# Patient Record
Sex: FEMALE | Race: WHITE | NOT HISPANIC OR LATINO | Employment: OTHER | URBAN - METROPOLITAN AREA
[De-identification: names, ages, dates, MRNs, and addresses within clinical notes are randomized per-mention and may not be internally consistent; named-entity substitution may affect disease eponyms.]

---

## 2017-01-01 ENCOUNTER — HOSPITAL ENCOUNTER (EMERGENCY)
Facility: HOSPITAL | Age: 37
Discharge: HOME | End: 2017-01-01
Attending: FAMILY MEDICINE
Payer: MEDICAID

## 2017-01-01 VITALS
OXYGEN SATURATION: 99 % | HEIGHT: 62 IN | WEIGHT: 293 LBS | SYSTOLIC BLOOD PRESSURE: 136 MMHG | HEART RATE: 90 BPM | BODY MASS INDEX: 53.92 KG/M2 | TEMPERATURE: 98 F | RESPIRATION RATE: 18 BRPM | DIASTOLIC BLOOD PRESSURE: 72 MMHG

## 2017-01-01 DIAGNOSIS — N39.0 URINARY TRACT INFECTION WITHOUT HEMATURIA, SITE UNSPECIFIED: Primary | ICD-10-CM

## 2017-01-01 DIAGNOSIS — R05.9 COUGH: ICD-10-CM

## 2017-01-01 LAB
B-HCG UR QL: NEGATIVE
BACTERIA #/AREA URNS AUTO: ABNORMAL /HPF
BILIRUB UR QL STRIP: NEGATIVE
CLARITY UR REFRACT.AUTO: CLEAR
COLOR UR AUTO: ABNORMAL
GLUCOSE UR QL STRIP: ABNORMAL
HGB UR QL STRIP: ABNORMAL
KETONES UR QL STRIP: NEGATIVE
LEUKOCYTE ESTERASE UR QL STRIP: ABNORMAL
MICROSCOPIC COMMENT: ABNORMAL
NITRITE UR QL STRIP: NEGATIVE
PH UR STRIP: 7 [PH] (ref 5–8)
PROT UR QL STRIP: NEGATIVE
RBC #/AREA URNS AUTO: 2 /HPF (ref 0–4)
SP GR UR STRIP: 1.01 (ref 1–1.03)
URN SPEC COLLECT METH UR: ABNORMAL
UROBILINOGEN UR STRIP-ACNC: NEGATIVE EU/DL
WBC #/AREA URNS AUTO: 10 /HPF (ref 0–5)
WBC CLUMPS UR QL AUTO: ABNORMAL
YEAST UR QL AUTO: ABNORMAL

## 2017-01-01 PROCEDURE — 87086 URINE CULTURE/COLONY COUNT: CPT

## 2017-01-01 PROCEDURE — 81000 URINALYSIS NONAUTO W/SCOPE: CPT

## 2017-01-01 PROCEDURE — 87077 CULTURE AEROBIC IDENTIFY: CPT

## 2017-01-01 PROCEDURE — 25000003 PHARM REV CODE 250: Performed by: FAMILY MEDICINE

## 2017-01-01 PROCEDURE — 87186 SC STD MICRODIL/AGAR DIL: CPT

## 2017-01-01 PROCEDURE — 81025 URINE PREGNANCY TEST: CPT

## 2017-01-01 PROCEDURE — 99284 EMERGENCY DEPT VISIT MOD MDM: CPT

## 2017-01-01 PROCEDURE — 87088 URINE BACTERIA CULTURE: CPT

## 2017-01-01 RX ORDER — TRAMADOL HYDROCHLORIDE 50 MG/1
50 TABLET ORAL EVERY 6 HOURS PRN
Qty: 12 TABLET | Refills: 0 | Status: SHIPPED | OUTPATIENT
Start: 2017-01-01 | End: 2017-01-11

## 2017-01-01 RX ORDER — SULFAMETHOXAZOLE AND TRIMETHOPRIM 800; 160 MG/1; MG/1
1 TABLET ORAL 2 TIMES DAILY
Qty: 14 TABLET | Refills: 0 | Status: SHIPPED | OUTPATIENT
Start: 2017-01-01 | End: 2017-01-08

## 2017-01-01 RX ORDER — SULFAMETHOXAZOLE AND TRIMETHOPRIM 800; 160 MG/1; MG/1
1 TABLET ORAL
Status: COMPLETED | OUTPATIENT
Start: 2017-01-01 | End: 2017-01-01

## 2017-01-01 RX ADMIN — SULFAMETHOXAZOLE AND TRIMETHOPRIM 1 TABLET: 800; 160 TABLET ORAL at 09:01

## 2017-01-01 NOTE — ED AVS SNAPSHOT
[facility]  [address] [address]               Lele Dias   2017  8:12 PM   ED    Description:  Female : 1980   Department:  [facility]           Your Care was Coordinated By:     Provider Role From Binu Cochran MD Attending Provider [phone] --      Reason for Visit     Flank Pain     Cough           Diagnoses this Visit        Comments    Urinary tract infection without hematuria, site unspecified    -  Primary     Cough           ED Disposition     ED Disposition Condition Comment    Discharge             To Do List           Follow-up Information     Schedule an appointment as soon as possible for a visit with Kari Edge MD.    Specialty:  Emergency Medicine    Why:  If symptoms worsen    Contact information:    [address]  [facility] [address]  [phone]         These Medications        Disp Refills Start End    sulfamethoxazole-trimethoprim 800-160mg (BACTRIM DS) 800-160 mg Tab 14 tablet 0 2017    Take 1 tablet by mouth 2 (two) times daily. - Oral    Pharmacy: [facility] #[place]  [address] Ph #: [phone]       tramadol (ULTRAM) 50 mg tablet 12 tablet 0 2017    Take 1 tablet (50 mg total) by mouth every 6 (six) hours as needed for Pain. With a tylenol prn pain - Oral    Pharmacy: [employer] [facility] [address] [address] Ph #: [phone]         [facility]     [facility] -  Assistance  Registered nurses in the [facility] provide clinical advisement, health education, appointment booking, and other advisory services.  Call for this free service at [phone].             Medications           Message regarding Medications     Verify the changes and/or additions to your medication regime listed below are the same as discussed with your clinician today.  If any  of these changes or additions are incorrect, please notify your healthcare provider.        START taking these NEW medications        Refills    sulfamethoxazole-trimethoprim 800-160mg (BACTRIM DS) 800-160 mg Tab 0    Sig: Take 1 tablet by mouth 2 (two) times daily.    Class: Normal    Route: Oral    tramadol (ULTRAM) 50 mg tablet 0    Sig: Take 1 tablet (50 mg total) by mouth every 6 (six) hours as needed for Pain. With a tylenol prn pain    Class: Print    Route: Oral      STOP taking these medications     carvedilol (COREG) 3.125 MG tablet Take 1 tablet (3.125 mg total) by mouth 2 (two) times daily.    diclofenac (VOLTAREN) 75 MG EC tablet Take 1 tablet (75 mg total) by mouth 2 (two) times daily as needed (Pain).    naproxen (NAPROSYN) 500 MG tablet Take 1 tablet (500 mg total) by mouth 2 (two) times daily with meals.           Verify that the below list of medications is an accurate representation of the medications you are currently taking.  If none reported, the list may be blank. If incorrect, please contact your healthcare provider. Carry this list with you in case of emergency.           Current Medications     albuterol (ACCUNEB) 1.25 mg/3 mL Nebu Take 1.25 mg by nebulization every 4 (four) hours as needed.    amitriptyline (ELAVIL) 50 MG tablet Take 50 mg by mouth nightly as needed for Insomnia.    buprenorphine-naloxone 2-0.5 mg (SUBOXONE) 2-0.5 mg Subl Place 8 mg under the tongue once daily.    fluticasone-salmeterol 100-50 mcg/dose (ADVAIR) 100-50 mcg/dose diskus inhaler Inhale 1 puff into the lungs 2 (two) times daily.    hydrOXYzine pamoate (VISTARIL) 50 MG Cap Take 50 mg by mouth every 8 (eight) hours as needed.    insulin glargine (LANTUS) 100 unit/mL injection Inject 120 Units into the skin 2 (two) times daily.     insulin lispro (HUMALOG) 100 unit/mL injection Inject 95 Units into the skin 3 (three) times daily before meals.     lisinopril (PRINIVIL,ZESTRIL) 20 MG tablet Take 20 mg by mouth once  "daily.     omeprazole (PRILOSEC) 40 MG capsule Take 40 mg by mouth every morning.     pregabalin (LYRICA) 150 MG capsule Take 300 mg by mouth 2 (two) times daily.    promethazine (PHENERGAN) 25 MG tablet Take 25 mg by mouth every 4 (four) hours.    sulfamethoxazole-trimethoprim 800-160mg (BACTRIM DS) 800-160 mg Tab Take 1 tablet by mouth 2 (two) times daily.    tramadol (ULTRAM) 50 mg tablet Take 1 tablet (50 mg total) by mouth every 6 (six) hours as needed for Pain. With a tylenol prn pain           Clinical Reference Information           Your Vitals Were     BP Pulse Temp Resp Height Weight    136/72 (BP Location: Right arm, Patient Position: Sitting) 90 97.6 °F (36.4 °C) (Oral) 18 5' 2" (1.575 m) 136.1 kg (300 lb)    SpO2 BMI             99% 54.87 kg/m2         Allergies as of 1/1/2017        Reactions    Celexa [Citalopram] Nausea And Vomiting      Immunizations Administered on Date of Encounter - 1/1/2017     None      ED Micro, Lab, POCT     Start Ordered       Status Ordering Provider    01/01/17 2023 01/01/17 2022  Urinalysis  STAT      Final result     01/01/17 2023 01/01/17 2022  UPT (Pregnancy, urine rapid)  STAT      Final result     01/01/17 2022 01/01/17 2022  Urinalysis Microscopic  Once      Final result       ED Imaging Orders     Start Ordered       Status Ordering Provider    01/01/17 2025 01/01/17 2025  X-Ray Chest PA And Lateral  1 time imaging      Final result       Discharge References/Attachments     URINARY TRACT INFECTIONS IN WOMEN (ENGLISH)      [facility] Sign-Up     Activating your [facility] account is as easy as 1-2-3!     1) Visit [email or web address], select Sign Up Now, enter this activation code and your date of birth, then select Next.  [email or web address]  Expires: 2/15/2017  9:41 PM      2) Create a username and password to use when you visit [facility] in the future and select a security question in case you lose your password and select Next.    3) Enter your e-mail address and click " Sign Up!    Additional Information  If you have questions, please e-mail [email or web address] or call [phone] to talk to our [facility] staff. Remember, [facility] is NOT to be used for urgent needs. For medical emergencies, dial 911.         Smoking Cessation     If you would like to quit smoking:   You may be eligible for free services if you are a [place] resident and started smoking cigarettes before September 1, 1988.  Call the Smoking Cessation Trust ([facility]) toll free at [phone] or [phone].   Call 4-474-QUIT-NOW if you do not meet the above criteria.             [facility] complies with applicable Federal civil rights laws and does not discriminate on the basis of race, color, national origin, age, disability, or sex.        Language Assistance Services     ATTENTION: Language assistance services are available, free of charge. Please call [phone].      ATENCIÓN: Si habla español, tiene a moore disposición servicios gratuitos de asistencia lingüística. Llame al [phone].     CHÚ Ý: N?u b?n nói Ti?ng Vi?t, có các d?ch v? h? tr? ngôn ng? mi?n phí leylah cho b?n. G?i s? [phone].

## 2017-01-02 NOTE — ED NOTES
"When nurse attempt to give pt her tramadol rx pt hand it back to nurse and state "I can't have this. Im on suboxone and I don't want any issues with my doctor." Rx voided and shredded per Dr. Cochran instruction  "

## 2017-01-02 NOTE — ED PROVIDER NOTES
Encounter Date: 2017       History     Chief Complaint   Patient presents with    Flank Pain     Pt state she has been having pain in the right side x1 week    Cough     Review of patient's allergies indicates:   Allergen Reactions    Celexa [citalopram] Nausea And Vomiting     HPI Comments: Patient's a 36-year-old white female comes in complaining of right flank pain of approximately one week duration.  Patient states this is waxed and waned for a week along with some intermittent nausea and vomiting.  Now she states as a dull ache aggravated when she coughs.  She's had no hematuria or dysuria of note.  The patient is on Depo-Provera.    The history is provided by the patient.     Past Medical History   Diagnosis Date    Asthma     Depression     Diabetes mellitus, type II     Diastolic dysfunction     E. 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   Respiratory: Positive for cough. Negative for shortness of breath.    Gastrointestinal: Positive for nausea and  vomiting. Negative for diarrhea.   Genitourinary: Positive for flank pain. Negative for frequency, hematuria, vaginal bleeding and vaginal discharge.   All other systems reviewed and are negative.      Physical Exam   Initial Vitals   BP Pulse Resp Temp SpO2   01/01/17 2014 01/01/17 2014 01/01/17 2014 01/01/17 2014 01/01/17 2014   136/72 90 18 97.6 °F (36.4 °C) 99 %     Physical Exam    Nursing note and vitals reviewed.  Constitutional: She appears well-developed.   Morbidly obese white female in no respiratory distress   HENT:   Head: Normocephalic.   Eyes: Pupils are equal, round, and reactive to light.   Neck: Neck supple.   Cardiovascular: Normal rate and regular rhythm. Exam reveals no friction rub.    Pulmonary/Chest: Breath sounds normal. No respiratory distress. She has no wheezes. She has no rhonchi. She has no rales.   Abdominal: She exhibits no distension. There is no tenderness.   Musculoskeletal: Normal range of motion.   Left arm amputated above elbow   Neurological: She is alert and oriented to person, place, and time. She has normal strength. No sensory deficit.   Skin: Skin is warm and dry.   Psychiatric: She has a normal mood and affect.         ED Course   Procedures  Labs Reviewed   URINALYSIS - Abnormal; Notable for the following:        Result Value    Glucose, UA 4+ (*)     Occult Blood UA 1+ (*)     Leukocytes, UA 1+ (*)     All other components within normal limits   URINALYSIS MICROSCOPIC - Abnormal; Notable for the following:     WBC, UA 10 (*)     WBC Clumps, UA Occasional (*)     Bacteria, UA Moderate (*)     All other components within normal limits   PREGNANCY TEST, URINE RAPID             Medical Decision Making:   Clinical Tests:   Lab Tests: Ordered and Reviewed  The following lab test(s) were unremarkable: UPT       <> Summary of Lab: 10 WBCs per high-powered field of urine.  WBCs in clumps                   ED Course     Clinical Impression:   The primary encounter diagnosis was  Urinary tract infection without hematuria, site unspecified. A diagnosis of Cough was also pertinent to this visit.          RONA Cochran MD  01/01/17 3725

## 2017-01-05 LAB — BACTERIA UR CULT: NORMAL

## 2017-01-08 ENCOUNTER — HOSPITAL ENCOUNTER (EMERGENCY)
Facility: HOSPITAL | Age: 37
Discharge: HOME | End: 2017-01-08
Attending: EMERGENCY MEDICINE
Payer: MEDICAID

## 2017-01-08 VITALS
SYSTOLIC BLOOD PRESSURE: 156 MMHG | HEART RATE: 94 BPM | BODY MASS INDEX: 53.92 KG/M2 | RESPIRATION RATE: 18 BRPM | DIASTOLIC BLOOD PRESSURE: 69 MMHG | WEIGHT: 293 LBS | HEIGHT: 62 IN | TEMPERATURE: 98 F | OXYGEN SATURATION: 100 %

## 2017-01-08 DIAGNOSIS — L02.91 ABSCESS: Primary | ICD-10-CM

## 2017-01-08 PROCEDURE — 99283 EMERGENCY DEPT VISIT LOW MDM: CPT

## 2017-01-08 RX ORDER — SULFAMETHOXAZOLE AND TRIMETHOPRIM 800; 160 MG/1; MG/1
1 TABLET ORAL 2 TIMES DAILY
Qty: 14 TABLET | Refills: 0 | Status: SHIPPED | OUTPATIENT
Start: 2017-01-08 | End: 2017-01-15

## 2017-01-08 RX ORDER — DOXYCYCLINE 100 MG/1
100 CAPSULE ORAL 2 TIMES DAILY
Qty: 20 CAPSULE | Refills: 0 | Status: SHIPPED | OUTPATIENT
Start: 2017-01-08 | End: 2017-01-18

## 2017-01-08 NOTE — DISCHARGE INSTRUCTIONS
Abscess (Antibiotic Treatment Only)  An abscess (sometimes called a boil) occurs when bacteria get trapped under the skin and begin to grow. Pus forms inside the abscess as the body responds to the bacteria. An abscess can occur with an insect bite, ingrown hair, blocked oil gland, pimple, cyst, or puncture wound.  In the early stages, redness and tenderness are the only symptoms. Sometimes, this stage can be treated with antibiotics alone. If the abscess does not respond to antibiotic treatment, it will need to be drained with a small cut, under local anesthesia.  Home care  The following will help you care for your abscess at home:  · Soak the wound in hot water or apply hot packs (small towel soaked in hot water) to the area for 20 minutes at a time. Do this 3 to 4 times a day.  · Do not lakia, squeeze, or pop the boil yourself.  · Apply antibiotic cream or ointment onto the skin 3 to 4 times a day, unless something else was prescribed. Some ointments include an antibiotic plus a local pain reliever.  · If your doctor prescribed antibiotics, do not stop taking this medication until you have finished the medication or the doctor tells you to stop.  · You may use an over-the-counter pain medication to control pain, unless another pain medicine was prescribed. If you have chronic liver or kidney disease or ever had a stomach ulcer or gastrointestinal bleeding, talk with your doctor before using these any of these.  Follow-up care  Follow up with your health care provider as advised by our staff. Look at your wound each day for the signs of worsening infection listed below.  When to seek medical care  Get prompt medical attention if any of the following occur:  · An increase in redness or swelling  · Red streaks in the skin leading away from the abscess  · An increase in local pain or swelling  · Fever of 100.4ºF (38ºC) or higher, or as directed by your health care provider  · Pus or fluid coming from the  abscess  · Boil returns after getting better  © 2257-5865 [employer]. [address] [place] [address], [place] [address]. All rights reserved. This information is not intended as a substitute for professional medical care. Always follow your healthcare professional's instructions.

## 2017-01-08 NOTE — ED PROVIDER NOTES
"Encounter Date: 2017       History     Chief Complaint   Patient presents with    Abscess     Pt states "I have a boil on my vagina, about 3 days"     Review of patient's allergies indicates:   Allergen Reactions    Celexa [citalopram] Nausea And Vomiting     The history is provided by the patient. No  was used.    Patient complains of sinus on the knee region above her vagina for 3 days.  Patient denies any nausea vomiting or diarrhea.  Patient denies any fever.  Patient states that she has had drainage from the site.  Patient states that she has been taking Amoxil for her abscess.  Patient states the pain is 8 out of 10.  States she has been taking Amoxil for 3 days.    Past Medical History   Diagnosis Date    Asthma     Depression     Diabetes mellitus, type II     Diastolic dysfunction     E. 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      Physical Exam   Initial Vitals   BP Pulse Resp Temp SpO2   17 1402 17 1402 " 01/08/17 1402 01/08/17 1402 01/08/17 1402   183/106 97 20 98 °F (36.7 °C) 100 %     Physical Exam    Nursing note and vitals reviewed.  Constitutional: She appears well-developed and well-nourished.   HENT:   Head: Normocephalic and atraumatic.   Neck: Normal range of motion.   Pulmonary/Chest: Breath sounds normal.   Musculoskeletal: Normal range of motion.   Neurological: She is alert and oriented to person, place, and time.   Skin: Skin is warm and dry. Abscess noted. There is erythema.   Psychiatric: She has a normal mood and affect.         ED Course   Procedures  Labs Reviewed - No data to display                            ED Course     Clinical Impression:   The encounter diagnosis was Abscess.          Sumanth Vaz MD  01/08/17 [id number]

## 2017-01-08 NOTE — ED AVS SNAPSHOT
[facility]  [address] [place] [address]               Lele Dias   2017  1:58 PM   ED    Description:  Female : 1980   Department:  [facility]           Your Care was Coordinated By:     Provider Role From To    Sumanth Vaz MD Attending Provider 17 1356 --      Reason for Visit     Abscess           Diagnoses this Visit        Comments    Abscess    -  Primary       ED Disposition     ED Disposition Condition Comment    Discharge             To Do List           Follow-up Information     Follow up with Kari Edge MD In 1 week.    Specialty:  Emergency Medicine    Contact information:    Michael LYMAN  [facility] [address]  [phone]         These Medications        Disp Refills Start End    doxycycline (VIBRAMYCIN) 100 MG Cap 20 capsule 0 2017    Take 1 capsule (100 mg total) by mouth 2 (two) times daily. - Oral    sulfamethoxazole-trimethoprim 800-160mg (BACTRIM DS) 800-160 mg Tab 14 tablet 0 2017 1/15/2017    Take 1 tablet by mouth 2 (two) times daily. - Oral      [facility]     [facility] On Call Nurse Care Line -  Assistance  Registered nurses in the [facility] provide clinical advisement, health education, appointment booking, and other advisory services.  Call for this free service at [phone].             Medications           Message regarding Medications     Verify the changes and/or additions to your medication regime listed below are the same as discussed with your clinician today.  If any of these changes or additions are incorrect, please notify your healthcare provider.        START taking these NEW medications        Refills    doxycycline (VIBRAMYCIN) 100 MG Cap 0    Sig: Take 1 capsule (100 mg total) by mouth 2 (two) times daily.    Class: Print    Route: Oral           Verify that the below list of medications is an accurate representation of  "the medications you are currently taking.  If none reported, the list may be blank. If incorrect, please contact your healthcare provider. Carry this list with you in case of emergency.           Current Medications     albuterol (ACCUNEB) 1.25 mg/3 mL Nebu Take 1.25 mg by nebulization every 4 (four) hours as needed.    amitriptyline (ELAVIL) 50 MG tablet Take 50 mg by mouth nightly as needed for Insomnia.    buprenorphine-naloxone 2-0.5 mg (SUBOXONE) 2-0.5 mg Subl Place 8 mg under the tongue once daily.    doxycycline (VIBRAMYCIN) 100 MG Cap Take 1 capsule (100 mg total) by mouth 2 (two) times daily.    fluticasone-salmeterol 100-50 mcg/dose (ADVAIR) 100-50 mcg/dose diskus inhaler Inhale 1 puff into the lungs 2 (two) times daily.    hydrOXYzine pamoate (VISTARIL) 50 MG Cap Take 50 mg by mouth every 8 (eight) hours as needed.    insulin glargine (LANTUS) 100 unit/mL injection Inject 120 Units into the skin 2 (two) times daily.     insulin lispro (HUMALOG) 100 unit/mL injection Inject 95 Units into the skin 3 (three) times daily before meals.     lisinopril (PRINIVIL,ZESTRIL) 20 MG tablet Take 20 mg by mouth once daily.     omeprazole (PRILOSEC) 40 MG capsule Take 40 mg by mouth every morning.     pregabalin (LYRICA) 150 MG capsule Take 300 mg by mouth 2 (two) times daily.    promethazine (PHENERGAN) 25 MG tablet Take 25 mg by mouth every 4 (four) hours.    sulfamethoxazole-trimethoprim 800-160mg (BACTRIM DS) 800-160 mg Tab Take 1 tablet by mouth 2 (two) times daily.    tramadol (ULTRAM) 50 mg tablet Take 1 tablet (50 mg total) by mouth every 6 (six) hours as needed for Pain. With a tylenol prn pain           Clinical Reference Information           Your Vitals Were     BP Pulse Temp Resp Height Weight    183/106 97 98 °F (36.7 °C) 20 5' 2" (1.575 m) 136.1 kg (300 lb)    SpO2 BMI             100% 54.87 kg/m2         Allergies as of 1/8/2017        Reactions    Celexa [Citalopram] Nausea And Vomiting    "   Immunizations Administered on Date of Encounter - 1/8/2017     None      ED Micro, Lab, POCT     None      ED Imaging Orders     None        Discharge Instructions         Abscess (Antibiotic Treatment Only)  An abscess (sometimes called a boil) occurs when bacteria get trapped under the skin and begin to grow. Pus forms inside the abscess as the body responds to the bacteria. An abscess can occur with an insect bite, ingrown hair, blocked oil gland, pimple, cyst, or puncture wound.  In the early stages, redness and tenderness are the only symptoms. Sometimes, this stage can be treated with antibiotics alone. If the abscess does not respond to antibiotic treatment, it will need to be drained with a small cut, under local anesthesia.  Home care  The following will help you care for your abscess at home:  · Soak the wound in hot water or apply hot packs (small towel soaked in hot water) to the area for 20 minutes at a time. Do this 3 to 4 times a day.  · Do not lakia, squeeze, or pop the boil yourself.  · Apply antibiotic cream or ointment onto the skin 3 to 4 times a day, unless something else was prescribed. Some ointments include an antibiotic plus a local pain reliever.  · If your doctor prescribed antibiotics, do not stop taking this medication until you have finished the medication or the doctor tells you to stop.  · You may use an over-the-counter pain medication to control pain, unless another pain medicine was prescribed. If you have chronic liver or kidney disease or ever had a stomach ulcer or gastrointestinal bleeding, talk with your doctor before using these any of these.  Follow-up care  Follow up with your health care provider as advised by our staff. Look at your wound each day for the signs of worsening infection listed below.  When to seek medical care  Get prompt medical attention if any of the following occur:  · An increase in redness or swelling  · Red streaks in the skin leading away from  the abscess  · An increase in local pain or swelling  · Fever of 100.4ºF (38ºC) or higher, or as directed by your health care provider  · Pus or fluid coming from the abscess  · Boil returns after getting better  © 6101-5212 [employer]. [address] [place] [address]. All rights reserved. This information is not intended as a substitute for professional medical care. Always follow your healthcare professional's instructions.          [facility] Sign-Up     Activating your [facility] account is as easy as 1-2-3!     1) Visit [email or web address], select Sign Up Now, enter this activation code and your date of birth, then select Next.  [email or web address]  Expires: 2/15/2017  9:41 PM      2) Create a username and password to use when you visit [facility] in the future and select a security question in case you lose your password and select Next.    3) Enter your e-mail address and click Sign Up!    Additional Information  If you have questions, please e-mail [email or web address] or call [phone] to talk to our [facility] staff. Remember, [facility] is NOT to be used for urgent needs. For medical emergencies, dial 911.         Smoking Cessation     If you would like to quit smoking:   You may be eligible for free services if you are a [place] resident and started smoking cigarettes before September 1, 1988.  Call the Smoking Cessation Trust ([facility]) toll free at (234) 751-9720 or (127) 704-6905.   Call 0-800-QUIT-NOW if you do not meet the above criteria.             [facility] complies with applicable Federal civil rights laws and does not discriminate on the basis of race, color, national origin, age, disability, or sex.        Language Assistance Services     ATTENTION: Language assistance services are available, free of charge. Please call [phone].      ATENCIÓN: Si habla español, tiene a moore disposición servicios gratuitos de asistencia lingüística. Llame al  [phone].     SUREKHA Ý: N?u b?n nói Ti?ng Vi?t, có các d?ch v? h? tr? ngôn ng? mi?n phí dành cho b?n. G?i s? [phone].

## 2017-04-27 ENCOUNTER — HOSPITAL ENCOUNTER (EMERGENCY)
Facility: HOSPITAL | Age: 37
Discharge: HOME | End: 2017-04-28
Attending: EMERGENCY MEDICINE
Payer: MEDICAID

## 2017-04-27 DIAGNOSIS — N39.0 LOWER URINARY TRACT INFECTIOUS DISEASE: Primary | ICD-10-CM

## 2017-04-27 DIAGNOSIS — F11.10 DRUG ABUSE, OPIOID TYPE: ICD-10-CM

## 2017-04-27 LAB
AMPHET+METHAMPHET UR QL: NEGATIVE
ANION GAP SERPL CALC-SCNC: 13 MMOL/L
B-HCG UR QL: NEGATIVE
BACTERIA #/AREA URNS AUTO: ABNORMAL /HPF
BARBITURATES UR QL SCN>200 NG/ML: NORMAL
BENZODIAZ UR QL SCN>200 NG/ML: NORMAL
BILIRUB UR QL STRIP: NEGATIVE
BUN SERPL-MCNC: 21 MG/DL
BZE UR QL SCN: NEGATIVE
CALCIUM SERPL-MCNC: 9.4 MG/DL
CANNABINOIDS UR QL SCN: NEGATIVE
CHLORIDE SERPL-SCNC: 106 MMOL/L
CLARITY UR REFRACT.AUTO: ABNORMAL
CO2 SERPL-SCNC: 19 MMOL/L
COLOR UR AUTO: ABNORMAL
CREAT SERPL-MCNC: 1.45 MG/DL
CREAT UR-MCNC: 282.6 MG/DL
EST. GFR  (AFRICAN AMERICAN): 52.3 ML/MIN/1.73 M^2
EST. GFR  (NON AFRICAN AMERICAN): 45.4 ML/MIN/1.73 M^2
GLUCOSE SERPL-MCNC: 300 MG/DL
GLUCOSE UR QL STRIP: ABNORMAL
HGB UR QL STRIP: ABNORMAL
HYALINE CASTS UR QL AUTO: 7 /LPF
KETONES UR QL STRIP: NEGATIVE
LEUKOCYTE ESTERASE UR QL STRIP: ABNORMAL
METHADONE UR QL SCN>300 NG/ML: NEGATIVE
MICROSCOPIC COMMENT: ABNORMAL
NITRITE UR QL STRIP: NEGATIVE
OPIATES UR QL SCN: NORMAL
PCP UR QL SCN>25 NG/ML: NEGATIVE
PH UR STRIP: 5 [PH] (ref 5–8)
POCT GLUCOSE: 271 MG/DL (ref 70–110)
POTASSIUM SERPL-SCNC: 3.9 MMOL/L
PROT UR QL STRIP: ABNORMAL
RBC #/AREA URNS AUTO: 4 /HPF (ref 0–4)
SODIUM SERPL-SCNC: 138 MMOL/L
SP GR UR STRIP: 1.02 (ref 1–1.03)
SQUAMOUS #/AREA URNS AUTO: ABNORMAL /HPF
TOXICOLOGY INFORMATION: NORMAL
URN SPEC COLLECT METH UR: ABNORMAL
UROBILINOGEN UR STRIP-ACNC: NEGATIVE EU/DL
WBC #/AREA URNS AUTO: 6 /HPF (ref 0–5)

## 2017-04-27 PROCEDURE — 82962 GLUCOSE BLOOD TEST: CPT

## 2017-04-27 PROCEDURE — 85027 COMPLETE CBC AUTOMATED: CPT

## 2017-04-27 PROCEDURE — 99284 EMERGENCY DEPT VISIT MOD MDM: CPT | Mod: 25

## 2017-04-27 PROCEDURE — 81000 URINALYSIS NONAUTO W/SCOPE: CPT

## 2017-04-27 PROCEDURE — 82570 ASSAY OF URINE CREATININE: CPT

## 2017-04-27 PROCEDURE — 80048 BASIC METABOLIC PNL TOTAL CA: CPT

## 2017-04-27 PROCEDURE — 81025 URINE PREGNANCY TEST: CPT

## 2017-04-27 PROCEDURE — 85007 BL SMEAR W/DIFF WBC COUNT: CPT

## 2017-04-27 NOTE — ED AVS SNAPSHOT
[facility]  [address] [place] [address]               Lele Dias   2017 10:01 PM   ED    Description:  Female : 1980   Department:  [facility]           Your Care was Coordinated By:     Provider Role From To    Lana Ramires MD Attending Provider [phone] --      Reason for Visit     Dizziness           Diagnoses this Visit        Comments    Lower urinary tract infectious disease    -  Primary     Drug abuse, opioid type           ED Disposition     ED Disposition Condition Comment    Discharge             To Do List           Follow-up Information     Follow up with Kari Edge MD.    Specialty:  Emergency Medicine    Contact information:    Michael DON[facility] [address]  [phone]         These Medications        Disp Refills Start End    nitrofurantoin, macrocrystal-monohydrate, (MACROBID) 100 MG capsule 14 capsule 0 2017     Take 1 capsule (100 mg total) by mouth 2 (two) times daily. - Oral    Pharmacy: [address] #: [phone]         [facility] On Call     [facility] Care [facility] -  Assistance  Unless otherwise directed by your provider, please contact [facility], our nurse care line that is available for  assistance.     Registered nurses in the [facility] provide: appointment scheduling, clinical advisement, health education, and other advisory services.  Call: [phone] (toll free)               Medications           Message regarding Medications     Verify the changes and/or additions to your medication regime listed below are the same as discussed with your clinician today.  If any of these changes or additions are incorrect, please notify your healthcare provider.        START taking these NEW medications        Refills    nitrofurantoin, macrocrystal-monohydrate, (MACROBID) 100 MG capsule 0     "Sig: Take 1 capsule (100 mg total) by mouth 2 (two) times daily.    Class: Print    Route: Oral           Verify that the below list of medications is an accurate representation of the medications you are currently taking.  If none reported, the list may be blank. If incorrect, please contact your healthcare provider. Carry this list with you in case of emergency.           Current Medications     albuterol (ACCUNEB) 1.25 mg/3 mL Nebu Take 1.25 mg by nebulization every 4 (four) hours as needed.    amitriptyline (ELAVIL) 50 MG tablet Take 50 mg by mouth nightly as needed for Insomnia.    buprenorphine-naloxone 2-0.5 mg (SUBOXONE) 2-0.5 mg Subl Place 8 mg under the tongue once daily.    fluticasone-salmeterol 100-50 mcg/dose (ADVAIR) 100-50 mcg/dose diskus inhaler Inhale 1 puff into the lungs 2 (two) times daily.    hydrOXYzine pamoate (VISTARIL) 50 MG Cap Take 50 mg by mouth every 8 (eight) hours as needed.    insulin glargine (LANTUS) 100 unit/mL injection Inject 120 Units into the skin 2 (two) times daily.     insulin lispro (HUMALOG) 100 unit/mL injection Inject 95 Units into the skin 3 (three) times daily before meals.     lisinopril (PRINIVIL,ZESTRIL) 20 MG tablet Take 20 mg by mouth once daily.     nitrofurantoin, macrocrystal-monohydrate, (MACROBID) 100 MG capsule Take 1 capsule (100 mg total) by mouth 2 (two) times daily.    omeprazole (PRILOSEC) 40 MG capsule Take 40 mg by mouth every morning.     pregabalin (LYRICA) 150 MG capsule Take 300 mg by mouth 2 (two) times daily.    promethazine (PHENERGAN) 25 MG tablet Take 25 mg by mouth every 4 (four) hours.           Clinical Reference Information           Your Vitals Were     BP Pulse Temp Resp Height Weight    127/91 (BP Location: Right arm, Patient Position: Sitting, BP Method: Automatic) 76 98.4 °F (36.9 °C) (Oral) 18 5' 2" (1.575 m) 148.8 kg (328 lb)    SpO2 BMI             96% 59.99 kg/m2         Allergies as of 4/28/2017        Reactions    Celexa " "[Citalopram] Nausea And Vomiting      Immunizations Administered on Date of Encounter - 4/28/2017     None      ED Micro, Lab, POCT     Start Ordered       Status Ordering Provider    04/27/17 2244 04/27/17 2243  UPT (Pregnancy, urine rapid)  STAT      Final result     04/27/17 2244 04/27/17 2243  Urinalysis  STAT      Final result     04/27/17 2244 04/27/17 2243  Drug screen panel, urine emergency  Once      Final result     04/27/17 2243 04/27/17 2243  Urinalysis Microscopic  Once      Final result     04/27/17 2228 04/27/17 2227  Basic metabolic panel  STAT      Final result     04/27/17 2228 04/27/17 2227  CBC auto differential  STAT      Final result     04/27/17 2216 04/27/17 2216  POCT glucose  Once      Final result     04/27/17 2210 04/27/17 2209  POCT glucose  Once      Acknowledged       ED Imaging Orders     None        Discharge Instructions           Bladder Infection, Female (Adult)    Urine is normally doesn't have any bacteria in it. But bacteria can get into the urinary tract from the skin around the rectum. Or they can travel in the blood from elsewhere in the body. Once they are in your urinary tract, they can cause infection in the urethra (urethritis), the bladder (cystitis), or the kidneys (pyelonephritis).  The most common place for an infection is in the bladder. This is called a bladder infection. This is one of the most common infections in women. Most bladder infections are easily treated. They are not serious unless the infection spreads to the kidney.  The phrases "bladder infection," "UTI," and "cystitis" are often used to describe the same thing. But they are not always the same. Cystitis is an inflammation of the bladder. The most common cause of cystitis is an infection.  Symptoms  The infection causes inflammation in the urethra and bladder. This causes many of the symptoms. The most common symptoms of a bladder infection are:  · Pain or burning when urinating  · Having to urinate " more often than usual  · Urgent need to urinate  · Only a small amount of urine comes out  · Blood in urine  · Abdominal discomfort. This is usually in the lower abdomen above the pubic bone.  · Cloudy urine  · Strong- or bad-smelling urine  · Unable to urinate (urinary retention)  · Unable to hold urine in (urinary incontinence)  · Fever  · Loss of appetite  · Confusion (in older adults)  Causes  Bladder infections are not contagious. You can't get one from someone else, from a toilet seat, or from sharing a bath.  The most common cause of bladder infections is bacteria from the bowels. The bacteria get onto the skin around the opening of the urethra. From there, they can get into the urine and travel up to the bladder, causing inflammation and infection. This usually happens because of:  · Wiping improperly after urinating. Always wipe from front to back.  · Bowel incontinence  · Pregnancy  · Procedures such as having a catheter inserted  · Older age  · Not emptying your bladder. This can allow bacteria a chance to grow in your urine.  · Dehydration  · Constipation  · Sex  · Use of a diaphragm for birth control   Treatment  Bladder infections are diagnosed by a urine test. They are treated with antibiotics and usually clear up quickly without complications. Treatment helps prevent a more serious kidney infection.  Medicines  Medicines can help in the treatment of a bladder infection:  · Take antibiotics until they are used up, even if you feel better. It is important to finish them to make sure the infection has cleared.  · You can use acetaminophen or ibuprofen for pain, fever, or discomfort, unless another medicine was prescribed. If you have chronic liver or kidney disease, talk with your healthcare provider before using these medicines. Also talk with your provider if you've ever had a stomach ulcer or gastrointestinal bleeding, or are taking blood-thinner medicines.  · If you are given phenazopydridine to  reduce burning with urination, it will cause your urine to become a bright orange color. This can stain clothing.  Care and prevention  These self-care steps can help prevent future infections:  · Drink plenty of fluids to prevent dehydration and flush out your bladder. Do this unless you must restrict fluids for other health reasons, or your doctor told you not to.  · Proper cleaning after going to the bathroom is important. Wipe from front to back after using the toilet to prevent the spread of bacteria.  · Urinate more often. Don't try to hold urine in for a long time.  · Wear loose-fitting clothes and cotton underwear. Avoid tight-fitting pants.  · Improve your diet and prevent constipation. Eat more fresh fruit and vegetables, and fiber, and less junk and fatty foods.  · Avoid sex until your symptoms are gone.  · Avoid caffeine, alcohol, and spicy foods. These can irritate your bladder.  · Urinate right after intercourse to flush out your bladder.  · If you use birth control pills and have frequent bladder infections, discuss it with your doctor.  Follow-up care  Call your healthcare provider if all symptoms are not gone after 3 days of treatment. This is especially important if you have repeat infections.  If a culture was done, you will be told if your treatment needs to be changed. If directed, you can call to find out the results.  If X-rays were done, you will be told if the results will affect your treatment.  Call 911  Call 911 if any of the following occur:  · Trouble breathing  · Hard to wake up or confusion  · Fainting or loss of consciousness  · Rapid heart rate  When to seek medical advice  Call your healthcare provider right away if any of these occur:  · Fever of 100.4ºF (38.0ºC) or higher, or as directed by your healthcare provider  · Symptoms are not better by the third day of treatment  · Back or belly (abdominal) pain that gets worse  · Repeated vomiting, or unable to keep medicine  down  · Weakness or dizziness  · Vaginal discharge  · Pain, redness, or swelling in the outer vaginal area (labia)  Date Last Reviewed: 10/1/2016  © 3110-2439 [employer]. [address] [place] [address], [place] [address]. All rights reserved. This information is not intended as a substitute for professional medical care. Always follow your healthcare professional's instructions.          Urinary Tract Infections in Women    Urinary tract infections (UTIs) are most often caused by bacteria (germs). These bacteria enter the urinary tract. The bacteria may come from outside the body. Or they may travel from the skin outside the rectum or vagina into the urethra. Female anatomy makes it easier for bacteria from the bowel to enter a womans urinary tract, which is the most common source of UTI. This means women develop UTIs more often than men. Pain in or around the urinary tract is a common UTI symptom. But the only way to know for sure if you have a UTI for the health care provider to test your urine. The two tests that may be done are the urinalysis and urine culture.  Types of UTIs  · Cystitis: A bladder infection (cystitis) is the most common UTI in women. You may have urgent or frequent urination. You may also have pain, burning when you urinate, and bloody urine.  · Urethritis: This is an inflamed urethra, which is the tube that carries urine from the bladder to outside the body. You may have lower stomach or back pain. You may also have urgent or frequent urination.  · Pyelonephritis: This is a kidney infection. If not treated, it can be serious and damage your kidneys. In severe cases, you may be hospitalized. You may have a fever and lower back pain.  Medications to treat a UTI  Most UTIs are treated with antibiotics. These kill the bacteria. The length of time you need to take them depends on the type of infection. It may be as short as 3 days. If you have repeated UTIs, a low-dose antibiotic may be  needed for several months. Take antibiotics exactly as directed. Dont stop taking them until all of the medication is gone. If you stop taking the antibiotic too soon, the infection may not go away, and you may develop a resistance to the antibiotic. This can make it much harder to treat.  Lifestyle changes to treat and prevent UTIs  The lifestyle changes below will help get rid of your UTI. They may also help prevent future UTIs.  · Drink plenty of fluids. This includes water, juice, or other caffeine-free drinks. Fluids help flush bacteria out of your body.  · Empty your bladder. Always empty your bladder when you feel the urge to urinate. And always urinate before going to sleep. Urine that stays in your bladder can lead to infection. Try to urinate before and after sex as well.  · Practice good personal hygiene. Wipe yourself from front to back after using the toilet. This helps keep bacteria from getting into the urethra.  · Use condoms during sex. These help prevent UTIs caused by sexually transmitted bacteria. Also, avoid using spermicides during sex. These can increase the risk of UTIs. Choose other forms of birth control instead. For women who tend to get UTIs after sex, a low-dose of a preventive antibiotic may be used. Be sure to discuss this option with your health care provider.  · Follow up with your health care provider as directed. He or she may test to make sure the infection has cleared. If necessary, additional treatment may be started.  Date Last Reviewed: 9/8/2014  © 9127-4882 [employer]. [address] [place] [address], [place] [address]. All rights reserved. This information is not intended as a substitute for professional medical care. Always follow your healthcare professional's instructions.          [facility] Sign-Up     Activating your [facility] account is as easy as 1-2-3!     1) Visit [email or web address], select Sign Up Now, enter this activation code and your date of birth, then  select Next.  [email or web address]  Expires: 6/12/2017 12:14 AM      2) Create a username and password to use when you visit [facility] in the future and select a security question in case you lose your password and select Next.    3) Enter your e-mail address and click Sign Up!    Additional Information  If you have questions, please e-mail [email or web address] or call [phone] to talk to our [facility] staff. Remember, [facility] is NOT to be used for urgent needs. For medical emergencies, dial 911.         Smoking Cessation     If you would like to quit smoking:   You may be eligible for free services if you are a [place] resident and started smoking cigarettes before September 1, 1988.  Call the Smoking Cessation Trust ([facility]) toll free at (401) 972-5551 or (426) 780-0888.   Call 4-800-QUIT-NOW if you do not meet the above criteria.   Contact us via email: [email or web address]   View our website for more information: [email or web address]         [facility] complies with applicable Federal civil rights laws and does not discriminate on the basis of race, color, national origin, age, disability, or sex.        Language Assistance Services     ATTENTION: Language assistance services are available, free of charge. Please call [phone].      ATENCIÓN: Si habla español, tiene a moore disposición servicios gratuitos de asistencia lingüística. Llame al [phone].     CHÚ Ý: N?u b?n nói Ti?ng Vi?t, có các d?ch v? h? tr? ngôn ng? mi?n phí dành cho b?n. G?i s? [phone].

## 2017-04-28 VITALS
TEMPERATURE: 98 F | BODY MASS INDEX: 53.92 KG/M2 | DIASTOLIC BLOOD PRESSURE: 82 MMHG | HEIGHT: 62 IN | OXYGEN SATURATION: 99 % | WEIGHT: 293 LBS | RESPIRATION RATE: 16 BRPM | HEART RATE: 74 BPM | SYSTOLIC BLOOD PRESSURE: 124 MMHG

## 2017-04-28 LAB
BASOPHILS # BLD AUTO: ABNORMAL K/UL
BASOPHILS NFR BLD: 1 %
DIFFERENTIAL METHOD: ABNORMAL
EOSINOPHIL # BLD AUTO: ABNORMAL K/UL
EOSINOPHIL NFR BLD: 2 %
ERYTHROCYTE [DISTWIDTH] IN BLOOD BY AUTOMATED COUNT: 14.4 %
HCT VFR BLD AUTO: 40.6 %
HGB BLD-MCNC: 14 G/DL
LYMPHOCYTES # BLD AUTO: ABNORMAL K/UL
LYMPHOCYTES NFR BLD: 46 %
MCH RBC QN AUTO: 28.3 PG
MCHC RBC AUTO-ENTMCNC: 34.5 %
MCV RBC AUTO: 82 FL
MONOCYTES # BLD AUTO: ABNORMAL K/UL
MONOCYTES NFR BLD: 6 %
NEUTROPHILS NFR BLD: 42 %
NEUTS BAND NFR BLD MANUAL: 3 %
PLATELET # BLD AUTO: 209 K/UL
PLATELET BLD QL SMEAR: ABNORMAL
PMV BLD AUTO: 10.7 FL
RBC # BLD AUTO: 4.94 M/UL
WBC # BLD AUTO: 16.04 K/UL

## 2017-04-28 RX ORDER — NITROFURANTOIN 25; 75 MG/1; MG/1
100 CAPSULE ORAL 2 TIMES DAILY
Qty: 14 CAPSULE | Refills: 0 | Status: ON HOLD | OUTPATIENT
Start: 2017-04-28 | End: 2017-12-19 | Stop reason: CLARIF
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
## 2017-04-28 NOTE — DISCHARGE INSTRUCTIONS
"    Bladder Infection, Female (Adult)    Urine is normally doesn't have any bacteria in it. But bacteria can get into the urinary tract from the skin around the rectum. Or they can travel in the blood from elsewhere in the body. Once they are in your urinary tract, they can cause infection in the urethra (urethritis), the bladder (cystitis), or the kidneys (pyelonephritis).  The most common place for an infection is in the bladder. This is called a bladder infection. This is one of the most common infections in women. Most bladder infections are easily treated. They are not serious unless the infection spreads to the kidney.  The phrases "bladder infection," "UTI," and "cystitis" are often used to describe the same thing. But they are not always the same. Cystitis is an inflammation of the bladder. The most common cause of cystitis is an infection.  Symptoms  The infection causes inflammation in the urethra and bladder. This causes many of the symptoms. The most common symptoms of a bladder infection are:  · Pain or burning when urinating  · Having to urinate more often than usual  · Urgent need to urinate  · Only a small amount of urine comes out  · Blood in urine  · Abdominal discomfort. This is usually in the lower abdomen above the pubic bone.  · Cloudy urine  · Strong- or bad-smelling urine  · Unable to urinate (urinary retention)  · Unable to hold urine in (urinary incontinence)  · Fever  · Loss of appetite  · Confusion (in older adults)  Causes  Bladder infections are not contagious. You can't get one from someone else, from a toilet seat, or from sharing a bath.  The most common cause of bladder infections is bacteria from the bowels. The bacteria get onto the skin around the opening of the urethra. From there, they can get into the urine and travel up to the bladder, causing inflammation and infection. This usually happens because of:  · Wiping improperly after urinating. Always wipe from front to " back.  · Bowel incontinence  · Pregnancy  · Procedures such as having a catheter inserted  · Older age  · Not emptying your bladder. This can allow bacteria a chance to grow in your urine.  · Dehydration  · Constipation  · Sex  · Use of a diaphragm for birth control   Treatment  Bladder infections are diagnosed by a urine test. They are treated with antibiotics and usually clear up quickly without complications. Treatment helps prevent a more serious kidney infection.  Medicines  Medicines can help in the treatment of a bladder infection:  · Take antibiotics until they are used up, even if you feel better. It is important to finish them to make sure the infection has cleared.  · You can use acetaminophen or ibuprofen for pain, fever, or discomfort, unless another medicine was prescribed. If you have chronic liver or kidney disease, talk with your healthcare provider before using these medicines. Also talk with your provider if you've ever had a stomach ulcer or gastrointestinal bleeding, or are taking blood-thinner medicines.  · If you are given phenazopydridine to reduce burning with urination, it will cause your urine to become a bright orange color. This can stain clothing.  Care and prevention  These self-care steps can help prevent future infections:  · Drink plenty of fluids to prevent dehydration and flush out your bladder. Do this unless you must restrict fluids for other health reasons, or your doctor told you not to.  · Proper cleaning after going to the bathroom is important. Wipe from front to back after using the toilet to prevent the spread of bacteria.  · Urinate more often. Don't try to hold urine in for a long time.  · Wear loose-fitting clothes and cotton underwear. Avoid tight-fitting pants.  · Improve your diet and prevent constipation. Eat more fresh fruit and vegetables, and fiber, and less junk and fatty foods.  · Avoid sex until your symptoms are gone.  · Avoid caffeine, alcohol, and spicy  foods. These can irritate your bladder.  · Urinate right after intercourse to flush out your bladder.  · If you use birth control pills and have frequent bladder infections, discuss it with your doctor.  Follow-up care  Call your healthcare provider if all symptoms are not gone after 3 days of treatment. This is especially important if you have repeat infections.  If a culture was done, you will be told if your treatment needs to be changed. If directed, you can call to find out the results.  If X-rays were done, you will be told if the results will affect your treatment.  Call 911  Call 911 if any of the following occur:  · Trouble breathing  · Hard to wake up or confusion  · Fainting or loss of consciousness  · Rapid heart rate  When to seek medical advice  Call your healthcare provider right away if any of these occur:  · Fever of 100.4ºF (38.0ºC) or higher, or as directed by your healthcare provider  · Symptoms are not better by the third day of treatment  · Back or belly (abdominal) pain that gets worse  · Repeated vomiting, or unable to keep medicine down  · Weakness or dizziness  · Vaginal discharge  · Pain, redness, or swelling in the outer vaginal area (labia)  Date Last Reviewed: 10/1/2016  © 9650-1510 [employer]. [address] [place] [address], [place] [id number]. All rights reserved. This information is not intended as a substitute for professional medical care. Always follow your healthcare professional's instructions.          Urinary Tract Infections in Women    Urinary tract infections (UTIs) are most often caused by bacteria (germs). These bacteria enter the urinary tract. The bacteria may come from outside the body. Or they may travel from the skin outside the rectum or vagina into the urethra. Female anatomy makes it easier for bacteria from the bowel to enter a womans urinary tract, which is the most common source of UTI. This means women develop UTIs more often than men. Pain in or around  the urinary tract is a common UTI symptom. But the only way to know for sure if you have a UTI for the health care provider to test your urine. The two tests that may be done are the urinalysis and urine culture.  Types of UTIs  · Cystitis: A bladder infection (cystitis) is the most common UTI in women. You may have urgent or frequent urination. You may also have pain, burning when you urinate, and bloody urine.  · Urethritis: This is an inflamed urethra, which is the tube that carries urine from the bladder to outside the body. You may have lower stomach or back pain. You may also have urgent or frequent urination.  · Pyelonephritis: This is a kidney infection. If not treated, it can be serious and damage your kidneys. In severe cases, you may be hospitalized. You may have a fever and lower back pain.  Medications to treat a UTI  Most UTIs are treated with antibiotics. These kill the bacteria. The length of time you need to take them depends on the type of infection. It may be as short as 3 days. If you have repeated UTIs, a low-dose antibiotic may be needed for several months. Take antibiotics exactly as directed. Dont stop taking them until all of the medication is gone. If you stop taking the antibiotic too soon, the infection may not go away, and you may develop a resistance to the antibiotic. This can make it much harder to treat.  Lifestyle changes to treat and prevent UTIs  The lifestyle changes below will help get rid of your UTI. They may also help prevent future UTIs.  · Drink plenty of fluids. This includes water, juice, or other caffeine-free drinks. Fluids help flush bacteria out of your body.  · Empty your bladder. Always empty your bladder when you feel the urge to urinate. And always urinate before going to sleep. Urine that stays in your bladder can lead to infection. Try to urinate before and after sex as well.  · Practice good personal hygiene. Wipe yourself from front to back after using the  toilet. This helps keep bacteria from getting into the urethra.  · Use condoms during sex. These help prevent UTIs caused by sexually transmitted bacteria. Also, avoid using spermicides during sex. These can increase the risk of UTIs. Choose other forms of birth control instead. For women who tend to get UTIs after sex, a low-dose of a preventive antibiotic may be used. Be sure to discuss this option with your health care provider.  · Follow up with your health care provider as directed. He or she may test to make sure the infection has cleared. If necessary, additional treatment may be started.  Date Last Reviewed: 9/8/2014  © 2018-2635 [employer]. [address] [place] [address], [place] [address]. All rights reserved. This information is not intended as a substitute for professional medical care. Always follow your healthcare professional's instructions.

## 2017-05-19 ENCOUNTER — HOSPITAL ENCOUNTER (EMERGENCY)
Facility: HOSPITAL | Age: 37
Discharge: HOME | End: 2017-05-19
Attending: EMERGENCY MEDICINE
Payer: MEDICAID

## 2017-05-19 VITALS
DIASTOLIC BLOOD PRESSURE: 71 MMHG | OXYGEN SATURATION: 96 % | RESPIRATION RATE: 16 BRPM | BODY MASS INDEX: 53.92 KG/M2 | HEIGHT: 62 IN | TEMPERATURE: 98 F | WEIGHT: 293 LBS | SYSTOLIC BLOOD PRESSURE: 141 MMHG | HEART RATE: 91 BPM

## 2017-05-19 DIAGNOSIS — R07.9 CHEST PAIN, UNSPECIFIED TYPE: Primary | ICD-10-CM

## 2017-05-19 DIAGNOSIS — I10 ESSENTIAL HYPERTENSION: ICD-10-CM

## 2017-05-19 DIAGNOSIS — M79.669 LOWER LEG PAIN: ICD-10-CM

## 2017-05-19 LAB
ALBUMIN SERPL BCP-MCNC: 3.4 G/DL
ALP SERPL-CCNC: 57 U/L
ALT SERPL W/O P-5'-P-CCNC: 13 U/L
AMPHET+METHAMPHET UR QL: NEGATIVE
ANION GAP SERPL CALC-SCNC: 11 MMOL/L
AST SERPL-CCNC: 10 U/L
B-HCG UR QL: NEGATIVE
BARBITURATES UR QL SCN>200 NG/ML: NEGATIVE
BASOPHILS # BLD AUTO: 0.03 K/UL
BASOPHILS NFR BLD: 0.3 %
BENZODIAZ UR QL SCN>200 NG/ML: NEGATIVE
BILIRUB SERPL-MCNC: 0.4 MG/DL
BNP SERPL-MCNC: 77 PG/ML
BUN SERPL-MCNC: 10 MG/DL
BZE UR QL SCN: NEGATIVE
CALCIUM SERPL-MCNC: 8.6 MG/DL
CANNABINOIDS UR QL SCN: NEGATIVE
CHLORIDE SERPL-SCNC: 108 MMOL/L
CO2 SERPL-SCNC: 19 MMOL/L
CREAT SERPL-MCNC: 0.9 MG/DL
CREAT UR-MCNC: 64.3 MG/DL
CTP QC/QA: YES
D DIMER PPP IA.FEU-MCNC: 0.38 MG/L FEU
DIFFERENTIAL METHOD: ABNORMAL
EOSINOPHIL # BLD AUTO: 0.1 K/UL
EOSINOPHIL NFR BLD: 1.2 %
ERYTHROCYTE [DISTWIDTH] IN BLOOD BY AUTOMATED COUNT: 14.5 %
EST. GFR  (AFRICAN AMERICAN): >60 ML/MIN/1.73 M^2
EST. GFR  (NON AFRICAN AMERICAN): >60 ML/MIN/1.73 M^2
GLUCOSE SERPL-MCNC: 229 MG/DL
HCT VFR BLD AUTO: 38.6 %
HGB BLD-MCNC: 13.3 G/DL
LYMPHOCYTES # BLD AUTO: 4.2 K/UL
LYMPHOCYTES NFR BLD: 36.1 %
MCH RBC QN AUTO: 28.7 PG
MCHC RBC AUTO-ENTMCNC: 34.5 %
MCV RBC AUTO: 83 FL
METHADONE UR QL SCN>300 NG/ML: NEGATIVE
MONOCYTES # BLD AUTO: 0.6 K/UL
MONOCYTES NFR BLD: 4.9 %
NEUTROPHILS # BLD AUTO: 6.6 K/UL
NEUTROPHILS NFR BLD: 56 %
OPIATES UR QL SCN: NEGATIVE
PCP UR QL SCN>25 NG/ML: NEGATIVE
PLATELET # BLD AUTO: 139 K/UL
PMV BLD AUTO: 10.2 FL
POTASSIUM SERPL-SCNC: 3.9 MMOL/L
PROT SERPL-MCNC: 6.9 G/DL
RBC # BLD AUTO: 4.64 M/UL
SODIUM SERPL-SCNC: 138 MMOL/L
TOXICOLOGY INFORMATION: NORMAL
TROPONIN I SERPL DL<=0.01 NG/ML-MCNC: <0.006 NG/ML
TROPONIN I SERPL DL<=0.01 NG/ML-MCNC: <0.006 NG/ML
WBC # BLD AUTO: 11.7 K/UL

## 2017-05-19 PROCEDURE — 82570 ASSAY OF URINE CREATININE: CPT

## 2017-05-19 PROCEDURE — 25500020 PHARM REV CODE 255: Performed by: EMERGENCY MEDICINE

## 2017-05-19 PROCEDURE — 85379 FIBRIN DEGRADATION QUANT: CPT

## 2017-05-19 PROCEDURE — 81025 URINE PREGNANCY TEST: CPT | Performed by: EMERGENCY MEDICINE

## 2017-05-19 PROCEDURE — 99284 EMERGENCY DEPT VISIT MOD MDM: CPT | Mod: 25

## 2017-05-19 PROCEDURE — 84484 ASSAY OF TROPONIN QUANT: CPT

## 2017-05-19 PROCEDURE — 93010 ELECTROCARDIOGRAM REPORT: CPT | Mod: ,,, | Performed by: INTERNAL MEDICINE

## 2017-05-19 PROCEDURE — 80053 COMPREHEN METABOLIC PANEL: CPT

## 2017-05-19 PROCEDURE — 93005 ELECTROCARDIOGRAM TRACING: CPT

## 2017-05-19 PROCEDURE — 83880 ASSAY OF NATRIURETIC PEPTIDE: CPT

## 2017-05-19 PROCEDURE — 85025 COMPLETE CBC W/AUTO DIFF WBC: CPT

## 2017-05-19 RX ADMIN — IOHEXOL 100 ML: 350 INJECTION, SOLUTION INTRAVENOUS at 03:05

## 2017-05-19 NOTE — DISCHARGE INSTRUCTIONS
"  Discharge Instructions for High Blood Pressure (Hypertension)  You have been diagnosed with high blood pressure (also called hypertension). This means the force of blood against your artery walls is too strong. It also means your heart is working hard to move blood. High blood pressure usually has no symptoms, but over time, it can damage your heart, blood vessels, eyes, kidneys, and other organs. With help from your doctor, you can manage your blood pressure and protect your health.  Taking medicine  · Learn to take your own blood pressure. Keep a record of your results. Ask your doctor which readings mean that you need medical attention.  · Take your blood pressure medicine exactly as directed. Dont skip doses. Missing doses can cause your blood pressure to get out of control.  · If you do miss a dose (or doses) check with your healthcare provider about what to do.  · Avoid medicine that contain heart stimulants, including over-the-counter drugs. Check for warnings about high blood pressure on the label. Ask the pharmacist before purchasing something you haven't used before  · Check with your doctor or pharmacist before taking a decongestant. Some decongestants can worsen high blood pressure.  Lifestyle changes  · Maintain a healthy weight. Get help to lose any extra pounds.  · Cut back on salt.  ¨ Limit canned, dried, packaged, and fast foods.  ¨ Dont add salt to your food at the table.  ¨ Season foods with herbs instead of salt when you cook.  ¨ Request no added salt when you go to a restaurant.  ¨ The American Heart Associations (AHA) "ideal" sodium intake recommendation is 1,500 milligrams per day.  However, since American's eat so much salt, the AHA says a positive change can occur by cutting back to even 2,400 milligrams of sodium a day.   · Follow the DASH (Dietary Approaches to Stop Hypertension) eating plan. This plan recommends vegetables, fruits, whole gains, and other heart healthy foods.  · Begin " an exercise program. Ask your doctor how to get started. The American Heart Association recommends aerobic exercise 3 to 4 times a week for an average of 40 minutes at a time, with your doctor's approval. Simple activities like walking or gardening can help.  · Break the smoking habit. Enroll in a stop-smoking program to improve your chances of success. Ask your healthcare provider about programs and medicines to help you stop smoking.  · Limit drinks that contain caffeine (coffee, black or green tea, cola) to 2 per day.  · Never take stimulants such as amphetamines or cocaine; these drugs can be deadly for someone with high blood pressure.  · Control your stress. Learn stress-management techniques.  · Limit alcohol to no more than 1 drink a day for women and 2 drinks a day for men.  Follow-up care  Make a follow-up appointment as directed by our staff.     When to seek medical care  Call your doctor immediately or seek emergency care if you have any of the following:  · Chest pain or shortness of breath (call 911)  · Moderate to severe headache  · Weakness in the muscles of your face, arms, or legs  · Trouble speaking  · Extreme drowsiness  · Confusion  · Fainting or dizziness  · Pulsating or rushing sound in your ears  · Unexplained nosebleed  · Weakness, tingling, or numbness of your face, arms, or legs  · Change in vision  · Blood pressure measured at home that is greater than 180/110   Date Last Reviewed: 4/27/2016  © 2803-6577 [employer]. [address] [place] [address]. All rights reserved. This information is not intended as a substitute for professional medical care. Always follow your healthcare professional's instructions.        Noncardiac Chest Pain    Based on your visit today, the health care provider doesnt know what is causing your chest pain. In most cases, people who come to the emergency department with chest pain dont have a problem with their heart. Instead, the pain  is caused by other conditions. These may be problems with the lungs, muscles, bones, digestive tract, nerves, or mental health.  Lung problems  · Inflammation around the lungs (pleurisy)  · Collapsed lung (pneumothorax)  · Fluid around the lungs (pleural effusion)  · Lung cancer. This is a rare cause of chest pain.  Muscle or bone problems  · Inflamed cartilage between the ribs (pleurisy)  · Fibromyalgia  · Rheumatoid arthritis  Digestive system problems  · Reflux  · Stomach ulcer  · Spasms of the esophagus  · Gall stones  · Gallbladder inflammation  Mental health conditions  · Panic or anxiety attacks  · Emotional distress  Your condition doesnt seem serious and your pain doesnt appear to be coming from your heart. But sometimes the signs of a serious problem take more time to appear. Watch for the warning signs listed below.  Home care  Follow these guidelines when caring for yourself at home:  · Rest today and avoid strenuous activity.  · Take any prescribed medicine as directed.  Follow-up care  Follow up with your health care provider, or as advised, if you dont start to feel better within 24 hours.  When to seek medical advice  Call your health care provider right away if any of these occur:  · A change in the type of pain. Call if it feels different, becomes more serious, lasts longer, or begins to spread into your shoulder, arm, neck, jaw, or back.  · Shortness of breath  · You feel more pain when you breathe  · Cough with dark-colored mucus or blood  · Weakness, dizziness, or fainting  · Fever of 100.4ºF (38ºC) or higher, or as directed by your health care provider  · Swelling, pain, or redness in one leg  Date Last Reviewed: 11/24/2014  © 4832-2484 [employer]. [address] [place] [address], [place] [address]. All rights reserved. This information is not intended as a substitute for professional medical care. Always follow your healthcare professional's instructions.

## 2017-05-19 NOTE — ED TRIAGE NOTES
38 Y/O F with CC of CP. Pt states has been having pain and edema to dennis lower extremities over the past couple weeks and has been taking lasix. Swelling noted to L leg, no redness. Pt reports began having CP this AM, pain reproducible with palpation. Also reports having SOB over the past couple weeks, made worse with exertion. No other complaints verbalized. NAD noted. Pt provided with gown and instructed to change into it and remove all metal. Pt verbalized understanding. Patient on cardiac monitor, automatic blood pressure cuff and pulse oximeter. Will continue to monitor.

## 2017-05-19 NOTE — ED PROVIDER NOTES
"Encounter Date: 2017       History     Chief Complaint   Patient presents with    Chest Pain     began an hour ago accompanied by shortness of breath; left side and denies radiation; described as a "sharp" pain; also c/o bilateral leg pain      Review of patient's allergies indicates:   Allergen Reactions    Celexa [citalopram] Nausea And Vomiting     36 y/o F presents to ED with PMHx of asthma, depression, DM2, diastolic dysfunction and HTN presents to ED with c/o cp, sob and pain/swelling of BLE.  Pt reports the cp was present this morning when she woke up and has been constant.  Pain is worse with movement and deep breathing.  Pain feels sharp and stabbing; non-radiating.  No pressure like sensation.  Sob has been ongoing for the past couple of weeks.  Worsened with exertion.  No cough/fever/chills/n/v.  Pt reports mild pain and swelling of BLE for weeks.  She takes lasix for the swelling but states that it is not improving her symptoms.   Pt reports remote hx of DVT years ago.  No hx of PE.  No recent change in her meds.  Last stress test was years ago and neg.              The history is provided by the patient. No  was used.     Past Medical History:   Diagnosis Date    Asthma     Depression     Diabetes mellitus, type II     Diastolic dysfunction     E. 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     SECTION       SECTION, CLASSIC      CHOLECYSTECTOMY  age 15    TONSILLECTOMY, ADENOIDECTOMY       Family History   Problem Relation Age of Onset    Cancer Father     Heart attack Mother     Cancer Maternal Aunt      lung (smoker)    Heart attack Maternal Aunt     Breast cancer Paternal Grandmother     Heart attack Maternal Grandmother      Social History   Substance Use " Topics    Smoking status: Current Every Day Smoker     Packs/day: 1.00     Years: 15.00     Types: Cigarettes    Smokeless tobacco: None    Alcohol use No     Review of Systems   Constitutional: Negative for activity change, appetite change, chills, diaphoresis, fatigue, fever and unexpected weight change.   HENT: Negative for congestion and sore throat.    Eyes: Negative for pain, redness and visual disturbance.   Respiratory: Positive for shortness of breath. Negative for cough and chest tightness.    Cardiovascular: Positive for chest pain and leg swelling. Negative for palpitations.   Gastrointestinal: Negative for abdominal distention, abdominal pain, diarrhea, nausea and vomiting.   Endocrine: Negative for polydipsia, polyphagia and polyuria.   Genitourinary: Negative for difficulty urinating, dysuria and flank pain.   Musculoskeletal: Positive for myalgias. Negative for arthralgias.   Skin: Negative for pallor and rash.   Allergic/Immunologic: Negative for immunocompromised state.   Neurological: Negative for dizziness, tremors, seizures, syncope, speech difficulty, weakness, light-headedness, numbness and headaches.   Hematological: Negative.  Does not bruise/bleed easily.   Psychiatric/Behavioral: Negative.    All other systems reviewed and are negative.      Physical Exam   Initial Vitals   BP Pulse Resp Temp SpO2   05/19/17 1109 05/19/17 1109 05/19/17 1109 05/19/17 1109 05/19/17 1109   182/98 99 22 98.4 °F (36.9 °C) 96 %     Physical Exam    Nursing note and vitals reviewed.  Constitutional: She appears well-developed and well-nourished. She is not diaphoretic. No distress.   OBESE 38 Y/O IN NO DISTRESS WITH STABLE VITALS   HENT:   Head: Normocephalic and atraumatic.   Mouth/Throat: Oropharynx is clear and moist.   Eyes: Conjunctivae are normal. No scleral icterus.   Neck: Normal range of motion. Neck supple.   Cardiovascular: Normal rate, regular rhythm and normal heart sounds. Exam reveals no gallop  and no friction rub.    No murmur heard.  Pulmonary/Chest: Breath sounds normal. No respiratory distress. She has no wheezes. She has no rhonchi. She has no rales. She exhibits tenderness.       Abdominal: Soft. Bowel sounds are normal. She exhibits no distension. There is no tenderness. There is no rebound and no guarding.   Musculoskeletal: Normal range of motion. She exhibits edema. She exhibits no tenderness.   MILD DEPENDENT EDEMA OF BLE   Lymphadenopathy:     She has no cervical adenopathy.   Neurological: She is alert and oriented to person, place, and time.   Skin: Skin is warm and dry. No erythema.   Psychiatric: She has a normal mood and affect. Her behavior is normal. Judgment and thought content normal.         ED Course   Procedures  Labs Reviewed   CBC W/ AUTO DIFFERENTIAL   COMPREHENSIVE METABOLIC PANEL   TROPONIN I   B-TYPE NATRIURETIC PEPTIDE   D DIMER, QUANTITATIVE   DRUG SCREEN PANEL, URINE EMERGENCY   POCT URINE PREGNANCY     EKG Readings: (Independently Interpreted)   Initial Reading: No STEMI. Previous EKG: Compared with most recent EKG Previous EKG Date: 09/13/16. Heart Rate: 87. Ectopy: No Ectopy. Clinical Impression: Normal Sinus Rhythm          Medical Decision Making:   Initial Assessment:   36 y/o F with cp, sob and ble swelling/pain.  On exam, pt does have reproducible cp.  + mild BLE edema.    Differential Diagnosis:   DDX:  Dvt, PE, PNA, PTX, ACS, arrhythmia, pericarditis  Independently Interpreted Test(s):   I have ordered and independently interpreted EKG Reading(s) - see prior notes  Clinical Tests:   Lab Tests: Ordered and Reviewed  The following lab test(s) were unremarkable: CBC, CMP and Troponin  Radiological Study: Ordered and Reviewed  Medical Tests: Ordered and Reviewed  ED Management:   Pt work up is neg for acute emergent cause of cp.  Pt is pain free with no complaints.   She is well appearing and has a pcp with whom she will f/u asap.  Pt understands that she must return  to ED immediately if symptoms worsen.      Pt counseled on their diagnosis and the importance of following up with PCP.  Pt also cautioned on when to return to ED.  Pt verbalizes understanding of discharge plan and will return to ED immediately if symptoms worsen      Additional MDM:     Well's Criteria Score:  -Clinical symptoms of DVT (leg swelling, pain with palpation) = 3.0  -Other diagnosis less likely than pulmonary embolism =            3.0  -Heart Rate >100 =   0.0  -Immobilization (= or > than 3 days) or surgery in the previous 4 weeks = 0.0  -Previous DVT/PE = 1.5  -Hemoptysis =          0.0  -Malignancy =           0.0  Well's Probability Score =    7.5                    ED Course     Clinical Impression:   The primary encounter diagnosis was Chest pain, unspecified type. Diagnoses of Lower leg pain and Essential hypertension were also pertinent to this visit.    Disposition:   Disposition: Discharged  Condition: Stable       Allison Encinas MD  05/23/17 1951

## 2017-05-19 NOTE — ED AVS SNAPSHOT
[facility]  [address]  [place] [address]               Lele Dias   2017 12:34 PM   ED    Description:  Female : 1980   Department:  [facility]           Your Care was Coordinated By:     Provider Role From To    Allison Encinas MD Attending Provider 17 1236 --      Reason for Visit     Chest Pain           Diagnoses this Visit        Comments    Chest pain, unspecified type    -  Primary     Lower leg pain         Essential hypertension           ED Disposition     None           To Do List           Follow-up Information     Follow up with Kari Edge MD On 2017.    Specialty:  Emergency Medicine    Contact information:    Michael LYMAN  [facility] [address]  [phone]        [facility]     [facility] Nurse Care Line -  Assistance  Unless otherwise directed by your provider, please contact [facility], our nurse care line that is available for  assistance.     Registered nurses in the [facility] provide: appointment scheduling, clinical advisement, health education, and other advisory services.  Call: [phone] (toll free)               Medications           Message regarding Medications     Verify the changes and/or additions to your medication regime listed below are the same as discussed with your clinician today.  If any of these changes or additions are incorrect, please notify your healthcare provider.        These medications were administered today        Dose Freq    omnipaque 350 iohexol 100 mL 100 mL IMG once as needed    Sig: Inject 100 mLs into the vein ONCE PRN for contrast.    Class: Normal    Route: Intravenous           Verify that the below list of medications is an accurate representation of the medications you are currently taking.  If none reported, the list may be blank. If incorrect, please contact your healthcare provider. Carry this list  "with you in case of emergency.           Current Medications     albuterol (ACCUNEB) 1.25 mg/3 mL Nebu Take 1.25 mg by nebulization every 4 (four) hours as needed.    amitriptyline (ELAVIL) 50 MG tablet Take 50 mg by mouth nightly as needed for Insomnia.    buprenorphine-naloxone 2-0.5 mg (SUBOXONE) 2-0.5 mg Subl Place 8 mg under the tongue once daily.    fluticasone-salmeterol 100-50 mcg/dose (ADVAIR) 100-50 mcg/dose diskus inhaler Inhale 1 puff into the lungs 2 (two) times daily.    hydrOXYzine pamoate (VISTARIL) 50 MG Cap Take 50 mg by mouth every 8 (eight) hours as needed.    insulin glargine (LANTUS) 100 unit/mL injection Inject 120 Units into the skin 2 (two) times daily.     insulin lispro (HUMALOG) 100 unit/mL injection Inject 95 Units into the skin 3 (three) times daily before meals.     lisinopril (PRINIVIL,ZESTRIL) 20 MG tablet Take 20 mg by mouth once daily.     nitrofurantoin, macrocrystal-monohydrate, (MACROBID) 100 MG capsule Take 1 capsule (100 mg total) by mouth 2 (two) times daily.    omeprazole (PRILOSEC) 40 MG capsule Take 40 mg by mouth every morning.     pregabalin (LYRICA) 150 MG capsule Take 300 mg by mouth 2 (two) times daily.    promethazine (PHENERGAN) 25 MG tablet Take 25 mg by mouth every 4 (four) hours.           Clinical Reference Information           Your Vitals Were     BP Pulse Temp Resp Height Weight    182/98 99 98.4 °F (36.9 °C) (Oral) 22 5' 2" (1.575 m) 136.1 kg (300 lb)    SpO2 BMI             96% 54.87 kg/m2         Allergies as of 5/19/2017        Reactions    Celexa [Citalopram] Nausea And Vomiting      Immunizations Administered on Date of Encounter - 5/19/2017     None      ED Micro, Lab, POCT     Start Ordered       Status Ordering Provider    05/19/17 1510 05/19/17 1444  Troponin I  Once      Final result     05/19/17 1238 05/19/17 1237  CBC auto differential  STAT      Final result     05/19/17 1238 05/19/17 1237  Comprehensive metabolic panel  STAT      Final result  "    05/19/17 1238 05/19/17 1237  Troponin I #1  Once      Final result     05/19/17 1238 05/19/17 1237  B-Type natriuretic peptide (BNP)  STAT      Final result     05/19/17 1238 05/19/17 1237  D dimer, quantitative  STAT      Final result     05/19/17 1238 05/19/17 1237  POCT urine pregnancy  Once      Final result     05/19/17 1238 05/19/17 1238  Drug screen panel, emergency  STAT      In process       ED Imaging Orders     Start Ordered       Status Ordering Provider    05/19/17 1504 05/19/17 1503  CTA Chest Non-Coronary (PE Study)  1 time imaging      Final result     05/19/17 1310 05/19/17 1309  US Lower Extremity Veins Bilateral  1 time imaging      Final result     05/19/17 1238 05/19/17 1237  X-Ray Chest PA And Lateral  1 time imaging      Acknowledged         Discharge Instructions         Discharge Instructions for High Blood Pressure (Hypertension)  You have been diagnosed with high blood pressure (also called hypertension). This means the force of blood against your artery walls is too strong. It also means your heart is working hard to move blood. High blood pressure usually has no symptoms, but over time, it can damage your heart, blood vessels, eyes, kidneys, and other organs. With help from your doctor, you can manage your blood pressure and protect your health.  Taking medicine  · Learn to take your own blood pressure. Keep a record of your results. Ask your doctor which readings mean that you need medical attention.  · Take your blood pressure medicine exactly as directed. Dont skip doses. Missing doses can cause your blood pressure to get out of control.  · If you do miss a dose (or doses) check with your healthcare provider about what to do.  · Avoid medicine that contain heart stimulants, including over-the-counter drugs. Check for warnings about high blood pressure on the label. Ask the pharmacist before purchasing something you haven't used before  · Check with your doctor or pharmacist before  "taking a decongestant. Some decongestants can worsen high blood pressure.  Lifestyle changes  · Maintain a healthy weight. Get help to lose any extra pounds.  · Cut back on salt.  ¨ Limit canned, dried, packaged, and fast foods.  ¨ Dont add salt to your food at the table.  ¨ Season foods with herbs instead of salt when you cook.  ¨ Request no added salt when you go to a restaurant.  ¨ The American Heart Associations (AHA) "ideal" sodium intake recommendation is 1,500 milligrams per day.  However, since American's eat so much salt, the AHA says a positive change can occur by cutting back to even 2,400 milligrams of sodium a day.   · Follow the DASH (Dietary Approaches to Stop Hypertension) eating plan. This plan recommends vegetables, fruits, whole gains, and other heart healthy foods.  · Begin an exercise program. Ask your doctor how to get started. The American Heart Association recommends aerobic exercise 3 to 4 times a week for an average of 40 minutes at a time, with your doctor's approval. Simple activities like walking or gardening can help.  · Break the smoking habit. Enroll in a stop-smoking program to improve your chances of success. Ask your healthcare provider about programs and medicines to help you stop smoking.  · Limit drinks that contain caffeine (coffee, black or green tea, cola) to 2 per day.  · Never take stimulants such as amphetamines or cocaine; these drugs can be deadly for someone with high blood pressure.  · Control your stress. Learn stress-management techniques.  · Limit alcohol to no more than 1 drink a day for women and 2 drinks a day for men.  Follow-up care  Make a follow-up appointment as directed by our staff.     When to seek medical care  Call your doctor immediately or seek emergency care if you have any of the following:  · Chest pain or shortness of breath (call 911)  · Moderate to severe headache  · Weakness in the muscles of your face, arms, or legs  · Trouble " speaking  · Extreme drowsiness  · Confusion  · Fainting or dizziness  · Pulsating or rushing sound in your ears  · Unexplained nosebleed  · Weakness, tingling, or numbness of your face, arms, or legs  · Change in vision  · Blood pressure measured at home that is greater than 180/110   Date Last Reviewed: 4/27/2016  © 6218-3514 [employer]. [address] [place] [address], [place] [address]. All rights reserved. This information is not intended as a substitute for professional medical care. Always follow your healthcare professional's instructions.        Noncardiac Chest Pain    Based on your visit today, the health care provider doesnt know what is causing your chest pain. In most cases, people who come to the emergency department with chest pain dont have a problem with their heart. Instead, the pain is caused by other conditions. These may be problems with the lungs, muscles, bones, digestive tract, nerves, or mental health.  Lung problems  · Inflammation around the lungs (pleurisy)  · Collapsed lung (pneumothorax)  · Fluid around the lungs (pleural effusion)  · Lung cancer. This is a rare cause of chest pain.  Muscle or bone problems  · Inflamed cartilage between the ribs (pleurisy)  · Fibromyalgia  · Rheumatoid arthritis  Digestive system problems  · Reflux  · Stomach ulcer  · Spasms of the esophagus  · Gall stones  · Gallbladder inflammation  Mental health conditions  · Panic or anxiety attacks  · Emotional distress  Your condition doesnt seem serious and your pain doesnt appear to be coming from your heart. But sometimes the signs of a serious problem take more time to appear. Watch for the warning signs listed below.  Home care  Follow these guidelines when caring for yourself at home:  · Rest today and avoid strenuous activity.  · Take any prescribed medicine as directed.  Follow-up care  Follow up with your health care provider, or as advised, if you dont start to feel better within 24  hours.  When to seek medical advice  Call your health care provider right away if any of these occur:  · A change in the type of pain. Call if it feels different, becomes more serious, lasts longer, or begins to spread into your shoulder, arm, neck, jaw, or back.  · Shortness of breath  · You feel more pain when you breathe  · Cough with dark-colored mucus or blood  · Weakness, dizziness, or fainting  · Fever of 100.4ºF (38ºC) or higher, or as directed by your health care provider  · Swelling, pain, or redness in one leg  Date Last Reviewed: 11/24/2014  © 5529-1062 [employer]. [address] [place] [address], [place] [address]. All rights reserved. This information is not intended as a substitute for professional medical care. Always follow your healthcare professional's instructions.          [facility] Sign-Up     Activating your [facility] account is as easy as 1-2-3!     1) Visit [email or web address], select Sign Up Now, enter this activation code and your date of birth, then select Next.  [email or web address]  Expires: 6/12/2017 12:14 AM      2) Create a username and password to use when you visit [facility] in the future and select a security question in case you lose your password and select Next.    3) Enter your e-mail address and click Sign Up!    Additional Information  If you have questions, please e-mail [email or web address] or call [phone] to talk to our [facility] staff. Remember, [facility] is NOT to be used for urgent needs. For medical emergencies, dial 911.         Smoking Cessation     If you would like to quit smoking:   You may be eligible for free services if you are a [place] resident and started smoking cigarettes before September 1, 1988.  Call the Smoking Cessation Trust (SCT) toll free at (895) 514-2440 or (612) 120-3263.   Call 1-800-QUIT-NOW if you do not meet the above criteria.   Contact us via email: [email or web address]   View our website for more information:  [email or web address]         [facility] complies with applicable Federal civil rights laws and does not discriminate on the basis of race, color, national origin, age, disability, or sex.        Language Assistance Services     ATTENTION: Language assistance services are available, free of charge. Please call [phone].      ATENCIÓN: Si habla español, tiene a moore disposición servicios gratuitos de asistencia lingüística. Llame al [phone].     CHÚ Ý: N?u b?n nói Ti?ng Vi?t, có các d?ch v? h? tr? ngôn ng? mi?n phí dành cho b?n. G?i s? [phone].

## 2017-07-24 ENCOUNTER — HOSPITAL ENCOUNTER (EMERGENCY)
Facility: HOSPITAL | Age: 37
Discharge: HOME | End: 2017-07-24
Attending: FAMILY MEDICINE
Payer: MEDICAID

## 2017-07-24 VITALS
HEART RATE: 101 BPM | TEMPERATURE: 98 F | OXYGEN SATURATION: 99 % | WEIGHT: 293 LBS | HEIGHT: 62 IN | RESPIRATION RATE: 16 BRPM | SYSTOLIC BLOOD PRESSURE: 156 MMHG | BODY MASS INDEX: 53.92 KG/M2 | DIASTOLIC BLOOD PRESSURE: 86 MMHG

## 2017-07-24 DIAGNOSIS — L02.211 ABSCESS OF ABDOMINAL WALL: Primary | ICD-10-CM

## 2017-07-24 PROCEDURE — 99283 EMERGENCY DEPT VISIT LOW MDM: CPT

## 2017-07-24 RX ORDER — SULFAMETHOXAZOLE AND TRIMETHOPRIM 800; 160 MG/1; MG/1
1 TABLET ORAL 2 TIMES DAILY
Qty: 14 TABLET | Refills: 0 | Status: SHIPPED | OUTPATIENT
Start: 2017-07-24 | End: 2017-07-31

## 2017-07-24 NOTE — ED PROVIDER NOTES
Encounter Date: 2017       History     Chief Complaint   Patient presents with    Wound Check     Pt has wound to abdomen that she noticed 2 days ago and is getting worse.     37-year-old female presents with chief complaint of abscess to the left abdomen.  States it started off as a small area but began applying child antibiotics O2 in the area has enlarged.  States that it is tender and has not seen any drainage from the area.  Patient denies any fever chills nausea vomiting          Review of patient's allergies indicates:   Allergen Reactions    Celexa [citalopram] Nausea And Vomiting     Past Medical History:   Diagnosis Date    Asthma     Depression     Diabetes mellitus, type II     Diastolic dysfunction     E. 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     SECTION       SECTION, CLASSIC      CHOLECYSTECTOMY  age 15    TONSILLECTOMY, ADENOIDECTOMY       Family History   Problem Relation Age of Onset    Cancer Father     Heart attack Mother     Cancer Maternal Aunt      lung (smoker)    Heart attack Maternal Aunt     Breast cancer Paternal Grandmother     Heart attack Maternal Grandmother      Social History   Substance Use Topics    Smoking status: Current Every Day Smoker     Packs/day: 1.00     Years: 15.00     Types: Cigarettes    Smokeless tobacco: Never Used    Alcohol use No     Review of Systems   Constitutional: Negative for chills and fever.   Skin: Positive for wound.   Hematological: Negative for adenopathy.   All other systems reviewed and are negative.      Physical Exam     Initial Vitals [17 1348]   BP Pulse Resp Temp SpO2   (!) 156/86 101 16 97.9 °F (36.6 °C) 99 %      MAP       109.33         Physical Exam    Nursing note and vitals reviewed.  Constitutional: She  appears well-developed and well-nourished.   HENT:   Head: Normocephalic and atraumatic.   Eyes: EOM are normal. Pupils are equal, round, and reactive to light.   Neck: Normal range of motion. Neck supple.   Cardiovascular: Normal rate and normal heart sounds.   Pulmonary/Chest: Breath sounds normal.   Abdominal: Soft. Bowel sounds are normal.   Musculoskeletal: Normal range of motion.   Neurological: She is oriented to person, place, and time.   Skin: Abscess (subcutaneous abscess to the left low) noted.   Psychiatric: She has a normal mood and affect. Her behavior is normal.         ED Course   Procedures  Labs Reviewed - No data to display                            ED Course     Clinical Impression:   The encounter diagnosis was Abscess of abdominal wall.                           Matt Chandler MD  07/24/17 7133

## 2017-11-17 ENCOUNTER — HOSPITAL ENCOUNTER (EMERGENCY)
Facility: HOSPITAL | Age: 37
Discharge: HOME | End: 2017-11-17
Attending: FAMILY MEDICINE
Payer: MEDICAID

## 2017-11-17 VITALS
TEMPERATURE: 99 F | SYSTOLIC BLOOD PRESSURE: 101 MMHG | DIASTOLIC BLOOD PRESSURE: 54 MMHG | OXYGEN SATURATION: 100 % | BODY MASS INDEX: 58.53 KG/M2 | WEIGHT: 293 LBS | RESPIRATION RATE: 20 BRPM | HEART RATE: 63 BPM

## 2017-11-17 DIAGNOSIS — I95.9 HYPOTENSION, UNSPECIFIED HYPOTENSION TYPE: Primary | ICD-10-CM

## 2017-11-17 LAB
ALBUMIN SERPL BCP-MCNC: 3.8 G/DL
ALP SERPL-CCNC: 66 U/L
ALT SERPL W/O P-5'-P-CCNC: 26 U/L
ANION GAP SERPL CALC-SCNC: 15 MMOL/L
AST SERPL-CCNC: 23 U/L
BASOPHILS # BLD AUTO: 0.07 K/UL
BASOPHILS NFR BLD: 0.5 %
BILIRUB SERPL-MCNC: 0.4 MG/DL
BILIRUB UR QL STRIP: NEGATIVE
BUN SERPL-MCNC: 23 MG/DL
CALCIUM SERPL-MCNC: 8.9 MG/DL
CHLORIDE SERPL-SCNC: 104 MMOL/L
CLARITY UR REFRACT.AUTO: CLEAR
CO2 SERPL-SCNC: 21 MMOL/L
COLOR UR AUTO: ABNORMAL
CREAT SERPL-MCNC: 1.38 MG/DL
DIFFERENTIAL METHOD: ABNORMAL
EOSINOPHIL # BLD AUTO: 0.3 K/UL
EOSINOPHIL NFR BLD: 1.9 %
ERYTHROCYTE [DISTWIDTH] IN BLOOD BY AUTOMATED COUNT: 15 %
EST. GFR  (AFRICAN AMERICAN): 55.6 ML/MIN/1.73 M^2
EST. GFR  (NON AFRICAN AMERICAN): 48.2 ML/MIN/1.73 M^2
GLUCOSE SERPL-MCNC: 211 MG/DL
GLUCOSE UR QL STRIP: NEGATIVE
HCT VFR BLD AUTO: 37.7 %
HGB BLD-MCNC: 12.6 G/DL
HGB UR QL STRIP: NEGATIVE
KETONES UR QL STRIP: ABNORMAL
LEUKOCYTE ESTERASE UR QL STRIP: ABNORMAL
LYMPHOCYTES # BLD AUTO: 7.8 K/UL
LYMPHOCYTES NFR BLD: 53.3 %
MCH RBC QN AUTO: 28.3 PG
MCHC RBC AUTO-ENTMCNC: 33.4 G/DL
MCV RBC AUTO: 85 FL
MICROSCOPIC COMMENT: NORMAL
MONOCYTES # BLD AUTO: 1 K/UL
MONOCYTES NFR BLD: 6.7 %
NEUTROPHILS # BLD AUTO: 5.2 K/UL
NEUTROPHILS NFR BLD: 35.7 %
NITRITE UR QL STRIP: NEGATIVE
PH UR STRIP: 5 [PH] (ref 5–8)
PLATELET # BLD AUTO: 176 K/UL
PMV BLD AUTO: 10.7 FL
POCT GLUCOSE: 267 MG/DL (ref 70–110)
POTASSIUM SERPL-SCNC: 3.9 MMOL/L
PROT SERPL-MCNC: 7.1 G/DL
PROT UR QL STRIP: ABNORMAL
RBC # BLD AUTO: 4.46 M/UL
SODIUM SERPL-SCNC: 140 MMOL/L
SP GR UR STRIP: 1.02 (ref 1–1.03)
URN SPEC COLLECT METH UR: ABNORMAL
UROBILINOGEN UR STRIP-ACNC: NEGATIVE EU/DL
WBC # BLD AUTO: 14.59 K/UL
WBC #/AREA URNS AUTO: 4 /HPF (ref 0–5)

## 2017-11-17 PROCEDURE — 81000 URINALYSIS NONAUTO W/SCOPE: CPT

## 2017-11-17 PROCEDURE — 80053 COMPREHEN METABOLIC PANEL: CPT

## 2017-11-17 PROCEDURE — 25000003 PHARM REV CODE 250: Performed by: FAMILY MEDICINE

## 2017-11-17 PROCEDURE — 85025 COMPLETE CBC W/AUTO DIFF WBC: CPT

## 2017-11-17 PROCEDURE — 82962 GLUCOSE BLOOD TEST: CPT

## 2017-11-17 PROCEDURE — 99284 EMERGENCY DEPT VISIT MOD MDM: CPT | Mod: 25

## 2017-11-17 RX ORDER — CARVEDILOL 12.5 MG/1
12.5 TABLET ORAL 2 TIMES DAILY WITH MEALS
COMMUNITY
End: 2017-11-17 | Stop reason: SINTOL

## 2017-11-17 RX ADMIN — SODIUM CHLORIDE 500 ML: 0.9 INJECTION, SOLUTION INTRAVENOUS at 08:11

## 2017-11-17 NOTE — ED NOTES
Pt reports she is unable to stand any longer bc she is too dizzy and feels like she is going to fall. Pt assisted back into bed

## 2017-11-17 NOTE — ED PROVIDER NOTES
Encounter Date: 2017       History     Chief Complaint   Patient presents with    Dizziness     states weakness and dizziness that started yesterday    Fatigue     36 yo just started on coreg for tachycardia comes in with dizziness and being off balance        General Illness    The current episode started two days ago. The problem occurs frequently. Pertinent negatives include no fever, no double vision, no nausea, no congestion, no headaches, no rhinorrhea, no sore throat, no cough, no shortness of breath and no rash. Services received include medications given. Recently, medical care has been given by the PCP.     Review of patient's allergies indicates:   Allergen Reactions    Celexa [citalopram] Nausea And Vomiting     Past Medical History:   Diagnosis Date    Asthma     Depression     Diabetes mellitus, type II     Diastolic dysfunction     E. 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     SECTION       SECTION, CLASSIC      CHOLECYSTECTOMY  age 15    TONSILLECTOMY, ADENOIDECTOMY       Family History   Problem Relation Age of Onset    Cancer Father     Heart attack Mother     Cancer Maternal Aunt      lung (smoker)    Heart attack Maternal Aunt     Breast cancer Paternal Grandmother     Heart attack Maternal Grandmother      Social History   Substance Use Topics    Smoking status: Current Every Day Smoker     Packs/day: 1.00     Years: 15.00     Types: Cigarettes    Smokeless tobacco: Never Used    Alcohol use No     Review of Systems   Constitutional: Negative for fever.   HENT: Negative for congestion, rhinorrhea and sore throat.    Eyes: Negative for double vision.   Respiratory: Negative for cough and shortness of breath.    Cardiovascular: Negative for chest pain.    Gastrointestinal: Negative for nausea.   Genitourinary: Negative for dysuria.   Musculoskeletal: Negative for back pain.   Skin: Negative for rash.   Neurological: Negative for weakness and headaches.   Hematological: Does not bruise/bleed easily.   All other systems reviewed and are negative.      Physical Exam     Initial Vitals   BP Pulse Resp Temp SpO2   11/17/17 0710 11/17/17 0710 11/17/17 0710 11/17/17 0707 11/17/17 0710   (!) 113/52 64 20 99.2 °F (37.3 °C) 99 %      MAP       11/17/17 0710       72.33         Physical Exam    Nursing note and vitals reviewed.  Constitutional: She appears well-developed.   HENT:   Head: Normocephalic and atraumatic.   Right Ear: External ear normal.   Left Ear: External ear normal.   Nose: Nose normal.   Mouth/Throat: Oropharynx is clear and moist.   Eyes: Conjunctivae and EOM are normal. Pupils are equal, round, and reactive to light. Right eye exhibits no discharge. Left eye exhibits no discharge.   Neck: Normal range of motion. Neck supple. No tracheal deviation present.   Cardiovascular: Normal rate, regular rhythm and normal heart sounds.   No murmur heard.  Pulmonary/Chest: Breath sounds normal. No respiratory distress. She has no wheezes.   Abdominal: Soft. Bowel sounds are normal.   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   Skin: Skin is warm and dry.         ED Course   Procedures  Labs Reviewed   CBC W/ AUTO DIFFERENTIAL - Abnormal; Notable for the following:        Result Value    WBC 14.59 (*)     RDW 15.0 (*)     Lymph # 7.8 (*)     Gran% 35.7 (*)     Lymph% 53.3 (*)     All other components within normal limits   COMPREHENSIVE METABOLIC PANEL - Abnormal; Notable for the following:     CO2 21 (*)     Glucose 211 (*)     BUN, Bld 23 (*)     eGFR if  55.6 (*)     eGFR if non  48.2 (*)     All other components within normal limits   POCT  GLUCOSE - Abnormal; Notable for the following:     POCT Glucose 267 (*)     All other components within normal limits   URINALYSIS             Medical Decision Making:   Initial Assessment:   Patient sitting in no distress and pleasant. Patient has no other complaints other than documented.     Differential Diagnosis:   Headache  Migraine  Stroke  Aneurysm                     ED Course      Clinical Impression:   The encounter diagnosis was Hypotension, unspecified hypotension type.                           Minor Crane MD  11/17/17 9055

## 2017-12-18 ENCOUNTER — HOSPITAL ENCOUNTER (OUTPATIENT)
Facility: HOSPITAL | Age: 37
Discharge: HOME | End: 2017-12-20
Attending: EMERGENCY MEDICINE | Admitting: HOSPITALIST
Payer: MEDICAID

## 2017-12-18 DIAGNOSIS — E11.65 POORLY CONTROLLED DIABETES MELLITUS: ICD-10-CM

## 2017-12-18 DIAGNOSIS — E11.69 DIABETES MELLITUS TYPE 2 IN OBESE: Chronic | ICD-10-CM

## 2017-12-18 DIAGNOSIS — M79.89 LEG SWELLING: ICD-10-CM

## 2017-12-18 DIAGNOSIS — L03.311 CELLULITIS OF LEFT ABDOMINAL WALL: Primary | ICD-10-CM

## 2017-12-18 DIAGNOSIS — N39.0 URINARY TRACT INFECTION WITHOUT HEMATURIA, SITE UNSPECIFIED: ICD-10-CM

## 2017-12-18 DIAGNOSIS — L03.311 CELLULITIS OF ABDOMINAL WALL: ICD-10-CM

## 2017-12-18 DIAGNOSIS — N17.9 AKI (ACUTE KIDNEY INJURY): ICD-10-CM

## 2017-12-18 DIAGNOSIS — E66.9 DIABETES MELLITUS TYPE 2 IN OBESE: Chronic | ICD-10-CM

## 2017-12-18 PROBLEM — N30.00 ACUTE CYSTITIS WITHOUT HEMATURIA: Status: ACTIVE | Noted: 2017-12-18

## 2017-12-18 LAB
ALBUMIN SERPL BCP-MCNC: 3.2 G/DL
ALP SERPL-CCNC: 78 U/L
ALT SERPL W/O P-5'-P-CCNC: 13 U/L
ANION GAP SERPL CALC-SCNC: 11 MMOL/L
AST SERPL-CCNC: 13 U/L
BACTERIA #/AREA URNS HPF: ABNORMAL /HPF
BASOPHILS # BLD AUTO: 0.01 K/UL
BASOPHILS NFR BLD: 0.1 %
BILIRUB SERPL-MCNC: 0.2 MG/DL
BILIRUB UR QL STRIP: NEGATIVE
BNP SERPL-MCNC: 37 PG/ML
BUN SERPL-MCNC: 46 MG/DL
CALCIUM SERPL-MCNC: 8.5 MG/DL
CHLORIDE SERPL-SCNC: 106 MMOL/L
CLARITY UR: ABNORMAL
CO2 SERPL-SCNC: 19 MMOL/L
COLOR UR: YELLOW
CREAT SERPL-MCNC: 2 MG/DL
DIFFERENTIAL METHOD: ABNORMAL
EOSINOPHIL # BLD AUTO: 0.2 K/UL
EOSINOPHIL NFR BLD: 2.3 %
ERYTHROCYTE [DISTWIDTH] IN BLOOD BY AUTOMATED COUNT: 14.5 %
EST. GFR  (AFRICAN AMERICAN): 35 ML/MIN/1.73 M^2
EST. GFR  (NON AFRICAN AMERICAN): 31 ML/MIN/1.73 M^2
GLUCOSE SERPL-MCNC: 338 MG/DL
GLUCOSE UR QL STRIP: ABNORMAL
HCT VFR BLD AUTO: 36.6 %
HGB BLD-MCNC: 11.9 G/DL
HGB UR QL STRIP: ABNORMAL
INR PPP: 0.9
KETONES UR QL STRIP: NEGATIVE
LEUKOCYTE ESTERASE UR QL STRIP: ABNORMAL
LYMPHOCYTES # BLD AUTO: 4.8 K/UL
LYMPHOCYTES NFR BLD: 47.9 %
MAGNESIUM SERPL-MCNC: 1.7 MG/DL
MCH RBC QN AUTO: 27.5 PG
MCHC RBC AUTO-ENTMCNC: 32.5 G/DL
MCV RBC AUTO: 85 FL
MICROSCOPIC COMMENT: ABNORMAL
MONOCYTES # BLD AUTO: 0.5 K/UL
MONOCYTES NFR BLD: 4.6 %
NEUTROPHILS # BLD AUTO: 4.4 K/UL
NEUTROPHILS NFR BLD: 43.6 %
NITRITE UR QL STRIP: POSITIVE
PH UR STRIP: 6 [PH] (ref 5–8)
PLATELET # BLD AUTO: 148 K/UL
PMV BLD AUTO: 10.5 FL
POCT GLUCOSE: 213 MG/DL (ref 70–110)
POCT GLUCOSE: 348 MG/DL (ref 70–110)
POTASSIUM SERPL-SCNC: 4 MMOL/L
PROT SERPL-MCNC: 7.4 G/DL
PROT UR QL STRIP: ABNORMAL
PROTHROMBIN TIME: 10 SEC
RBC # BLD AUTO: 4.33 M/UL
RBC #/AREA URNS HPF: 8 /HPF (ref 0–4)
SODIUM SERPL-SCNC: 136 MMOL/L
SP GR UR STRIP: 1.02 (ref 1–1.03)
SQUAMOUS #/AREA URNS HPF: 6 /HPF
URN SPEC COLLECT METH UR: ABNORMAL
UROBILINOGEN UR STRIP-ACNC: NEGATIVE EU/DL
WBC # BLD AUTO: 10 K/UL
WBC #/AREA URNS HPF: >100 /HPF (ref 0–5)

## 2017-12-18 PROCEDURE — 82962 GLUCOSE BLOOD TEST: CPT

## 2017-12-18 PROCEDURE — 85025 COMPLETE CBC W/AUTO DIFF WBC: CPT

## 2017-12-18 PROCEDURE — S4991 NICOTINE PATCH NONLEGEND: HCPCS | Performed by: EMERGENCY MEDICINE

## 2017-12-18 PROCEDURE — 87186 SC STD MICRODIL/AGAR DIL: CPT

## 2017-12-18 PROCEDURE — G0378 HOSPITAL OBSERVATION PER HR: HCPCS

## 2017-12-18 PROCEDURE — 81000 URINALYSIS NONAUTO W/SCOPE: CPT

## 2017-12-18 PROCEDURE — 83880 ASSAY OF NATRIURETIC PEPTIDE: CPT

## 2017-12-18 PROCEDURE — 80053 COMPREHEN METABOLIC PANEL: CPT

## 2017-12-18 PROCEDURE — 87077 CULTURE AEROBIC IDENTIFY: CPT

## 2017-12-18 PROCEDURE — 25000003 PHARM REV CODE 250: Performed by: EMERGENCY MEDICINE

## 2017-12-18 PROCEDURE — 85610 PROTHROMBIN TIME: CPT

## 2017-12-18 PROCEDURE — 87040 BLOOD CULTURE FOR BACTERIA: CPT | Mod: 59

## 2017-12-18 PROCEDURE — 87088 URINE BACTERIA CULTURE: CPT

## 2017-12-18 PROCEDURE — 99285 EMERGENCY DEPT VISIT HI MDM: CPT | Mod: 25

## 2017-12-18 PROCEDURE — 96367 TX/PROPH/DG ADDL SEQ IV INF: CPT

## 2017-12-18 PROCEDURE — 63600175 PHARM REV CODE 636 W HCPCS: Performed by: HOSPITALIST

## 2017-12-18 PROCEDURE — 96365 THER/PROPH/DIAG IV INF INIT: CPT

## 2017-12-18 PROCEDURE — 83735 ASSAY OF MAGNESIUM: CPT

## 2017-12-18 PROCEDURE — 87086 URINE CULTURE/COLONY COUNT: CPT

## 2017-12-18 PROCEDURE — 25000003 PHARM REV CODE 250: Performed by: HOSPITALIST

## 2017-12-18 PROCEDURE — 63600175 PHARM REV CODE 636 W HCPCS: Performed by: EMERGENCY MEDICINE

## 2017-12-18 PROCEDURE — 96361 HYDRATE IV INFUSION ADD-ON: CPT

## 2017-12-18 PROCEDURE — 94761 N-INVAS EAR/PLS OXIMETRY MLT: CPT

## 2017-12-18 RX ORDER — SODIUM CHLORIDE 9 MG/ML
INJECTION, SOLUTION INTRAVENOUS CONTINUOUS
Status: DISCONTINUED | OUTPATIENT
Start: 2017-12-18 | End: 2017-12-19

## 2017-12-18 RX ORDER — ENOXAPARIN SODIUM 100 MG/ML
40 INJECTION SUBCUTANEOUS EVERY 24 HOURS
Status: DISCONTINUED | OUTPATIENT
Start: 2017-12-18 | End: 2017-12-20 | Stop reason: HOSPADM

## 2017-12-18 RX ORDER — ONDANSETRON 2 MG/ML
4 INJECTION INTRAMUSCULAR; INTRAVENOUS EVERY 8 HOURS PRN
Status: DISCONTINUED | OUTPATIENT
Start: 2017-12-18 | End: 2017-12-20 | Stop reason: HOSPADM

## 2017-12-18 RX ORDER — TIZANIDINE 4 MG/1
4 TABLET ORAL EVERY 8 HOURS
Status: DISCONTINUED | OUTPATIENT
Start: 2017-12-18 | End: 2017-12-20 | Stop reason: HOSPADM

## 2017-12-18 RX ORDER — IBUPROFEN 200 MG
24 TABLET ORAL
Status: DISCONTINUED | OUTPATIENT
Start: 2017-12-18 | End: 2017-12-20 | Stop reason: HOSPADM

## 2017-12-18 RX ORDER — IBUPROFEN 200 MG
1 TABLET ORAL
Status: DISCONTINUED | OUTPATIENT
Start: 2017-12-18 | End: 2017-12-19

## 2017-12-18 RX ORDER — BUPRENORPHINE HYDROCHLORIDE AND NALOXONE HYDROCHLORIDE DIHYDRATE 8; 2 MG/1; MG/1
8 TABLET SUBLINGUAL ONCE
Status: DISCONTINUED | OUTPATIENT
Start: 2017-12-18 | End: 2017-12-18

## 2017-12-18 RX ORDER — PANTOPRAZOLE SODIUM 40 MG/1
40 TABLET, DELAYED RELEASE ORAL DAILY
Status: DISCONTINUED | OUTPATIENT
Start: 2017-12-19 | End: 2017-12-20 | Stop reason: HOSPADM

## 2017-12-18 RX ORDER — ALBUTEROL SULFATE 90 UG/1
2 AEROSOL, METERED RESPIRATORY (INHALATION) EVERY 4 HOURS PRN
Status: DISCONTINUED | OUTPATIENT
Start: 2017-12-18 | End: 2017-12-20 | Stop reason: HOSPADM

## 2017-12-18 RX ORDER — FLUTICASONE FUROATE AND VILANTEROL 100; 25 UG/1; UG/1
1 POWDER RESPIRATORY (INHALATION) DAILY
Status: DISCONTINUED | OUTPATIENT
Start: 2017-12-19 | End: 2017-12-20 | Stop reason: HOSPADM

## 2017-12-18 RX ORDER — CEFEPIME HYDROCHLORIDE 1 G/50ML
1 INJECTION, SOLUTION INTRAVENOUS
Status: COMPLETED | OUTPATIENT
Start: 2017-12-18 | End: 2017-12-18

## 2017-12-18 RX ORDER — PREGABALIN 75 MG/1
300 CAPSULE ORAL 2 TIMES DAILY
Status: DISCONTINUED | OUTPATIENT
Start: 2017-12-18 | End: 2017-12-20 | Stop reason: HOSPADM

## 2017-12-18 RX ORDER — HYDROCODONE BITARTRATE AND ACETAMINOPHEN 5; 325 MG/1; MG/1
1 TABLET ORAL EVERY 4 HOURS PRN
Status: DISCONTINUED | OUTPATIENT
Start: 2017-12-18 | End: 2017-12-20 | Stop reason: HOSPADM

## 2017-12-18 RX ORDER — GLUCAGON 1 MG
1 KIT INJECTION
Status: DISCONTINUED | OUTPATIENT
Start: 2017-12-18 | End: 2017-12-20 | Stop reason: HOSPADM

## 2017-12-18 RX ORDER — MICONAZOLE NITRATE 2 %
POWDER (GRAM) TOPICAL 2 TIMES DAILY
Status: DISCONTINUED | OUTPATIENT
Start: 2017-12-18 | End: 2017-12-20 | Stop reason: HOSPADM

## 2017-12-18 RX ORDER — IBUPROFEN 200 MG
16 TABLET ORAL
Status: DISCONTINUED | OUTPATIENT
Start: 2017-12-18 | End: 2017-12-20 | Stop reason: HOSPADM

## 2017-12-18 RX ORDER — ONDANSETRON 8 MG/1
8 TABLET, ORALLY DISINTEGRATING ORAL EVERY 8 HOURS PRN
Status: DISCONTINUED | OUTPATIENT
Start: 2017-12-18 | End: 2017-12-20 | Stop reason: HOSPADM

## 2017-12-18 RX ORDER — INSULIN ASPART 100 [IU]/ML
1-10 INJECTION, SOLUTION INTRAVENOUS; SUBCUTANEOUS
Status: DISCONTINUED | OUTPATIENT
Start: 2017-12-18 | End: 2017-12-20 | Stop reason: HOSPADM

## 2017-12-18 RX ORDER — INSULIN ASPART 100 [IU]/ML
50 INJECTION, SOLUTION INTRAVENOUS; SUBCUTANEOUS
Status: DISCONTINUED | OUTPATIENT
Start: 2017-12-19 | End: 2017-12-19

## 2017-12-18 RX ORDER — ACETAMINOPHEN 325 MG/1
650 TABLET ORAL EVERY 4 HOURS PRN
Status: DISCONTINUED | OUTPATIENT
Start: 2017-12-18 | End: 2017-12-20 | Stop reason: HOSPADM

## 2017-12-18 RX ORDER — MORPHINE SULFATE 2 MG/ML
4 INJECTION, SOLUTION INTRAMUSCULAR; INTRAVENOUS EVERY 4 HOURS PRN
Status: DISCONTINUED | OUTPATIENT
Start: 2017-12-18 | End: 2017-12-20 | Stop reason: HOSPADM

## 2017-12-18 RX ORDER — LISINOPRIL 10 MG/1
10 TABLET ORAL DAILY
Status: DISCONTINUED | OUTPATIENT
Start: 2017-12-19 | End: 2017-12-20 | Stop reason: HOSPADM

## 2017-12-18 RX ORDER — TIZANIDINE 4 MG/1
4 TABLET ORAL EVERY 8 HOURS
Status: ON HOLD | COMMUNITY
End: 2018-07-17 | Stop reason: HOSPADM

## 2017-12-18 RX ORDER — ACETAMINOPHEN 325 MG/1
650 TABLET ORAL EVERY 4 HOURS PRN
Status: DISCONTINUED | OUTPATIENT
Start: 2017-12-18 | End: 2017-12-18

## 2017-12-18 RX ORDER — AMITRIPTYLINE HYDROCHLORIDE 25 MG/1
50 TABLET, FILM COATED ORAL NIGHTLY
Status: DISCONTINUED | OUTPATIENT
Start: 2017-12-18 | End: 2017-12-20 | Stop reason: HOSPADM

## 2017-12-18 RX ORDER — SODIUM CHLORIDE 0.9 % (FLUSH) 0.9 %
5 SYRINGE (ML) INJECTION
Status: DISCONTINUED | OUTPATIENT
Start: 2017-12-18 | End: 2017-12-20 | Stop reason: HOSPADM

## 2017-12-18 RX ORDER — HYDROXYZINE PAMOATE 25 MG/1
50 CAPSULE ORAL EVERY 8 HOURS
Status: DISCONTINUED | OUTPATIENT
Start: 2017-12-18 | End: 2017-12-20 | Stop reason: HOSPADM

## 2017-12-18 RX ADMIN — AMITRIPTYLINE HYDROCHLORIDE 50 MG: 25 TABLET, FILM COATED ORAL at 09:12

## 2017-12-18 RX ADMIN — MICONAZOLE NITRATE: 20 POWDER TOPICAL at 09:12

## 2017-12-18 RX ADMIN — HYDROCODONE BITARTRATE AND ACETAMINOPHEN 1 TABLET: 5; 325 TABLET ORAL at 10:12

## 2017-12-18 RX ADMIN — SODIUM CHLORIDE 1000 ML: 0.9 INJECTION, SOLUTION INTRAVENOUS at 02:12

## 2017-12-18 RX ADMIN — PREGABALIN 300 MG: 75 CAPSULE ORAL at 09:12

## 2017-12-18 RX ADMIN — VANCOMYCIN HYDROCHLORIDE 2000 MG: 1 INJECTION, POWDER, LYOPHILIZED, FOR SOLUTION INTRAVENOUS at 04:12

## 2017-12-18 RX ADMIN — ENOXAPARIN SODIUM 40 MG: 100 INJECTION SUBCUTANEOUS at 07:12

## 2017-12-18 RX ADMIN — TIZANIDINE 4 MG: 4 TABLET ORAL at 09:12

## 2017-12-18 RX ADMIN — HYDROXYZINE PAMOATE 50 MG: 25 CAPSULE ORAL at 09:12

## 2017-12-18 RX ADMIN — CEFEPIME HYDROCHLORIDE 1 G: 1 INJECTION, SOLUTION INTRAVENOUS at 03:12

## 2017-12-18 RX ADMIN — NICOTINE 1 PATCH: 21 PATCH, EXTENDED RELEASE TRANSDERMAL at 02:12

## 2017-12-18 RX ADMIN — SODIUM CHLORIDE: 0.9 INJECTION, SOLUTION INTRAVENOUS at 07:12

## 2017-12-18 RX ADMIN — INSULIN DETEMIR 90 UNITS: 100 INJECTION, SOLUTION SUBCUTANEOUS at 08:12

## 2017-12-18 NOTE — ED NOTES
"Pt reports she is going outside to smoke, RN explains hospital policy prohibits leaving the ER, pt reports "you ain't my boss", nicotine patch offered to pt, pt reports "that never works" pt agrees to stay in bed and receive patch  "

## 2017-12-18 NOTE — ED NOTES
Pt behaving in hostile manner demanding pain medicines and repeating she wants to go home. I offered her to go AMA and for ER MD to come discuss AMA with her. She states she will wait for admitting MD to discuss plan of care. She kept demanding pain med-I notified her ER MD offering tylenol or toradol-she refused. I had to get security in room in order for pt to stop screaming and talking disrespectfully. Home med list updated.

## 2017-12-18 NOTE — ED PROVIDER NOTES
Encounter Date: 2017       History     Chief Complaint   Patient presents with    Leg Swelling     x 2 weeks pt on lasix,     Abscess     noted to abd x 1 week      Patient is a 37-year-old female who complains of leg swelling for the past couple of weeks.  She says she has been taking Lasix without relief.  She has mild leg pain associated with this.  She denies chest pain or cough.  Patient also complains of a sore on her left lower abdomen.  She has had no fever or chills.  No vomiting.      The history is provided by the patient.     Review of patient's allergies indicates:   Allergen Reactions    Celexa [citalopram] Nausea And Vomiting     Past Medical History:   Diagnosis Date    Asthma     Depression     Diabetes mellitus, type II     Diastolic dysfunction     E. 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     SECTION       SECTION, CLASSIC      CHOLECYSTECTOMY  age 15    TONSILLECTOMY, ADENOIDECTOMY       Family History   Problem Relation Age of Onset    Cancer Father     Heart attack Mother     Cancer Maternal Aunt      lung (smoker)    Heart attack Maternal Aunt     Breast cancer Paternal Grandmother     Heart attack Maternal Grandmother      Social History   Substance Use Topics    Smoking status: Current Every Day Smoker     Packs/day: 1.00     Years: 15.00     Types: Cigarettes    Smokeless tobacco: Never Used    Alcohol use No     Review of Systems   Constitutional: Negative for fever.   Respiratory: Negative for cough and shortness of breath.    Cardiovascular: Positive for leg swelling. Negative for chest pain.   Gastrointestinal: Negative for vomiting.   Skin: Positive for color change and wound.   All other systems reviewed and are negative.      Physical Exam     Initial  Vitals [12/18/17 1121]   BP Pulse Resp Temp SpO2   (!) 106/55 80 20 98.1 °F (36.7 °C) 100 %      MAP       72         Physical Exam    Nursing note and vitals reviewed.  Constitutional: No distress.   HENT:   Head: Normocephalic and atraumatic.   Eyes: EOM are normal.   Neck: Normal range of motion. Neck supple.   Cardiovascular: Normal rate, regular rhythm and normal heart sounds.   Pulmonary/Chest: Breath sounds normal.   Abdominal: Soft.   Musculoskeletal:   Bilateral lower extremity edema worse on the left.   Neurological: She is alert and oriented to person, place, and time.   Skin: Skin is warm and dry.   Extensive erythema of the left lower abdomen surrounding an approximately 4 cm oval ulceration.  No bleeding or discharge from the ulcer.  There is no appreciable fluctuance or induration.  No vesicles.   Psychiatric: Her behavior is normal.         ED Course   Procedures  Labs Reviewed   CBC W/ AUTO DIFFERENTIAL - Abnormal; Notable for the following:        Result Value    Hemoglobin 11.9 (*)     Hematocrit 36.6 (*)     Platelets 148 (*)     All other components within normal limits   COMPREHENSIVE METABOLIC PANEL - Abnormal; Notable for the following:     CO2 19 (*)     Glucose 338 (*)     BUN, Bld 46 (*)     Creatinine 2.0 (*)     Calcium 8.5 (*)     Albumin 3.2 (*)     eGFR if  35 (*)     eGFR if non  31 (*)     All other components within normal limits   URINALYSIS - Abnormal; Notable for the following:     Appearance, UA Cloudy (*)     Protein, UA Trace (*)     Glucose, UA Trace (*)     Occult Blood UA 2+ (*)     Nitrite, UA Positive (*)     Leukocytes, UA 2+ (*)     All other components within normal limits   URINALYSIS MICROSCOPIC - Abnormal; Notable for the following:     RBC, UA 8 (*)     WBC, UA >100 (*)     Bacteria, UA Few (*)     All other components within normal limits   POCT GLUCOSE - Abnormal; Notable for the following:     POCT Glucose 348 (*)     All other  components within normal limits   CULTURE, URINE   CULTURE, BLOOD   CULTURE, BLOOD   CULTURE, URINE   PROTIME-INR   B-TYPE NATRIURETIC PEPTIDE   MAGNESIUM             Medical Decision Making:   Clinical Tests:   Lab Tests: Ordered and Reviewed  Radiological Study: Ordered and Reviewed  ED Management:  Discussed all above pertinent findings with Dr. Kirkland.  He will admit the patient for further treatment.                   ED Course      Clinical Impression:   The primary encounter diagnosis was Cellulitis of left abdominal wall. Diagnoses of Leg swelling, BRIDGET (acute kidney injury), Poorly controlled diabetes mellitus, and Urinary tract infection without hematuria, site unspecified were also pertinent to this visit.                           Phil Velázquez MD  12/18/17 0091

## 2017-12-18 NOTE — ED NOTES
Pt awake alert in no acute distress, pt reports lt leg swelling intermittent for the past month, pt reports she has been to primary care for same complaint and prescribed fluid pill, pt reports ulcer to  abd stage one approximately 5 cm diameter, pt denies chest pain or sob, denies fever, denies n/v/d

## 2017-12-19 LAB
ANION GAP SERPL CALC-SCNC: 8 MMOL/L
BASOPHILS # BLD AUTO: 0.03 K/UL
BASOPHILS NFR BLD: 0.4 %
BUN SERPL-MCNC: 34 MG/DL
CALCIUM SERPL-MCNC: 7.8 MG/DL
CHLORIDE SERPL-SCNC: 110 MMOL/L
CO2 SERPL-SCNC: 19 MMOL/L
CREAT SERPL-MCNC: 1.3 MG/DL
DIFFERENTIAL METHOD: ABNORMAL
EOSINOPHIL # BLD AUTO: 0.2 K/UL
EOSINOPHIL NFR BLD: 2 %
ERYTHROCYTE [DISTWIDTH] IN BLOOD BY AUTOMATED COUNT: 14.7 %
EST. GFR  (AFRICAN AMERICAN): 60 ML/MIN/1.73 M^2
EST. GFR  (NON AFRICAN AMERICAN): 52 ML/MIN/1.73 M^2
ESTIMATED AVG GLUCOSE: 223 MG/DL
GLUCOSE SERPL-MCNC: 375 MG/DL
HBA1C MFR BLD HPLC: 9.4 %
HCT VFR BLD AUTO: 36.3 %
HGB BLD-MCNC: 11.8 G/DL
LYMPHOCYTES # BLD AUTO: 4.4 K/UL
LYMPHOCYTES NFR BLD: 55.7 %
MAGNESIUM SERPL-MCNC: 1.5 MG/DL
MCH RBC QN AUTO: 27.8 PG
MCHC RBC AUTO-ENTMCNC: 32.5 G/DL
MCV RBC AUTO: 85 FL
MONOCYTES # BLD AUTO: 0.4 K/UL
MONOCYTES NFR BLD: 5.6 %
NEUTROPHILS # BLD AUTO: 2.7 K/UL
NEUTROPHILS NFR BLD: 35.1 %
PHOSPHATE SERPL-MCNC: 3.1 MG/DL
PLATELET # BLD AUTO: 104 K/UL
PMV BLD AUTO: 10.3 FL
POCT GLUCOSE: 253 MG/DL (ref 70–110)
POCT GLUCOSE: 286 MG/DL (ref 70–110)
POCT GLUCOSE: 323 MG/DL (ref 70–110)
POCT GLUCOSE: 477 MG/DL (ref 70–110)
POTASSIUM SERPL-SCNC: 4.1 MMOL/L
RBC # BLD AUTO: 4.25 M/UL
SODIUM SERPL-SCNC: 137 MMOL/L
WBC # BLD AUTO: 7.81 K/UL

## 2017-12-19 PROCEDURE — 36415 COLL VENOUS BLD VENIPUNCTURE: CPT

## 2017-12-19 PROCEDURE — 25000003 PHARM REV CODE 250: Performed by: PHYSICIAN ASSISTANT

## 2017-12-19 PROCEDURE — 94761 N-INVAS EAR/PLS OXIMETRY MLT: CPT

## 2017-12-19 PROCEDURE — 25000242 PHARM REV CODE 250 ALT 637 W/ HCPCS: Performed by: HOSPITALIST

## 2017-12-19 PROCEDURE — 25000003 PHARM REV CODE 250: Performed by: NURSE PRACTITIONER

## 2017-12-19 PROCEDURE — 80048 BASIC METABOLIC PNL TOTAL CA: CPT

## 2017-12-19 PROCEDURE — G0378 HOSPITAL OBSERVATION PER HR: HCPCS

## 2017-12-19 PROCEDURE — 83036 HEMOGLOBIN GLYCOSYLATED A1C: CPT

## 2017-12-19 PROCEDURE — 85025 COMPLETE CBC W/AUTO DIFF WBC: CPT

## 2017-12-19 PROCEDURE — 84100 ASSAY OF PHOSPHORUS: CPT

## 2017-12-19 PROCEDURE — 63600175 PHARM REV CODE 636 W HCPCS: Performed by: HOSPITALIST

## 2017-12-19 PROCEDURE — 83735 ASSAY OF MAGNESIUM: CPT

## 2017-12-19 PROCEDURE — 25000003 PHARM REV CODE 250: Performed by: HOSPITALIST

## 2017-12-19 RX ORDER — NITROGLYCERIN 0.4 MG/1
0.4 TABLET SUBLINGUAL EVERY 5 MIN PRN
COMMUNITY
End: 2022-03-28 | Stop reason: SDUPTHER

## 2017-12-19 RX ORDER — POTASSIUM CHLORIDE 20 MEQ/1
20 TABLET, EXTENDED RELEASE ORAL DAILY PRN
Status: ON HOLD | COMMUNITY
End: 2021-03-04 | Stop reason: HOSPADM

## 2017-12-19 RX ORDER — OLANZAPINE 5 MG/1
5 TABLET, ORALLY DISINTEGRATING ORAL ONCE AS NEEDED
Status: DISPENSED | OUTPATIENT
Start: 2017-12-19 | End: 2017-12-19

## 2017-12-19 RX ORDER — ALBUTEROL SULFATE 90 UG/1
2 AEROSOL, METERED RESPIRATORY (INHALATION) EVERY 6 HOURS PRN
COMMUNITY
End: 2019-11-17 | Stop reason: SDUPTHER

## 2017-12-19 RX ORDER — LORAZEPAM/0.9% SODIUM CHLORIDE 100MG/0.1L
2 PLASTIC BAG, INJECTION (ML) INTRAVENOUS
Status: COMPLETED | OUTPATIENT
Start: 2017-12-19 | End: 2017-12-19

## 2017-12-19 RX ORDER — INSULIN ASPART 100 [IU]/ML
75 INJECTION, SOLUTION INTRAVENOUS; SUBCUTANEOUS
Status: DISCONTINUED | OUTPATIENT
Start: 2017-12-19 | End: 2017-12-20 | Stop reason: HOSPADM

## 2017-12-19 RX ORDER — FUROSEMIDE 40 MG/1
40 TABLET ORAL 2 TIMES DAILY PRN
Status: ON HOLD | COMMUNITY
End: 2023-01-01

## 2017-12-19 RX ADMIN — TIZANIDINE 4 MG: 4 TABLET ORAL at 01:12

## 2017-12-19 RX ADMIN — VANCOMYCIN HYDROCHLORIDE 1500 MG: 10 INJECTION, POWDER, LYOPHILIZED, FOR SOLUTION INTRAVENOUS at 04:12

## 2017-12-19 RX ADMIN — HYDROXYZINE PAMOATE 50 MG: 25 CAPSULE ORAL at 06:12

## 2017-12-19 RX ADMIN — PREGABALIN 300 MG: 75 CAPSULE ORAL at 09:12

## 2017-12-19 RX ADMIN — CEFEPIME 1 G: 1 INJECTION, POWDER, FOR SOLUTION INTRAMUSCULAR; INTRAVENOUS at 04:12

## 2017-12-19 RX ADMIN — SODIUM CHLORIDE: 0.9 INJECTION, SOLUTION INTRAVENOUS at 05:12

## 2017-12-19 RX ADMIN — LIDOCAINE HYDROCHLORIDE: 20 SOLUTION ORAL; TOPICAL at 03:12

## 2017-12-19 RX ADMIN — CEFEPIME 1 G: 1 INJECTION, POWDER, FOR SOLUTION INTRAMUSCULAR; INTRAVENOUS at 03:12

## 2017-12-19 RX ADMIN — MAGNESIUM SULFATE IN WATER 2 G: 40 INJECTION, SOLUTION INTRAVENOUS at 12:12

## 2017-12-19 RX ADMIN — MAGNESIUM SULFATE IN WATER 2 G: 40 INJECTION, SOLUTION INTRAVENOUS at 10:12

## 2017-12-19 RX ADMIN — HYDROCODONE BITARTRATE AND ACETAMINOPHEN 1 TABLET: 5; 325 TABLET ORAL at 04:12

## 2017-12-19 RX ADMIN — TIZANIDINE 4 MG: 4 TABLET ORAL at 06:12

## 2017-12-19 RX ADMIN — HYDROCODONE BITARTRATE AND ACETAMINOPHEN 1 TABLET: 5; 325 TABLET ORAL at 02:12

## 2017-12-19 RX ADMIN — VANCOMYCIN HYDROCHLORIDE 1500 MG: 10 INJECTION, POWDER, LYOPHILIZED, FOR SOLUTION INTRAVENOUS at 05:12

## 2017-12-19 RX ADMIN — INSULIN ASPART 50 UNITS: 100 INJECTION, SOLUTION INTRAVENOUS; SUBCUTANEOUS at 06:12

## 2017-12-19 RX ADMIN — MICONAZOLE NITRATE: 20 POWDER TOPICAL at 09:12

## 2017-12-19 RX ADMIN — LISINOPRIL 10 MG: 10 TABLET ORAL at 09:12

## 2017-12-19 RX ADMIN — INSULIN ASPART 75 UNITS: 100 INJECTION, SOLUTION INTRAVENOUS; SUBCUTANEOUS at 04:12

## 2017-12-19 RX ADMIN — PANTOPRAZOLE SODIUM 40 MG: 40 TABLET, DELAYED RELEASE ORAL at 09:12

## 2017-12-19 RX ADMIN — ENOXAPARIN SODIUM 40 MG: 100 INJECTION SUBCUTANEOUS at 04:12

## 2017-12-19 RX ADMIN — AMITRIPTYLINE HYDROCHLORIDE 50 MG: 25 TABLET, FILM COATED ORAL at 09:12

## 2017-12-19 RX ADMIN — HYDROXYZINE PAMOATE 50 MG: 25 CAPSULE ORAL at 01:12

## 2017-12-19 RX ADMIN — TIZANIDINE 4 MG: 4 TABLET ORAL at 09:12

## 2017-12-19 RX ADMIN — LIDOCAINE HYDROCHLORIDE: 20 SOLUTION ORAL; TOPICAL at 10:12

## 2017-12-19 RX ADMIN — INSULIN ASPART 6 UNITS: 100 INJECTION, SOLUTION INTRAVENOUS; SUBCUTANEOUS at 04:12

## 2017-12-19 RX ADMIN — HYDROCODONE BITARTRATE AND ACETAMINOPHEN 1 TABLET: 5; 325 TABLET ORAL at 01:12

## 2017-12-19 RX ADMIN — FLUTICASONE FUROATE AND VILANTEROL TRIFENATATE 1 PUFF: 100; 25 POWDER RESPIRATORY (INHALATION) at 08:12

## 2017-12-19 RX ADMIN — HYDROXYZINE PAMOATE 50 MG: 25 CAPSULE ORAL at 11:12

## 2017-12-19 RX ADMIN — HYDROCODONE BITARTRATE AND ACETAMINOPHEN 1 TABLET: 5; 325 TABLET ORAL at 09:12

## 2017-12-19 RX ADMIN — INSULIN ASPART 75 UNITS: 100 INJECTION, SOLUTION INTRAVENOUS; SUBCUTANEOUS at 12:12

## 2017-12-19 NOTE — NURSING
"Patient angry and states "nobody would give me my pain medicine in the emergency and I need to go smoke. This patch doesn't work." Explained that [facility] is a non-smoking facility and that it is against hospital policy to allow her to smoke on the property. Patient begins to yell stating "You cant tell me if I can smoke or not. You don't buy my cigarettes, trump doesn't buy my cigarettes. I do!". Explained again that [facility] is a smoke free campus and also explained that patients are not allowed to leave unit who have heart monitors because it will not monitor that far away. Also explained to patient she is unable to leave unit  if she receives pain medications and that she will have to get out of bed with a staff member only. Patient yells get out. You cant be my nurse. Get out before I flip out on you. Charge nurse called into room to speak to patient. After speaking to Mrs. Dias she agrees to allow me back in to her room. Dr. Kirkland notified of patients complaints.  "

## 2017-12-19 NOTE — SUBJECTIVE & OBJECTIVE
Past Medical History:   Diagnosis Date    Asthma     Depression     Diabetes mellitus, type II     Diastolic dysfunction     E. coli pyelonephritis 2015    Hernia, epigastric     Hypertension     Nonalcoholic hepatosteatosis     Periumbilical hernia        Past Surgical History:   Procedure Laterality Date    ARM AMPUTATION AT SHOULDER Left 2000s     SECTION       SECTION, CLASSIC      CHOLECYSTECTOMY  age 15    TONSILLECTOMY, ADENOIDECTOMY         Review of patient's allergies indicates:   Allergen Reactions    Celexa [citalopram] Nausea And Vomiting       No current facility-administered medications on file prior to encounter.      Current Outpatient Prescriptions on File Prior to Encounter   Medication Sig    albuterol (ACCUNEB) 1.25 mg/3 mL Nebu Take 1.25 mg by nebulization every 4 (four) hours as needed.    amitriptyline (ELAVIL) 50 MG tablet Take 50 mg by mouth nightly as needed for Insomnia.    buprenorphine-naloxone 2-0.5 mg (SUBOXONE) 2-0.5 mg Subl Place 8 mg under the tongue once daily. Pt states once daily    fluticasone-salmeterol 100-50 mcg/dose (ADVAIR) 100-50 mcg/dose diskus inhaler Inhale 1 puff into the lungs 2 (two) times daily.    hydrOXYzine pamoate (VISTARIL) 50 MG Cap Take 50 mg by mouth every 8 (eight) hours as needed.    insulin glargine (LANTUS) 100 unit/mL injection Inject 125 Units into the skin 2 (two) times daily.     insulin lispro (HUMALOG) 100 unit/mL injection Inject 95 Units into the skin 3 (three) times daily before meals.     lisinopril (PRINIVIL,ZESTRIL) 20 MG tablet Take 10 mg by mouth once daily.     omeprazole (PRILOSEC) 40 MG capsule Take 40 mg by mouth every morning.     pregabalin (LYRICA) 150 MG capsule Take 300 mg by mouth 2 (two) times daily.    promethazine (PHENERGAN) 25 MG tablet Take 25 mg by mouth every 4 (four) hours.    [DISCONTINUED] nitrofurantoin, macrocrystal-monohydrate, (MACROBID) 100 MG capsule Take 1 capsule  (100 mg total) by mouth 2 (two) times daily.     Family History     Problem Relation (Age of Onset)    Breast cancer Paternal Grandmother    Cancer Father, Maternal Aunt    Heart attack Mother, Maternal Aunt, Maternal Grandmother    Heart disease Mother        Social History Main Topics    Smoking status: Current Every Day Smoker     Packs/day: 2.00     Years: 15.00     Types: Cigarettes    Smokeless tobacco: Never Used    Alcohol use No    Drug use: No    Sexual activity: Yes     Partners: Male     Birth control/ protection: Injection     Review of Systems   Constitutional: Negative for appetite change, diaphoresis and fever.   HENT: Negative for congestion, nosebleeds, sinus pressure and sore throat.    Eyes: Negative for pain and itching.   Respiratory: Negative for cough, shortness of breath and wheezing.    Cardiovascular: Positive for leg swelling. Negative for chest pain and palpitations.   Gastrointestinal: Positive for abdominal pain. Negative for blood in stool, nausea and vomiting.   Endocrine: Negative for cold intolerance, polydipsia and polyphagia.   Genitourinary: Negative for difficulty urinating, dysuria, frequency and hematuria.   Musculoskeletal: Negative for arthralgias, back pain and myalgias.   Skin: Positive for rash. Negative for pallor.   Allergic/Immunologic: Negative for environmental allergies and food allergies.   Neurological: Negative for dizziness, syncope, light-headedness and headaches.   Hematological: Does not bruise/bleed easily.   Psychiatric/Behavioral: Negative for agitation and confusion. The patient is not nervous/anxious.      Objective:     Vital Signs (Most Recent):  Temp: 98.2 °F (36.8 °C) (12/18/17 1759)  Pulse: 96 (12/18/17 1759)  Resp: 18 (12/18/17 1759)  BP: 132/62 (12/18/17 1759)  SpO2: 98 % (12/18/17 1759) Vital Signs (24h Range):  Temp:  [98 °F (36.7 °C)-98.2 °F (36.8 °C)] 98.2 °F (36.8 °C)  Pulse:  [80-96] 96  Resp:  [16-20] 18  SpO2:  [98 %-100 %] 98 %  BP:  (106-132)/(53-62) 132/62     Weight: (!) 150 kg (330 lb 11 oz)  Body mass index is 60.48 kg/m².    Physical Exam   Constitutional: She is oriented to person, place, and time. She appears well-developed and well-nourished. She is cooperative. No distress.   HENT:   Head: Normocephalic and atraumatic.   Right Ear: External ear normal.   Left Ear: External ear normal.   Nose: No mucosal edema or sinus tenderness.   Mouth/Throat: Oropharynx is clear and moist and mucous membranes are normal. Abnormal dentition. No oropharyngeal exudate.   Eyes: Conjunctivae and EOM are normal. Pupils are equal, round, and reactive to light. No scleral icterus.   Neck: Phonation normal. Neck supple. No JVD present. No muscular tenderness present. Carotid bruit is not present. No tracheal deviation present.   Cardiovascular: Normal rate, regular rhythm, S1 normal and S2 normal.    No murmur heard.  Pulses:       Radial pulses are 2+ on the right side, and 2+ on the left side.   Pulmonary/Chest: Effort normal and breath sounds normal. No respiratory distress. She has no wheezes. She has no rales. She exhibits no tenderness and no crepitus.   Abdominal: Soft. Bowel sounds are normal. She exhibits no distension. There is tenderness. There is no rebound and no guarding. No hernia.       Musculoskeletal: She exhibits edema. She exhibits no tenderness.   Left arm amputated at the shoulder   Lymphadenopathy:        Right cervical: No superficial cervical adenopathy present.       Left cervical: No superficial cervical adenopathy present.        Right: No supraclavicular adenopathy present.        Left: No supraclavicular adenopathy present.   Neurological: She is alert and oriented to person, place, and time. She displays no tremor. She displays no seizure activity.   Skin: Skin is warm and dry. Rash noted. There is erythema. No cyanosis. Nails show no clubbing.   Psychiatric: She has a normal mood and affect. Her behavior is normal. Thought  content normal.   Nursing note and vitals reviewed.        CRANIAL NERVES     CN III, IV, VI   Pupils are equal, round, and reactive to light.  Extraocular motions are normal.        Significant Labs:   CBC:   Recent Labs  Lab 12/18/17  1252   WBC 10.00   HGB 11.9*   HCT 36.6*   *     CMP:   Recent Labs  Lab 12/18/17  1252      K 4.0      CO2 19*   *   BUN 46*   CREATININE 2.0*   CALCIUM 8.5*   PROT 7.4   ALBUMIN 3.2*   BILITOT 0.2   ALKPHOS 78   AST 13   ALT 13   ANIONGAP 11   EGFRNONAA 31*     Cardiac Markers:   Recent Labs  Lab 12/18/17  1252   BNP 37     Coagulation:   Recent Labs  Lab 12/18/17  1252   INR 0.9     Magnesium:   Recent Labs  Lab 12/18/17  1252   MG 1.7     Urine Studies:   Recent Labs  Lab 12/18/17  1309   COLORU Yellow   APPEARANCEUA Cloudy*   PHUR 6.0   SPECGRAV 1.025   PROTEINUA Trace*   GLUCUA Trace*   KETONESU Negative   BILIRUBINUA Negative   OCCULTUA 2+*   NITRITE Positive*   UROBILINOGEN Negative   LEUKOCYTESUR 2+*   RBCUA 8*   WBCUA >100*   BACTERIA Few*   SQUAMEPITHEL 6       Significant Imaging: CXR: I have reviewed all pertinent results/findings within the past 24 hours and my personal findings are:  No focal consolidation or infiltrate. No pulmonary edema.  U/S: I have reviewed all pertinent results/findings within the past 24 hours and my personal findings are:  No DVT

## 2017-12-19 NOTE — ASSESSMENT & PLAN NOTE
Phantom limb pain  Continue home lyrica at 300 mg BID, nightly elavil. Holding home suboxone per her request.

## 2017-12-19 NOTE — PLAN OF CARE
Problem: Patient Care Overview  Goal: Plan of Care Review  Outcome: Ongoing (interventions implemented as appropriate)  Pt on RA with sats of 99%. Will continue to monitor.
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
## 2017-12-19 NOTE — PLAN OF CARE
Patient is independent and drives, however she does use a walker PRN and a cane.  Patient lives with supportive significant other and daughter who are independent and able to assist with needs.  Significant other is in room with patient at this time. Juan will give her a ride home once ready for DC.    Juan Coats Significant other     [phone]       Jessica Dias Daughter [phone]       Patient prefers to set her own f/u appointments at DC. Patient ask about getting home health after DC.  TN explained that it may be possible but that her insurance will limit our options in choice of company and the degree of services they will provide if she accepted at all.  Patient verbalized understanding.    Follow-up With  Details  Why  Contact Info   Kari Edge MD  Call  patient will set her own follow up appointments.  [address] [facility] [address]  [phone]               12/19/17 0954   Discharge Assessment   Assessment Type Discharge Planning Assessment   Confirmed/corrected address and phone number on facesheet? Yes   Assessment information obtained from? Patient;Caregiver   Prior to hospitilization cognitive status: Alert/Oriented   Prior to hospitalization functional status: Assistive Equipment;Independent   Current cognitive status: Alert/Oriented   Current Functional Status: Independent;Assistive Equipment   Facility Arrived From: home   Lives With significant other   Able to Return to Prior Arrangements yes   Is patient able to care for self after discharge? Yes   Who are your caregiver(s) and their phone number(s)? Juan Coats Significant other     [phone]    Patient's perception of discharge disposition home or selfcare;home health   Patient currently being followed by outpatient case management? No   Patient currently receives any other outside agency services? No   Equipment Currently Used at Home walker, presley;cane, straight   Do you have any problems affording any  of your prescribed medications? Yes   Is the patient taking medications as prescribed? yes   Does the patient have transportation home? Yes   Transportation Available family or friend will provide   Discharge Plan A Home with family   Discharge Plan B Home with family;Home Health   Patient/Family In Agreement With Plan yes

## 2017-12-19 NOTE — ASSESSMENT & PLAN NOTE
Abdominal wall ulcer  Intertriginous tinea infection  Continue the vancomycin and cefepime for now. Monitor the ulcer. Use interdry or other wicking fabric to try to dry out the area. Give miconazole powder. Do not feel that she has deeper paniculitis infection at this time.

## 2017-12-19 NOTE — H&P
[facility] Medicine  History & Physical    Patient Name: Lele Dias  MRN: [id number]  Admission Date: 2017  Attending Physician: Pk Kirkland MD  Primary Care Provider: Kari Edge MD         Patient information was obtained from patient, spouse/SO, past medical records and ER records.     Subjective:     Principal Problem:Cellulitis of abdominal wall    Chief Complaint:   Chief Complaint   Patient presents with    Leg Swelling     x 2 weeks pt on lasix,     Abscess     noted to abd x 1 week         HPI: Ms. Dias is a 38 yo WF with DM2, asthma, HTN and chronic pain that presented to [facility] complaining of abdominal wall pain. She says that she has recurrent ulcers on her panus and that the most recent one has been there for months, but was healing until a couple days ago. She noticed increased drainage, foul smell, increased erythema and pain up to 9 out of 10 in the left lower abdomen. No radiation of the pain and it has been constant and not relieved with her home suboxone. She denies any fevers or chills. She also denies any dysuria, frequency or change in urine color. Blood sugars have been elevated despite taking her prescribed insulin at home.     Past Medical History:   Diagnosis Date    Asthma     Depression     Diabetes mellitus, type II     Diastolic dysfunction     E. coli pyelonephritis 2015    Hernia, epigastric     Hypertension     Nonalcoholic hepatosteatosis     Periumbilical hernia        Past Surgical History:   Procedure Laterality Date    ARM AMPUTATION AT SHOULDER Left 2000s     SECTION       SECTION, CLASSIC      CHOLECYSTECTOMY  age 15    TONSILLECTOMY, ADENOIDECTOMY         Review of patient's allergies indicates:   Allergen Reactions    Celexa [citalopram] Nausea And Vomiting       No current facility-administered medications on file prior to encounter.      Current Outpatient Prescriptions on File  Prior to Encounter   Medication Sig    albuterol (ACCUNEB) 1.25 mg/3 mL Nebu Take 1.25 mg by nebulization every 4 (four) hours as needed.    amitriptyline (ELAVIL) 50 MG tablet Take 50 mg by mouth nightly as needed for Insomnia.    buprenorphine-naloxone 2-0.5 mg (SUBOXONE) 2-0.5 mg Subl Place 8 mg under the tongue once daily. Pt states once daily    fluticasone-salmeterol 100-50 mcg/dose (ADVAIR) 100-50 mcg/dose diskus inhaler Inhale 1 puff into the lungs 2 (two) times daily.    hydrOXYzine pamoate (VISTARIL) 50 MG Cap Take 50 mg by mouth every 8 (eight) hours as needed.    insulin glargine (LANTUS) 100 unit/mL injection Inject 125 Units into the skin 2 (two) times daily.     insulin lispro (HUMALOG) 100 unit/mL injection Inject 95 Units into the skin 3 (three) times daily before meals.     lisinopril (PRINIVIL,ZESTRIL) 20 MG tablet Take 10 mg by mouth once daily.     omeprazole (PRILOSEC) 40 MG capsule Take 40 mg by mouth every morning.     pregabalin (LYRICA) 150 MG capsule Take 300 mg by mouth 2 (two) times daily.    promethazine (PHENERGAN) 25 MG tablet Take 25 mg by mouth every 4 (four) hours.    [DISCONTINUED] nitrofurantoin, macrocrystal-monohydrate, (MACROBID) 100 MG capsule Take 1 capsule (100 mg total) by mouth 2 (two) times daily.     Family History     Problem Relation (Age of Onset)    Breast cancer Paternal Grandmother    Cancer Father, Maternal Aunt    Heart attack Mother, Maternal Aunt, Maternal Grandmother    Heart disease Mother        Social History Main Topics    Smoking status: Current Every Day Smoker     Packs/day: 2.00     Years: 15.00     Types: Cigarettes    Smokeless tobacco: Never Used    Alcohol use No    Drug use: No    Sexual activity: Yes     Partners: Male     Birth control/ protection: Injection     Review of Systems   Constitutional: Negative for appetite change, diaphoresis and fever.   HENT: Negative for congestion, nosebleeds, sinus pressure and sore throat.     Eyes: Negative for pain and itching.   Respiratory: Negative for cough, shortness of breath and wheezing.    Cardiovascular: Positive for leg swelling. Negative for chest pain and palpitations.   Gastrointestinal: Positive for abdominal pain. Negative for blood in stool, nausea and vomiting.   Endocrine: Negative for cold intolerance, polydipsia and polyphagia.   Genitourinary: Negative for difficulty urinating, dysuria, frequency and hematuria.   Musculoskeletal: Negative for arthralgias, back pain and myalgias.   Skin: Positive for rash. Negative for pallor.   Allergic/Immunologic: Negative for environmental allergies and food allergies.   Neurological: Negative for dizziness, syncope, light-headedness and headaches.   Hematological: Does not bruise/bleed easily.   Psychiatric/Behavioral: Negative for agitation and confusion. The patient is not nervous/anxious.      Objective:     Vital Signs (Most Recent):  Temp: 98.2 °F (36.8 °C) (12/18/17 1759)  Pulse: 96 (12/18/17 1759)  Resp: 18 (12/18/17 1759)  BP: 132/62 (12/18/17 1759)  SpO2: 98 % (12/18/17 1759) Vital Signs (24h Range):  Temp:  [98 °F (36.7 °C)-98.2 °F (36.8 °C)] 98.2 °F (36.8 °C)  Pulse:  [80-96] 96  Resp:  [16-20] 18  SpO2:  [98 %-100 %] 98 %  BP: (106-132)/(53-62) 132/62     Weight: (!) 150 kg (330 lb 11 oz)  Body mass index is 60.48 kg/m².    Physical Exam   Constitutional: She is oriented to person, place, and time. She appears well-developed and well-nourished. She is cooperative. No distress.   HENT:   Head: Normocephalic and atraumatic.   Right Ear: External ear normal.   Left Ear: External ear normal.   Nose: No mucosal edema or sinus tenderness.   Mouth/Throat: Oropharynx is clear and moist and mucous membranes are normal. Abnormal dentition. No oropharyngeal exudate.   Eyes: Conjunctivae and EOM are normal. Pupils are equal, round, and reactive to light. No scleral icterus.   Neck: Phonation normal. Neck supple. No JVD present. No muscular  tenderness present. Carotid bruit is not present. No tracheal deviation present.   Cardiovascular: Normal rate, regular rhythm, S1 normal and S2 normal.    No murmur heard.  Pulses:       Radial pulses are 2+ on the right side, and 2+ on the left side.   Pulmonary/Chest: Effort normal and breath sounds normal. No respiratory distress. She has no wheezes. She has no rales. She exhibits no tenderness and no crepitus.   Abdominal: Soft. Bowel sounds are normal. She exhibits no distension. There is tenderness. There is no rebound and no guarding. No hernia.       Musculoskeletal: She exhibits edema. She exhibits no tenderness.   Left arm amputated at the shoulder   Lymphadenopathy:        Right cervical: No superficial cervical adenopathy present.       Left cervical: No superficial cervical adenopathy present.        Right: No supraclavicular adenopathy present.        Left: No supraclavicular adenopathy present.   Neurological: She is alert and oriented to person, place, and time. She displays no tremor. She displays no seizure activity.   Skin: Skin is warm and dry. Rash noted. There is erythema. No cyanosis. Nails show no clubbing.   Psychiatric: She has a normal mood and affect. Her behavior is normal. Thought content normal.   Nursing note and vitals reviewed.        CRANIAL NERVES     CN III, IV, VI   Pupils are equal, round, and reactive to light.  Extraocular motions are normal.        Significant Labs:   CBC:   Recent Labs  Lab 12/18/17  1252   WBC 10.00   HGB 11.9*   HCT 36.6*   *     CMP:   Recent Labs  Lab 12/18/17  1252      K 4.0      CO2 19*   *   BUN 46*   CREATININE 2.0*   CALCIUM 8.5*   PROT 7.4   ALBUMIN 3.2*   BILITOT 0.2   ALKPHOS 78   AST 13   ALT 13   ANIONGAP 11   EGFRNONAA 31*     Cardiac Markers:   Recent Labs  Lab 12/18/17  1252   BNP 37     Coagulation:   Recent Labs  Lab 12/18/17  1252   INR 0.9     Magnesium:   Recent Labs  Lab 12/18/17  1252   MG 1.7     Urine  Studies:   Recent Labs  Lab 12/18/17  1309   COLORU Yellow   APPEARANCEUA Cloudy*   PHUR 6.0   SPECGRAV 1.025   PROTEINUA Trace*   GLUCUA Trace*   KETONESU Negative   BILIRUBINUA Negative   OCCULTUA 2+*   NITRITE Positive*   UROBILINOGEN Negative   LEUKOCYTESUR 2+*   RBCUA 8*   WBCUA >100*   BACTERIA Few*   SQUAMEPITHEL 6       Significant Imaging: CXR: I have reviewed all pertinent results/findings within the past 24 hours and my personal findings are:  No focal consolidation or infiltrate. No pulmonary edema.  U/S: I have reviewed all pertinent results/findings within the past 24 hours and my personal findings are:  No DVT    Assessment/Plan:     * Cellulitis of abdominal wall    Abdominal wall ulcer  Intertriginous tinea infection  Continue the vancomycin and cefepime for now. Monitor the ulcer. Use interdry or other wicking fabric to try to dry out the area. Give miconazole powder. Do not feel that she has deeper paniculitis infection at this time.           Acute cystitis without hematuria    Follow up culture. Continue cefepime.         Opioid dependence on agonist therapy    Takes Suboxone at home and says that she does not want to take it in the hospital.         History of amputation of left arm above elbow    Phantom limb pain  Continue home lyrica at 300 mg BID, nightly elavil. Holding home suboxone per her request.         BRIDGET (acute kidney injury)    Creatinine is 2 today. Will hold furosemide and KCL. Give a liter of saline. Monitor.         Benign essential hypertension    Continue home lisinopril. Hold lasix for now. Monitor.           Diabetes mellitus type 2 in obese    Check A1c. Takes lantus 125 units BID and humolog 95 units TIDWM. Will continue levemirr at 90 units BID and aspart 50 units TIDWM. Monitor with accuchecks and give SSI.           Morbid obesity with BMI of 50.0-59.9, adult    Noted            VTE Risk Mitigation         Ordered     enoxaparin injection 40 mg  Daily     Route:   Subcutaneous        12/18/17 1745     Medium Risk of VTE  Once      12/18/17 1745             kP Kirkland MD  Department of Hospital Medicine   [facility]

## 2017-12-19 NOTE — ASSESSMENT & PLAN NOTE
Check A1c. Takes lantus 125 units BID and humolog 95 units TIDWM. Will continue levemirr at 90 units BID and aspart 50 units TIDWM. Monitor with accuchecks and give SSI.

## 2017-12-20 VITALS
HEIGHT: 62 IN | TEMPERATURE: 97 F | HEART RATE: 89 BPM | BODY MASS INDEX: 53.92 KG/M2 | RESPIRATION RATE: 20 BRPM | SYSTOLIC BLOOD PRESSURE: 132 MMHG | OXYGEN SATURATION: 96 % | DIASTOLIC BLOOD PRESSURE: 60 MMHG | WEIGHT: 293 LBS

## 2017-12-20 PROBLEM — N39.0 E. COLI UTI: Status: ACTIVE | Noted: 2017-12-18

## 2017-12-20 PROBLEM — B96.20 E. COLI UTI: Status: ACTIVE | Noted: 2017-12-18

## 2017-12-20 LAB
ANION GAP SERPL CALC-SCNC: 6 MMOL/L
BACTERIA UR CULT: NORMAL
BASOPHILS # BLD AUTO: 0.01 K/UL
BASOPHILS NFR BLD: 0.1 %
BUN SERPL-MCNC: 23 MG/DL
CALCIUM SERPL-MCNC: 8.2 MG/DL
CHLORIDE SERPL-SCNC: 111 MMOL/L
CO2 SERPL-SCNC: 21 MMOL/L
CREAT SERPL-MCNC: 1 MG/DL
DIFFERENTIAL METHOD: ABNORMAL
EOSINOPHIL # BLD AUTO: 0.2 K/UL
EOSINOPHIL NFR BLD: 2.1 %
ERYTHROCYTE [DISTWIDTH] IN BLOOD BY AUTOMATED COUNT: 14.6 %
EST. GFR  (AFRICAN AMERICAN): >60 ML/MIN/1.73 M^2
EST. GFR  (NON AFRICAN AMERICAN): >60 ML/MIN/1.73 M^2
GLUCOSE SERPL-MCNC: 335 MG/DL
HCT VFR BLD AUTO: 32 %
HGB BLD-MCNC: 10.3 G/DL
LYMPHOCYTES # BLD AUTO: 4 K/UL
LYMPHOCYTES NFR BLD: 49 %
MAGNESIUM SERPL-MCNC: 1.8 MG/DL
MCH RBC QN AUTO: 27.2 PG
MCHC RBC AUTO-ENTMCNC: 32.2 G/DL
MCV RBC AUTO: 85 FL
MONOCYTES # BLD AUTO: 0.6 K/UL
MONOCYTES NFR BLD: 7.5 %
NEUTROPHILS # BLD AUTO: 3.3 K/UL
NEUTROPHILS NFR BLD: 40.4 %
PHOSPHATE SERPL-MCNC: 2.2 MG/DL
PLATELET # BLD AUTO: 124 K/UL
PMV BLD AUTO: 10.1 FL
POCT GLUCOSE: 313 MG/DL (ref 70–110)
POTASSIUM SERPL-SCNC: 4.8 MMOL/L
RBC # BLD AUTO: 3.78 M/UL
SODIUM SERPL-SCNC: 138 MMOL/L
WBC # BLD AUTO: 8.17 K/UL

## 2017-12-20 PROCEDURE — 80048 BASIC METABOLIC PNL TOTAL CA: CPT

## 2017-12-20 PROCEDURE — 85025 COMPLETE CBC W/AUTO DIFF WBC: CPT

## 2017-12-20 PROCEDURE — 83735 ASSAY OF MAGNESIUM: CPT

## 2017-12-20 PROCEDURE — 36415 COLL VENOUS BLD VENIPUNCTURE: CPT

## 2017-12-20 PROCEDURE — 25000003 PHARM REV CODE 250: Performed by: HOSPITALIST

## 2017-12-20 PROCEDURE — 94640 AIRWAY INHALATION TREATMENT: CPT

## 2017-12-20 PROCEDURE — 84100 ASSAY OF PHOSPHORUS: CPT

## 2017-12-20 PROCEDURE — G0378 HOSPITAL OBSERVATION PER HR: HCPCS

## 2017-12-20 PROCEDURE — 63600175 PHARM REV CODE 636 W HCPCS: Performed by: HOSPITALIST

## 2017-12-20 RX ORDER — MICONAZOLE NITRATE 2 %
POWDER (GRAM) TOPICAL 2 TIMES DAILY
Qty: 70 G | Refills: 0 | Status: SHIPPED | OUTPATIENT
Start: 2017-12-20 | End: 2018-04-20 | Stop reason: CLARIF

## 2017-12-20 RX ORDER — CEFDINIR 300 MG/1
300 CAPSULE ORAL 2 TIMES DAILY
Qty: 8 CAPSULE | Refills: 0 | Status: SHIPPED | OUTPATIENT
Start: 2017-12-20 | End: 2017-12-24

## 2017-12-20 RX ORDER — DOXYCYCLINE 100 MG/1
100 CAPSULE ORAL 2 TIMES DAILY
Qty: 16 CAPSULE | Refills: 0 | Status: SHIPPED | OUTPATIENT
Start: 2017-12-20 | End: 2017-12-28

## 2017-12-20 RX ADMIN — ACETAMINOPHEN 650 MG: 325 TABLET ORAL at 06:12

## 2017-12-20 RX ADMIN — HYDROCODONE BITARTRATE AND ACETAMINOPHEN 1 TABLET: 5; 325 TABLET ORAL at 02:12

## 2017-12-20 RX ADMIN — MICONAZOLE NITRATE: 20 POWDER TOPICAL at 08:12

## 2017-12-20 RX ADMIN — PREGABALIN 300 MG: 75 CAPSULE ORAL at 08:12

## 2017-12-20 RX ADMIN — HYDROXYZINE PAMOATE 50 MG: 25 CAPSULE ORAL at 05:12

## 2017-12-20 RX ADMIN — INSULIN ASPART 75 UNITS: 100 INJECTION, SOLUTION INTRAVENOUS; SUBCUTANEOUS at 06:12

## 2017-12-20 RX ADMIN — TIZANIDINE 4 MG: 4 TABLET ORAL at 05:12

## 2017-12-20 RX ADMIN — INSULIN ASPART 8 UNITS: 100 INJECTION, SOLUTION INTRAVENOUS; SUBCUTANEOUS at 08:12

## 2017-12-20 RX ADMIN — FLUTICASONE FUROATE AND VILANTEROL TRIFENATATE 1 PUFF: 100; 25 POWDER RESPIRATORY (INHALATION) at 08:12

## 2017-12-20 RX ADMIN — PANTOPRAZOLE SODIUM 40 MG: 40 TABLET, DELAYED RELEASE ORAL at 08:12

## 2017-12-20 RX ADMIN — CEFEPIME 1 G: 1 INJECTION, POWDER, FOR SOLUTION INTRAMUSCULAR; INTRAVENOUS at 03:12

## 2017-12-20 RX ADMIN — LISINOPRIL 10 MG: 10 TABLET ORAL at 08:12

## 2017-12-20 NOTE — ASSESSMENT & PLAN NOTE
Abdominal wall ulcer  Intertriginous tinea infection  Prescribe doxycycline and miconazole powder.

## 2017-12-20 NOTE — ASSESSMENT & PLAN NOTE
Check A1c. Takes insulin glargine 125 units BID and Humalog 95 units TIDWM.  Giving insulin detemir 110 90 units BID and aspart 75 units TIDWM.  Monitor with accuchecks and give SSI.

## 2017-12-20 NOTE — PLAN OF CARE
Follow-up With  Details  Why  Contact Info   Kari Edge MD  Call  patient will set her own follow up appointments.  [address]  [facility] [id number]  [phone]             12/20/17 1104   Final Note   Assessment Type Final Discharge Note   Discharge Disposition Home   Hospital Follow Up  Appt(s) scheduled? Yes  (patient to set own hospital follow up appointment)   Discharge plans and expectations educations in teach back method with documentation complete? Yes   Right Care Referral Info   Post Acute Recommendation No Care

## 2017-12-20 NOTE — ASSESSMENT & PLAN NOTE
Check A1c. Takes lantus 125 units BID and humolog 95 units TIDWM. Increase levemir to 110 90 units BID and aspart 75 units TIDWM. Monitor with accuchecks and give SSI.
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
## 2017-12-20 NOTE — PLAN OF CARE
Problem: Patient Care Overview  Goal: Plan of Care Review  Outcome: Ongoing (interventions implemented as appropriate)  Pt is in bed resting with no S/S of pain or distress. Went over plan of care with pt, verbalized understanding. Pt received scheduled and prn insulin for high glucose. She is receiving IV antibiotics. Wound care was performed and pt took a shower today. She is currently stable and will continue to monitor. Will give report to oncoming nurse.

## 2017-12-20 NOTE — PROGRESS NOTES
[facility] Medicine  Progress Note    Patient Name: Lele Dias  MRN: [id number]  Patient Class: OP- Observation   Admission Date: 12/18/2017  Length of Stay: 0 days  Attending Physician: Pk Kirkland MD  Primary Care Provider: Kari Edge MD        Subjective:     Principal Problem:Cellulitis of abdominal wall    HPI:  Ms. Dias is a 36 yo WF with DM2, asthma, HTN and chronic pain that presented to [facility] complaining of abdominal wall pain. She says that she has recurrent ulcers on her panus and that the most recent one has been there for months, but was healing until a couple days ago. She noticed increased drainage, foul smell, increased erythema and pain up to 9 out of 10 in the left lower abdomen. No radiation of the pain and it has been constant and not relieved with her home suboxone. She denies any fevers or chills. She also denies any dysuria, frequency or change in urine color. Blood sugars have been elevated despite taking her prescribed insulin at home.     Hospital Course:  No notes on file    Interval History: Less pain in the abdomen today. Still with some drainage from the wound. Says that she is still hungry after eating the tray from the kitchen.     Review of Systems   Constitutional: Negative for chills and fever.   Respiratory: Negative for cough and shortness of breath.    Cardiovascular: Negative for chest pain and palpitations.   Gastrointestinal: Negative for nausea and vomiting.     Objective:     Vital Signs (Most Recent):  Temp: 98.4 °F (36.9 °C) (12/19/17 2046)  Pulse: 86 (12/19/17 2345)  Resp: 18 (12/19/17 2345)  BP: (!) 145/65 (12/19/17 2046)  SpO2: 98 % (12/19/17 2345) Vital Signs (24h Range):  Temp:  [97.9 °F (36.6 °C)-98.4 °F (36.9 °C)] 98.4 °F (36.9 °C)  Pulse:  [] 86  Resp:  [14-20] 18  SpO2:  [98 %-99 %] 98 %  BP: (112-152)/(56-67) 145/65     Weight: (!) 150 kg (330 lb 11 oz)  Body mass index is 60.48 kg/m².    Intake/Output  Summary (Last 24 hours) at 12/20/17 0608  Last data filed at 12/19/17 1831   Gross per 24 hour   Intake              875 ml   Output                0 ml   Net              875 ml      Physical Exam   Constitutional: She is oriented to person, place, and time. She appears well-developed and well-nourished. No distress.   Cardiovascular: Normal rate and regular rhythm.    No murmur heard.  Pulmonary/Chest: Effort normal and breath sounds normal. No respiratory distress. She has no wheezes.   Abdominal: Soft. Bowel sounds are normal. She exhibits no distension. There is no tenderness.   Musculoskeletal: She exhibits no edema.   Neurological: She is alert and oriented to person, place, and time.   Skin: Skin is warm and dry.   Psychiatric: She has a normal mood and affect. Her behavior is normal.   Nursing note and vitals reviewed.      Significant Labs:   CBC:   Recent Labs  Lab 12/18/17  1252 12/19/17  0646   WBC 10.00 7.81   HGB 11.9* 11.8*   HCT 36.6* 36.3*   * 104*     CMP:   Recent Labs  Lab 12/18/17  1252 12/19/17  0646    137   K 4.0 4.1    110   CO2 19* 19*   * 375*   BUN 46* 34*   CREATININE 2.0* 1.3   CALCIUM 8.5* 7.8*   PROT 7.4  --    ALBUMIN 3.2*  --    BILITOT 0.2  --    ALKPHOS 78  --    AST 13  --    ALT 13  --    ANIONGAP 11 8   EGFRNONAA 31* 52*     POCT Glucose:   Recent Labs  Lab 12/19/17  1203 12/19/17  1600 12/19/17  2100   POCTGLUCOSE 286* 323* 253*       Significant Imaging: I have reviewed all pertinent imaging results/findings within the past 24 hours.    Assessment/Plan:      * Cellulitis of abdominal wall    Abdominal wall ulcer  Intertriginous tinea infection  Continue the vancomycin and cefepime for now. Monitor the ulcer. Use interdry or other wicking fabric to try to dry out the area. Continue miconazole powder. Do not feel that she has deeper paniculitis infection at this time. Improving today.         Acute cystitis without hematuria    Follow up culture with  presumptive E.Coli. Continue cefepime.         Opioid dependence on agonist therapy    Takes Suboxone at home and says that she does not want to take it in the hospital.         History of amputation of left arm above elbow    Phantom limb pain  Continue home lyrica at 300 mg BID, nightly elavil. Holding home suboxone per her request.         BRIDGET (acute kidney injury)    Creatinine is 1.3 today. Continue to hold furosemide and KCL. Monitor off IV fluids. .         Benign essential hypertension    Continue home lisinopril. Hold lasix for now. Monitor.           Diabetes mellitus type 2 in obese    Check A1c. Takes lantus 125 units BID and humolog 95 units TIDWM. Increase levemir to 110 90 units BID and aspart 75 units TIDWM. Monitor with accuchecks and give SSI.           Morbid obesity with BMI of 50.0-59.9, adult    Noted            VTE Risk Mitigation         Ordered     enoxaparin injection 40 mg  Daily     Route:  Subcutaneous        12/18/17 1745     Medium Risk of VTE  Once      12/18/17 1745              Pk Kirkland MD  Department of Hospital Medicine   [facility]
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
## 2017-12-20 NOTE — PLAN OF CARE
Pt is being discharged. Removed IV, tip intact, tolerated well. Went over discharge instructions with pt, verbalized understanding,

## 2017-12-20 NOTE — SUBJECTIVE & OBJECTIVE
Interval History: Ready to go home.    Review of Systems   Constitutional: Negative for chills and fever.   Respiratory: Negative for cough and shortness of breath.    Cardiovascular: Negative for chest pain and palpitations.   Gastrointestinal: Negative for nausea and vomiting.     Objective:     Vital Signs (Most Recent):  Temp: 97.3 °F (36.3 °C) (12/20/17 0835)  Pulse: 89 (12/20/17 0835)  Resp: 20 (12/20/17 0835)  BP: 132/60 (12/20/17 0835)  SpO2: 96 % (12/20/17 0835) Vital Signs (24h Range):  Temp:  [97 °F (36.1 °C)-98.4 °F (36.9 °C)] 97.3 °F (36.3 °C)  Pulse:  [] 89  Resp:  [14-20] 20  SpO2:  [96 %-99 %] 96 %  BP: ()/(49-67) 132/60     Weight: (!) 150 kg (330 lb 11 oz)  Body mass index is 60.48 kg/m².    Intake/Output Summary (Last 24 hours) at 12/20/17 0842  Last data filed at 12/20/17 0551   Gross per 24 hour   Intake              875 ml   Output                0 ml   Net              875 ml      Physical Exam   Constitutional: She is oriented to person, place, and time. She appears well-developed and well-nourished. No distress.   Cardiovascular: Normal rate and regular rhythm.    No murmur heard.  Pulmonary/Chest: Effort normal and breath sounds normal. No respiratory distress. She has no wheezes.   Abdominal: Soft. Bowel sounds are normal. She exhibits no distension. There is no tenderness.   Musculoskeletal: She exhibits no edema.   Neurological: She is alert and oriented to person, place, and time.   Skin: Skin is warm and dry.   Psychiatric: She has a normal mood and affect. Her behavior is normal.   Nursing note and vitals reviewed.      Significant Labs:   CBC:     Recent Labs  Lab 12/18/17  1252 12/19/17  0646 12/20/17  0641   WBC 10.00 7.81 8.17   HGB 11.9* 11.8* 10.3*   HCT 36.6* 36.3* 32.0*   * 104* 124*     CMP:     Recent Labs  Lab 12/18/17  1252 12/19/17  0646 12/20/17  0641    137 138   K 4.0 4.1 4.8    110 111*   CO2 19* 19* 21*   * 375* 335*   BUN 46*  34* 23*   CREATININE 2.0* 1.3 1.0   CALCIUM 8.5* 7.8* 8.2*   PROT 7.4  --   --    ALBUMIN 3.2*  --   --    BILITOT 0.2  --   --    ALKPHOS 78  --   --    AST 13  --   --    ALT 13  --   --    ANIONGAP 11 8 6*   EGFRNONAA 31* 52* >60     POCT Glucose:     Recent Labs  Lab 12/19/17  1600 12/19/17  2100 12/20/17  0616   POCTGLUCOSE 323* 253* 313*       Significant Imaging: I have reviewed all pertinent imaging results/findings within the past 24 hours.   X-Ray Chest 1 View 12/18/17: The trachea and cardiomediastinal silhouette are within normal limits.  There is no evidence of pleural effusions, pneumothoraces or consolidations.  Lungs are clear.  Osseous structures demonstrate no evidence for acute fractures or dislocations.  US Lower Extremity Veins Left 12/18/17: No sonographic evidence for deep venous thrombosis in the left lower extremity.

## 2017-12-20 NOTE — PLAN OF CARE
Problem: Patient Care Overview  Goal: Plan of Care Review  Outcome: Ongoing (interventions implemented as appropriate)  Pt on RA with sats of  98%, Will continue to monitor.

## 2017-12-20 NOTE — HOSPITAL COURSE
Her cellulitis was treated with miconazole powder, cefepime and vancomycin.  Urine culture grew >100,000 cfu/mL E coli.  She was discharged home on 12/20/17 with 8 days of doxycycline for cellulitis, miconazole powder for Candida intertrigo, and 4 days of cefdinir for UTI.

## 2017-12-20 NOTE — SUBJECTIVE & OBJECTIVE
Interval History: Less pain in the abdomen today. Still with some drainage from the wound. Says that she is still hungry after eating the tray from the kitchen.     Review of Systems   Constitutional: Negative for chills and fever.   Respiratory: Negative for cough and shortness of breath.    Cardiovascular: Negative for chest pain and palpitations.   Gastrointestinal: Negative for nausea and vomiting.     Objective:     Vital Signs (Most Recent):  Temp: 98.4 °F (36.9 °C) (12/19/17 2046)  Pulse: 86 (12/19/17 2345)  Resp: 18 (12/19/17 2345)  BP: (!) 145/65 (12/19/17 2046)  SpO2: 98 % (12/19/17 2345) Vital Signs (24h Range):  Temp:  [97.9 °F (36.6 °C)-98.4 °F (36.9 °C)] 98.4 °F (36.9 °C)  Pulse:  [] 86  Resp:  [14-20] 18  SpO2:  [98 %-99 %] 98 %  BP: (112-152)/(56-67) 145/65     Weight: (!) 150 kg (330 lb 11 oz)  Body mass index is 60.48 kg/m².    Intake/Output Summary (Last 24 hours) at 12/20/17 0608  Last data filed at 12/19/17 1831   Gross per 24 hour   Intake              875 ml   Output                0 ml   Net              875 ml      Physical Exam   Constitutional: She is oriented to person, place, and time. She appears well-developed and well-nourished. No distress.   Cardiovascular: Normal rate and regular rhythm.    No murmur heard.  Pulmonary/Chest: Effort normal and breath sounds normal. No respiratory distress. She has no wheezes.   Abdominal: Soft. Bowel sounds are normal. She exhibits no distension. There is no tenderness.   Musculoskeletal: She exhibits no edema.   Neurological: She is alert and oriented to person, place, and time.   Skin: Skin is warm and dry.   Psychiatric: She has a normal mood and affect. Her behavior is normal.   Nursing note and vitals reviewed.      Significant Labs:   CBC:   Recent Labs  Lab 12/18/17  1252 12/19/17  0646   WBC 10.00 7.81   HGB 11.9* 11.8*   HCT 36.6* 36.3*   * 104*     CMP:   Recent Labs  Lab 12/18/17  1252 12/19/17  0646    137   K 4.0 4.1     110   CO2 19* 19*   * 375*   BUN 46* 34*   CREATININE 2.0* 1.3   CALCIUM 8.5* 7.8*   PROT 7.4  --    ALBUMIN 3.2*  --    BILITOT 0.2  --    ALKPHOS 78  --    AST 13  --    ALT 13  --    ANIONGAP 11 8   EGFRNONAA 31* 52*     POCT Glucose:   Recent Labs  Lab 12/19/17  1203 12/19/17  1600 12/19/17  2100   POCTGLUCOSE 286* 323* 253*       Significant Imaging: I have reviewed all pertinent imaging results/findings within the past 24 hours.

## 2017-12-23 LAB
BACTERIA BLD CULT: NORMAL
BACTERIA BLD CULT: NORMAL

## 2018-01-05 ENCOUNTER — HOSPITAL ENCOUNTER (EMERGENCY)
Facility: HOSPITAL | Age: 38
Discharge: HOME | End: 2018-01-05
Attending: EMERGENCY MEDICINE
Payer: MEDICAID

## 2018-01-05 VITALS
BODY MASS INDEX: 53.92 KG/M2 | SYSTOLIC BLOOD PRESSURE: 106 MMHG | HEART RATE: 70 BPM | HEIGHT: 62 IN | RESPIRATION RATE: 14 BRPM | WEIGHT: 293 LBS | TEMPERATURE: 98 F | DIASTOLIC BLOOD PRESSURE: 64 MMHG

## 2018-01-05 DIAGNOSIS — M54.50 ACUTE RIGHT-SIDED LOW BACK PAIN WITHOUT SCIATICA: ICD-10-CM
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
## 2018-01-05 LAB — POCT GLUCOSE: 183 MG/DL (ref 70–110)

## 2018-01-05 PROCEDURE — 99284 EMERGENCY DEPT VISIT MOD MDM: CPT | Mod: 25

## 2018-01-05 PROCEDURE — 82962 GLUCOSE BLOOD TEST: CPT
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
## 2018-01-05 NOTE — ED NOTES
Pt awake alert oriented X 4, pt reports she rolled out of bed on Wednesday, denies hitting head or LOC, pt reports pain to neck and back, pt has been ambulating using cane since incident, denies chest pain or sob, neck and lower  to palpate, no deformity noted, pt reports she has been taking ibuprofen with no relief of pain, family at bedside

## 2018-03-28 ENCOUNTER — HOSPITAL ENCOUNTER (EMERGENCY)
Facility: HOSPITAL | Age: 38
Discharge: HOME | End: 2018-03-28
Attending: EMERGENCY MEDICINE
Payer: MEDICAID

## 2018-03-28 VITALS
SYSTOLIC BLOOD PRESSURE: 103 MMHG | DIASTOLIC BLOOD PRESSURE: 61 MMHG | HEIGHT: 62 IN | HEART RATE: 110 BPM | WEIGHT: 293 LBS | OXYGEN SATURATION: 97 % | RESPIRATION RATE: 19 BRPM | TEMPERATURE: 100 F | BODY MASS INDEX: 53.92 KG/M2

## 2018-03-28 DIAGNOSIS — N39.0 URINARY TRACT INFECTION WITHOUT HEMATURIA, SITE UNSPECIFIED: Primary | ICD-10-CM

## 2018-03-28 LAB
ALBUMIN SERPL BCP-MCNC: 3.7 G/DL
ALP SERPL-CCNC: 63 U/L
ALT SERPL W/O P-5'-P-CCNC: 26 U/L
ANION GAP SERPL CALC-SCNC: 14 MMOL/L
AST SERPL-CCNC: 25 U/L
B-HCG UR QL: NEGATIVE
BACTERIA #/AREA URNS AUTO: ABNORMAL /HPF
BASOPHILS # BLD AUTO: 0.02 K/UL
BASOPHILS NFR BLD: 0.1 %
BILIRUB SERPL-MCNC: 0.7 MG/DL
BILIRUB UR QL STRIP: NEGATIVE
BUN SERPL-MCNC: 10 MG/DL
CALCIUM SERPL-MCNC: 8.9 MG/DL
CHLORIDE SERPL-SCNC: 108 MMOL/L
CLARITY UR REFRACT.AUTO: ABNORMAL
CO2 SERPL-SCNC: 21 MMOL/L
COLOR UR AUTO: ABNORMAL
CREAT SERPL-MCNC: 0.89 MG/DL
DIFFERENTIAL METHOD: ABNORMAL
EOSINOPHIL # BLD AUTO: 0 K/UL
EOSINOPHIL NFR BLD: 0.2 %
ERYTHROCYTE [DISTWIDTH] IN BLOOD BY AUTOMATED COUNT: 16 %
EST. GFR  (AFRICAN AMERICAN): >60 ML/MIN/1.73 M^2
EST. GFR  (NON AFRICAN AMERICAN): >60 ML/MIN/1.73 M^2
GLUCOSE SERPL-MCNC: 210 MG/DL
GLUCOSE UR QL STRIP: NEGATIVE
HCT VFR BLD AUTO: 42.1 %
HGB BLD-MCNC: 13.6 G/DL
HGB UR QL STRIP: ABNORMAL
HYALINE CASTS UR QL AUTO: 0 /LPF
KETONES UR QL STRIP: ABNORMAL
LEUKOCYTE ESTERASE UR QL STRIP: ABNORMAL
LIPASE SERPL-CCNC: 25 U/L
LYMPHOCYTES # BLD AUTO: 1.8 K/UL
LYMPHOCYTES NFR BLD: 10.2 %
MCH RBC QN AUTO: 27.4 PG
MCHC RBC AUTO-ENTMCNC: 32.3 G/DL
MCV RBC AUTO: 85 FL
MICROSCOPIC COMMENT: ABNORMAL
MONOCYTES # BLD AUTO: 1.4 K/UL
MONOCYTES NFR BLD: 8.2 %
NEUTROPHILS # BLD AUTO: 14.1 K/UL
NEUTROPHILS NFR BLD: 80.7 %
NITRITE UR QL STRIP: POSITIVE
PH UR STRIP: 5 [PH] (ref 5–8)
PLATELET # BLD AUTO: 149 K/UL
PMV BLD AUTO: 10.6 FL
POTASSIUM SERPL-SCNC: 3.9 MMOL/L
PROT SERPL-MCNC: 6.7 G/DL
PROT UR QL STRIP: ABNORMAL
RBC # BLD AUTO: 4.96 M/UL
RBC #/AREA URNS AUTO: 15 /HPF (ref 0–4)
SODIUM SERPL-SCNC: 143 MMOL/L
SP GR UR STRIP: 1.02 (ref 1–1.03)
URN SPEC COLLECT METH UR: ABNORMAL
UROBILINOGEN UR STRIP-ACNC: NEGATIVE EU/DL
WBC # BLD AUTO: 17.42 K/UL
WBC #/AREA URNS AUTO: >100 /HPF (ref 0–5)

## 2018-03-28 PROCEDURE — 96376 TX/PRO/DX INJ SAME DRUG ADON: CPT

## 2018-03-28 PROCEDURE — 96374 THER/PROPH/DIAG INJ IV PUSH: CPT

## 2018-03-28 PROCEDURE — 99284 EMERGENCY DEPT VISIT MOD MDM: CPT | Mod: 25

## 2018-03-28 PROCEDURE — 96372 THER/PROPH/DIAG INJ SC/IM: CPT | Mod: 59

## 2018-03-28 PROCEDURE — 83690 ASSAY OF LIPASE: CPT

## 2018-03-28 PROCEDURE — 63600175 PHARM REV CODE 636 W HCPCS: Performed by: EMERGENCY MEDICINE

## 2018-03-28 PROCEDURE — 25000003 PHARM REV CODE 250: Performed by: EMERGENCY MEDICINE

## 2018-03-28 PROCEDURE — 85025 COMPLETE CBC W/AUTO DIFF WBC: CPT

## 2018-03-28 PROCEDURE — 80053 COMPREHEN METABOLIC PANEL: CPT

## 2018-03-28 PROCEDURE — 96361 HYDRATE IV INFUSION ADD-ON: CPT

## 2018-03-28 PROCEDURE — 81000 URINALYSIS NONAUTO W/SCOPE: CPT

## 2018-03-28 PROCEDURE — 81025 URINE PREGNANCY TEST: CPT

## 2018-03-28 RX ORDER — ONDANSETRON 4 MG/1
8 TABLET, ORALLY DISINTEGRATING ORAL EVERY 6 HOURS PRN
Qty: 12 TABLET | Refills: 0 | Status: ON HOLD | OUTPATIENT
Start: 2018-03-28 | End: 2021-01-24

## 2018-03-28 RX ORDER — CIPROFLOXACIN 500 MG/1
500 TABLET ORAL 2 TIMES DAILY
Qty: 20 TABLET | Refills: 0 | Status: SHIPPED | OUTPATIENT
Start: 2018-03-28 | End: 2018-04-07

## 2018-03-28 RX ORDER — CEFTRIAXONE 1 G/1
1 INJECTION, POWDER, FOR SOLUTION INTRAMUSCULAR; INTRAVENOUS
Status: COMPLETED | OUTPATIENT
Start: 2018-03-28 | End: 2018-03-28

## 2018-03-28 RX ORDER — ONDANSETRON 2 MG/ML
4 INJECTION INTRAMUSCULAR; INTRAVENOUS
Status: COMPLETED | OUTPATIENT
Start: 2018-03-28 | End: 2018-03-28

## 2018-03-28 RX ADMIN — SODIUM CHLORIDE 1000 ML: 0.9 INJECTION, SOLUTION INTRAVENOUS at 09:03

## 2018-03-28 RX ADMIN — CEFTRIAXONE SODIUM 1 G: 1 INJECTION, POWDER, FOR SOLUTION INTRAMUSCULAR; INTRAVENOUS at 10:03

## 2018-03-28 RX ADMIN — SODIUM CHLORIDE 1000 ML: 0.9 INJECTION, SOLUTION INTRAVENOUS at 07:03

## 2018-03-28 RX ADMIN — ONDANSETRON 4 MG: 2 INJECTION, SOLUTION INTRAMUSCULAR; INTRAVENOUS at 07:03

## 2018-03-28 RX ADMIN — ONDANSETRON 4 MG: 2 INJECTION, SOLUTION INTRAMUSCULAR; INTRAVENOUS at 10:03

## 2018-03-28 NOTE — ED PROVIDER NOTES
Encounter Date: 3/28/2018       History     Chief Complaint   Patient presents with    Emesis     Pt reports vomiting and chills with bilateral flank pain since last pm.  Denies any OTC medications for symptoms. Denies burning with urination, denies blood in urine, denies vaginal d/c.  Pt states drank water enroute without vomiting.      The history is provided by the patient.   Emesis    This is a new problem. The current episode started yesterday. The problem occurs 5 - 10 times per day. The problem has been unchanged. The emesis has an appearance of stomach contents. Associated symptoms include abdominal pain, arthralgias, chills, diarrhea and myalgias. Pertinent negatives include no fever, no headaches and no sweats.     Review of patient's allergies indicates:   Allergen Reactions    Celexa [citalopram] Nausea And Vomiting     Past Medical History:   Diagnosis Date    Asthma     Depression     Diabetes mellitus, type II     Diastolic dysfunction     E. 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     SECTION       SECTION, CLASSIC      CHOLECYSTECTOMY  age 15    TONSILLECTOMY, ADENOIDECTOMY       Family History   Problem Relation Age of Onset    Cancer Father     Heart attack Mother     Heart disease Mother     Cancer Maternal Aunt      lung (smoker)    Heart attack Maternal Aunt     Breast cancer Paternal Grandmother     Heart attack Maternal Grandmother      Social History   Substance Use Topics    Smoking status: Current Every Day Smoker     Packs/day: 2.00     Years: 15.00     Types: Cigarettes    Smokeless tobacco: Never Used    Alcohol use No     Review of Systems   Constitutional: Positive for chills. Negative for fever.   Gastrointestinal: Positive for abdominal pain, diarrhea and  vomiting.   Musculoskeletal: Positive for arthralgias and myalgias.   Neurological: Negative for headaches.   All other systems reviewed and are negative.      Physical Exam     Initial Vitals [03/28/18 0729]   BP Pulse Resp Temp SpO2   123/77 (!) 141 (!) 22 (!) 100.6 °F (38.1 °C) 97 %      MAP       92.33         Physical Exam    Nursing note and vitals reviewed.  Constitutional: She appears well-developed and well-nourished.   HENT:   Head: Normocephalic and atraumatic.   Eyes: EOM are normal.   Neck: Normal range of motion. Neck supple.   Cardiovascular: Normal rate, regular rhythm, normal heart sounds and intact distal pulses.   Pulmonary/Chest: Breath sounds normal.   Abdominal: Soft. She exhibits no distension. There is generalized tenderness. There is no rigidity, no rebound, no guarding, no CVA tenderness, no tenderness at McBurney's point and negative Anthony's sign.   I feel that the patient's pain is somewhat histrionic.  She is barely touching her and she started screaming.   Musculoskeletal: Normal range of motion.   Neurological: She is alert and oriented to person, place, and time.   Skin: Skin is warm and dry. Capillary refill takes less than 2 seconds.   Psychiatric: She has a normal mood and affect. Her behavior is normal. Judgment and thought content normal.         ED Course   Procedures  Labs Reviewed   CBC W/ AUTO DIFFERENTIAL - Abnormal; Notable for the following:        Result Value    WBC 17.42 (*)     RDW 16.0 (*)     Platelets 149 (*)     Gran # (ANC) 14.1 (*)     Mono # 1.4 (*)     Gran% 80.7 (*)     Lymph% 10.2 (*)     All other components within normal limits   COMPREHENSIVE METABOLIC PANEL - Abnormal; Notable for the following:     CO2 21 (*)     Glucose 210 (*)     All other components within normal limits   URINALYSIS - Abnormal; Notable for the following:     Appearance, UA Cloudy (*)     Protein, UA 2+ (*)     Ketones, UA 1+ (*)     Occult Blood UA 3+ (*)     Nitrite, UA Positive  (*)     Leukocytes, UA 3+ (*)     All other components within normal limits   URINALYSIS MICROSCOPIC - Abnormal; Notable for the following:     RBC, UA 15 (*)     WBC, UA >100 (*)     Bacteria, UA Moderate (*)     All other components within normal limits   LIPASE   PREGNANCY TEST, URINE RAPID   DRUG SCREEN PANEL, URINE EMERGENCY          X-Rays:   Independently Interpreted Readings:   Abdomen:   Flat and Erect of Abdomen - No evidence of bowel obstruction.  There is a mild to moderate stool burden     Medical Decision Making:   Clinical Tests:   Lab Tests: Ordered and Reviewed  Radiological Study: Ordered and Reviewed                      Clinical Impression:   The encounter diagnosis was Urinary tract infection without hematuria, site unspecified.    Disposition:   Disposition: Discharged  Condition: Stable                        Lana Ramires MD  03/28/18 1111

## 2018-03-28 NOTE — ED NOTES
Pt denies nausea at this time. Pt states her stomach is feeling better. Pt resting with eyes closed until entering room. Pt calm and cooperative. Pt remains on cardiac monitor. Pt unable to give urine sample at this time. Pt aware sample is needed. Pt NS IV fluids infusing at this time, no redness and no swelling noted.

## 2018-04-20 ENCOUNTER — HOSPITAL ENCOUNTER (INPATIENT)
Facility: HOSPITAL | Age: 38
LOS: 3 days | Discharge: HOME | DRG: 872 | End: 2018-04-23
Attending: EMERGENCY MEDICINE | Admitting: HOSPITALIST
Payer: MEDICAID

## 2018-04-20 ENCOUNTER — ANESTHESIA EVENT (OUTPATIENT)
Dept: EMERGENCY MEDICINE | Facility: HOSPITAL | Age: 38
DRG: 872 | End: 2018-04-20
Payer: MEDICAID

## 2018-04-20 ENCOUNTER — ANESTHESIA (OUTPATIENT)
Dept: EMERGENCY MEDICINE | Facility: HOSPITAL | Age: 38
DRG: 872 | End: 2018-04-20
Payer: MEDICAID

## 2018-04-20 DIAGNOSIS — R00.0 TACHYCARDIA: ICD-10-CM

## 2018-04-20 DIAGNOSIS — R10.9 RIGHT FLANK PAIN: ICD-10-CM

## 2018-04-20 DIAGNOSIS — R50.9 FEVER, UNSPECIFIED FEVER CAUSE: ICD-10-CM

## 2018-04-20 DIAGNOSIS — Z45.2 ENCOUNTER FOR CENTRAL LINE PLACEMENT: ICD-10-CM

## 2018-04-20 DIAGNOSIS — D64.9 ANEMIA, UNSPECIFIED TYPE: ICD-10-CM

## 2018-04-20 DIAGNOSIS — R78.81 E COLI BACTEREMIA: ICD-10-CM

## 2018-04-20 DIAGNOSIS — N12 PYELONEPHRITIS: Primary | ICD-10-CM

## 2018-04-20 DIAGNOSIS — R79.89 ELEVATED TROPONIN: ICD-10-CM

## 2018-04-20 DIAGNOSIS — B96.20 E. COLI PYELONEPHRITIS: ICD-10-CM

## 2018-04-20 DIAGNOSIS — B96.20 E COLI BACTEREMIA: ICD-10-CM

## 2018-04-20 DIAGNOSIS — N12 E. COLI PYELONEPHRITIS: ICD-10-CM

## 2018-04-20 PROBLEM — F17.200 TOBACCO USE DISORDER: Chronic | Status: ACTIVE | Noted: 2018-04-20

## 2018-04-20 LAB
ALBUMIN SERPL BCP-MCNC: 2.8 G/DL
ALP SERPL-CCNC: 103 U/L
ALT SERPL W/O P-5'-P-CCNC: 13 U/L
ANION GAP SERPL CALC-SCNC: 14 MMOL/L
AST SERPL-CCNC: 10 U/L
B-HCG UR QL: NEGATIVE
BACTERIA #/AREA URNS HPF: ABNORMAL /HPF
BASOPHILS # BLD AUTO: ABNORMAL K/UL
BASOPHILS NFR BLD: 0 %
BILIRUB SERPL-MCNC: 0.6 MG/DL
BILIRUB UR QL STRIP: NEGATIVE
BUN SERPL-MCNC: 10 MG/DL
CALCIUM SERPL-MCNC: 8.9 MG/DL
CHLORIDE SERPL-SCNC: 108 MMOL/L
CLARITY UR: ABNORMAL
CO2 SERPL-SCNC: 19 MMOL/L
COLOR UR: YELLOW
CREAT SERPL-MCNC: 1.1 MG/DL
CTP QC/QA: YES
DIFFERENTIAL METHOD: ABNORMAL
EOSINOPHIL # BLD AUTO: ABNORMAL K/UL
EOSINOPHIL NFR BLD: 0 %
ERYTHROCYTE [DISTWIDTH] IN BLOOD BY AUTOMATED COUNT: 15.7 %
EST. GFR  (AFRICAN AMERICAN): >60 ML/MIN/1.73 M^2
EST. GFR  (NON AFRICAN AMERICAN): >60 ML/MIN/1.73 M^2
ESTIMATED AVG GLUCOSE: 169 MG/DL
FLUAV AG SPEC QL IA: NEGATIVE
FLUBV AG SPEC QL IA: NEGATIVE
GLUCOSE SERPL-MCNC: 175 MG/DL
GLUCOSE UR QL STRIP: NEGATIVE
HBA1C MFR BLD HPLC: 7.5 %
HCT VFR BLD AUTO: 36.8 %
HGB BLD-MCNC: 11.7 G/DL
HGB UR QL STRIP: ABNORMAL
HYALINE CASTS #/AREA URNS LPF: 0 /LPF
KETONES UR QL STRIP: NEGATIVE
LACTATE SERPL-SCNC: 1.2 MMOL/L
LACTATE SERPL-SCNC: 2 MMOL/L
LEUKOCYTE ESTERASE UR QL STRIP: ABNORMAL
LYMPHOCYTES # BLD AUTO: ABNORMAL K/UL
LYMPHOCYTES NFR BLD: 11 %
MCH RBC QN AUTO: 26.9 PG
MCHC RBC AUTO-ENTMCNC: 31.8 G/DL
MCV RBC AUTO: 85 FL
METAMYELOCYTES NFR BLD MANUAL: 3 %
MICROSCOPIC COMMENT: ABNORMAL
MONOCYTES # BLD AUTO: ABNORMAL K/UL
MONOCYTES NFR BLD: 2 %
NEUTROPHILS # BLD AUTO: ABNORMAL K/UL
NEUTROPHILS NFR BLD: 72 %
NEUTS BAND NFR BLD MANUAL: 12 %
NITRITE UR QL STRIP: POSITIVE
PH UR STRIP: 6 [PH] (ref 5–8)
PLATELET # BLD AUTO: 177 K/UL
PLATELET BLD QL SMEAR: ABNORMAL
PMV BLD AUTO: 9.8 FL
POCT GLUCOSE: 123 MG/DL (ref 70–110)
POCT GLUCOSE: 149 MG/DL (ref 70–110)
POTASSIUM SERPL-SCNC: 3.5 MMOL/L
PROT SERPL-MCNC: 7.5 G/DL
PROT UR QL STRIP: ABNORMAL
RBC # BLD AUTO: 4.35 M/UL
RBC #/AREA URNS HPF: >100 /HPF (ref 0–4)
SODIUM SERPL-SCNC: 141 MMOL/L
SP GR UR STRIP: 1.02 (ref 1–1.03)
SPECIMEN SOURCE: NORMAL
TROPONIN I SERPL DL<=0.01 NG/ML-MCNC: 0.33 NG/ML
URN SPEC COLLECT METH UR: ABNORMAL
UROBILINOGEN UR STRIP-ACNC: NEGATIVE EU/DL
WBC # BLD AUTO: 17.89 K/UL
WBC #/AREA URNS HPF: >100 /HPF (ref 0–5)

## 2018-04-20 PROCEDURE — 87186 SC STD MICRODIL/AGAR DIL: CPT | Mod: 59

## 2018-04-20 PROCEDURE — 82962 GLUCOSE BLOOD TEST: CPT

## 2018-04-20 PROCEDURE — 84484 ASSAY OF TROPONIN QUANT: CPT

## 2018-04-20 PROCEDURE — G0378 HOSPITAL OBSERVATION PER HR: HCPCS

## 2018-04-20 PROCEDURE — 63600175 PHARM REV CODE 636 W HCPCS: Performed by: EMERGENCY MEDICINE

## 2018-04-20 PROCEDURE — 85007 BL SMEAR W/DIFF WBC COUNT: CPT

## 2018-04-20 PROCEDURE — 96375 TX/PRO/DX INJ NEW DRUG ADDON: CPT

## 2018-04-20 PROCEDURE — 87086 URINE CULTURE/COLONY COUNT: CPT

## 2018-04-20 PROCEDURE — 81025 URINE PREGNANCY TEST: CPT | Performed by: EMERGENCY MEDICINE

## 2018-04-20 PROCEDURE — 87088 URINE BACTERIA CULTURE: CPT

## 2018-04-20 PROCEDURE — C1751 CATH, INF, PER/CENT/MIDLINE: HCPCS | Performed by: ANESTHESIOLOGY

## 2018-04-20 PROCEDURE — 96365 THER/PROPH/DIAG IV INF INIT: CPT

## 2018-04-20 PROCEDURE — 25000003 PHARM REV CODE 250: Performed by: EMERGENCY MEDICINE

## 2018-04-20 PROCEDURE — 87184 SC STD DISK METHOD PER PLATE: CPT

## 2018-04-20 PROCEDURE — 96361 HYDRATE IV INFUSION ADD-ON: CPT

## 2018-04-20 PROCEDURE — 11000001 HC ACUTE MED/SURG PRIVATE ROOM

## 2018-04-20 PROCEDURE — 87400 INFLUENZA A/B EACH AG IA: CPT

## 2018-04-20 PROCEDURE — 93005 ELECTROCARDIOGRAM TRACING: CPT

## 2018-04-20 PROCEDURE — 93010 ELECTROCARDIOGRAM REPORT: CPT | Mod: ,,, | Performed by: INTERNAL MEDICINE

## 2018-04-20 PROCEDURE — 81000 URINALYSIS NONAUTO W/SCOPE: CPT

## 2018-04-20 PROCEDURE — 25000003 PHARM REV CODE 250: Performed by: NURSE PRACTITIONER

## 2018-04-20 PROCEDURE — 87205 SMEAR GRAM STAIN: CPT

## 2018-04-20 PROCEDURE — 87040 BLOOD CULTURE FOR BACTERIA: CPT | Mod: 59

## 2018-04-20 PROCEDURE — 25500020 PHARM REV CODE 255: Performed by: EMERGENCY MEDICINE

## 2018-04-20 PROCEDURE — 83036 HEMOGLOBIN GLYCOSYLATED A1C: CPT

## 2018-04-20 PROCEDURE — 76937 US GUIDE VASCULAR ACCESS: CPT | Performed by: ANESTHESIOLOGY

## 2018-04-20 PROCEDURE — P9612 CATHETERIZE FOR URINE SPEC: HCPCS

## 2018-04-20 PROCEDURE — 83605 ASSAY OF LACTIC ACID: CPT | Mod: 91

## 2018-04-20 PROCEDURE — 96367 TX/PROPH/DG ADDL SEQ IV INF: CPT

## 2018-04-20 PROCEDURE — 85027 COMPLETE CBC AUTOMATED: CPT

## 2018-04-20 PROCEDURE — 80053 COMPREHEN METABOLIC PANEL: CPT

## 2018-04-20 PROCEDURE — 87077 CULTURE AEROBIC IDENTIFY: CPT | Mod: 59

## 2018-04-20 PROCEDURE — 99291 CRITICAL CARE FIRST HOUR: CPT | Mod: 25

## 2018-04-20 RX ORDER — SODIUM CHLORIDE 0.9 % (FLUSH) 0.9 %
5 SYRINGE (ML) INJECTION
Status: DISCONTINUED | OUTPATIENT
Start: 2018-04-20 | End: 2018-04-23 | Stop reason: HOSPADM

## 2018-04-20 RX ORDER — ONDANSETRON 2 MG/ML
4 INJECTION INTRAMUSCULAR; INTRAVENOUS
Status: COMPLETED | OUTPATIENT
Start: 2018-04-20 | End: 2018-04-20

## 2018-04-20 RX ORDER — PANTOPRAZOLE SODIUM 40 MG/1
40 TABLET, DELAYED RELEASE ORAL DAILY
Status: DISCONTINUED | OUTPATIENT
Start: 2018-04-21 | End: 2018-04-23 | Stop reason: HOSPADM

## 2018-04-20 RX ORDER — GLUCAGON 1 MG
1 KIT INJECTION
Status: DISCONTINUED | OUTPATIENT
Start: 2018-04-20 | End: 2018-04-23 | Stop reason: HOSPADM

## 2018-04-20 RX ORDER — FLUTICASONE FUROATE AND VILANTEROL 100; 25 UG/1; UG/1
1 POWDER RESPIRATORY (INHALATION) DAILY
Status: DISCONTINUED | OUTPATIENT
Start: 2018-04-21 | End: 2018-04-23 | Stop reason: HOSPADM

## 2018-04-20 RX ORDER — FENTANYL CITRATE 50 UG/ML
50 INJECTION, SOLUTION INTRAMUSCULAR; INTRAVENOUS
Status: COMPLETED | OUTPATIENT
Start: 2018-04-20 | End: 2018-04-20

## 2018-04-20 RX ORDER — IBUPROFEN 200 MG
1 TABLET ORAL DAILY
Status: DISCONTINUED | OUTPATIENT
Start: 2018-04-21 | End: 2018-04-23 | Stop reason: HOSPADM

## 2018-04-20 RX ORDER — INSULIN GLARGINE 300 [IU]/ML
INJECTION, SOLUTION SUBCUTANEOUS 2 TIMES DAILY
COMMUNITY
End: 2020-03-20

## 2018-04-20 RX ORDER — PREGABALIN 75 MG/1
300 CAPSULE ORAL 2 TIMES DAILY
Status: DISCONTINUED | OUTPATIENT
Start: 2018-04-21 | End: 2018-04-21

## 2018-04-20 RX ORDER — HYDROXYZINE PAMOATE 25 MG/1
50 CAPSULE ORAL EVERY 8 HOURS PRN
Status: DISCONTINUED | OUTPATIENT
Start: 2018-04-20 | End: 2018-04-23 | Stop reason: HOSPADM

## 2018-04-20 RX ORDER — ACETAMINOPHEN 325 MG/1
650 TABLET ORAL EVERY 6 HOURS PRN
Status: DISCONTINUED | OUTPATIENT
Start: 2018-04-20 | End: 2018-04-23 | Stop reason: HOSPADM

## 2018-04-20 RX ORDER — MORPHINE SULFATE 4 MG/ML
4 INJECTION, SOLUTION INTRAMUSCULAR; INTRAVENOUS
Status: DISCONTINUED | OUTPATIENT
Start: 2018-04-20 | End: 2018-04-21

## 2018-04-20 RX ORDER — CEFTRIAXONE 1 G/1
1 INJECTION, POWDER, FOR SOLUTION INTRAMUSCULAR; INTRAVENOUS
Status: DISCONTINUED | OUTPATIENT
Start: 2018-04-21 | End: 2018-04-20

## 2018-04-20 RX ORDER — LEVALBUTEROL 1.25 MG/.5ML
1.25 SOLUTION, CONCENTRATE RESPIRATORY (INHALATION) EVERY 8 HOURS PRN
Status: DISCONTINUED | OUTPATIENT
Start: 2018-04-20 | End: 2018-04-23 | Stop reason: HOSPADM

## 2018-04-20 RX ORDER — IBUPROFEN 200 MG
16 TABLET ORAL
Status: DISCONTINUED | OUTPATIENT
Start: 2018-04-20 | End: 2018-04-23 | Stop reason: HOSPADM

## 2018-04-20 RX ORDER — IBUPROFEN 200 MG
24 TABLET ORAL
Status: DISCONTINUED | OUTPATIENT
Start: 2018-04-20 | End: 2018-04-23 | Stop reason: HOSPADM

## 2018-04-20 RX ORDER — AMITRIPTYLINE HYDROCHLORIDE 25 MG/1
50 TABLET, FILM COATED ORAL NIGHTLY PRN
Status: DISCONTINUED | OUTPATIENT
Start: 2018-04-20 | End: 2018-04-23 | Stop reason: HOSPADM

## 2018-04-20 RX ORDER — LISINOPRIL 10 MG/1
10 TABLET ORAL DAILY
Status: DISCONTINUED | OUTPATIENT
Start: 2018-04-21 | End: 2018-04-21

## 2018-04-20 RX ORDER — ONDANSETRON 8 MG/1
8 TABLET, ORALLY DISINTEGRATING ORAL EVERY 8 HOURS PRN
Status: DISCONTINUED | OUTPATIENT
Start: 2018-04-20 | End: 2018-04-22

## 2018-04-20 RX ORDER — RAMELTEON 8 MG/1
8 TABLET ORAL NIGHTLY PRN
Status: DISCONTINUED | OUTPATIENT
Start: 2018-04-20 | End: 2018-04-23 | Stop reason: HOSPADM

## 2018-04-20 RX ORDER — INSULIN ASPART 100 [IU]/ML
1-10 INJECTION, SOLUTION INTRAVENOUS; SUBCUTANEOUS
Status: DISCONTINUED | OUTPATIENT
Start: 2018-04-20 | End: 2018-04-23 | Stop reason: HOSPADM

## 2018-04-20 RX ADMIN — SODIUM CHLORIDE 2000 ML: 0.9 INJECTION, SOLUTION INTRAVENOUS at 04:04

## 2018-04-20 RX ADMIN — ONDANSETRON 8 MG: 4 TABLET, ORALLY DISINTEGRATING ORAL at 08:04

## 2018-04-20 RX ADMIN — PIPERACILLIN AND TAZOBACTAM 4.5 G: 4; .5 INJECTION, POWDER, LYOPHILIZED, FOR SOLUTION INTRAVENOUS; PARENTERAL at 09:04

## 2018-04-20 RX ADMIN — FENTANYL CITRATE 50 MCG: 50 INJECTION, SOLUTION INTRAMUSCULAR; INTRAVENOUS at 05:04

## 2018-04-20 RX ADMIN — VANCOMYCIN HYDROCHLORIDE 1250 MG: 10 INJECTION, POWDER, LYOPHILIZED, FOR SOLUTION INTRAVENOUS at 10:04

## 2018-04-20 RX ADMIN — IOHEXOL 100 ML: 350 INJECTION, SOLUTION INTRAVENOUS at 06:04

## 2018-04-20 RX ADMIN — ONDANSETRON 4 MG: 2 INJECTION INTRAMUSCULAR; INTRAVENOUS at 05:04

## 2018-04-20 NOTE — ED NOTES
Patient presents to ed with c/o epigastric abdominal pain, n/v, and fever x 2days. Denies any urinary symptoms. Denies respiratory symptoms. Abdomen tender to palpation.

## 2018-04-20 NOTE — ED PROVIDER NOTES
Encounter Date: 2018    SCRIBE #1 NOTE: I, Robe Ortiz, am scribing for, and in the presence of,  Dr. Marco Antonio Cramer. I have scribed the entire note.       History     Chief Complaint   Patient presents with    Abdominal Pain     right side abd pain x 2 days with n/v and fever.      The history is provided by the patient.      Lele Dias is a 37 y.o. female who  has a past medical history of Asthma; Depression; Diabetes mellitus, type II; Diastolic dysfunction; E. coli pyelonephritis (2015); Hernia, epigastric; Hypertension; Nonalcoholic hepatosteatosis; and Periumbilical hernia.    The patient presents to the ED due to right sided abdominal pain with associated nausea, vomiting and fever. Patient states that her abdomen has been hurting since she was seen a month ago for a kidney infection, for which she was prescribed Cipro. Patient reports a fever today of 105 F, and took tylenol just prior to arrival. Patient states that she has had three episodes of emesis today which she describes as watery. Patient also complains of constipation, stating her last normal bowl movement was about two days ago. Patient denies any blood in stool or diarrhea.     Review of patient's allergies indicates:   Allergen Reactions    Celexa [citalopram] Nausea And Vomiting     Past Medical History:   Diagnosis Date    Asthma     Depression     Diabetes mellitus, type II     Diastolic dysfunction     E. 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     SECTION       SECTION, CLASSIC      CHOLECYSTECTOMY  age 15    TONSILLECTOMY, ADENOIDECTOMY       Family History   Problem Relation Age of Onset    Cancer Father     Heart attack Mother     Heart disease Mother     Cancer Maternal Aunt      lung (smoker)    Heart  attack Maternal Aunt     Breast cancer Paternal Grandmother     Heart attack Maternal Grandmother      Social History   Substance Use Topics    Smoking status: Current Every Day Smoker     Packs/day: 2.00     Years: 15.00     Types: Cigarettes    Smokeless tobacco: Never Used    Alcohol use No     Review of Systems   Constitutional: Positive for chills and fever.   HENT: Negative for congestion, rhinorrhea and sore throat.    Eyes: Negative for redness and visual disturbance.   Respiratory: Negative for cough, shortness of breath and wheezing.    Cardiovascular: Negative for chest pain and palpitations.   Gastrointestinal: Positive for abdominal pain, constipation, nausea and vomiting. Negative for diarrhea.   Genitourinary: Negative for dysuria and hematuria.   Musculoskeletal: Negative for back pain, myalgias and neck pain.   Skin: Negative for rash.   Neurological: Negative for dizziness, weakness and light-headedness.   Psychiatric/Behavioral: Negative for confusion.       Physical Exam     Initial Vitals [04/20/18 1501]   BP Pulse Resp Temp SpO2   (!) 100/57 (!) 136 20 100 °F (37.8 °C) (!) 93 %      MAP       71.33         Physical Exam    Nursing note and vitals reviewed.  Constitutional: She appears well-developed and well-nourished. She is not diaphoretic. No distress.   HENT:   Head: Normocephalic and atraumatic.   Mouth/Throat: Oropharynx is clear and moist.   Eyes: EOM are normal. Pupils are equal, round, and reactive to light.   Neck: No tracheal deviation present.   Cardiovascular: Normal rate, regular rhythm, normal heart sounds and intact distal pulses.   Pulmonary/Chest: Breath sounds normal. No stridor. No respiratory distress. She has no wheezes.   Abdominal: Soft. Bowel sounds are normal. She exhibits no distension and no mass. There is tenderness in the right lower quadrant. There is CVA tenderness. There is no rigidity, no rebound, no guarding, no tenderness at McBurney's point and negative  Anthony's sign.   Morbidly obese abdomen with mild tenderness to RLQ and right CVA   Musculoskeletal: Normal range of motion. She exhibits no edema.   Left arm amputation at the humerus.    Neurological: She is alert and oriented to person, place, and time. She has normal strength. No cranial nerve deficit or sensory deficit.   Skin: Skin is warm and dry. Capillary refill takes less than 2 seconds. No pallor.   Psychiatric: She has a normal mood and affect. Her behavior is normal. Thought content normal.         ED Course   Critical Care  Date/Time: 4/20/2018 6:08 PM  Performed by: ZAC DENG  Authorized by: ZAC DENG   Direct patient critical care time: 15 minutes  Additional history critical care time: 10 minutes  Ordering / reviewing critical care time: 10 minutes  Documentation critical care time: 15 minutes  Consulting other physicians critical care time: 5 minutes  Total critical care time (exclusive of procedural time) : 55 minutes  Critical care was necessary to treat or prevent imminent or life-threatening deterioration of the following conditions: sepsis.  Critical care was time spent personally by me on the following activities: development of treatment plan with patient or surrogate, interpretation of cardiac output measurements, evaluation of patient's response to treatment, examination of patient, obtaining history from patient or surrogate, ordering and review of laboratory studies, ordering and review of radiographic studies and review of old charts.        Labs Reviewed   CBC W/ AUTO DIFFERENTIAL - Abnormal; Notable for the following:        Result Value    WBC 17.89 (*)     Hemoglobin 11.7 (*)     Hematocrit 36.8 (*)     MCH 26.9 (*)     MCHC 31.8 (*)     RDW 15.7 (*)     Lymph% 11.0 (*)     Mono% 2.0 (*)     All other components within normal limits   COMPREHENSIVE METABOLIC PANEL - Abnormal; Notable for the following:     CO2 19 (*)     Glucose 175 (*)     Albumin 2.8 (*)     All other  components within normal limits   URINALYSIS - Abnormal; Notable for the following:     Appearance, UA Hazy (*)     Protein, UA 1+ (*)     Occult Blood UA 3+ (*)     Nitrite, UA Positive (*)     Leukocytes, UA 2+ (*)     All other components within normal limits   TROPONIN I - Abnormal; Notable for the following:     Troponin I 0.326 (*)     All other components within normal limits   URINALYSIS MICROSCOPIC - Abnormal; Notable for the following:     RBC, UA >100 (*)     WBC, UA >100 (*)     Bacteria, UA Many (*)     All other components within normal limits   HEMOGLOBIN A1C - Abnormal; Notable for the following:     Hemoglobin A1C 7.5 (*)     Estimated Avg Glucose 169 (*)     All other components within normal limits   POCT GLUCOSE - Abnormal; Notable for the following:     POCT Glucose 149 (*)     All other components within normal limits   POCT GLUCOSE - Abnormal; Notable for the following:     POCT Glucose 123 (*)     All other components within normal limits   GRAM STAIN   LACTIC ACID, PLASMA   INFLUENZA A AND B ANTIGEN   TROPONIN I   LACTIC ACID, PLASMA   HEMOGLOBIN A1C   POCT URINE PREGNANCY     EKG Readings: (Independently Interpreted)   Initial Reading: No STEMI. Previous EKG: Compared with most recent EKG Rhythm: Sinus Tachycardia.   Sinus tachycardia, rate of 125 BPM. T wave inversions in inferior and lateral leads, no reciprocal changes.   Compared with prior EKG 05/2017, T wave inversions are new.          Medical Decision Making:   Initial Assessment:   Patient is a morbidly obese 37 y.o. female that presents with nausea, vomiting and abdominal pain that started this morning. Patient reports having a scheduled stress test today that she could not attend due to her symptoms. Patient reports a fever of 105 degrees at home that improved with tylenol. Vitals on arrival with tachycardia to 136, temperature 100 F.     Given history of fever, will utilize sepsis order set, give IV fluids and obtain infectious  workup including chest X-ray and UA, and reassess. Will consider advanced imaging following initial workup.   Differential Diagnosis:   Differential Diagnosis includes, but is not limited to:  AAA, aortic dissection, mesenteric ischemia, perforated viscous, MI/ACS, SBO/volvulus, incarcerated/strangulated hernia, intussusception, ileus, appendicitis, cholecystitis, cholangitis, diverticulitis, esophagitis, hepatitis, nephrolithiasis, pancreatitis, gastroenteritis, colitis, IBD/IBS, biliary colic, GERD, PUD, constipation, UTI/pyelonephritis,  disorder.  Clinical Tests:   Lab Tests: Ordered and Reviewed  ED Management:  17:42   Labs revealed white count of 17k. Electrolytes and lactate normal.   Given leukocytosis, will initiate vancomycin and zosyn.  UA showed with greater than 100 WBC and RBC.  Mild elevated troponin to 0.3, likely demand from tachycardia and infection.  We will obtain CT A/P for further evaluation.    18:34   CT revealed marked perinephric stranding, most consistent with pyelonephritis.   Given persistent tachycardia and concern for resistant infection, as patient was recently treated as OP, will request admission for IV anti-biotics and further treatment of pyelonephritis.     19:32   I discussed the case with [facility] hospital medicine who has agreed for admission.   Upon re-evaluation, the patient's status has remained stable.  At this time, I believe the patient should be admitted to the hospital for further evaluation and management of pyelonephritis.    [facility] service was contacted and the case was discussed. The consulting physician agrees with plan and will admit under their service. Additional recommendations at this time: none    The patient and family were updated with test results, overall impression, and further plan of care. All questions were answered. The patient expressed understanding and agreed with the current plan.                          Clinical Impression:     1.  Pyelonephritis    2. Tachycardia    3. Right flank pain    4. Fever, unspecified fever cause    5. Encounter for central line placement    6. Elevated troponin    7. E. coli pyelonephritis    8. Anemia, unspecified type    9. E coli bacteremia                  I, Dr. Marco Antonio Cramer, personally performed the services described in this documentation. All medical record entries made by the scribe were at my direction and in my presence.  I have reviewed the chart and agree that the record reflects my personal performance and is accurate and complete.     Marco Antonio Cramer MD.               Marco Antonio Cramer MD  05/18/18 8207

## 2018-04-21 PROBLEM — R78.81 GRAM-NEGATIVE BACTEREMIA: Status: ACTIVE | Noted: 2018-04-21

## 2018-04-21 PROBLEM — L03.311 CELLULITIS OF ABDOMINAL WALL: Status: RESOLVED | Noted: 2017-12-18 | Resolved: 2018-04-21

## 2018-04-21 LAB
ANION GAP SERPL CALC-SCNC: 9 MMOL/L
BASOPHILS # BLD AUTO: 0.02 K/UL
BASOPHILS NFR BLD: 0.1 %
BUN SERPL-MCNC: 15 MG/DL
CALCIUM SERPL-MCNC: 7.9 MG/DL
CHLORIDE SERPL-SCNC: 107 MMOL/L
CO2 SERPL-SCNC: 23 MMOL/L
CREAT SERPL-MCNC: 1.4 MG/DL
DIFFERENTIAL METHOD: ABNORMAL
EOSINOPHIL # BLD AUTO: 0.1 K/UL
EOSINOPHIL NFR BLD: 0.3 %
ERYTHROCYTE [DISTWIDTH] IN BLOOD BY AUTOMATED COUNT: 16.1 %
EST. GFR  (AFRICAN AMERICAN): 55 ML/MIN/1.73 M^2
EST. GFR  (NON AFRICAN AMERICAN): 47 ML/MIN/1.73 M^2
GLUCOSE SERPL-MCNC: 167 MG/DL
GRAM STN SPEC: NORMAL
HCT VFR BLD AUTO: 29.4 %
HGB BLD-MCNC: 9.1 G/DL
LYMPHOCYTES # BLD AUTO: 3 K/UL
LYMPHOCYTES NFR BLD: 15.4 %
MCH RBC QN AUTO: 26.5 PG
MCHC RBC AUTO-ENTMCNC: 31 G/DL
MCV RBC AUTO: 86 FL
MONOCYTES # BLD AUTO: 1.7 K/UL
MONOCYTES NFR BLD: 8.7 %
NEUTROPHILS # BLD AUTO: 14.4 K/UL
NEUTROPHILS NFR BLD: 74.2 %
PLATELET # BLD AUTO: 145 K/UL
PMV BLD AUTO: 9.7 FL
POCT GLUCOSE: 159 MG/DL (ref 70–110)
POCT GLUCOSE: 231 MG/DL (ref 70–110)
POCT GLUCOSE: 232 MG/DL (ref 70–110)
POCT GLUCOSE: 238 MG/DL (ref 70–110)
POTASSIUM SERPL-SCNC: 3.7 MMOL/L
RBC # BLD AUTO: 3.44 M/UL
SODIUM SERPL-SCNC: 139 MMOL/L
TROPONIN I SERPL DL<=0.01 NG/ML-MCNC: 0.45 NG/ML
TROPONIN I SERPL DL<=0.01 NG/ML-MCNC: 0.56 NG/ML
WBC # BLD AUTO: 19.36 K/UL

## 2018-04-21 PROCEDURE — 25000003 PHARM REV CODE 250: Performed by: HOSPITALIST

## 2018-04-21 PROCEDURE — 85025 COMPLETE CBC W/AUTO DIFF WBC: CPT

## 2018-04-21 PROCEDURE — 36415 COLL VENOUS BLD VENIPUNCTURE: CPT

## 2018-04-21 PROCEDURE — 63600175 PHARM REV CODE 636 W HCPCS: Performed by: PHYSICIAN ASSISTANT

## 2018-04-21 PROCEDURE — 80307 DRUG TEST PRSMV CHEM ANLYZR: CPT

## 2018-04-21 PROCEDURE — 25000242 PHARM REV CODE 250 ALT 637 W/ HCPCS: Performed by: NURSE PRACTITIONER

## 2018-04-21 PROCEDURE — 25000003 PHARM REV CODE 250: Performed by: NURSE PRACTITIONER

## 2018-04-21 PROCEDURE — 84484 ASSAY OF TROPONIN QUANT: CPT | Mod: 91

## 2018-04-21 PROCEDURE — 63600175 PHARM REV CODE 636 W HCPCS: Performed by: NURSE PRACTITIONER

## 2018-04-21 PROCEDURE — 94640 AIRWAY INHALATION TREATMENT: CPT

## 2018-04-21 PROCEDURE — 80048 BASIC METABOLIC PNL TOTAL CA: CPT

## 2018-04-21 PROCEDURE — 94761 N-INVAS EAR/PLS OXIMETRY MLT: CPT

## 2018-04-21 PROCEDURE — 11000001 HC ACUTE MED/SURG PRIVATE ROOM

## 2018-04-21 PROCEDURE — 84484 ASSAY OF TROPONIN QUANT: CPT

## 2018-04-21 PROCEDURE — S4991 NICOTINE PATCH NONLEGEND: HCPCS | Performed by: NURSE PRACTITIONER

## 2018-04-21 RX ORDER — LISINOPRIL 10 MG/1
10 TABLET ORAL DAILY
Status: DISCONTINUED | OUTPATIENT
Start: 2018-04-22 | End: 2018-04-22

## 2018-04-21 RX ORDER — FAMOTIDINE 20 MG/1
20 TABLET, FILM COATED ORAL 2 TIMES DAILY PRN
Status: DISCONTINUED | OUTPATIENT
Start: 2018-04-21 | End: 2018-04-23 | Stop reason: HOSPADM

## 2018-04-21 RX ORDER — METOPROLOL TARTRATE 25 MG/1
25 TABLET, FILM COATED ORAL EVERY 6 HOURS PRN
Status: DISCONTINUED | OUTPATIENT
Start: 2018-04-21 | End: 2018-04-23 | Stop reason: HOSPADM

## 2018-04-21 RX ORDER — ENOXAPARIN SODIUM 100 MG/ML
40 INJECTION SUBCUTANEOUS EVERY 24 HOURS
Status: DISCONTINUED | OUTPATIENT
Start: 2018-04-21 | End: 2018-04-22

## 2018-04-21 RX ORDER — SODIUM CHLORIDE 9 MG/ML
INJECTION, SOLUTION INTRAVENOUS CONTINUOUS
Status: ACTIVE | OUTPATIENT
Start: 2018-04-21 | End: 2018-04-21

## 2018-04-21 RX ORDER — CLONIDINE HYDROCHLORIDE 0.2 MG/1
0.2 TABLET ORAL EVERY 4 HOURS PRN
Status: DISCONTINUED | OUTPATIENT
Start: 2018-04-21 | End: 2018-04-23 | Stop reason: HOSPADM

## 2018-04-21 RX ORDER — KETOROLAC TROMETHAMINE 30 MG/ML
15 INJECTION, SOLUTION INTRAMUSCULAR; INTRAVENOUS ONCE
Status: COMPLETED | OUTPATIENT
Start: 2018-04-21 | End: 2018-04-21

## 2018-04-21 RX ORDER — BUPRENORPHINE HYDROCHLORIDE AND NALOXONE HYDROCHLORIDE DIHYDRATE 8; 2 MG/1; MG/1
8 TABLET SUBLINGUAL DAILY
Status: DISCONTINUED | OUTPATIENT
Start: 2018-04-21 | End: 2018-04-23 | Stop reason: HOSPADM

## 2018-04-21 RX ADMIN — ENOXAPARIN SODIUM 40 MG: 100 INJECTION SUBCUTANEOUS at 05:04

## 2018-04-21 RX ADMIN — CEFTRIAXONE SODIUM 1 G: 1 INJECTION, POWDER, FOR SOLUTION INTRAMUSCULAR; INTRAVENOUS at 05:04

## 2018-04-21 RX ADMIN — BUPRENORPHINE HYDROCHLORIDE AND NALOXONE HYDROCHLORIDE DIHYDRATE 1 TABLET: 8; 2 TABLET SUBLINGUAL at 10:04

## 2018-04-21 RX ADMIN — PANTOPRAZOLE SODIUM 40 MG: 40 TABLET, DELAYED RELEASE ORAL at 10:04

## 2018-04-21 RX ADMIN — NICOTINE 1 PATCH: 21 PATCH, EXTENDED RELEASE TRANSDERMAL at 10:04

## 2018-04-21 RX ADMIN — CEFTRIAXONE SODIUM 1 G: 1 INJECTION, POWDER, FOR SOLUTION INTRAMUSCULAR; INTRAVENOUS at 10:04

## 2018-04-21 RX ADMIN — SODIUM CHLORIDE: 0.9 INJECTION, SOLUTION INTRAVENOUS at 11:04

## 2018-04-21 RX ADMIN — INSULIN DETEMIR 50 UNITS: 100 INJECTION, SOLUTION SUBCUTANEOUS at 10:04

## 2018-04-21 RX ADMIN — INSULIN ASPART 2 UNITS: 100 INJECTION, SOLUTION INTRAVENOUS; SUBCUTANEOUS at 10:04

## 2018-04-21 RX ADMIN — ACETAMINOPHEN 650 MG: 325 TABLET ORAL at 08:04

## 2018-04-21 RX ADMIN — INSULIN DETEMIR 50 UNITS: 100 INJECTION, SOLUTION SUBCUTANEOUS at 08:04

## 2018-04-21 RX ADMIN — FAMOTIDINE 20 MG: 20 TABLET, FILM COATED ORAL at 07:04

## 2018-04-21 RX ADMIN — KETOROLAC TROMETHAMINE 15 MG: 30 INJECTION, SOLUTION INTRAMUSCULAR at 02:04

## 2018-04-21 RX ADMIN — FLUTICASONE FUROATE AND VILANTEROL TRIFENATATE 1 PUFF: 100; 25 POWDER RESPIRATORY (INHALATION) at 09:04

## 2018-04-21 RX ADMIN — ONDANSETRON 8 MG: 4 TABLET, ORALLY DISINTEGRATING ORAL at 12:04

## 2018-04-21 RX ADMIN — ACETAMINOPHEN 650 MG: 325 TABLET ORAL at 11:04

## 2018-04-21 RX ADMIN — RAMELTEON 8 MG: 8 TABLET, FILM COATED ORAL at 10:04

## 2018-04-21 RX ADMIN — INSULIN ASPART 4 UNITS: 100 INJECTION, SOLUTION INTRAVENOUS; SUBCUTANEOUS at 05:04
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
## 2018-04-21 NOTE — ASSESSMENT & PLAN NOTE
Troponin 0.326 on admission.  Likely elevated due to demand ischemia.  Monitor on telemetry.  Trend troponin.

## 2018-04-21 NOTE — ED NOTES
Report received, pt care assume. Pt lying on stretcher, NAD, VSS. Pt updated on plan of care and admission process as well as need to IV access.

## 2018-04-21 NOTE — SUBJECTIVE & OBJECTIVE
Interval History: Pt lethargic. States just wants to sleep. C/O right flank pain and mild nausea. Denies CP, SOB. Significant other present this morning.     Review of Systems   Constitutional: Positive for fatigue. Negative for chills and fever.   Respiratory: Negative for cough and shortness of breath.    Cardiovascular: Negative for chest pain and palpitations.   Gastrointestinal: Positive for nausea. Negative for vomiting.   Genitourinary: Positive for flank pain.   Musculoskeletal: Positive for back pain.   Neurological: Negative for dizziness and headaches.     Objective:     Vital Signs (Most Recent):  Temp: (!) 100.9 °F (38.3 °C) (04/21/18 0738)  Pulse: (!) 121 (04/21/18 0800)  Resp: 20 (04/21/18 0738)  BP: 124/60 (04/21/18 0738)  SpO2: 95 % (04/21/18 0454) Vital Signs (24h Range):  Temp:  [99.5 °F (37.5 °C)-100.9 °F (38.3 °C)] 100.9 °F (38.3 °C)  Pulse:  [101-136] 121  Resp:  [16-22] 20  SpO2:  [93 %-96 %] 95 %  BP: (100-137)/(52-69) 124/60     Weight: (!) 142 kg (313 lb 0.9 oz)  Body mass index is 57.26 kg/m².    Intake/Output Summary (Last 24 hours) at 04/21/18 0853  Last data filed at 04/20/18 2210   Gross per 24 hour   Intake              100 ml   Output                0 ml   Net              100 ml      Physical Exam   Constitutional: No distress.   Morbidly obese   Cardiovascular: Normal rate and regular rhythm.    No murmur heard.  Pulmonary/Chest: Effort normal and breath sounds normal. No respiratory distress. She has no wheezes.   Abdominal: Soft. There is no tenderness.   Musculoskeletal: She exhibits no edema.   Neurological:   Lethargic but answers questions.    Psychiatric: She has a normal mood and affect.   Nursing note and vitals reviewed.      Significant Labs:   BMP:   Recent Labs  Lab 04/21/18  0543   *      K 3.7      CO2 23   BUN 15   CREATININE 1.4   CALCIUM 7.9*     CBC:   Recent Labs  Lab 04/20/18  1603 04/21/18  0543   WBC 17.89* 19.36*   HGB 11.7* 9.1*   HCT  36.8* 29.4*    145*     Troponin:   Recent Labs  Lab 04/20/18  1603 04/21/18  0543   TROPONINI 0.326* 0.452*       Significant Imaging: no new

## 2018-04-21 NOTE — ASSESSMENT & PLAN NOTE
Sepsis  Meets sepsis criteria due to WBC count 17.89, temperature of 105F, heart rate 130s, urine as source of infection, and abdominal CT concerning for pyelonephritis.  Was given NS 2L bolus, zosyn, and vancomycin in ED.  Will give ceftriaxone.  Continue to monitor.  Trend lactic acid per sepsis protocol.  F/u urine gram stain and cultures.
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
## 2018-04-21 NOTE — ASSESSMENT & PLAN NOTE
Sepsis  Tmax 100.9. Blood culture NG x 1 day.  Pt tachycardic likely 2/2 pain and opioid withdrawal.    Was given NS 2L bolus, zosyn, and vancomycin in ED. Continue ceftriaxone according to last few sensitivities. Urine culture pending.  Gram stain: many WBCs and gram negative rods. Consult Urology if not improving as CT mentions possible right renal abscess.

## 2018-04-21 NOTE — ASSESSMENT & PLAN NOTE
A1c 7.5%.  Takes Toujeo 138 units q morning, 135 units qHS, and Humalog 95 untis TIDAC at home.  Levemir 50 units BID, high dose SSI.  Pattern glucose and adjust insulin dose as necessary.

## 2018-04-21 NOTE — ASSESSMENT & PLAN NOTE
Troponin 0.326 >> 0.452.  Likely elevated due to demand ischemia.  Denies CP.  Monitor on telemetry.  Trend troponin.

## 2018-04-21 NOTE — ANESTHESIA PROCEDURE NOTES
Central Line    Diagnosis: poor venous access  Patient location during procedure: ED  Procedure start time: 4/20/2018 9:10 PM  Timeout: 4/20/2018 9:10 PM  Procedure end time: 4/20/2018 9:35 PM  Staffing  Anesthesiologist: MECHE WATKINS  Performed: anesthesiologist   Anesthesiologist was present at the time of the procedure.  Preanesthetic Checklist  Completed: patient identified, site marked, surgical consent, pre-op evaluation, timeout performed, IV checked, risks and benefits discussed, monitors and equipment checked and anesthesia consent given  Indication  Indication: vascular access, med administration, hemodynamic monitoring     Anesthesia   local infiltration  Local Infiltration: lidocaine 1% without epinephrine    Central Line  Skin Prep: skin prepped with ChloraPrep, skin prep agent completely dried prior to procedure  maximum sterile barriers used during central venous catheter insertion  hand hygiene performed prior to central venous catheter insertion  Location: right internal jugular,   Catheter type: triple lumen  Catheter Size: 7 Fr  Inserted Catheter Length: 14 cm  Ultrasound: vascular probe with ultrasound  Vessel Caliber: medium, patent, compressibility normal  Needle advanced into vessel with real time Ultrasound guidance.  Guidewire confirmed in vessel.  Sterile sheath used.  Image recorded and saved.  Manometry: none  Insertion Attempts: 1   Securement:line sutured, chlorhexidine patch, sterile dressing applied and blood return through all ports     Post-Procedure  X-Ray: no pneumothorax on x-ray, placement verified by x-ray, tip termination and successful placement  Tip termination comments: in the right atrium   Adverse Events:none

## 2018-04-21 NOTE — ASSESSMENT & PLAN NOTE
Chronic.  Stable.  Takes Advair 100-50 mcg BID and PRN albuterol inhaler/nebulizers at home.  Give fluticasone-vilanterol (Advair not available in hospital formulary), and PRN levalbuterol (holding albuterol due to tachycardia).

## 2018-04-21 NOTE — SUBJECTIVE & OBJECTIVE
Past Medical History:   Diagnosis Date    Asthma     Depression     Diabetes mellitus, type II     Diastolic dysfunction     E. coli pyelonephritis 2015    Hernia, epigastric     Hypertension     Nonalcoholic hepatosteatosis     Periumbilical hernia        Past Surgical History:   Procedure Laterality Date    ARM AMPUTATION AT SHOULDER Left 2000s     SECTION       SECTION, CLASSIC      CHOLECYSTECTOMY  age 15    TONSILLECTOMY, ADENOIDECTOMY         Review of patient's allergies indicates:   Allergen Reactions    Celexa [citalopram] Nausea And Vomiting       No current facility-administered medications on file prior to encounter.      Current Outpatient Prescriptions on File Prior to Encounter   Medication Sig    [DISCONTINUED] miconazole NITRATE 2 % (MICOTIN) 2 % top powder Apply topically 2 (two) times daily. Under abdomen    albuterol (ACCUNEB) 1.25 mg/3 mL Nebu Take 1.25 mg by nebulization every 4 (four) hours as needed.    albuterol 90 mcg/actuation inhaler Inhale 2 puffs into the lungs every 6 (six) hours as needed for Wheezing. Rescue    amitriptyline (ELAVIL) 50 MG tablet Take 50 mg by mouth nightly as needed for Insomnia.    buprenorphine-naloxone 2-0.5 mg (SUBOXONE) 2-0.5 mg Subl Place 8 mg under the tongue once daily. Pt states once daily    fluticasone-salmeterol 100-50 mcg/dose (ADVAIR) 100-50 mcg/dose diskus inhaler Inhale 1 puff into the lungs 2 (two) times daily.    furosemide (LASIX) 40 MG tablet Take 40 mg by mouth 2 (two) times daily.    hydrOXYzine pamoate (VISTARIL) 50 MG Cap Take 50 mg by mouth every 8 (eight) hours as needed.    insulin lispro (HUMALOG) 100 unit/mL injection Inject 95 Units into the skin 3 (three) times daily before meals.     lisinopril (PRINIVIL,ZESTRIL) 20 MG tablet Take 10 mg by mouth once daily.     nitroGLYCERIN (NITROSTAT) 0.4 MG SL tablet Place 0.4 mg under the tongue every 5 (five) minutes as needed for Chest pain.     omeprazole (PRILOSEC) 40 MG capsule Take 40 mg by mouth every morning.     ondansetron (ZOFRAN-ODT) 4 MG TbDL Take 2 tablets (8 mg total) by mouth every 6 (six) hours as needed.    potassium chloride SA (K-DUR,KLOR-CON) 20 MEQ tablet Take 20 mEq by mouth once daily.    pregabalin (LYRICA) 150 MG capsule Take 300 mg by mouth 2 (two) times daily.    promethazine (PHENERGAN) 25 MG tablet Take 25 mg by mouth every 4 (four) hours.    tiZANidine (ZANAFLEX) 4 MG tablet Take 4 mg by mouth every 8 (eight) hours.    [DISCONTINUED] insulin glargine (LANTUS) 100 unit/mL injection Inject 125 Units into the skin 2 (two) times daily.      Family History     Problem Relation (Age of Onset)    Breast cancer Paternal Grandmother    Cancer Father, Maternal Aunt    Heart attack Mother, Maternal Aunt, Maternal Grandmother    Heart disease Mother        Social History Main Topics    Smoking status: Current Every Day Smoker     Packs/day: 2.00     Years: 15.00     Types: Cigarettes    Smokeless tobacco: Never Used    Alcohol use No    Drug use: No    Sexual activity: Yes     Partners: Male     Birth control/ protection: Injection     Review of Systems   Constitutional: Positive for chills and fever. Negative for diaphoresis.   HENT: Negative for congestion, trouble swallowing and voice change.    Eyes: Negative for photophobia, pain and visual disturbance.   Respiratory: Negative for cough, shortness of breath and wheezing.    Cardiovascular: Negative for chest pain, palpitations and leg swelling.   Gastrointestinal: Positive for abdominal pain, constipation, nausea and vomiting.   Endocrine: Negative for cold intolerance and heat intolerance.   Genitourinary: Negative for dysuria, frequency and urgency.   Musculoskeletal: Positive for back pain. Negative for arthralgias, gait problem and myalgias.   Skin: Negative for color change, pallor and rash.   Neurological: Negative for dizziness, weakness and headaches.    Hematological: Does not bruise/bleed easily.   Psychiatric/Behavioral: Negative for agitation, confusion and hallucinations.     Objective:     Vital Signs (Most Recent):  Temp: 99.5 °F (37.5 °C) (04/20/18 2142)  Pulse: (!) 127 (04/20/18 2142)  Resp: 16 (04/20/18 2142)  BP: (!) 137/59 (04/20/18 2142)  SpO2: 96 % (04/20/18 2142) Vital Signs (24h Range):  Temp:  [99.5 °F (37.5 °C)-100 °F (37.8 °C)] 99.5 °F (37.5 °C)  Pulse:  [101-136] 127  Resp:  [16-20] 16  SpO2:  [93 %-96 %] 96 %  BP: (100-137)/(57-69) 137/59     Weight: 136.1 kg (300 lb)  Body mass index is 54.87 kg/m².    Physical Exam   Constitutional: She is oriented to person, place, and time. She appears well-developed and well-nourished. She appears lethargic. No distress.   Morbidly obese   HENT:   Head: Normocephalic and atraumatic.   Mouth/Throat: Oropharynx is clear and moist.   Eyes: EOM are normal. Pupils are equal, round, and reactive to light. No scleral icterus.   Neck: Normal range of motion. Neck supple. No tracheal deviation present.   Cardiovascular: Regular rhythm and intact distal pulses.  Tachycardia present.    Pulmonary/Chest: Effort normal and breath sounds normal. No respiratory distress. She has no wheezes.   Abdominal: Soft. Bowel sounds are normal. She exhibits no distension. There is tenderness in the epigastric area and suprapubic area. There is CVA tenderness. There is no rigidity and no guarding.   Bilateral CVA tenderness   Musculoskeletal: Normal range of motion. She exhibits no edema or tenderness.   Left arm amputation   Neurological: She is oriented to person, place, and time. She has normal strength. She appears lethargic. GCS eye subscore is 4. GCS verbal subscore is 5. GCS motor subscore is 6.   Skin: Skin is warm, dry and intact.   Psychiatric: She has a normal mood and affect. Her speech is normal and behavior is normal.   Nursing note and vitals reviewed.        CRANIAL NERVES     CN III, IV, VI   Pupils are equal,  round, and reactive to light.  Extraocular motions are normal.        Significant Labs:   A1C:   Recent Labs  Lab 12/19/17  0646 04/20/18  1603   HGBA1C 9.4* 7.5*     Blood Culture: No results for input(s): LABBLOO in the last 48 hours.  CBC:   Recent Labs  Lab 04/20/18  1603   WBC 17.89*   HGB 11.7*   HCT 36.8*        CMP:   Recent Labs  Lab 04/20/18  1603      K 3.5      CO2 19*   *   BUN 10   CREATININE 1.1   CALCIUM 8.9   PROT 7.5   ALBUMIN 2.8*   BILITOT 0.6   ALKPHOS 103   AST 10   ALT 13   ANIONGAP 14   EGFRNONAA >60     Coagulation: No results for input(s): PT, INR, APTT in the last 48 hours.  Lactic Acid:   Recent Labs  Lab 04/20/18  1603 04/20/18  1945   LACTATE 2.0 1.2     POCT Glucose:   Recent Labs  Lab 04/20/18  1708 04/20/18  2201   POCTGLUCOSE 149* 123*     Troponin:   Recent Labs  Lab 04/20/18  1603   TROPONINI 0.326*     Urine Culture: No results for input(s): LABURIN in the last 48 hours.  Urine Studies:   Recent Labs  Lab 04/20/18  1603   COLORU Yellow   APPEARANCEUA Hazy*   PHUR 6.0   SPECGRAV 1.020   PROTEINUA 1+*   GLUCUA Negative   KETONESU Negative   BILIRUBINUA Negative   OCCULTUA 3+*   NITRITE Positive*   UROBILINOGEN Negative   LEUKOCYTESUR 2+*   RBCUA >100*   WBCUA >100*   BACTERIA Many*   HYALINECASTS 0     All pertinent labs within the past 24 hours have been reviewed.    Significant Imaging: I have reviewed all pertinent imaging results/findings within the past 24 hours.     X-Ray Chest 1 View: Right central venous catheter has been placed, tip overlies the distal SVC/right atrial junction.  There is no pneumothorax or significant interval detrimental change in the cardiopulmonary status noting shallow inspiratory effort.    CT Abdomen Pelvis With Contrast:  1. Marked right perirenal inflammation with associated disorganized fluid, findings are concerning for pyelonephritis.  Subcentimeter low attenuating focus within the right kidney could reflect developing  abscess.  Correlation is advised.  There is mild urinary bladder wall thickening, correlation with urinalysis is recommended.  2. Hepatic steatosis, correlation with LFTs recommended.  3. Complex anterior abdominal wall fat containing hernias, similar in appearance to the previous exam.  4. Pulmonary nodule within the right lower lobe, appears stable since examination of 2015, strongly favoring benign etiology given long-term stability.  5. Several additional findings above.    X-Ray Chest PA And Lateral: The cardiomediastinal silhouette is not enlarged.  There is no pleural effusion.  The trachea is midline.  The lungs are symmetrically expanded bilaterally without evidence of acute parenchymal process. No large focal consolidation seen.  There is no pneumothorax.  The osseous structures are remarkable for degenerative changes.    EKG: Sinus tachycardia  Possible Left atrial enlargement  T wave abnormality, consider inferolateral ischemia  Abnormal ECG

## 2018-04-21 NOTE — PLAN OF CARE
TN met with patient and SO, Andrew at bedside. Pt ambulates without difficulty. SO transports to appts when able.  SO concerned about patient kidney functions, States patient has had episodes like this prior.    Plan of care reviewed, verbalizes understanding. SO hoping to discuss treatment with team.      04/21/18 1217   Discharge Assessment   Assessment Type Discharge Planning Assessment   Confirmed/corrected address and phone number on facesheet? Yes   Assessment information obtained from? Patient   Communicated expected length of stay with patient/caregiver yes   Prior to hospitilization cognitive status: Alert/Oriented   Prior to hospitalization functional status: Needs Assistance;Independent  (pt has left arm amputation, above elbow)   Current cognitive status: Alert/Oriented   Current Functional Status: Independent   Lives With alone   Able to Return to Prior Arrangements yes   Is patient able to care for self after discharge? Yes   Who are your caregiver(s) and their phone number(s)? Andrew- [phone]   Patient's perception of discharge disposition home or selfcare   Readmission Within The Last 30 Days no previous admission in last 30 days   Patient currently being followed by outpatient case management? No   Patient currently receives any other outside agency services? No   Equipment Currently Used at Home none   Do you have any problems affording any of your prescribed medications? No   Is the patient taking medications as prescribed? yes   Does the patient have transportation home? Yes   Transportation Available Medicaid transportation;family or friend will provide   Does the patient receive services at the [facility]? No   Discharge Plan A Home with family   Discharge Plan B Home with family   Patient/Family In Agreement With Plan yes

## 2018-04-21 NOTE — ED NOTES
Dr. Cerda at bedside for CVL placement. Pt and family educated on procedure and need for CVL per Dr. Cerda

## 2018-04-21 NOTE — ASSESSMENT & PLAN NOTE
Chronic.  A1c 7.5, improved from 9.4.  Takes Toujeo 138 units q morning, 135 units qHS, and Humalog 95 untis TIDAC at home.  Levemir 50 units BID, high dose SSI.  Pattern glucose and adjust insulin dose as necessary.
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
## 2018-04-21 NOTE — CLINICAL REVIEW
Chart reviewed.  CT scan was independently reviewed today and reveals significant right perinephric inflammation consistent with pyelonephritis. I do not see any developing abscess that would warrant drainage.  Would treat based on prior + culture from 12/2017 while awaiting culture results.  Prior culture sensitive to ceftriaxone that the patient is receiving.  Monitor for any signs of worsening or non-response to antibiotics.  Currently no need for urologic intervention.  Please call urology with any questions.

## 2018-04-21 NOTE — ED NOTES
Pt drowsy, falling asleep with talking. Pt reports pain 10/10 and nausea. Will administer PRN nausea medication.

## 2018-04-21 NOTE — NURSING
Lab reports positive blood culture. Anaerobic bottle drawn from right antecubital positive for gram negative rods.  Dr. Dc notified.

## 2018-04-21 NOTE — ASSESSMENT & PLAN NOTE
Takes Advair 100-50 mcg BID and PRN albuterol inhaler/nebulizers at home.  continue fluticasone-vilanterol (Advair not available in hospital formulary), and PRN levalbuterol (holding albuterol due to tachycardia).

## 2018-04-21 NOTE — ED NOTES
Notified SCOTT Perkins NP of elevated heart rate. Okay for pt to go to floor per SCOTT Perkins NP.

## 2018-04-22 PROBLEM — D69.6 THROMBOCYTOPENIA, UNSPECIFIED: Status: ACTIVE | Noted: 2018-04-22

## 2018-04-22 PROBLEM — E83.42 HYPOMAGNESEMIA: Status: ACTIVE | Noted: 2018-04-22

## 2018-04-22 PROBLEM — D64.9 ANEMIA: Status: ACTIVE | Noted: 2018-04-22

## 2018-04-22 PROBLEM — E87.6 HYPOKALEMIA: Status: ACTIVE | Noted: 2018-04-22

## 2018-04-22 PROBLEM — B96.20 E. COLI UTI: Status: RESOLVED | Noted: 2017-12-18 | Resolved: 2018-04-22

## 2018-04-22 PROBLEM — N39.0 E. COLI UTI: Status: RESOLVED | Noted: 2017-12-18 | Resolved: 2018-04-22

## 2018-04-22 LAB
ANION GAP SERPL CALC-SCNC: 7 MMOL/L
BASOPHILS # BLD AUTO: 0 K/UL
BASOPHILS NFR BLD: 0 %
BUN SERPL-MCNC: 19 MG/DL
CALCIUM SERPL-MCNC: 7.4 MG/DL
CHLORIDE SERPL-SCNC: 110 MMOL/L
CO2 SERPL-SCNC: 23 MMOL/L
CREAT SERPL-MCNC: 1.4 MG/DL
DIFFERENTIAL METHOD: ABNORMAL
EOSINOPHIL # BLD AUTO: 0.1 K/UL
EOSINOPHIL NFR BLD: 0.5 %
ERYTHROCYTE [DISTWIDTH] IN BLOOD BY AUTOMATED COUNT: 16 %
EST. GFR  (AFRICAN AMERICAN): 55 ML/MIN/1.73 M^2
EST. GFR  (NON AFRICAN AMERICAN): 47 ML/MIN/1.73 M^2
FERRITIN SERPL-MCNC: 364 NG/ML
GLUCOSE SERPL-MCNC: 152 MG/DL
HCT VFR BLD AUTO: 24.2 %
HGB BLD-MCNC: 7.7 G/DL
IRON SERPL-MCNC: <10 UG/DL
LYMPHOCYTES # BLD AUTO: 2.3 K/UL
LYMPHOCYTES NFR BLD: 17.9 %
MAGNESIUM SERPL-MCNC: 1 MG/DL
MCH RBC QN AUTO: 27 PG
MCHC RBC AUTO-ENTMCNC: 31.8 G/DL
MCV RBC AUTO: 85 FL
MONOCYTES # BLD AUTO: 1 K/UL
MONOCYTES NFR BLD: 7.7 %
NEUTROPHILS # BLD AUTO: 9.3 K/UL
NEUTROPHILS NFR BLD: 72.4 %
PHOSPHATE SERPL-MCNC: 3.2 MG/DL
PLATELET # BLD AUTO: 101 K/UL
PMV BLD AUTO: 10 FL
POCT GLUCOSE: 109 MG/DL (ref 70–110)
POCT GLUCOSE: 132 MG/DL (ref 70–110)
POCT GLUCOSE: 148 MG/DL (ref 70–110)
POCT GLUCOSE: 173 MG/DL (ref 70–110)
POTASSIUM SERPL-SCNC: 3.2 MMOL/L
RBC # BLD AUTO: 2.85 M/UL
SATURATED IRON: ABNORMAL %
SODIUM SERPL-SCNC: 140 MMOL/L
TOTAL IRON BINDING CAPACITY: 189 UG/DL
TRANSFERRIN SERPL-MCNC: 128 MG/DL
TROPONIN I SERPL DL<=0.01 NG/ML-MCNC: 0.57 NG/ML
WBC # BLD AUTO: 12.78 K/UL

## 2018-04-22 PROCEDURE — 25000003 PHARM REV CODE 250: Performed by: NURSE PRACTITIONER

## 2018-04-22 PROCEDURE — 82728 ASSAY OF FERRITIN: CPT

## 2018-04-22 PROCEDURE — 11000001 HC ACUTE MED/SURG PRIVATE ROOM

## 2018-04-22 PROCEDURE — 83735 ASSAY OF MAGNESIUM: CPT

## 2018-04-22 PROCEDURE — 80048 BASIC METABOLIC PNL TOTAL CA: CPT

## 2018-04-22 PROCEDURE — 36415 COLL VENOUS BLD VENIPUNCTURE: CPT

## 2018-04-22 PROCEDURE — 25000003 PHARM REV CODE 250: Performed by: PHYSICIAN ASSISTANT

## 2018-04-22 PROCEDURE — 85025 COMPLETE CBC W/AUTO DIFF WBC: CPT

## 2018-04-22 PROCEDURE — 87040 BLOOD CULTURE FOR BACTERIA: CPT

## 2018-04-22 PROCEDURE — 94761 N-INVAS EAR/PLS OXIMETRY MLT: CPT

## 2018-04-22 PROCEDURE — 84484 ASSAY OF TROPONIN QUANT: CPT

## 2018-04-22 PROCEDURE — 83540 ASSAY OF IRON: CPT

## 2018-04-22 PROCEDURE — S4991 NICOTINE PATCH NONLEGEND: HCPCS | Performed by: NURSE PRACTITIONER

## 2018-04-22 PROCEDURE — 94640 AIRWAY INHALATION TREATMENT: CPT

## 2018-04-22 PROCEDURE — 84100 ASSAY OF PHOSPHORUS: CPT

## 2018-04-22 PROCEDURE — 63600175 PHARM REV CODE 636 W HCPCS: Performed by: PHYSICIAN ASSISTANT

## 2018-04-22 RX ORDER — AMOXICILLIN 250 MG
1 CAPSULE ORAL DAILY
Status: DISCONTINUED | OUTPATIENT
Start: 2018-04-22 | End: 2018-04-23 | Stop reason: HOSPADM

## 2018-04-22 RX ORDER — BISACODYL 10 MG
10 SUPPOSITORY, RECTAL RECTAL DAILY PRN
Status: DISCONTINUED | OUTPATIENT
Start: 2018-04-22 | End: 2018-04-23 | Stop reason: HOSPADM

## 2018-04-22 RX ORDER — PREGABALIN 75 MG/1
300 CAPSULE ORAL 2 TIMES DAILY
Status: DISCONTINUED | OUTPATIENT
Start: 2018-04-22 | End: 2018-04-23 | Stop reason: HOSPADM

## 2018-04-22 RX ORDER — POLYETHYLENE GLYCOL 3350 17 G/17G
17 POWDER, FOR SOLUTION ORAL DAILY
Status: DISCONTINUED | OUTPATIENT
Start: 2018-04-22 | End: 2018-04-23 | Stop reason: HOSPADM

## 2018-04-22 RX ORDER — LIDOCAINE 50 MG/G
1 PATCH TOPICAL
Status: DISCONTINUED | OUTPATIENT
Start: 2018-04-22 | End: 2018-04-23 | Stop reason: HOSPADM

## 2018-04-22 RX ORDER — LISINOPRIL 10 MG/1
10 TABLET ORAL DAILY
Status: DISCONTINUED | OUTPATIENT
Start: 2018-04-23 | End: 2018-04-23 | Stop reason: HOSPADM

## 2018-04-22 RX ORDER — ASPIRIN 81 MG/1
81 TABLET ORAL DAILY
Status: DISCONTINUED | OUTPATIENT
Start: 2018-04-23 | End: 2018-04-23 | Stop reason: HOSPADM

## 2018-04-22 RX ORDER — TRAMADOL HYDROCHLORIDE 50 MG/1
50 TABLET ORAL EVERY 6 HOURS PRN
Status: DISCONTINUED | OUTPATIENT
Start: 2018-04-22 | End: 2018-04-23 | Stop reason: HOSPADM

## 2018-04-22 RX ORDER — PROCHLORPERAZINE EDISYLATE 5 MG/ML
10 INJECTION INTRAMUSCULAR; INTRAVENOUS EVERY 6 HOURS PRN
Status: DISCONTINUED | OUTPATIENT
Start: 2018-04-22 | End: 2018-04-23 | Stop reason: HOSPADM

## 2018-04-22 RX ORDER — ENOXAPARIN SODIUM 100 MG/ML
40 INJECTION SUBCUTANEOUS EVERY 24 HOURS
Status: DISCONTINUED | OUTPATIENT
Start: 2018-04-23 | End: 2018-04-23

## 2018-04-22 RX ORDER — MAGNESIUM SULFATE HEPTAHYDRATE 40 MG/ML
2 INJECTION, SOLUTION INTRAVENOUS
Status: COMPLETED | OUTPATIENT
Start: 2018-04-22 | End: 2018-04-22

## 2018-04-22 RX ORDER — ONDANSETRON 8 MG/1
8 TABLET, ORALLY DISINTEGRATING ORAL EVERY 6 HOURS PRN
Status: DISCONTINUED | OUTPATIENT
Start: 2018-04-22 | End: 2018-04-23 | Stop reason: HOSPADM

## 2018-04-22 RX ADMIN — PANTOPRAZOLE SODIUM 40 MG: 40 TABLET, DELAYED RELEASE ORAL at 10:04

## 2018-04-22 RX ADMIN — NICOTINE 1 PATCH: 21 PATCH, EXTENDED RELEASE TRANSDERMAL at 08:04

## 2018-04-22 RX ADMIN — FLUTICASONE FUROATE AND VILANTEROL TRIFENATATE 1 PUFF: 100; 25 POWDER RESPIRATORY (INHALATION) at 08:04

## 2018-04-22 RX ADMIN — PREGABALIN 300 MG: 75 CAPSULE ORAL at 11:04

## 2018-04-22 RX ADMIN — ERTAPENEM SODIUM 1 G: 1 INJECTION, POWDER, LYOPHILIZED, FOR SOLUTION INTRAMUSCULAR; INTRAVENOUS at 06:04

## 2018-04-22 RX ADMIN — ACETAMINOPHEN 650 MG: 325 TABLET ORAL at 02:04

## 2018-04-22 RX ADMIN — MAGNESIUM SULFATE IN WATER 2 G: 40 INJECTION, SOLUTION INTRAVENOUS at 01:04

## 2018-04-22 RX ADMIN — STANDARDIZED SENNA CONCENTRATE AND DOCUSATE SODIUM 1 TABLET: 8.6; 5 TABLET, FILM COATED ORAL at 11:04

## 2018-04-22 RX ADMIN — MAGNESIUM SULFATE IN WATER 2 G: 40 INJECTION, SOLUTION INTRAVENOUS at 09:04

## 2018-04-22 RX ADMIN — ONDANSETRON 8 MG: 4 TABLET, ORALLY DISINTEGRATING ORAL at 02:04

## 2018-04-22 RX ADMIN — ACETAMINOPHEN 650 MG: 325 TABLET ORAL at 11:04

## 2018-04-22 RX ADMIN — INSULIN DETEMIR 50 UNITS: 100 INJECTION, SOLUTION SUBCUTANEOUS at 08:04

## 2018-04-22 RX ADMIN — PROCHLORPERAZINE EDISYLATE 10 MG: 5 INJECTION INTRAMUSCULAR; INTRAVENOUS at 08:04

## 2018-04-22 RX ADMIN — AMITRIPTYLINE HYDROCHLORIDE 50 MG: 25 TABLET, FILM COATED ORAL at 02:04

## 2018-04-22 RX ADMIN — BUPRENORPHINE HYDROCHLORIDE AND NALOXONE HYDROCHLORIDE DIHYDRATE 1 TABLET: 8; 2 TABLET SUBLINGUAL at 10:04

## 2018-04-22 RX ADMIN — POLYETHYLENE GLYCOL 3350 17 G: 17 POWDER, FOR SOLUTION ORAL at 06:04

## 2018-04-22 RX ADMIN — CEFTRIAXONE SODIUM 2 G: 2 INJECTION, POWDER, FOR SOLUTION INTRAMUSCULAR; INTRAVENOUS at 08:04

## 2018-04-22 RX ADMIN — ONDANSETRON 8 MG: 8 TABLET, ORALLY DISINTEGRATING ORAL at 02:04

## 2018-04-22 RX ADMIN — PREGABALIN 300 MG: 75 CAPSULE ORAL at 08:04

## 2018-04-22 RX ADMIN — RAMELTEON 8 MG: 8 TABLET, FILM COATED ORAL at 08:04

## 2018-04-22 RX ADMIN — TRAMADOL HYDROCHLORIDE 50 MG: 50 TABLET, COATED ORAL at 04:04

## 2018-04-22 RX ADMIN — LIDOCAINE 1 PATCH: 50 PATCH TOPICAL at 04:04

## 2018-04-22 NOTE — ASSESSMENT & PLAN NOTE
Fasting blood glucose 152. A1c 7.5%.  Takes Toujeo 138 units q morning, 135 units qHS, and Humalog 95 untis TIDAC at home.  Levemir 50 units BID, high dose SSI.  Pattern glucose and adjust insulin dose as necessary.

## 2018-04-22 NOTE — ASSESSMENT & PLAN NOTE
Troponin 0.326 >> 0.452 >> 0.557 >> 0.573.  Likely elevated due to demand ischemia.  Denies CP.  Monitor on telemetry.  2D Echo ordered.  Daily ASA. Check lipid panel in AM.

## 2018-04-22 NOTE — SUBJECTIVE & OBJECTIVE
Interval History: Pt c/o N/V and constipation. Denies CP, SOB. Significant Other Juan Coats present this morning.     Review of Systems   Constitutional: Positive for fatigue and fever.   Respiratory: Negative for cough and shortness of breath.    Cardiovascular: Negative for chest pain and palpitations.   Gastrointestinal: Positive for constipation, nausea and vomiting.   Genitourinary: Positive for flank pain. Negative for difficulty urinating.   Musculoskeletal: Positive for back pain.   Psychiatric/Behavioral: Negative for confusion.     Objective:     Vital Signs (Most Recent):  Temp: 99.7 °F (37.6 °C) (04/22/18 0814)  Pulse: (!) 120 (04/22/18 0855)  Resp: 16 (04/22/18 0855)  BP: 117/62 (04/22/18 0814)  SpO2: 99 % (04/22/18 0855) Vital Signs (24h Range):  Temp:  [98.7 °F (37.1 °C)-102.1 °F (38.9 °C)] 99.7 °F (37.6 °C)  Pulse:  [107-147] 120  Resp:  [16-20] 16  SpO2:  [95 %-99 %] 99 %  BP: ()/(50-64) 117/62     Weight: (!) 144.2 kg (317 lb 14.5 oz)  Body mass index is 58.15 kg/m².    Intake/Output Summary (Last 24 hours) at 04/22/18 0950  Last data filed at 04/22/18 0600   Gross per 24 hour   Intake              225 ml   Output                0 ml   Net              225 ml      Physical Exam   Constitutional: She is oriented to person, place, and time. No distress.   Morbidly obese   Cardiovascular: Normal rate and regular rhythm.    No murmur heard.  Pulmonary/Chest: Effort normal and breath sounds normal. No respiratory distress. She has no wheezes.   Abdominal: Soft. There is no tenderness.   +right flank TTP   Musculoskeletal: She exhibits no edema.   Neurological: She is alert and oriented to person, place, and time.   Psychiatric: She has a normal mood and affect. Her behavior is normal.   Nursing note and vitals reviewed.      Significant Labs:   BMP:   Recent Labs  Lab 04/22/18  0530   *      K 3.2*      CO2 23   BUN 19   CREATININE 1.4   CALCIUM 7.4*   MG 1.0*     CBC:    Recent Labs  Lab 04/20/18  1603 04/21/18  0543 04/22/18  0530   WBC 17.89* 19.36* 12.78*   HGB 11.7* 9.1* 7.7*   HCT 36.8* 29.4* 24.2*    145* 101*     Magnesium:   Recent Labs  Lab 04/22/18  0530   MG 1.0*     POCT Glucose:   Recent Labs  Lab 04/21/18  1540 04/21/18 2008 04/22/18  0612   POCTGLUCOSE 231* 232* 132*     Troponin:   Recent Labs  Lab 04/21/18  0543 04/21/18  1117 04/22/18  0530   TROPONINI 0.452* 0.557* 0.573*       Significant Imaging: no new

## 2018-04-22 NOTE — ASSESSMENT & PLAN NOTE
Thrombocytopenia  H&H and platelets trending down perhaps 2/2 infection. Appears baseline hemoglobin is 10-13.  Check iron studies. Denies h/o blood transfusion.  No visible, active bleeding. Monitor.

## 2018-04-22 NOTE — ASSESSMENT & PLAN NOTE
-147. Hold home lisinopril 10 mg daily for slight increase in creatinine. Monitor. PRN clonidine.

## 2018-04-22 NOTE — ASSESSMENT & PLAN NOTE
No respiratory complaints. Takes Advair 100-50 mcg BID and PRN albuterol inhaler/nebulizers at home.  continue fluticasone-vilanterol (Advair not available in hospital formulary), and PRN levalbuterol (holding albuterol due to tachycardia).

## 2018-04-22 NOTE — ASSESSMENT & PLAN NOTE
Gram negative bacteremia, Sepsis  Blood culture 4/20: Gram negative rods in 1 bottle. Urine culture 4/20: gram negative rods.  Intermittent fever and tachycardia.   No elevation in lactic acid. WBC trending down. Was given NS 2L bolus, zosyn, and vancomycin in ED. Continue ceftriaxone according to last few sensitivities but increase to 2 grams daily.  Urologist Dr. Gael Fuller reviewed CT and states low suspicion of right renal abscess.  Chart notes history of chronic right flank and RUQ pain as far back as 2015.

## 2018-04-22 NOTE — PLAN OF CARE
Problem: Patient Care Overview  Goal: Plan of Care Review  Outcome: Ongoing (interventions implemented as appropriate)  Pt with IV antibiotic given. Pt encouraged to drink fluids. Temperature of 100.7 with tylenol given down to 100.3. Lidocaine patch paiied to right flank for pain and tramadol given.
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
## 2018-04-22 NOTE — PLAN OF CARE
Problem: Patient Care Overview  Goal: Plan of Care Review  Outcome: Ongoing (interventions implemented as appropriate)  Patient remains free from falls, bed alarm in use. Family at bedside assistance given with patient. IV fluids given and discontinued. Medicated for pain with Tylenol and Nausea with Zofran with good results. CBG for , 2 units insulin given per SSI and 132 @ 0600.

## 2018-04-23 VITALS
HEART RATE: 112 BPM | DIASTOLIC BLOOD PRESSURE: 60 MMHG | SYSTOLIC BLOOD PRESSURE: 121 MMHG | TEMPERATURE: 100 F | OXYGEN SATURATION: 99 % | RESPIRATION RATE: 20 BRPM | WEIGHT: 293 LBS | HEIGHT: 62 IN | BODY MASS INDEX: 53.92 KG/M2

## 2018-04-23 PROBLEM — B96.20 E. COLI PYELONEPHRITIS: Status: ACTIVE | Noted: 2018-04-20

## 2018-04-23 PROBLEM — B96.20 E COLI BACTEREMIA: Status: ACTIVE | Noted: 2018-04-21

## 2018-04-23 PROBLEM — E83.42 HYPOMAGNESEMIA: Status: RESOLVED | Noted: 2018-04-22 | Resolved: 2018-04-23

## 2018-04-23 LAB
ANION GAP SERPL CALC-SCNC: 9 MMOL/L
BACTERIA BLD CULT: NORMAL
BASOPHILS # BLD AUTO: 0.01 K/UL
BASOPHILS NFR BLD: 0.1 %
BUN SERPL-MCNC: 16 MG/DL
CALCIUM SERPL-MCNC: 7.6 MG/DL
CHLORIDE SERPL-SCNC: 108 MMOL/L
CHOLEST SERPL-MCNC: 95 MG/DL
CHOLEST/HDLC SERPL: 7.9 {RATIO}
CO2 SERPL-SCNC: 22 MMOL/L
CREAT SERPL-MCNC: 1.4 MG/DL
DIASTOLIC DYSFUNCTION: NO
DIFFERENTIAL METHOD: ABNORMAL
EOSINOPHIL # BLD AUTO: 0.1 K/UL
EOSINOPHIL NFR BLD: 1.2 %
ERYTHROCYTE [DISTWIDTH] IN BLOOD BY AUTOMATED COUNT: 16.2 %
EST. GFR  (AFRICAN AMERICAN): 55 ML/MIN/1.73 M^2
EST. GFR  (NON AFRICAN AMERICAN): 47 ML/MIN/1.73 M^2
ESTIMATED PA SYSTOLIC PRESSURE: 14.83
GLUCOSE SERPL-MCNC: 130 MG/DL
HCT VFR BLD AUTO: 23.1 %
HDLC SERPL-MCNC: 12 MG/DL
HDLC SERPL: 12.6 %
HGB BLD-MCNC: 7.3 G/DL
LDLC SERPL CALC-MCNC: 53.6 MG/DL
LYMPHOCYTES # BLD AUTO: 2.3 K/UL
LYMPHOCYTES NFR BLD: 23.5 %
MAGNESIUM SERPL-MCNC: 1.6 MG/DL
MCH RBC QN AUTO: 26.9 PG
MCHC RBC AUTO-ENTMCNC: 31.6 G/DL
MCV RBC AUTO: 85 FL
MITRAL VALVE MOBILITY: NORMAL
MONOCYTES # BLD AUTO: 0.7 K/UL
MONOCYTES NFR BLD: 7 %
NEUTROPHILS # BLD AUTO: 6.5 K/UL
NEUTROPHILS NFR BLD: 67.7 %
NONHDLC SERPL-MCNC: 83 MG/DL
PHOSPHATE SERPL-MCNC: 3.2 MG/DL
PLATELET # BLD AUTO: 98 K/UL
PMV BLD AUTO: 10.1 FL
POCT GLUCOSE: 138 MG/DL (ref 70–110)
POCT GLUCOSE: 174 MG/DL (ref 70–110)
POTASSIUM SERPL-SCNC: 3.1 MMOL/L
RBC # BLD AUTO: 2.71 M/UL
RETIRED EF AND QEF - SEE NOTES: 55 (ref 55–65)
SODIUM SERPL-SCNC: 139 MMOL/L
TRICUSPID VALVE REGURGITATION: NORMAL
TRIGL SERPL-MCNC: 147 MG/DL
WBC # BLD AUTO: 9.64 K/UL

## 2018-04-23 PROCEDURE — 80048 BASIC METABOLIC PNL TOTAL CA: CPT

## 2018-04-23 PROCEDURE — 63600175 PHARM REV CODE 636 W HCPCS: Performed by: PHYSICIAN ASSISTANT

## 2018-04-23 PROCEDURE — 25000003 PHARM REV CODE 250: Performed by: NURSE PRACTITIONER

## 2018-04-23 PROCEDURE — 80061 LIPID PANEL: CPT

## 2018-04-23 PROCEDURE — 85025 COMPLETE CBC W/AUTO DIFF WBC: CPT

## 2018-04-23 PROCEDURE — 84100 ASSAY OF PHOSPHORUS: CPT

## 2018-04-23 PROCEDURE — S4991 NICOTINE PATCH NONLEGEND: HCPCS | Performed by: NURSE PRACTITIONER

## 2018-04-23 PROCEDURE — 93306 TTE W/DOPPLER COMPLETE: CPT | Mod: 26,,, | Performed by: INTERNAL MEDICINE

## 2018-04-23 PROCEDURE — 83735 ASSAY OF MAGNESIUM: CPT

## 2018-04-23 PROCEDURE — 93306 TTE W/DOPPLER COMPLETE: CPT

## 2018-04-23 PROCEDURE — 25000003 PHARM REV CODE 250: Performed by: PHYSICIAN ASSISTANT

## 2018-04-23 PROCEDURE — 94640 AIRWAY INHALATION TREATMENT: CPT

## 2018-04-23 RX ORDER — FERROUS SULFATE 325(65) MG
325 TABLET, DELAYED RELEASE (ENTERIC COATED) ORAL 2 TIMES DAILY
Refills: 0 | COMMUNITY
Start: 2018-04-23 | End: 2018-08-09

## 2018-04-23 RX ORDER — CIPROFLOXACIN 500 MG/1
500 TABLET ORAL EVERY 12 HOURS
Qty: 14 TABLET | Refills: 0 | Status: SHIPPED | OUTPATIENT
Start: 2018-04-23 | End: 2018-04-30

## 2018-04-23 RX ORDER — FERROUS SULFATE 325(65) MG
325 TABLET, DELAYED RELEASE (ENTERIC COATED) ORAL 2 TIMES DAILY
Status: DISCONTINUED | OUTPATIENT
Start: 2018-04-23 | End: 2018-04-23 | Stop reason: HOSPADM

## 2018-04-23 RX ORDER — CIPROFLOXACIN 500 MG/1
500 TABLET ORAL EVERY 12 HOURS
Status: DISCONTINUED | OUTPATIENT
Start: 2018-04-23 | End: 2018-04-23 | Stop reason: HOSPADM

## 2018-04-23 RX ORDER — POTASSIUM CHLORIDE 20 MEQ/1
40 TABLET, EXTENDED RELEASE ORAL ONCE
Status: COMPLETED | OUTPATIENT
Start: 2018-04-23 | End: 2018-04-23

## 2018-04-23 RX ORDER — TRAMADOL HYDROCHLORIDE 50 MG/1
50 TABLET ORAL EVERY 8 HOURS PRN
Qty: 9 TABLET | Refills: 0 | Status: SHIPPED | OUTPATIENT
Start: 2018-04-23 | End: 2018-04-26

## 2018-04-23 RX ORDER — CARVEDILOL 3.12 MG/1
3.12 TABLET ORAL 2 TIMES DAILY
Status: DISCONTINUED | OUTPATIENT
Start: 2018-04-23 | End: 2018-04-23 | Stop reason: HOSPADM

## 2018-04-23 RX ORDER — ENOXAPARIN SODIUM 100 MG/ML
40 INJECTION SUBCUTANEOUS EVERY 24 HOURS
Status: DISCONTINUED | OUTPATIENT
Start: 2018-04-24 | End: 2018-04-23 | Stop reason: HOSPADM

## 2018-04-23 RX ADMIN — PANTOPRAZOLE SODIUM 40 MG: 40 TABLET, DELAYED RELEASE ORAL at 09:04

## 2018-04-23 RX ADMIN — CIPROFLOXACIN HYDROCHLORIDE 500 MG: 500 TABLET, FILM COATED ORAL at 11:04

## 2018-04-23 RX ADMIN — FLUTICASONE FUROATE AND VILANTEROL TRIFENATATE 1 PUFF: 100; 25 POWDER RESPIRATORY (INHALATION) at 07:04

## 2018-04-23 RX ADMIN — INSULIN ASPART 2 UNITS: 100 INJECTION, SOLUTION INTRAVENOUS; SUBCUTANEOUS at 12:04

## 2018-04-23 RX ADMIN — CARVEDILOL 3.12 MG: 3.12 TABLET, FILM COATED ORAL at 09:04

## 2018-04-23 RX ADMIN — POTASSIUM CHLORIDE 40 MEQ: 20 TABLET, EXTENDED RELEASE ORAL at 09:04

## 2018-04-23 RX ADMIN — ASPIRIN 81 MG: 81 TABLET, COATED ORAL at 09:04

## 2018-04-23 RX ADMIN — TRAMADOL HYDROCHLORIDE 50 MG: 50 TABLET, COATED ORAL at 06:04

## 2018-04-23 RX ADMIN — NICOTINE 1 PATCH: 21 PATCH, EXTENDED RELEASE TRANSDERMAL at 09:04

## 2018-04-23 RX ADMIN — FERROUS SULFATE TAB EC 325 MG (65 MG FE EQUIVALENT) 325 MG: 325 (65 FE) TABLET DELAYED RESPONSE at 09:04

## 2018-04-23 RX ADMIN — INSULIN DETEMIR 50 UNITS: 100 INJECTION, SOLUTION SUBCUTANEOUS at 09:04

## 2018-04-23 RX ADMIN — BUPRENORPHINE HYDROCHLORIDE AND NALOXONE HYDROCHLORIDE DIHYDRATE 1 TABLET: 8; 2 TABLET SUBLINGUAL at 10:04

## 2018-04-23 RX ADMIN — PREGABALIN 300 MG: 75 CAPSULE ORAL at 09:04

## 2018-04-23 RX ADMIN — STANDARDIZED SENNA CONCENTRATE AND DOCUSATE SODIUM 1 TABLET: 8.6; 5 TABLET, FILM COATED ORAL at 09:04

## 2018-04-23 RX ADMIN — TRAMADOL HYDROCHLORIDE 50 MG: 50 TABLET, COATED ORAL at 12:04

## 2018-04-23 RX ADMIN — POLYETHYLENE GLYCOL 3350 17 G: 17 POWDER, FOR SOLUTION ORAL at 09:04

## 2018-04-23 NOTE — ASSESSMENT & PLAN NOTE
E. coli bacteremia  Blood culture 4/20: E. coli in 1 bottle; Urine culture 4/20: E. Coli, sensitive to cefepime, cipro and ertapenem. No elevation in lactic acid. WBC trended down to normal on 4/23. Was given NS 2L bolus, zosyn, and vancomycin in ED. Continued ceftriaxone according to last few sensitivities but increased to 2 grams daily on 4/22. Switched to Ertapenem on 4/23 as pt still with fever and tachycardia. Discharging on PO Cipro for 7 days to complete course of abx for complicated pyelonephritis and bacteremia.  Urologist Dr. Gael Fuller reviewed CT on 4/21 and states low suspicion of right renal abscess.  Chart notes history of chronic right flank and RUQ pain as far back as 2015.     Serum creatinine: 1.4 mg/dL 04/23/18 0508  Estimated creatinine clearance: 74.7 mL/min

## 2018-04-23 NOTE — ASSESSMENT & PLAN NOTE
Thrombocytopenia  H&H and platelets trending down perhaps 2/2 infection. Appears baseline hemoglobin is 10-13.  Iron studies suggest more chronic disease pattern. Denies h/o blood transfusion.  No visible, active bleeding. Monitor. Start PO Fe with bowel regimen.

## 2018-04-23 NOTE — SUBJECTIVE & OBJECTIVE
Interval History: Pt without complaints today. States ready for discharge. Family present in room this morning.     Review of Systems   Constitutional: Negative for chills, fatigue and fever.   Respiratory: Negative for cough and shortness of breath.    Cardiovascular: Negative for chest pain and palpitations.   Gastrointestinal: Negative for nausea and vomiting.   Genitourinary: Positive for flank pain. Negative for difficulty urinating.   Musculoskeletal: Positive for back pain.   Psychiatric/Behavioral: Negative for confusion.     Objective:     Vital Signs (Most Recent):  Temp: 99.5 °F (37.5 °C) (04/23/18 0841)  Pulse: 104 (04/23/18 0841)  Resp: 17 (04/23/18 0841)  BP: (!) 100/55 (04/23/18 0841)  SpO2: 100 % (04/23/18 0724) Vital Signs (24h Range):  Temp:  [99.5 °F (37.5 °C)-100.7 °F (38.2 °C)] 99.5 °F (37.5 °C)  Pulse:  [102-121] 104  Resp:  [17-22] 17  SpO2:  [96 %-100 %] 100 %  BP: ()/(51-65) 100/55     Weight: (!) 144.2 kg (317 lb 14.5 oz)  Body mass index is 58.15 kg/m².    Intake/Output Summary (Last 24 hours) at 04/23/18 0956  Last data filed at 04/23/18 0839   Gross per 24 hour   Intake              480 ml   Output              300 ml   Net              180 ml      Physical Exam   Constitutional: She is oriented to person, place, and time. No distress.   Morbidly obese   Cardiovascular: Normal rate and regular rhythm.    No murmur heard.  Pulmonary/Chest: Effort normal and breath sounds normal. No respiratory distress. She has no wheezes.   Abdominal: Soft. There is no tenderness.   +right flank TTP though less than yesterday   Musculoskeletal: She exhibits no edema.   Neurological: She is alert and oriented to person, place, and time.   Psychiatric: She has a normal mood and affect. Her behavior is normal. Judgment and thought content normal.   Nursing note and vitals reviewed.      Significant Labs:   BMP:   Recent Labs  Lab 04/23/18  0508   *      K 3.1*      CO2 22*   BUN 16    CREATININE 1.4   CALCIUM 7.6*   MG 1.6     CBC:   Recent Labs  Lab 18  0530 18  0508   WBC 12.78* 9.64   HGB 7.7* 7.3*   HCT 24.2* 23.1*   * 98*     Lipid Panel:   Recent Labs  Lab 18  0508   CHOL 95*   HDL 12*   LDLCALC 53.6*   TRIG 147   CHOLHDL 12.6*     Magnesium:   Recent Labs  Lab 18  0530 18  0508   MG 1.0* 1.6     POCT Glucose:   Recent Labs  Lab 18  1655 18  0553   POCTGLUCOSE 148* 109 138*       Significant ImaginD Echo done, report pending.

## 2018-04-23 NOTE — ASSESSMENT & PLAN NOTE
No respiratory complaints. Not hypoxic. Resume home Advair 100-50 mcg BID and PRN albuterol inhaler/nebulizers at home.

## 2018-04-23 NOTE — PLAN OF CARE
Problem: Patient Care Overview  Goal: Plan of Care Review  Outcome: Ongoing (interventions implemented as appropriate)  Patient AAox4. Significant other at bedside. Patient on diabetic diet.  last night. levemir 50 units held last night per Dr slater orders. Patient on IV abx. Right IJ TLC 4/27. Last bm on 4/18.  Abscess to left lower abd that was POA. Abscess has a mepilex on it. Voiding without issues. Safety ensured. pateint refusing bed alarm. Side rails up.

## 2018-04-23 NOTE — ASSESSMENT & PLAN NOTE
Fasting blood glucose 130.  A1c 7.5%.  Takes Toujeo 138 units q morning, 135 units qHS, and Humalog 95 untis TIDAC at home.  Levemir 50 units BID, high dose SSI.  Pattern glucose and adjust insulin dose as necessary.

## 2018-04-23 NOTE — PROGRESS NOTES
Reviewed discharge instructions with patient, patient verbalized understanding.  Delivery of antibiotic to the bedside.  Safety maintained.  Ambulated with patient to lobby for discharge.

## 2018-04-23 NOTE — ASSESSMENT & PLAN NOTE
Troponin 0.326 >> 0.452 >> 0.557 >> 0.573.  Likely elevated due to demand ischemia.  Denies CP.  Monitor on telemetry.  2D Echo done, report pending.  Daily ASA. Lipid panel 4/23: TC 95, HDL 12, LDL 54, .
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
## 2018-04-23 NOTE — PROGRESS NOTES
.Pharmacy New Medication Education    Patient accepted medication education.    Pharmacy educated patient on name and purpose of medications and possible side effects, using the teach-back method.     Current Inpatient Medication Orders   acetaminophen tablet 650 mg   amitriptyline tablet 50 mg   aspirin EC tablet 81 mg   bisacodyl suppository 10 mg   buprenorphine-naloxone 8-2 mg SL tablet 1 tablet   carvedilol tablet 3.125 mg   cloNIDine tablet 0.2 mg   dextrose 50% injection 12.5 g   dextrose 50% injection 25 g   enoxaparin injection 40 mg   ertapenem (INVANZ) 1 g in sodium chloride 0.9 % 100 mL IVPB (ready to mix system)   famotidine tablet 20 mg   ferrous sulfate EC tablet 325 mg   fluticasone-vilanterol 100-25 mcg/dose diskus inhaler 1 puff   glucagon (human recombinant) injection 1 mg   glucose chewable tablet 16 g   glucose chewable tablet 24 g   hydrOXYzine pamoate capsule 50 mg   insulin aspart U-100 pen 1-10 Units   insulin detemir U-100 pen 50 Units   levalbuterol nebulizer solution 1.25 mg   lidocaine 5 % patch 1 patch   lisinopril tablet 10 mg   metoprolol tartrate (LOPRESSOR) tablet 25 mg   nicotine 21 mg/24 hr 1 patch   ondansetron disintegrating tablet 8 mg   pantoprazole EC tablet 40 mg   polyethylene glycol packet 17 g   potassium chloride SA CR tablet 40 mEq   pregabalin capsule 300 mg   prochlorperazine injection Soln 10 mg   ramelteon tablet 8 mg   senna-docusate 8.6-50 mg per tablet 1 tablet   sodium chloride 0.9% flush 5 mL   traMADol tablet 50 mg       Learners of pharmacy medication education included:  Patient    Patient +/- learner response:  Verbalized Understanding, Teachback

## 2018-04-23 NOTE — PROGRESS NOTES
Tele [id number] removed and returned to charge desk.  Central line removed and dressing CDI.  Will continue to monitor

## 2018-04-23 NOTE — ASSESSMENT & PLAN NOTE
Troponin 0.326 >> 0.452 >> 0.557 >> 0.573.  Likely elevated due to demand ischemia.  Denies CP.  Monitored on telemetry with no events.  2D Echo 4/23: no wall motion abnormalities, no systolic and no diastolic dysfunction.  Lipid panel 4/23: TC 95, HDL 12, LDL 54, .

## 2018-04-23 NOTE — PROGRESS NOTES
Explained to patient that she is a fall risk due to medication she is on . She states that shes been getting up and doesn't want it on. Significant other at bedside and will stay the night. I instructed patient to please call when needing to use the bathroom. Patient verbalized understanding. claire GORE made aware

## 2018-04-23 NOTE — DISCHARGE INSTRUCTIONS
Take Tramadol as prescribed only with permission of your PCP while on suboxone.      Free smoking cessation program:  Local - [phone]          Toll Free  - [phone]

## 2018-04-23 NOTE — ASSESSMENT & PLAN NOTE
SBP . Holding home lisinopril 10 mg daily for slight increase in creatinine. Starting low dose carvedilol 3.125 mg BID for persistent tachycardia.  Monitor. PRN clonidine.

## 2018-04-23 NOTE — PROGRESS NOTES
Patient AAox4. No distress noted. Walking rounds performed. Report given to day time , nurse lisseth
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
## 2018-04-23 NOTE — PROGRESS NOTES
I notified Dr Dc that patients CBG for tonight was 109. Patient has scheduled 50 units for levemir. Patient not eating much today. Ok to hold levemir. PM snack given

## 2018-04-23 NOTE — ASSESSMENT & PLAN NOTE
Takes suboxone 8 mg at home. Continued here. Discharging with Rx for Tramadol 50 mg for breakthrough right flank pain from pyelonephritis, #9 with no refills.  Pt unsure if she can take this while on her suboxone management program.  She will check with her PCP before filling this prescription.

## 2018-04-23 NOTE — ASSESSMENT & PLAN NOTE
E. coli bacteremia, Sepsis  Blood culture 4/20: E. coli in 1 bottle. Urine culture 4/20: E. Coli, sensitive to cefepime, cipro and ertapenem. No elevation in lactic acid. WBC trended down to normal on 4/23. Was given NS 2L bolus, zosyn, and vancomycin in ED. Continued ceftriaxone according to last few sensitivities but increased to 2 grams daily on 4/22. Switched to Ertapenem on 4/23 as pt still with fever and tachycardia. Will likely discharge on PO Cipro for 7 days to complete course of abx for complicated pyelonephritis and bacteremia.  Urologist Dr. Gael Fuller reviewed CT on 4/21 and states low suspicion of right renal abscess.  Chart notes history of chronic right flank and RUQ pain as far back as 2015.     Serum creatinine: 1.4 mg/dL 04/23/18 0508  Estimated creatinine clearance: 74.7 mL/min

## 2018-04-23 NOTE — ASSESSMENT & PLAN NOTE
Thrombocytopenia  H&H and platelets trending down perhaps 2/2 infection. Appears baseline hemoglobin is 10-13.  Iron studies suggest more chronic disease pattern. Denies h/o blood transfusion.  No visible, active bleeding. Monitor. Start PO Fe with bowel regimen. Holding chemoprophylaxis another day.

## 2018-04-23 NOTE — ASSESSMENT & PLAN NOTE
No respiratory complaints. Not hypoxic. Takes Advair 100-50 mcg BID and PRN albuterol inhaler/nebulizers at home.  continue fluticasone-vilanterol (Advair not available in hospital formulary), and PRN levalbuterol (holding albuterol due to tachycardia).

## 2018-04-24 ENCOUNTER — PATIENT OUTREACH (OUTPATIENT)
Dept: ADMINISTRATIVE | Facility: CLINIC | Age: 38
End: 2018-04-24

## 2018-04-24 LAB
AMPHETAMINES SERPL QL: NEGATIVE
BACTERIA UR CULT: NORMAL
BARBITURATES SERPL QL SCN: POSITIVE
BENZODIAZ SERPL QL: POSITIVE
CANNABINOIDS SERPL QL: NEGATIVE
COCAINE, BLOOD: NEGATIVE
ETHANOL SERPL-MCNC: NEGATIVE MG/DL
METHADONE SERPL QL SCN: NEGATIVE
OPIATES SERPL QL SCN: NEGATIVE
PCP SERPL QL SCN: NEGATIVE
PROPOXYPH SERPL QL: NEGATIVE

## 2018-04-24 NOTE — PLAN OF CARE
PATIENT D/MARTIN, NO HH OR DME NEEDS.  BEDSIDE DELIVERY USED.            04/23/18 1136   Final Note   Assessment Type Final Discharge Note   Discharge Disposition Home   What phone number can be called within the next 1-3 days to see how you are doing after discharge? [phone]   Hospital Follow Up  Appt(s) scheduled? Yes   Discharge plans and expectations educations in teach back method with documentation complete? Yes   Right Care Referral Info   Post Acute Recommendation No Care

## 2018-04-24 NOTE — PATIENT INSTRUCTIONS
Pyelonephritis or Kidney Infection (Child)    A type of infection of one or both kidneys is called pyelonephritis. It is usually caused when bacteria or a virus gets into the kidneys. The bacteria or virus can enter the kidney(s) from the bladder or from blood traveling from other parts of the body.  Common causes for this problem include:  · Not keeping the genital area clean and dry, which promotes the growth of bacteria.  · In young girls: wiping in the back to front. This drags bacteria from the rectum toward the urinary opening (urethra).  · Wearing tight pants or underwear. This allows moisture to build up in the genital area, which helps bacteria grow.  · Sensitivity of some children to the chemicals in bubble baths. These can enter the urinary opening and can lead to a urinary infection.  · Holding the urine for long periods of time  · Dehydration  A first-time urinary tract infection is not unusual in a female child. However, recurrent infections in a girl or a first-time infection in a boy require further testing.  Kidney infections can cause symptoms similar to a bladder infection. The infection can cause one or more of these symptoms:  · Pain (or burning) when urinating  · Having to urinate more often than usual  · Bedwetting or urinating in underwear (by a child who is toilet-trained)  · Blood in urine (pink or red in color)  · Abdominal pain or discomfort, usually in the lower abdomen  · Pain in the side or back  · Pain above the pubic bone  · Fever or chills  · Vomiting  · Irritability, especially in infants  · Refusing to eat  · Poor weight gain  Children younger than 2 years old may only have a high fever without other symptoms involving the urinary tract (such as blood in the urine, pain when urinating, etc).  Children who are older than 2 months of age and not vomiting are treated with oral (liquid, pills) antibiotics. These are started right away. If a culture was done, you will be told  if the treatment needs to be changed. If directed, you can call to find out the results  Based on a childs age (less than 2 months), overall health, or how severe the infection, he or she may need to be admitted to the hospital.  Home care  Medicines  · The healthcare provider will prescribe medicine to treat the infection. Follow all instructions for giving this medicine to your child. Use the medicine as instructed every day until it is gone. Dont stop giving it to your child even if he or she feels better. Never give your child aspirin unless told to by the healthcare provider.  · For children ages 2 and up: You can give acetaminophen or ibuprofen for pain, fever, fussiness, or discomfort, if allowed by the healthcare provider.  · If your child has chronic liver or kidney disease, talk with your healthcare provider before giving these medicines. Also talk with your provider if your child has ever had a stomach ulcer or gastrointestinal bleeding, or is taking blood thinners. Contact your childs healthcare provider before starting or stopping any medicine (over-the-counter or prescription).  General care  · Your child should stay home from school and rest in bed until his or her fever breaks and your child feels better, or as advised by the doctor.  · Make sure your child drinks plenty of fluids. Or, make sure your baby feeds often. This is to prevent dehydration. Ask your doctor how much water your child should try to drink daily.  · Keep track of how often your child urinates. Note the urine color and amount.  · Do what you can to get your child to urinate at least every 3 to 4 hours during the day. Make sure he or she does not delay. Holding urine and overstretching the bladder can make your childs condition worse.  · Tell your child to completely empty the bladder each time. This will help flush out bacteria.  · Have your child wear loose clothes and cotton underwear.  · Make sure that your child drinks  enough fluids. Give your child cranberry juice if advised by the healthcare provider.  · Females should avoid taking bubble baths. Sensitivity to the chemicals in bubble baths can irritate the urethra.  · Make sure your child wipes from front to back after using the toilet. Wipe your baby from front to back during diaper changes.  · Make sure your sons penis is cleaned regularly. If he isnt circumcised, have him retract (pull back) the foreskin when cleaning.  Prevention  · Teach your daughter to wipe from front to back after using the toilet.  · Teach your son to clean his penis regularly. If he isnt circumcised, teach him to retract (pull back) the foreskin when cleaning.  · Make sure diapers arent tight. If you use cloth diapers, use cotton or wool protectors rather than nylon or rubber pants.   · Change soiled diapers right away. Keep the genital region clean and dry.  · Make sure your child urinates when needed, and does not hold it in. Do what you can to get your child to urinate at least every 3 to 4 hours during the day. Make sure he or she does not delay. Holding urine and overstretching the bladder can make your childs condition worse.  · Make sure your child avoid wearing tight-fitting pants and underwear.  · Encourage your child to urinate in a steady stream rather than starting and stopping during urination. This helps to empty the bladder all the way.  · Keep your childs bath water free of shampoo, or other soaps. Wash the childs genital area with no or very gentle soap (not bar soap) and rinse well with water.  Gently dry.  · Constipation can make a urinary tract infection more likely. Talk to your childs doctor if your child has trouble with bowel movements.  Follow-up care  Make a follow-up appointment as directed by your childs healthcare provider. Close follow-up and further testing is very important to find the cause and to prevent future infections.  If your child had a urine culture  "during this visit, you will be contacted if your childs treatment needs to be changed. If directed, you can call to find out the results.  If you had an X-ray, CT scan, or another diagnostic test, you will be notified of any new findings that may affect your childs care.     Call 911  Call for emergency care if any of the following occur:  · Trouble breathing  · Confused  · Very drowsy or trouble awakening  · Fainting or loss of consciousness  · Rapid or very slow heart rate  · Weakness, dizziness, or fainting  When to seek medical advice  Call your child's healthcare provider right away if any of these occur:  · Not feeling better within 1 to 2 days  after starting antibiotics  · 2 years old or older on antibiotics: Has a fever of 102.2°F or higher (39°C) or has a fever that lasts for more than 2 days or as directed by the doctor.  · Any symptoms that continue after 3 days of treatment  · Increasing pain in the stomach, back, side, or groin area  · Trouble urinating or decreased urine output  · No urine for 8 hours, no tears when crying, "sunken" eyes, or dry mouth.  · Vomiting  · Bloody, dark-colored, or foul-smelling urine  · Not able to take prescribed medicine due to nausea or any other reason  · In girls: vaginal discharge, pain, swelling or redness in the labia (outer vaginal area)  · In infants: Increase in irritability or fussiness or unable to calm down  Date Last Reviewed: 2/5/2016  © 8691-3339 [employer]. [address] [place] [address], [place] [address]. All rights reserved. This information is not intended as a substitute for professional medical care. Always follow your healthcare professional's instructions.        "

## 2018-04-25 LAB — BACTERIA BLD CULT: NORMAL

## 2018-04-25 NOTE — PHYSICIAN QUERY
"PT Name: Lele Dias  MR #: [id number]     Physician Query Form - Documentation Clarification      CDS: Maria Guadalupe Chavez RN, CCDS         Contact information :ext [phone])  [email or web address]       This form is a permanent document in the medical record.     Query Date: April 25, 2018    By submitting this query, we are merely seeking further clarification of documentation. Please utilize your independent clinical judgment when addressing the question(s) below.    The Medical record reflects the following:    Supporting Clinical Findings Location in Medical Record     "Diabetes mellitus type 2 in obese"  "Chronic.  A1c 7.5, improved from 9.4.  Takes Toujeo 138 units q morning, 135 units qHS, and Humalog 95 untis TIDAC at home.  Levemir 50 units BID, high dose SSI.  Pattern glucose and adjust insulin dose as necessary."    Glucose 175-130  Point of care glucose 109-238   H&P 4/20/18                Lab 4/21-4/23/18                                                                                Doctor, Please specify diagnosis or diagnoses associated with above clinical findings.    Provider Use Only        _x__Type 2 Diabetes Mellitus with hyperglycemia      ___Type 2 Diabetes Mellitus with other complication, please specify___                                                                                                               [  ] Clinically undetermined            "

## 2018-04-25 NOTE — PHYSICIAN QUERY
"PT Name: Lele Dias  MR #: [id number]    Physician Query Form - Hematology Clarification      CDS: Maria Guadalupe Chavez RN, CCDS         Contact information :ext [phone])  [email or web address]       This form is a permanent document in the medical record.      Query Date: April 25, 2018    By submitting this query, we are merely seeking further clarification of documentation. Please utilize your independent clinical judgment when addressing the question(s) below.    The Medical record contains the following:   Indicators  Supporting Clinical Findings Location in Medical Record   x "Anemia" documented "anemia"  "H&H and platelets trending down perhaps 2/2 infection. Appears baseline hemoglobin is 10-13.  Iron studies suggest more chronic disease pattern. Denies h/o blood transfusion.  No visible, active bleeding. Monitor. Start PO Fe with bowel regimen. " Discharge summary 4/23/18   x H & H = H/H 11.7/36.8  H/H 7.3/23.1 Lab 4/21/18  Lab 4/23/18    BP =                     HR=      "GI bleeding" documented      Acute bleeding (Non GI site)      Transfusion(s)     x Treatment: Started PO Fe BID on 4/23 with bowel regimen Discharge summary 4/23/18   x Other:  "Noted decrease in H&H with iron studies suggesting a more chronic disease pattern. Started PO Fe BID on 4/23 with bowel regimen. Will need repeat labs in 7-10 days."  Occult blood 3+  "hypertension, diabetes mellitus type 2" Discharge summary 4/23/18        UA result 4/20/18     Provider, please specify diagnosis or diagnoses associated with above clinical findings.          [ x ] Anemia of chronic disease ( Specify chronic disease) ___recurrent UTIs__________                                                 [  ] Other (Specify) _______________________    [  ] Iron deficiency anemia    [  ] Chronic blood loss anemia    [  ] Clinically Undetermined     [  ] Other Hematological Diagnosis (please specify): ______________________    [  ] Clinically Undetermined "       Please document in your progress notes daily for the duration of treatment, until resolved, and include in your discharge summary.

## 2018-04-25 NOTE — PHYSICIAN QUERY
"PT Name: Lele Dias  MR #: [id number]     Physician Query Form - Documentation Clarification      CDS: Maria Guadalupe Chavez RN, CCDS         Contact information :ext [phone]  [email or web address]       This form is a permanent document in the medical record.     Query Date: April 25, 2018    By submitting this query, we are merely seeking further clarification of documentation. Please utilize your independent clinical judgment when addressing the question(s) below.    The Medical record reflects the following:    Supporting Clinical Findings Location in Medical Record     "Pyelonephritis"  "Abdominal CT with marked right perirenal inflammation with associated disorganized fluid concerning for pyelonephritis.  Blood and urine cultures were collected.  She was given  2L normal saline per sepsis protocol, zosyn 4.5 gm, vancomycin 1.25 gm, and fentanyl 50 mcg in ED"    "E. coli pyelonephritis"  "Discharging on PO Cipro for 7 days to complete course of abx for complicated pyelonephritis and bacteremia."   H&P 4/20/18                Discharge summary 4/23/18                                                                                Doctor, Please specify diagnosis or diagnoses associated with above clinical findings.  Please document acuity/chronicity of pyelonephritis diagnosis    Provider Use Only      __x_acute pyelonephritis    ___chronic pyelonephritis    ___other, _______                                                                                                               [  ] Clinically undetermined            "

## 2018-04-27 LAB — BACTERIA BLD CULT: NORMAL

## 2018-05-19 ENCOUNTER — HOSPITAL ENCOUNTER (INPATIENT)
Facility: HOSPITAL | Age: 38
LOS: 2 days | Discharge: HOME | DRG: 872 | End: 2018-05-21
Attending: EMERGENCY MEDICINE | Admitting: FAMILY MEDICINE
Payer: MEDICAID

## 2018-05-19 DIAGNOSIS — A41.9 SEPSIS, DUE TO UNSPECIFIED ORGANISM: Primary | ICD-10-CM

## 2018-05-19 DIAGNOSIS — R45.1 AGITATION: ICD-10-CM

## 2018-05-19 DIAGNOSIS — N12 PYELONEPHRITIS: ICD-10-CM

## 2018-05-19 LAB
ESTIMATED AVG GLUCOSE: 151 MG/DL
HBA1C MFR BLD HPLC: 6.9 %
POCT GLUCOSE: 161 MG/DL (ref 70–110)

## 2018-05-19 PROCEDURE — 11000001 HC ACUTE MED/SURG PRIVATE ROOM

## 2018-05-19 PROCEDURE — 83036 HEMOGLOBIN GLYCOSYLATED A1C: CPT

## 2018-05-19 PROCEDURE — 63600175 PHARM REV CODE 636 W HCPCS: Performed by: STUDENT IN AN ORGANIZED HEALTH CARE EDUCATION/TRAINING PROGRAM

## 2018-05-19 PROCEDURE — 63600175 PHARM REV CODE 636 W HCPCS: Performed by: EMERGENCY MEDICINE

## 2018-05-19 PROCEDURE — 25000003 PHARM REV CODE 250: Performed by: EMERGENCY MEDICINE

## 2018-05-19 PROCEDURE — 25000003 PHARM REV CODE 250: Performed by: STUDENT IN AN ORGANIZED HEALTH CARE EDUCATION/TRAINING PROGRAM

## 2018-05-19 PROCEDURE — 36415 COLL VENOUS BLD VENIPUNCTURE: CPT

## 2018-05-19 RX ORDER — INSULIN ASPART 100 [IU]/ML
1-10 INJECTION, SOLUTION INTRAVENOUS; SUBCUTANEOUS
Status: DISCONTINUED | OUTPATIENT
Start: 2018-05-19 | End: 2018-05-21 | Stop reason: HOSPADM

## 2018-05-19 RX ORDER — GLUCAGON 1 MG
1 KIT INJECTION
Status: DISCONTINUED | OUTPATIENT
Start: 2018-05-19 | End: 2018-05-19

## 2018-05-19 RX ORDER — PROMETHAZINE HYDROCHLORIDE 25 MG/1
25 TABLET ORAL EVERY 4 HOURS PRN
Status: DISCONTINUED | OUTPATIENT
Start: 2018-05-19 | End: 2018-05-21 | Stop reason: HOSPADM

## 2018-05-19 RX ORDER — PREGABALIN 75 MG/1
150 CAPSULE ORAL 2 TIMES DAILY
Status: DISCONTINUED | OUTPATIENT
Start: 2018-05-19 | End: 2018-05-21 | Stop reason: HOSPADM

## 2018-05-19 RX ORDER — SODIUM CHLORIDE 0.9 % (FLUSH) 0.9 %
5 SYRINGE (ML) INJECTION
Status: DISCONTINUED | OUTPATIENT
Start: 2018-05-19 | End: 2018-05-21 | Stop reason: HOSPADM

## 2018-05-19 RX ORDER — ALBUTEROL SULFATE 90 UG/1
2 AEROSOL, METERED RESPIRATORY (INHALATION) EVERY 6 HOURS PRN
Status: DISCONTINUED | OUTPATIENT
Start: 2018-05-19 | End: 2018-05-21 | Stop reason: HOSPADM

## 2018-05-19 RX ORDER — AMITRIPTYLINE HYDROCHLORIDE 25 MG/1
50 TABLET, FILM COATED ORAL NIGHTLY PRN
Status: DISCONTINUED | OUTPATIENT
Start: 2018-05-19 | End: 2018-05-19

## 2018-05-19 RX ORDER — CIPROFLOXACIN 2 MG/ML
400 INJECTION, SOLUTION INTRAVENOUS
Status: DISCONTINUED | OUTPATIENT
Start: 2018-05-19 | End: 2018-05-21 | Stop reason: HOSPADM

## 2018-05-19 RX ORDER — INSULIN ASPART 100 [IU]/ML
20 INJECTION, SOLUTION INTRAVENOUS; SUBCUTANEOUS
Status: DISCONTINUED | OUTPATIENT
Start: 2018-05-20 | End: 2018-05-21 | Stop reason: HOSPADM

## 2018-05-19 RX ORDER — TIZANIDINE 4 MG/1
4 TABLET ORAL EVERY 8 HOURS
Status: DISCONTINUED | OUTPATIENT
Start: 2018-05-19 | End: 2018-05-21 | Stop reason: HOSPADM

## 2018-05-19 RX ORDER — IBUPROFEN 200 MG
16 TABLET ORAL
Status: DISCONTINUED | OUTPATIENT
Start: 2018-05-19 | End: 2018-05-21 | Stop reason: HOSPADM

## 2018-05-19 RX ORDER — AMITRIPTYLINE HYDROCHLORIDE 25 MG/1
50 TABLET, FILM COATED ORAL NIGHTLY PRN
Status: DISCONTINUED | OUTPATIENT
Start: 2018-05-19 | End: 2018-05-21 | Stop reason: HOSPADM

## 2018-05-19 RX ORDER — POTASSIUM CHLORIDE 20 MEQ/1
20 TABLET, EXTENDED RELEASE ORAL DAILY
Status: DISCONTINUED | OUTPATIENT
Start: 2018-05-20 | End: 2018-05-21 | Stop reason: HOSPADM

## 2018-05-19 RX ORDER — GLUCAGON 1 MG
1 KIT INJECTION
Status: DISCONTINUED | OUTPATIENT
Start: 2018-05-19 | End: 2018-05-21 | Stop reason: HOSPADM

## 2018-05-19 RX ORDER — ACETAMINOPHEN 325 MG/1
650 TABLET ORAL EVERY 8 HOURS PRN
Status: DISCONTINUED | OUTPATIENT
Start: 2018-05-19 | End: 2018-05-21 | Stop reason: HOSPADM

## 2018-05-19 RX ORDER — ONDANSETRON 8 MG/1
8 TABLET, ORALLY DISINTEGRATING ORAL EVERY 6 HOURS PRN
Status: DISCONTINUED | OUTPATIENT
Start: 2018-05-19 | End: 2018-05-21 | Stop reason: HOSPADM

## 2018-05-19 RX ORDER — SODIUM CHLORIDE 9 MG/ML
INJECTION, SOLUTION INTRAVENOUS CONTINUOUS
Status: ACTIVE | OUTPATIENT
Start: 2018-05-19 | End: 2018-05-20

## 2018-05-19 RX ORDER — IBUPROFEN 200 MG
24 TABLET ORAL
Status: DISCONTINUED | OUTPATIENT
Start: 2018-05-19 | End: 2018-05-21 | Stop reason: HOSPADM

## 2018-05-19 RX ORDER — NITROGLYCERIN 0.4 MG/1
0.4 TABLET SUBLINGUAL EVERY 5 MIN PRN
Status: DISCONTINUED | OUTPATIENT
Start: 2018-05-19 | End: 2018-05-21 | Stop reason: HOSPADM

## 2018-05-19 RX ORDER — PANTOPRAZOLE SODIUM 40 MG/1
40 TABLET, DELAYED RELEASE ORAL DAILY
Status: DISCONTINUED | OUTPATIENT
Start: 2018-05-20 | End: 2018-05-21 | Stop reason: HOSPADM

## 2018-05-19 RX ORDER — ENOXAPARIN SODIUM 100 MG/ML
40 INJECTION SUBCUTANEOUS EVERY 12 HOURS
Status: DISCONTINUED | OUTPATIENT
Start: 2018-05-19 | End: 2018-05-21 | Stop reason: HOSPADM

## 2018-05-19 RX ORDER — FUROSEMIDE 40 MG/1
40 TABLET ORAL 2 TIMES DAILY
Status: DISCONTINUED | OUTPATIENT
Start: 2018-05-19 | End: 2018-05-21 | Stop reason: HOSPADM

## 2018-05-19 RX ORDER — FERROUS SULFATE 325(65) MG
325 TABLET, DELAYED RELEASE (ENTERIC COATED) ORAL 2 TIMES DAILY
Status: DISCONTINUED | OUTPATIENT
Start: 2018-05-19 | End: 2018-05-21 | Stop reason: HOSPADM

## 2018-05-19 RX ORDER — BUPRENORPHINE HYDROCHLORIDE AND NALOXONE HYDROCHLORIDE DIHYDRATE 8; 2 MG/1; MG/1
8 TABLET SUBLINGUAL DAILY
Status: DISCONTINUED | OUTPATIENT
Start: 2018-05-20 | End: 2018-05-21 | Stop reason: HOSPADM

## 2018-05-19 RX ORDER — ACETAMINOPHEN 325 MG/1
650 TABLET ORAL EVERY 4 HOURS PRN
Status: DISCONTINUED | OUTPATIENT
Start: 2018-05-19 | End: 2018-05-21 | Stop reason: HOSPADM

## 2018-05-19 RX ORDER — FLUTICASONE FUROATE AND VILANTEROL 100; 25 UG/1; UG/1
1 POWDER RESPIRATORY (INHALATION) DAILY
Status: DISCONTINUED | OUTPATIENT
Start: 2018-05-20 | End: 2018-05-21 | Stop reason: HOSPADM

## 2018-05-19 RX ORDER — HYDROXYZINE PAMOATE 25 MG/1
50 CAPSULE ORAL NIGHTLY PRN
Status: DISCONTINUED | OUTPATIENT
Start: 2018-05-19 | End: 2018-05-21 | Stop reason: HOSPADM

## 2018-05-19 RX ORDER — LIDOCAINE 50 MG/G
1 PATCH TOPICAL
Status: DISCONTINUED | OUTPATIENT
Start: 2018-05-19 | End: 2018-05-21 | Stop reason: HOSPADM

## 2018-05-19 RX ORDER — SODIUM CHLORIDE 9 MG/ML
1000 INJECTION, SOLUTION INTRAVENOUS
Status: COMPLETED | OUTPATIENT
Start: 2018-05-19 | End: 2018-05-19

## 2018-05-19 RX ORDER — ONDANSETRON 2 MG/ML
4 INJECTION INTRAMUSCULAR; INTRAVENOUS EVERY 6 HOURS PRN
Status: DISCONTINUED | OUTPATIENT
Start: 2018-05-19 | End: 2018-05-21 | Stop reason: HOSPADM

## 2018-05-19 RX ORDER — IBUPROFEN 200 MG
1 TABLET ORAL DAILY
Status: DISCONTINUED | OUTPATIENT
Start: 2018-05-20 | End: 2018-05-20

## 2018-05-19 RX ADMIN — CIPROFLOXACIN 400 MG: 2 INJECTION, SOLUTION INTRAVENOUS at 11:05

## 2018-05-19 RX ADMIN — PREGABALIN 150 MG: 75 CAPSULE ORAL at 11:05

## 2018-05-19 RX ADMIN — LIDOCAINE 1 PATCH: 50 PATCH TOPICAL at 11:05

## 2018-05-19 RX ADMIN — FUROSEMIDE 40 MG: 40 TABLET ORAL at 11:05

## 2018-05-19 RX ADMIN — SODIUM CHLORIDE 1250 MG: 9 INJECTION, SOLUTION INTRAVENOUS at 11:05

## 2018-05-19 RX ADMIN — TIZANIDINE 4 MG: 4 TABLET ORAL at 11:05

## 2018-05-19 RX ADMIN — ENOXAPARIN SODIUM 40 MG: 100 INJECTION SUBCUTANEOUS at 11:05

## 2018-05-19 RX ADMIN — FERROUS SULFATE TAB EC 325 MG (65 MG FE EQUIVALENT) 325 MG: 325 (65 FE) TABLET DELAYED RESPONSE at 11:05

## 2018-05-19 RX ADMIN — PIPERACILLIN AND TAZOBACTAM 4.5 G: 4; .5 INJECTION, POWDER, LYOPHILIZED, FOR SOLUTION INTRAVENOUS; PARENTERAL at 09:05

## 2018-05-19 RX ADMIN — AMITRIPTYLINE HYDROCHLORIDE 50 MG: 25 TABLET, FILM COATED ORAL at 11:05

## 2018-05-19 RX ADMIN — SODIUM CHLORIDE 1000 ML: 0.9 INJECTION, SOLUTION INTRAVENOUS at 09:05

## 2018-05-19 RX ADMIN — SODIUM CHLORIDE: 0.9 INJECTION, SOLUTION INTRAVENOUS at 10:05

## 2018-05-20 LAB
ALBUMIN SERPL BCP-MCNC: 2.8 G/DL
ALP SERPL-CCNC: 63 U/L
ALT SERPL W/O P-5'-P-CCNC: 10 U/L
ANION GAP SERPL CALC-SCNC: 9 MMOL/L
AST SERPL-CCNC: 15 U/L
BASOPHILS # BLD AUTO: 0.02 K/UL
BASOPHILS NFR BLD: 0.1 %
BILIRUB SERPL-MCNC: 0.3 MG/DL
BUN SERPL-MCNC: 15 MG/DL
CALCIUM SERPL-MCNC: 8.3 MG/DL
CHLORIDE SERPL-SCNC: 111 MMOL/L
CO2 SERPL-SCNC: 17 MMOL/L
CREAT SERPL-MCNC: 1.4 MG/DL
DIFFERENTIAL METHOD: ABNORMAL
EOSINOPHIL # BLD AUTO: 0.4 K/UL
EOSINOPHIL NFR BLD: 3.1 %
ERYTHROCYTE [DISTWIDTH] IN BLOOD BY AUTOMATED COUNT: 17.3 %
EST. GFR  (AFRICAN AMERICAN): 54 ML/MIN/1.73 M^2
EST. GFR  (NON AFRICAN AMERICAN): 47 ML/MIN/1.73 M^2
GLUCOSE SERPL-MCNC: 304 MG/DL
HCT VFR BLD AUTO: 28.7 %
HGB BLD-MCNC: 9.1 G/DL
LACTATE SERPL-SCNC: 1.3 MMOL/L
LACTATE SERPL-SCNC: 1.4 MMOL/L
LACTATE SERPL-SCNC: 1.7 MMOL/L
LACTATE SERPL-SCNC: 2.3 MMOL/L
LYMPHOCYTES # BLD AUTO: 5.2 K/UL
LYMPHOCYTES NFR BLD: 38.6 %
MAGNESIUM SERPL-MCNC: 1.6 MG/DL
MCH RBC QN AUTO: 27.4 PG
MCHC RBC AUTO-ENTMCNC: 31.7 G/DL
MCV RBC AUTO: 86 FL
MONOCYTES # BLD AUTO: 0.8 K/UL
MONOCYTES NFR BLD: 6 %
NEUTROPHILS # BLD AUTO: 6.9 K/UL
NEUTROPHILS NFR BLD: 51.4 %
PHOSPHATE SERPL-MCNC: 3.8 MG/DL
PLATELET # BLD AUTO: 103 K/UL
PMV BLD AUTO: 10.1 FL
POCT GLUCOSE: 161 MG/DL (ref 70–110)
POCT GLUCOSE: 204 MG/DL (ref 70–110)
POCT GLUCOSE: 259 MG/DL (ref 70–110)
POCT GLUCOSE: 283 MG/DL (ref 70–110)
POTASSIUM SERPL-SCNC: 4 MMOL/L
PROT SERPL-MCNC: 6.4 G/DL
RBC # BLD AUTO: 3.32 M/UL
SODIUM SERPL-SCNC: 137 MMOL/L
WBC # BLD AUTO: 13.35 K/UL

## 2018-05-20 PROCEDURE — 80053 COMPREHEN METABOLIC PANEL: CPT

## 2018-05-20 PROCEDURE — 25000003 PHARM REV CODE 250: Performed by: STUDENT IN AN ORGANIZED HEALTH CARE EDUCATION/TRAINING PROGRAM

## 2018-05-20 PROCEDURE — 87040 BLOOD CULTURE FOR BACTERIA: CPT | Mod: 59

## 2018-05-20 PROCEDURE — 94640 AIRWAY INHALATION TREATMENT: CPT

## 2018-05-20 PROCEDURE — 83735 ASSAY OF MAGNESIUM: CPT

## 2018-05-20 PROCEDURE — 85025 COMPLETE CBC W/AUTO DIFF WBC: CPT

## 2018-05-20 PROCEDURE — 63600175 PHARM REV CODE 636 W HCPCS: Performed by: STUDENT IN AN ORGANIZED HEALTH CARE EDUCATION/TRAINING PROGRAM

## 2018-05-20 PROCEDURE — 25000242 PHARM REV CODE 250 ALT 637 W/ HCPCS: Performed by: STUDENT IN AN ORGANIZED HEALTH CARE EDUCATION/TRAINING PROGRAM

## 2018-05-20 PROCEDURE — 11000001 HC ACUTE MED/SURG PRIVATE ROOM

## 2018-05-20 PROCEDURE — 94761 N-INVAS EAR/PLS OXIMETRY MLT: CPT

## 2018-05-20 PROCEDURE — 83605 ASSAY OF LACTIC ACID: CPT

## 2018-05-20 PROCEDURE — S4991 NICOTINE PATCH NONLEGEND: HCPCS | Performed by: STUDENT IN AN ORGANIZED HEALTH CARE EDUCATION/TRAINING PROGRAM

## 2018-05-20 PROCEDURE — 36415 COLL VENOUS BLD VENIPUNCTURE: CPT

## 2018-05-20 PROCEDURE — 84100 ASSAY OF PHOSPHORUS: CPT

## 2018-05-20 RX ORDER — IBUPROFEN 200 MG
1 TABLET ORAL DAILY
Status: DISCONTINUED | OUTPATIENT
Start: 2018-05-20 | End: 2018-05-21 | Stop reason: HOSPADM

## 2018-05-20 RX ADMIN — PREGABALIN 150 MG: 75 CAPSULE ORAL at 09:05

## 2018-05-20 RX ADMIN — PANTOPRAZOLE SODIUM 40 MG: 40 TABLET, DELAYED RELEASE ORAL at 09:05

## 2018-05-20 RX ADMIN — CIPROFLOXACIN 400 MG: 2 INJECTION, SOLUTION INTRAVENOUS at 10:05

## 2018-05-20 RX ADMIN — LIDOCAINE 1 PATCH: 50 PATCH TOPICAL at 11:05

## 2018-05-20 RX ADMIN — FUROSEMIDE 40 MG: 40 TABLET ORAL at 09:05

## 2018-05-20 RX ADMIN — PIPERACILLIN AND TAZOBACTAM 4.5 G: 4; .5 INJECTION, POWDER, LYOPHILIZED, FOR SOLUTION INTRAVENOUS; PARENTERAL at 11:05

## 2018-05-20 RX ADMIN — INSULIN ASPART 2 UNITS: 100 INJECTION, SOLUTION INTRAVENOUS; SUBCUTANEOUS at 09:05

## 2018-05-20 RX ADMIN — INSULIN DETEMIR 100 UNITS: 100 INJECTION, SOLUTION SUBCUTANEOUS at 09:05

## 2018-05-20 RX ADMIN — TIZANIDINE 4 MG: 4 TABLET ORAL at 05:05

## 2018-05-20 RX ADMIN — CIPROFLOXACIN 400 MG: 2 INJECTION, SOLUTION INTRAVENOUS at 11:05

## 2018-05-20 RX ADMIN — TIZANIDINE 4 MG: 4 TABLET ORAL at 09:05

## 2018-05-20 RX ADMIN — INSULIN DETEMIR 100 UNITS: 100 INJECTION, SOLUTION SUBCUTANEOUS at 01:05

## 2018-05-20 RX ADMIN — PIPERACILLIN AND TAZOBACTAM 4.5 G: 4; .5 INJECTION, POWDER, LYOPHILIZED, FOR SOLUTION INTRAVENOUS; PARENTERAL at 05:05

## 2018-05-20 RX ADMIN — NICOTINE 1 PATCH: 21 PATCH, EXTENDED RELEASE TRANSDERMAL at 09:05

## 2018-05-20 RX ADMIN — INSULIN ASPART 20 UNITS: 100 INJECTION, SOLUTION INTRAVENOUS; SUBCUTANEOUS at 08:05

## 2018-05-20 RX ADMIN — INSULIN ASPART 20 UNITS: 100 INJECTION, SOLUTION INTRAVENOUS; SUBCUTANEOUS at 05:05

## 2018-05-20 RX ADMIN — AMITRIPTYLINE HYDROCHLORIDE 50 MG: 25 TABLET, FILM COATED ORAL at 09:05

## 2018-05-20 RX ADMIN — INSULIN ASPART 20 UNITS: 100 INJECTION, SOLUTION INTRAVENOUS; SUBCUTANEOUS at 12:05

## 2018-05-20 RX ADMIN — FERROUS SULFATE TAB EC 325 MG (65 MG FE EQUIVALENT) 325 MG: 325 (65 FE) TABLET DELAYED RESPONSE at 09:05

## 2018-05-20 RX ADMIN — BUPRENORPHINE HYDROCHLORIDE AND NALOXONE HYDROCHLORIDE DIHYDRATE 1 TABLET: 8; 2 TABLET SUBLINGUAL at 09:05

## 2018-05-20 RX ADMIN — INSULIN ASPART 6 UNITS: 100 INJECTION, SOLUTION INTRAVENOUS; SUBCUTANEOUS at 12:05

## 2018-05-20 RX ADMIN — PIPERACILLIN AND TAZOBACTAM 4.5 G: 4; .5 INJECTION, POWDER, LYOPHILIZED, FOR SOLUTION INTRAVENOUS; PARENTERAL at 12:05

## 2018-05-20 RX ADMIN — ENOXAPARIN SODIUM 40 MG: 100 INJECTION SUBCUTANEOUS at 09:05

## 2018-05-20 RX ADMIN — NICOTINE 1 PATCH: 21 PATCH, EXTENDED RELEASE TRANSDERMAL at 12:05

## 2018-05-20 RX ADMIN — TIZANIDINE 4 MG: 4 TABLET ORAL at 03:05

## 2018-05-20 RX ADMIN — POTASSIUM CHLORIDE 20 MEQ: 20 TABLET, EXTENDED RELEASE ORAL at 09:05

## 2018-05-20 RX ADMIN — FLUTICASONE FUROATE AND VILANTEROL TRIFENATATE 1 PUFF: 100; 25 POWDER RESPIRATORY (INHALATION) at 09:05
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
## 2018-05-20 NOTE — NURSING
Pt received to the unit via wheelchair. Complains of back pain. Requests for IV  To be placed in her neck. No distress noted. Will continue to monitor.
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
## 2018-05-20 NOTE — ED NOTES
"Patient yelling that the pump is going off. When nurse went into room she had her arm bent on the phone. Nurse let patient know as long as arm is bent the alarm will go off. The patient then began to curse and stated "I need to call my fucking family." Nurse delayed pump for 5 minutes in order for her to update her family. Boyfriend stepped out of room to go get her clothes in car.   "
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
## 2018-05-20 NOTE — ED PROVIDER NOTES
"Encounter Date: 5/19/2018    SCRIBE #1 NOTE: I, Valarie Oneill, am scribing for, and in the presence of,  Dr. Cramer. I have scribed the entire note.       History     Chief Complaint   Patient presents with    Fever     Patient presents to the ED with reports of having a fever at home. States home temperature was "104.1". Treated with 650 mg of Tylenol prior to arrival. Patient was admitted for sepsis this morning but left ama, reports she is back for treatment.      Lele Dias is a 38 y.o. female who  has a past medical history of Asthma; Cellulitis of abdominal wall (12/18/2017); Depression; Diabetes mellitus, type II; Diastolic dysfunction; Diverticulosis of intestine with bleeding (9/14/2016); E. coli pyelonephritis (6/2/2015); E. coli UTI (12/18/2017); Hernia, epigastric; Hypertension; Nonalcoholic hepatosteatosis; and Periumbilical hernia.    The patient presents to the ED due to fever. She reports onset of symptoms was a few days ago. The patient was evaluated in the ED for fever and tachycardia yesterday. She was admitted secondary to sepsis, presumed due to UTI. However, the patient left AMA this morning. She came back today due to recurrent fever and feeling unwell. She reports she will stay for treatment because she is worried about infection spreading to her blood.        The history is provided by the patient.     Review of patient's allergies indicates:   Allergen Reactions    Celexa [citalopram] Nausea And Vomiting     Past Medical History:   Diagnosis Date    Asthma     Cellulitis of abdominal wall 12/18/2017    Depression     Diabetes mellitus, type II     Diastolic dysfunction     Diverticulosis of intestine with bleeding 9/14/2016    E. coli pyelonephritis 6/2/2015    E. coli UTI 12/18/2017    Hernia, epigastric     Hypertension     Nonalcoholic hepatosteatosis     Periumbilical hernia      Past Surgical History:   Procedure Laterality Date    ARM AMPUTATION AT SHOULDER Left " 2000s     SECTION       SECTION, CLASSIC      CHOLECYSTECTOMY  age 15    TONSILLECTOMY, ADENOIDECTOMY       Family History   Problem Relation Age of Onset    Cancer Father     Heart attack Mother     Heart disease Mother     Cancer Maternal Aunt         lung (smoker)    Heart attack Maternal Aunt     Breast cancer Paternal Grandmother     Heart attack Maternal Grandmother      Social History   Substance Use Topics    Smoking status: Current Every Day Smoker     Packs/day: 2.00     Years: 15.00     Types: Cigarettes    Smokeless tobacco: Never Used    Alcohol use No     Review of Systems   Constitutional: Positive for fever. Negative for chills.   HENT: Negative for congestion, rhinorrhea and sore throat.    Eyes: Negative for redness and visual disturbance.   Respiratory: Negative for cough, shortness of breath and wheezing.    Cardiovascular: Negative for chest pain and palpitations.   Gastrointestinal: Negative for abdominal pain, diarrhea, nausea and vomiting.   Genitourinary: Negative for dysuria and hematuria.   Musculoskeletal: Negative for back pain, myalgias and neck pain.   Skin: Negative for rash.   Neurological: Negative for dizziness, weakness and light-headedness.   Psychiatric/Behavioral: Negative for confusion.       Physical Exam     Initial Vitals [18]   BP Pulse Resp Temp SpO2   (!) 112/59 (!) 113 20 99 °F (37.2 °C) 95 %      MAP       76.67         Physical Exam    Nursing note and vitals reviewed.  Constitutional: She appears well-developed and well-nourished. She is not diaphoretic. No distress.   HENT:   Head: Normocephalic and atraumatic.   Mouth/Throat: Oropharynx is clear and moist.   Eyes: EOM are normal. Pupils are equal, round, and reactive to light.   Neck: No tracheal deviation present.   Cardiovascular: Normal rate, regular rhythm, normal heart sounds and intact distal pulses.   Pulmonary/Chest: Breath sounds normal. No stridor. No respiratory  distress. She has no wheezes.   Abdominal: Soft. Bowel sounds are normal. She exhibits no distension and no mass. There is no tenderness.   Morbidly obese, non-tender abdomen   Musculoskeletal: Normal range of motion. She exhibits no edema.        Arms:  LUE amputation   Neurological: She is alert and oriented to person, place, and time. She has normal strength. No cranial nerve deficit or sensory deficit.   Skin: Skin is warm and dry. Capillary refill takes less than 2 seconds. No pallor.   Psychiatric: She has a normal mood and affect. Her behavior is normal. Thought content normal.         ED Course   Procedures  Labs Reviewed   POCT GLUCOSE - Abnormal; Notable for the following:        Result Value    POCT Glucose 161 (*)     All other components within normal limits             Medical Decision Making:   Initial Assessment:   39 yo F HTN, DM, recent admission for UTI/sepsis, left AMA this morning, presents with recurrent fever. Vitals with low grade temp to 99 F, tachycardic to 110s. Chart reviewed. Plan to discuss with [facility] team, and initiate doses of vanc/Zosyn to continue UTI/sepsis treatment.  Differential Diagnosis:   Differential Diagnosis includes, but is not limited to:  Sepsis, bacteremia, UTI, pneumonia, cellulitis, abscess, indwelling line/catheter infection, cholecystitis, viral URI, gastroenteritis, viral syndrome, sinusitis, otitis media/externa, neoplasm, drug reaction, serotonin syndrome, intoxication/withdrawal syndrome.    Clinical Tests:   Lab Tests: Reviewed  Radiological Study: Reviewed  Medical Tests: Reviewed  ED Management:  Upon re-evaluation, the patient's status has remained stable.  At this time, I believe the patient should be admitted to the hospital for further evaluation and management of sepsis.    [facility] service was contacted and the case was discussed. The consulting physician agrees with plan and will admit under their service. Additional recommendations at this time:  none    The patient and family were updated with test results, overall impression, and further plan of care. All questions were answered. The patient expressed understanding and agreed with the current plan.                          Clinical Impression:     1. Sepsis, due to unspecified organism    2. Pyelonephritis           I, Dr. Marco Antonio Cramer, personally performed the services described in this documentation. All medical record entries made by the scribe were at my direction and in my presence.  I have reviewed the chart and agree that the record reflects my personal performance and is accurate and complete.     Marco Antonio Cramer MD.  8:59 PM 05/19/2018                       Marco Antonio Cramer MD  06/08/18 1021

## 2018-05-20 NOTE — PLAN OF CARE
Pt was admitted, but left ama. Patient returned for services.         05/20/18 1134   Readmission Questionnaire   At the time of your discharge, did someone talk to you about what your health problems were? Yes   At the time of discharge, did someone talk to you about what to watch out for regarding worsening of your health problem? Yes   At the time of discharge, did someone talk to you about what to do if you experienced worsening of your health problem? Yes   At the time of discharge, did someone talk to you about which medication to take when you left the hospital and which ones to stop taking? Yes   At the time of discharge, did someone talk to you about when and where to follow up with a doctor after you left the hospital? Yes   What do you believe caused you to be sick enough to be re-admitted? infection   How often do you need to have someone help you when you read instructions, pamphlets, or other written material from your doctor or pharmacy? Sometimes   Do you have problems taking your medications as prescribed? No   Do you have any problems affording any of  your prescribed medications? Yes   Do you have problems obtaining/receiving your medications? Yes   Does the patient have transportation to healthcare appointments? Yes   Lives With significant other   Living Arrangements house   Does the patient have family/friends to help with healtcare needs after discharge? yes   Does your caregiver provide all the help you need? Yes   Are you currently feeling confused? No   Are you currently having problems thinking? No   Are you currently having memory problems? No   Have you felt down, depressed, or hopeless? 1   Have you felt little interest or pleasure in doing things? 1   In the last 7 days, my sleep quality was: fair

## 2018-05-20 NOTE — ED NOTES
Admitting resident from [facility] at the bedside. Patient repeatedly requesting an IV in her neck due to IV becoming infiltrated in her arm. Admitting resident explained if IV becomes bad then next step will to put one on her neck. Patient said okay.

## 2018-05-21 ENCOUNTER — ANESTHESIA EVENT (OUTPATIENT)
Dept: MEDSURG UNIT | Facility: HOSPITAL | Age: 38
DRG: 872 | End: 2018-05-21
Payer: MEDICAID

## 2018-05-21 ENCOUNTER — ANESTHESIA (OUTPATIENT)
Dept: MEDSURG UNIT | Facility: HOSPITAL | Age: 38
DRG: 872 | End: 2018-05-21
Payer: MEDICAID

## 2018-05-21 VITALS
TEMPERATURE: 98 F | OXYGEN SATURATION: 97 % | RESPIRATION RATE: 14 BRPM | BODY MASS INDEX: 53.92 KG/M2 | HEART RATE: 105 BPM | HEIGHT: 62 IN | WEIGHT: 293 LBS | DIASTOLIC BLOOD PRESSURE: 56 MMHG | SYSTOLIC BLOOD PRESSURE: 109 MMHG

## 2018-05-21 LAB
ALBUMIN SERPL BCP-MCNC: 3 G/DL
ALP SERPL-CCNC: 69 U/L
ALT SERPL W/O P-5'-P-CCNC: 11 U/L
ANION GAP SERPL CALC-SCNC: 10 MMOL/L
AST SERPL-CCNC: 11 U/L
BASOPHILS # BLD AUTO: 0.01 K/UL
BASOPHILS NFR BLD: 0.1 %
BILIRUB SERPL-MCNC: 0.3 MG/DL
BUN SERPL-MCNC: 18 MG/DL
CALCIUM SERPL-MCNC: 9.2 MG/DL
CHLORIDE SERPL-SCNC: 107 MMOL/L
CO2 SERPL-SCNC: 24 MMOL/L
CREAT SERPL-MCNC: 1.4 MG/DL
DIFFERENTIAL METHOD: ABNORMAL
EOSINOPHIL # BLD AUTO: 0.3 K/UL
EOSINOPHIL NFR BLD: 4.6 %
ERYTHROCYTE [DISTWIDTH] IN BLOOD BY AUTOMATED COUNT: 16.9 %
EST. GFR  (AFRICAN AMERICAN): 54 ML/MIN/1.73 M^2
EST. GFR  (NON AFRICAN AMERICAN): 47 ML/MIN/1.73 M^2
GLUCOSE SERPL-MCNC: 205 MG/DL
HCT VFR BLD AUTO: 31.6 %
HGB BLD-MCNC: 10.1 G/DL
LYMPHOCYTES # BLD AUTO: 3.9 K/UL
LYMPHOCYTES NFR BLD: 53.6 %
MAGNESIUM SERPL-MCNC: 1.5 MG/DL
MCH RBC QN AUTO: 27.1 PG
MCHC RBC AUTO-ENTMCNC: 32 G/DL
MCV RBC AUTO: 85 FL
MONOCYTES # BLD AUTO: 0.4 K/UL
MONOCYTES NFR BLD: 5.5 %
NEUTROPHILS # BLD AUTO: 2.5 K/UL
NEUTROPHILS NFR BLD: 34.8 %
PHOSPHATE SERPL-MCNC: 4.5 MG/DL
PLATELET # BLD AUTO: 107 K/UL
PMV BLD AUTO: 10.5 FL
POCT GLUCOSE: 156 MG/DL (ref 70–110)
POCT GLUCOSE: 202 MG/DL (ref 70–110)
POTASSIUM SERPL-SCNC: 3.7 MMOL/L
PROT SERPL-MCNC: 7.1 G/DL
RBC # BLD AUTO: 3.73 M/UL
SODIUM SERPL-SCNC: 141 MMOL/L
WBC # BLD AUTO: 7.24 K/UL

## 2018-05-21 PROCEDURE — 85025 COMPLETE CBC W/AUTO DIFF WBC: CPT

## 2018-05-21 PROCEDURE — 80053 COMPREHEN METABOLIC PANEL: CPT

## 2018-05-21 PROCEDURE — 25000003 PHARM REV CODE 250: Performed by: STUDENT IN AN ORGANIZED HEALTH CARE EDUCATION/TRAINING PROGRAM

## 2018-05-21 PROCEDURE — 36415 COLL VENOUS BLD VENIPUNCTURE: CPT

## 2018-05-21 PROCEDURE — 94761 N-INVAS EAR/PLS OXIMETRY MLT: CPT

## 2018-05-21 PROCEDURE — 94640 AIRWAY INHALATION TREATMENT: CPT

## 2018-05-21 PROCEDURE — 36000 PLACE NEEDLE IN VEIN: CPT | Performed by: ANESTHESIOLOGY

## 2018-05-21 PROCEDURE — 83735 ASSAY OF MAGNESIUM: CPT

## 2018-05-21 PROCEDURE — 84100 ASSAY OF PHOSPHORUS: CPT

## 2018-05-21 PROCEDURE — 87086 URINE CULTURE/COLONY COUNT: CPT

## 2018-05-21 PROCEDURE — S4991 NICOTINE PATCH NONLEGEND: HCPCS | Performed by: STUDENT IN AN ORGANIZED HEALTH CARE EDUCATION/TRAINING PROGRAM

## 2018-05-21 PROCEDURE — 63600175 PHARM REV CODE 636 W HCPCS: Performed by: STUDENT IN AN ORGANIZED HEALTH CARE EDUCATION/TRAINING PROGRAM

## 2018-05-21 RX ORDER — CIPROFLOXACIN 500 MG/1
500 TABLET ORAL EVERY 12 HOURS
Qty: 14 TABLET | Refills: 0 | Status: SHIPPED | OUTPATIENT
Start: 2018-05-21 | End: 2018-05-28

## 2018-05-21 RX ADMIN — INSULIN ASPART 20 UNITS: 100 INJECTION, SOLUTION INTRAVENOUS; SUBCUTANEOUS at 08:05

## 2018-05-21 RX ADMIN — NICOTINE 1 PATCH: 21 PATCH, EXTENDED RELEASE TRANSDERMAL at 09:05

## 2018-05-21 RX ADMIN — BUPRENORPHINE HYDROCHLORIDE AND NALOXONE HYDROCHLORIDE DIHYDRATE 1 TABLET: 8; 2 TABLET SUBLINGUAL at 09:05

## 2018-05-21 RX ADMIN — INSULIN ASPART 20 UNITS: 100 INJECTION, SOLUTION INTRAVENOUS; SUBCUTANEOUS at 11:05

## 2018-05-21 RX ADMIN — PANTOPRAZOLE SODIUM 40 MG: 40 TABLET, DELAYED RELEASE ORAL at 09:05

## 2018-05-21 RX ADMIN — PREGABALIN 150 MG: 75 CAPSULE ORAL at 09:05

## 2018-05-21 RX ADMIN — FUROSEMIDE 40 MG: 40 TABLET ORAL at 09:05

## 2018-05-21 RX ADMIN — POTASSIUM CHLORIDE 20 MEQ: 20 TABLET, EXTENDED RELEASE ORAL at 09:05

## 2018-05-21 RX ADMIN — PIPERACILLIN AND TAZOBACTAM 4.5 G: 4; .5 INJECTION, POWDER, LYOPHILIZED, FOR SOLUTION INTRAVENOUS; PARENTERAL at 06:05

## 2018-05-21 RX ADMIN — TIZANIDINE 4 MG: 4 TABLET ORAL at 06:05

## 2018-05-21 RX ADMIN — ENOXAPARIN SODIUM 40 MG: 100 INJECTION SUBCUTANEOUS at 09:05

## 2018-05-21 RX ADMIN — FLUTICASONE FUROATE AND VILANTEROL TRIFENATATE 1 PUFF: 100; 25 POWDER RESPIRATORY (INHALATION) at 08:05

## 2018-05-21 RX ADMIN — INSULIN DETEMIR 100 UNITS: 100 INJECTION, SOLUTION SUBCUTANEOUS at 09:05

## 2018-05-21 RX ADMIN — FERROUS SULFATE TAB EC 325 MG (65 MG FE EQUIVALENT) 325 MG: 325 (65 FE) TABLET DELAYED RESPONSE at 09:05

## 2018-05-21 NOTE — PROGRESS NOTES
Patients AAOx4, patient very agitated, IV removed at bedside, tele box [id number] removed and returned to desk. Discharge instructions reviewed with patient, patient's boyfriend is here to take her home. Safety has been maintained, will continue to monitor.

## 2018-05-21 NOTE — PLAN OF CARE
Problem: Patient Care Overview  Goal: Plan of Care Review  Reviewed plan of care with pt., understanding verbalized.  Will monitor pt. Throughout shift.

## 2018-05-21 NOTE — PLAN OF CARE
Problem: Patient Care Overview  Goal: Plan of Care Review  Outcome: Ongoing (interventions implemented as appropriate)  Pt on RA. Pt with no apparent distress noted. Will continue to monitor.
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
## 2018-05-21 NOTE — PROGRESS NOTES
.Pharmacy New Medication Education    Patient accepted medication education.    Pharmacy educated patient on name and purpose of medications and possible side effects, using the teach-back method.     Current Inpatient Medication Orders   acetaminophen tablet 650 mg   acetaminophen tablet 650 mg   albuterol inhaler 2 puff   amitriptyline tablet 50 mg   buprenorphine-naloxone 8-2 mg SL tablet 1 tablet   ciprofloxacin (CIPRO)400mg/200ml D5W IVPB 400 mg   dextrose 50% injection 12.5 g   dextrose 50% injection 25 g   enoxaparin injection 40 mg   ferrous sulfate EC tablet 325 mg   fluticasone-vilanterol 100-25 mcg/dose diskus inhaler 1 puff   furosemide tablet 40 mg   glucagon (human recombinant) injection 1 mg   glucose chewable tablet 16 g   glucose chewable tablet 24 g   hydrOXYzine pamoate capsule 50 mg   insulin aspart U-100 pen 1-10 Units   insulin aspart U-100 pen 20 Units   insulin detemir U-100 pen 100 Units   lidocaine 5 % patch 1 patch   nicotine 21 mg/24 hr 1 patch   nitroGLYCERIN SL tablet 0.4 mg   ondansetron disintegrating tablet 8 mg   ondansetron injection 4 mg   pantoprazole EC tablet 40 mg   piperacillin-tazobactam 4.5 g in dextrose 5 % 100 mL IVPB (ready to mix system)   potassium chloride SA CR tablet 20 mEq   pregabalin capsule 150 mg   promethazine tablet 25 mg   sodium chloride 0.9% flush 5 mL   sodium chloride 0.9% flush 5 mL   tiZANidine tablet 4 mg       Learners of pharmacy medication education included:  Patient    Patient +/- learner response:  Verbalized Understanding, Teachback

## 2018-05-21 NOTE — ANESTHESIA PROCEDURE NOTES
Peripheral IV Insertion    Diagnosis: poor access and I87.9 Disorder of vein, unspecified.  Doctor requesting consult: monik    Patient location during procedure: floor  Procedure start time: 5/21/2018 12:21 PM  Timeout: 5/21/2018 12:20 PM  Procedure end time: 5/21/2018 12:26 PM  Staffing  Anesthesiologist: ROLANDO OG  Performed: anesthesiologist   Anesthesiologist was present at the time of the procedure.  Preanesthetic Checklist  Completed: patient identified, site marked, surgical consent, pre-op evaluation, timeout performed, IV checked, risks and benefits discussed, monitors and equipment checked and anesthesia consent givenPeripheral IV Insertion  Skin Prep: chlorhexidine gluconate  Orientation: right  Location: antecubital  Catheter Size: 20 G  Catheter placement by Ultrasound guidance. Heme positive aspiration all ports.  Vessel Caliber: medium, patent, compressibility normalInsertion Attempts: 1  Assessment  Dressing: secured with tape and tegaderm  Patient: Tolerated well  Line flushed easily.

## 2018-05-21 NOTE — NURSING
"Patient shouting about her frustrations with her dinner. Cursing and states, "I want to talk to the mother yanni that is over all of you." Charge Nurse Chyna contacted and notified. Then house supervisor,  Sara and hospital police notified of patient's irate behavior.  "

## 2018-05-22 LAB — BACTERIA UR CULT: NO GROWTH

## 2018-05-25 LAB
BACTERIA BLD CULT: NORMAL
BACTERIA BLD CULT: NORMAL

## 2018-06-04 ENCOUNTER — HOSPITAL ENCOUNTER (EMERGENCY)
Facility: HOSPITAL | Age: 38
Discharge: HOME | End: 2018-06-04
Attending: EMERGENCY MEDICINE
Payer: MEDICAID

## 2018-06-04 VITALS
BODY MASS INDEX: 53.92 KG/M2 | HEART RATE: 86 BPM | OXYGEN SATURATION: 98 % | SYSTOLIC BLOOD PRESSURE: 142 MMHG | WEIGHT: 293 LBS | TEMPERATURE: 99 F | HEIGHT: 62 IN | RESPIRATION RATE: 18 BRPM | DIASTOLIC BLOOD PRESSURE: 79 MMHG

## 2018-06-04 DIAGNOSIS — N30.00 ACUTE CYSTITIS WITHOUT HEMATURIA: Primary | ICD-10-CM

## 2018-06-04 LAB
ANION GAP SERPL CALC-SCNC: 12 MMOL/L
BACTERIA #/AREA URNS HPF: ABNORMAL /HPF
BASOPHILS # BLD AUTO: 0.03 K/UL
BASOPHILS NFR BLD: 0.3 %
BILIRUB UR QL STRIP: NEGATIVE
BUN SERPL-MCNC: 24 MG/DL
CALCIUM SERPL-MCNC: 10.1 MG/DL
CHLORIDE SERPL-SCNC: 108 MMOL/L
CLARITY UR: ABNORMAL
CO2 SERPL-SCNC: 18 MMOL/L
COLOR UR: YELLOW
CREAT SERPL-MCNC: 1.3 MG/DL
DIFFERENTIAL METHOD: ABNORMAL
EOSINOPHIL # BLD AUTO: 0.2 K/UL
EOSINOPHIL NFR BLD: 2.1 %
ERYTHROCYTE [DISTWIDTH] IN BLOOD BY AUTOMATED COUNT: 16 %
EST. GFR  (AFRICAN AMERICAN): 59 ML/MIN/1.73 M^2
EST. GFR  (NON AFRICAN AMERICAN): 51 ML/MIN/1.73 M^2
GLUCOSE SERPL-MCNC: 243 MG/DL
GLUCOSE UR QL STRIP: NEGATIVE
HCT VFR BLD AUTO: 38.1 %
HGB BLD-MCNC: 12.1 G/DL
HGB UR QL STRIP: ABNORMAL
KETONES UR QL STRIP: NEGATIVE
LEUKOCYTE ESTERASE UR QL STRIP: ABNORMAL
LYMPHOCYTES # BLD AUTO: 5.2 K/UL
LYMPHOCYTES NFR BLD: 44.9 %
MCH RBC QN AUTO: 26.9 PG
MCHC RBC AUTO-ENTMCNC: 31.8 G/DL
MCV RBC AUTO: 85 FL
MICROSCOPIC COMMENT: ABNORMAL
MONOCYTES # BLD AUTO: 0.5 K/UL
MONOCYTES NFR BLD: 4.5 %
NEUTROPHILS # BLD AUTO: 5.5 K/UL
NEUTROPHILS NFR BLD: 48.2 %
NITRITE UR QL STRIP: NEGATIVE
PH UR STRIP: 6 [PH] (ref 5–8)
PLATELET # BLD AUTO: 192 K/UL
PLATELET BLD QL SMEAR: ABNORMAL
PMV BLD AUTO: 9.9 FL
POTASSIUM SERPL-SCNC: 5 MMOL/L
PROT UR QL STRIP: NEGATIVE
RBC # BLD AUTO: 4.5 M/UL
RBC #/AREA URNS HPF: 30 /HPF (ref 0–4)
SODIUM SERPL-SCNC: 138 MMOL/L
SP GR UR STRIP: 1.01 (ref 1–1.03)
URN SPEC COLLECT METH UR: ABNORMAL
UROBILINOGEN UR STRIP-ACNC: NEGATIVE EU/DL
WBC # BLD AUTO: 11.6 K/UL
WBC #/AREA URNS HPF: 50 /HPF (ref 0–5)

## 2018-06-04 PROCEDURE — 85025 COMPLETE CBC W/AUTO DIFF WBC: CPT

## 2018-06-04 PROCEDURE — 81000 URINALYSIS NONAUTO W/SCOPE: CPT

## 2018-06-04 PROCEDURE — 80048 BASIC METABOLIC PNL TOTAL CA: CPT

## 2018-06-04 PROCEDURE — 99283 EMERGENCY DEPT VISIT LOW MDM: CPT

## 2018-06-04 RX ORDER — SULFAMETHOXAZOLE AND TRIMETHOPRIM 800; 160 MG/1; MG/1
1 TABLET ORAL 2 TIMES DAILY
Qty: 14 TABLET | Refills: 0 | Status: SHIPPED | OUTPATIENT
Start: 2018-06-04 | End: 2018-06-11

## 2018-06-04 RX ORDER — SULFAMETHOXAZOLE AND TRIMETHOPRIM 800; 160 MG/1; MG/1
1 TABLET ORAL 2 TIMES DAILY
Qty: 14 TABLET | Refills: 0 | Status: SHIPPED | OUTPATIENT
Start: 2018-06-04 | End: 2018-06-04

## 2018-06-04 NOTE — ED PROVIDER NOTES
Encounter Date: 2018       History     Chief Complaint   Patient presents with    Abnormal Lab     Pt had labs drawn on Friday and was called today and told to come to the hospital because her wbc was high and kidney function was poor     38-year-old white female who presents to the ER because her primary care doctor faith her blood on Friday and called her today saying that her white blood cell count was elevated and her kidney function was different than before.  The patient has no complaints no abdominal pain no nausea vomiting no fevers urine output has been normal no dysuria.          Review of patient's allergies indicates:   Allergen Reactions    Celexa [citalopram] Nausea And Vomiting     Past Medical History:   Diagnosis Date    Asthma     Cellulitis of abdominal wall 2017    Depression     Diabetes mellitus, type II     Diastolic dysfunction     Diverticulosis of intestine with bleeding 2016    E. coli pyelonephritis 2015    E. 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     SECTION       SECTION, CLASSIC      CHOLECYSTECTOMY  age 15    TONSILLECTOMY, ADENOIDECTOMY       Family History   Problem Relation Age of Onset    Cancer Father     Heart attack Mother     Heart disease Mother     Cancer Maternal Aunt         lung (smoker)    Heart attack Maternal Aunt     Breast cancer Paternal Grandmother     Heart attack Maternal Grandmother      Social History   Substance Use Topics    Smoking status: Current Every Day Smoker     Packs/day: 2.00     Years: 15.00     Types: Cigarettes    Smokeless tobacco: Never Used    Alcohol use No     Review of Systems   Constitutional: Negative for fever.   HENT: Negative for sore throat.    Respiratory: Negative for shortness of breath.     Cardiovascular: Negative for chest pain.   Gastrointestinal: Negative for nausea.   Genitourinary: Negative for dysuria.   Musculoskeletal: Negative for back pain.   Skin: Negative for rash.   Neurological: Negative for weakness.   Hematological: Does not bruise/bleed easily.       Physical Exam     Initial Vitals [06/04/18 1630]   BP Pulse Resp Temp SpO2   (!) 141/90 96 20 98.7 °F (37.1 °C) 99 %      MAP       107         Physical Exam    Nursing note and vitals reviewed.  Constitutional: She appears well-developed and well-nourished.   HENT:   Head: Normocephalic and atraumatic.   Mouth/Throat: Oropharynx is clear and moist.   Eyes: Conjunctivae and EOM are normal. Pupils are equal, round, and reactive to light.   Neck: Normal range of motion. Neck supple.   Cardiovascular: Normal rate, regular rhythm, normal heart sounds and intact distal pulses.   Pulmonary/Chest: Breath sounds normal.   Abdominal: Soft. Bowel sounds are normal.   Musculoskeletal: Normal range of motion.   Neurological: She is alert and oriented to person, place, and time. She has normal strength and normal reflexes.   Skin: Skin is warm and dry.   Psychiatric: She has a normal mood and affect. Thought content normal.         ED Course   Procedures  Labs Reviewed - No data to display                               Clinical Impression:   UTI    No orders to display       Disposition:   Disposition: Discharged  A 38-year-old white female apparently sent to the ER by her primary care physician due to her blood on Friday was given a call this morning and told to come to the ER for elevated white count her white count here is normal she was also told her kidney function was that it is normal here as well CBC basic metabolic panel both normal urinalysis shows a UTI the patient is now showing me that she has an ulcerative lesion on the skin of her anterior abdomen it looks to be about 3 cm wide chronic ulcerative lesion I will prescribe her Bactrim to  cover the UTI and also this ulcerative chronic appearing lesion on her abdomen and asked her follow up with her primary care doctor tomorrow.                        Billy Dorsey MD  06/04/18 2645

## 2018-06-04 NOTE — ED NOTES
Had labs drawn on Friday.  Received call from PCP today and told to come to ED.  Denies fever, dysuria, hematuria, nausea or vomiting.  LBM today.  Reports right flank pain

## 2018-06-25 ENCOUNTER — HOSPITAL ENCOUNTER (EMERGENCY)
Facility: HOSPITAL | Age: 38
Discharge: HOME | End: 2018-06-25
Attending: EMERGENCY MEDICINE
Payer: MEDICAID

## 2018-06-25 VITALS
TEMPERATURE: 98 F | HEIGHT: 62 IN | OXYGEN SATURATION: 95 % | DIASTOLIC BLOOD PRESSURE: 54 MMHG | SYSTOLIC BLOOD PRESSURE: 100 MMHG | BODY MASS INDEX: 53.92 KG/M2 | HEART RATE: 84 BPM | WEIGHT: 293 LBS | RESPIRATION RATE: 19 BRPM

## 2018-06-25 DIAGNOSIS — N39.0 URINARY TRACT INFECTION WITH HEMATURIA, SITE UNSPECIFIED: Primary | ICD-10-CM

## 2018-06-25 DIAGNOSIS — R31.9 URINARY TRACT INFECTION WITH HEMATURIA, SITE UNSPECIFIED: Primary | ICD-10-CM

## 2018-06-25 DIAGNOSIS — M79.89 LEG SWELLING: ICD-10-CM

## 2018-06-25 LAB
ALBUMIN SERPL BCP-MCNC: 3.8 G/DL
ALP SERPL-CCNC: 84 U/L
ALT SERPL W/O P-5'-P-CCNC: 12 U/L
ANION GAP SERPL CALC-SCNC: 11 MMOL/L
AST SERPL-CCNC: 10 U/L
B-HCG UR QL: NEGATIVE
BACTERIA #/AREA URNS HPF: ABNORMAL /HPF
BASOPHILS # BLD AUTO: 0.02 K/UL
BASOPHILS NFR BLD: 0.2 %
BILIRUB SERPL-MCNC: 0.2 MG/DL
BILIRUB UR QL STRIP: ABNORMAL
BNP SERPL-MCNC: 106 PG/ML
BUN SERPL-MCNC: 28 MG/DL
CALCIUM SERPL-MCNC: 9.3 MG/DL
CHLORIDE SERPL-SCNC: 110 MMOL/L
CLARITY UR: ABNORMAL
CO2 SERPL-SCNC: 17 MMOL/L
COLOR UR: YELLOW
CREAT SERPL-MCNC: 2 MG/DL
CTP QC/QA: YES
DIFFERENTIAL METHOD: ABNORMAL
EOSINOPHIL # BLD AUTO: 0.2 K/UL
EOSINOPHIL NFR BLD: 1.8 %
ERYTHROCYTE [DISTWIDTH] IN BLOOD BY AUTOMATED COUNT: 15.7 %
EST. GFR  (AFRICAN AMERICAN): 35 ML/MIN/1.73 M^2
EST. GFR  (NON AFRICAN AMERICAN): 31 ML/MIN/1.73 M^2
GLUCOSE SERPL-MCNC: 90 MG/DL
GLUCOSE SERPL-MCNC: 94 MG/DL (ref 70–110)
GLUCOSE UR QL STRIP: NEGATIVE
HCT VFR BLD AUTO: 35.5 %
HGB BLD-MCNC: 11 G/DL
HGB UR QL STRIP: ABNORMAL
HYALINE CASTS #/AREA URNS LPF: 2 /LPF
INR PPP: 1
KETONES UR QL STRIP: ABNORMAL
LACTATE SERPL-SCNC: 1.7 MMOL/L
LEUKOCYTE ESTERASE UR QL STRIP: ABNORMAL
LYMPHOCYTES # BLD AUTO: 6.2 K/UL
LYMPHOCYTES NFR BLD: 47.5 %
MCH RBC QN AUTO: 25.9 PG
MCHC RBC AUTO-ENTMCNC: 31 G/DL
MCV RBC AUTO: 84 FL
MICROSCOPIC COMMENT: ABNORMAL
MONOCYTES # BLD AUTO: 0.8 K/UL
MONOCYTES NFR BLD: 5.8 %
NEUTROPHILS # BLD AUTO: 5.7 K/UL
NEUTROPHILS NFR BLD: 43.4 %
NITRITE UR QL STRIP: NEGATIVE
PH UR STRIP: 6 [PH] (ref 5–8)
PLATELET # BLD AUTO: 236 K/UL
PMV BLD AUTO: 9.6 FL
POTASSIUM SERPL-SCNC: 3.9 MMOL/L
PROT SERPL-MCNC: 7.5 G/DL
PROT UR QL STRIP: ABNORMAL
PROTHROMBIN TIME: 10.3 SEC
RBC # BLD AUTO: 4.24 M/UL
RBC #/AREA URNS HPF: 5 /HPF (ref 0–4)
SODIUM SERPL-SCNC: 138 MMOL/L
SP GR UR STRIP: >=1.03 (ref 1–1.03)
URN SPEC COLLECT METH UR: ABNORMAL
UROBILINOGEN UR STRIP-ACNC: NEGATIVE EU/DL
WBC # BLD AUTO: 13.04 K/UL
WBC #/AREA URNS HPF: 50 /HPF (ref 0–5)

## 2018-06-25 PROCEDURE — 87077 CULTURE AEROBIC IDENTIFY: CPT

## 2018-06-25 PROCEDURE — 83880 ASSAY OF NATRIURETIC PEPTIDE: CPT

## 2018-06-25 PROCEDURE — 81000 URINALYSIS NONAUTO W/SCOPE: CPT

## 2018-06-25 PROCEDURE — 87040 BLOOD CULTURE FOR BACTERIA: CPT

## 2018-06-25 PROCEDURE — 80053 COMPREHEN METABOLIC PANEL: CPT

## 2018-06-25 PROCEDURE — 87086 URINE CULTURE/COLONY COUNT: CPT

## 2018-06-25 PROCEDURE — 85610 PROTHROMBIN TIME: CPT

## 2018-06-25 PROCEDURE — 87088 URINE BACTERIA CULTURE: CPT

## 2018-06-25 PROCEDURE — 83605 ASSAY OF LACTIC ACID: CPT

## 2018-06-25 PROCEDURE — 81025 URINE PREGNANCY TEST: CPT | Performed by: PHYSICIAN ASSISTANT

## 2018-06-25 PROCEDURE — 25000003 PHARM REV CODE 250: Performed by: NURSE PRACTITIONER

## 2018-06-25 PROCEDURE — 87186 SC STD MICRODIL/AGAR DIL: CPT

## 2018-06-25 PROCEDURE — 82962 GLUCOSE BLOOD TEST: CPT

## 2018-06-25 PROCEDURE — 99284 EMERGENCY DEPT VISIT MOD MDM: CPT | Mod: 25

## 2018-06-25 PROCEDURE — 96374 THER/PROPH/DIAG INJ IV PUSH: CPT

## 2018-06-25 PROCEDURE — 25000003 PHARM REV CODE 250: Performed by: EMERGENCY MEDICINE

## 2018-06-25 PROCEDURE — 63600175 PHARM REV CODE 636 W HCPCS: Performed by: NURSE PRACTITIONER

## 2018-06-25 PROCEDURE — 85025 COMPLETE CBC W/AUTO DIFF WBC: CPT

## 2018-06-25 RX ORDER — CEPHALEXIN 500 MG/1
500 CAPSULE ORAL EVERY 12 HOURS
Qty: 14 CAPSULE | Refills: 0 | Status: SHIPPED | OUTPATIENT
Start: 2018-06-25 | End: 2018-07-02

## 2018-06-25 RX ORDER — HYDROCODONE BITARTRATE AND ACETAMINOPHEN 5; 325 MG/1; MG/1
1 TABLET ORAL
Status: COMPLETED | OUTPATIENT
Start: 2018-06-25 | End: 2018-06-25

## 2018-06-25 RX ADMIN — CEFTRIAXONE 1 G: 1 INJECTION, SOLUTION INTRAVENOUS at 06:06

## 2018-06-25 RX ADMIN — HYDROCODONE BITARTRATE AND ACETAMINOPHEN 1 TABLET: 5; 325 TABLET ORAL at 06:06

## 2018-06-25 RX ADMIN — SODIUM CHLORIDE 500 ML: 0.9 INJECTION, SOLUTION INTRAVENOUS at 05:06

## 2018-06-25 NOTE — ED PROVIDER NOTES
Encounter Date: 2018       History     Chief Complaint   Patient presents with    Leg Pain     pt c/o left ankle and foot swelling and pain x 3 days     Female  Problem     reports yeast infection was treated with vaginal suppositories and 2 doses of diflucan with no relief.      Patient is a 38-year-old female with pmhx of depression, asthma, diabetes, hypertension, hernia, urosepsis, and morbid obesity who presents to ED with left lower leg pain and swelling x3 days. Patient also reports redness and burning pain to her left lower leg. Pt denies any trauma or injury. Patient denies history of DVT. Patient denies taking any medications PTA.  Patient also reports that she continues to have a yeast infection despite trying vaginal suppositories and 2 doses of Diflucan. Pt associates dysuria and back pain. Pt denies fever, chills, neck pain/stiffness, dizziness, syncope, SOB, chest pain, N/V/D, and abdominal pain. Pt denies any other complaints at this time.       The history is provided by the patient.     Review of patient's allergies indicates:   Allergen Reactions    Celexa [citalopram] Nausea And Vomiting     Past Medical History:   Diagnosis Date    Asthma     Cellulitis of abdominal wall 2017    Depression     Diabetes mellitus, type II     Diastolic dysfunction     Diverticulosis of intestine with bleeding 2016    E. coli pyelonephritis 2015    E. 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     SECTION       SECTION, CLASSIC      CHOLECYSTECTOMY  age 15    TONSILLECTOMY, ADENOIDECTOMY       Family History   Problem Relation Age of Onset    Cancer Father     Heart attack Mother     Heart disease Mother     Cancer Maternal Aunt         lung (smoker)    Heart attack Maternal Aunt      Breast cancer Paternal Grandmother     Heart attack Maternal Grandmother      Social History   Substance Use Topics    Smoking status: Current Every Day Smoker     Packs/day: 2.00     Years: 15.00     Types: Cigarettes    Smokeless tobacco: Never Used    Alcohol use No     Review of Systems   Constitutional: Negative for chills and fever.   HENT: Negative for sore throat.    Respiratory: Negative for shortness of breath.    Cardiovascular: Positive for leg swelling. Negative for chest pain.   Gastrointestinal: Negative for abdominal pain, diarrhea, nausea and vomiting.   Genitourinary: Positive for dysuria and vaginal pain. Negative for decreased urine volume, difficulty urinating, dyspareunia, enuresis, flank pain, frequency, hematuria, menstrual problem, pelvic pain, urgency, vaginal bleeding and vaginal discharge.   Musculoskeletal: Positive for back pain. Negative for neck pain and neck stiffness.   Skin: Negative for rash.   Neurological: Negative for dizziness, syncope, facial asymmetry, speech difficulty, weakness, light-headedness and headaches.   Hematological: Does not bruise/bleed easily.       Physical Exam     Initial Vitals [06/25/18 1531]   BP Pulse Resp Temp SpO2   (!) 100/54 84 19 98.2 °F (36.8 °C) 95 %      MAP       --         Physical Exam    Nursing note and vitals reviewed.  Constitutional: Vital signs are normal. She is not diaphoretic. She is Obese .  Non-toxic appearance. She does not have a sickly appearance.   HENT:   Head: Normocephalic.   Nose: Nose normal.   Mouth/Throat: Uvula is midline, oropharynx is clear and moist and mucous membranes are normal.   Eyes: Lids are normal.   Neck: Trachea normal, normal range of motion, full passive range of motion without pain and phonation normal. Neck supple.   Cardiovascular: Normal rate, regular rhythm and normal heart sounds.   Pulses:       Dorsalis pedis pulses are 2+ on the right side, and 2+ on the left side.   Bilateral lower leg  swelling but more swelling and redness noted to left lower leg and foot with 2+ pitting edema. No drainage.    Pulmonary/Chest: Effort normal and breath sounds normal.   Abdominal: Soft. Normal appearance and bowel sounds are normal. She exhibits no distension and no mass. There is no tenderness. There is no rigidity, no rebound, no guarding and no CVA tenderness.   Musculoskeletal:   Above elbow amputation of left arm.   Neurological: She is alert and oriented to person, place, and time. She has normal strength. No sensory deficit. Gait normal. GCS eye subscore is 4. GCS verbal subscore is 5. GCS motor subscore is 6.   Skin: Skin is warm, dry and intact. Capillary refill takes less than 2 seconds. No rash noted.   Psychiatric: She has a normal mood and affect.         ED Course   Procedures  Labs Reviewed   CBC W/ AUTO DIFFERENTIAL - Abnormal; Notable for the following:        Result Value    WBC 13.04 (*)     Hemoglobin 11.0 (*)     Hematocrit 35.5 (*)     MCH 25.9 (*)     MCHC 31.0 (*)     RDW 15.7 (*)     Lymph # 6.2 (*)     All other components within normal limits   COMPREHENSIVE METABOLIC PANEL - Abnormal; Notable for the following:     CO2 17 (*)     BUN, Bld 28 (*)     Creatinine 2.0 (*)     eGFR if  35 (*)     eGFR if non  31 (*)     All other components within normal limits   URINALYSIS - Abnormal; Notable for the following:     Appearance, UA Hazy (*)     Specific Gravity, UA >=1.030 (*)     Protein, UA 1+ (*)     Ketones, UA Trace (*)     Bilirubin (UA) 1+ (*)     Occult Blood UA Trace (*)     Leukocytes, UA 1+ (*)     All other components within normal limits   B-TYPE NATRIURETIC PEPTIDE - Abnormal; Notable for the following:      (*)     All other components within normal limits   URINALYSIS MICROSCOPIC - Abnormal; Notable for the following:     RBC, UA 5 (*)     WBC, UA 50 (*)     Bacteria, UA Many (*)     Hyaline Casts, UA 2 (*)     All other components within  normal limits   CULTURE, BLOOD   CULTURE, BLOOD   PROTIME-INR   LACTIC ACID, PLASMA   POCT URINE PREGNANCY   POCT GLUCOSE MONITORING CONTINUOUS         Imaging Results          US Lower Extremity Veins Left (Final result)  Result time 06/25/18 17:11:39    Final result by Nick Mancini MD (06/25/18 17:11:39)                 Impression:      No evidence of deep venous thrombosis in the left lower extremity.      Electronically signed by: Nick Mancini MD  Date:    06/25/2018  Time:    17:11             Narrative:    EXAMINATION:  US LOWER EXTREMITY VEINS LEFT    CLINICAL HISTORY:  Other specified soft tissue disorders    TECHNIQUE:  Duplex and color flow Doppler evaluation and graded compression of the left lower extremity veins was performed.    COMPARISON:  Left lower extremity venous upper ultrasound 12/18/2017    FINDINGS:  Left thigh veins: The common femoral, femoral, popliteal, upper greater saphenous, and deep femoral veins are patent and free of thrombus. The veins are normally compressible and have normal phasic flow and augmentation response.    Left calf veins: The visualized calf veins are patent.    Contralateral CFV: The contralateral (right) common femoral vein is patent and free of thrombus.    Miscellaneous: None                                Imaging Results    None          Medical Decision Making:   Initial Assessment:   Patient presents to ED with left lower leg pain and swelling x3 days.  Patient also complaining of vaginal pain and a yeast infection that has not improved.  Patient appears well, nontoxic.  Patient afebrile.  2+ DP pulses bilaterally by palpation.  Sensation and strength intact bilaterally in lower extremities.  Patient NVI.   Differential Diagnosis:   Cellulitis, DVT, UTI, pyelonephritis, urosepsis, sepsis, CHF  Clinical Tests:   Lab Tests: Ordered and Reviewed  Radiological Study: Ordered and Reviewed  ED Management:  CBC, CMP, PT-INR, lactic acid, blood cultures X 2, BNP,  POCT glucose, urinalysis and culture, US lower extremity, 1 liter NS, 5-325 PO norco, 1 gram IV rocephin  Labs unremarkable except for elevated BUN and creatinine. Urinalysis revealed leukocytes.  US negative. Patient will be given 1 g IV Rocephin in ED and will be DC home with prescription for Keflex to treat UTI.  Patient straight to follow up with her PCP about her elevated BUN and creatinine within 2-3 days and to return to ED if symptoms worsen or change.  Patient verbalized discharge instructions and is in compliance in agreement with treatment plan.    Rx: keflex               Attending Attestation:     Physician Attestation Statement for NP/PA:   I have conducted a face to face encounter with this patient in addition to the NP/PA, due to NP/PA Request    Other NP/PA Attestation Additions:      Medical Decision Making: Patient presents to ED with left lower leg pain and swelling x3 days.  Patient also complaining of vaginal pain and a yeast infection that has not improved.  Patient appears well, nontoxic.  Patient afebrile.  2+ DP pulses bilaterally by palpation.  Sensation and strength intact bilaterally in lower extremities.  Patient NVI.   Differential Diagnosis:   Cellulitis, DVT, UTI, pyelonephritis, urosepsis, sepsis, CHF  Clinical Tests:   Lab Tests: Ordered and Reviewed  Radiological Study: Ordered and Reviewed  ED Management:  CBC, CMP, PT-INR, lactic acid, blood cultures X 2, BNP, POCT glucose, urinalysis and culture, US lower extremity, 1 liter NS, 5-325 PO norco, 1 gram IV rocephin  Labs unremarkable except for mildly elevated BUN and creatinine. Urinalysis revealed leukocytes.  US negative. Patient will be given 1 g IV Rocephin in ED and will be DC home with prescription for Keflex to treat UTI.  Patient straight to follow up with her PCP about her elevated BUN and creatinine within 2-3 days and to return to ED if symptoms worsen or change.  Patient verbalized discharge instructions and is in  compliance in agreement with treatment plan.                 ED Course as of Jun 25 1651 Mon Jun 25, 2018   4077 Sort note: Lele Dias nontoxic/afebrile 38 y.o.  presented to the ED with c/o left lower leg pain and swelling. She reports similar to previous episodes where she had negative DVT. Studies. She does report redness and burning tingling pain of the affected lower extremity. She also reports continued vaginal pain despite trying vaginal suppository.      Patient seen and medically screened by Physician assistant in Sort process due to ED crowding.  Appropriate tests and/or medications ordered.  Care transferred to an alternate provider when patient was placed in an Exam Room from the [facility] for physical exam, additional orders, and disposition. 3:37 PM. LC    [LC]      ED Course User Index  [LC] DELANEY Maddox     Clinical Impression:   The primary encounter diagnosis was Urinary tract infection with hematuria, site unspecified. A diagnosis of Leg swelling was also pertinent to this visit.                             Blane Nuno NP  06/27/18 0103       Lisa Carrillo MD  07/04/18 1148

## 2018-06-26 LAB — POCT GLUCOSE: 94 MG/DL (ref 70–110)

## 2018-06-26 NOTE — DISCHARGE INSTRUCTIONS
Please take prescribed medication for your UTI. Follow-up with PCP within 2-3 days about your elevated BUN and creatinine. Return to ED if symptoms worsen or change.

## 2018-06-28 LAB — BACTERIA UR CULT: NORMAL

## 2018-06-29 NOTE — PROGRESS NOTES
Pt reports that she is not feeling well, and did see her PCP today who prescribed Bactrim.  Based upon urine culture, there is resistance to Bactrim. Pt notified. Changed to Macrobid. Advised DC keflex and bactrim.

## 2018-06-30 LAB — BACTERIA BLD CULT: NORMAL

## 2018-07-01 LAB — BACTERIA BLD CULT: NORMAL

## 2018-07-16 ENCOUNTER — HOSPITAL ENCOUNTER (OUTPATIENT)
Facility: HOSPITAL | Age: 38
Discharge: HOME | End: 2018-07-17
Attending: EMERGENCY MEDICINE | Admitting: HOSPITALIST
Payer: MEDICAID

## 2018-07-16 DIAGNOSIS — I50.9 ACUTE CONGESTIVE HEART FAILURE, UNSPECIFIED HEART FAILURE TYPE: Primary | ICD-10-CM

## 2018-07-16 DIAGNOSIS — E11.69 DIABETES MELLITUS TYPE 2 IN OBESE: Chronic | ICD-10-CM

## 2018-07-16 DIAGNOSIS — R06.02 SHORTNESS OF BREATH: ICD-10-CM

## 2018-07-16 DIAGNOSIS — E66.9 DIABETES MELLITUS TYPE 2 IN OBESE: Chronic | ICD-10-CM

## 2018-07-16 DIAGNOSIS — I50.9 CHF (CONGESTIVE HEART FAILURE): ICD-10-CM

## 2018-07-16 PROCEDURE — 99285 EMERGENCY DEPT VISIT HI MDM: CPT | Mod: 25

## 2018-07-16 PROCEDURE — 96374 THER/PROPH/DIAG INJ IV PUSH: CPT

## 2018-07-17 VITALS
WEIGHT: 293 LBS | TEMPERATURE: 99 F | HEIGHT: 62 IN | RESPIRATION RATE: 18 BRPM | OXYGEN SATURATION: 99 % | BODY MASS INDEX: 53.92 KG/M2 | SYSTOLIC BLOOD PRESSURE: 111 MMHG | HEART RATE: 78 BPM | DIASTOLIC BLOOD PRESSURE: 56 MMHG

## 2018-07-17 PROBLEM — I50.9 ACUTE CONGESTIVE HEART FAILURE: Status: ACTIVE | Noted: 2018-07-17

## 2018-07-17 PROBLEM — N12 E. COLI PYELONEPHRITIS: Status: RESOLVED | Noted: 2018-04-20 | Resolved: 2018-07-17

## 2018-07-17 PROBLEM — R78.81 E COLI BACTEREMIA: Status: RESOLVED | Noted: 2018-04-21 | Resolved: 2018-07-17

## 2018-07-17 PROBLEM — E87.20 NORMAL ANION GAP METABOLIC ACIDOSIS: Status: ACTIVE | Noted: 2018-07-17

## 2018-07-17 PROBLEM — B96.20 E. COLI PYELONEPHRITIS: Status: RESOLVED | Noted: 2018-04-20 | Resolved: 2018-07-17

## 2018-07-17 PROBLEM — I50.33 ACUTE ON CHRONIC DIASTOLIC CONGESTIVE HEART FAILURE: Status: ACTIVE | Noted: 2018-07-17

## 2018-07-17 PROBLEM — B96.20 E COLI BACTEREMIA: Status: RESOLVED | Noted: 2018-04-21 | Resolved: 2018-07-17

## 2018-07-17 PROBLEM — R79.89 ELEVATED TROPONIN I MEASUREMENT: Status: RESOLVED | Noted: 2018-04-20 | Resolved: 2018-07-17

## 2018-07-17 PROBLEM — E87.6 HYPOKALEMIA: Status: RESOLVED | Noted: 2018-04-22 | Resolved: 2018-07-17

## 2018-07-17 PROBLEM — E87.20 NORMAL ANION GAP METABOLIC ACIDOSIS: Status: RESOLVED | Noted: 2018-07-17 | Resolved: 2018-07-17

## 2018-07-17 PROBLEM — E87.6 HYPOKALEMIA: Status: ACTIVE | Noted: 2018-07-17

## 2018-07-17 LAB
ALBUMIN SERPL BCP-MCNC: 3.4 G/DL
ALP SERPL-CCNC: 73 U/L
ALT SERPL W/O P-5'-P-CCNC: 21 U/L
AMPHET+METHAMPHET UR QL: NEGATIVE
ANION GAP SERPL CALC-SCNC: 11 MMOL/L
ANION GAP SERPL CALC-SCNC: 12 MMOL/L
AST SERPL-CCNC: 14 U/L
BARBITURATES UR QL SCN>200 NG/ML: NEGATIVE
BASOPHILS # BLD AUTO: 0.02 K/UL
BASOPHILS # BLD AUTO: 0.04 K/UL
BASOPHILS NFR BLD: 0.2 %
BASOPHILS NFR BLD: 0.4 %
BENZODIAZ UR QL SCN>200 NG/ML: NORMAL
BILIRUB SERPL-MCNC: 0.2 MG/DL
BILIRUB UR QL STRIP: NEGATIVE
BNP SERPL-MCNC: 31 PG/ML
BUN SERPL-MCNC: 14 MG/DL
BUN SERPL-MCNC: 17 MG/DL
BZE UR QL SCN: NEGATIVE
CALCIUM SERPL-MCNC: 8.6 MG/DL
CALCIUM SERPL-MCNC: 8.8 MG/DL
CANNABINOIDS UR QL SCN: NEGATIVE
CHLORIDE SERPL-SCNC: 110 MMOL/L
CHLORIDE SERPL-SCNC: 115 MMOL/L
CLARITY UR: CLEAR
CO2 SERPL-SCNC: 15 MMOL/L
CO2 SERPL-SCNC: 20 MMOL/L
COLOR UR: YELLOW
CREAT SERPL-MCNC: 0.9 MG/DL
CREAT SERPL-MCNC: 0.9 MG/DL
CREAT UR-MCNC: 49.8 MG/DL
DIASTOLIC DYSFUNCTION: NO
DIFFERENTIAL METHOD: ABNORMAL
DIFFERENTIAL METHOD: ABNORMAL
EOSINOPHIL # BLD AUTO: 0.2 K/UL
EOSINOPHIL # BLD AUTO: 0.2 K/UL
EOSINOPHIL NFR BLD: 1.6 %
EOSINOPHIL NFR BLD: 2 %
ERYTHROCYTE [DISTWIDTH] IN BLOOD BY AUTOMATED COUNT: 16.3 %
ERYTHROCYTE [DISTWIDTH] IN BLOOD BY AUTOMATED COUNT: 16.3 %
EST. GFR  (AFRICAN AMERICAN): >60 ML/MIN/1.73 M^2
EST. GFR  (AFRICAN AMERICAN): >60 ML/MIN/1.73 M^2
EST. GFR  (NON AFRICAN AMERICAN): >60 ML/MIN/1.73 M^2
EST. GFR  (NON AFRICAN AMERICAN): >60 ML/MIN/1.73 M^2
ESTIMATED AVG GLUCOSE: 146 MG/DL
GLUCOSE SERPL-MCNC: 104 MG/DL
GLUCOSE SERPL-MCNC: 81 MG/DL
GLUCOSE UR QL STRIP: NEGATIVE
HBA1C MFR BLD HPLC: 6.7 %
HCT VFR BLD AUTO: 35.3 %
HCT VFR BLD AUTO: 37.8 %
HGB BLD-MCNC: 11 G/DL
HGB BLD-MCNC: 11.9 G/DL
HGB UR QL STRIP: NEGATIVE
INR PPP: 0.9
KETONES UR QL STRIP: NEGATIVE
LEUKOCYTE ESTERASE UR QL STRIP: ABNORMAL
LYMPHOCYTES # BLD AUTO: 5.7 K/UL
LYMPHOCYTES # BLD AUTO: 5.8 K/UL
LYMPHOCYTES NFR BLD: 48.8 %
LYMPHOCYTES NFR BLD: 52.1 %
MCH RBC QN AUTO: 26.9 PG
MCH RBC QN AUTO: 27 PG
MCHC RBC AUTO-ENTMCNC: 31.2 G/DL
MCHC RBC AUTO-ENTMCNC: 31.5 G/DL
MCV RBC AUTO: 86 FL
MCV RBC AUTO: 87 FL
METHADONE UR QL SCN>300 NG/ML: NEGATIVE
MICROSCOPIC COMMENT: NORMAL
MITRAL VALVE MOBILITY: NORMAL
MONOCYTES # BLD AUTO: 0.6 K/UL
MONOCYTES # BLD AUTO: 0.7 K/UL
MONOCYTES NFR BLD: 5.7 %
MONOCYTES NFR BLD: 5.9 %
NEUTROPHILS # BLD AUTO: 4.3 K/UL
NEUTROPHILS # BLD AUTO: 4.9 K/UL
NEUTROPHILS NFR BLD: 38.6 %
NEUTROPHILS NFR BLD: 42.4 %
NITRITE UR QL STRIP: NEGATIVE
OPIATES UR QL SCN: NEGATIVE
PCP UR QL SCN>25 NG/ML: NEGATIVE
PH UR STRIP: 6 [PH] (ref 5–8)
PLATELET # BLD AUTO: 164 K/UL
PLATELET # BLD AUTO: 65 K/UL
PMV BLD AUTO: 10 FL
PMV BLD AUTO: 10.8 FL
POCT GLUCOSE: 106 MG/DL (ref 70–110)
POCT GLUCOSE: 115 MG/DL (ref 70–110)
POCT GLUCOSE: 48 MG/DL (ref 70–110)
POCT GLUCOSE: 51 MG/DL (ref 70–110)
POCT GLUCOSE: 56 MG/DL (ref 70–110)
POCT GLUCOSE: 94 MG/DL (ref 70–110)
POTASSIUM SERPL-SCNC: 3.1 MMOL/L
POTASSIUM SERPL-SCNC: 3.7 MMOL/L
PROT SERPL-MCNC: 6.6 G/DL
PROT UR QL STRIP: NEGATIVE
PROTHROMBIN TIME: 10 SEC
RBC # BLD AUTO: 4.08 M/UL
RBC # BLD AUTO: 4.42 M/UL
RETIRED EF AND QEF - SEE NOTES: 55 (ref 55–65)
SODIUM SERPL-SCNC: 141 MMOL/L
SODIUM SERPL-SCNC: 142 MMOL/L
SP GR UR STRIP: 1.01 (ref 1–1.03)
TOXICOLOGY INFORMATION: NORMAL
TRICUSPID VALVE REGURGITATION: NORMAL
TROPONIN I SERPL DL<=0.01 NG/ML-MCNC: 0.01 NG/ML
TROPONIN I SERPL DL<=0.01 NG/ML-MCNC: 0.01 NG/ML
URN SPEC COLLECT METH UR: ABNORMAL
UROBILINOGEN UR STRIP-ACNC: NEGATIVE EU/DL
WBC # BLD AUTO: 11.11 K/UL
WBC # BLD AUTO: 11.61 K/UL
WBC #/AREA URNS HPF: 3 /HPF (ref 0–5)

## 2018-07-17 PROCEDURE — 93306 TTE W/DOPPLER COMPLETE: CPT

## 2018-07-17 PROCEDURE — 80307 DRUG TEST PRSMV CHEM ANLYZR: CPT

## 2018-07-17 PROCEDURE — 36415 COLL VENOUS BLD VENIPUNCTURE: CPT

## 2018-07-17 PROCEDURE — 25000003 PHARM REV CODE 250: Performed by: PHYSICIAN ASSISTANT

## 2018-07-17 PROCEDURE — 85610 PROTHROMBIN TIME: CPT

## 2018-07-17 PROCEDURE — 93010 ELECTROCARDIOGRAM REPORT: CPT | Mod: ,,, | Performed by: INTERNAL MEDICINE

## 2018-07-17 PROCEDURE — 25000003 PHARM REV CODE 250: Performed by: EMERGENCY MEDICINE

## 2018-07-17 PROCEDURE — 94640 AIRWAY INHALATION TREATMENT: CPT

## 2018-07-17 PROCEDURE — 85025 COMPLETE CBC W/AUTO DIFF WBC: CPT

## 2018-07-17 PROCEDURE — 80048 BASIC METABOLIC PNL TOTAL CA: CPT

## 2018-07-17 PROCEDURE — G0378 HOSPITAL OBSERVATION PER HR: HCPCS

## 2018-07-17 PROCEDURE — 80053 COMPREHEN METABOLIC PANEL: CPT

## 2018-07-17 PROCEDURE — 83036 HEMOGLOBIN GLYCOSYLATED A1C: CPT

## 2018-07-17 PROCEDURE — 83880 ASSAY OF NATRIURETIC PEPTIDE: CPT

## 2018-07-17 PROCEDURE — 63600175 PHARM REV CODE 636 W HCPCS: Performed by: EMERGENCY MEDICINE

## 2018-07-17 PROCEDURE — 93005 ELECTROCARDIOGRAM TRACING: CPT

## 2018-07-17 PROCEDURE — 93306 TTE W/DOPPLER COMPLETE: CPT | Mod: 26,,, | Performed by: INTERNAL MEDICINE

## 2018-07-17 PROCEDURE — 81000 URINALYSIS NONAUTO W/SCOPE: CPT

## 2018-07-17 PROCEDURE — 25000242 PHARM REV CODE 250 ALT 637 W/ HCPCS: Performed by: EMERGENCY MEDICINE

## 2018-07-17 PROCEDURE — S4991 NICOTINE PATCH NONLEGEND: HCPCS | Performed by: EMERGENCY MEDICINE

## 2018-07-17 PROCEDURE — 84484 ASSAY OF TROPONIN QUANT: CPT

## 2018-07-17 RX ORDER — FUROSEMIDE 10 MG/ML
60 INJECTION INTRAMUSCULAR; INTRAVENOUS
Status: COMPLETED | OUTPATIENT
Start: 2018-07-17 | End: 2018-07-17

## 2018-07-17 RX ORDER — DEXTROSE 4 G
TABLET,CHEWABLE ORAL
Qty: 1 EACH | Refills: 0 | Status: ON HOLD | OUTPATIENT
Start: 2018-07-17 | End: 2023-01-01

## 2018-07-17 RX ORDER — POTASSIUM CHLORIDE 20 MEQ/1
20 TABLET, EXTENDED RELEASE ORAL
Status: DISCONTINUED | OUTPATIENT
Start: 2018-07-17 | End: 2018-07-17

## 2018-07-17 RX ORDER — FUROSEMIDE 40 MG/1
40 TABLET ORAL DAILY
Qty: 30 TABLET | Refills: 0 | Status: SHIPPED | OUTPATIENT
Start: 2018-07-17 | End: 2018-08-16

## 2018-07-17 RX ORDER — METHOCARBAMOL 750 MG/1
1500 TABLET, FILM COATED ORAL 3 TIMES DAILY
Qty: 30 TABLET | Refills: 0 | Status: SHIPPED | OUTPATIENT
Start: 2018-07-17 | End: 2018-07-22

## 2018-07-17 RX ORDER — METHOCARBAMOL 750 MG/1
1500 TABLET, FILM COATED ORAL 3 TIMES DAILY
Qty: 30 TABLET | Refills: 0 | Status: SHIPPED | OUTPATIENT
Start: 2018-07-17 | End: 2018-07-17

## 2018-07-17 RX ORDER — IBUPROFEN 200 MG
1 TABLET ORAL
Status: DISCONTINUED | OUTPATIENT
Start: 2018-07-17 | End: 2018-07-17 | Stop reason: HOSPADM

## 2018-07-17 RX ORDER — FUROSEMIDE 40 MG/1
40 TABLET ORAL DAILY
Qty: 30 TABLET | Refills: 0 | Status: SHIPPED | OUTPATIENT
Start: 2018-07-17 | End: 2018-07-17

## 2018-07-17 RX ORDER — IPRATROPIUM BROMIDE AND ALBUTEROL SULFATE 2.5; .5 MG/3ML; MG/3ML
3 SOLUTION RESPIRATORY (INHALATION)
Status: COMPLETED | OUTPATIENT
Start: 2018-07-17 | End: 2018-07-17

## 2018-07-17 RX ORDER — POTASSIUM CHLORIDE 20 MEQ/1
40 TABLET, EXTENDED RELEASE ORAL ONCE
Status: COMPLETED | OUTPATIENT
Start: 2018-07-17 | End: 2018-07-17

## 2018-07-17 RX ORDER — PREGABALIN 75 MG/1
150 CAPSULE ORAL ONCE
Status: COMPLETED | OUTPATIENT
Start: 2018-07-17 | End: 2018-07-17

## 2018-07-17 RX ADMIN — IPRATROPIUM BROMIDE AND ALBUTEROL SULFATE 3 ML: .5; 3 SOLUTION RESPIRATORY (INHALATION) at 12:07

## 2018-07-17 RX ADMIN — POTASSIUM CHLORIDE 40 MEQ: 1500 TABLET, EXTENDED RELEASE ORAL at 01:07

## 2018-07-17 RX ADMIN — FUROSEMIDE 60 MG: 10 INJECTION, SOLUTION INTRAMUSCULAR; INTRAVENOUS at 03:07

## 2018-07-17 RX ADMIN — Medication 1 PATCH: at 04:07

## 2018-07-17 RX ADMIN — PREGABALIN 150 MG: 75 CAPSULE ORAL at 01:07

## 2018-07-17 NOTE — NURSING
"Patient to room 480. Patient argumentative and refuses bed alarm. Patient states "if I have to have a bed alarm on then I want to go home now". Bed alarm not set and boyfriend at bedside. Patient mood calmed and quiet environment promoted.   "

## 2018-07-17 NOTE — ASSESSMENT & PLAN NOTE
Smokes 1 pack of cigarettes per day    Discussed smoking cessation with patient.  She has no inclination to quit at this time.    She is to continue her previously prescribed medication for her asthma exacerbations.

## 2018-07-17 NOTE — ASSESSMENT & PLAN NOTE
Continue previously prescribed anti-hypertensive medications.  Follow up with PCP in 3 days concerning addition of Lasix.

## 2018-07-17 NOTE — NURSING
Pt IV  removed with catheter intact, tele monitor removed and returned to monitor tech.  Pt AAOx4 and in NAD with significant other at the bedside.  Pharmacy delivered meds.  Pt skin ulcer to the livia lower quadrant cleaned with wound  and secured with 4x4 gaugze and silk tape. Pt transported via W/C to front entrance.

## 2018-07-17 NOTE — ED NOTES
"Pt agreed to stay. Pt ambulating back to room. Pt stating "Oh they put me in the room my mother  in. If I knew that I woulda been cut up." Pt refuses to sit in the stretcher and sat in the chair. Pt brought up in a wheelchair with security and BRITTNEY Grimes.  "

## 2018-07-17 NOTE — ASSESSMENT & PLAN NOTE
Phantom limb pain  History of amputation of left arm above elbow  Will not provide opioids upon discharge.  Continue suboxone.  Will substitute robaxin for Zanaflex

## 2018-07-17 NOTE — ED PROVIDER NOTES
Encounter Date: 2018       History     Chief Complaint   Patient presents with    Leg Swelling     worsening edema to bilateral lower extremities. pt. also c/o fatigue and lethargy for the last couple of days. pt. is drowsy with slurred speech.      Patient is a 38-year-old female past medical history of asthma, mild intermittent, chronic pain, frequent urinary tract infections, renal insufficiency, diabetes, hypertension presents emergency room with family due to patient having bilateral lower leg edema x3 weeks.  Per patient family at bedside patient lower leg edema started after Lasix was discontinued.  Family denies any chest pain, shortness of breath, leg pain associated with leg swelling.  Patient family states edema has been getting worse but is reason patient was brought to emergency room to be evaluated.          Review of patient's allergies indicates:   Allergen Reactions    Celexa [citalopram] Nausea And Vomiting     Past Medical History:   Diagnosis Date    Asthma     Cellulitis of abdominal wall 2017    Depression     Diabetes mellitus, type II     Diastolic dysfunction     Diverticulosis of intestine with bleeding 2016    E coli bacteremia 2018    E. coli pyelonephritis 2015    E. 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     SECTION       SECTION, CLASSIC      CHOLECYSTECTOMY  age 15    TONSILLECTOMY, ADENOIDECTOMY       Family History   Problem Relation Age of Onset    Cancer Father     Heart attack Mother     Heart disease Mother     Cancer Maternal Aunt         lung (smoker)    Heart attack Maternal Aunt     Breast cancer Paternal Grandmother     Heart attack Maternal Grandmother      Social History   Substance Use Topics    Smoking status: Current Every Day  Smoker     Packs/day: 2.00     Years: 15.00     Types: Cigarettes    Smokeless tobacco: Never Used    Alcohol use No     Review of Systems   Constitutional: Negative.    HENT: Negative.    Eyes: Negative.    Respiratory: Negative.    Cardiovascular: Negative.    Gastrointestinal: Negative.    Endocrine: Negative.    Genitourinary: Negative.    Musculoskeletal: Positive for joint swelling.   Skin: Negative.    Allergic/Immunologic: Negative.    Neurological: Negative.    Hematological: Negative.    Psychiatric/Behavioral: Negative.    All other systems reviewed and are negative.      Physical Exam     Initial Vitals [07/16/18 2346]   BP Pulse Resp Temp SpO2   (!) 100/51 69 20 97.8 °F (36.6 °C) 100 %      MAP       --         Physical Exam    Nursing note and vitals reviewed.  Constitutional: She appears well-developed and well-nourished. No distress.   Well-appearing   HENT:   Head: Normocephalic and atraumatic.   Nose: Nose normal.   Mouth/Throat: Oropharynx is clear and moist.   Eyes: Conjunctivae and EOM are normal. Pupils are equal, round, and reactive to light.   Neck: Normal range of motion. Neck supple.   Negative meningismus   Cardiovascular: Normal rate, regular rhythm, normal heart sounds and intact distal pulses.   Pulmonary/Chest: She has wheezes.   Breathing nonlabored   Abdominal: Soft. Bowel sounds are normal.   Musculoskeletal: Normal range of motion. She exhibits edema.   2+ pitting edema   Neurological:   Sleeping,    Skin: Skin is warm and dry. Capillary refill takes less than 2 seconds.         ED Course   Procedures  Labs Reviewed   CBC W/ AUTO DIFFERENTIAL   COMPREHENSIVE METABOLIC PANEL   TROPONIN I   B-TYPE NATRIURETIC PEPTIDE   PROTIME-INR     EKG Readings: (Independently Interpreted)   Rhythm: Normal Sinus Rhythm. Heart Rate: 70. Axis: Normal. Other Impression: Nonspecific T-wave abnormality       Imaging Results    None       X-Rays:   Independently Interpreted Readings:   Other  Readings:  EXAMINATION:  XR CHEST AP PORTABLE    CLINICAL HISTORY:  CHF;    TECHNIQUE:  Single frontal view of the chest was performed.    COMPARISON:  Chest x-ray dated 05/18/2018    FINDINGS:  There is an overlying site of keys in the right upper abdominal quadrant.  This limits evaluation.  Monitoring EKG leads are present.    The trachea is unremarkable.  There is enlargement of the cardiac silhouette.  There is elevation of both hemidiaphragms.  No definitive evidence of free air beneath the hemidiaphragms.  No pleural effusions are present.  There is no evidence of a pneumothorax.  There is no evidence of pneumomediastinum.  There is increased attenuation of the pulmonary parenchyma.  There is pulmonary vascular congestion.  The osseous structures are unremarkable.  Impression       Cardiomegaly with increased attenuation of the pulmonary parenchyma and pulmonary vascular congestion.  The findings are suggestive of early CHF.        Medical Decision Making:   Initial Assessment:   Patient is a 38-year-old female past medical history of asthma, mild intermittent, chronic pain, frequent urinary tract infections, renal insufficiency, diabetes, hypertension presents emergency room with family due to patient having bilateral lower leg edema x3 weeks.  Per patient family at bedside patient lower leg edema started after Lasix was discontinued.  Family denies any chest pain, shortness of breath, leg pain associated with leg swelling.  Patient family states edema has been getting worse but is reason patient was brought to emergency room to be evaluated.  Differential Diagnosis:   CHF  COPD  Clinical Tests:   Lab Tests: Ordered and Reviewed  The following lab test(s) were unremarkable: CBC, BMP, Troponin and BNP  Radiological Study: Ordered and Reviewed  Medical Tests: Ordered and Reviewed  ED Management:  Patient placed on cardiac monitor on arrival IV line established labs drawn.  0230 lasix 60 mg IV ordered.  Pt  discussed with Dr. Dc who agreed with plan to admit.                      Clinical Impression:   The primary encounter diagnosis was Acute congestive heart failure, unspecified heart failure type. A diagnosis of Shortness of breath was also pertinent to this visit.                             Ebenezer Newby MD  07/17/18 0256

## 2018-07-17 NOTE — HOSPITAL COURSE
Patient was admitted to OBS unit for administration of IV lasix and duonebs.  Patient reports improvement of bilateral leg swelling and increased production of urine.  ECHO showed no concerning abnormalities at this time.  K+ slightly decreased at 3.1.  PO replacement given.  Patient becam hypoglycemic and at that time juice and food were given with improvement.

## 2018-07-17 NOTE — HPI
CC: bilateral LE edema x 2 - 3 weeks, after d/c of lasix 1 month ago     HPI: Lele Dias, a 38 y.o. female with PMH of asthma, chronic pain, frequent UTIs, renal insufficiency, DM, HTN that presented to the ED yesterday for evaluation of bilateral LE x 2 - 3 weeks with associated pain.  She states that her PCP removed the lasix from her treatment regimen for an unknown reason.  Denies any SOB, cough, or CP.  After administration of lasix, patient attests to improvement of her leg swelling.  Today it is difficult to obtain her HPI d/t patients somnolence.  She continually has to be woke for questioning and becomes agitated and aggressive, then falls back to sleep.  She states that she has taken her suboxone as prescribed.  Boyfriend at beside spoke with me privately and states that she takes xanaflex and the suboxone together, which causes her drowsy state.  He denies knowing about any other drug use.  Unsure of her last ingestion.

## 2018-07-17 NOTE — ASSESSMENT & PLAN NOTE
Hypertension  H/O diastolic dysfunction  Continue at home anti-hypertensive medications.  F/u with cardiologist as needed.

## 2018-07-17 NOTE — PLAN OF CARE
07/17/18 1544   Final Note   Assessment Type Final Discharge Note   Discharge Disposition Home   Hospital Follow Up  Appt(s) scheduled? Yes   Discharge plans and expectations educations in teach back method with documentation complete? Yes   [facility] Referral Info   Post Acute Recommendation No Care     No future appointments.

## 2018-07-17 NOTE — PROGRESS NOTES
Patient is a fall risk, however she becomes aggressive with staff when they try to apply bed alarm.  She was instructed repeatedly to call for assistance with ambulating.  And multiple attempts were made to activate bed alarm.

## 2018-07-17 NOTE — ASSESSMENT & PLAN NOTE
Morbid obesity with BMI of 50.0-59.9, adult    Encouraged weight loss.  Instructed to continue home meds.  Will prescribe [employer] testing strips for at home use.  A1C 6.7

## 2018-07-17 NOTE — ED NOTES
While assisting patient with change of gown pill bottle  and cigarette fell from under her breast. Patient asked to turn in cigarette and pill bottle Patient became hostile with staff refusing to stay and state going AMA. Patient  Informed IV must be removed before leaving hospital refused IV removal, security called.ED Charge Nurse and Doctor notified of situation,  Talking with patient continue to remain hostile. ED Doctor was able to convince patient to stay. Patient continue to argue with staff during transfer. Report called to CDU staff.

## 2018-07-17 NOTE — NURSING
At 1033 pt was not responding to the  at which time BETTE Malloy checked patients BS and found it to be 48.  Patient was given 3 cartons of apple juice and rechecked at which time it came up to 58.  I then gave pt 3 more cartons of juice. Rechecked sugar at 1202 up to 94.  Provider notified.

## 2018-07-17 NOTE — ED NOTES
Pt insists that she can ambulate to the restroom. Reminded pt that she needed 3 people to help her out of the car into the wheelchair. She states she is more alert now and does not need a wheelchair. Pt used a walking cane to get to the bathroom

## 2018-07-17 NOTE — NURSING
[facility] RN visited patient in room 480.  Patient and Juan at bedside during my visit.  Informed patient of scheduled [facility] appointment and gave her details of purpose of appointment.  Patient declined appointment and prefered to see her PCP Dr. Edge so that she can get her Suboxone refilled. 8 Step Plan for Heart Failure Patients booklet given to patient for educational purposes.  Patient accepted booklet.  No other questions or concerns at this time per patient.

## 2018-07-17 NOTE — DISCHARGE SUMMARY
[facility] Hospital Medicine  Discharge Summary      Patient Name: Lele Dias  MRN: [id number]  Admission Date: 7/16/2018  Hospital Length of Stay: 0 days  Discharge Date and Time: No discharge date for patient encounter.  Attending Physician: Pk Kirkland MD   Discharging Provider: Niharika Cummings PA-C  Primary Care Provider: Kari Edge MD (Inactive)      HPI:   CC: bilateral LE edema x 2 - 3 weeks, after d/c of lasix 1 month ago     HPI: Lele Dias, a 38 y.o. female with PMH of asthma, chronic pain, frequent UTIs, renal insufficiency, DM, HTN that presented to the ED yesterday for evaluation of bilateral LE x 2 - 3 weeks with associated pain.  She states that her PCP removed the lasix from her treatment regimen for an unknown reason.  Denies any SOB, cough, or CP.  After administration of lasix, patient attests to improvement of her leg swelling.  Today it is difficult to obtain her HPI d/t patients somnolence.  She continually has to be woke for questioning and becomes agitated and aggressive, then falls back to sleep.  She states that she has taken her suboxone as prescribed.  Boyfriend at beside spoke with me privately and states that she takes xanaflex and the suboxone together, which causes her drowsy state.  He denies knowing about any other drug use.  Unsure of her last ingestion.      * No surgery found *      Hospital Course:   Patient was admitted to OBS unit for administration of IV lasix and duonebs.  Patient reports improvement of bilateral leg swelling and increased production of urine.  ECHO showed no concerning abnormalities at this time.  K+ slightly decreased at 3.1.  PO replacement given.  Patient becam hypoglycemic and at that time juice and food were given with improvement.       Consults:     * Acute on chronic diastolic congestive heart failure    Hypertension  H/O diastolic dysfunction  Continue at home anti-hypertensive medications. Lasix  40mg once daily added.  F/u with cardiologist as needed.          Hypokalemia    Replace PO.  Continue to take previously prescribed potassium at 20mEq once daily           Opioid dependence on agonist therapy    Phantom limb pain  History of amputation of left arm above elbow  Will not provide opioids upon discharge.  Continue suboxone.  Will substitute robaxin for Zanaflex         Nonalcoholic hepatosteatosis    Normal LFTs.  Continue to monitor on an outpatient basis as needed.            Benign essential hypertension    Continue previously prescribed anti-hypertensive medications.  Follow up with PCP in 3 days concerning addition of Lasix.            Diabetes mellitus type 2 in obese    Morbid obesity with BMI of 50.0-59.9, adult    Encouraged weight loss.  Instructed to continue home meds.  Will prescribe True Metrix testing strips for at home use.  A1C 6.7        Asthma    Smokes 1 pack of cigarettes per day    Discussed smoking cessation with patient.  She has no inclination to quit at this time.    She is to continue her previously prescribed medication for her asthma exacerbations.            Final Active Diagnoses:    Diagnosis Date Noted POA    PRINCIPAL PROBLEM:  Acute on chronic diastolic congestive heart failure [I50.33] 07/17/2018 Yes    Hypokalemia [E87.6] 07/17/2018 Yes    Smokes 1 pack of cigarettes per day [F17.210] 06/02/2015 Yes     Chronic    Phantom limb pain [G54.6] 06/02/2015 Yes     Chronic    Opioid dependence on agonist therapy [F11.20] 06/02/2015 Yes     Chronic    Nonalcoholic hepatosteatosis [K76.0] 06/02/2015 Yes     Chronic    History of amputation of left arm above elbow [Z89.222] 06/02/2015 Not Applicable     Chronic    H/O diastolic dysfunction [Z86.79] 07/15/2014 Not Applicable     Chronic    Diabetes mellitus type 2 in obese [E11.69, E66.9] 07/15/2014 Yes     Chronic    Benign essential hypertension [I10] 07/15/2014 Yes     Chronic    Asthma [J45.909] 07/15/2014 Yes      Chronic    Morbid obesity with BMI of 50.0-59.9, adult [E66.01, Z68.43] 05/17/2013 Not Applicable      Problems Resolved During this Admission:    Diagnosis Date Noted Date Resolved POA    Normal anion gap metabolic acidosis [E87.2] 07/17/2018 07/17/2018 Yes       Discharged Condition: stable    Disposition: Home or Self Care    Follow Up:  Follow-up Information     Kari Edge MD In 3 days.    Specialty:  Emergency Medicine  Contact information:  [address]  [facility] [address]  [phone]                 Patient Instructions:     Ambulatory Referral to Pharmacy Assistance   Referral Priority: Routine Referral Type: Consultation   Referral Reason: Specialty Services Required    Number of Visits Requested: 1      Diet diabetic     Diet Cardiac     Notify your health care provider if you experience any of the following:  difficulty breathing or increased cough     Activity as tolerated         Significant Diagnostic Studies: Labs:   BMP:   Recent Labs  Lab 07/17/18  0052 07/17/18  0530   GLU 81 104    141   K 3.7 3.1*   * 110   CO2 15* 20*   BUN 17 14   CREATININE 0.9 0.9   CALCIUM 8.6* 8.8   , Troponin   Recent Labs  Lab 07/17/18  0530   TROPONINI 0.006    and A1C:   Recent Labs  Lab 05/18/18  2248 05/19/18  2303 07/17/18  0530   HGBA1C 6.9* 6.9* 6.7*     Cardiac Graphics: ECG: Normal sinus rhythm  Nonspecific T wave abnormality HR 70  Prolonged QT and Echocardiogram:   2D echo with color flow doppler:   Results for orders placed or performed during the hospital encounter of 07/16/18   2D echo with color flow doppler   Result Value Ref Range    EF 55 55 - 65    Diastolic Dysfunction No     Mitral Valve Mobility NORMAL     Tricuspid Valve Regurgitation TRIVIAL        Pending Diagnostic Studies:     None         Medications:  Reconciled Home Medications:      Medication List      START taking these medications    blood sugar diagnostic Strp  1 strip by Misc.(Non-Drug; Combo Route)  route once daily.     methocarbamol 750 MG Tab  Commonly known as:  ROBAXIN  Take 2 tablets (1,500 mg total) by mouth 3 (three) times daily. for 5 days        CHANGE how you take these medications    * furosemide 40 MG tablet  Commonly known as:  LASIX  Take 40 mg by mouth 2 (two) times daily.  What changed:  Another medication with the same name was added. Make sure you understand how and when to take each.     * furosemide 40 MG tablet  Commonly known as:  LASIX  Take 1 tablet (40 mg total) by mouth once daily.  What changed:  You were already taking a medication with the same name, and this prescription was added. Make sure you understand how and when to take each.        * This list has 2 medication(s) that are the same as other medications prescribed for you. Read the directions carefully, and ask your doctor or other care provider to review them with you.            CONTINUE taking these medications    * albuterol 1.25 mg/3 mL Nebu  Commonly known as:  ACCUNEB  Take 1.25 mg by nebulization every 4 (four) hours as needed.     * albuterol 90 mcg/actuation inhaler  Inhale 2 puffs into the lungs every 6 (six) hours as needed for Wheezing. Rescue     amitriptyline 50 MG tablet  Commonly known as:  ELAVIL  Take 50 mg by mouth nightly as needed for Insomnia.     buprenorphine-naloxone 2-0.5 mg 2-0.5 mg Subl  Commonly known as:  SUBOXONE  Place 8 mg under the tongue once daily. Pt states once daily     ferrous sulfate 325 (65 FE) MG EC tablet  Take 1 tablet (325 mg total) by mouth 2 (two) times daily.     fluticasone-salmeterol 100-50 mcg/dose 100-50 mcg/dose diskus inhaler  Commonly known as:  ADVAIR  Inhale 1 puff into the lungs 2 (two) times daily.     hydrOXYzine pamoate 50 MG Cap  Commonly known as:  VISTARIL  Take 50 mg by mouth every 8 (eight) hours as needed.     insulin glargine (TOUJEO) 300 unit/mL (1.5 mL) Inpn pen  Commonly known as:  TOUJEO  Inject into the skin 2 (two) times daily. Takes 138 units every  morning; 135 units every evening     insulin lispro 100 unit/mL injection  Commonly known as:  HUMALOG  Inject 95 Units into the skin 3 (three) times daily before meals.     lisinopril 20 MG tablet  Commonly known as:  PRINIVIL,ZESTRIL  Take 10 mg by mouth once daily.     nitroGLYCERIN 0.4 MG SL tablet  Commonly known as:  NITROSTAT  Place 0.4 mg under the tongue every 5 (five) minutes as needed for Chest pain.     omeprazole 40 MG capsule  Commonly known as:  PRILOSEC  Take 40 mg by mouth every morning.     ondansetron 4 MG Tbdl  Commonly known as:  ZOFRAN-ODT  Take 2 tablets (8 mg total) by mouth every 6 (six) hours as needed.     potassium chloride SA 20 MEQ tablet  Commonly known as:  K-DUR,KLOR-CON  Take 20 mEq by mouth once daily.     pregabalin 150 MG capsule  Commonly known as:  LYRICA  Take 300 mg by mouth 2 (two) times daily.     promethazine 25 MG tablet  Commonly known as:  PHENERGAN  Take 25 mg by mouth every 4 (four) hours.        * This list has 2 medication(s) that are the same as other medications prescribed for you. Read the directions carefully, and ask your doctor or other care provider to review them with you.            STOP taking these medications    tiZANidine 4 MG tablet  Commonly known as:  ZANAFLEX            Indwelling Lines/Drains at time of discharge:   Lines/Drains/Airways          No matching active lines, drains, or airways          Time spent on the discharge of patient: 35 minutes  Patient was seen and examined on the date of discharge and determined to be suitable for discharge.         Niharika Cummings PA-C  Department of Hospital Medicine  [facility]

## 2018-07-17 NOTE — ASSESSMENT & PLAN NOTE
Hypertension  H/O diastolic dysfunction  Continue at home anti-hypertensive medications. Lasix 40mg once daily added.  F/u with cardiologist as needed.

## 2018-07-17 NOTE — NURSING
Spoke with patient about the importance of checking her blood glucose regularly. Patient verbalized understanding. Significant other has taken the situation under control and communicated via phone with the insurance company to get a new glucose meter and strips for the patient.  Patient also encouraged to watch her fluid intake and to take her meds as prescribed to prevent adverse complications. Patient and significant other verbalized understanding.

## 2018-07-17 NOTE — PLAN OF CARE
Accompanied by Juan Nesbitt Significant other     [phone]       Patient difficult to rouse, Juan is assisting with initial DC planning.    TN requested RN to check CBG at this time.    Juan states that the family unit is amicable to PCC/[facility] and will bring to scheduled appointment.     Juan states that patient does not have the proper testing supplies at home for her DM and that she has been difficult to wake up for several days, per attending PA, patient has hx of polypharmacy.  Juan also mentioned that patient's insurance did not cover a recent antibiotic during a hospital stay.    TN has entered pharmacy assistance referral and has also alerted our inpatient pharmacy to possible needs for DC.    Time spent on patient 1 hour.    Update 11:00 -  Per Yosi in Pharmacy, patient antibiotic on 4/23 was filled for 0.50    Update 12:46 - Juan, called patient insurance and has determined that her insurance will cover [employer] testing supplies.  Expecting paper script from attending provider for DC.    [facility].       07/17/18 1023   Discharge Assessment   Assessment Type Discharge Planning Assessment   Confirmed/corrected address and phone number on facesheet? Yes   Assessment information obtained from? Caregiver;Patient   Prior to hospitilization cognitive status: Unable to Assess   Current cognitive status: Inappropriate Behavior   Current Functional Status: Assistive Equipment;Needs Assistance   Facility Arrived From: Home   Lives With significant other   Able to Return to Prior Arrangements unable to determine at this time (comments)   Is patient able to care for self after discharge? Unable to determine at this time (comments)   Who are your caregiver(s) and their phone number(s)? Juan Coats Significant other     [phone]      Patient currently being followed by outpatient case management? No   Equipment Currently Used at Home  cane, straight;shower chair   Do you have any problems affording any of your prescribed medications? No  (recent script for abx was not covered, glucometer/strips)   Is the patient taking medications as prescribed? (undetermined)   Does the patient have transportation home? Yes   Transportation Available family or friend will provide   Discharge Plan A Home with family   Patient/Family In Agreement With Plan yes

## 2018-07-17 NOTE — ED NOTES
Pt to ED with complaints of leg swelling. States pain to the left leg rated at an 8 out of 10. Pt states they swell every now and again, and her left leg is more swollen than her right. She also states she has been dealing with numbness and tingling to the right hand and fingers within the past year occasionally. She utilizes a walking cane to ambulate. Pt states that she has also been fatigued and constantly wanting to sleep. Pt's boyfriend and daughter at the bedside. Pt's daughter states the pt has been complaining of weakness for the past week, although pt states she does not feel weak. Pt seems lethargic and seems to have slurred speech, but pt's daughter states her speech is her baseline speech.    APPEARANCE: Alert, oriented and in no acute distress. Pt's speech seems slurred and slow, but pt's daughter states this is normal.  CARDIAC: Normal rate and rhythm. S1S2 noted. Pt denies any chest pain. Denies SOB, diaphoresis, and nausea/vomiting.  PERIPHERAL VASCULAR: peripheral pulses present. Normal cap refill. Warm to touch. 2+ radial pulse to right extremity. 2+ pedal pulses bilaterally. Pt had 2+ pitting edema to the left leg, and 1+ to the right leg.  RESPIRATORY:Normal rate and effort, pt has slight inspiratory and expiratory wheezing to the lower lobes, and breath sounds are clear throughout upper lobes. Respirations are equal and unlabored no obvious signs of distress.  MUSC: Full ROM. Pt has an amputation to the left upper extremity.   SKIN: Skin is warm and dry, normal skin turgor, mucous membranes moist.  NEURO: 5/5 strength major flexors/extensors bilaterally. Sensory intact to light touch bilaterally. Colby coma scale: eyes open spontaneously-4, oriented & converses-5, obeys commands-6. No neurological abnormalities. Pt seems to talk slow and slurred, but pt's daughter states this as normal.  MENTAL STATUS: awake, alert and aware of environment. Pt states it is July 16. She was confused with the  year.  EYE: No obvious discharge.   ENT: EARS: no obvious drainage. NOSE: no active bleeding.    Pt verified name and  listed on armband.     Review of patient's allergies indicates:   Allergen Reactions    Celexa [citalopram] Nausea And Vomiting

## 2018-07-17 NOTE — H&P
[facility] Medicine  History & Physical    Patient Name: Lele Dias  MRN: [id number]  Admission Date: 7/16/2018  Attending Physician: Pk Kirkland MD   Primary Care Provider: Kari Edge MD (Inactive)         Patient information was obtained from patient, spouse/SO and ER records.     Subjective:     Principal Problem:Acute on chronic diastolic congestive heart failure    Chief Complaint:   Chief Complaint   Patient presents with    Leg Swelling     worsening edema to bilateral lower extremities. pt. also c/o fatigue and lethargy for the last couple of days. pt. is drowsy with slurred speech.         HPI: CC: bilateral LE edema x 2 - 3 weeks, after d/c of lasix 1 month ago     HPI: Lele Dias, a 38 y.o. female with PMH of asthma, chronic pain, frequent UTIs, renal insufficiency, DM, HTN that presented to the ED yesterday for evaluation of bilateral LE x 2 - 3 weeks with associated pain.  She states that her PCP removed the lasix from her treatment regimen for an unknown reason.  Denies any SOB, cough, or CP.  After administration of lasix, patient attests to improvement of her leg swelling.  Today it is difficult to obtain her HPI d/t patients somnolence.  She continually has to be woke for questioning and becomes agitated and aggressive, then falls back to sleep.  She states that she has taken her suboxone as prescribed.  Boyfriend at beside spoke with me privately and states that she takes xanaflex and the suboxone together, which causes her drowsy state.  He denies knowing about any other drug use.  Unsure of her last ingestion.      Past Medical History:   Diagnosis Date    Asthma     Cellulitis of abdominal wall 12/18/2017    Depression     Diabetes mellitus, type II     Diastolic dysfunction     Diverticulosis of intestine with bleeding 9/14/2016    E coli bacteremia 4/21/2018    E. coli pyelonephritis 6/2/2015    E. coli UTI 12/18/2017     Hernia, epigastric     Hypertension     Nonalcoholic hepatosteatosis     Periumbilical hernia        Past Surgical History:   Procedure Laterality Date    ARM AMPUTATION AT SHOULDER Left 2000s     SECTION       SECTION, CLASSIC      CHOLECYSTECTOMY  age 15    TONSILLECTOMY, ADENOIDECTOMY         Review of patient's allergies indicates:   Allergen Reactions    Celexa [citalopram] Nausea And Vomiting       No current facility-administered medications on file prior to encounter.      Current Outpatient Prescriptions on File Prior to Encounter   Medication Sig    albuterol (ACCUNEB) 1.25 mg/3 mL Nebu Take 1.25 mg by nebulization every 4 (four) hours as needed.    albuterol 90 mcg/actuation inhaler Inhale 2 puffs into the lungs every 6 (six) hours as needed for Wheezing. Rescue    amitriptyline (ELAVIL) 50 MG tablet Take 50 mg by mouth nightly as needed for Insomnia.    buprenorphine-naloxone 2-0.5 mg (SUBOXONE) 2-0.5 mg Subl Place 8 mg under the tongue once daily. Pt states once daily    ferrous sulfate 325 (65 FE) MG EC tablet Take 1 tablet (325 mg total) by mouth 2 (two) times daily.    fluticasone-salmeterol 100-50 mcg/dose (ADVAIR) 100-50 mcg/dose diskus inhaler Inhale 1 puff into the lungs 2 (two) times daily.    furosemide (LASIX) 40 MG tablet Take 40 mg by mouth 2 (two) times daily.    hydrOXYzine pamoate (VISTARIL) 50 MG Cap Take 50 mg by mouth every 8 (eight) hours as needed.    insulin glargine, TOUJEO, (TOUJEO) 300 unit/mL (1.5 mL) InPn pen Inject into the skin 2 (two) times daily. Takes 138 units every morning; 135 units every evening    insulin lispro (HUMALOG) 100 unit/mL injection Inject 95 Units into the skin 3 (three) times daily before meals.     lisinopril (PRINIVIL,ZESTRIL) 20 MG tablet Take 10 mg by mouth once daily.     nitroGLYCERIN (NITROSTAT) 0.4 MG SL tablet Place 0.4 mg under the tongue every 5 (five) minutes as needed for Chest pain.    omeprazole  (PRILOSEC) 40 MG capsule Take 40 mg by mouth every morning.     ondansetron (ZOFRAN-ODT) 4 MG TbDL Take 2 tablets (8 mg total) by mouth every 6 (six) hours as needed.    potassium chloride SA (K-DUR,KLOR-CON) 20 MEQ tablet Take 20 mEq by mouth once daily.    pregabalin (LYRICA) 150 MG capsule Take 300 mg by mouth 2 (two) times daily.    promethazine (PHENERGAN) 25 MG tablet Take 25 mg by mouth every 4 (four) hours.    tiZANidine (ZANAFLEX) 4 MG tablet Take 4 mg by mouth every 8 (eight) hours.     Family History     Problem Relation (Age of Onset)    Breast cancer Paternal Grandmother    Cancer Father, Maternal Aunt    Heart attack Mother, Maternal Aunt, Maternal Grandmother    Heart disease Mother        Social History Main Topics    Smoking status: Current Every Day Smoker     Packs/day: 2.00     Years: 15.00     Types: Cigarettes    Smokeless tobacco: Never Used    Alcohol use No    Drug use: No    Sexual activity: Yes     Partners: Male     Birth control/ protection: Injection     Review of Systems   Constitutional: Negative for fatigue and fever.   Respiratory: Negative for cough and shortness of breath.    Cardiovascular: Positive for leg swelling (improved ). Negative for chest pain.   Gastrointestinal: Negative for abdominal pain, nausea and vomiting.   Genitourinary: Negative for difficulty urinating and dysuria.   Skin: Negative for color change.   Allergic/Immunologic: Negative for immunocompromised state.   Neurological: Negative for dizziness.   Hematological: Negative for adenopathy.   Psychiatric/Behavioral: Positive for agitation.   All other systems reviewed and are negative.    Objective:     Vital Signs (Most Recent):  Temp: 98.2 °F (36.8 °C) (07/17/18 0554)  Pulse: 94 (07/17/18 0800)  Resp: 18 (07/17/18 0554)  BP: (!) 144/97 (07/17/18 0554)  SpO2: 98 % (07/17/18 0159) Vital Signs (24h Range):  Temp:  [97.8 °F (36.6 °C)-98.4 °F (36.9 °C)] 98.2 °F (36.8 °C)  Pulse:  [69-94] 94  Resp:   [18-20] 18  SpO2:  [98 %-100 %] 98 %  BP: (100-144)/(51-97) 144/97     Weight: (!) 149 kg (328 lb 7.8 oz)  Body mass index is 60.08 kg/m².    Physical Exam   Constitutional: She is oriented to person, place, and time. She appears well-developed. No distress.   HENT:   Head: Normocephalic and atraumatic.   Nose: Nose normal.   Mouth/Throat: Oropharynx is clear and moist.   Eyes: Conjunctivae and EOM are normal.   Neck: Normal range of motion. Neck supple.   Cardiovascular: Normal rate and regular rhythm.    1+ pitting edema bilaterally    Pulmonary/Chest: Effort normal. No respiratory distress. She has wheezes. She exhibits no tenderness.   Abdominal: Soft. Bowel sounds are normal. She exhibits no distension and no mass. There is no tenderness. There is no rebound and no guarding.   Musculoskeletal: Normal range of motion.   Left arm amputated above the elbow    Neurological: She is alert and oriented to person, place, and time.   Skin: Skin is warm and dry. Capillary refill takes less than 2 seconds. No erythema.   Psychiatric: Her affect is labile. She is agitated and aggressive.   Nursing note and vitals reviewed.        CRANIAL NERVES     CN III, IV, VI   Extraocular motions are normal.        Significant Labs:   CBC:   Recent Labs  Lab 07/17/18 0052 07/17/18  0530   WBC 11.61 11.11   HGB 11.0* 11.9*   HCT 35.3* 37.8   PLT 65* 164     CMP:   Recent Labs  Lab 07/17/18 0052 07/17/18  0530    141   K 3.7 3.1*   * 110   CO2 15* 20*   GLU 81 104   BUN 17 14   CREATININE 0.9 0.9   CALCIUM 8.6* 8.8   PROT 6.6  --    ALBUMIN 3.4*  --    BILITOT 0.2  --    ALKPHOS 73  --    AST 14  --    ALT 21  --    ANIONGAP 12 11   EGFRNONAA >60 >60     Cardiac Markers:   Recent Labs  Lab 07/17/18  0052   BNP 31     Troponin:   Recent Labs  Lab 07/17/18 0052 07/17/18  0530   TROPONINI 0.006 0.006       Significant Imaging: I have reviewed all pertinent imaging results/findings within the past 24  hours.    Assessment/Plan:     * Acute on chronic diastolic congestive heart failure    Hypertension  H/O diastolic dysfunction  Continue at home anti-hypertensive medications.  F/u with cardiologist as needed.          Hypokalemia    Replace PO           Opioid dependence on agonist therapy    Phantom limb pain  History of amputation of left arm above elbow  Will not provide opioids upon discharge.  Continue suboxone.  Will substitute robaxin for Zanaflex         Nonalcoholic hepatosteatosis    Normal LFTs.  Continue to monitor on an outpatient basis as needed.            Benign essential hypertension    Continue previously prescribed anti-hypertensive medications           Diabetes mellitus type 2 in obese    Morbid obesity with BMI of 50.0-59.9, adult    Encouraged weight loss.  Instructed to continue home meds.  Will prescribe True Metrix testing strips for at home use.  Accu checks.  Check A1C and give SSI.           Asthma    Smokes 1 pack of cigarettes per day    Discussed smoking cessation with patient.  She has no inclination to quit at this time.    She is to continue her previously prescribed medication for her asthma exacerbations.            VTE Risk Mitigation         Ordered     IP VTE HIGH RISK PATIENT  Once      07/17/18 0400     Place VINI hose  Until discontinued      07/17/18 0400             Niharika Cummings PA-C  Department of Hospital Medicine   [facility]

## 2018-07-17 NOTE — ASSESSMENT & PLAN NOTE
Morbid obesity with BMI of 50.0-59.9, adult    Encouraged weight loss.  Instructed to continue home meds.  Will prescribe True Metrix testing strips for at home use.  Accu checks.  Check A1C and give SSI.

## 2018-07-17 NOTE — NURSING
"Went to pt room for rounds. Was informed by previous shift that pt refused bed alarm. Discussed fall risks with pt. Fall risk contract explained and signed. Pt stated she was refusing bed alarm, even though fall risk contract had been signed. Explained that pt is weak and lethargic and that bed alarm is hospital policy. Set bed alarm. Pt immediately stood and said, "I'm gonna make y'all work for your money." Pt sat in bed. Set bed alarm again. Pt immediately stood and cursed at RN and stated that she would leave AMA if bed alarm was set again. DELANEY Bundy notified.   "

## 2018-07-17 NOTE — SUBJECTIVE & OBJECTIVE
Past Medical History:   Diagnosis Date    Asthma     Cellulitis of abdominal wall 2017    Depression     Diabetes mellitus, type II     Diastolic dysfunction     Diverticulosis of intestine with bleeding 2016    E coli bacteremia 2018    E. coli pyelonephritis 2015    E. 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      Current Outpatient Prescriptions on File Prior to Encounter   Medication Sig    albuterol (ACCUNEB) 1.25 mg/3 mL Nebu Take 1.25 mg by nebulization every 4 (four) hours as needed.    albuterol 90 mcg/actuation inhaler Inhale 2 puffs into the lungs every 6 (six) hours as needed for Wheezing. Rescue    amitriptyline (ELAVIL) 50 MG tablet Take 50 mg by mouth nightly as needed for Insomnia.    buprenorphine-naloxone 2-0.5 mg (SUBOXONE) 2-0.5 mg Subl Place 8 mg under the tongue once daily. Pt states once daily    ferrous sulfate 325 (65 FE) MG EC tablet Take 1 tablet (325 mg total) by mouth 2 (two) times daily.    fluticasone-salmeterol 100-50 mcg/dose (ADVAIR) 100-50 mcg/dose diskus inhaler Inhale 1 puff into the lungs 2 (two) times daily.    furosemide (LASIX) 40 MG tablet Take 40 mg by mouth 2 (two) times daily.    hydrOXYzine pamoate (VISTARIL) 50 MG Cap Take 50 mg by mouth every 8 (eight) hours as needed.    insulin glargine, TOUJEO, (TOUJEO) 300 unit/mL (1.5 mL) InPn pen Inject into the skin 2 (two) times daily. Takes 138 units every morning; 135 units every evening    insulin lispro  (HUMALOG) 100 unit/mL injection Inject 95 Units into the skin 3 (three) times daily before meals.     lisinopril (PRINIVIL,ZESTRIL) 20 MG tablet Take 10 mg by mouth once daily.     nitroGLYCERIN (NITROSTAT) 0.4 MG SL tablet Place 0.4 mg under the tongue every 5 (five) minutes as needed for Chest pain.    omeprazole (PRILOSEC) 40 MG capsule Take 40 mg by mouth every morning.     ondansetron (ZOFRAN-ODT) 4 MG TbDL Take 2 tablets (8 mg total) by mouth every 6 (six) hours as needed.    potassium chloride SA (K-DUR,KLOR-CON) 20 MEQ tablet Take 20 mEq by mouth once daily.    pregabalin (LYRICA) 150 MG capsule Take 300 mg by mouth 2 (two) times daily.    promethazine (PHENERGAN) 25 MG tablet Take 25 mg by mouth every 4 (four) hours.    tiZANidine (ZANAFLEX) 4 MG tablet Take 4 mg by mouth every 8 (eight) hours.     Family History     Problem Relation (Age of Onset)    Breast cancer Paternal Grandmother    Cancer Father, Maternal Aunt    Heart attack Mother, Maternal Aunt, Maternal Grandmother    Heart disease Mother        Social History Main Topics    Smoking status: Current Every Day Smoker     Packs/day: 2.00     Years: 15.00     Types: Cigarettes    Smokeless tobacco: Never Used    Alcohol use No    Drug use: No    Sexual activity: Yes     Partners: Male     Birth control/ protection: Injection     Review of Systems   Constitutional: Negative for fatigue and fever.   Respiratory: Negative for cough and shortness of breath.    Cardiovascular: Positive for leg swelling (improved ). Negative for chest pain.   Gastrointestinal: Negative for abdominal pain, nausea and vomiting.   Genitourinary: Negative for difficulty urinating and dysuria.   Skin: Negative for color change.   Allergic/Immunologic: Negative for immunocompromised state.   Neurological: Negative for dizziness.   Hematological: Negative for adenopathy.   Psychiatric/Behavioral: Positive for agitation.   All other systems reviewed and are  negative.    Objective:     Vital Signs (Most Recent):  Temp: 98.2 °F (36.8 °C) (07/17/18 0554)  Pulse: 94 (07/17/18 0800)  Resp: 18 (07/17/18 0554)  BP: (!) 144/97 (07/17/18 0554)  SpO2: 98 % (07/17/18 0159) Vital Signs (24h Range):  Temp:  [97.8 °F (36.6 °C)-98.4 °F (36.9 °C)] 98.2 °F (36.8 °C)  Pulse:  [69-94] 94  Resp:  [18-20] 18  SpO2:  [98 %-100 %] 98 %  BP: (100-144)/(51-97) 144/97     Weight: (!) 149 kg (328 lb 7.8 oz)  Body mass index is 60.08 kg/m².    Physical Exam   Constitutional: She is oriented to person, place, and time. She appears well-developed. No distress.   HENT:   Head: Normocephalic and atraumatic.   Nose: Nose normal.   Mouth/Throat: Oropharynx is clear and moist.   Eyes: Conjunctivae and EOM are normal.   Neck: Normal range of motion. Neck supple.   Cardiovascular: Normal rate and regular rhythm.    1+ pitting edema bilaterally    Pulmonary/Chest: Effort normal. No respiratory distress. She has wheezes. She exhibits no tenderness.   Abdominal: Soft. Bowel sounds are normal. She exhibits no distension and no mass. There is no tenderness. There is no rebound and no guarding.   Musculoskeletal: Normal range of motion.   Left arm amputated above the elbow    Neurological: She is alert and oriented to person, place, and time.   Skin: Skin is warm and dry. Capillary refill takes less than 2 seconds. No erythema.   Psychiatric: Her affect is labile. She is agitated and aggressive.   Nursing note and vitals reviewed.        CRANIAL NERVES     CN III, IV, VI   Extraocular motions are normal.        Significant Labs:   CBC:   Recent Labs  Lab 07/17/18 0052 07/17/18  0530   WBC 11.61 11.11   HGB 11.0* 11.9*   HCT 35.3* 37.8   PLT 65* 164     CMP:   Recent Labs  Lab 07/17/18 0052 07/17/18  0530    141   K 3.7 3.1*   * 110   CO2 15* 20*   GLU 81 104   BUN 17 14   CREATININE 0.9 0.9   CALCIUM 8.6* 8.8   PROT 6.6  --    ALBUMIN 3.4*  --    BILITOT 0.2  --    ALKPHOS 73  --    AST 14  --     ALT 21  --    ANIONGAP 12 11   EGFRNONAA >60 >60     Cardiac Markers:   Recent Labs  Lab 07/17/18  0052   BNP 31     Troponin:   Recent Labs  Lab 07/17/18 0052 07/17/18  0530   TROPONINI 0.006 0.006       Significant Imaging: I have reviewed all pertinent imaging results/findings within the past 24 hours.

## 2018-07-18 ENCOUNTER — TELEPHONE (OUTPATIENT)
Dept: PULMONOLOGY | Facility: CLINIC | Age: 38
End: 2018-07-18

## 2018-08-03 ENCOUNTER — TELEPHONE (OUTPATIENT)
Dept: HEMATOLOGY/ONCOLOGY | Facility: CLINIC | Age: 38
End: 2018-08-03

## 2018-08-09 ENCOUNTER — INITIAL CONSULT (OUTPATIENT)
Dept: HEMATOLOGY/ONCOLOGY | Facility: CLINIC | Age: 38
End: 2018-08-09
Payer: MEDICAID

## 2018-08-09 ENCOUNTER — TELEPHONE (OUTPATIENT)
Dept: HEMATOLOGY/ONCOLOGY | Facility: CLINIC | Age: 38
End: 2018-08-09

## 2018-08-09 ENCOUNTER — LAB VISIT (OUTPATIENT)
Dept: LAB | Facility: HOSPITAL | Age: 38
End: 2018-08-09
Payer: MEDICAID

## 2018-08-09 VITALS
HEART RATE: 101 BPM | DIASTOLIC BLOOD PRESSURE: 55 MMHG | SYSTOLIC BLOOD PRESSURE: 106 MMHG | OXYGEN SATURATION: 96 % | WEIGHT: 293 LBS | BODY MASS INDEX: 53.92 KG/M2 | HEIGHT: 62 IN | TEMPERATURE: 98 F

## 2018-08-09 DIAGNOSIS — D72.820 LYMPHOCYTOSIS: ICD-10-CM

## 2018-08-09 DIAGNOSIS — D50.0 ANEMIA DUE TO CHRONIC BLOOD LOSS: ICD-10-CM

## 2018-08-09 DIAGNOSIS — D50.0 ANEMIA DUE TO CHRONIC BLOOD LOSS: Primary | ICD-10-CM

## 2018-08-09 DIAGNOSIS — L98.492 ULCER OF ABDOMEN WALL WITH FAT LAYER EXPOSED: ICD-10-CM

## 2018-08-09 LAB
BASOPHILS # BLD AUTO: 0.09 K/UL
BASOPHILS NFR BLD: 0.7 %
DIFFERENTIAL METHOD: ABNORMAL
EOSINOPHIL # BLD AUTO: 0.2 K/UL
EOSINOPHIL NFR BLD: 1.2 %
ERYTHROCYTE [DISTWIDTH] IN BLOOD BY AUTOMATED COUNT: 15.4 %
FERRITIN SERPL-MCNC: 51 NG/ML
HAV IGM SERPL QL IA: NEGATIVE
HBV CORE IGM SERPL QL IA: NEGATIVE
HBV SURFACE AG SERPL QL IA: NEGATIVE
HCT VFR BLD AUTO: 43.3 %
HCV AB SERPL QL IA: NEGATIVE
HGB BLD-MCNC: 13.9 G/DL
HIV 1+2 AB+HIV1 P24 AG SERPL QL IA: NEGATIVE
IMM GRANULOCYTES # BLD AUTO: 0.26 K/UL
IMM GRANULOCYTES NFR BLD AUTO: 1.9 %
LYMPHOCYTES # BLD AUTO: 5.4 K/UL
LYMPHOCYTES NFR BLD: 39.3 %
MCH RBC QN AUTO: 27.5 PG
MCHC RBC AUTO-ENTMCNC: 32.1 G/DL
MCV RBC AUTO: 86 FL
MONOCYTES # BLD AUTO: 0.7 K/UL
MONOCYTES NFR BLD: 5.4 %
NEUTROPHILS # BLD AUTO: 7 K/UL
NEUTROPHILS NFR BLD: 51.5 %
NRBC BLD-RTO: 0 /100 WBC
PLATELET # BLD AUTO: 231 K/UL
PMV BLD AUTO: 10.8 FL
RBC # BLD AUTO: 5.05 M/UL
RETICS/RBC NFR AUTO: 2.1 %
TSH SERPL DL<=0.005 MIU/L-ACNC: 0.9 UIU/ML
VIT B12 SERPL-MCNC: 226 PG/ML
WBC # BLD AUTO: 13.61 K/UL

## 2018-08-09 PROCEDURE — 88184 FLOWCYTOMETRY/ TC 1 MARKER: CPT | Performed by: PATHOLOGY

## 2018-08-09 PROCEDURE — 82747 ASSAY OF FOLIC ACID RBC: CPT

## 2018-08-09 PROCEDURE — 99204 OFFICE O/P NEW MOD 45 MIN: CPT | Mod: S$PBB,,, | Performed by: STUDENT IN AN ORGANIZED HEALTH CARE EDUCATION/TRAINING PROGRAM

## 2018-08-09 PROCEDURE — 85025 COMPLETE CBC W/AUTO DIFF WBC: CPT

## 2018-08-09 PROCEDURE — 36415 COLL VENOUS BLD VENIPUNCTURE: CPT

## 2018-08-09 PROCEDURE — 80074 ACUTE HEPATITIS PANEL: CPT

## 2018-08-09 PROCEDURE — 88185 FLOWCYTOMETRY/TC ADD-ON: CPT | Performed by: PATHOLOGY

## 2018-08-09 PROCEDURE — 85045 AUTOMATED RETICULOCYTE COUNT: CPT

## 2018-08-09 PROCEDURE — 82607 VITAMIN B-12: CPT

## 2018-08-09 PROCEDURE — 86703 HIV-1/HIV-2 1 RESULT ANTBDY: CPT

## 2018-08-09 PROCEDURE — 99999 PR PBB SHADOW E&M-EST. PATIENT-LVL III: CPT | Mod: PBBFAC,,, | Performed by: STUDENT IN AN ORGANIZED HEALTH CARE EDUCATION/TRAINING PROGRAM

## 2018-08-09 PROCEDURE — 82728 ASSAY OF FERRITIN: CPT

## 2018-08-09 PROCEDURE — 88189 FLOWCYTOMETRY/READ 16 & >: CPT | Mod: ,,, | Performed by: PATHOLOGY

## 2018-08-09 PROCEDURE — 99213 OFFICE O/P EST LOW 20 MIN: CPT | Mod: PBBFAC,25 | Performed by: STUDENT IN AN ORGANIZED HEALTH CARE EDUCATION/TRAINING PROGRAM

## 2018-08-09 PROCEDURE — 84443 ASSAY THYROID STIM HORMONE: CPT

## 2018-08-09 RX ORDER — METRONIDAZOLE 500 MG/1
500 TABLET ORAL 3 TIMES DAILY
Qty: 21 TABLET | Refills: 0 | Status: SHIPPED | OUTPATIENT
Start: 2018-08-09 | End: 2018-08-16

## 2018-08-09 RX ORDER — CARVEDILOL 3.12 MG/1
3.12 TABLET ORAL 2 TIMES DAILY WITH MEALS
COMMUNITY
End: 2021-03-31 | Stop reason: SDUPTHER

## 2018-08-09 RX ORDER — LEVOFLOXACIN 750 MG/1
750 TABLET ORAL DAILY
Qty: 7 TABLET | Refills: 0 | Status: SHIPPED | OUTPATIENT
Start: 2018-08-09 | End: 2018-08-16
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
## 2018-08-09 NOTE — PROGRESS NOTES
Distress Screening Results: Psychosocial Distress screening score of Distress Score: 10 noted and reviewed. No intervention indicated. The pt will most likely not be returning to Hematology/Oncology follow-up after this visit.

## 2018-08-10 LAB — FOLATE RBC-MCNC: 768 NG/ML

## 2018-08-13 LAB
FLOW CYTOMETRY ANTIBODIES ANALYZED - BLOOD: NORMAL
FLOW CYTOMETRY COMMENT - BLOOD: NORMAL
FLOW CYTOMETRY INTERPRETATION - BLOOD: NORMAL

## 2018-09-11 ENCOUNTER — HOSPITAL ENCOUNTER (INPATIENT)
Facility: HOSPITAL | Age: 38
LOS: 3 days | Discharge: HOME | DRG: 871 | End: 2018-09-14
Attending: EMERGENCY MEDICINE | Admitting: HOSPITALIST
Payer: MEDICAID

## 2018-09-11 DIAGNOSIS — L98.492 ULCER OF ABDOMEN WALL WITH FAT LAYER EXPOSED: ICD-10-CM

## 2018-09-11 DIAGNOSIS — K76.0 NONALCOHOLIC HEPATOSTEATOSIS: Chronic | ICD-10-CM

## 2018-09-11 DIAGNOSIS — E66.01 MORBID OBESITY WITH BMI OF 50.0-59.9, ADULT: ICD-10-CM

## 2018-09-11 DIAGNOSIS — R00.0 TACHYCARDIA: ICD-10-CM

## 2018-09-11 DIAGNOSIS — E11.69 DIABETES MELLITUS TYPE 2 IN OBESE: Chronic | ICD-10-CM

## 2018-09-11 DIAGNOSIS — R07.9 CHEST PAIN: ICD-10-CM

## 2018-09-11 DIAGNOSIS — E66.9 DIABETES MELLITUS TYPE 2 IN OBESE: Chronic | ICD-10-CM

## 2018-09-11 DIAGNOSIS — I50.32 CHRONIC DIASTOLIC CONGESTIVE HEART FAILURE: Chronic | ICD-10-CM

## 2018-09-11 DIAGNOSIS — J45.909 ASTHMA, UNSPECIFIED ASTHMA SEVERITY, UNSPECIFIED WHETHER COMPLICATED, UNSPECIFIED WHETHER PERSISTENT: Chronic | ICD-10-CM

## 2018-09-11 DIAGNOSIS — I10 BENIGN ESSENTIAL HYPERTENSION: Chronic | ICD-10-CM

## 2018-09-11 DIAGNOSIS — A41.9 SEPSIS, DUE TO UNSPECIFIED ORGANISM: Primary | ICD-10-CM

## 2018-09-11 DIAGNOSIS — R55 NEAR SYNCOPE: ICD-10-CM

## 2018-09-11 DIAGNOSIS — D72.829 LEUKOCYTOSIS, UNSPECIFIED TYPE: ICD-10-CM

## 2018-09-11 PROBLEM — D69.6 THROMBOCYTOPENIA, UNSPECIFIED: Status: RESOLVED | Noted: 2018-04-22 | Resolved: 2018-09-11

## 2018-09-11 PROBLEM — D50.0 ANEMIA DUE TO CHRONIC BLOOD LOSS: Status: RESOLVED | Noted: 2018-08-09 | Resolved: 2018-09-11

## 2018-09-11 PROBLEM — E87.6 HYPOKALEMIA: Status: RESOLVED | Noted: 2018-07-17 | Resolved: 2018-09-11

## 2018-09-11 PROBLEM — N30.90 CYSTITIS: Status: ACTIVE | Noted: 2018-09-11

## 2018-09-11 PROBLEM — E87.20 LACTIC ACIDOSIS: Status: ACTIVE | Noted: 2018-09-11

## 2018-09-11 PROBLEM — D64.9 ANEMIA: Status: RESOLVED | Noted: 2018-04-22 | Resolved: 2018-09-11

## 2018-09-11 LAB
ALBUMIN SERPL BCP-MCNC: 3.3 G/DL
ALP SERPL-CCNC: 87 U/L
ALT SERPL W/O P-5'-P-CCNC: 13 U/L
AMPHET+METHAMPHET UR QL: NEGATIVE
ANION GAP SERPL CALC-SCNC: 13 MMOL/L
AST SERPL-CCNC: 14 U/L
B-HCG UR QL: NEGATIVE
BACTERIA #/AREA URNS HPF: ABNORMAL /HPF
BARBITURATES UR QL SCN>200 NG/ML: NEGATIVE
BASOPHILS # BLD AUTO: 0.03 K/UL
BASOPHILS # BLD AUTO: ABNORMAL K/UL
BASOPHILS NFR BLD: 0 %
BASOPHILS NFR BLD: 0.2 %
BENZODIAZ UR QL SCN>200 NG/ML: NEGATIVE
BILIRUB SERPL-MCNC: 0.3 MG/DL
BILIRUB UR QL STRIP: NEGATIVE
BILIRUB UR QL STRIP: NEGATIVE
BNP SERPL-MCNC: 174 PG/ML
BUN SERPL-MCNC: 7 MG/DL
BZE UR QL SCN: NEGATIVE
CALCIUM SERPL-MCNC: 9.4 MG/DL
CANNABINOIDS UR QL SCN: NEGATIVE
CHLORIDE SERPL-SCNC: 110 MMOL/L
CLARITY UR: ABNORMAL
CLARITY UR: CLEAR
CO2 SERPL-SCNC: 13 MMOL/L
COLOR UR: YELLOW
COLOR UR: YELLOW
CREAT SERPL-MCNC: 1.3 MG/DL
CREAT UR-MCNC: 43.2 MG/DL
CTP QC/QA: YES
DIFFERENTIAL METHOD: ABNORMAL
DIFFERENTIAL METHOD: ABNORMAL
EOSINOPHIL # BLD AUTO: 0.2 K/UL
EOSINOPHIL # BLD AUTO: ABNORMAL K/UL
EOSINOPHIL NFR BLD: 0 %
EOSINOPHIL NFR BLD: 1 %
ERYTHROCYTE [DISTWIDTH] IN BLOOD BY AUTOMATED COUNT: 14.9 %
ERYTHROCYTE [DISTWIDTH] IN BLOOD BY AUTOMATED COUNT: 15.1 %
EST. GFR  (AFRICAN AMERICAN): 59 ML/MIN/1.73 M^2
EST. GFR  (NON AFRICAN AMERICAN): 51 ML/MIN/1.73 M^2
ESTIMATED AVG GLUCOSE: 189 MG/DL
GLUCOSE SERPL-MCNC: 231 MG/DL
GLUCOSE UR QL STRIP: NEGATIVE
GLUCOSE UR QL STRIP: NEGATIVE
HBA1C MFR BLD HPLC: 8.2 %
HCT VFR BLD AUTO: 37 %
HCT VFR BLD AUTO: 40.8 %
HGB BLD-MCNC: 12.7 G/DL
HGB BLD-MCNC: 13.9 G/DL
HGB UR QL STRIP: ABNORMAL
HGB UR QL STRIP: NEGATIVE
HYALINE CASTS #/AREA URNS LPF: 0 /LPF
KETONES UR QL STRIP: NEGATIVE
KETONES UR QL STRIP: NEGATIVE
LACTATE SERPL-SCNC: 1.6 MMOL/L
LACTATE SERPL-SCNC: 2.7 MMOL/L
LEUKOCYTE ESTERASE UR QL STRIP: ABNORMAL
LEUKOCYTE ESTERASE UR QL STRIP: NEGATIVE
LYMPHOCYTES # BLD AUTO: 5.6 K/UL
LYMPHOCYTES # BLD AUTO: ABNORMAL K/UL
LYMPHOCYTES NFR BLD: 37.8 %
LYMPHOCYTES NFR BLD: 38 %
MCH RBC QN AUTO: 27.5 PG
MCH RBC QN AUTO: 27.6 PG
MCHC RBC AUTO-ENTMCNC: 34.1 G/DL
MCHC RBC AUTO-ENTMCNC: 34.3 G/DL
MCV RBC AUTO: 80 FL
MCV RBC AUTO: 81 FL
METHADONE UR QL SCN>300 NG/ML: NEGATIVE
MICROSCOPIC COMMENT: ABNORMAL
MONOCYTES # BLD AUTO: 0.9 K/UL
MONOCYTES # BLD AUTO: ABNORMAL K/UL
MONOCYTES NFR BLD: 5 %
MONOCYTES NFR BLD: 6.2 %
NEUTROPHILS # BLD AUTO: 7.9 K/UL
NEUTROPHILS NFR BLD: 53 %
NEUTROPHILS NFR BLD: 53.3 %
NEUTS BAND NFR BLD MANUAL: 4 %
NITRITE UR QL STRIP: NEGATIVE
NITRITE UR QL STRIP: NEGATIVE
OPIATES UR QL SCN: NEGATIVE
PATH REV BLD -IMP: NORMAL
PCP UR QL SCN>25 NG/ML: NEGATIVE
PH UR STRIP: 6 [PH] (ref 5–8)
PH UR STRIP: 7 [PH] (ref 5–8)
PLATELET # BLD AUTO: 217 K/UL
PLATELET # BLD AUTO: 257 K/UL
PLATELET BLD QL SMEAR: ABNORMAL
PMV BLD AUTO: 10.3 FL
PMV BLD AUTO: 10.6 FL
POCT GLUCOSE: 139 MG/DL (ref 70–110)
POCT GLUCOSE: 149 MG/DL (ref 70–110)
POCT GLUCOSE: 279 MG/DL (ref 70–110)
POTASSIUM SERPL-SCNC: 3.9 MMOL/L
PROCALCITONIN SERPL IA-MCNC: 0.14 NG/ML
PROT SERPL-MCNC: 6.8 G/DL
PROT UR QL STRIP: ABNORMAL
PROT UR QL STRIP: NEGATIVE
RBC # BLD AUTO: 4.6 M/UL
RBC # BLD AUTO: 5.05 M/UL
RBC #/AREA URNS HPF: 2 /HPF (ref 0–4)
SODIUM SERPL-SCNC: 136 MMOL/L
SP GR UR STRIP: 1.02 (ref 1–1.03)
SP GR UR STRIP: <=1.005 (ref 1–1.03)
SQUAMOUS #/AREA URNS HPF: 19 /HPF
TOXICOLOGY INFORMATION: NORMAL
TROPONIN I SERPL DL<=0.01 NG/ML-MCNC: 0.02 NG/ML
TROPONIN I SERPL DL<=0.01 NG/ML-MCNC: 0.1 NG/ML
TROPONIN I SERPL DL<=0.01 NG/ML-MCNC: 0.21 NG/ML
URN SPEC COLLECT METH UR: ABNORMAL
URN SPEC COLLECT METH UR: ABNORMAL
UROBILINOGEN UR STRIP-ACNC: NEGATIVE EU/DL
UROBILINOGEN UR STRIP-ACNC: NEGATIVE EU/DL
WBC # BLD AUTO: 14.76 K/UL
WBC # BLD AUTO: 23.88 K/UL
WBC #/AREA URNS HPF: 8 /HPF (ref 0–5)

## 2018-09-11 PROCEDURE — 21400001 HC TELEMETRY ROOM

## 2018-09-11 PROCEDURE — 83880 ASSAY OF NATRIURETIC PEPTIDE: CPT

## 2018-09-11 PROCEDURE — 94761 N-INVAS EAR/PLS OXIMETRY MLT: CPT

## 2018-09-11 PROCEDURE — 25000003 PHARM REV CODE 250: Performed by: PHYSICIAN ASSISTANT

## 2018-09-11 PROCEDURE — 99285 EMERGENCY DEPT VISIT HI MDM: CPT

## 2018-09-11 PROCEDURE — 63600175 PHARM REV CODE 636 W HCPCS: Performed by: EMERGENCY MEDICINE

## 2018-09-11 PROCEDURE — 83036 HEMOGLOBIN GLYCOSYLATED A1C: CPT

## 2018-09-11 PROCEDURE — 93005 ELECTROCARDIOGRAM TRACING: CPT

## 2018-09-11 PROCEDURE — 25000003 PHARM REV CODE 250: Performed by: HOSPITALIST

## 2018-09-11 PROCEDURE — 85060 BLOOD SMEAR INTERPRETATION: CPT | Mod: ,,, | Performed by: PATHOLOGY

## 2018-09-11 PROCEDURE — 85027 COMPLETE CBC AUTOMATED: CPT

## 2018-09-11 PROCEDURE — 80307 DRUG TEST PRSMV CHEM ANLYZR: CPT

## 2018-09-11 PROCEDURE — 87086 URINE CULTURE/COLONY COUNT: CPT

## 2018-09-11 PROCEDURE — 81025 URINE PREGNANCY TEST: CPT | Performed by: EMERGENCY MEDICINE

## 2018-09-11 PROCEDURE — 85007 BL SMEAR W/DIFF WBC COUNT: CPT

## 2018-09-11 PROCEDURE — 81000 URINALYSIS NONAUTO W/SCOPE: CPT | Mod: 59

## 2018-09-11 PROCEDURE — 63600175 PHARM REV CODE 636 W HCPCS: Performed by: PHYSICIAN ASSISTANT

## 2018-09-11 PROCEDURE — 87040 BLOOD CULTURE FOR BACTERIA: CPT | Mod: 59

## 2018-09-11 PROCEDURE — G0378 HOSPITAL OBSERVATION PER HR: HCPCS

## 2018-09-11 PROCEDURE — 81003 URINALYSIS AUTO W/O SCOPE: CPT

## 2018-09-11 PROCEDURE — 96368 THER/DIAG CONCURRENT INF: CPT

## 2018-09-11 PROCEDURE — 84484 ASSAY OF TROPONIN QUANT: CPT | Mod: 91

## 2018-09-11 PROCEDURE — 25000003 PHARM REV CODE 250: Performed by: EMERGENCY MEDICINE

## 2018-09-11 PROCEDURE — 96365 THER/PROPH/DIAG IV INF INIT: CPT

## 2018-09-11 PROCEDURE — 83605 ASSAY OF LACTIC ACID: CPT | Mod: 91

## 2018-09-11 PROCEDURE — 25500020 PHARM REV CODE 255: Performed by: EMERGENCY MEDICINE

## 2018-09-11 PROCEDURE — 84484 ASSAY OF TROPONIN QUANT: CPT

## 2018-09-11 PROCEDURE — 84145 PROCALCITONIN (PCT): CPT

## 2018-09-11 PROCEDURE — 63600175 PHARM REV CODE 636 W HCPCS: Performed by: HOSPITALIST

## 2018-09-11 PROCEDURE — 82962 GLUCOSE BLOOD TEST: CPT

## 2018-09-11 PROCEDURE — 80053 COMPREHEN METABOLIC PANEL: CPT

## 2018-09-11 PROCEDURE — 85025 COMPLETE CBC W/AUTO DIFF WBC: CPT

## 2018-09-11 PROCEDURE — 93010 ELECTROCARDIOGRAM REPORT: CPT | Mod: ,,, | Performed by: INTERNAL MEDICINE

## 2018-09-11 RX ORDER — IBUPROFEN 200 MG
16 TABLET ORAL
Status: DISCONTINUED | OUTPATIENT
Start: 2018-09-11 | End: 2018-09-14 | Stop reason: HOSPADM

## 2018-09-11 RX ORDER — POTASSIUM CHLORIDE 20 MEQ/1
20 TABLET, EXTENDED RELEASE ORAL DAILY
Status: DISCONTINUED | OUTPATIENT
Start: 2018-09-11 | End: 2018-09-12

## 2018-09-11 RX ORDER — PREGABALIN 75 MG/1
300 CAPSULE ORAL 2 TIMES DAILY
Status: DISCONTINUED | OUTPATIENT
Start: 2018-09-11 | End: 2018-09-14 | Stop reason: HOSPADM

## 2018-09-11 RX ORDER — GLUCAGON 1 MG
1 KIT INJECTION
Status: DISCONTINUED | OUTPATIENT
Start: 2018-09-11 | End: 2018-09-14 | Stop reason: HOSPADM

## 2018-09-11 RX ORDER — ENOXAPARIN SODIUM 100 MG/ML
40 INJECTION SUBCUTANEOUS EVERY 24 HOURS
Status: DISCONTINUED | OUTPATIENT
Start: 2018-09-11 | End: 2018-09-14 | Stop reason: HOSPADM

## 2018-09-11 RX ORDER — BUPRENORPHINE HYDROCHLORIDE AND NALOXONE HYDROCHLORIDE DIHYDRATE 8; 2 MG/1; MG/1
8 TABLET SUBLINGUAL DAILY
Status: DISCONTINUED | OUTPATIENT
Start: 2018-09-11 | End: 2018-09-14 | Stop reason: HOSPADM

## 2018-09-11 RX ORDER — INSULIN ASPART 100 [IU]/ML
1-10 INJECTION, SOLUTION INTRAVENOUS; SUBCUTANEOUS
Status: DISCONTINUED | OUTPATIENT
Start: 2018-09-11 | End: 2018-09-14 | Stop reason: HOSPADM

## 2018-09-11 RX ORDER — ONDANSETRON 2 MG/ML
4 INJECTION INTRAMUSCULAR; INTRAVENOUS EVERY 8 HOURS PRN
Status: DISCONTINUED | OUTPATIENT
Start: 2018-09-11 | End: 2018-09-14 | Stop reason: HOSPADM

## 2018-09-11 RX ORDER — RAMELTEON 8 MG/1
8 TABLET ORAL NIGHTLY PRN
Status: DISCONTINUED | OUTPATIENT
Start: 2018-09-11 | End: 2018-09-14 | Stop reason: HOSPADM

## 2018-09-11 RX ORDER — IBUPROFEN 200 MG
24 TABLET ORAL
Status: DISCONTINUED | OUTPATIENT
Start: 2018-09-11 | End: 2018-09-14 | Stop reason: HOSPADM

## 2018-09-11 RX ORDER — NITROGLYCERIN 0.4 MG/1
0.4 TABLET SUBLINGUAL EVERY 5 MIN PRN
Status: DISCONTINUED | OUTPATIENT
Start: 2018-09-11 | End: 2018-09-14 | Stop reason: HOSPADM

## 2018-09-11 RX ORDER — FUROSEMIDE 40 MG/1
40 TABLET ORAL 2 TIMES DAILY
Status: DISCONTINUED | OUTPATIENT
Start: 2018-09-11 | End: 2018-09-11

## 2018-09-11 RX ORDER — HYDROXYZINE PAMOATE 25 MG/1
50 CAPSULE ORAL EVERY 8 HOURS
Status: DISCONTINUED | OUTPATIENT
Start: 2018-09-11 | End: 2018-09-14 | Stop reason: HOSPADM

## 2018-09-11 RX ORDER — SODIUM CHLORIDE 0.9 % (FLUSH) 0.9 %
5 SYRINGE (ML) INJECTION
Status: DISCONTINUED | OUTPATIENT
Start: 2018-09-11 | End: 2018-09-14 | Stop reason: HOSPADM

## 2018-09-11 RX ORDER — IPRATROPIUM BROMIDE AND ALBUTEROL SULFATE 2.5; .5 MG/3ML; MG/3ML
3 SOLUTION RESPIRATORY (INHALATION) EVERY 4 HOURS PRN
Status: DISCONTINUED | OUTPATIENT
Start: 2018-09-11 | End: 2018-09-14 | Stop reason: HOSPADM

## 2018-09-11 RX ORDER — ONDANSETRON 8 MG/1
8 TABLET, ORALLY DISINTEGRATING ORAL EVERY 8 HOURS PRN
Status: DISCONTINUED | OUTPATIENT
Start: 2018-09-11 | End: 2018-09-14 | Stop reason: HOSPADM

## 2018-09-11 RX ORDER — FLUTICASONE FUROATE AND VILANTEROL 100; 25 UG/1; UG/1
1 POWDER RESPIRATORY (INHALATION) DAILY
Status: DISCONTINUED | OUTPATIENT
Start: 2018-09-11 | End: 2018-09-14 | Stop reason: HOSPADM

## 2018-09-11 RX ORDER — CARVEDILOL 3.12 MG/1
3.12 TABLET ORAL 2 TIMES DAILY WITH MEALS
Status: DISCONTINUED | OUTPATIENT
Start: 2018-09-11 | End: 2018-09-14 | Stop reason: HOSPADM

## 2018-09-11 RX ORDER — ACETAMINOPHEN 325 MG/1
650 TABLET ORAL EVERY 4 HOURS PRN
Status: DISCONTINUED | OUTPATIENT
Start: 2018-09-11 | End: 2018-09-14 | Stop reason: HOSPADM

## 2018-09-11 RX ORDER — AMITRIPTYLINE HYDROCHLORIDE 25 MG/1
50 TABLET, FILM COATED ORAL NIGHTLY PRN
Status: DISCONTINUED | OUTPATIENT
Start: 2018-09-11 | End: 2018-09-14 | Stop reason: HOSPADM

## 2018-09-11 RX ORDER — PANTOPRAZOLE SODIUM 40 MG/1
40 TABLET, DELAYED RELEASE ORAL DAILY
Status: DISCONTINUED | OUTPATIENT
Start: 2018-09-11 | End: 2018-09-14 | Stop reason: HOSPADM

## 2018-09-11 RX ORDER — POLYETHYLENE GLYCOL 3350 17 G/17G
17 POWDER, FOR SOLUTION ORAL DAILY
Status: DISCONTINUED | OUTPATIENT
Start: 2018-09-11 | End: 2018-09-14 | Stop reason: HOSPADM

## 2018-09-11 RX ORDER — ASPIRIN 325 MG
325 TABLET ORAL DAILY
Status: DISCONTINUED | OUTPATIENT
Start: 2018-09-12 | End: 2018-09-14 | Stop reason: HOSPADM

## 2018-09-11 RX ADMIN — INSULIN ASPART 6 UNITS: 100 INJECTION, SOLUTION INTRAVENOUS; SUBCUTANEOUS at 06:09

## 2018-09-11 RX ADMIN — HYDROXYZINE PAMOATE 50 MG: 25 CAPSULE ORAL at 02:09

## 2018-09-11 RX ADMIN — INSULIN DETEMIR 75 UNITS: 100 INJECTION, SOLUTION SUBCUTANEOUS at 10:09

## 2018-09-11 RX ADMIN — IOHEXOL 100 ML: 350 INJECTION, SOLUTION INTRAVENOUS at 05:09

## 2018-09-11 RX ADMIN — PIPERACILLIN AND TAZOBACTAM 4.5 G: 4; .5 INJECTION, POWDER, LYOPHILIZED, FOR SOLUTION INTRAVENOUS; PARENTERAL at 08:09

## 2018-09-11 RX ADMIN — PREGABALIN 300 MG: 75 CAPSULE ORAL at 08:09

## 2018-09-11 RX ADMIN — HYDROXYZINE PAMOATE 50 MG: 25 CAPSULE ORAL at 09:09

## 2018-09-11 RX ADMIN — SODIUM CHLORIDE 2000 ML: 0.9 INJECTION, SOLUTION INTRAVENOUS at 05:09

## 2018-09-11 RX ADMIN — BUPRENORPHINE HYDROCHLORIDE AND NALOXONE HYDROCHLORIDE DIHYDRATE 1 TABLET: 8; 2 TABLET SUBLINGUAL at 02:09

## 2018-09-11 RX ADMIN — PANTOPRAZOLE SODIUM 40 MG: 40 TABLET, DELAYED RELEASE ORAL at 12:09

## 2018-09-11 RX ADMIN — POTASSIUM CHLORIDE 20 MEQ: 1500 TABLET, EXTENDED RELEASE ORAL at 12:09

## 2018-09-11 RX ADMIN — PIPERACILLIN AND TAZOBACTAM 4.5 G: 4; .5 INJECTION, POWDER, LYOPHILIZED, FOR SOLUTION INTRAVENOUS; PARENTERAL at 05:09

## 2018-09-11 RX ADMIN — ACETAMINOPHEN 650 MG: 325 TABLET ORAL at 09:09

## 2018-09-11 RX ADMIN — FUROSEMIDE 40 MG: 40 TABLET ORAL at 06:09

## 2018-09-11 NOTE — ASSESSMENT & PLAN NOTE
Cr 1.3 from baseline 0.9. Likely 2/2 sepsis and hypotension. Received 2L IVF in ED.  Continue to monitor. IVF as needed.

## 2018-09-11 NOTE — PROVIDER PROGRESS NOTES - EMERGENCY DEPT.
Encounter Date: 9/11/2018    ED Physician Progress Notes       SCRIBE NOTE: I, Allie Li, am scribing for, and in the presence of,  Dr. Rand.  Physician Statement: I, Dr. Rand, personally performed the services described in this documentation as scribed by Allie Li in my presence, and it is both accurate and complete.     Physician Note:   **This is an assumption of care note**    Case accepted from Dr. Starr at 0600    Pt presents with multiple issues. She had CP and a near syncopal episode. She had an episode of HTN which was fluid responsive. Her workup revealed leukocytosis of unknown origin; although, her nurse said her urine was foul smelling. Pt has current received IVF, medicine for CP, ABX given her leukocytosis. We're awaiting results of CT scan and plan for admission due to her multiple issues.     8:03 AM - Discussed case with [facility] hospitalist who will admit patient.

## 2018-09-11 NOTE — ASSESSMENT & PLAN NOTE
Smokes 1 pack of cigarettes per day  Tobacco use disorder  Diffuse quiet wheezing on exam with no hypoxia on 2L NC. SOB PTA, but currently resolved. No plan to quit smoking.  -Duonebs PRN  -Nicotine patch  -Continue to reinforce need to quit smoking

## 2018-09-11 NOTE — HPI
Ms. Lele Dias is a 37 yo white female with diastolic CHF, HTN, DM II, opioid dependence on opioid agonist therapy, asthma, tobacco use disorder, nonhealing ulcer of abdomen, recurrent UTI with hx of ESBL, and BLANCO who presents today with chest pain and leukocytosis. She reports that this morning she awoke and had chest pain, took a nitroglycerin and went back to bed. She then awoke again to go to the bathroom but by the time she got there she was shaking, SOB, and had chest pain again. EMS were called and she was found to be tachycardic and hypotensive. She was given another dose of nitro and aspirin.    In the ED, she was hypotensive but responded well to IVF, with SBP increasing from 80s to 120s. WBC were 24, and she had metabolic acidosis with lactate 2.7 and CO2 13. BNP was 174 and troponin 0.097. Cr was 1.3 from baseline 0.9, and UA showed many bacteria and 8 WBC. EKG was NSR with prolonged QT. CXR and CT abdomen and pelvis were non-contributory. Blood cultures were drawn. She was admitted to [facility] hospital medicine.
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
## 2018-09-11 NOTE — ASSESSMENT & PLAN NOTE
Takes 137 units glargine BID and 94 units lispro WM. A1C 6.7.   -Resume insulin as appropriate while admitted  -Diabetic cardiac diet  -Check BG AC and HS, and use SSI
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
## 2018-09-11 NOTE — ASSESSMENT & PLAN NOTE
Echo 7/17/18 showed normal EF and diastolic function. Pt takes coreg 3.125 BID but has been out for two days, also takes lasix 40 mg BID. Edematous on exam.  today from baseline 30.  -Continue lasix IV  -Telemetry monitoring  -Strict I/Os and daily weights  -Resume coreg with hold parameters - currently bradycardic

## 2018-09-11 NOTE — ASSESSMENT & PLAN NOTE
Had episode of chest pain with hypotension and tachycardia PTA. Troponin 0.019 > 0.097.   -Trend troponin  -Telemetry monitoring

## 2018-09-11 NOTE — ASSESSMENT & PLAN NOTE
Hypotensive today. Pt takes lisinopril only PRN.  -Hold lisinopril for BRIDGET and hypotension  -Monitor

## 2018-09-11 NOTE — SUBJECTIVE & OBJECTIVE
Past Medical History:   Diagnosis Date    Asthma     Cellulitis of abdominal wall 2017    Depression     Diabetes mellitus, type II     Diastolic dysfunction     Diverticulosis of intestine with bleeding 2016    E coli bacteremia 2018    E. coli pyelonephritis 2015    E. 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      Current Outpatient Medications on File Prior to Encounter   Medication Sig    albuterol (ACCUNEB) 1.25 mg/3 mL Nebu Take 1.25 mg by nebulization every 4 (four) hours as needed.    albuterol 90 mcg/actuation inhaler Inhale 2 puffs into the lungs every 6 (six) hours as needed for Wheezing. Rescue    amitriptyline (ELAVIL) 50 MG tablet Take 50 mg by mouth nightly as needed for Insomnia.    blood sugar diagnostic Strp TEST BLOOD SUAGR ONCE DAILY    blood-glucose meter Misc use as directed    buprenorphine-naloxone 2-0.5 mg (SUBOXONE) 2-0.5 mg Subl Place 8 mg under the tongue once daily. Pt states once daily    carvedilol (COREG) 3.125 MG tablet Take 3.125 mg by mouth 2 (two) times daily with meals.    fluticasone-salmeterol 100-50 mcg/dose (ADVAIR) 100-50 mcg/dose diskus inhaler Inhale 1 puff into the lungs 2 (two) times daily.    furosemide (LASIX) 40 MG tablet Take 40 mg by mouth 2 (two) times daily.    hydrOXYzine pamoate (VISTARIL) 50 MG Cap Take 50 mg by mouth every 8 (eight) hours as needed.    insulin glargine, TOUJEO, (TOUJEO) 300 unit/mL (1.5 mL) InPn pen Inject into the skin 2  (two) times daily. Takes 138 units every morning; 135 units every evening    insulin lispro (HUMALOG) 100 unit/mL injection Inject 95 Units into the skin 3 (three) times daily before meals.     lancets 30 gauge Misc TEST BLOOD SUGAR ONCE DAILY    lisinopril (PRINIVIL,ZESTRIL) 20 MG tablet Take 10 mg by mouth once daily.     nitroGLYCERIN (NITROSTAT) 0.4 MG SL tablet Place 0.4 mg under the tongue every 5 (five) minutes as needed for Chest pain.    omeprazole (PRILOSEC) 40 MG capsule Take 40 mg by mouth every morning.     ondansetron (ZOFRAN-ODT) 4 MG TbDL Take 2 tablets (8 mg total) by mouth every 6 (six) hours as needed.    potassium chloride SA (K-DUR,KLOR-CON) 20 MEQ tablet Take 20 mEq by mouth once daily.    pregabalin (LYRICA) 150 MG capsule Take 300 mg by mouth 2 (two) times daily.    promethazine (PHENERGAN) 25 MG tablet Take 25 mg by mouth every 4 (four) hours.     Family History     Problem Relation (Age of Onset)    Breast cancer Paternal Grandmother    Cancer Father, Maternal Aunt    Heart attack Mother, Maternal Aunt, Maternal Grandmother    Heart disease Mother        Tobacco Use    Smoking status: Current Every Day Smoker     Packs/day: 2.00     Years: 15.00     Pack years: 30.00     Types: Cigarettes    Smokeless tobacco: Never Used   Substance and Sexual Activity    Alcohol use: No    Drug use: No    Sexual activity: Yes     Partners: Male     Birth control/protection: Injection     Review of Systems   Constitutional: Positive for diaphoresis. Negative for activity change and appetite change.   HENT: Negative for congestion and rhinorrhea.    Eyes: Negative for visual disturbance.   Respiratory: Positive for shortness of breath. Negative for cough.    Cardiovascular: Positive for chest pain, palpitations and leg swelling.   Gastrointestinal: Negative for abdominal pain, constipation, diarrhea, nausea and vomiting.   Endocrine: Negative for polydipsia and polyphagia.   Genitourinary:  Negative for decreased urine volume, difficulty urinating, dysuria and frequency.   Musculoskeletal: Positive for arthralgias (from fall on back). Negative for myalgias.   Skin: Positive for wound.   Allergic/Immunologic: Positive for immunocompromised state (DM).   Neurological: Positive for dizziness and light-headedness. Negative for syncope.   Hematological: Does not bruise/bleed easily.     Objective:     Vital Signs (Most Recent):  Temp: 97.8 °F (36.6 °C) (09/11/18 0431)  Pulse: (!) 54 (09/11/18 0831)  Resp: (!) 21 (09/11/18 0831)  BP: (!) 125/53 (09/11/18 0831)  SpO2: 100 % (09/11/18 0831) Vital Signs (24h Range):  Temp:  [97.8 °F (36.6 °C)] 97.8 °F (36.6 °C)  Pulse:  [43-92] 54  Resp:  [13-23] 21  SpO2:  [97 %-100 %] 100 %  BP: ()/(28-67) 125/53     Weight: (!) 144.2 kg (318 lb)  Body mass index is 58.16 kg/m².    Physical Exam   Constitutional: No distress.   Morbidly obese   HENT:   Head: Normocephalic and atraumatic.   Eyes: EOM are normal.   Cardiovascular: Regular rhythm. Bradycardia present.   Pulmonary/Chest: No respiratory distress. She has wheezes (throughout).   Abdominal: Soft. Bowel sounds are normal. There is no tenderness.   Musculoskeletal: She exhibits edema (1+ pitting bilaterally) and deformity (left arm amputation).   Neurological: She is alert.   Skin: Skin is warm and dry. She is not diaphoretic.              CRANIAL NERVES     CN III, IV, VI   Extraocular motions are normal.            Significant Labs: All pertinent labs within the past 24 hours have been reviewed.    Significant Imaging: I have reviewed all pertinent imaging results/findings within the past 24 hours.

## 2018-09-11 NOTE — NURSING
"Patient to  via stretcher with ER RN. Patient is AAO x4. Tele monitor applied as per orders. IV site dressing c/d/i. At present no distress noted. Patient demanding and impatient.  "" at bedside. BLE +1 pitting edema noted. Dressing to open ulcer to lower left abdomen under abd fold dry and intact. Small amount of old grayish drainage noted on old dressing. For further data refer to admission assessment data sheets. Will cont to monitor pt and intervene prn.  "

## 2018-09-11 NOTE — ED PROVIDER NOTES
Encounter Date: 2018    SCRIBE #1 NOTE: I, Valarie Oneill, am scribing for, and in the presence of,  Dr. Alex I have scribed the entire note.       History     Chief Complaint   Patient presents with    Chest Pain     left sided; 8/10; one nitro taken before EMS arrival; Described as sharp; EMS reports pt was diaphoretic and dizzy upon arrival; pt denies chest pain at this time; EMS reports gave pt ASA and one sL nitro pta;      This is a 38 y.o. female who has a past medical history of Asthma, Cellulitis of abdominal wall (2017), Depression, Diabetes mellitus, type II, Diastolic dysfunction, Diverticulosis of intestine with bleeding (2016), E coli bacteremia (2018), E. coli pyelonephritis (2015), E. coli UTI (2017), Hernia, epigastric, Hypertension, Nonalcoholic hepatosteatosis, and Periumbilical hernia.     The patient presents to the Emergency Department with chest pain.  She reports onset of symptoms a few hours ago.  The patient reports she woke from sleep due to not feeling well.   The patient states she was shaking with burning sensation in chest.   She attributes the burning in the chest due to acid reflx from pizza eaten for dinner.  She notes she then took Nitro and Zantac, then went back to sleep  The patient reports woke again from sleep with more intense shaking and called for her daughter.  The daughter then called the ambulance  As per EMS the patient was tachycardiac, weak and dizzy while sitting on toilet.   The patient was found to be hypotensive. She was then given IV fluids which improved the patient's pressure.   Once the pressure normalized the patient was given 1 spray of Nitro and aspirin.   She reports associated shortness of breath and sweating  Pt denies any vomiting or palpitations  She is currently without any pain.  Patient has prior history of similar symptoms.   Pt has a past surgical history that includes  section; TONSILLECTOMY,  ADENOIDECTOMY;  section, classic; Arm amputation at shoulder (Left, s); and Cholecystectomy (age 15).        The history is provided by the patient and the EMS personnel.     Review of patient's allergies indicates:   Allergen Reactions    Celexa [citalopram] Nausea And Vomiting     Past Medical History:   Diagnosis Date    Asthma     Cellulitis of abdominal wall 2017    Depression     Diabetes mellitus, type II     Diastolic dysfunction     Diverticulosis of intestine with bleeding 2016    E coli bacteremia 2018    E. coli pyelonephritis 2015    E. coli UTI 2017    Hernia, epigastric     Hypertension     Nonalcoholic hepatosteatosis     Periumbilical hernia      Past Surgical History:   Procedure Laterality Date    ARM AMPUTATION AT SHOULDER Left s     SECTION       SECTION, CLASSIC      CHOLECYSTECTOMY  age 15    TONSILLECTOMY, ADENOIDECTOMY       Family History   Problem Relation Age of Onset    Cancer Father     Heart attack Mother     Heart disease Mother     Cancer Maternal Aunt         lung (smoker)    Heart attack Maternal Aunt     Breast cancer Paternal Grandmother     Heart attack Maternal Grandmother      Social History     Tobacco Use    Smoking status: Current Every Day Smoker     Packs/day: 2.00     Years: 15.00     Pack years: 30.00     Types: Cigarettes    Smokeless tobacco: Never Used   Substance Use Topics    Alcohol use: No    Drug use: No     Review of Systems   Constitutional: Positive for diaphoresis. Negative for chills and fever.   Respiratory: Positive for shortness of breath.    Cardiovascular: Positive for chest pain. Negative for palpitations.   Gastrointestinal: Positive for nausea. Negative for vomiting.   Neurological: Positive for dizziness.       Physical Exam     Initial Vitals [18 0431]   BP Pulse Resp Temp SpO2   (!) 85/39 92 20 97.8 °F (36.6 °C) 98 %      MAP       --         Physical  Exam    Nursing note and vitals reviewed.  Constitutional: She appears well-developed and well-nourished. She is not diaphoretic. No distress.   Obese   HENT:   Head: Normocephalic and atraumatic.   Mouth/Throat: Oropharynx is clear and moist.   Eyes: Conjunctivae and EOM are normal. Pupils are equal, round, and reactive to light.   Neck: Normal range of motion. Neck supple.   Cardiovascular: Normal rate, regular rhythm and normal heart sounds. Exam reveals no gallop and no friction rub.    No murmur heard.  Pulmonary/Chest: Breath sounds normal. She has no wheezes. She has no rhonchi. She has no rales.   Abdominal: Soft. Bowel sounds are normal. There is tenderness. There is no rebound and no guarding.   Lower abdominal tenderness   Musculoskeletal: Normal range of motion. She exhibits edema. She exhibits no tenderness.   Left arm amputation above the elbow. BLE edema   Lymphadenopathy:     She has no cervical adenopathy.   Neurological: She is alert and oriented to person, place, and time. She has normal strength.   Skin: Skin is warm and dry. Capillary refill takes less than 2 seconds. No rash noted.         ED Course   Procedures  Labs Reviewed   CBC W/ AUTO DIFFERENTIAL - Abnormal; Notable for the following components:       Result Value    WBC 23.88 (*)     MCV 81 (*)     RDW 14.9 (*)     All other components within normal limits   COMPREHENSIVE METABOLIC PANEL - Abnormal; Notable for the following components:    CO2 13 (*)     Glucose 231 (*)     Albumin 3.3 (*)     eGFR if  59 (*)     eGFR if non  51 (*)     All other components within normal limits   B-TYPE NATRIURETIC PEPTIDE - Abnormal; Notable for the following components:     (*)     All other components within normal limits   URINALYSIS, REFLEX TO URINE CULTURE - Abnormal; Notable for the following components:    Appearance, UA Hazy (*)     Protein, UA 2+ (*)     Occult Blood UA 1+ (*)     Leukocytes, UA 1+ (*)      All other components within normal limits    Narrative:     Preferred Collection Type->Urine, Clean Catch   LACTIC ACID, PLASMA - Abnormal; Notable for the following components:    Lactate (Lactic Acid) 2.7 (*)     All other components within normal limits   URINALYSIS MICROSCOPIC - Abnormal; Notable for the following components:    WBC, UA 8 (*)     Bacteria, UA Many (*)     All other components within normal limits    Narrative:     Preferred Collection Type->Urine, Clean Catch   TROPONIN I - Abnormal; Notable for the following components:    Troponin I 0.097 (*)     All other components within normal limits   URINALYSIS - Abnormal; Notable for the following components:    Specific Gravity, UA <=1.005 (*)     All other components within normal limits   POCT GLUCOSE - Abnormal; Notable for the following components:    POCT Glucose 139 (*)     All other components within normal limits   CULTURE, URINE   CULTURE, URINE   TROPONIN I   PROCALCITONIN   LACTIC ACID, PLASMA   LACTIC ACID, PLASMA   PATHOLOGIST REVIEW   POCT URINE PREGNANCY   POCT GLUCOSE   POCT GLUCOSE     EKG Readings: (Independently Interpreted)   EKG: Normal sinus rhythm at 81 bpm, nl axis, Prolonged QT at 571 ms, no hypertrophy, no ST-T changes as read by me (Dr. Starr). There are changes in comparison to previous EKG on 7/17/18, QT is more prolonged currently.         Imaging Results          X-Ray Chest AP Portable (Final result)  Result time 09/11/18 06:55:01    Final result by Rajan Mcclain MD (09/11/18 06:55:01)                 Impression:      No radiographic evidence of acute intrathoracic process.  Stable cardiac enlargement with prominent central pulmonary vasculature.      Electronically signed by: Rajan Mcclain MD  Date:    09/11/2018  Time:    06:55             Narrative:    EXAMINATION:  XR CHEST AP PORTABLE    CLINICAL HISTORY:  Chest pain, unspecified    TECHNIQUE:  Single frontal view of the chest was  performed.    COMPARISON:  07/17/2017    FINDINGS:  Cardiomediastinal silhouette appears enlarged, similar to prior examination.  The lungs are symmetrically expanded with mild prominence of the central pulmonary vasculature.  No confluent airspace consolidation or pleural effusion identified.  There is no evidence of pneumothorax.  The visualized osseous structures demonstrate stable degenerative changes.                               CT Abdomen Pelvis With Contrast (Final result)  Result time 09/11/18 07:10:43    Final result by Rajan Mcclain MD (09/11/18 07:10:43)                 Impression:      1.  Interval resolution of previously demonstrated perinephric inflammatory change.  No evidence of hydronephrosis.  Small punctate focus of air within the urinary bladder which may relate to recent catheterization, although clinical correlation is advised.    2.  Hepatomegaly.  Cholecystectomy.    3.  Small hiatal hernia.  Complex anterior abdominal wall fat containing hernia, similar to prior exam.      Electronically signed by: Rajan Mcclain MD  Date:    09/11/2018  Time:    07:10             Narrative:    EXAMINATION:  CT ABDOMEN PELVIS WITH CONTRAST    CLINICAL HISTORY:  lower abd TTP;    TECHNIQUE:  Low dose axial images, sagittal and coronal reformations were obtained from the lung bases to the pubic symphysis following the IV administration of 100 mL of Omnipaque 350 .  Oral contrast was not given.    COMPARISON:  CT 04/20/2018    FINDINGS:  No significant pleural effusion at the visualized lung bases.  The visualized portions of the heart appear normal.    The liver is enlarged.  No focal hepatic abnormality is appreciated.  The gallbladder is surgically absent.  The portal vein is patent.  There is no intra-or extrahepatic biliary ductal dilatation.    There is a small hiatal hernia.  The stomach otherwise appears within normal limits.  The spleen, pancreas, and adrenal glands appear within normal  limits.    The kidneys enhance symmetrically and excrete contrast appropriately.  The previously demonstrated perinephric edema has intervally resolved.  No definite evidence of hydronephrosis.  There is a small focus of air within the urinary bladder which may relate to recent catheterization.  Clinical correlation is advised.  The bladder otherwise appears within normal limits.  The uterus appears within normal limits.  No significant free fluid within the pelvis.    The abdominal aorta is normal in course and caliber without significant atherosclerotic calcifications.    The visualized loops of small and large bowel show no evidence of obstruction or inflammation.  The appendix appears within normal limits.  There is a complex anterior abdominal wall fat containing hernia, similar to prior examination.  There is no free intraperitoneal air or ascites.    The osseous structures demonstrate stable degenerative changes.  The extraperitoneal soft tissues are unremarkable.                                 Medical Decision Making:   Initial Assessment:   This is an emergent evaluation of a 38 y.o.female patient with presentation of mid-chest pain and reflux-type symptoms with low blood pressure s/p Nitroglycerin nausea and dizziness  Initial differentials include but are not limited to: ACS, medication affect, reflux  MSK pain, aortic dissection , .   Plan: cardiac workup including troponin, EKG and chest xray    Clinical Tests:   Lab Tests: Ordered and Reviewed  Medical Tests: Ordered and Reviewed  ED Management:  5:11 AM Patient has white count of 23,000, will add on lactate and blood cultures               Attending Attestation:         Attending Critical Care:   Critical Care Times:   ==============================================================  · Total Critical Care Time - exclusive of procedural time: 30 minutes.  ==============================================================              ED Course as of Sep 12  0014   Tue Sep 11, 2018   0531 Chemistry shows metabolic acidosis, mild renal impairment, hyperglycemia.  We are pending lactate  [NP]      ED Course User Index  [NP] Oscar Starr MD       6AM - case turned over to Dr. Rand pending workup and likely admission.      Clinical Impression:     1. Sepsis, due to unspecified organism    2. Chest pain    3. Near syncope           Scribe attestation: I, Dr. Oscar Starr, personally performed the services described in this documentation.   All medical record entries made by the scribe were at my direction and in my presence.   I have reviewed the chart and agree that the record is accurate and complete.   Oscar Starr MD.                     Oscar Starr MD  09/12/18 0013       Oscar Starr MD  09/12/18 0014

## 2018-09-11 NOTE — NURSING
Care assumed. Pt anxious. DELANEY Parker at bedside speaking with pt about plan of care. NAD noted. Will cont to monitor.

## 2018-09-11 NOTE — ASSESSMENT & PLAN NOTE
Cystitis  Ulcer of abdomen wall with fat layer exposed  Lactic acidosis  WBC 23.88 today with lactate 2.7. Hypotensive on arrival, possibly 2/2 NG administration x2, with improvement w IVF. UA showing many bacteria and 8 WBC, culture sent as pt has hx of recurrent UTI and ESBL. Pt with chronic nonhealing wound of abdomen - foul smelling with purulent drainage today (usually dressed with silvadene daily).   -Gen surg consult for wound  -Zosyn   -Monitor CBC and check repeat lactate  -Monitor BP

## 2018-09-12 ENCOUNTER — CLINICAL SUPPORT (OUTPATIENT)
Dept: SMOKING CESSATION | Facility: CLINIC | Age: 38
End: 2018-09-12
Payer: COMMERCIAL

## 2018-09-12 DIAGNOSIS — F17.200 TOBACCO USE DISORDER: Chronic | ICD-10-CM

## 2018-09-12 PROBLEM — A41.9 SEPSIS: Status: ACTIVE | Noted: 2018-09-12

## 2018-09-12 LAB
ANION GAP SERPL CALC-SCNC: 15 MMOL/L
BACTERIA UR CULT: NORMAL
BACTERIA UR CULT: NORMAL
BASOPHILS NFR BLD: 0 %
BUN SERPL-MCNC: 13 MG/DL
CALCIUM SERPL-MCNC: 9.8 MG/DL
CHLORIDE SERPL-SCNC: 111 MMOL/L
CO2 SERPL-SCNC: 17 MMOL/L
CREAT SERPL-MCNC: 1.5 MG/DL
DIFFERENTIAL METHOD: ABNORMAL
EOSINOPHIL NFR BLD: 0 %
ERYTHROCYTE [DISTWIDTH] IN BLOOD BY AUTOMATED COUNT: 16.4 %
EST. GFR  (AFRICAN AMERICAN): 50 ML/MIN/1.73 M^2
EST. GFR  (NON AFRICAN AMERICAN): 43 ML/MIN/1.73 M^2
GLUCOSE SERPL-MCNC: 82 MG/DL
HCT VFR BLD AUTO: 40.4 %
HGB BLD-MCNC: 13.8 G/DL
LYMPHOCYTES NFR BLD: 46 %
MCH RBC QN AUTO: 27.4 PG
MCHC RBC AUTO-ENTMCNC: 34.2 G/DL
MCV RBC AUTO: 80 FL
MONOCYTES NFR BLD: 5 %
NEUTROPHILS NFR BLD: 49 %
PLATELET # BLD AUTO: 254 K/UL
PMV BLD AUTO: 11 FL
POCT GLUCOSE: 103 MG/DL (ref 70–110)
POCT GLUCOSE: 118 MG/DL (ref 70–110)
POCT GLUCOSE: 140 MG/DL (ref 70–110)
POCT GLUCOSE: 141 MG/DL (ref 70–110)
POTASSIUM SERPL-SCNC: 3.4 MMOL/L
RBC # BLD AUTO: 5.03 M/UL
SODIUM SERPL-SCNC: 143 MMOL/L
TROPONIN I SERPL DL<=0.01 NG/ML-MCNC: 0.22 NG/ML
TROPONIN I SERPL DL<=0.01 NG/ML-MCNC: 0.3 NG/ML
TROPONIN I SERPL DL<=0.01 NG/ML-MCNC: 0.46 NG/ML
TROPONIN I SERPL DL<=0.01 NG/ML-MCNC: 0.57 NG/ML
WBC # BLD AUTO: 19.54 K/UL

## 2018-09-12 PROCEDURE — 25000242 PHARM REV CODE 250 ALT 637 W/ HCPCS: Performed by: PHYSICIAN ASSISTANT

## 2018-09-12 PROCEDURE — 84484 ASSAY OF TROPONIN QUANT: CPT | Mod: 91

## 2018-09-12 PROCEDURE — 99407 BEHAV CHNG SMOKING > 10 MIN: CPT | Mod: S$GLB,,,

## 2018-09-12 PROCEDURE — 93005 ELECTROCARDIOGRAM TRACING: CPT

## 2018-09-12 PROCEDURE — 99223 1ST HOSP IP/OBS HIGH 75: CPT | Mod: ,,, | Performed by: INTERNAL MEDICINE

## 2018-09-12 PROCEDURE — 80048 BASIC METABOLIC PNL TOTAL CA: CPT

## 2018-09-12 PROCEDURE — 93010 ELECTROCARDIOGRAM REPORT: CPT | Mod: ,,, | Performed by: INTERNAL MEDICINE

## 2018-09-12 PROCEDURE — 25000003 PHARM REV CODE 250: Performed by: PHYSICIAN ASSISTANT

## 2018-09-12 PROCEDURE — 25000003 PHARM REV CODE 250: Performed by: HOSPITALIST

## 2018-09-12 PROCEDURE — 63600175 PHARM REV CODE 636 W HCPCS: Performed by: HOSPITALIST

## 2018-09-12 PROCEDURE — 85007 BL SMEAR W/DIFF WBC COUNT: CPT

## 2018-09-12 PROCEDURE — 97802 MEDICAL NUTRITION INDIV IN: CPT

## 2018-09-12 PROCEDURE — 36415 COLL VENOUS BLD VENIPUNCTURE: CPT

## 2018-09-12 PROCEDURE — 94761 N-INVAS EAR/PLS OXIMETRY MLT: CPT

## 2018-09-12 PROCEDURE — 21400001 HC TELEMETRY ROOM

## 2018-09-12 PROCEDURE — 99900035 HC TECH TIME PER 15 MIN (STAT)

## 2018-09-12 PROCEDURE — 27000221 HC OXYGEN, UP TO 24 HOURS

## 2018-09-12 PROCEDURE — 63600175 PHARM REV CODE 636 W HCPCS: Performed by: PHYSICIAN ASSISTANT

## 2018-09-12 PROCEDURE — 85027 COMPLETE CBC AUTOMATED: CPT

## 2018-09-12 RX ORDER — IBUPROFEN 200 MG
1 TABLET ORAL DAILY
Status: DISCONTINUED | OUTPATIENT
Start: 2018-09-13 | End: 2018-09-14 | Stop reason: HOSPADM

## 2018-09-12 RX ORDER — ACETAMINOPHEN 500 MG
1000 TABLET ORAL EVERY 6 HOURS PRN
Status: DISCONTINUED | OUTPATIENT
Start: 2018-09-12 | End: 2018-09-14 | Stop reason: HOSPADM

## 2018-09-12 RX ORDER — VANCOMYCIN HCL IN 5 % DEXTROSE 1G/250ML
1000 PLASTIC BAG, INJECTION (ML) INTRAVENOUS
Status: DISCONTINUED | OUTPATIENT
Start: 2018-09-12 | End: 2018-09-12

## 2018-09-12 RX ORDER — POTASSIUM CHLORIDE 20 MEQ/1
40 TABLET, EXTENDED RELEASE ORAL DAILY
Status: DISCONTINUED | OUTPATIENT
Start: 2018-09-12 | End: 2018-09-14 | Stop reason: HOSPADM

## 2018-09-12 RX ADMIN — ACETAMINOPHEN 1000 MG: 500 TABLET ORAL at 09:09

## 2018-09-12 RX ADMIN — FLUTICASONE FUROATE AND VILANTEROL TRIFENATATE 1 PUFF: 100; 25 POWDER RESPIRATORY (INHALATION) at 09:09

## 2018-09-12 RX ADMIN — PIPERACILLIN AND TAZOBACTAM 4.5 G: 4; .5 INJECTION, POWDER, LYOPHILIZED, FOR SOLUTION INTRAVENOUS; PARENTERAL at 09:09

## 2018-09-12 RX ADMIN — PANTOPRAZOLE SODIUM 40 MG: 40 TABLET, DELAYED RELEASE ORAL at 09:09

## 2018-09-12 RX ADMIN — CARVEDILOL 3.12 MG: 3.12 TABLET, FILM COATED ORAL at 04:09

## 2018-09-12 RX ADMIN — BUPRENORPHINE HYDROCHLORIDE AND NALOXONE HYDROCHLORIDE DIHYDRATE 1 TABLET: 8; 2 TABLET SUBLINGUAL at 09:09

## 2018-09-12 RX ADMIN — PREGABALIN 300 MG: 75 CAPSULE ORAL at 09:09

## 2018-09-12 RX ADMIN — VANCOMYCIN HYDROCHLORIDE 1250 MG: 10 INJECTION, POWDER, LYOPHILIZED, FOR SOLUTION INTRAVENOUS at 10:09

## 2018-09-12 RX ADMIN — HYDROXYZINE PAMOATE 50 MG: 25 CAPSULE ORAL at 09:09

## 2018-09-12 RX ADMIN — VANCOMYCIN HYDROCHLORIDE 1250 MG: 10 INJECTION, POWDER, LYOPHILIZED, FOR SOLUTION INTRAVENOUS at 11:09

## 2018-09-12 RX ADMIN — ASPIRIN 325 MG ORAL TABLET 325 MG: 325 PILL ORAL at 09:09

## 2018-09-12 RX ADMIN — POTASSIUM CHLORIDE 40 MEQ: 20 TABLET, EXTENDED RELEASE ORAL at 09:09

## 2018-09-12 RX ADMIN — HYDROXYZINE PAMOATE 50 MG: 25 CAPSULE ORAL at 01:09

## 2018-09-12 RX ADMIN — ENOXAPARIN SODIUM 40 MG: 100 INJECTION SUBCUTANEOUS at 04:09

## 2018-09-12 RX ADMIN — PIPERACILLIN AND TAZOBACTAM 4.5 G: 4; .5 INJECTION, POWDER, LYOPHILIZED, FOR SOLUTION INTRAVENOUS; PARENTERAL at 03:09

## 2018-09-12 RX ADMIN — INSULIN DETEMIR 75 UNITS: 100 INJECTION, SOLUTION SUBCUTANEOUS at 09:09

## 2018-09-12 RX ADMIN — CARVEDILOL 3.12 MG: 3.12 TABLET, FILM COATED ORAL at 09:09

## 2018-09-12 RX ADMIN — PIPERACILLIN AND TAZOBACTAM 4.5 G: 4; .5 INJECTION, POWDER, LYOPHILIZED, FOR SOLUTION INTRAVENOUS; PARENTERAL at 12:09

## 2018-09-12 RX ADMIN — SODIUM CHLORIDE 1000 ML: 0.9 INJECTION, SOLUTION INTRAVENOUS at 09:09

## 2018-09-12 NOTE — ASSESSMENT & PLAN NOTE
Cystitis  Ulcer of abdomen wall with fat layer exposed  Lactic acidosis  WBC 23.88 today with lactate 2.7. Hypotensive on arrival, possibly 2/2 NG administration x2, with improvement w IVF. UA showing many bacteria and 8 WBC, culture sent as pt has hx of recurrent UTI and ESBL. Pt with chronic nonhealing wound of abdomen - foul smelling with purulent drainage today (usually dressed with silvadene daily). New abdominal tenderness  -Gen surg consult for wound  -Zosyn and vanc  -Monitor CBC and check repeat lactate  -Monitor BP   -CT abdomen
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
## 2018-09-12 NOTE — PLAN OF CARE
Problem: Patient Care Overview  Goal: Plan of Care Review  Outcome: Ongoing (interventions implemented as appropriate)  Plan of care reviewed with patient. Significant other at bedside. Call light within reach, fall precautions maintained, bed alarm set. Pt on 2L NC. Patient aware. PIV placed by anesthesia to SOHAM. Nurse instructed patient to call if needs assistance. Patient verbalized complete understanding. Telemetry monitor ST throughout shift. Accuchecks done achs; no coverage needed. Abdominal wound dressing change done by night shift this morning. Daily dressing change orders in place. Will continue to monitor and continue plan of care.

## 2018-09-12 NOTE — PROGRESS NOTES
Patient was seen by Tobacco Treatment Specialist- encounter duration 20 minutes.  Smoking cessation education and materials provided, and patient states that she is highly motivated to quit smoking.  She was enrolled in the Ambulatory Smoking Cessation program during our visit.  Patient states that she is experiencing nicotine cravings, and an order for 21 mg transdermal nicotine patch was requested from Hospitalist service.

## 2018-09-12 NOTE — ASSESSMENT & PLAN NOTE
SBP 80s-150s  Hypotension and tachycardia likely in setting of active infection and will improve with treatment of underlying condition    Hold ACEI, may change Coreg to Metoprolol if hypotension recurs.

## 2018-09-12 NOTE — ASSESSMENT & PLAN NOTE
Hypotension continues. Pt takes lisinopril only PRN.  -Hold lisinopril for BRIDGET and hypotension  -Monitor

## 2018-09-12 NOTE — NURSING
Janett ORTIZ PA notified of patient's present vital signs in lying position take with manual equipment. Will cont to monitor pt and intervene prn.

## 2018-09-12 NOTE — NURSING
Pt off unit for CT transported via wheelchair by nolan Negrete. Telemetry monitor on box # 3525. NAD noted.

## 2018-09-12 NOTE — HOSPITAL COURSE
Pt with continued hypotension and chest pain overnight 9/11, with heart rate sustaining in the 130s+. Pt given 1L bolus. Added vancomycin d/t poor improvement. GS consult pending. Troponin elevated to 0.22. New abdominal tenderness so obtained CT abdomen, results showing no change. On 9/13 troponin peaked and WBC fell from 19 to 10, remaining stable. Urine culture showing multiple organisms. Pt seen by smoking cessation and dietary and counseled. Pustule developed 9/14, seen by wound care and Gen Surg Dr. Montile, who recommended follow up in clinic. Patient discharged with augmentin, wound care follow up, and dietary. Wound care instructed pt and  for daily dressing changes with xeroform.

## 2018-09-12 NOTE — CONSULTS
Food & Nutrition  Education    Diet Education: CHF, heart healthy, diabetic  Time Spent: 15 minutes  Learners: pt/spouse    Nutrition Education provided with handouts: CHF, heart healthy, ADA      Comments: Attempted to educate pt on Cardiac/ADA diet. Pt reports not feeling well and not ready for education at this time. Spouse and I tried to encourage her then transport came to take pt for scan. Educated spouse on diets. Will re-attempt to educate pt when more appropriate. Pt would benefit from outpatient RD.      All questions and concerns answered. Dietitian's contact information provided.       Follow-Up: 9/14/18    Please Re-consult as needed        Thanks!  Carolyn Johnson RD, LDN

## 2018-09-12 NOTE — CONSULTS
[facility]  Cardiology  Consult Note    Patient Name: Lele Dias  MRN: [id number]  Admission Date: 9/11/2018  Hospital Length of Stay: 0 days  Code Status: Full Code   Attending Provider: Pk Kirkland MD   Consulting Provider: Ramón Mallory NP  Primary Care Physician: Kari Edge MD (Inactive)  Principal Problem:Chest pain    Patient information was obtained from patient, past medical records and ER records.     Inpatient consult to [facility]  Consult performed by: Ramón Mallory NP  Consult ordered by: Karlee Rowland PA-C        Subjective:     Chief Complaint:  Chest/abdominal pain      HPI:   Lele Dias is a 37 yo female with HFpEF, HTN, DM II, opioid dependence on opioid agonist therapy, asthma, tobacco use disorder, nonhealing ulcer of abdomen, recurrent UTI with hx of ESBL, morbid obesity, and BLANCO who presented to the ED today with chest/abdominal pain. Patient reports that she awoke with chest pain, took a nitroglycerin and went back to bed. She then awoke again to go to the bathroom but by the time she got there she was shaking, SOB, and had chest pain again. EMS was called and she was found to be tachycardic and hypotensive. Chest pain was relieved by NTG and hypotension responded well to IVF. Patient has remained tachycardiac. Lactate was elevated at 2.7.  WBC were 24. BNP was 174 and troponin 0.097, 0.3, 0.2. Cr was 1.3, 1.5  from baseline 0.9, and UA showed many bacteria and 8 WBC. CXR and CT abdomen and pelvis were non-contributory. Started on Vanc and Zosyn. EKG ST with nonspecific ST changes. She has no chest pain at this time, c/o RUQ tenderness.       Past Medical History:   Diagnosis Date    Asthma     Cellulitis of abdominal wall 12/18/2017    Depression     Diabetes mellitus, type II     Diastolic dysfunction     Diverticulosis of intestine with bleeding 9/14/2016    E coli bacteremia 4/21/2018    E. coli pyelonephritis  2015    E. 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      Current Outpatient Medications on File Prior to Encounter   Medication Sig    albuterol (ACCUNEB) 1.25 mg/3 mL Nebu Take 1.25 mg by nebulization every 4 (four) hours as needed.    albuterol 90 mcg/actuation inhaler Inhale 2 puffs into the lungs every 6 (six) hours as needed for Wheezing. Rescue    amitriptyline (ELAVIL) 50 MG tablet Take 50 mg by mouth nightly as needed for Insomnia.    blood sugar diagnostic Strp TEST BLOOD SUAGR ONCE DAILY    blood-glucose meter Misc use as directed    buprenorphine-naloxone 2-0.5 mg (SUBOXONE) 2-0.5 mg Subl Place 8 mg under the tongue once daily. Pt states once daily    carvedilol (COREG) 3.125 MG tablet Take 3.125 mg by mouth 2 (two) times daily with meals.    fluticasone-salmeterol 100-50 mcg/dose (ADVAIR) 100-50 mcg/dose diskus inhaler Inhale 1 puff into the lungs 2 (two) times daily.    furosemide (LASIX) 40 MG tablet Take 40 mg by mouth 2 (two) times daily.    hydrOXYzine pamoate (VISTARIL) 50 MG Cap Take 50 mg by mouth every 8 (eight) hours as needed.    insulin glargine, TOUJEO, (TOUJEO) 300 unit/mL (1.5 mL) InPn pen Inject into the skin 2 (two) times daily. Takes 138 units every morning; 135 units every evening    insulin lispro (HUMALOG) 100 unit/mL injection Inject 95 Units into the skin 3 (three) times daily before meals.     lancets 30 gauge Misc TEST BLOOD SUGAR ONCE DAILY    lisinopril (PRINIVIL,ZESTRIL) 20 MG  tablet Take 10 mg by mouth once daily.     nitroGLYCERIN (NITROSTAT) 0.4 MG SL tablet Place 0.4 mg under the tongue every 5 (five) minutes as needed for Chest pain.    omeprazole (PRILOSEC) 40 MG capsule Take 40 mg by mouth every morning.     ondansetron (ZOFRAN-ODT) 4 MG TbDL Take 2 tablets (8 mg total) by mouth every 6 (six) hours as needed.    potassium chloride SA (K-DUR,KLOR-CON) 20 MEQ tablet Take 20 mEq by mouth once daily.    pregabalin (LYRICA) 150 MG capsule Take 300 mg by mouth 2 (two) times daily.    promethazine (PHENERGAN) 25 MG tablet Take 25 mg by mouth every 4 (four) hours.     Family History     Problem Relation (Age of Onset)    Breast cancer Paternal Grandmother    Cancer Father, Maternal Aunt    Heart attack Mother, Maternal Aunt, Maternal Grandmother    Heart disease Mother        Tobacco Use    Smoking status: Current Every Day Smoker     Packs/day: 2.00     Years: 15.00     Pack years: 30.00     Types: Cigarettes    Smokeless tobacco: Never Used   Substance and Sexual Activity    Alcohol use: No    Drug use: No    Sexual activity: Yes     Partners: Male     Birth control/protection: Injection     Review of Systems   Constitution: Negative for diaphoresis and fever.   HENT: Negative.    Eyes: Negative.    Cardiovascular: Positive for leg swelling (chronic). Negative for chest pain, dyspnea on exertion, orthopnea, palpitations, paroxysmal nocturnal dyspnea and syncope.   Respiratory: Negative for cough and shortness of breath.    Endocrine: Negative.    Hematologic/Lymphatic: Negative.    Skin: Positive for poor wound healing.   Musculoskeletal: Positive for arthritis and myalgias.   Gastrointestinal: Positive for abdominal pain.   Genitourinary: Negative.    Neurological: Negative.  Negative for dizziness and light-headedness.   Psychiatric/Behavioral: Negative.    Allergic/Immunologic: Negative.      Objective:     Vital Signs (Most Recent):  Temp: 98.5 °F (36.9 °C) (09/12/18  0717)  Pulse: (!) 116 (09/12/18 1300)  Resp: 18 (09/12/18 0900)  BP: (!) 143/82 (09/12/18 0717)  SpO2: 98 % (09/12/18 1200) Vital Signs (24h Range):  Temp:  [96.8 °F (36 °C)-98.9 °F (37.2 °C)] 98.5 °F (36.9 °C)  Pulse:  [] 116  Resp:  [18-22] 18  SpO2:  [89 %-100 %] 98 %  BP: ()/(36-82) 143/82     Weight: (!) 144.6 kg (318 lb 12.6 oz)  Body mass index is 58.31 kg/m².    SpO2: 98 %  O2 Device (Oxygen Therapy): nasal cannula      Intake/Output Summary (Last 24 hours) at 9/12/2018 1408  Last data filed at 9/12/2018 0900  Gross per 24 hour   Intake 60 ml   Output 450 ml   Net -390 ml       Lines/Drains/Airways     Peripheral Intravenous Line                 Peripheral IV - Single Lumen 09/12/18 1030 Anterior;Right Upper Arm less than 1 day                Physical Exam   Constitutional: No distress.   Obese    Eyes: Right eye exhibits no discharge. Left eye exhibits no discharge.   Neck: No JVD present.   Cardiovascular: Regular rhythm.  No extrasystoles are present. Tachycardia present. Exam reveals no gallop.   No murmur heard.  Pulmonary/Chest: Effort normal and breath sounds normal.   Abdominal: Bowel sounds are normal. She exhibits distension. There is tenderness.   Musculoskeletal: She exhibits edema.   Neurological: She is alert.   Skin: Skin is warm and dry. She is not diaphoretic.       Significant Labs:   Blood Culture:   Recent Labs   Lab  09/11/18   0526  09/11/18   0527   LABBLOO  No Growth to date  No Growth to date  No Growth to date  No Growth to date   , BMP:   Recent Labs   Lab  09/11/18   0448  09/12/18   0329   GLU  231*  82   NA  136  143   K  3.9  3.4*   CL  110  111*   CO2  13*  17*   BUN  7  13   CREATININE  1.3  1.5*   CALCIUM  9.4  9.8   , CMP   Recent Labs   Lab  09/11/18 0448 09/12/18   0329   NA  136  143   K  3.9  3.4*   CL  110  111*   CO2  13*  17*   GLU  231*  82   BUN  7  13   CREATININE  1.3  1.5*   CALCIUM  9.4  9.8   PROT  6.8   --    ALBUMIN  3.3*   --    BILITOT   0.3   --    ALKPHOS  87   --    AST  14   --    ALT  13   --    ANIONGAP  13  15   ESTGFRAFRICA  59*  50*   EGFRNONAA  51*  43*   , CBC   Recent Labs   Lab  09/11/18   0448  09/11/18   1713  09/12/18   0329   WBC  23.88*  14.76*  19.54*   HGB  13.9  12.7  13.8   HCT  40.8  37.0  40.4   PLT  257  217  254   , INR No results for input(s): INR, PROTIME in the last 48 hours., Troponin   Recent Labs   Lab  09/11/18   1713  09/12/18   0329  09/12/18   0849   TROPONINI  0.206*  0.221*  0.305*    and All pertinent lab results from the last 24 hours have been reviewed.    Significant Imaging: Echocardiogram:   2D echo with color flow doppler:   Results for orders placed or performed during the hospital encounter of 07/16/18   2D echo with color flow doppler   Result Value Ref Range    EF 55 55 - 65    Diastolic Dysfunction No     Mitral Valve Mobility NORMAL     Tricuspid Valve Regurgitation TRIVIAL      Assessment and Plan:     * Chest pain    Trop mildly elevated and peaked at 0.3  EKG ST with nonspecific ST changes  Felt to be demand in setting of acute illness  No further ischemic work up indicated at the present      Tachycardia related to infectious process; avoid over treating rate         Benign essential hypertension    SBP 80s-150s  Hypotension and tachycardia likely in setting of active infection and will improve with treatment of underlying condition    Hold ACEI, may change Coreg to Metoprolol if hypotension recurs.         Chronic diastolic congestive heart failure    LVEF 55-60% with DD per echo in 07/22018  On BB, Lasix, and ACEI at home   BB continued here  Lasix and ACEI on hold given BRIDGET  euvolemic on exam and currently receiving IVF-agree   Resume ACEI and Lasix once BRIDGET resolves              VTE Risk Mitigation (From admission, onward)        Ordered     enoxaparin injection 40 mg  Daily      09/11/18 1049     IP VTE HIGH RISK PATIENT  Once      09/11/18 1049          Thank you for your consult. I will  sign off. Please contact us if you have any additional questions.    Ramón Mallory NP  Cardiology   [facility]

## 2018-09-12 NOTE — SUBJECTIVE & OBJECTIVE
Interval History: Pt in bed with boyfriend at bedside. Reports that the bed alarm is scaring her. She feels very anxious.    Review of Systems   Respiratory: Positive for chest tightness.    Cardiovascular: Positive for chest pain.   Gastrointestinal: Positive for abdominal pain.   Genitourinary: Negative for difficulty urinating.   Skin: Positive for wound.     Objective:     Vital Signs (Most Recent):  Temp: 98.5 °F (36.9 °C) (09/12/18 0717)  Pulse: (!) 136 (09/12/18 0717)  Resp: (!) 22 (09/12/18 0717)  BP: (!) 143/82 (09/12/18 0717)  SpO2: 97 % (09/12/18 0717) Vital Signs (24h Range):  Temp:  [96.8 °F (36 °C)-98.9 °F (37.2 °C)] 98.5 °F (36.9 °C)  Pulse:  [] 136  Resp:  [18-24] 22  SpO2:  [89 %-100 %] 97 %  BP: ()/(36-82) 143/82     Weight: (!) 144.6 kg (318 lb 12.6 oz)  Body mass index is 58.31 kg/m².    Intake/Output Summary (Last 24 hours) at 9/12/2018 0902  Last data filed at 9/11/2018 1800  Gross per 24 hour   Intake --   Output 250 ml   Net -250 ml      Physical Exam   Cardiovascular: Regular rhythm. Tachycardia present.   Pulmonary/Chest: Effort normal. She has wheezes.   Abdominal: Soft. There is tenderness (RUQ, exam limited by pt body habitus).   Musculoskeletal: She exhibits edema (increased today 2/2 IVF).   Neurological: She is alert.   Skin: Skin is warm and dry.       Significant Labs: All pertinent labs within the past 24 hours have been reviewed.    Significant Imaging: I have reviewed all pertinent imaging results/findings within the past 24 hours.

## 2018-09-12 NOTE — ASSESSMENT & PLAN NOTE
Echo 7/17/18 showed normal EF and diastolic function. Pt takes coreg 3.125 BID but has been out for two days, also takes lasix 40 mg BID. Edematous on exam.  today from baseline 30.  -Holding lasix IV d/t hypotension  -Telemetry monitoring  -Strict I/Os and daily weights  -Resume coreg d/t tachycardia

## 2018-09-12 NOTE — ASSESSMENT & PLAN NOTE
Trop mildly elevated and peaked at 0.3  EKG ST with nonspecific ST changes  Felt to be demand in setting of acute illness  No further ischemic work up indicated at the present      Tachycardia related to infectious process; avoid over treating rate

## 2018-09-12 NOTE — PLAN OF CARE
Pt ambulated to bedside commode. HR elevated 140s-150s. Pt has been in bed for an hour and HR has been sustaing in 130s while resting. Pt states she feel much better now then when she first came. Dr. Dc notified. EKG ordered.

## 2018-09-12 NOTE — ASSESSMENT & PLAN NOTE
LVEF 55-60% with DD per echo in 07/22018  On BB, Lasix, and ACEI at home   BB continued here  Lasix and ACEI on hold given BRIDGET  euvolemic on exam and currently receiving IVF-agree   Resume ACEI and Lasix once BRIDGET resolves

## 2018-09-12 NOTE — SUBJECTIVE & OBJECTIVE
Past Medical History:   Diagnosis Date    Asthma     Cellulitis of abdominal wall 2017    Depression     Diabetes mellitus, type II     Diastolic dysfunction     Diverticulosis of intestine with bleeding 2016    E coli bacteremia 2018    E. coli pyelonephritis 2015    E. 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      Current Outpatient Medications on File Prior to Encounter   Medication Sig    albuterol (ACCUNEB) 1.25 mg/3 mL Nebu Take 1.25 mg by nebulization every 4 (four) hours as needed.    albuterol 90 mcg/actuation inhaler Inhale 2 puffs into the lungs every 6 (six) hours as needed for Wheezing. Rescue    amitriptyline (ELAVIL) 50 MG tablet Take 50 mg by mouth nightly as needed for Insomnia.    blood sugar diagnostic Strp TEST BLOOD SUAGR ONCE DAILY    blood-glucose meter Misc use as directed    buprenorphine-naloxone 2-0.5 mg (SUBOXONE) 2-0.5 mg Subl Place 8 mg under the tongue once daily. Pt states once daily    carvedilol (COREG) 3.125 MG tablet Take 3.125 mg by mouth 2 (two) times daily with meals.    fluticasone-salmeterol 100-50 mcg/dose (ADVAIR) 100-50 mcg/dose diskus inhaler Inhale 1 puff into the lungs 2 (two) times daily.    furosemide (LASIX) 40 MG tablet Take 40 mg by mouth 2 (two) times daily.    hydrOXYzine pamoate (VISTARIL) 50 MG Cap Take 50 mg by mouth every 8 (eight) hours as needed.    insulin glargine, TOUJEO, (TOUJEO) 300 unit/mL (1.5 mL) InPn pen Inject into the skin 2  (two) times daily. Takes 138 units every morning; 135 units every evening    insulin lispro (HUMALOG) 100 unit/mL injection Inject 95 Units into the skin 3 (three) times daily before meals.     lancets 30 gauge Misc TEST BLOOD SUGAR ONCE DAILY    lisinopril (PRINIVIL,ZESTRIL) 20 MG tablet Take 10 mg by mouth once daily.     nitroGLYCERIN (NITROSTAT) 0.4 MG SL tablet Place 0.4 mg under the tongue every 5 (five) minutes as needed for Chest pain.    omeprazole (PRILOSEC) 40 MG capsule Take 40 mg by mouth every morning.     ondansetron (ZOFRAN-ODT) 4 MG TbDL Take 2 tablets (8 mg total) by mouth every 6 (six) hours as needed.    potassium chloride SA (K-DUR,KLOR-CON) 20 MEQ tablet Take 20 mEq by mouth once daily.    pregabalin (LYRICA) 150 MG capsule Take 300 mg by mouth 2 (two) times daily.    promethazine (PHENERGAN) 25 MG tablet Take 25 mg by mouth every 4 (four) hours.     Family History     Problem Relation (Age of Onset)    Breast cancer Paternal Grandmother    Cancer Father, Maternal Aunt    Heart attack Mother, Maternal Aunt, Maternal Grandmother    Heart disease Mother        Tobacco Use    Smoking status: Current Every Day Smoker     Packs/day: 2.00     Years: 15.00     Pack years: 30.00     Types: Cigarettes    Smokeless tobacco: Never Used   Substance and Sexual Activity    Alcohol use: No    Drug use: No    Sexual activity: Yes     Partners: Male     Birth control/protection: Injection     Review of Systems   Constitution: Negative for diaphoresis and fever.   HENT: Negative.    Eyes: Negative.    Cardiovascular: Positive for leg swelling (chronic). Negative for chest pain, dyspnea on exertion, orthopnea, palpitations, paroxysmal nocturnal dyspnea and syncope.   Respiratory: Negative for cough and shortness of breath.    Endocrine: Negative.    Hematologic/Lymphatic: Negative.    Skin: Positive for poor wound healing.   Musculoskeletal: Positive for arthritis and myalgias.   Gastrointestinal:  Positive for abdominal pain.   Genitourinary: Negative.    Neurological: Negative.  Negative for dizziness and light-headedness.   Psychiatric/Behavioral: Negative.    Allergic/Immunologic: Negative.      Objective:     Vital Signs (Most Recent):  Temp: 98.5 °F (36.9 °C) (09/12/18 0717)  Pulse: (!) 116 (09/12/18 1300)  Resp: 18 (09/12/18 0900)  BP: (!) 143/82 (09/12/18 0717)  SpO2: 98 % (09/12/18 1200) Vital Signs (24h Range):  Temp:  [96.8 °F (36 °C)-98.9 °F (37.2 °C)] 98.5 °F (36.9 °C)  Pulse:  [] 116  Resp:  [18-22] 18  SpO2:  [89 %-100 %] 98 %  BP: ()/(36-82) 143/82     Weight: (!) 144.6 kg (318 lb 12.6 oz)  Body mass index is 58.31 kg/m².    SpO2: 98 %  O2 Device (Oxygen Therapy): nasal cannula      Intake/Output Summary (Last 24 hours) at 9/12/2018 1408  Last data filed at 9/12/2018 0900  Gross per 24 hour   Intake 60 ml   Output 450 ml   Net -390 ml       Lines/Drains/Airways     Peripheral Intravenous Line                 Peripheral IV - Single Lumen 09/12/18 1030 Anterior;Right Upper Arm less than 1 day                Physical Exam   Constitutional: No distress.   Obese    Eyes: Right eye exhibits no discharge. Left eye exhibits no discharge.   Neck: No JVD present.   Cardiovascular: Regular rhythm.  No extrasystoles are present. Tachycardia present. Exam reveals no gallop.   No murmur heard.  Pulmonary/Chest: Effort normal and breath sounds normal.   Abdominal: Bowel sounds are normal. She exhibits distension. There is tenderness.   Musculoskeletal: She exhibits edema.   Neurological: She is alert.   Skin: Skin is warm and dry. She is not diaphoretic.       Significant Labs:   Blood Culture:   Recent Labs   Lab  09/11/18   0526  09/11/18   0527   LABBLOO  No Growth to date  No Growth to date  No Growth to date  No Growth to date   , BMP:   Recent Labs   Lab  09/11/18   0448  09/12/18   0329   GLU  231*  82   NA  136  143   K  3.9  3.4*   CL  110  111*   CO2  13*  17*   BUN  7  13   CREATININE   1.3  1.5*   CALCIUM  9.4  9.8   , CMP   Recent Labs   Lab  09/11/18 0448  09/12/18   0329   NA  136  143   K  3.9  3.4*   CL  110  111*   CO2  13*  17*   GLU  231*  82   BUN  7  13   CREATININE  1.3  1.5*   CALCIUM  9.4  9.8   PROT  6.8   --    ALBUMIN  3.3*   --    BILITOT  0.3   --    ALKPHOS  87   --    AST  14   --    ALT  13   --    ANIONGAP  13  15   ESTGFRAFRICA  59*  50*   EGFRNONAA  51*  43*   , CBC   Recent Labs   Lab  09/11/18 0448 09/11/18 1713  09/12/18   0329   WBC  23.88*  14.76*  19.54*   HGB  13.9  12.7  13.8   HCT  40.8  37.0  40.4   PLT  257  217  254   , INR No results for input(s): INR, PROTIME in the last 48 hours., Troponin   Recent Labs   Lab  09/11/18 1713 09/12/18   0329  09/12/18   0849   TROPONINI  0.206*  0.221*  0.305*    and All pertinent lab results from the last 24 hours have been reviewed.    Significant Imaging: Echocardiogram:   2D echo with color flow doppler:   Results for orders placed or performed during the hospital encounter of 07/16/18   2D echo with color flow doppler   Result Value Ref Range    EF 55 55 - 65    Diastolic Dysfunction No     Mitral Valve Mobility NORMAL     Tricuspid Valve Regurgitation TRIVIAL

## 2018-09-12 NOTE — PROGRESS NOTES
"Vancomycin Dosing and Monitoring Pharmacy Protocol    Lele Dias is a 38 y.o. female    Height: 5' 2" (1.575 m)   Wt Readings from Last 3 Encounters:   09/12/18 (!) 144.6 kg (318 lb 12.6 oz)   08/09/18 (!) 145.6 kg (320 lb 15.8 oz)   07/17/18 (!) 149 kg (328 lb 7.8 oz)     Ideal body weight: 50.1 kg (110 lb 7.2 oz)  Adjusted ideal body weight: 87.9 kg (193 lb 12.5 oz)    Temp Readings from Last 3 Encounters:   09/12/18 98.5 °F (36.9 °C) (Oral)   08/09/18 98.4 °F (36.9 °C) (Oral)   07/17/18 98.6 °F (37 °C)      Lab Results   Component Value Date/Time    WBC 19.54 (H) 09/12/2018 03:29 AM    WBC 14.76 (H) 09/11/2018 05:13 PM    WBC 23.88 (H) 09/11/2018 04:48 AM      Lab Results   Component Value Date/Time    CREATININE 1.5 (H) 09/12/2018 03:29 AM    CREATININE 1.3 09/11/2018 04:48 AM    CREATININE 0.9 07/17/2018 05:30 AM      Lab Results   Component Value Date/Time    LACTATE 1.6 09/11/2018 09:41 AM    LACTATE 2.7 (H) 09/11/2018 05:28 AM    LACTATE 1.7 06/25/2018 04:03 PM       Serum creatinine: 1.5 mg/dL (H) 09/12/18 0329  Estimated creatinine clearance: 69.2 mL/min (A)    Antibiotics (From admission, onward)      Start     Stop Route Frequency Ordered    09/12/18 0930  vancomycin in dextrose 5 % 1 gram/250 mL IVPB 1,000 mg  (Vancomycin IVPB with levels panel)      -- IV Every 12 hours (non-standard times) 09/12/18 0817    09/11/18 2000  piperacillin-tazobactam 4.5 g in dextrose 5 % 100 mL IVPB (ready to mix system)      -- IV Every 8 hours (non-standard times) 09/11/18 1123          Antifungals (From admission, onward)      None            Microbiology Results (last 7 days)       Procedure Component Value Units Date/Time    Blood culture x two cultures. Draw prior to antibiotics. [[id number]] Collected:  09/11/18 0526    Order Status:  Completed Specimen:  Blood from Peripheral, Antecubital, Right Updated:  09/11/18 1710     Blood Culture, Routine No Growth to date    Narrative:       Aerobic and anaerobic    " Blood culture x two cultures. Draw prior to antibiotics. [[id number]] Collected:  18    Order Status:  Completed Specimen:  Blood from Peripheral, Hand, Right Updated:  18 1715     Blood Culture, Routine No Growth to date    Narrative:       Aerobic and anaerobic    Urine culture [[id number]] Collected:  1816    Order Status:  No result Specimen:  Urine from Clean Catch Updated:  18 1059    Urine Culture [[id number]]     Order Status:  Completed Specimen:  Urine             Indication/Target trough:   Skin and skin structure (Target trough: 10-15 mcg/ml)    Hemodialysis:   N/A    Dosing Weight:   AdjBW--adjusted body weight  If ABW is greater than or equal to 30% over Ideal Body Weight, AdjBW will be used to calculate vancomycin dose.    Last Vancomycin dose: N/A   Date/Time given: N/A         Vancomycin level:  No results for input(s): VANCOMYCIN-TROUGH in the last 72 hours.  No results for input(s): VANCOMYCIN, RANDOM in the last 72 hours.    Per Protocol Initial/Adjustments Dosin. Initial/Adjustment Dose: Initial Vancomycin dose = 1250mg q12hr to be given at 18 0930date/time  2. Vancomycin Trough Level will be drawn on 18 2030date/time    Pharmacy will continue to follow.    Please contact if you have any further questions. Thank you.    Margaret Russo, PharmD  [phone]

## 2018-09-12 NOTE — HPI
Lele Dias is a 39 yo female with HFpEF, HTN, DM II, opioid dependence on opioid agonist therapy, asthma, tobacco use disorder, nonhealing ulcer of abdomen, recurrent UTI with hx of ESBL, morbid obesity, and BLANCO who presented to the ED today with chest/abdominal pain. Patient reports that she awoke with chest pain, took a nitroglycerin and went back to bed. She then awoke again to go to the bathroom but by the time she got there she was shaking, SOB, and had chest pain again. EMS was called and she was found to be tachycardic and hypotensive. Chest pain was relieved by NTG and hypotension responded well to IVF. Patient has remained tachycardiac. Lactate was elevated at 2.7.  WBC were 24. BNP was 174 and troponin 0.097, 0.3, 0.2. Cr was 1.3, 1.5  from baseline 0.9, and UA showed many bacteria and 8 WBC. CXR and CT abdomen and pelvis were non-contributory. Started on Vanc and Zosyn. EKG ST with nonspecific ST changes. She has no chest pain at this time, c/o RUQ tenderness.

## 2018-09-12 NOTE — CONSULTS
Today`s Date: 2018     Admit Date: 2018    Admitting Physician: Pk Kirkland MD    Patient`s Name: Lele Dias , 38 y.o. female    Reason for consultation   non healing abdominal wound evaluation and treatment.   Patient Active Problem List:     Morbid obesity with BMI of 50.0-59.9, adult     Chronic diastolic congestive heart failure     Asthma     Diabetes mellitus type 2 in obese     Benign essential hypertension     Acute kidney injury     History of amputation of left arm above elbow     Smokes 1 pack of cigarettes per day     Nonalcoholic hepatosteatosis     Opioid dependence on agonist therapy     Phantom limb pain     Periumbilical hernia     Tobacco use disorder     Acute on chronic diastolic congestive heart failure     Leukocytosis     Ulcer of abdomen wall with fat layer exposed     Chest pain     Lactic acidosis     Cystitis     Sepsis      Past Medical History:  No date: Asthma  2017: Cellulitis of abdominal wall  No date: Depression  No date: Diabetes mellitus, type II  No date: Diastolic dysfunction  2016: Diverticulosis of intestine with bleeding  2018: E coli bacteremia  2015: E. coli pyelonephritis  2017: E. coli UTI  No date: Hernia, epigastric  No date: Hypertension  No date: Nonalcoholic hepatosteatosis  No date: Periumbilical hernia    Past Surgical History:  : ARM AMPUTATION AT SHOULDER; Left  No date:  SECTION  No date:  SECTION, CLASSIC  age 15: CHOLECYSTECTOMY  No date: TONSILLECTOMY, ADENOIDECTOMY    Prior to Admission medications :  Medication albuterol (ACCUNEB) 1.25 mg/3 mL Nebu, Sig Take 1.25 mg by nebulization every 4 (four) hours as needed., Start Date , End Date , Taking? Yes, Authorizing Provider Historical Provider, MD    Medication albuterol 90 mcg/actuation inhaler, Sig Inhale 2 puffs into the lungs every 6 (six) hours as needed for Wheezing. Rescue, Start Date , End Date , Taking? Yes, Authorizing Provider  Historical Provider, MD    Medication amitriptyline (ELAVIL) 50 MG tablet, Sig Take 50 mg by mouth nightly as needed for Insomnia., Start Date , End Date , Taking? Yes, Authorizing Provider Jordana Spangler MD    Medication blood sugar diagnostic Strp, Sig TEST BLOOD SUAGR ONCE DAILY, Start Date 7/17/18, End Date , Taking? Yes, Authorizing Provider Niharika Cummings PA-C    Medication blood-glucose meter Misc, Sig use as directed, Start Date 7/17/18, End Date , Taking? Yes, Authorizing Provider Niharika Cummings PA-C    Medication buprenorphine-naloxone 2-0.5 mg (SUBOXONE) 2-0.5 mg Subl, Sig Place 8 mg under the tongue once daily. Pt states once daily, Start Date , End Date , Taking? Yes, Authorizing Provider Jordana Spangler MD    Medication carvedilol (COREG) 3.125 MG tablet, Sig Take 3.125 mg by mouth 2 (two) times daily with meals., Start Date , End Date , Taking? Yes, Authorizing Provider Jordana Spangler MD    Medication fluticasone-salmeterol 100-50 mcg/dose (ADVAIR) 100-50 mcg/dose diskus inhaler, Sig Inhale 1 puff into the lungs 2 (two) times daily., Start Date 6/4/15, End Date , Taking? Yes, Authorizing Provider Cristi Dc MD    Medication furosemide (LASIX) 40 MG tablet, Sig Take 40 mg by mouth 2 (two) times daily., Start Date , End Date , Taking? Yes, Authorizing Provider Jordana Spangler MD    Medication hydrOXYzine pamoate (VISTARIL) 50 MG Cap, Sig Take 50 mg by mouth every 8 (eight) hours as needed., Start Date , End Date , Taking? Yes, Authorizing Provider Jordana Spangler MD    Medication insulin glargine, TOUJEO, (TOUJEO) 300 unit/mL (1.5 mL) InPn pen, Sig Inject into the skin 2 (two) times daily. Takes 138 units every morning; 135 units every evening, Start Date , End Date , Taking? Yes, Authorizing Provider Jordana Spangler MD    Medication insulin lispro (HUMALOG) 100 unit/mL injection, Sig Inject 95 Units into the skin 3 (three) times daily before meals. , Start  Date , End Date , Taking? Yes, Authorizing Provider Historical MD Aníbal    Medication lancets 30 gauge Misc, Sig TEST BLOOD SUGAR ONCE DAILY, Start Date 7/17/18, End Date , Taking? Yes, Authorizing Provider Niharika Cummings PA-C    Medication lisinopril (PRINIVIL,ZESTRIL) 20 MG tablet, Sig Take 10 mg by mouth once daily. , Start Date 5/8/13, End Date , Taking? Yes, Authorizing Provider Historical MD Aníbal    Medication nitroGLYCERIN (NITROSTAT) 0.4 MG SL tablet, Sig Place 0.4 mg under the tongue every 5 (five) minutes as needed for Chest pain., Start Date , End Date , Taking? Yes, Authorizing Provider Historical MD Aníbal    Medication omeprazole (PRILOSEC) 40 MG capsule, Sig Take 40 mg by mouth every morning. , Start Date 5/7/13, End Date , Taking? Yes, Authorizing Provider Jordana Spangler MD    Medication ondansetron (ZOFRAN-ODT) 4 MG TbDL, Sig Take 2 tablets (8 mg total) by mouth every 6 (six) hours as needed., Start Date 3/28/18, End Date , Taking? Yes, Authorizing Provider Lana Ramires MD    Medication potassium chloride SA (K-DUR,KLOR-CON) 20 MEQ tablet, Sig Take 20 mEq by mouth once daily., Start Date , End Date , Taking? Yes, Authorizing Provider Jordana Spangler MD    Medication pregabalin (LYRICA) 150 MG capsule, Sig Take 300 mg by mouth 2 (two) times daily., Start Date , End Date , Taking? Yes, Authorizing Provider Historical MD Aníbal    Medication promethazine (PHENERGAN) 25 MG tablet, Sig Take 25 mg by mouth every 4 (four) hours., Start Date , End Date , Taking? Yes, Authorizing Provider Historical MD Aníbal      No current facility-administered medications on file prior to encounter.   Current Outpatient Medications on File Prior to Encounter:  albuterol (ACCUNEB) 1.25 mg/3 mL Nebu, Take 1.25 mg by nebulization every 4 (four) hours as needed., Disp: , Rfl:   albuterol 90 mcg/actuation inhaler, Inhale 2 puffs into the lungs every 6 (six) hours as needed for Wheezing. Rescue,  Disp: , Rfl:   amitriptyline (ELAVIL) 50 MG tablet, Take 50 mg by mouth nightly as needed for Insomnia., Disp: , Rfl:   blood sugar diagnostic Strp, TEST BLOOD SUAGR ONCE DAILY, Disp: 100 strip, Rfl: 3  blood-glucose meter Misc, use as directed, Disp: 1 each, Rfl: 0  buprenorphine-naloxone 2-0.5 mg (SUBOXONE) 2-0.5 mg Subl, Place 8 mg under the tongue once daily. Pt states once daily, Disp: , Rfl:   carvedilol (COREG) 3.125 MG tablet, Take 3.125 mg by mouth 2 (two) times daily with meals., Disp: , Rfl:   fluticasone-salmeterol 100-50 mcg/dose (ADVAIR) 100-50 mcg/dose diskus inhaler, Inhale 1 puff into the lungs 2 (two) times daily., Disp: 60 each, Rfl: 2  furosemide (LASIX) 40 MG tablet, Take 40 mg by mouth 2 (two) times daily., Disp: , Rfl:   hydrOXYzine pamoate (VISTARIL) 50 MG Cap, Take 50 mg by mouth every 8 (eight) hours as needed., Disp: , Rfl:   insulin glargine, TOUJEO, (TOUJEO) 300 unit/mL (1.5 mL) InPn pen, Inject into the skin 2 (two) times daily. Takes 138 units every morning; 135 units every evening, Disp: , Rfl:   insulin lispro (HUMALOG) 100 unit/mL injection, Inject 95 Units into the skin 3 (three) times daily before meals. , Disp: , Rfl:   lancets 30 gauge Misc, TEST BLOOD SUGAR ONCE DAILY, Disp: 100 each, Rfl: 3  lisinopril (PRINIVIL,ZESTRIL) 20 MG tablet, Take 10 mg by mouth once daily. , Disp: , Rfl:   nitroGLYCERIN (NITROSTAT) 0.4 MG SL tablet, Place 0.4 mg under the tongue every 5 (five) minutes as needed for Chest pain., Disp: , Rfl:   omeprazole (PRILOSEC) 40 MG capsule, Take 40 mg by mouth every morning. , Disp: , Rfl:   ondansetron (ZOFRAN-ODT) 4 MG TbDL, Take 2 tablets (8 mg total) by mouth every 6 (six) hours as needed., Disp: 12 tablet, Rfl: 0  potassium chloride SA (K-DUR,KLOR-CON) 20 MEQ tablet, Take 20 mEq by mouth once daily., Disp: , Rfl:   pregabalin (LYRICA) 150 MG capsule, Take 300 mg by mouth 2 (two) times daily., Disp: , Rfl:   promethazine (PHENERGAN) 25 MG tablet, Take 25 mg  by mouth every 4 (four) hours., Disp: , Rfl:          Review of patient's allergies indicates:   -- Celexa [citalopram] -- Nausea And Vomiting    Social History:   reports that she has been smoking cigarettes.  She has a 30.00 pack-year smoking history. she has never used smokeless tobacco. She reports that she does not drink alcohol or use drugs.     Review of patient's family history indicates:  Problem: Cancer      Relation: Father          Age of Onset: (Not Specified)  Problem: Heart attack      Relation: Mother          Age of Onset: (Not Specified)  Problem: Heart disease      Relation: Mother          Age of Onset: (Not Specified)  Problem: Cancer      Relation: Maternal Aunt          Age of Onset: (Not Specified)          Comment: lung (smoker)  Problem: Heart attack      Relation: Maternal Aunt          Age of Onset: (Not Specified)  Problem: Breast cancer      Relation: Paternal Grandmother          Age of Onset: (Not Specified)  Problem: Heart attack      Relation: Maternal Grandmother          Age of Onset: (Not Specified)      PHYSICAL EXAMINATION  Temp:  [96.8 °F (36 °C)-98.9 °F (37.2 °C)] 98.5 °F (36.9 °C)  Pulse:  [] 116  Resp:  [18-22] 18  SpO2:  [89 %-100 %] 98 %  BP: ()/(36-82) 143/82    General Condition:   alert x 3   Obese    Head & Neck  Anemia: None  Jaundice: None  Neck vein: Not distended  Carotid Bruits: none  Lymph nodes: none palpable  Thyroid: normal    Chest: normal    Heart: normal    Rt. Breast: not examined  Lt. Breast: not examined  Axillary lymph nodes: none    Abdomen: Soft,   tender with non healing wound 3 x 4 cm at the lower border of abdominal wall pannus un the mid line with yellowish tissue at the base  Hernia: none    Rectal: Defered    Extremities: normal    Vascular: normal    Specific focus Examination    Imp: diabetic infected abdominal wall wound with cellulitis.obesity , swelling both lower legs    Plan: check culture , apply santyl and xeroform locally  daily.

## 2018-09-12 NOTE — ASSESSMENT & PLAN NOTE
Takes 137 units glargine BID and 94 units lispro WM. A1C 6.7.   -Resume insulin at half normal dose for now  -Diabetic cardiac diet  -Check BG AC and HS, and use SSI

## 2018-09-12 NOTE — ASSESSMENT & PLAN NOTE
Cr 1.3 > 1.5 from baseline 0.9. Likely 2/2 sepsis and hypotension. Received 2L IVF in ED.  Continue to monitor. IVF as needed. Avoid nephrotoxins and renally dose meds.

## 2018-09-12 NOTE — NURSING
Nurse notified by monitor tech that pt's HR sustaining in 160's. Nurse assessed pt and she is sitting up on the bedside commode asymptomatic. Nurse notified Dr. Kirkland of findings. No new orders given. Will cont to monitor.

## 2018-09-12 NOTE — NURSING
Noitifed SARAH Rowland, NP of patient having automatic BP=76/36, HR=86. Patient insists on amb to restroom with assistance to have BM. Will obtain a manual BP and report to SARAH Rowland. Will cont to monitor pt and intervene prn.

## 2018-09-12 NOTE — ASSESSMENT & PLAN NOTE
Had episode of chest pain with hypotension and tachycardia PTA. Troponin 0.019 > 0.097 > 0.2. Similar episode overnight 9/11.  -Trend troponin, currently elevated to 0.22 and hasn't peaked. Consider cardiology consult  -Telemetry monitoring

## 2018-09-12 NOTE — PLAN OF CARE
Accompanied by linn Martinez.    Patient from home, does not drive, uses cane, has shower chair.    TN gave number to Medicaid waivers.  TN called her pharmacy and of choice and determined they do not sell transfer tub benches which Juan states she needs, has a shower chair but is not getting safely in and out of tub.    Patient has consults to General Surgery and Cardiology.         09/12/18 1111   Discharge Assessment   Assessment Type Discharge Planning Assessment   Confirmed/corrected address and phone number on facesheet? Yes   Assessment information obtained from? Patient   Prior to hospitilization cognitive status: Alert/Oriented   Prior to hospitalization functional status: Assistive Equipment   Current cognitive status: Alert/Oriented   Current Functional Status: Assistive Equipment;Needs Assistance   Facility Arrived From: home   Lives With significant other   Able to Return to Prior Arrangements yes   Is patient able to care for self after discharge? Yes   Who are your caregiver(s) and their phone number(s)? Juan Coats Significant other     [phone]      Patient's perception of discharge disposition home health   Patient currently being followed by outpatient case management? No   Patient currently receives any other outside agency services? No   Equipment Currently Used at Home cane, straight   Do you have any problems affording any of your prescribed medications? No   Is the patient taking medications as prescribed? yes   Does the patient have transportation home? Yes   Transportation Available Medicaid transportation;family or friend will provide   Discharge Plan A Home with family   Discharge Plan B Home with family;Home Health   Patient/Family In Agreement With Plan yes

## 2018-09-12 NOTE — NURSING
R hand noted to be very swollen. PIV leaking. Pt complaining of R hand pain. Hand elevated and ice bad applied to hand. Nurse notified PA of no PIV access. Anesthesia to be consulted for IV access.

## 2018-09-12 NOTE — PLAN OF CARE
Problem: Patient Care Overview  Goal: Plan of Care Review  Outcome: Revised  No distress noted. Blood glucose monitored closely. SSI given. Pt afebrile throughout the night. ST on tele HR 130s. IV antibiotics administered. Dressing applied to abdomen. Spouse remains at the bedside.

## 2018-09-13 LAB
ANION GAP SERPL CALC-SCNC: 11 MMOL/L
BASOPHILS # BLD AUTO: 0.02 K/UL
BASOPHILS NFR BLD: 0.2 %
BUN SERPL-MCNC: 11 MG/DL
CALCIUM SERPL-MCNC: 9 MG/DL
CHLORIDE SERPL-SCNC: 111 MMOL/L
CO2 SERPL-SCNC: 18 MMOL/L
CREAT SERPL-MCNC: 1.1 MG/DL
DIFFERENTIAL METHOD: ABNORMAL
EOSINOPHIL # BLD AUTO: 0.2 K/UL
EOSINOPHIL NFR BLD: 1.6 %
ERYTHROCYTE [DISTWIDTH] IN BLOOD BY AUTOMATED COUNT: 15.8 %
EST. GFR  (AFRICAN AMERICAN): >60 ML/MIN/1.73 M^2
EST. GFR  (NON AFRICAN AMERICAN): >60 ML/MIN/1.73 M^2
GLUCOSE SERPL-MCNC: 151 MG/DL
HCT VFR BLD AUTO: 37 %
HGB BLD-MCNC: 12.6 G/DL
LYMPHOCYTES # BLD AUTO: 5.2 K/UL
LYMPHOCYTES NFR BLD: 48.6 %
MCH RBC QN AUTO: 27.7 PG
MCHC RBC AUTO-ENTMCNC: 34.1 G/DL
MCV RBC AUTO: 81 FL
MONOCYTES # BLD AUTO: 0.8 K/UL
MONOCYTES NFR BLD: 7.6 %
NEUTROPHILS # BLD AUTO: 4.3 K/UL
NEUTROPHILS NFR BLD: 40.2 %
PLATELET # BLD AUTO: 189 K/UL
PMV BLD AUTO: 9.8 FL
POCT GLUCOSE: 141 MG/DL (ref 70–110)
POCT GLUCOSE: 177 MG/DL (ref 70–110)
POCT GLUCOSE: 208 MG/DL (ref 70–110)
POCT GLUCOSE: 268 MG/DL (ref 70–110)
POTASSIUM SERPL-SCNC: 3.6 MMOL/L
RBC # BLD AUTO: 4.55 M/UL
SODIUM SERPL-SCNC: 140 MMOL/L
TROPONIN I SERPL DL<=0.01 NG/ML-MCNC: 0.45 NG/ML
VANCOMYCIN TROUGH SERPL-MCNC: 14.3 UG/ML
WBC # BLD AUTO: 10.6 K/UL

## 2018-09-13 PROCEDURE — 87075 CULTR BACTERIA EXCEPT BLOOD: CPT

## 2018-09-13 PROCEDURE — 80202 ASSAY OF VANCOMYCIN: CPT

## 2018-09-13 PROCEDURE — 21400001 HC TELEMETRY ROOM

## 2018-09-13 PROCEDURE — 25000003 PHARM REV CODE 250: Performed by: HOSPITALIST

## 2018-09-13 PROCEDURE — 87070 CULTURE OTHR SPECIMN AEROBIC: CPT

## 2018-09-13 PROCEDURE — 25000242 PHARM REV CODE 250 ALT 637 W/ HCPCS: Performed by: PHYSICIAN ASSISTANT

## 2018-09-13 PROCEDURE — 84484 ASSAY OF TROPONIN QUANT: CPT

## 2018-09-13 PROCEDURE — 63600175 PHARM REV CODE 636 W HCPCS: Performed by: PHYSICIAN ASSISTANT

## 2018-09-13 PROCEDURE — 63600175 PHARM REV CODE 636 W HCPCS: Performed by: HOSPITALIST

## 2018-09-13 PROCEDURE — 25000003 PHARM REV CODE 250: Performed by: PHYSICIAN ASSISTANT

## 2018-09-13 PROCEDURE — 85025 COMPLETE CBC W/AUTO DIFF WBC: CPT

## 2018-09-13 PROCEDURE — 94761 N-INVAS EAR/PLS OXIMETRY MLT: CPT

## 2018-09-13 PROCEDURE — 27000221 HC OXYGEN, UP TO 24 HOURS

## 2018-09-13 PROCEDURE — 80048 BASIC METABOLIC PNL TOTAL CA: CPT

## 2018-09-13 PROCEDURE — 94640 AIRWAY INHALATION TREATMENT: CPT

## 2018-09-13 PROCEDURE — S4991 NICOTINE PATCH NONLEGEND: HCPCS | Performed by: PHYSICIAN ASSISTANT

## 2018-09-13 PROCEDURE — 36415 COLL VENOUS BLD VENIPUNCTURE: CPT

## 2018-09-13 RX ORDER — CLONIDINE HYDROCHLORIDE 0.2 MG/1
0.2 TABLET ORAL EVERY 4 HOURS PRN
Status: DISCONTINUED | OUTPATIENT
Start: 2018-09-13 | End: 2018-09-14 | Stop reason: HOSPADM

## 2018-09-13 RX ORDER — FUROSEMIDE 20 MG/1
20 TABLET ORAL 2 TIMES DAILY
Status: DISCONTINUED | OUTPATIENT
Start: 2018-09-13 | End: 2018-09-13

## 2018-09-13 RX ORDER — FUROSEMIDE 40 MG/1
40 TABLET ORAL 2 TIMES DAILY
Status: DISCONTINUED | OUTPATIENT
Start: 2018-09-13 | End: 2018-09-14 | Stop reason: HOSPADM

## 2018-09-13 RX ADMIN — PREGABALIN 300 MG: 75 CAPSULE ORAL at 09:09

## 2018-09-13 RX ADMIN — HYDROXYZINE PAMOATE 50 MG: 25 CAPSULE ORAL at 09:09

## 2018-09-13 RX ADMIN — ACETAMINOPHEN 1000 MG: 500 TABLET ORAL at 08:09

## 2018-09-13 RX ADMIN — VANCOMYCIN HYDROCHLORIDE 1250 MG: 10 INJECTION, POWDER, LYOPHILIZED, FOR SOLUTION INTRAVENOUS at 12:09

## 2018-09-13 RX ADMIN — CLONIDINE HYDROCHLORIDE 0.2 MG: 0.2 TABLET ORAL at 01:09

## 2018-09-13 RX ADMIN — INSULIN DETEMIR 75 UNITS: 100 INJECTION, SOLUTION SUBCUTANEOUS at 08:09

## 2018-09-13 RX ADMIN — HYDROXYZINE PAMOATE 50 MG: 25 CAPSULE ORAL at 01:09

## 2018-09-13 RX ADMIN — ACETAMINOPHEN 650 MG: 325 TABLET ORAL at 05:09

## 2018-09-13 RX ADMIN — PANTOPRAZOLE SODIUM 40 MG: 40 TABLET, DELAYED RELEASE ORAL at 08:09

## 2018-09-13 RX ADMIN — CARVEDILOL 3.12 MG: 3.12 TABLET, FILM COATED ORAL at 04:09

## 2018-09-13 RX ADMIN — INSULIN DETEMIR 75 UNITS: 100 INJECTION, SOLUTION SUBCUTANEOUS at 09:09

## 2018-09-13 RX ADMIN — BUPRENORPHINE HYDROCHLORIDE AND NALOXONE HYDROCHLORIDE DIHYDRATE 1 TABLET: 8; 2 TABLET SUBLINGUAL at 08:09

## 2018-09-13 RX ADMIN — FLUTICASONE FUROATE AND VILANTEROL TRIFENATATE 1 PUFF: 100; 25 POWDER RESPIRATORY (INHALATION) at 08:09

## 2018-09-13 RX ADMIN — INSULIN ASPART 4 UNITS: 100 INJECTION, SOLUTION INTRAVENOUS; SUBCUTANEOUS at 04:09

## 2018-09-13 RX ADMIN — VANCOMYCIN HYDROCHLORIDE 1250 MG: 10 INJECTION, POWDER, LYOPHILIZED, FOR SOLUTION INTRAVENOUS at 10:09

## 2018-09-13 RX ADMIN — CARVEDILOL 3.12 MG: 3.12 TABLET, FILM COATED ORAL at 08:09

## 2018-09-13 RX ADMIN — HYDROXYZINE PAMOATE 50 MG: 25 CAPSULE ORAL at 05:09

## 2018-09-13 RX ADMIN — INSULIN ASPART 3 UNITS: 100 INJECTION, SOLUTION INTRAVENOUS; SUBCUTANEOUS at 09:09

## 2018-09-13 RX ADMIN — PIPERACILLIN AND TAZOBACTAM 4.5 G: 4; .5 INJECTION, POWDER, LYOPHILIZED, FOR SOLUTION INTRAVENOUS; PARENTERAL at 05:09

## 2018-09-13 RX ADMIN — NICOTINE 1 PATCH: 21 PATCH, EXTENDED RELEASE TRANSDERMAL at 08:09

## 2018-09-13 RX ADMIN — ENOXAPARIN SODIUM 40 MG: 100 INJECTION SUBCUTANEOUS at 04:09

## 2018-09-13 RX ADMIN — PREGABALIN 300 MG: 75 CAPSULE ORAL at 08:09

## 2018-09-13 RX ADMIN — PIPERACILLIN AND TAZOBACTAM 4.5 G: 4; .5 INJECTION, POWDER, LYOPHILIZED, FOR SOLUTION INTRAVENOUS; PARENTERAL at 01:09

## 2018-09-13 RX ADMIN — PIPERACILLIN AND TAZOBACTAM 4.5 G: 4; .5 INJECTION, POWDER, LYOPHILIZED, FOR SOLUTION INTRAVENOUS; PARENTERAL at 09:09

## 2018-09-13 RX ADMIN — ASPIRIN 325 MG ORAL TABLET 325 MG: 325 PILL ORAL at 08:09

## 2018-09-13 RX ADMIN — INSULIN ASPART 2 UNITS: 100 INJECTION, SOLUTION INTRAVENOUS; SUBCUTANEOUS at 12:09

## 2018-09-13 RX ADMIN — FUROSEMIDE 40 MG: 40 TABLET ORAL at 01:09

## 2018-09-13 RX ADMIN — POTASSIUM CHLORIDE 40 MEQ: 20 TABLET, EXTENDED RELEASE ORAL at 08:09

## 2018-09-13 NOTE — PLAN OF CARE
Problem: Patient Care Overview  Goal: Plan of Care Review  Outcome: Ongoing (interventions implemented as appropriate)  Reviewed plan of care with patient, verbalized understanding.  AAOx4.  Spouse at bedside.  Complaint of bilateral leg pain, received increased dose of tylenol, pain relieved with the medication.  On continuous telemetry monitor, ST with HR in 110s-120s.  On 1L oxygen via NC.  Accuchecks done achs.  Abdominal dressing changed, culture collected and sent it to lab.  Fall precaution measures maintained, bed alarm set on, bed locked, bed in the lowest position, side rails up x2, and call light within reach.  Instructed to call for any assistance.  Will continue to monitor and continue plan of care.

## 2018-09-13 NOTE — ASSESSMENT & PLAN NOTE
Cystitis  Ulcer of abdomen wall with fat layer exposed  Lactic acidosis  WBC 23.88 9/10 with lactate 2.7. Hypotensive on arrival, possibly worsened by NG administration x2, with improvement w IVF. UA showing many bacteria and 8 WBC, culture sent as pt has hx of recurrent UTI and ESBL - showing multiple organisms. Pt with chronic nonhealing wound of abdomen - foul smelling with purulent drainage today (usually dressed with silvadene daily). New abdominal tenderness 9/12 resolved. WBC decreased to 10.6 on 9/13. Hypotension resolved.  -Gen surg consult for wound, also consult wound care nurse  -Zosyn and vanc  -Monitor CBC and check repeat lactate   -Monitor BP   -CT abdomen

## 2018-09-13 NOTE — ASSESSMENT & PLAN NOTE
Hypotension improved. Pt takes lisinopril only PRN.  -Hold lisinopril for BRIDGET and hypotension  -Monitor

## 2018-09-13 NOTE — SUBJECTIVE & OBJECTIVE
Interval History: pt eating full-size bag of doritos when I came in. We discussed that she should not be eating this and how her poor diet is contributing to her poor health. Feeling much better today. She reports that she was very out of it yesterday and doesn't remember much.     Review of Systems   Respiratory: Positive for shortness of breath.    Cardiovascular: Positive for leg swelling. Negative for chest pain.   Gastrointestinal: Negative for abdominal pain.   Skin: Positive for wound (less tender today).   Psychiatric/Behavioral: Positive for confusion (reports that she was confused and out of it yesterday).     Objective:     Vital Signs (Most Recent):  Temp: 97.1 °F (36.2 °C) (09/13/18 0831)  Pulse: (!) 111 (09/13/18 0831)  Resp: 20 (09/13/18 0831)  BP: (!) 174/88 (09/13/18 0831)  SpO2: 98 % (09/13/18 0831) Vital Signs (24h Range):  Temp:  [97.1 °F (36.2 °C)-98.4 °F (36.9 °C)] 97.1 °F (36.2 °C)  Pulse:  [105-125] 111  Resp:  [18-20] 20  SpO2:  [98 %] 98 %  BP: (138-174)/(75-88) 174/88     Weight: (!) 144.6 kg (318 lb 12.6 oz)  Body mass index is 58.31 kg/m².    Intake/Output Summary (Last 24 hours) at 9/13/2018 1104  Last data filed at 9/13/2018 0730  Gross per 24 hour   Intake --   Output 1050 ml   Net -1050 ml      Physical Exam   Cardiovascular: Normal rate and regular rhythm.   Pulmonary/Chest: Effort normal and breath sounds normal. She has no wheezes. She has no rales.   Abdominal: Soft. Bowel sounds are normal. There is no tenderness.   Musculoskeletal: She exhibits edema (throughout).   Neurological: She is alert.   Skin:   Wound dressing c/d/i       Significant Labs: All pertinent labs within the past 24 hours have been reviewed.    Significant Imaging: I have reviewed all pertinent imaging results/findings within the past 24 hours.

## 2018-09-13 NOTE — ASSESSMENT & PLAN NOTE
Cr 1.3 > 1.5 > 1.1 from baseline 0.9. Likely 2/2 sepsis and hypotension. Received 2L IVF in ED.  -Continue to monitor Cr   -IVF as needed.   -Avoid nephrotoxins and renally dose meds.  -Resume lasix at half normal dose d/t edema

## 2018-09-13 NOTE — PLAN OF CARE
Problem: Patient Care Overview  Goal: Plan of Care Review   09/13/18 1510   Coping/Psychosocial   Plan Of Care Reviewed With patient   Care plan reviewed with Pt verbalized plan of care. Care plan reviewed with patient, AAOx4, family at bedside, no respiratory distress noted, on room air.  per cardiac monitor, no red alarm noted. Denies any pain of discomfort, education provided on medication effect and side effect, including insulin administration for hyperglycemia, voices understanding. Bilateral lower extremities ultrasound done. Blood pressure decreased to 180/76 after clonidine administered. Abdominal dressing changed using sterile techniques, small amount of yellow drainage present, dressing clean and intact. Call light within her reach, no apparent distress noted, bed in low position, bed alarm on, educated on the importance of calling as needed, voices understanding, stable at this time.

## 2018-09-13 NOTE — ASSESSMENT & PLAN NOTE
Had episode of chest pain with hypotension and tachycardia PTA. Troponin 0.019 > 0.097 > 0.2 > 0.5 > 0.4. Similar episode overnight 9/11. Cardiology consulted and believe related to stress d/t infection.  -Telemetry monitoring

## 2018-09-13 NOTE — ASSESSMENT & PLAN NOTE
Pt would benefit from weight loss. Will discuss daily.  RD consult for low salt, diabetic and weight loss education.

## 2018-09-13 NOTE — ASSESSMENT & PLAN NOTE
Echo 7/17/18 showed normal EF and diastolic function. Pt takes coreg 3.125 BID but has been out for two days, also takes lasix 40 mg BID. Edematous on exam.  today from baseline 30.  -Resuming lasix at half dose as pt is edematous and hypertensive, will monitor BP  -Telemetry monitoring  -Strict I/Os and daily weights  -Resumed coreg d/t tachycardia
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
## 2018-09-13 NOTE — ASSESSMENT & PLAN NOTE
Smokes 1 pack of cigarettes per day  Tobacco use disorder  Diffuse quiet wheezing on exam with no hypoxia on 2L NC. SOB PTA, but currently resolved. Seen by smoking cessation and enrolled in OP program  -Nicotine patch  -Continue to reinforce need to quit smoking

## 2018-09-14 VITALS
HEART RATE: 97 BPM | SYSTOLIC BLOOD PRESSURE: 137 MMHG | WEIGHT: 293 LBS | DIASTOLIC BLOOD PRESSURE: 103 MMHG | BODY MASS INDEX: 53.92 KG/M2 | TEMPERATURE: 98 F | OXYGEN SATURATION: 96 % | HEIGHT: 62 IN | RESPIRATION RATE: 18 BRPM

## 2018-09-14 PROBLEM — L02.211 ABDOMINAL WALL ABSCESS: Status: ACTIVE | Noted: 2018-09-14

## 2018-09-14 LAB
ANION GAP SERPL CALC-SCNC: 10 MMOL/L
ANISOCYTOSIS BLD QL SMEAR: SLIGHT
BASOPHILS # BLD AUTO: ABNORMAL K/UL
BASOPHILS NFR BLD: 0 %
BUN SERPL-MCNC: 17 MG/DL
CALCIUM SERPL-MCNC: 8.8 MG/DL
CHLORIDE SERPL-SCNC: 108 MMOL/L
CO2 SERPL-SCNC: 21 MMOL/L
CREAT SERPL-MCNC: 1.2 MG/DL
DIFFERENTIAL METHOD: ABNORMAL
EOSINOPHIL # BLD AUTO: ABNORMAL K/UL
EOSINOPHIL NFR BLD: 1 %
ERYTHROCYTE [DISTWIDTH] IN BLOOD BY AUTOMATED COUNT: 15.7 %
EST. GFR  (AFRICAN AMERICAN): >60 ML/MIN/1.73 M^2
EST. GFR  (NON AFRICAN AMERICAN): 57 ML/MIN/1.73 M^2
GLUCOSE SERPL-MCNC: 188 MG/DL
HCT VFR BLD AUTO: 36.7 %
HGB BLD-MCNC: 12.5 G/DL
HYPOCHROMIA BLD QL SMEAR: ABNORMAL
LYMPHOCYTES # BLD AUTO: ABNORMAL K/UL
LYMPHOCYTES NFR BLD: 43 %
MCH RBC QN AUTO: 27.7 PG
MCHC RBC AUTO-ENTMCNC: 34.1 G/DL
MCV RBC AUTO: 81 FL
METAMYELOCYTES NFR BLD MANUAL: 1 %
MONOCYTES # BLD AUTO: ABNORMAL K/UL
MONOCYTES NFR BLD: 6 %
NEUTROPHILS NFR BLD: 46 %
NEUTS BAND NFR BLD MANUAL: 3 %
PLATELET # BLD AUTO: 157 K/UL
PLATELET BLD QL SMEAR: ABNORMAL
PMV BLD AUTO: 10.4 FL
POCT GLUCOSE: 195 MG/DL (ref 70–110)
POCT GLUCOSE: 239 MG/DL (ref 70–110)
POTASSIUM SERPL-SCNC: 3.9 MMOL/L
RBC # BLD AUTO: 4.51 M/UL
SODIUM SERPL-SCNC: 139 MMOL/L
WBC # BLD AUTO: 10.15 K/UL

## 2018-09-14 PROCEDURE — 63600175 PHARM REV CODE 636 W HCPCS: Performed by: HOSPITALIST

## 2018-09-14 PROCEDURE — 25000003 PHARM REV CODE 250: Performed by: PHYSICIAN ASSISTANT

## 2018-09-14 PROCEDURE — 80048 BASIC METABOLIC PNL TOTAL CA: CPT

## 2018-09-14 PROCEDURE — 85027 COMPLETE CBC AUTOMATED: CPT

## 2018-09-14 PROCEDURE — 94761 N-INVAS EAR/PLS OXIMETRY MLT: CPT

## 2018-09-14 PROCEDURE — 63600175 PHARM REV CODE 636 W HCPCS: Performed by: PHYSICIAN ASSISTANT

## 2018-09-14 PROCEDURE — 85007 BL SMEAR W/DIFF WBC COUNT: CPT

## 2018-09-14 PROCEDURE — 36415 COLL VENOUS BLD VENIPUNCTURE: CPT

## 2018-09-14 PROCEDURE — 94640 AIRWAY INHALATION TREATMENT: CPT

## 2018-09-14 PROCEDURE — 25000003 PHARM REV CODE 250: Performed by: HOSPITALIST

## 2018-09-14 PROCEDURE — S4991 NICOTINE PATCH NONLEGEND: HCPCS | Performed by: PHYSICIAN ASSISTANT

## 2018-09-14 RX ORDER — AMOXICILLIN AND CLAVULANATE POTASSIUM 875; 125 MG/1; MG/1
1 TABLET, FILM COATED ORAL EVERY 12 HOURS
Qty: 10 TABLET | Refills: 0 | Status: SHIPPED | OUTPATIENT
Start: 2018-09-14 | End: 2018-09-19 | Stop reason: SDUPTHER

## 2018-09-14 RX ADMIN — CARVEDILOL 3.12 MG: 3.12 TABLET, FILM COATED ORAL at 09:09

## 2018-09-14 RX ADMIN — NICOTINE 1 PATCH: 21 PATCH, EXTENDED RELEASE TRANSDERMAL at 09:09

## 2018-09-14 RX ADMIN — PANTOPRAZOLE SODIUM 40 MG: 40 TABLET, DELAYED RELEASE ORAL at 09:09

## 2018-09-14 RX ADMIN — BUPRENORPHINE HYDROCHLORIDE AND NALOXONE HYDROCHLORIDE DIHYDRATE 1 TABLET: 8; 2 TABLET SUBLINGUAL at 09:09

## 2018-09-14 RX ADMIN — FUROSEMIDE 40 MG: 40 TABLET ORAL at 09:09

## 2018-09-14 RX ADMIN — ACETAMINOPHEN 650 MG: 325 TABLET ORAL at 12:09

## 2018-09-14 RX ADMIN — INSULIN DETEMIR 75 UNITS: 100 INJECTION, SOLUTION SUBCUTANEOUS at 09:09

## 2018-09-14 RX ADMIN — PREGABALIN 300 MG: 75 CAPSULE ORAL at 09:09

## 2018-09-14 RX ADMIN — INSULIN ASPART 4 UNITS: 100 INJECTION, SOLUTION INTRAVENOUS; SUBCUTANEOUS at 12:09

## 2018-09-14 RX ADMIN — FLUTICASONE FUROATE AND VILANTEROL TRIFENATATE 1 PUFF: 100; 25 POWDER RESPIRATORY (INHALATION) at 09:09

## 2018-09-14 RX ADMIN — POLYETHYLENE GLYCOL 3350 17 G: 17 POWDER, FOR SOLUTION ORAL at 09:09

## 2018-09-14 RX ADMIN — HYDROXYZINE PAMOATE 50 MG: 25 CAPSULE ORAL at 06:09

## 2018-09-14 RX ADMIN — POTASSIUM CHLORIDE 40 MEQ: 20 TABLET, EXTENDED RELEASE ORAL at 09:09

## 2018-09-14 RX ADMIN — ASPIRIN 325 MG ORAL TABLET 325 MG: 325 PILL ORAL at 09:09

## 2018-09-14 RX ADMIN — PIPERACILLIN AND TAZOBACTAM 4.5 G: 4; .5 INJECTION, POWDER, LYOPHILIZED, FOR SOLUTION INTRAVENOUS; PARENTERAL at 06:09

## 2018-09-14 NOTE — ASSESSMENT & PLAN NOTE
Takes 137 units glargine BID and 94 units lispro WM. A1C 6.7.   -Hold insulin for now, and monitor BG closely at home  -Diabetic cardiac diet  -Check BG AC and HS

## 2018-09-14 NOTE — PLAN OF CARE
Problem: Patient Care Overview  Goal: Plan of Care Review  Outcome: Ongoing (interventions implemented as appropriate)  The proper method of use, as well as anticipated side effects, of this metered-dose inhaler are discussed and demonstrated to the patient. Pt on RA with documented sats.98. Will continue to monitor.

## 2018-09-14 NOTE — DISCHARGE INSTRUCTIONS
Medicaid Waivers    This is the contact information for Medicaid Waivers:  1 [phone] also 1 [phone]  They may be able to assist you with a personal care attendant.    4 Steps for Eating Healthier (English) View Edit Remove   Abscess, Antibiotic Treatment Only (Child) (English) View Edit Remove   Chest Pain, Uncertain Cause (English) View Edit Remove   Diabetes, Carbohydrates, Fats, and Protein, Understanding (English) View Edit Remove   Diabetes, Diet (English) View Edit Remove   Smoking, Tips for Quitting (Cardiovascular) (English) View Edit Remove

## 2018-09-14 NOTE — PLAN OF CARE
Patient set for DC, has taken receipt on her DC meds and has f/u with both her PCP and the wound care clinic next week.    Patient's s/o is here to bring her home. [place] gave [place] Waivers information to patient and her significant other as per their request.    Follow-up With  Details  Why  Contact Info   [facility]  Go on 9/19/2018  @ 10:30 with Dr. Montiel in Wound Care.  [address]  [place] [address]  [phone]   Kari Edge MD  Go on 9/28/2018  @ 10am  [address] [facility] [address]  [phone]            09/14/18 1146   Final Note   Assessment Type Final Discharge Note   What phone number can be called within the next 1-3 days to see how you are doing after discharge? [phone]   Hospital Follow Up  Appt(s) scheduled? Yes   Discharge plans and expectations educations in teach back method with documentation complete? Yes   Right Care Referral Info   Post Acute Recommendation No Care

## 2018-09-14 NOTE — ASSESSMENT & PLAN NOTE
Cystitis  Ulcer of abdomen wall with fat layer exposed  Lactic acidosis  WBC 23.88 9/10 with lactate 2.7. Hypotensive on arrival, possibly worsened by NG administration x2, with improvement w IVF. UA showing many bacteria and 8 WBC, culture sent as pt has hx of recurrent UTI and ESBL - showing multiple organisms. Pt with chronic nonhealing wound of abdomen - foul smelling with purulent drainage on admission (usually dressed with silvadene daily). New abdominal tenderness 9/12 resolved. WBC decreased to 10.6 on 9/13. Hypotension resolved. Wound showing new pustule 9/14, evaluated by GS who request follow up in clinic.  -Gen surg consulted for wound, also consulted wound care nurse  -Zosyn and esperanza in house, downgraded to augmentin BID x5 days on discharge  -Follow up in wound care clinic

## 2018-09-14 NOTE — PROGRESS NOTES
"Surgery follow up  BP (!) 137/103 (BP Location: Right arm, Patient Position: Sitting)   Pulse 97   Temp 97.6 °F (36.4 °C) (Oral)   Resp 18   Ht 5' 2" (1.575 m)   Wt (!) 144.6 kg (318 lb 12.6 oz)   LMP  (LMP Unknown)   SpO2 98%   Breastfeeding? No   BMI 58.31 kg/m²   I/O last 3 completed shifts:  In: 300 [P.O.:300]  Out: 850 [Urine:850]  I/O this shift:  In: 300 [P.O.:300]  Out: -   Wound re examined , much better , no evidence of abscess,   Wound has improved a lot.  Continue present management , will follow in wound center.    "

## 2018-09-14 NOTE — PROGRESS NOTES
Followed up with pt on diet education. Pt states she is ready for education today and was out of it on Wednesday when I tried to talk to her. Discussed low fat/low Na diet. Discussed foods high in fat/sodium to avoid. Also discussed ADA diet. Discussed foods that contain carbs and carb counting. Pt somewhat receptive but still says she isn't giving up her gatorade and coke. Provided handouts to pt and encouraged her  to read them. Pt would benefit from further outpatient diet counseling.      Carolyn Johnson, SESAR, LDN

## 2018-09-14 NOTE — ASSESSMENT & PLAN NOTE
Pt would benefit from weight loss. Will discuss daily.  -RD consulted for low salt, diabetic and weight loss education.  -Dietary follow up outpatient

## 2018-09-14 NOTE — PLAN OF CARE
Problem: Patient Care Overview  Goal: Plan of Care Review  Outcome: Ongoing (interventions implemented as appropriate)  Reviewed plan of care with patient, verbalized understanding.  AAOx4.  Spouse at bedside.  Complaint of leg pain, tylenol given, pain relieved with med.  On continuous telemetry monitor, NSR to ST with HR in 90s-100s.  On RA.  Accuchecks done achs.  Abdominal wound culture remained in process.  Fall precaution measures maintained, bed alarm set on, bed locked, bed in the lowest position, side rails up x2, and call light within reach.  Instructed to call for any assistance.  Will continue to monitor and continue plan of care.

## 2018-09-14 NOTE — CONSULTS
Dr. Montiel consulted for this wound on 9/12.     Wound assessed today per new wound care consult order. SARAH BALTAZAR, notified of assessment and told that wound showing a core of pus still intact, Janett states that this is a new finding and requested that Dr. Montiel see pt before discharge, wound care orders given and requests that pt follow up with him in wound clinic. Janett states that she has already made pt an appointment.
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
## 2018-09-14 NOTE — PLAN OF CARE
Problem: Patient Care Overview  Goal: Plan of Care Review  Outcome: Ongoing (interventions implemented as appropriate)  Pt on RA; no respiratory distress noted. Will continue to monitor.

## 2018-09-14 NOTE — ASSESSMENT & PLAN NOTE
Smokes 1 pack of cigarettes per day  Tobacco use disorder  Diffuse quiet wheezing on exam with no hypoxia on 2L NC and weaned down to room air. SOB PTA, but currently resolved. Seen by smoking cessation and enrolled in OP program  -Continue to reinforce need to quit smoking  -Continue outpatient counseling

## 2018-09-14 NOTE — ASSESSMENT & PLAN NOTE
Had episode of chest pain with hypotension and tachycardia PTA. Troponin 0.019 > 0.097 > 0.2 > 0.5 > 0.4. Similar episode overnight 9/11. Cardiology consulted and believe related to stress d/t infection. Continue cardiac regimen and follow up in PCP.

## 2018-09-14 NOTE — ASSESSMENT & PLAN NOTE
Echo 7/17/18 showed normal EF and diastolic function. Pt takes coreg 3.125 BID and takes lasix 40 mg BID. Edematous on exam.  today from baseline 30.  -Resuming lasix and coreg  -Daily weights at home

## 2018-09-14 NOTE — PLAN OF CARE
Problem: Patient Care Overview  Goal: Plan of Care Review  Outcome: Ongoing (interventions implemented as appropriate)  20 gauge to RUE removed without difficulty. No redness or swelling noted to site at time of removal. Tele monitor removed, cleaned, and returned to tele bunker. Discharge instructions and medications reviewed with patient and SO, both verbalize understanding.

## 2018-09-15 LAB — BACTERIA SPEC AEROBE CULT: NORMAL

## 2018-09-16 LAB
BACTERIA BLD CULT: NORMAL
BACTERIA BLD CULT: NORMAL

## 2018-09-17 NOTE — PHYSICIAN QUERY
PT Name: Lele Dias  MR #: [id number]     Physician Query Form - Documentation Clarification      CDS/: Suzanna Augustin               Contact information: [email or web address]      This form is a permanent document in the medical record.     Query Date: September 17, 2018    By submitting this query, we are merely seeking further clarification of documentation. Please utilize your independent clinical judgment when addressing the question(s) below.    The Medical record reflects the following:    Supporting Clinical Findings Location in Medical Record     Leukocytosis    Cystitis  Ulcer of abdomen wall with fat layer exposed  Lactic acidosis  WBC 23.88 9/10 with lactate 2.7. Hypotensive on arrival, possibly worsened by NG administration x2, with improvement w IVF. UA showing many bacteria and 8 WBC, culture sent as pt has hx of recurrent UTI and ESBL - showing multiple organisms. Pt with chronic nonhealing wound of abdomen - foul smelling with purulent drainage on admission (usually dressed with silvadene daily). New abdominal tenderness 9/12 resolved. WBC decreased to 10.6 on 9/13. Hypotension resolved. Wound showing new pustule 9/14, evaluated by GS who request follow up in clinic.  -Gen surg consulted for wound, also consulted wound care nurse  -Zosyn and vanc in house, downgraded to augmentin BID x5 days on discharge  -Follow up in wound care clinic       Discharge summary                                                                             Doctor, Please specify diagnosis or diagnoses associated with above clinical findings.    Provider Use Only    [  x   ] Acute Cystitis   [     ] Chronic Cystitis   [     ] Other:___________________                                                                                                           [  ] Clinically undetermined

## 2018-09-18 LAB — BACTERIA SPEC ANAEROBE CULT: NORMAL

## 2018-09-19 ENCOUNTER — HOSPITAL ENCOUNTER (OUTPATIENT)
Dept: WOUND CARE | Facility: HOSPITAL | Age: 38
Discharge: HOME | End: 2018-09-19
Attending: SURGERY
Payer: MEDICAID

## 2018-09-19 VITALS
WEIGHT: 293 LBS | DIASTOLIC BLOOD PRESSURE: 78 MMHG | SYSTOLIC BLOOD PRESSURE: 137 MMHG | TEMPERATURE: 98 F | HEART RATE: 107 BPM | BODY MASS INDEX: 53.92 KG/M2 | HEIGHT: 62 IN

## 2018-09-19 DIAGNOSIS — E11.69 DIABETES MELLITUS TYPE 2 IN OBESE: ICD-10-CM

## 2018-09-19 DIAGNOSIS — E66.9 DIABETES MELLITUS TYPE 2 IN OBESE: ICD-10-CM

## 2018-09-19 DIAGNOSIS — L98.492 ULCER OF ABDOMEN WALL WITH FAT LAYER EXPOSED: ICD-10-CM

## 2018-09-19 DIAGNOSIS — L02.211 ABDOMINAL WALL ABSCESS: Primary | ICD-10-CM

## 2018-09-19 PROCEDURE — 99204 OFFICE O/P NEW MOD 45 MIN: CPT

## 2018-09-19 RX ORDER — AMOXICILLIN AND CLAVULANATE POTASSIUM 875; 125 MG/1; MG/1
1 TABLET, FILM COATED ORAL EVERY 12 HOURS
Qty: 30 TABLET | Refills: 1 | Status: SHIPPED | OUTPATIENT
Start: 2018-09-19 | End: 2018-10-04

## 2018-09-19 NOTE — PROGRESS NOTES
Much of the documentation for this visit was completed in the [facility] [employer] system. Please see the attached documentation for further details about this patient's care.     Asad Montiel MD

## 2018-09-19 NOTE — PROGRESS NOTES
Much of the documentation for this visit was completed in the [facility] [employer] system. Please see the attached documentation for further details about this patient's care.     Ivis Sandhu RN

## 2019-01-12 NOTE — ED NOTES
"Lab at bedside, Pt requesting daughter. Pt made aware visitors will be brought to room after labs and xray complete. Pt sts she is "getting real pissed off." BETTE Reagan and BETTE Nunes made aware  "
Care assumed. Pt in bed requesting to use bed pan. Brought bed side commode to room. Helped pt on it and back to bed. Resp =/ non labored. Skin P/W/D. Will continue to monitor.   
Family at bedside. Pt on continuous monitoring. Call light within reach. Pt is up to date with plan of care. Given water per request and ok'd by Dr. Rand. Denies any other needs at this time.  
Pt A&Ox4, anxious, cooperative. Pt states she woke up with CP and SOB. Pt went to the bathroom and states she became shaky and dizzy on the toilet and then fell backward hitting her midback against the bathtub. Denies hitting head. Denies LOC. Pt c/o chest pain/pressure at a 5/10. Pt states she had nitro at home and was given nitro by EMS with little relief of pain. C/o SOB and requesting oxygen. ok'd by Dr. Starr and pt placed on 2L O2 via NC.   
Pt placed on bedpan. Pt requesting daughter to be brought to room. Explained to pt the family would be brought back after she was taken off the bedpan and they were able to come do the scans due to the space in the room.   
Pt sleeping. VSS. Call light within reach.  
Spoke with Dr. Dc will place diabetic diet for pt and will come down to speak with pt about treatment plan. Pt informed and verbalizes understanding.   
Xray at bedside  
room air

## 2019-03-11 DIAGNOSIS — M17.12 PRIMARY OSTEOARTHRITIS OF LEFT KNEE: ICD-10-CM

## 2019-03-11 DIAGNOSIS — M62.81 QUADRICEPS WEAKNESS: ICD-10-CM

## 2019-03-11 DIAGNOSIS — M25.562 LEFT KNEE PAIN: Primary | ICD-10-CM

## 2019-04-29 ENCOUNTER — HOSPITAL ENCOUNTER (EMERGENCY)
Facility: HOSPITAL | Age: 39
Discharge: HOME | End: 2019-04-29
Attending: EMERGENCY MEDICINE
Payer: MEDICAID

## 2019-04-29 VITALS
TEMPERATURE: 98 F | SYSTOLIC BLOOD PRESSURE: 150 MMHG | DIASTOLIC BLOOD PRESSURE: 73 MMHG | HEIGHT: 62 IN | BODY MASS INDEX: 53.92 KG/M2 | HEART RATE: 94 BPM | OXYGEN SATURATION: 98 % | WEIGHT: 293 LBS | RESPIRATION RATE: 14 BRPM

## 2019-04-29 DIAGNOSIS — M79.605 LEFT LEG PAIN: ICD-10-CM

## 2019-04-29 DIAGNOSIS — N30.00 ACUTE CYSTITIS WITHOUT HEMATURIA: Primary | ICD-10-CM

## 2019-04-29 DIAGNOSIS — M17.12 ARTHRITIS OF KNEE, LEFT: ICD-10-CM

## 2019-04-29 LAB
B-HCG UR QL: NEGATIVE
BACTERIA #/AREA URNS HPF: ABNORMAL /HPF
BILIRUB UR QL STRIP: NEGATIVE
CLARITY UR: ABNORMAL
COLOR UR: YELLOW
CTP QC/QA: YES
GLUCOSE UR QL STRIP: NEGATIVE
HGB UR QL STRIP: ABNORMAL
KETONES UR QL STRIP: NEGATIVE
LEUKOCYTE ESTERASE UR QL STRIP: NEGATIVE
MICROSCOPIC COMMENT: ABNORMAL
NITRITE UR QL STRIP: POSITIVE
PH UR STRIP: 6 [PH] (ref 5–8)
POCT GLUCOSE: 129 MG/DL (ref 70–110)
PROT UR QL STRIP: ABNORMAL
RBC #/AREA URNS HPF: 0 /HPF (ref 0–4)
SP GR UR STRIP: 1.02 (ref 1–1.03)
SQUAMOUS #/AREA URNS HPF: 20 /HPF
URN SPEC COLLECT METH UR: ABNORMAL
UROBILINOGEN UR STRIP-ACNC: NEGATIVE EU/DL
WBC #/AREA URNS HPF: 15 /HPF (ref 0–5)

## 2019-04-29 PROCEDURE — 81025 URINE PREGNANCY TEST: CPT | Performed by: NURSE PRACTITIONER

## 2019-04-29 PROCEDURE — 82962 GLUCOSE BLOOD TEST: CPT

## 2019-04-29 PROCEDURE — 87086 URINE CULTURE/COLONY COUNT: CPT

## 2019-04-29 PROCEDURE — 99284 EMERGENCY DEPT VISIT MOD MDM: CPT | Mod: 25

## 2019-04-29 PROCEDURE — 81000 URINALYSIS NONAUTO W/SCOPE: CPT

## 2019-04-29 RX ORDER — CEPHALEXIN 500 MG/1
500 CAPSULE ORAL 4 TIMES DAILY
Qty: 20 CAPSULE | Refills: 0 | Status: SHIPPED | OUTPATIENT
Start: 2019-04-29 | End: 2019-05-04

## 2019-04-29 NOTE — ED PROVIDER NOTES
Encounter Date: 2019    SCRIBE #1 NOTE: I, Derek Avila, am scribing for, and in the presence of,  Dr. Skinner. I have scribed the entire note.       History     Chief Complaint   Patient presents with    Leg Pain     chronic swelling/pain to left lower leg that worsened over the last month.  Also reports lower back pain that began 1 day ago.  Denies SOB, CP, fever or congestion.       Lele Dias is a 39 y.o. female who presents to the ED with left leg pain x 1 day. The patient reports falling in September into an AC return vent when she injured her left knee. She was told shortly after she needs to have the knee replaced but she is unable to do so due to her obesity. Patient was sent for bariatric surgery but was told she can't have the surgery until she is cleared by her cardiologist and loses some weight. Patient also complains of flank pain and left leg swelling but the left leg does not appear to be more swollen than the right. Denies recent trauma.    The history is provided by the patient.     Review of patient's allergies indicates:   Allergen Reactions    Celexa [citalopram] Nausea And Vomiting     Past Medical History:   Diagnosis Date    Asthma     Cellulitis of abdominal wall 2017    Depression     Diabetes mellitus, type II     Diastolic dysfunction     Diverticulosis of intestine with bleeding 2016    E coli bacteremia 2018    E. coli pyelonephritis 2015    E. 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     SECTION       SECTION, CLASSIC      CHOLECYSTECTOMY  age 15    TONSILLECTOMY, ADENOIDECTOMY       Family History   Problem Relation Age of Onset    Cancer Father     Heart attack Mother     Heart disease Mother     Cancer Maternal Aunt         lung (smoker)     Heart attack Maternal Aunt     Breast cancer Paternal Grandmother     Heart attack Maternal Grandmother      Social History     Tobacco Use    Smoking status: Current Every Day Smoker     Packs/day: 2.00     Years: 15.00     Pack years: 30.00     Types: Cigarettes    Smokeless tobacco: Never Used   Substance Use Topics    Alcohol use: No    Drug use: No     Review of Systems   Constitutional: Negative for fever.   HENT: Negative for facial swelling and sore throat.    Eyes: Negative for pain and redness.   Respiratory: Negative for shortness of breath.    Cardiovascular: Negative for chest pain.   Gastrointestinal: Negative for abdominal pain, diarrhea, nausea and vomiting.   Genitourinary: Positive for flank pain. Negative for dysuria and hematuria.   Musculoskeletal: Negative for back pain and neck pain.        Left leg pain   Skin: Negative for rash.   Neurological: Negative for seizures and facial asymmetry.       Physical Exam     Initial Vitals [04/29/19 1555]   BP Pulse Resp Temp SpO2   (!) 143/76 93 18 98 °F (36.7 °C) 96 %      MAP       --         Physical Exam    Nursing note and vitals reviewed.  Constitutional: She appears well-developed and well-nourished. She is not diaphoretic. No distress.   Obese  Left arm amputation   HENT:   Head: Normocephalic and atraumatic.   Eyes: Conjunctivae and EOM are normal.   Neck: Normal range of motion. Neck supple.   Cardiovascular: Normal rate, regular rhythm and normal heart sounds.   Pulmonary/Chest: Breath sounds normal. No respiratory distress.   Abdominal: Soft. There is no tenderness.   Musculoskeletal: Normal range of motion. She exhibits tenderness. She exhibits no edema.   No pitting edema to lower extremeties  Diffuse tenderness to left calf  Decreased extention in left leg   Neurological: She is alert and oriented to person, place, and time. She has normal strength.   Skin: Skin is warm and dry.         ED Course   Procedures  Labs Reviewed    URINALYSIS, REFLEX TO URINE CULTURE - Abnormal; Notable for the following components:       Result Value    Appearance, UA Hazy (*)     Protein, UA Trace (*)     Occult Blood UA Trace (*)     Nitrite, UA Positive (*)     All other components within normal limits    Narrative:     Preferred Collection Type->Urine, Clean Catch   URINALYSIS MICROSCOPIC - Abnormal; Notable for the following components:    WBC, UA 15 (*)     Bacteria Moderate (*)     All other components within normal limits    Narrative:     Preferred Collection Type->Urine, Clean Catch   POCT GLUCOSE - Abnormal; Notable for the following components:    POCT Glucose 129 (*)     All other components within normal limits   CULTURE, URINE   URINALYSIS, REFLEX TO URINE CULTURE   POCT URINE PREGNANCY   POCT GLUCOSE MONITORING CONTINUOUS          Imaging Results          X-Ray Knee 3 View Left (Final result)  Result time 04/29/19 17:59:51    Final result by Cricket Pinzon MD (04/29/19 17:59:51)                 Impression:      Degenerative change.  No fracture or effusion or malalignment.      Electronically signed by: Cricket Pinzon  Date:    04/29/2019  Time:    17:59             Narrative:    EXAMINATION:  XR KNEE 3 VIEW LEFT    CLINICAL HISTORY:  Pain in left leg    TECHNIQUE:  AP, lateral, and Merchant views of the left knee were performed.    COMPARISON:  None    FINDINGS:  Cross-table lateral and lateral and frontal views of the left knee presented.  There is moderate degenerative change of the lateral femorotibial compartment.  No fracture or effusion or erosion.  Patellar tendon is probably normal, not well demonstrated.  No definite focal soft tissue swelling seen.  No soft tissue defect.  Bone density is probably normal.                               US Lower Extremity Veins Bilateral (Final result)  Result time 04/29/19 17:43:32    Final result by Nick Mancini MD (04/29/19 17:43:32)                 Impression:      No evidence of deep  venous thrombosis in either lower extremity.      Electronically signed by: Nick Mancini MD  Date:    04/29/2019  Time:    17:43             Narrative:    EXAMINATION:  US LOWER EXTREMITY VEINS BILATERAL    CLINICAL HISTORY:  Pain in left leg    TECHNIQUE:  Duplex and color flow Doppler and dynamic compression was performed of the bilateral lower extremity veins was performed.    COMPARISON:  Bilateral lower extremity venous upper ultrasound 09/13/2018    FINDINGS:  Right thigh veins: The common femoral, femoral, popliteal, upper greater saphenous, and deep femoral veins are patent and free of thrombus. The veins are normally compressible and have normal phasic flow and augmentation response.    Right calf veins: The visualized calf veins are patent.    Left thigh veins: The common femoral, femoral, popliteal, upper greater saphenous, and deep femoral veins are patent and free of thrombus. The veins are normally compressible and have normal phasic flow and augmentation response.    Left calf veins: The visualized calf veins are patent.    Miscellaneous: No significant sonographic abnormality seen at the medial left calf region where there is reported soft tissue swelling.                                 Medical Decision Making:   Clinical Tests:   Lab Tests: Ordered and Reviewed  The following lab test(s) were unremarkable: Urinalysis  Radiological Study: Ordered and Reviewed                      Clinical Impression:       ICD-10-CM ICD-9-CM   1. Acute cystitis without hematuria N30.00 595.0   2. Left leg pain M79.605 729.5   3. Arthritis of knee, left M17.12 716.96       Disposition:   Disposition: Discharged  Condition: Stable       I, Lidia Skinner, personally performed the services described in this documentation. All medical record entries made by the scribe were at my direction and in my presence.  I have reviewed the chart and agree that the record reflects my personal performance and is accurate and complete.  Lidia Skinner M.D. 6:22 PM04/29/2019                 Lidia Skinner MD  04/29/19 1822

## 2019-04-29 NOTE — ED NOTES
Patient placed into gown,Patient stated that she fell in ac vent in sept and has had increased pain and swelling to left lower extremity since that injury. Patient also complained of back pain and requested to be checked for UTI. Urine collected and notified MD of patient complaint.

## 2019-05-01 LAB
BACTERIA UR CULT: NORMAL
BACTERIA UR CULT: NORMAL

## 2019-06-22 ENCOUNTER — HOSPITAL ENCOUNTER (EMERGENCY)
Facility: HOSPITAL | Age: 39
Discharge: HOME | End: 2019-06-22
Attending: FAMILY MEDICINE
Payer: MEDICAID

## 2019-06-22 VITALS
DIASTOLIC BLOOD PRESSURE: 66 MMHG | SYSTOLIC BLOOD PRESSURE: 146 MMHG | TEMPERATURE: 98 F | WEIGHT: 293 LBS | HEART RATE: 89 BPM | BODY MASS INDEX: 66.25 KG/M2 | RESPIRATION RATE: 21 BRPM | OXYGEN SATURATION: 100 %

## 2019-06-22 DIAGNOSIS — D72.829 LEUKOCYTOSIS, UNSPECIFIED TYPE: ICD-10-CM

## 2019-06-22 DIAGNOSIS — M94.0 COSTOCHONDRITIS, ACUTE: Primary | ICD-10-CM

## 2019-06-22 DIAGNOSIS — R07.9 CHEST PAIN: ICD-10-CM

## 2019-06-22 LAB
ALBUMIN SERPL BCP-MCNC: 3.6 G/DL (ref 3.5–5.2)
ALP SERPL-CCNC: 69 U/L (ref 38–126)
ALT SERPL W/O P-5'-P-CCNC: 16 U/L (ref 10–44)
ANION GAP SERPL CALC-SCNC: 11 MMOL/L (ref 8–16)
AST SERPL-CCNC: 13 U/L (ref 15–46)
BASOPHILS # BLD AUTO: 0.04 K/UL (ref 0–0.2)
BASOPHILS NFR BLD: 0.2 % (ref 0–1.9)
BILIRUB SERPL-MCNC: 0.3 MG/DL (ref 0.1–1)
BILIRUB UR QL STRIP: NEGATIVE
BUN SERPL-MCNC: 18 MG/DL (ref 7–17)
CALCIUM SERPL-MCNC: 8.9 MG/DL (ref 8.7–10.5)
CHLORIDE SERPL-SCNC: 108 MMOL/L (ref 95–110)
CLARITY UR REFRACT.AUTO: CLEAR
CO2 SERPL-SCNC: 19 MMOL/L (ref 23–29)
COLOR UR AUTO: ABNORMAL
CREAT SERPL-MCNC: 1.2 MG/DL (ref 0.5–1.4)
D DIMER PPP FEU-MCNC: 266 NG/ML
DIFFERENTIAL METHOD: ABNORMAL
EOSINOPHIL # BLD AUTO: 0.2 K/UL (ref 0–0.5)
EOSINOPHIL NFR BLD: 1.2 % (ref 0–8)
ERYTHROCYTE [DISTWIDTH] IN BLOOD BY AUTOMATED COUNT: 15.4 % (ref 11.5–14.5)
EST. GFR  (AFRICAN AMERICAN): >60 ML/MIN/1.73 M^2
EST. GFR  (NON AFRICAN AMERICAN): 56.3 ML/MIN/1.73 M^2
GLUCOSE SERPL-MCNC: 264 MG/DL (ref 70–110)
GLUCOSE UR QL STRIP: NEGATIVE
HCT VFR BLD AUTO: 39.9 % (ref 37–48.5)
HGB BLD-MCNC: 12.9 G/DL (ref 12–16)
HGB UR QL STRIP: NEGATIVE
KETONES UR QL STRIP: NEGATIVE
LACTATE SERPL-SCNC: 1.5 MMOL/L (ref 0.5–2.2)
LEUKOCYTE ESTERASE UR QL STRIP: NEGATIVE
LYMPHOCYTES # BLD AUTO: 4.2 K/UL (ref 1–4.8)
LYMPHOCYTES NFR BLD: 24.2 % (ref 18–48)
MCH RBC QN AUTO: 27.2 PG (ref 27–31)
MCHC RBC AUTO-ENTMCNC: 32.3 G/DL (ref 32–36)
MCV RBC AUTO: 84 FL (ref 82–98)
MONOCYTES # BLD AUTO: 1.1 K/UL (ref 0.3–1)
MONOCYTES NFR BLD: 6.2 % (ref 4–15)
NEUTROPHILS # BLD AUTO: 11.3 K/UL (ref 1.8–7.7)
NEUTROPHILS NFR BLD: 66.2 % (ref 38–73)
NITRITE UR QL STRIP: NEGATIVE
NT-PROBNP: 431 PG/ML (ref 5–450)
PH UR STRIP: 6 [PH] (ref 5–8)
PLATELET # BLD AUTO: 194 K/UL (ref 150–350)
PMV BLD AUTO: 10.6 FL (ref 9.2–12.9)
POTASSIUM SERPL-SCNC: 3.8 MMOL/L (ref 3.5–5.1)
PROT SERPL-MCNC: 6.8 G/DL (ref 6–8.4)
PROT UR QL STRIP: ABNORMAL
RBC # BLD AUTO: 4.75 M/UL (ref 4–5.4)
SODIUM SERPL-SCNC: 138 MMOL/L (ref 136–145)
SP GR UR STRIP: 1.02 (ref 1–1.03)
TROPONIN I SERPL DL<=0.01 NG/ML-MCNC: <0.012 NG/ML (ref 0.01–0.03)
URN SPEC COLLECT METH UR: ABNORMAL
UROBILINOGEN UR STRIP-ACNC: NEGATIVE EU/DL
WBC # BLD AUTO: 17.14 K/UL (ref 3.9–12.7)

## 2019-06-22 PROCEDURE — 93010 EKG 12-LEAD: ICD-10-PCS | Mod: ,,, | Performed by: INTERNAL MEDICINE

## 2019-06-22 PROCEDURE — 84484 ASSAY OF TROPONIN QUANT: CPT | Mod: ER

## 2019-06-22 PROCEDURE — 63600175 PHARM REV CODE 636 W HCPCS: Mod: ER | Performed by: EMERGENCY MEDICINE

## 2019-06-22 PROCEDURE — 80053 COMPREHEN METABOLIC PANEL: CPT | Mod: ER

## 2019-06-22 PROCEDURE — 93010 ELECTROCARDIOGRAM REPORT: CPT | Mod: ,,, | Performed by: INTERNAL MEDICINE

## 2019-06-22 PROCEDURE — 83880 ASSAY OF NATRIURETIC PEPTIDE: CPT | Mod: ER

## 2019-06-22 PROCEDURE — 63600175 PHARM REV CODE 636 W HCPCS: Mod: ER | Performed by: FAMILY MEDICINE

## 2019-06-22 PROCEDURE — 85379 FIBRIN DEGRADATION QUANT: CPT | Mod: ER

## 2019-06-22 PROCEDURE — 87040 BLOOD CULTURE FOR BACTERIA: CPT | Mod: ER

## 2019-06-22 PROCEDURE — 96374 THER/PROPH/DIAG INJ IV PUSH: CPT | Mod: ER

## 2019-06-22 PROCEDURE — 25000003 PHARM REV CODE 250: Mod: ER | Performed by: FAMILY MEDICINE

## 2019-06-22 PROCEDURE — 99285 EMERGENCY DEPT VISIT HI MDM: CPT | Mod: 25,ER

## 2019-06-22 PROCEDURE — 96375 TX/PRO/DX INJ NEW DRUG ADDON: CPT | Mod: ER

## 2019-06-22 PROCEDURE — 81003 URINALYSIS AUTO W/O SCOPE: CPT | Mod: ER

## 2019-06-22 PROCEDURE — 83605 ASSAY OF LACTIC ACID: CPT | Mod: ER

## 2019-06-22 PROCEDURE — 85025 COMPLETE CBC W/AUTO DIFF WBC: CPT | Mod: ER

## 2019-06-22 PROCEDURE — 93005 ELECTROCARDIOGRAM TRACING: CPT | Mod: ER

## 2019-06-22 RX ORDER — ASPIRIN 325 MG
325 TABLET ORAL
Status: COMPLETED | OUTPATIENT
Start: 2019-06-22 | End: 2019-06-22

## 2019-06-22 RX ORDER — IBUPROFEN 800 MG/1
800 TABLET ORAL EVERY 6 HOURS PRN
Qty: 20 TABLET | Refills: 0 | Status: SHIPPED | OUTPATIENT
Start: 2019-06-22 | End: 2019-11-17

## 2019-06-22 RX ORDER — KETOROLAC TROMETHAMINE 30 MG/ML
30 INJECTION, SOLUTION INTRAMUSCULAR; INTRAVENOUS
Status: COMPLETED | OUTPATIENT
Start: 2019-06-22 | End: 2019-06-22

## 2019-06-22 RX ORDER — PROCHLORPERAZINE EDISYLATE 5 MG/ML
10 INJECTION INTRAMUSCULAR; INTRAVENOUS
Status: COMPLETED | OUTPATIENT
Start: 2019-06-22 | End: 2019-06-22

## 2019-06-22 RX ORDER — HYDROCODONE BITARTRATE AND ACETAMINOPHEN 5; 325 MG/1; MG/1
1 TABLET ORAL EVERY 4 HOURS PRN
Qty: 10 TABLET | Refills: 0 | OUTPATIENT
Start: 2019-06-22 | End: 2019-07-07

## 2019-06-22 RX ORDER — MORPHINE SULFATE 4 MG/ML
4 INJECTION, SOLUTION INTRAMUSCULAR; INTRAVENOUS
Status: COMPLETED | OUTPATIENT
Start: 2019-06-22 | End: 2019-06-22

## 2019-06-22 RX ADMIN — PROCHLORPERAZINE EDISYLATE 10 MG: 5 INJECTION INTRAMUSCULAR; INTRAVENOUS at 06:06

## 2019-06-22 RX ADMIN — ASPIRIN 325 MG ORAL TABLET 325 MG: 325 PILL ORAL at 04:06

## 2019-06-22 RX ADMIN — KETOROLAC TROMETHAMINE 30 MG: 30 INJECTION, SOLUTION INTRAMUSCULAR at 04:06

## 2019-06-22 RX ADMIN — MORPHINE SULFATE 4 MG: 4 INJECTION INTRAVENOUS at 06:06

## 2019-06-22 NOTE — ED PROVIDER NOTES
Encounter Date: 2019       History     Chief Complaint   Patient presents with    Chest Pain     left sided chest pain x approx 4 hours that radiates to midsternal chest. Describes pain as sharp/stabbing. 2 SL nitro and 2 chewable julius aspirin taken approx 2 hours PTA with no relief. +Hx of MI approx 6 months ago per pt     39-year-old female presents with chief complaint of left-sided chest pain which started approximately noon.  Patient reports took 2 sublingual nitro as well as 2 2 aspirin approximately 2 hr ago without any relief of the pain. Patient does report a history of a MI after being diagnosed with sepsis.  Patient denies having had followed up with Cardiology.  Denies any shortness of breath.        Review of patient's allergies indicates:   Allergen Reactions    Celexa [citalopram] Nausea And Vomiting     Past Medical History:   Diagnosis Date    Asthma     Cellulitis of abdominal wall 2017    Depression     Diabetes mellitus, type II     Diastolic dysfunction     Diverticulosis of intestine with bleeding 2016    E coli bacteremia 2018    E. coli pyelonephritis 2015    E. 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     SECTION       SECTION, CLASSIC      CHOLECYSTECTOMY  age 15    TONSILLECTOMY, ADENOIDECTOMY       Family History   Problem Relation Age of Onset    Cancer Father     Heart attack Mother     Heart disease Mother     Cancer Maternal Aunt         lung (smoker)    Heart attack Maternal Aunt     Breast cancer Paternal Grandmother     Heart attack Maternal Grandmother      Social History     Tobacco Use    Smoking status: Current Every Day Smoker     Packs/day: 2.00     Years: 15.00     Pack years: 30.00     Types: Cigarettes    Smokeless tobacco: Never  Used   Substance Use Topics    Alcohol use: No    Drug use: No     Review of Systems   Constitutional: Negative for chills and fever.   Respiratory: Negative for chest tightness and shortness of breath.    Cardiovascular: Positive for chest pain. Negative for palpitations and leg swelling.   All other systems reviewed and are negative.      Physical Exam     Initial Vitals [06/22/19 1600]   BP Pulse Resp Temp SpO2   134/62 105 20 98.4 °F (36.9 °C) 97 %      MAP       --         Physical Exam    Nursing note and vitals reviewed.  Constitutional: She appears well-developed and well-nourished.   HENT:   Head: Normocephalic and atraumatic.   Eyes: Conjunctivae and EOM are normal. Pupils are equal, round, and reactive to light.   Neck: Neck supple.   Cardiovascular: Normal rate, regular rhythm and normal heart sounds.   Pulmonary/Chest: No respiratory distress. She exhibits tenderness.   Abdominal: Soft. Bowel sounds are normal. She exhibits distension.   Musculoskeletal: Normal range of motion.   Lymphadenopathy:     She has no cervical adenopathy.   Neurological: She is alert and oriented to person, place, and time. She has normal strength. GCS score is 15. GCS eye subscore is 4. GCS verbal subscore is 5. GCS motor subscore is 6.   Skin: Skin is warm. Capillary refill takes less than 2 seconds.   Psychiatric: She has a normal mood and affect. Her behavior is normal. Thought content normal.         ED Course   Procedures  Labs Reviewed   CBC W/ AUTO DIFFERENTIAL - Abnormal; Notable for the following components:       Result Value    WBC 17.14 (*)     RDW 15.4 (*)     Gran # (ANC) 11.3 (*)     Mono # 1.1 (*)     All other components within normal limits   COMPREHENSIVE METABOLIC PANEL - Abnormal; Notable for the following components:    CO2 19 (*)     Glucose 264 (*)     BUN, Bld 18 (*)     AST 13 (*)     eGFR if non  56.3 (*)     All other components within normal limits   D DIMER - Abnormal; Notable  for the following components:    D-Dimer 266 (*)     All other components within normal limits   URINALYSIS, REFLEX TO URINE CULTURE - Abnormal; Notable for the following components:    Protein, UA Trace (*)     All other components within normal limits    Narrative:     Preferred Collection Type->Urine, Clean Catch   CULTURE, BLOOD   TROPONIN I   NT-PRO NATRIURETIC PEPTIDE   LACTIC ACID, PLASMA          Imaging Results          X-Ray Chest AP Portable (Final result)  Result time 06/22/19 16:44:06    Final result by Carlitos Garber III, MD (06/22/19 16:44:06)                 Impression:      No acute abnormality identified in the chest.      Electronically signed by: Carlitos Garber MD  Date:    06/22/2019  Time:    16:44             Narrative:    EXAMINATION:  XR CHEST AP PORTABLE    CLINICAL HISTORY:  Chest Pain;    COMPARISON:  09/11/2018    FINDINGS:  The cardiomediastinal silhouette is within normal limits for AP technique. The lungs appear clear of active disease. No acute appearing infiltrate, pleural effusion or pneumothorax identified.                                 Medical Decision Making:   Differential Diagnosis:   Chest wall pain, bronchitis, pneumonia, myocardial infarction  Clinical Tests:   Lab Tests: Ordered and Reviewed  The following lab test(s) were unremarkable: CBC, CMP, Troponin, BNP and D-Dimer       <> Summary of Lab: Leukocytosis noted with the [place] [address].  D-dimer slightly elevated at 260 pain most consistent with pleuritic/chest wall pain. In light of patient's history of a myocardial infarction although it did appear to be a hyper demand issue will repeat a 2nd set of cardiac markers.  Unlikely in this setting has a pulmonary embolism especially with a normal SpO2 and heart rate.  Thickened setting I would be comfortable with repeating a 2nd set of markers and D seeing the patient without the CT angiogram in light of her morbid obesity this qualify her from  obtaining this study at this facility. (>350 lbs).             5:53 p.m. patient transferred to Dr. JUAN Dominguez pending 2nd set of cardiac markers, Lactic acid, Blood cultures for leucocytosis.         Clinical Impression:       ICD-10-CM ICD-9-CM   1. Costochondritis, acute M94.0 733.6   2. Chest pain R07.9 786.50   3. Leukocytosis, unspecified type D72.829 288.60                                Matt Chandler MD  06/23/19 0624

## 2019-06-22 NOTE — ED TRIAGE NOTES
left sided chest pain x approx 4 hours that radiates to midsternal chest. Describes pain as sharp/stabbing. 2 SL nitro and 2 chewable julius aspirin taken approx 2 hours PTA with no relief. +Hx of MI approx 6 months ago per pt

## 2019-06-28 LAB — BACTERIA BLD CULT: NORMAL

## 2019-07-06 ENCOUNTER — HOSPITAL ENCOUNTER (EMERGENCY)
Facility: HOSPITAL | Age: 39
Discharge: HOME | End: 2019-07-07
Attending: EMERGENCY MEDICINE
Payer: MEDICAID

## 2019-07-06 DIAGNOSIS — S22.32XA CLOSED FRACTURE OF ONE RIB OF LEFT SIDE, INITIAL ENCOUNTER: Primary | ICD-10-CM
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
## 2019-07-06 LAB — POCT GLUCOSE: 127 MG/DL (ref 70–110)

## 2019-07-06 PROCEDURE — 99283 EMERGENCY DEPT VISIT LOW MDM: CPT

## 2019-07-06 PROCEDURE — 82962 GLUCOSE BLOOD TEST: CPT

## 2019-07-07 VITALS
OXYGEN SATURATION: 99 % | HEART RATE: 99 BPM | SYSTOLIC BLOOD PRESSURE: 141 MMHG | BODY MASS INDEX: 53.92 KG/M2 | TEMPERATURE: 98 F | DIASTOLIC BLOOD PRESSURE: 83 MMHG | RESPIRATION RATE: 20 BRPM | HEIGHT: 62 IN | WEIGHT: 293 LBS

## 2019-07-07 LAB
B-HCG UR QL: NEGATIVE
CTP QC/QA: YES

## 2019-07-07 PROCEDURE — 94799 UNLISTED PULMONARY SVC/PX: CPT

## 2019-07-07 PROCEDURE — 25000003 PHARM REV CODE 250: Performed by: EMERGENCY MEDICINE

## 2019-07-07 PROCEDURE — 81025 URINE PREGNANCY TEST: CPT | Performed by: EMERGENCY MEDICINE

## 2019-07-07 RX ORDER — HYDROCODONE BITARTRATE AND ACETAMINOPHEN 5; 325 MG/1; MG/1
1 TABLET ORAL EVERY 4 HOURS PRN
Qty: 12 TABLET | Refills: 0 | Status: SHIPPED | OUTPATIENT
Start: 2019-07-07 | End: 2020-03-20

## 2019-07-07 RX ORDER — HYDROCODONE BITARTRATE AND ACETAMINOPHEN 5; 325 MG/1; MG/1
1 TABLET ORAL
Status: COMPLETED | OUTPATIENT
Start: 2019-07-07 | End: 2019-07-07

## 2019-07-07 RX ADMIN — HYDROCODONE BITARTRATE AND ACETAMINOPHEN 1 TABLET: 5; 325 TABLET ORAL at 01:07

## 2019-07-07 NOTE — ED NOTES
Went over discharge instructions with patient.   Stressed importance of making and keeping all follow ups.   Pt instructed to call her physician in regards to pain prescription d/t being on a pain contract.  Patient verbalized understanding and has no questions in regards to discharge.

## 2019-07-07 NOTE — ED NOTES
"Pt presents to ED c/o left rib pain. Pt reports fell twice Monday due to uneven ground. Pt reports that pain is worse with movement. Pt reports has "bump" on left back that has been "oozing clear". Pt states was going to come in earlier, but had a  to attend today.   "
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
## 2019-08-17 ENCOUNTER — HOSPITAL ENCOUNTER (EMERGENCY)
Facility: HOSPITAL | Age: 39
Discharge: HOME | End: 2019-08-17
Attending: EMERGENCY MEDICINE
Payer: MEDICAID

## 2019-08-17 VITALS
BODY MASS INDEX: 53.92 KG/M2 | HEIGHT: 62 IN | SYSTOLIC BLOOD PRESSURE: 124 MMHG | WEIGHT: 293 LBS | DIASTOLIC BLOOD PRESSURE: 66 MMHG | RESPIRATION RATE: 20 BRPM | HEART RATE: 88 BPM | TEMPERATURE: 99 F | OXYGEN SATURATION: 97 %

## 2019-08-17 DIAGNOSIS — R06.00 DYSPNEA: ICD-10-CM

## 2019-08-17 DIAGNOSIS — R60.0 PERIPHERAL EDEMA: Primary | ICD-10-CM

## 2019-08-17 DIAGNOSIS — M79.89 SWELLING OF BOTH LOWER EXTREMITIES: ICD-10-CM

## 2019-08-17 DIAGNOSIS — R60.9 EDEMA: ICD-10-CM

## 2019-08-17 LAB
ANION GAP SERPL CALC-SCNC: 6 MMOL/L (ref 8–16)
BASOPHILS # BLD AUTO: 0.06 K/UL (ref 0–0.2)
BASOPHILS NFR BLD: 0.6 % (ref 0–1.9)
BUN SERPL-MCNC: 12 MG/DL (ref 7–17)
CALCIUM SERPL-MCNC: 9.5 MG/DL (ref 8.7–10.5)
CHLORIDE SERPL-SCNC: 107 MMOL/L (ref 95–110)
CO2 SERPL-SCNC: 28 MMOL/L (ref 23–29)
CREAT SERPL-MCNC: 0.76 MG/DL (ref 0.5–1.4)
D DIMER PPP FEU-MCNC: 232 NG/ML
DIFFERENTIAL METHOD: ABNORMAL
EOSINOPHIL # BLD AUTO: 0.4 K/UL (ref 0–0.5)
EOSINOPHIL NFR BLD: 3.8 % (ref 0–8)
ERYTHROCYTE [DISTWIDTH] IN BLOOD BY AUTOMATED COUNT: 16.5 % (ref 11.5–14.5)
EST. GFR  (AFRICAN AMERICAN): >60 ML/MIN/1.73 M^2
EST. GFR  (NON AFRICAN AMERICAN): >60 ML/MIN/1.73 M^2
GLUCOSE SERPL-MCNC: 172 MG/DL (ref 70–110)
HCT VFR BLD AUTO: 39.6 % (ref 37–48.5)
HGB BLD-MCNC: 12.6 G/DL (ref 12–16)
LYMPHOCYTES # BLD AUTO: 3.4 K/UL (ref 1–4.8)
LYMPHOCYTES NFR BLD: 32.7 % (ref 18–48)
MCH RBC QN AUTO: 27.5 PG (ref 27–31)
MCHC RBC AUTO-ENTMCNC: 31.8 G/DL (ref 32–36)
MCV RBC AUTO: 87 FL (ref 82–98)
MONOCYTES # BLD AUTO: 0.8 K/UL (ref 0.3–1)
MONOCYTES NFR BLD: 7.4 % (ref 4–15)
NEUTROPHILS # BLD AUTO: 5.5 K/UL (ref 1.8–7.7)
NEUTROPHILS NFR BLD: 55.5 % (ref 38–73)
NT-PROBNP: 425 PG/ML (ref 5–450)
PLATELET # BLD AUTO: 154 K/UL (ref 150–350)
PMV BLD AUTO: 10.8 FL (ref 9.2–12.9)
POTASSIUM SERPL-SCNC: 4 MMOL/L (ref 3.5–5.1)
RBC # BLD AUTO: 4.58 M/UL (ref 4–5.4)
SODIUM SERPL-SCNC: 141 MMOL/L (ref 136–145)
WBC # BLD AUTO: 10.47 K/UL (ref 3.9–12.7)

## 2019-08-17 PROCEDURE — 80048 BASIC METABOLIC PNL TOTAL CA: CPT | Mod: ER

## 2019-08-17 PROCEDURE — 93010 ELECTROCARDIOGRAM REPORT: CPT | Mod: ,,, | Performed by: STUDENT IN AN ORGANIZED HEALTH CARE EDUCATION/TRAINING PROGRAM

## 2019-08-17 PROCEDURE — 93005 ELECTROCARDIOGRAM TRACING: CPT | Mod: ER

## 2019-08-17 PROCEDURE — 83880 ASSAY OF NATRIURETIC PEPTIDE: CPT | Mod: ER

## 2019-08-17 PROCEDURE — 99285 EMERGENCY DEPT VISIT HI MDM: CPT | Mod: 25,ER

## 2019-08-17 PROCEDURE — 85025 COMPLETE CBC W/AUTO DIFF WBC: CPT | Mod: ER

## 2019-08-17 PROCEDURE — 93010 EKG 12-LEAD: ICD-10-PCS | Mod: ,,, | Performed by: STUDENT IN AN ORGANIZED HEALTH CARE EDUCATION/TRAINING PROGRAM

## 2019-08-17 PROCEDURE — 85379 FIBRIN DEGRADATION QUANT: CPT | Mod: ER
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
## 2019-08-17 NOTE — DISCHARGE INSTRUCTIONS
Avoid tobacco use  Elevate your extremities as much as possible  Increased her Lasix to 40 mg twice daily for the next 3 days  Follow-up with your primary care provider Monday or Tuesday  Return for worsening symptoms.

## 2019-09-24 RX ORDER — CALCIUM CITRATE/VITAMIN D3 200MG-6.25
TABLET ORAL
Qty: 50 STRIP | Refills: 3 | OUTPATIENT
Start: 2019-09-24

## 2019-10-10 DIAGNOSIS — R51.9 HEAD ACHE: Primary | ICD-10-CM

## 2019-11-05 DIAGNOSIS — R07.81 PLEURITIC CHEST PAIN: Primary | ICD-10-CM

## 2019-11-17 ENCOUNTER — HOSPITAL ENCOUNTER (EMERGENCY)
Facility: HOSPITAL | Age: 39
Discharge: HOME | End: 2019-11-17
Attending: EMERGENCY MEDICINE
Payer: MEDICAID

## 2019-11-17 VITALS
SYSTOLIC BLOOD PRESSURE: 135 MMHG | RESPIRATION RATE: 18 BRPM | HEART RATE: 86 BPM | OXYGEN SATURATION: 96 % | WEIGHT: 293 LBS | BODY MASS INDEX: 64.56 KG/M2 | DIASTOLIC BLOOD PRESSURE: 63 MMHG | TEMPERATURE: 99 F

## 2019-11-17 DIAGNOSIS — R05.9 COUGH: ICD-10-CM

## 2019-11-17 DIAGNOSIS — J20.9 ACUTE BRONCHITIS, UNSPECIFIED ORGANISM: Primary | ICD-10-CM

## 2019-11-17 DIAGNOSIS — M79.606 LEG PAIN: ICD-10-CM

## 2019-11-17 LAB
ALBUMIN SERPL BCP-MCNC: 3.4 G/DL (ref 3.5–5.2)
ALP SERPL-CCNC: 72 U/L (ref 55–135)
ALT SERPL W/O P-5'-P-CCNC: 8 U/L (ref 10–44)
ANION GAP SERPL CALC-SCNC: 10 MMOL/L (ref 8–16)
AST SERPL-CCNC: 8 U/L (ref 10–40)
B-HCG UR QL: NEGATIVE
BASOPHILS # BLD AUTO: 0.02 K/UL (ref 0–0.2)
BASOPHILS NFR BLD: 0.2 % (ref 0–1.9)
BILIRUB SERPL-MCNC: 0.7 MG/DL (ref 0.1–1)
BILIRUB UR QL STRIP: NEGATIVE
BNP SERPL-MCNC: 118 PG/ML (ref 0–99)
BUN SERPL-MCNC: 13 MG/DL (ref 6–20)
CALCIUM SERPL-MCNC: 9 MG/DL (ref 8.7–10.5)
CHLORIDE SERPL-SCNC: 105 MMOL/L (ref 95–110)
CLARITY UR: CLEAR
CO2 SERPL-SCNC: 25 MMOL/L (ref 23–29)
COLOR UR: YELLOW
CREAT SERPL-MCNC: 1.1 MG/DL (ref 0.5–1.4)
CTP QC/QA: YES
DIFFERENTIAL METHOD: ABNORMAL
EOSINOPHIL # BLD AUTO: 0.1 K/UL (ref 0–0.5)
EOSINOPHIL NFR BLD: 1.1 % (ref 0–8)
ERYTHROCYTE [DISTWIDTH] IN BLOOD BY AUTOMATED COUNT: 15.5 % (ref 11.5–14.5)
EST. GFR  (AFRICAN AMERICAN): >60 ML/MIN/1.73 M^2
EST. GFR  (NON AFRICAN AMERICAN): >60 ML/MIN/1.73 M^2
GLUCOSE SERPL-MCNC: 215 MG/DL (ref 70–110)
GLUCOSE UR QL STRIP: NEGATIVE
HCT VFR BLD AUTO: 40.1 % (ref 37–48.5)
HGB BLD-MCNC: 12.4 G/DL (ref 12–16)
HGB UR QL STRIP: NEGATIVE
INFLUENZA A, MOLECULAR: NEGATIVE
INFLUENZA B, MOLECULAR: NEGATIVE
KETONES UR QL STRIP: NEGATIVE
LEUKOCYTE ESTERASE UR QL STRIP: NEGATIVE
LYMPHOCYTES # BLD AUTO: 3.7 K/UL (ref 1–4.8)
LYMPHOCYTES NFR BLD: 34.5 % (ref 18–48)
MCH RBC QN AUTO: 27.4 PG (ref 27–31)
MCHC RBC AUTO-ENTMCNC: 30.9 G/DL (ref 32–36)
MCV RBC AUTO: 89 FL (ref 82–98)
MONOCYTES # BLD AUTO: 0.7 K/UL (ref 0.3–1)
MONOCYTES NFR BLD: 6.7 % (ref 4–15)
NEUTROPHILS # BLD AUTO: 6 K/UL (ref 1.8–7.7)
NEUTROPHILS NFR BLD: 57.5 % (ref 38–73)
NITRITE UR QL STRIP: NEGATIVE
PH UR STRIP: 6 [PH] (ref 5–8)
PLATELET # BLD AUTO: 154 K/UL (ref 150–350)
PMV BLD AUTO: 10.6 FL (ref 9.2–12.9)
POTASSIUM SERPL-SCNC: 4.3 MMOL/L (ref 3.5–5.1)
PROT SERPL-MCNC: 6.6 G/DL (ref 6–8.4)
PROT UR QL STRIP: NEGATIVE
RBC # BLD AUTO: 4.52 M/UL (ref 4–5.4)
SODIUM SERPL-SCNC: 140 MMOL/L (ref 136–145)
SP GR UR STRIP: 1.01 (ref 1–1.03)
SPECIMEN SOURCE: NORMAL
URN SPEC COLLECT METH UR: NORMAL
UROBILINOGEN UR STRIP-ACNC: 1 EU/DL
WBC # BLD AUTO: 10.59 K/UL (ref 3.9–12.7)

## 2019-11-17 PROCEDURE — 80053 COMPREHEN METABOLIC PANEL: CPT

## 2019-11-17 PROCEDURE — 81003 URINALYSIS AUTO W/O SCOPE: CPT

## 2019-11-17 PROCEDURE — 81025 URINE PREGNANCY TEST: CPT | Performed by: PHYSICIAN ASSISTANT

## 2019-11-17 PROCEDURE — 99284 EMERGENCY DEPT VISIT MOD MDM: CPT | Mod: 25

## 2019-11-17 PROCEDURE — 25000242 PHARM REV CODE 250 ALT 637 W/ HCPCS: Performed by: PHYSICIAN ASSISTANT

## 2019-11-17 PROCEDURE — 87502 INFLUENZA DNA AMP PROBE: CPT

## 2019-11-17 PROCEDURE — 83880 ASSAY OF NATRIURETIC PEPTIDE: CPT

## 2019-11-17 PROCEDURE — 85025 COMPLETE CBC W/AUTO DIFF WBC: CPT

## 2019-11-17 PROCEDURE — 94640 AIRWAY INHALATION TREATMENT: CPT

## 2019-11-17 RX ORDER — AZITHROMYCIN 250 MG/1
250 TABLET, FILM COATED ORAL DAILY
Qty: 6 TABLET | Refills: 0 | Status: SHIPPED | OUTPATIENT
Start: 2019-11-17 | End: 2019-11-27

## 2019-11-17 RX ORDER — FLUTICASONE PROPIONATE 50 MCG
1 SPRAY, SUSPENSION (ML) NASAL 2 TIMES DAILY PRN
Qty: 15 G | Refills: 0 | Status: SHIPPED | OUTPATIENT
Start: 2019-11-17 | End: 2023-01-01

## 2019-11-17 RX ORDER — IPRATROPIUM BROMIDE AND ALBUTEROL SULFATE 2.5; .5 MG/3ML; MG/3ML
3 SOLUTION RESPIRATORY (INHALATION)
Status: COMPLETED | OUTPATIENT
Start: 2019-11-17 | End: 2019-11-17

## 2019-11-17 RX ORDER — GUAIFENESIN AND DEXTROMETHORPHAN HYDROBROMIDE 1200; 60 MG/1; MG/1
1 TABLET, EXTENDED RELEASE ORAL 2 TIMES DAILY PRN
Qty: 30 TABLET | Refills: 0 | Status: SHIPPED | OUTPATIENT
Start: 2019-11-17 | End: 2019-11-27

## 2019-11-17 RX ORDER — ALBUTEROL SULFATE 90 UG/1
2 AEROSOL, METERED RESPIRATORY (INHALATION) EVERY 6 HOURS PRN
Qty: 18 G | Refills: 0 | Status: ON HOLD | OUTPATIENT
Start: 2019-11-17 | End: 2021-01-24

## 2019-11-17 RX ORDER — METHYLPREDNISOLONE 4 MG/1
TABLET ORAL
Qty: 1 PACKAGE | Refills: 0 | Status: ON HOLD | OUTPATIENT
Start: 2019-11-17 | End: 2021-01-24

## 2019-11-17 RX ADMIN — IPRATROPIUM BROMIDE AND ALBUTEROL SULFATE 3 ML: .5; 3 SOLUTION RESPIRATORY (INHALATION) at 01:11
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
## 2019-11-17 NOTE — ED NOTES
Attempted to place piv right hand. Patient c/o discomfort so I removed catheter. Patient depart in wheelchair to go to xray.

## 2019-11-17 NOTE — ED NOTES
Pediatric Well Child Exam: 4 Months of age    Chief Complaint   Patient presents with   • Well Child     4 mo phy       SUBJECTIVE:  Solange Moses is a 4 month old female who presents for a 4 month old well child exam.  Patient presents with Mother.    CONCERNS RAISED TODAY: Flat head.  Working on frequent tummy time.    SLEEP PATTERN:   Hrs/Night: 8pm until 7am   Naps: 3-4 little naps  Location: bassinet  Position: back  Snoring: none  Concerns: none      DIET/GI:  FEEDING: Breast feeding q3h during the day.  She takes pumped milk when mom is at work. She will take 2.5-4oz per bottle.  Supplements: Vitamin D  Voids: good wet diapers  STOOLS: soft stool every 2-3 days    /HOMECARE:   :  [x]  YES     []  NO   MGM will watch her until summer break.      FAMILY/HOME ENVIRONMENT:  Changes in home/work environment: MGM is living with them until the first week of .  Mom is now back at work  Parents working outside the home: Mother and father  Toxic Exposure:  Tobacco Use: Never             DEVELOPMENT:  [x]  YES     []  NO     []  UNKNOWN   Holds head steady?  [x]  YES     []  NO     []  UNKNOWN   Turns head in direction of voice?        [x]  YES     []  NO     []  UNKNOWN   Able to hold a toy?  [x]  YES     []  NO     []  UNKNOWN   Head at 90 degrees when up on forearms?  []  YES     [x]  NO     []  UNKNOWN  Rolls 1 way? Occasionally when on an incline/decline  [x]  YES     []  NO     []  UNKNOWN   Follows moving things with eyes side to side?  [x]  YES     []  NO     []  UNKNOWN   Laughs out loud?  [x]  YES     []  NO     []  UNKNOWN   Smiles spontaneously?  [x]  YES     []  NO     []  UNKNOWN   Stops crying at parent voice?    OBJECTIVE:  BIRTH HISTORY:  Birth History   • Birth     Length: 19.69\" (50 cm)     Weight: 2.921 kg (6 lb 7 oz)     HC 34.5 cm (13.58\")   • Apgar     One: 9     Five: 9   • Discharge Weight: 2.71 kg (5 lb 15.6 oz)   • Delivery Method: Vaginal, Spontaneous   • Gestation  US at bedside. No distress noted.     Age: 36 5/7 wks   • Hospital Name: [facility]     Passed CV screen; 25h bilirubin was 6.6 (LIR) and 47 hour bilirubin was 11 (HIR).       FAMILY HISTORY:  Family History   Problem Relation Age of Onset   • Hypothyroid Sister        PAST HISTORIES:  Allergies, Medications, Medical history, Surgical history, Family history reviewed and updated.  IMMUNIZATION STATUS: Up to date per review of the electronic health records after today's vaccines.    IMMUNIZATION REACTIONS: None    RECENT HEALTH EVENTS:  Hospitalizations: None.  Injuries or Accidents:None.    Review of Systems   Constitutional: Negative for fever.   HENT: Negative for congestion and rhinorrhea.    Eyes: Negative for discharge and redness.   Respiratory: Negative for cough.    Gastrointestinal: Negative for constipation.   Skin: Negative for rash.       Physical Exam   Constitutional: She appears well-nourished. She is active.   HENT:   Head: Anterior fontanelle is flat.   Nose: Nose normal.   Mouth/Throat: Mucous membranes are moist.   Back of head mild-moderately flat   Eyes: Conjunctivae are normal. Right eye exhibits no discharge. Left eye exhibits no discharge.   Neck: Normal range of motion. Neck supple.   Cardiovascular: Normal rate, regular rhythm, S1 normal and S2 normal.   Pulmonary/Chest: Effort normal and breath sounds normal.   Abdominal: Soft. She exhibits no distension. There is no tenderness.   Genitourinary:   Genitourinary Comments: Normal external genitalia   Musculoskeletal: Normal range of motion.   Neurological: She is alert. She has normal strength. She exhibits normal muscle tone.   Skin: Skin is warm and dry. No rash noted.     Visit Vitals  Ht 24.5\" (62.2 cm)   Wt 6.265 kg (13 lb 13 oz)   BMI 16.18 kg/m²        ASSESSMENT:  Assessment   Problem List Items Addressed This Visit     None      Visit Diagnoses     Encounter for routine child health examination without abnormal findings    -  Primary    Need for vaccination        Relevant  Orders    DTAP HIB IPV VACC 0 THRU 4 YRS (PENTACEL) (Completed)    PNEUMOCOCCAL CONJUGATE 13 VALENT VACC(PREVNAR-13) (Completed)    ROTAVIRUS MONOVALENT VACC 2 DOSE(ROTARIX) (Completed)    Positional plagiocephaly        Patient has a flat head. Increase tummy time. If head is still flat at 6 months, will refer for helmet evaluation.          PLAN:  1. All parental concerns and questions discussed.  2. Anticipatory guidance provided  3. Immunizations per orders.  Risks, benefits, and side effects discussed. Vaccine Information Statement for Immunizations given.    Follow up: Return for 6 month physical.
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
## 2020-02-29 ENCOUNTER — HOSPITAL ENCOUNTER (EMERGENCY)
Facility: HOSPITAL | Age: 40
Discharge: HOME | End: 2020-03-01
Attending: EMERGENCY MEDICINE
Payer: MEDICAID

## 2020-02-29 VITALS
SYSTOLIC BLOOD PRESSURE: 151 MMHG | DIASTOLIC BLOOD PRESSURE: 67 MMHG | RESPIRATION RATE: 20 BRPM | BODY MASS INDEX: 53.92 KG/M2 | OXYGEN SATURATION: 95 % | TEMPERATURE: 98 F | WEIGHT: 293 LBS | HEIGHT: 62 IN | HEART RATE: 108 BPM
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
## 2020-02-29 DIAGNOSIS — S42.411A RIGHT SUPRACONDYLAR HUMERUS FRACTURE, CLOSED, INITIAL ENCOUNTER: Primary | ICD-10-CM

## 2020-02-29 DIAGNOSIS — S22.41XA CLOSED FRACTURE OF MULTIPLE RIBS OF RIGHT SIDE, INITIAL ENCOUNTER: ICD-10-CM

## 2020-02-29 DIAGNOSIS — R60.0 LEG EDEMA, LEFT: ICD-10-CM

## 2020-02-29 DIAGNOSIS — R05.9 COUGH: ICD-10-CM

## 2020-02-29 LAB
ALBUMIN SERPL BCP-MCNC: 3.3 G/DL (ref 3.5–5.2)
ALP SERPL-CCNC: 69 U/L (ref 55–135)
ALT SERPL W/O P-5'-P-CCNC: 16 U/L (ref 10–44)
ANION GAP SERPL CALC-SCNC: 9 MMOL/L (ref 8–16)
AST SERPL-CCNC: 25 U/L (ref 10–40)
BASOPHILS # BLD AUTO: 0.07 K/UL (ref 0–0.2)
BASOPHILS NFR BLD: 0.5 % (ref 0–1.9)
BILIRUB SERPL-MCNC: 0.2 MG/DL (ref 0.1–1)
BILIRUB UR QL STRIP: NEGATIVE
BUN SERPL-MCNC: 18 MG/DL (ref 6–20)
CALCIUM SERPL-MCNC: 8.9 MG/DL (ref 8.7–10.5)
CHLORIDE SERPL-SCNC: 109 MMOL/L (ref 95–110)
CLARITY UR: CLEAR
CO2 SERPL-SCNC: 20 MMOL/L (ref 23–29)
COLOR UR: YELLOW
CREAT SERPL-MCNC: 1 MG/DL (ref 0.5–1.4)
DIFFERENTIAL METHOD: ABNORMAL
EOSINOPHIL # BLD AUTO: 0.1 K/UL (ref 0–0.5)
EOSINOPHIL NFR BLD: 0.7 % (ref 0–8)
ERYTHROCYTE [DISTWIDTH] IN BLOOD BY AUTOMATED COUNT: 16 % (ref 11.5–14.5)
EST. GFR  (AFRICAN AMERICAN): >60 ML/MIN/1.73 M^2
EST. GFR  (NON AFRICAN AMERICAN): >60 ML/MIN/1.73 M^2
GLUCOSE SERPL-MCNC: 69 MG/DL (ref 70–110)
GLUCOSE UR QL STRIP: NEGATIVE
HCT VFR BLD AUTO: 39.4 % (ref 37–48.5)
HGB BLD-MCNC: 12.4 G/DL (ref 12–16)
HGB UR QL STRIP: NEGATIVE
IMM GRANULOCYTES # BLD AUTO: 0.2 K/UL (ref 0–0.04)
IMM GRANULOCYTES NFR BLD AUTO: 1.5 % (ref 0–0.5)
KETONES UR QL STRIP: NEGATIVE
LEUKOCYTE ESTERASE UR QL STRIP: NEGATIVE
LYMPHOCYTES # BLD AUTO: 2.2 K/UL (ref 1–4.8)
LYMPHOCYTES NFR BLD: 15.9 % (ref 18–48)
MCH RBC QN AUTO: 27.6 PG (ref 27–31)
MCHC RBC AUTO-ENTMCNC: 31.5 G/DL (ref 32–36)
MCV RBC AUTO: 88 FL (ref 82–98)
MONOCYTES # BLD AUTO: 0.7 K/UL (ref 0.3–1)
MONOCYTES NFR BLD: 5.3 % (ref 4–15)
NEUTROPHILS # BLD AUTO: 10.4 K/UL (ref 1.8–7.7)
NEUTROPHILS NFR BLD: 76.1 % (ref 38–73)
NITRITE UR QL STRIP: NEGATIVE
NRBC BLD-RTO: 0 /100 WBC
PH UR STRIP: 7 [PH] (ref 5–8)
PLATELET # BLD AUTO: 153 K/UL (ref 150–350)
PMV BLD AUTO: 11.5 FL (ref 9.2–12.9)
POTASSIUM SERPL-SCNC: 4.3 MMOL/L (ref 3.5–5.1)
PROT SERPL-MCNC: 6.9 G/DL (ref 6–8.4)
PROT UR QL STRIP: NEGATIVE
RBC # BLD AUTO: 4.5 M/UL (ref 4–5.4)
SODIUM SERPL-SCNC: 138 MMOL/L (ref 136–145)
SP GR UR STRIP: 1.01 (ref 1–1.03)
URN SPEC COLLECT METH UR: NORMAL
UROBILINOGEN UR STRIP-ACNC: NEGATIVE EU/DL
WBC # BLD AUTO: 13.66 K/UL (ref 3.9–12.7)

## 2020-02-29 PROCEDURE — 29105 APPLICATION LONG ARM SPLINT: CPT

## 2020-02-29 PROCEDURE — 63600175 PHARM REV CODE 636 W HCPCS: Performed by: EMERGENCY MEDICINE

## 2020-02-29 PROCEDURE — 96375 TX/PRO/DX INJ NEW DRUG ADDON: CPT | Mod: 59

## 2020-02-29 PROCEDURE — 85025 COMPLETE CBC W/AUTO DIFF WBC: CPT

## 2020-02-29 PROCEDURE — 96374 THER/PROPH/DIAG INJ IV PUSH: CPT

## 2020-02-29 PROCEDURE — 80053 COMPREHEN METABOLIC PANEL: CPT

## 2020-02-29 PROCEDURE — 99285 EMERGENCY DEPT VISIT HI MDM: CPT | Mod: 25

## 2020-02-29 PROCEDURE — 96361 HYDRATE IV INFUSION ADD-ON: CPT | Mod: 59

## 2020-02-29 PROCEDURE — 81003 URINALYSIS AUTO W/O SCOPE: CPT

## 2020-02-29 RX ORDER — SODIUM CHLORIDE 9 MG/ML
1000 INJECTION, SOLUTION INTRAVENOUS
Status: COMPLETED | OUTPATIENT
Start: 2020-02-29 | End: 2020-02-29

## 2020-02-29 RX ORDER — ONDANSETRON 2 MG/ML
4 INJECTION INTRAMUSCULAR; INTRAVENOUS
Status: COMPLETED | OUTPATIENT
Start: 2020-02-29 | End: 2020-02-29

## 2020-02-29 RX ORDER — MORPHINE SULFATE 4 MG/ML
4 INJECTION, SOLUTION INTRAMUSCULAR; INTRAVENOUS
Status: COMPLETED | OUTPATIENT
Start: 2020-02-29 | End: 2020-02-29

## 2020-02-29 RX ADMIN — SODIUM CHLORIDE 1000 ML: 0.9 INJECTION, SOLUTION INTRAVENOUS at 07:02

## 2020-02-29 RX ADMIN — MORPHINE SULFATE 4 MG: 4 INJECTION INTRAVENOUS at 07:02

## 2020-02-29 RX ADMIN — ONDANSETRON 4 MG: 2 INJECTION INTRAMUSCULAR; INTRAVENOUS at 07:02
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
## 2020-03-01 NOTE — ED NOTES
Pt refusing to let us help her get dressed.  Pt hollering and fighting with family member.  States she wants daughter to help her get dressed.  Security going out to lobby to get patient's daughter.

## 2020-03-01 NOTE — ED TRIAGE NOTES
[facility] following mechanical fall outside of home. Per pt, she was walking out of her home and fell off the bottom 2 steps onto the lawn. Pt states she tried to catch herself on her right arm. Pt complaining of right arm pain. Per EMS- pt received 450 mcg of Fentanyl IM en route to hospital. Pt states pain 10/10. Pt denies CP, SOB, N/V/D. Pt states she has had a productive cough. Pt states she also has a chronic L knee pain.

## 2020-03-01 NOTE — CONSULTS
[facility]opa[facility] Consult Note    Consult: R humerus fx    Date of Injury: /td      HPI:  39 y.o. female hx of fallfrom 3 stairs at home.Fell onto R arm. Pain and swelling, came here for eval. Also w/ hx of L arm amputation from trauma in .     PMH:   Past Medical History:   Diagnosis Date    Asthma     Cellulitis of abdominal wall 2017    Depression     Diabetes mellitus, type II     Diastolic dysfunction     Diverticulosis of intestine with bleeding 2016    E coli bacteremia 2018    E. coli pyelonephritis 2015    E. coli UTI 2017    Hernia, epigastric     Hypertension     Nonalcoholic hepatosteatosis     Periumbilical hernia        PSH:    has a past surgical history that includes  section; TONSILLECTOMY, ADENOIDECTOMY;  section, classic; Arm amputation at shoulder (Left, 2000s); and Cholecystectomy (age 15).    SOCIAL:    reports that she has been smoking cigarettes. She has a 30.00 pack-year smoking history. She has never used smokeless tobacco. She reports that she does not drink alcohol or use drugs.    FAMILY:  Family History   Problem Relation Age of Onset    Cancer Father     Heart attack Mother     Heart disease Mother     Cancer Maternal Aunt         lung (smoker)    Heart attack Maternal Aunt     Breast cancer Paternal Grandmother     Heart attack Maternal Grandmother        MEDS:   No current facility-administered medications on file prior to encounter.      Current Outpatient Medications on File Prior to Encounter   Medication Sig Dispense Refill    albuterol (ACCUNEB) 1.25 mg/3 mL Nebu Take 1.25 mg by nebulization every 4 (four) hours as needed.      albuterol (PROVENTIL/VENTOLIN HFA) 90 mcg/actuation inhaler Inhale 2 puffs into the lungs every 6 (six) hours as needed for Wheezing. Rescue 18 g 0    amitriptyline (ELAVIL) 50 MG tablet Take 50 mg by mouth nightly as needed for Insomnia.      blood sugar diagnostic Strp TEST  BLOOD SUAGR ONCE DAILY 100 strip 3    blood-glucose meter Misc use as directed 1 each 0    buprenorphine-naloxone 2-0.5 mg (SUBOXONE) 2-0.5 mg Subl Place 8 mg under the tongue once daily. Pt states once daily      carvedilol (COREG) 3.125 MG tablet Take 3.125 mg by mouth 2 (two) times daily with meals.      collagenase (SANTYL) ointment Apply topically once daily. Apply locally very other day with xeroform. 30 g 1    fluticasone propionate (FLONASE) 50 mcg/actuation nasal spray 1 spray (50 mcg total) by Each Nostril route 2 (two) times daily as needed for Rhinitis. 15 g 0    fluticasone-salmeterol 100-50 mcg/dose (ADVAIR) 100-50 mcg/dose diskus inhaler Inhale 1 puff into the lungs 2 (two) times daily. 60 each 2    furosemide (LASIX) 40 MG tablet Take 40 mg by mouth 2 (two) times daily.      HYDROcodone-acetaminophen (NORCO) 5-325 mg per tablet Take 1 tablet by mouth every 4 (four) hours as needed for Pain. 12 tablet 0    hydrOXYzine pamoate (VISTARIL) 50 MG Cap Take 50 mg by mouth every 8 (eight) hours as needed.      insulin glargine, TOUJEO, (TOUJEO) 300 unit/mL (1.5 mL) InPn pen Inject into the skin 2 (two) times daily. Takes 138 units every morning; 135 units every evening      insulin lispro (HUMALOG) 100 unit/mL injection Inject 95 Units into the skin 3 (three) times daily before meals.       lancets 30 gauge Misc TEST BLOOD SUGAR ONCE DAILY 100 each 3    lisinopril (PRINIVIL,ZESTRIL) 20 MG tablet Take 10 mg by mouth once daily.       methylPREDNISolone (MEDROL DOSEPACK) 4 mg tablet Use as directed 1 Package 0    nitroGLYCERIN (NITROSTAT) 0.4 MG SL tablet Place 0.4 mg under the tongue every 5 (five) minutes as needed for Chest pain.      omeprazole (PRILOSEC) 40 MG capsule Take 40 mg by mouth every morning.       ondansetron (ZOFRAN-ODT) 4 MG TbDL Take 2 tablets (8 mg total) by mouth every 6 (six) hours as needed. 12 tablet 0    potassium chloride SA (K-DUR,KLOR-CON) 20 MEQ tablet Take 20 mEq  by mouth once daily.      pregabalin (LYRICA) 150 MG capsule Take 300 mg by mouth 2 (two) times daily.      promethazine (PHENERGAN) 25 MG tablet Take 25 mg by mouth every 4 (four) hours.         ALLERGY:   Allergies as of 02/29/2020 - Reviewed 02/29/2020   Allergen Reaction Noted    Celexa [citalopram] Nausea And Vomiting 06/02/2015         Vitals:    Vitals:    02/29/20 1950   BP: (!) 190/106   Pulse: 99       Labs:  Recent Labs   Lab 02/29/20 1959   WBC 13.66*   HGB 12.4   HCT 39.4      MCV 88   RDW 16.0*       Coags:   Lab Results   Component Value Date    INR 0.9 07/17/2018    APTT 26.9 10/16/2016       Infection:  Recent Labs   Lab 02/29/20 1959   WBC 13.66*       Physical Exam:    Gen: A/Ox3, NAD  Card: RRR by RP  Lungs: nonlabored breathing, symmetric chest rise  Abd: S/NT/ND    RUE  + gross deformity, upper arm  No open wounds  Crepitus over upper arm  TTP upper arm  Compartments soft, compressible with visible wrinkles  Motor intact M/U/R/AIN/PIN limited due to pain  SILT in M/U/R distribution   FROM at wrist, digits without pain.   Radial Pulse 2+, CF <2s      Radiology:  Xr R midshaft humerus fracture with angulation    Assessment/Plan:  39 y.o. female hx of fall from several stairs with subsequent R humeral shaft fracture    Patient was placed into a coaptation splint and sling  Tolerated well   F/U in [facility] within 1 week  Discussed patient's diagnosis and expected course with patient and family. They understand.   Weight Bearing Status: NWB FAB Hartman MD  [facility], PGY-3  Pager: [phone]

## 2020-03-01 NOTE — ED NOTES
Family and patient in room hollering and screaming at each other. Charge nurse in room. Pt states she wants to leave.

## 2020-03-20 ENCOUNTER — OFFICE VISIT (OUTPATIENT)
Dept: FAMILY MEDICINE | Facility: HOSPITAL | Age: 40
End: 2020-03-20
Attending: FAMILY MEDICINE
Payer: MEDICAID

## 2020-03-20 VITALS — HEART RATE: 85 BPM | DIASTOLIC BLOOD PRESSURE: 82 MMHG | TEMPERATURE: 99 F | SYSTOLIC BLOOD PRESSURE: 132 MMHG

## 2020-03-20 DIAGNOSIS — Z01.818 PREOPERATIVE CLEARANCE: ICD-10-CM

## 2020-03-20 DIAGNOSIS — S42.411A CLOSED SUPRACONDYLAR FRACTURE OF RIGHT HUMERUS, INITIAL ENCOUNTER: Primary | ICD-10-CM

## 2020-03-20 PROCEDURE — 99215 OFFICE O/P EST HI 40 MIN: CPT | Performed by: STUDENT IN AN ORGANIZED HEALTH CARE EDUCATION/TRAINING PROGRAM

## 2020-03-20 RX ORDER — INSULIN GLARGINE 100 [IU]/ML
INJECTION, SOLUTION SUBCUTANEOUS
Status: ON HOLD | COMMUNITY
Start: 2020-03-18 | End: 2021-01-24

## 2020-03-20 NOTE — PROGRESS NOTES
Subjective:      Lele Dias is a 39 y.o. female who presents to the office today for a preoperative consultation at the request of surgeon Dr. Rey who plans on performing right supracondylar repair. Patient is a left arm amputee that fell 2/29 and experience a right supracondylar fracture. Patient was scheduled for repair on 3/24. However, due to COVID-19 surgery will be postponed until the next available date. Patient has a history of asthma, hypertension, HFpEF. Patient has reported that she was recently admitted to [facility] for an asthma exacerbation. Patient reports that she has not been taking her Lasix because she has difficulty ambulating to the restroom. Patient has trouble with ambulating due to her fracture as she is having a more difficult time performing her ADLs. Patient has also been on Suboxone for pain. Patient is prescribed Suboxone from her PCP.         Review of Systems  Pertinent items are noted in HPI.      Objective:     Vitals:    03/20/20 0844   BP: 132/82   Pulse: 85   Temp: 99.2 °F (37.3 °C)        General appearance: alert, cooperative and morbidly obese, in Ambulance bed  Head: Normocephalic, without obvious abnormality, atraumatic  Lungs: diminished breath sounds bibasilar  Heart: regular rate and rhythm, S1, S2 normal, no murmur, click, rub or gallop  Abdomen: soft, non-tender; bowel sounds normal; no masses,  no organomegaly  Extremities: Left arm amputated, 2+ pitting edema in the bilateral lower extremity, Left lower extremity warm to the touch  Pulses: 2+ and symmetric      Cardiographics  ECG: NSR from 8/2019    Imaging  Chest x-ray: No acute cardiopulmonary process 2/29/20       Assessment:        39 y.o. female with planned surgery as above.    Known risk factors for perioperative complications: Congestive heart failure         Plan:     Patient was sent to Cardiology clinic for preop exam due to patient risk.   Cardiology will follow up stress testing from [facility]. Per Cardiology, if stress test is negative, patient, if negative will be intermediate risk for an intermediate risk surgery.
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
## 2020-06-25 ENCOUNTER — HOSPITAL ENCOUNTER (OUTPATIENT)
Dept: RADIOLOGY | Facility: HOSPITAL | Age: 40
Discharge: HOME | End: 2020-06-25
Attending: ORTHOPAEDIC SURGERY
Payer: MEDICAID

## 2020-06-25 DIAGNOSIS — S42.321D CLOSED DISPLACED TRANSVERSE FRACTURE OF SHAFT OF RIGHT HUMERUS WITH ROUTINE HEALING, SUBSEQUENT ENCOUNTER: ICD-10-CM

## 2020-06-25 PROCEDURE — 73060 X-RAY EXAM OF HUMERUS: CPT | Mod: TC,FY,PO,RT

## 2020-07-22 ENCOUNTER — HOSPITAL ENCOUNTER (OUTPATIENT)
Dept: RADIOLOGY | Facility: HOSPITAL | Age: 40
Discharge: HOME | End: 2020-07-22
Attending: ORTHOPAEDIC SURGERY
Payer: MEDICAID

## 2020-07-22 DIAGNOSIS — S42.321A: Primary | ICD-10-CM

## 2020-07-22 DIAGNOSIS — S42.321A: ICD-10-CM

## 2020-07-22 PROCEDURE — 73060 X-RAY EXAM OF HUMERUS: CPT | Mod: 26,RT,, | Performed by: RADIOLOGY

## 2020-07-22 PROCEDURE — 73060 X-RAY EXAM OF HUMERUS: CPT | Mod: TC,FY,RT

## 2020-07-22 PROCEDURE — 73060 XR HUMERUS 2 VIEW RIGHT: ICD-10-PCS | Mod: 26,RT,, | Performed by: RADIOLOGY

## 2020-12-07 ENCOUNTER — HOSPITAL ENCOUNTER (EMERGENCY)
Facility: HOSPITAL | Age: 40
Discharge: HOME | End: 2020-12-07
Attending: EMERGENCY MEDICINE
Payer: MEDICAID

## 2020-12-07 VITALS
HEART RATE: 127 BPM | SYSTOLIC BLOOD PRESSURE: 133 MMHG | RESPIRATION RATE: 20 BRPM | OXYGEN SATURATION: 96 % | WEIGHT: 293 LBS | HEIGHT: 62 IN | BODY MASS INDEX: 53.92 KG/M2 | DIASTOLIC BLOOD PRESSURE: 94 MMHG | TEMPERATURE: 99 F

## 2020-12-07 DIAGNOSIS — R10.2 PERINEAL PAIN IN FEMALE: ICD-10-CM

## 2020-12-07 DIAGNOSIS — S30.23XA PERINEAL HEMATOMA, INITIAL ENCOUNTER: Primary | ICD-10-CM

## 2020-12-07 PROCEDURE — 99283 EMERGENCY DEPT VISIT LOW MDM: CPT | Mod: ER

## 2020-12-07 RX ORDER — BACITRACIN ZINC 500 UNIT/G
OINTMENT (GRAM) TOPICAL 2 TIMES DAILY
Qty: 30 G | Refills: 0 | Status: SHIPPED | OUTPATIENT
Start: 2020-12-07 | End: 2021-03-02

## 2020-12-07 NOTE — DISCHARGE INSTRUCTIONS
Please check your wound 3 to 4 times a day.  Please return immediately if you notice increased redness pain swelling drainage fever or any other concerns.  Please follow-up with her primary doctor or here in 2 days for wound recheck.

## 2020-12-07 NOTE — ED PROVIDER NOTES
"COVID Statement  The  of Health and Human Services and Sushant Taylor, Governor of the [facility], have declared a State of Public Health Emergency due to the spread of a novel coronavirus and disease (COVID-19).  There is no currently accepted treatment except conservative measures and respiratory support if appropriate.  This has lead to significant resource capacity and potential delays in care.    Encounter Date: 12/7/2020       History     Chief Complaint   Patient presents with    Abscess     Patient states she has "pain and swelling" in between her vagina and rectal area. Reports "blood with odor". Symptoms began today     Lele Dias is a 40 y.o. female who  has a past medical history of Asthma, Cellulitis of abdominal wall (12/18/2017), Depression, Diabetes mellitus, type II, Diastolic dysfunction, Diverticulosis of intestine with bleeding (9/14/2016), E coli bacteremia (4/21/2018), E. coli pyelonephritis (6/2/2015), E. coli UTI (12/18/2017), Hernia, epigastric, Hypertension, Nonalcoholic hepatosteatosis, and Periumbilical hernia.    She presents today with perianal pain onset today while wiping. Associated blood and odor. Patient's daughter noticed a black spot and recommended she come to the ER for further evaluation.  Denies local trauma.  She endorses pain with wiping.  No fevers vomiting chest pain or any other concerns.          Review of patient's allergies indicates:   Allergen Reactions    Celexa [citalopram] Nausea And Vomiting     Past Medical History:   Diagnosis Date    Asthma     Cellulitis of abdominal wall 12/18/2017    Depression     Diabetes mellitus, type II     Diastolic dysfunction     Diverticulosis of intestine with bleeding 9/14/2016    E coli bacteremia 4/21/2018    E. coli pyelonephritis 6/2/2015    E. coli UTI 12/18/2017    Hernia, epigastric     Hypertension     Nonalcoholic hepatosteatosis     Periumbilical hernia      Past Surgical " History:   Procedure Laterality Date    ARM AMPUTATION AT SHOULDER Left      SECTION       SECTION, CLASSIC      CHOLECYSTECTOMY  age 15    TONSILLECTOMY, ADENOIDECTOMY       Family History   Problem Relation Age of Onset    Cancer Father     Heart attack Mother     Heart disease Mother     Cancer Maternal Aunt         lung (smoker)    Heart attack Maternal Aunt     Breast cancer Paternal Grandmother     Heart attack Maternal Grandmother      Social History     Tobacco Use    Smoking status: Current Every Day Smoker     Packs/day: 1.00     Years: 15.00     Pack years: 15.00     Types: Cigarettes    Smokeless tobacco: Never Used   Substance Use Topics    Alcohol use: No    Drug use: No     Review of Systems   Constitutional: Negative for chills and fever.   HENT: Negative for sore throat.    Respiratory: Negative for cough and shortness of breath.    Cardiovascular: Negative for chest pain.   Gastrointestinal: Negative for nausea and vomiting.   Genitourinary: Negative for dysuria, frequency and urgency.   Musculoskeletal: Negative for back pain, neck pain and neck stiffness.   Skin: Positive for wound. Negative for rash.   Neurological: Negative for syncope and weakness.   Hematological: Does not bruise/bleed easily.   Psychiatric/Behavioral: Negative for agitation, behavioral problems and confusion.       Physical Exam     Initial Vitals [20 1533]   BP Pulse Resp Temp SpO2   (!) 133/94 (!) 127 20 98.9 °F (37.2 °C) 96 %      MAP       --         Physical Exam    Constitutional: No distress.   HENT:   Head: Normocephalic and atraumatic.   Eyes: Conjunctivae are normal.   Cardiovascular: Intact distal pulses.   Pulmonary/Chest: No respiratory distress.   Genitourinary:    Genitourinary Comments: Chaperoned by nurse munson.    No perianal swelling induration warmth fluctuance or crepitus.  Small area of skin breakdown with surrounding hematoma as shown.  Mild tenderness to  touch.  No fluctuance warmth or drainage noted on palpation.         Neurological: She is alert.   Skin: Skin is warm and dry.   Psychiatric: She has a normal mood and affect.             ED Course   Procedures  Labs Reviewed - No data to display       Imaging Results    None          Medical Decision Making:   Differential Diagnosis:   Differential Diagnosis includes, but is not limited to:  Necrotizing fasciitis, erythema multiforme, Bartlett-Claudy syndrome, toxic epidermal necrolysis, DIC, cellulitis, Staph scalded skin syndrome, toxic shock syndrome, secondary syphilis, abscess, osteomyelitis, septic joint, MRSA, DVT, superficial thrombophlebitis, varicose vein, drug eruption, allergic reaction/urticatia, irritant/contact dermatitis, viral exanthem, local trauma/contusion, abrasion.    ED Management:  Patient with small area of skin breakdown.  She is afebrile here and nontoxic appearing.  Appears like a abrasion with small hematoma.  Currently hemostatic.  No purulence discharge noted.  No redness or swelling noted.  Unclear ideology however does not appear cellulitic or abscess at this time.  Does not appear to be necrotizing soft tissue infection.    Patient has support at home.  She reports her daughter will be able help her check her wound at least 3 times a day to ensure does not progressing.    Will discharge in bacitracin.  I recommend she follow up in 2 days for a wound recheck.    Strict immediate return precautions if she notices worsening pain swelling drainage or begins having fever vomiting chills or other symptoms.  Patient verbalized understanding and is comfortable with this plan.    After taking into careful account the historical factors and physical exam findings of the patient's presentation today, in conjunction with the empirical and objective data obtained on ED workup, no acute emergent medical condition has been identified. The patient appears to be low risk for an emergent medical  condition and I feel it is safe and appropriate at this time for the patient to be discharged to follow-up as detailed in their discharge instructions for reevaluation and possible continued outpatient workup and management. I have discussed the specifics of the workup with the patient and the patient has verbalized understanding of the details of the workup, the diagnosis, the treatment plan, and the need for outpatient follow-up.  Although the patient has no emergent etiology today this does not preclude the development of an emergent condition so in addition, I have advised the patient that they can return to the ED and/or activate EMS at any time with worsening of their symptoms, change of their symptoms, or with any other medical complaint.  The patient remained comfortable and stable during their visit in the ED.  Discharge and follow-up instructions discussed with the patient who expressed understanding and willingness to comply with my recommendations.                               Clinical Impression:     ICD-10-CM ICD-9-CM   1. Perineal hematoma, initial encounter  S30.23XA 674.30   2. Perineal pain in female  N94.9 625.9                 Portions of this note were dictated using voice recognition software and may contain dictation related errors in spelling/grammar/syntax not found on text review           ED Disposition Condition    Discharge Stable        ED Prescriptions     Medication Sig Dispense Start Date End Date Auth. Provider    bacitracin 500 unit/gram Oint Apply topically 2 (two) times daily. 30 g 12/7/2020  Collin Richardson Jr., MD        Follow-up Information     Follow up With Specialties Details Why Contact Info    Kari Edge MD Emergency Medicine In 2 days For wound re-check [address] [facility] [address]  [phone]      [facility] Emergency Medicine In 2 days For wound re-check [address] [facility] [address] [phone]                                        Collin Richardson Jr., MD  12/08/20 4051

## 2020-12-10 ENCOUNTER — HOSPITAL ENCOUNTER (INPATIENT)
Facility: HOSPITAL | Age: 40
LOS: 1 days | Discharge: LEFT BEFORE DISCHARGE | DRG: 854 | End: 2020-12-11
Attending: EMERGENCY MEDICINE | Admitting: STUDENT IN AN ORGANIZED HEALTH CARE EDUCATION/TRAINING PROGRAM
Payer: MEDICAID

## 2020-12-10 DIAGNOSIS — A41.9 SEPSIS, DUE TO UNSPECIFIED ORGANISM, UNSPECIFIED WHETHER ACUTE ORGAN DYSFUNCTION PRESENT: ICD-10-CM

## 2020-12-10 DIAGNOSIS — E11.65 TYPE 2 DIABETES MELLITUS WITH HYPERGLYCEMIA, WITH LONG-TERM CURRENT USE OF INSULIN: ICD-10-CM

## 2020-12-10 DIAGNOSIS — M79.89 NECROTIZING SOFT TISSUE INFECTION: ICD-10-CM

## 2020-12-10 DIAGNOSIS — A41.9 SEPSIS: ICD-10-CM

## 2020-12-10 DIAGNOSIS — Z79.4 TYPE 2 DIABETES MELLITUS WITH HYPERGLYCEMIA, WITH LONG-TERM CURRENT USE OF INSULIN: ICD-10-CM

## 2020-12-10 DIAGNOSIS — K61.0 PERIANAL CELLULITIS: ICD-10-CM

## 2020-12-10 LAB
ALBUMIN SERPL BCP-MCNC: 3.6 G/DL (ref 3.5–5.2)
ALP SERPL-CCNC: 102 U/L (ref 38–126)
ALT SERPL W/O P-5'-P-CCNC: 22 U/L (ref 10–44)
ANION GAP SERPL CALC-SCNC: 9 MMOL/L (ref 8–16)
AST SERPL-CCNC: 14 U/L (ref 15–46)
BACTERIA #/AREA URNS AUTO: ABNORMAL /HPF
BASOPHILS # BLD AUTO: 0.03 K/UL (ref 0–0.2)
BASOPHILS NFR BLD: 0.2 % (ref 0–1.9)
BILIRUB SERPL-MCNC: 0.6 MG/DL (ref 0.1–1)
BILIRUB UR QL STRIP: NEGATIVE
CALCIUM SERPL-MCNC: 8.1 MG/DL (ref 8.7–10.5)
CHLORIDE SERPL-SCNC: 103 MMOL/L (ref 95–110)
CLARITY UR REFRACT.AUTO: ABNORMAL
CO2 SERPL-SCNC: 25 MMOL/L (ref 23–29)
COLOR UR AUTO: YELLOW
CREAT SERPL-MCNC: 1.04 MG/DL (ref 0.5–1.4)
CRP SERPL-MCNC: 28.42 MG/DL (ref 0–1)
DIFFERENTIAL METHOD: ABNORMAL
EOSINOPHIL # BLD AUTO: 0.1 K/UL (ref 0–0.5)
EOSINOPHIL NFR BLD: 0.6 % (ref 0–8)
ERYTHROCYTE [DISTWIDTH] IN BLOOD BY AUTOMATED COUNT: 14.6 % (ref 11.5–14.5)
EST. GFR  (AFRICAN AMERICAN): >60 ML/MIN/1.73 M^2
EST. GFR  (NON AFRICAN AMERICAN): >60 ML/MIN/1.73 M^2
GLUCOSE SERPL-MCNC: 433 MG/DL (ref 70–110)
GLUCOSE UR QL STRIP: ABNORMAL
HCT VFR BLD AUTO: 36.9 % (ref 37–48.5)
HGB BLD-MCNC: 12.2 G/DL (ref 12–16)
HGB UR QL STRIP: ABNORMAL
IMM GRANULOCYTES # BLD AUTO: 0.17 K/UL (ref 0–0.04)
IMM GRANULOCYTES NFR BLD AUTO: 1.1 % (ref 0–0.5)
KETONES UR QL STRIP: NEGATIVE
LACTATE SERPL-SCNC: 3 MMOL/L (ref 0.5–2.2)
LEUKOCYTE ESTERASE UR QL STRIP: ABNORMAL
LYMPHOCYTES # BLD AUTO: 2.5 K/UL (ref 1–4.8)
LYMPHOCYTES NFR BLD: 16.1 % (ref 18–48)
MCH RBC QN AUTO: 28.4 PG (ref 27–31)
MCHC RBC AUTO-ENTMCNC: 33.1 G/DL (ref 32–36)
MCV RBC AUTO: 86 FL (ref 82–98)
MICROSCOPIC COMMENT: ABNORMAL
MONOCYTES # BLD AUTO: 0.7 K/UL (ref 0.3–1)
MONOCYTES NFR BLD: 4.4 % (ref 4–15)
NEUTROPHILS # BLD AUTO: 11.9 K/UL (ref 1.8–7.7)
NEUTROPHILS NFR BLD: 77.6 % (ref 38–73)
NITRITE UR QL STRIP: POSITIVE
NRBC BLD-RTO: 0 /100 WBC
NT-PROBNP SERPL-MCNC: 1070 PG/ML (ref 5–450)
PH UR STRIP: 6 [PH] (ref 5–8)
PLATELET # BLD AUTO: 128 K/UL (ref 150–350)
PLATELET BLD QL SMEAR: ABNORMAL
PMV BLD AUTO: 11.1 FL (ref 9.2–12.9)
POCT GLUCOSE: 266 MG/DL (ref 70–110)
POCT GLUCOSE: 327 MG/DL (ref 70–110)
POTASSIUM SERPL-SCNC: 4.5 MMOL/L (ref 3.5–5.1)
PROT SERPL-MCNC: 6.8 G/DL (ref 6–8.4)
PROT UR QL STRIP: ABNORMAL
RBC # BLD AUTO: 4.3 M/UL (ref 4–5.4)
RBC #/AREA URNS AUTO: 1 /HPF (ref 0–4)
SARS-COV-2 RDRP RESP QL NAA+PROBE: NEGATIVE
SODIUM SERPL-SCNC: 137 MMOL/L (ref 136–145)
SP GR UR STRIP: 1.01 (ref 1–1.03)
TROPONIN I SERPL-MCNC: 0.25 NG/ML (ref 0.01–0.03)
URN SPEC COLLECT METH UR: ABNORMAL
UROBILINOGEN UR STRIP-ACNC: NEGATIVE EU/DL
UUN UR-MCNC: 20 MG/DL (ref 7–17)
WBC # BLD AUTO: 15.28 K/UL (ref 3.9–12.7)
WBC #/AREA URNS AUTO: 21 /HPF (ref 0–5)
YEAST UR QL AUTO: ABNORMAL

## 2020-12-10 PROCEDURE — 93010 ELECTROCARDIOGRAM REPORT: CPT | Mod: ,,, | Performed by: INTERNAL MEDICINE

## 2020-12-10 PROCEDURE — 96365 THER/PROPH/DIAG IV INF INIT: CPT | Mod: ER

## 2020-12-10 PROCEDURE — 93010 EKG 12-LEAD: ICD-10-PCS | Mod: ,,, | Performed by: INTERNAL MEDICINE

## 2020-12-10 PROCEDURE — 63600175 PHARM REV CODE 636 W HCPCS: Mod: ER | Performed by: EMERGENCY MEDICINE

## 2020-12-10 PROCEDURE — 87186 SC STD MICRODIL/AGAR DIL: CPT | Mod: ER

## 2020-12-10 PROCEDURE — 87088 URINE BACTERIA CULTURE: CPT | Mod: ER

## 2020-12-10 PROCEDURE — 93005 ELECTROCARDIOGRAM TRACING: CPT | Mod: ER

## 2020-12-10 PROCEDURE — U0002 COVID-19 LAB TEST NON-CDC: HCPCS | Mod: ER

## 2020-12-10 PROCEDURE — 94760 N-INVAS EAR/PLS OXIMETRY 1: CPT | Mod: ER

## 2020-12-10 PROCEDURE — 87077 CULTURE AEROBIC IDENTIFY: CPT | Mod: ER

## 2020-12-10 PROCEDURE — 81000 URINALYSIS NONAUTO W/SCOPE: CPT | Mod: ER

## 2020-12-10 PROCEDURE — 85025 COMPLETE CBC W/AUTO DIFF WBC: CPT | Mod: ER

## 2020-12-10 PROCEDURE — 25000003 PHARM REV CODE 250: Mod: ER | Performed by: EMERGENCY MEDICINE

## 2020-12-10 PROCEDURE — 63600175 PHARM REV CODE 636 W HCPCS: Mod: ER | Performed by: PHYSICIAN ASSISTANT

## 2020-12-10 PROCEDURE — 83605 ASSAY OF LACTIC ACID: CPT | Mod: ER

## 2020-12-10 PROCEDURE — 25500020 PHARM REV CODE 255: Mod: ER | Performed by: EMERGENCY MEDICINE

## 2020-12-10 PROCEDURE — 87040 BLOOD CULTURE FOR BACTERIA: CPT | Mod: ER

## 2020-12-10 PROCEDURE — 84484 ASSAY OF TROPONIN QUANT: CPT | Mod: ER

## 2020-12-10 PROCEDURE — 83880 ASSAY OF NATRIURETIC PEPTIDE: CPT | Mod: ER

## 2020-12-10 PROCEDURE — 85652 RBC SED RATE AUTOMATED: CPT

## 2020-12-10 PROCEDURE — 11000001 HC ACUTE MED/SURG PRIVATE ROOM

## 2020-12-10 PROCEDURE — 86140 C-REACTIVE PROTEIN: CPT | Mod: ER

## 2020-12-10 PROCEDURE — 99285 EMERGENCY DEPT VISIT HI MDM: CPT | Mod: 25,ER

## 2020-12-10 PROCEDURE — 87086 URINE CULTURE/COLONY COUNT: CPT | Mod: ER

## 2020-12-10 PROCEDURE — 80053 COMPREHEN METABOLIC PANEL: CPT | Mod: ER

## 2020-12-10 RX ORDER — ACETAMINOPHEN 500 MG
1000 TABLET ORAL
Status: COMPLETED | OUTPATIENT
Start: 2020-12-10 | End: 2020-12-10

## 2020-12-10 RX ORDER — CLINDAMYCIN PHOSPHATE 900 MG/50ML
900 INJECTION, SOLUTION INTRAVENOUS
Status: COMPLETED | OUTPATIENT
Start: 2020-12-10 | End: 2020-12-10

## 2020-12-10 RX ADMIN — CLINDAMYCIN IN 5 PERCENT DEXTROSE 900 MG: 18 INJECTION, SOLUTION INTRAVENOUS at 08:12

## 2020-12-10 RX ADMIN — INSULIN HUMAN 5 UNITS: 100 INJECTION, SOLUTION PARENTERAL at 08:12

## 2020-12-10 RX ADMIN — ACETAMINOPHEN 1000 MG: 500 TABLET ORAL at 08:12

## 2020-12-10 RX ADMIN — INSULIN HUMAN 5 UNITS: 100 INJECTION, SOLUTION PARENTERAL at 10:12

## 2020-12-10 RX ADMIN — IOHEXOL 100 ML: 350 INJECTION, SOLUTION INTRAVENOUS at 08:12

## 2020-12-10 RX ADMIN — PIPERACILLIN AND TAZOBACTAM 4.5 G: 4; .5 INJECTION, POWDER, LYOPHILIZED, FOR SOLUTION INTRAVENOUS; PARENTERAL at 08:12

## 2020-12-10 RX ADMIN — VANCOMYCIN HYDROCHLORIDE 2500 MG: 1 INJECTION, POWDER, LYOPHILIZED, FOR SOLUTION INTRAVENOUS at 10:12

## 2020-12-11 ENCOUNTER — ANESTHESIA (OUTPATIENT)
Dept: SURGERY | Facility: HOSPITAL | Age: 40
DRG: 854 | End: 2020-12-11
Payer: MEDICAID

## 2020-12-11 ENCOUNTER — ANESTHESIA EVENT (OUTPATIENT)
Dept: SURGERY | Facility: HOSPITAL | Age: 40
DRG: 854 | End: 2020-12-11
Payer: MEDICAID

## 2020-12-11 ENCOUNTER — CLINICAL SUPPORT (OUTPATIENT)
Dept: SMOKING CESSATION | Facility: CLINIC | Age: 40
End: 2020-12-11
Payer: COMMERCIAL

## 2020-12-11 VITALS
BODY MASS INDEX: 53.92 KG/M2 | OXYGEN SATURATION: 99 % | HEART RATE: 108 BPM | HEIGHT: 62 IN | SYSTOLIC BLOOD PRESSURE: 122 MMHG | DIASTOLIC BLOOD PRESSURE: 68 MMHG | RESPIRATION RATE: 20 BRPM | TEMPERATURE: 97 F | WEIGHT: 293 LBS

## 2020-12-11 DIAGNOSIS — F17.210 CIGARETTE SMOKER: Primary | ICD-10-CM

## 2020-12-11 PROBLEM — E11.65 HYPERGLYCEMIA DUE TO DIABETES MELLITUS: Status: ACTIVE | Noted: 2020-12-11

## 2020-12-11 LAB
ERYTHROCYTE [SEDIMENTATION RATE] IN BLOOD BY WESTERGREN METHOD: 106 MM/HR (ref 0–20)
ESTIMATED AVG GLUCOSE: 223 MG/DL (ref 68–131)
HBA1C MFR BLD HPLC: 9.4 % (ref 4–5.6)
LACTATE SERPL-SCNC: 2 MMOL/L (ref 0.5–2.2)
POCT GLUCOSE: 262 MG/DL (ref 70–110)
POCT GLUCOSE: 282 MG/DL (ref 70–110)
POCT GLUCOSE: 283 MG/DL (ref 70–110)

## 2020-12-11 PROCEDURE — 25000003 PHARM REV CODE 250: Performed by: STUDENT IN AN ORGANIZED HEALTH CARE EDUCATION/TRAINING PROGRAM

## 2020-12-11 PROCEDURE — 87076 CULTURE ANAEROBE IDENT EACH: CPT

## 2020-12-11 PROCEDURE — 63600175 PHARM REV CODE 636 W HCPCS: Performed by: EMERGENCY MEDICINE

## 2020-12-11 PROCEDURE — 11004 PR DEBRIDE NECROTIC SKIN/ TISSUE, GENIT & PERINM: ICD-10-PCS | Mod: ,,, | Performed by: STUDENT IN AN ORGANIZED HEALTH CARE EDUCATION/TRAINING PROGRAM

## 2020-12-11 PROCEDURE — 36415 COLL VENOUS BLD VENIPUNCTURE: CPT

## 2020-12-11 PROCEDURE — 37000009 HC ANESTHESIA EA ADD 15 MINS: Performed by: STUDENT IN AN ORGANIZED HEALTH CARE EDUCATION/TRAINING PROGRAM

## 2020-12-11 PROCEDURE — 27000221 HC OXYGEN, UP TO 24 HOURS

## 2020-12-11 PROCEDURE — 36000705 HC OR TIME LEV I EA ADD 15 MIN: Performed by: STUDENT IN AN ORGANIZED HEALTH CARE EDUCATION/TRAINING PROGRAM

## 2020-12-11 PROCEDURE — 36000704 HC OR TIME LEV I 1ST 15 MIN: Performed by: STUDENT IN AN ORGANIZED HEALTH CARE EDUCATION/TRAINING PROGRAM

## 2020-12-11 PROCEDURE — 63600175 PHARM REV CODE 636 W HCPCS: Performed by: STUDENT IN AN ORGANIZED HEALTH CARE EDUCATION/TRAINING PROGRAM

## 2020-12-11 PROCEDURE — 99407 BEHAV CHNG SMOKING > 10 MIN: CPT | Mod: S$GLB,,,

## 2020-12-11 PROCEDURE — 71000033 HC RECOVERY, INTIAL HOUR: Performed by: STUDENT IN AN ORGANIZED HEALTH CARE EDUCATION/TRAINING PROGRAM

## 2020-12-11 PROCEDURE — 25000003 PHARM REV CODE 250: Performed by: ANESTHESIOLOGY

## 2020-12-11 PROCEDURE — 99222 1ST HOSP IP/OBS MODERATE 55: CPT | Mod: ,,, | Performed by: STUDENT IN AN ORGANIZED HEALTH CARE EDUCATION/TRAINING PROGRAM

## 2020-12-11 PROCEDURE — 83036 HEMOGLOBIN GLYCOSYLATED A1C: CPT

## 2020-12-11 PROCEDURE — 87075 CULTR BACTERIA EXCEPT BLOOD: CPT

## 2020-12-11 PROCEDURE — 11004 DBRDMT SKIN XTRNL GENT&PER: CPT | Mod: ,,, | Performed by: STUDENT IN AN ORGANIZED HEALTH CARE EDUCATION/TRAINING PROGRAM

## 2020-12-11 PROCEDURE — 37000008 HC ANESTHESIA 1ST 15 MINUTES: Performed by: STUDENT IN AN ORGANIZED HEALTH CARE EDUCATION/TRAINING PROGRAM

## 2020-12-11 PROCEDURE — 87070 CULTURE OTHR SPECIMN AEROBIC: CPT

## 2020-12-11 PROCEDURE — 99407 PR TOBACCO USE CESSATION INTENSIVE >10 MINUTES: ICD-10-PCS | Mod: S$GLB,,,

## 2020-12-11 PROCEDURE — S4991 NICOTINE PATCH NONLEGEND: HCPCS | Performed by: STUDENT IN AN ORGANIZED HEALTH CARE EDUCATION/TRAINING PROGRAM

## 2020-12-11 PROCEDURE — C9399 UNCLASSIFIED DRUGS OR BIOLOG: HCPCS | Performed by: STUDENT IN AN ORGANIZED HEALTH CARE EDUCATION/TRAINING PROGRAM

## 2020-12-11 PROCEDURE — 94761 N-INVAS EAR/PLS OXIMETRY MLT: CPT

## 2020-12-11 PROCEDURE — 83605 ASSAY OF LACTIC ACID: CPT

## 2020-12-11 PROCEDURE — 99222 PR INITIAL HOSPITAL CARE,LEVL II: ICD-10-PCS | Mod: ,,, | Performed by: STUDENT IN AN ORGANIZED HEALTH CARE EDUCATION/TRAINING PROGRAM

## 2020-12-11 RX ORDER — CLORAZEPATE DIPOTASSIUM 3.75 MG/1
3.75 TABLET ORAL 3 TIMES DAILY
COMMUNITY
End: 2021-01-23

## 2020-12-11 RX ORDER — IBUPROFEN 200 MG
1 TABLET ORAL DAILY
Status: DISCONTINUED | OUTPATIENT
Start: 2020-12-11 | End: 2020-12-11 | Stop reason: HOSPADM

## 2020-12-11 RX ORDER — ONDANSETRON 2 MG/ML
4 INJECTION INTRAMUSCULAR; INTRAVENOUS DAILY PRN
Status: DISCONTINUED | OUTPATIENT
Start: 2020-12-11 | End: 2020-12-11 | Stop reason: HOSPADM

## 2020-12-11 RX ORDER — OXYCODONE AND ACETAMINOPHEN 5; 325 MG/1; MG/1
1 TABLET ORAL ONCE
Status: COMPLETED | OUTPATIENT
Start: 2020-12-11 | End: 2020-12-11

## 2020-12-11 RX ORDER — INSULIN ASPART 100 [IU]/ML
1-10 INJECTION, SOLUTION INTRAVENOUS; SUBCUTANEOUS
Status: DISCONTINUED | OUTPATIENT
Start: 2020-12-11 | End: 2020-12-11 | Stop reason: HOSPADM

## 2020-12-11 RX ORDER — HYDROXYZINE PAMOATE 25 MG/1
50 CAPSULE ORAL EVERY 8 HOURS PRN
Status: DISCONTINUED | OUTPATIENT
Start: 2020-12-11 | End: 2020-12-11 | Stop reason: HOSPADM

## 2020-12-11 RX ORDER — MORPHINE SULFATE 4 MG/ML
4 INJECTION, SOLUTION INTRAMUSCULAR; INTRAVENOUS EVERY 4 HOURS PRN
Status: DISCONTINUED | OUTPATIENT
Start: 2020-12-11 | End: 2020-12-11 | Stop reason: HOSPADM

## 2020-12-11 RX ORDER — SUCCINYLCHOLINE CHLORIDE 20 MG/ML
INJECTION INTRAMUSCULAR; INTRAVENOUS
Status: DISCONTINUED | OUTPATIENT
Start: 2020-12-11 | End: 2020-12-11

## 2020-12-11 RX ORDER — HYDROMORPHONE HYDROCHLORIDE 2 MG/ML
0.5 INJECTION, SOLUTION INTRAMUSCULAR; INTRAVENOUS; SUBCUTANEOUS EVERY 5 MIN PRN
Status: DISCONTINUED | OUTPATIENT
Start: 2020-12-11 | End: 2020-12-11 | Stop reason: HOSPADM

## 2020-12-11 RX ORDER — ONDANSETRON 2 MG/ML
INJECTION INTRAMUSCULAR; INTRAVENOUS
Status: DISCONTINUED | OUTPATIENT
Start: 2020-12-11 | End: 2020-12-11

## 2020-12-11 RX ORDER — MIDAZOLAM HYDROCHLORIDE 1 MG/ML
INJECTION INTRAMUSCULAR; INTRAVENOUS
Status: DISCONTINUED | OUTPATIENT
Start: 2020-12-11 | End: 2020-12-11

## 2020-12-11 RX ORDER — FUROSEMIDE 40 MG/1
40 TABLET ORAL 2 TIMES DAILY
Status: DISCONTINUED | OUTPATIENT
Start: 2020-12-11 | End: 2020-12-11

## 2020-12-11 RX ORDER — SODIUM CHLORIDE 0.9 % (FLUSH) 0.9 %
10 SYRINGE (ML) INJECTION
Status: DISCONTINUED | OUTPATIENT
Start: 2020-12-11 | End: 2020-12-11 | Stop reason: HOSPADM

## 2020-12-11 RX ORDER — GLUCAGON 1 MG
1 KIT INJECTION
Status: DISCONTINUED | OUTPATIENT
Start: 2020-12-11 | End: 2020-12-11 | Stop reason: HOSPADM

## 2020-12-11 RX ORDER — SODIUM CHLORIDE, SODIUM LACTATE, POTASSIUM CHLORIDE, CALCIUM CHLORIDE 600; 310; 30; 20 MG/100ML; MG/100ML; MG/100ML; MG/100ML
INJECTION, SOLUTION INTRAVENOUS CONTINUOUS
Status: DISCONTINUED | OUTPATIENT
Start: 2020-12-11 | End: 2020-12-11 | Stop reason: HOSPADM

## 2020-12-11 RX ORDER — FENTANYL CITRATE 50 UG/ML
INJECTION, SOLUTION INTRAMUSCULAR; INTRAVENOUS
Status: DISCONTINUED | OUTPATIENT
Start: 2020-12-11 | End: 2020-12-11

## 2020-12-11 RX ORDER — IBUPROFEN 200 MG
16 TABLET ORAL
Status: DISCONTINUED | OUTPATIENT
Start: 2020-12-11 | End: 2020-12-11 | Stop reason: HOSPADM

## 2020-12-11 RX ORDER — KETOROLAC TROMETHAMINE 30 MG/ML
INJECTION, SOLUTION INTRAMUSCULAR; INTRAVENOUS
Status: DISCONTINUED | OUTPATIENT
Start: 2020-12-11 | End: 2020-12-11

## 2020-12-11 RX ORDER — IBUPROFEN 200 MG
1 TABLET ORAL DAILY
Status: DISCONTINUED | OUTPATIENT
Start: 2020-12-12 | End: 2020-12-11

## 2020-12-11 RX ORDER — CARVEDILOL 3.12 MG/1
3.12 TABLET ORAL 2 TIMES DAILY WITH MEALS
Status: DISCONTINUED | OUTPATIENT
Start: 2020-12-11 | End: 2020-12-11

## 2020-12-11 RX ORDER — IBUPROFEN 200 MG
24 TABLET ORAL
Status: DISCONTINUED | OUTPATIENT
Start: 2020-12-11 | End: 2020-12-11 | Stop reason: HOSPADM

## 2020-12-11 RX ORDER — PREGABALIN 75 MG/1
300 CAPSULE ORAL 2 TIMES DAILY
Status: DISCONTINUED | OUTPATIENT
Start: 2020-12-11 | End: 2020-12-11 | Stop reason: HOSPADM

## 2020-12-11 RX ORDER — KETOROLAC TROMETHAMINE 30 MG/ML
15 INJECTION, SOLUTION INTRAMUSCULAR; INTRAVENOUS EVERY 6 HOURS PRN
Status: DISCONTINUED | OUTPATIENT
Start: 2020-12-11 | End: 2020-12-11 | Stop reason: HOSPADM

## 2020-12-11 RX ORDER — HYDROCODONE BITARTRATE AND ACETAMINOPHEN 10; 325 MG/1; MG/1
1 TABLET ORAL EVERY 4 HOURS PRN
Status: DISCONTINUED | OUTPATIENT
Start: 2020-12-11 | End: 2020-12-11 | Stop reason: HOSPADM

## 2020-12-11 RX ORDER — ONDANSETRON 2 MG/ML
4 INJECTION INTRAMUSCULAR; INTRAVENOUS EVERY 8 HOURS PRN
Status: DISCONTINUED | OUTPATIENT
Start: 2020-12-11 | End: 2020-12-11 | Stop reason: HOSPADM

## 2020-12-11 RX ORDER — CLINDAMYCIN PHOSPHATE 900 MG/50ML
900 INJECTION, SOLUTION INTRAVENOUS
Status: DISCONTINUED | OUTPATIENT
Start: 2020-12-11 | End: 2020-12-11 | Stop reason: HOSPADM

## 2020-12-11 RX ORDER — PROPOFOL 10 MG/ML
VIAL (ML) INTRAVENOUS
Status: DISCONTINUED | OUTPATIENT
Start: 2020-12-11 | End: 2020-12-11

## 2020-12-11 RX ORDER — CLORAZEPATE DIPOTASSIUM 3.75 MG/1
3.75 TABLET ORAL 3 TIMES DAILY
Status: DISCONTINUED | OUTPATIENT
Start: 2020-12-11 | End: 2020-12-11 | Stop reason: HOSPADM

## 2020-12-11 RX ORDER — CARVEDILOL 3.12 MG/1
3.12 TABLET ORAL 2 TIMES DAILY
Status: DISCONTINUED | OUTPATIENT
Start: 2020-12-11 | End: 2020-12-11 | Stop reason: HOSPADM

## 2020-12-11 RX ORDER — MUPIROCIN 20 MG/G
OINTMENT TOPICAL 2 TIMES DAILY
Status: DISCONTINUED | OUTPATIENT
Start: 2020-12-11 | End: 2020-12-11 | Stop reason: HOSPADM

## 2020-12-11 RX ORDER — LIDOCAINE HYDROCHLORIDE 20 MG/ML
INJECTION INTRAVENOUS
Status: DISCONTINUED | OUTPATIENT
Start: 2020-12-11 | End: 2020-12-11

## 2020-12-11 RX ADMIN — NICOTINE 1 PATCH: 14 PATCH TRANSDERMAL at 03:12

## 2020-12-11 RX ADMIN — INSULIN ASPART 6 UNITS: 100 INJECTION, SOLUTION INTRAVENOUS; SUBCUTANEOUS at 04:12

## 2020-12-11 RX ADMIN — CLORAZEPATE DIPOTASSIUM 3.75 MG: 3.75 TABLET ORAL at 09:12

## 2020-12-11 RX ADMIN — INSULIN DETEMIR 20 UNITS: 100 INJECTION, SOLUTION SUBCUTANEOUS at 09:12

## 2020-12-11 RX ADMIN — MIDAZOLAM HYDROCHLORIDE 2 MG: 1 INJECTION, SOLUTION INTRAMUSCULAR; INTRAVENOUS at 12:12

## 2020-12-11 RX ADMIN — SUCCINYLCHOLINE CHLORIDE 100 MG: 20 INJECTION, SOLUTION INTRAMUSCULAR; INTRAVENOUS at 12:12

## 2020-12-11 RX ADMIN — PROPOFOL 190 MG: 10 INJECTION, EMULSION INTRAVENOUS at 12:12

## 2020-12-11 RX ADMIN — INSULIN ASPART 6 UNITS: 100 INJECTION, SOLUTION INTRAVENOUS; SUBCUTANEOUS at 06:12

## 2020-12-11 RX ADMIN — CLORAZEPATE DIPOTASSIUM 3.75 MG: 3.75 TABLET ORAL at 03:12

## 2020-12-11 RX ADMIN — PREGABALIN 300 MG: 75 CAPSULE ORAL at 09:12

## 2020-12-11 RX ADMIN — VANCOMYCIN HYDROCHLORIDE 2000 MG: 10 INJECTION, POWDER, LYOPHILIZED, FOR SOLUTION INTRAVENOUS at 04:12

## 2020-12-11 RX ADMIN — CLINDAMYCIN PHOSPHATE 900 MG: 900 INJECTION, SOLUTION INTRAVENOUS at 03:12

## 2020-12-11 RX ADMIN — INSULIN ASPART 3 UNITS: 100 INJECTION, SOLUTION INTRAVENOUS; SUBCUTANEOUS at 01:12

## 2020-12-11 RX ADMIN — CARVEDILOL 3.12 MG: 3.12 TABLET, FILM COATED ORAL at 04:12

## 2020-12-11 RX ADMIN — ONDANSETRON 4 MG: 2 INJECTION, SOLUTION INTRAMUSCULAR; INTRAVENOUS at 12:12

## 2020-12-11 RX ADMIN — FENTANYL CITRATE 25 MCG: 50 INJECTION, SOLUTION INTRAMUSCULAR; INTRAVENOUS at 12:12

## 2020-12-11 RX ADMIN — OXYCODONE HYDROCHLORIDE AND ACETAMINOPHEN 1 TABLET: 5; 325 TABLET ORAL at 02:12

## 2020-12-11 RX ADMIN — FENTANYL CITRATE 25 MCG: 50 INJECTION, SOLUTION INTRAMUSCULAR; INTRAVENOUS at 01:12

## 2020-12-11 RX ADMIN — INSULIN ASPART 6 UNITS: 100 INJECTION, SOLUTION INTRAVENOUS; SUBCUTANEOUS at 09:12

## 2020-12-11 RX ADMIN — SODIUM CHLORIDE, SODIUM LACTATE, POTASSIUM CHLORIDE, AND CALCIUM CHLORIDE: .6; .31; .03; .02 INJECTION, SOLUTION INTRAVENOUS at 09:12

## 2020-12-11 RX ADMIN — SODIUM CHLORIDE, SODIUM LACTATE, POTASSIUM CHLORIDE, AND CALCIUM CHLORIDE 1000 ML: .6; .31; .03; .02 INJECTION, SOLUTION INTRAVENOUS at 09:12

## 2020-12-11 RX ADMIN — FENTANYL CITRATE 50 MCG: 50 INJECTION, SOLUTION INTRAMUSCULAR; INTRAVENOUS at 12:12

## 2020-12-11 RX ADMIN — MORPHINE SULFATE 4 MG: 4 INJECTION INTRAVENOUS at 09:12

## 2020-12-11 RX ADMIN — LIDOCAINE HYDROCHLORIDE 100 MG: 20 INJECTION, SOLUTION INTRAVENOUS at 12:12

## 2020-12-11 RX ADMIN — MORPHINE SULFATE 4 MG: 4 INJECTION INTRAVENOUS at 02:12

## 2020-12-11 RX ADMIN — KETOROLAC TROMETHAMINE 30 MG: 30 INJECTION, SOLUTION INTRAMUSCULAR; INTRAVENOUS at 01:12

## 2020-12-11 RX ADMIN — SODIUM CHLORIDE, SODIUM LACTATE, POTASSIUM CHLORIDE, AND CALCIUM CHLORIDE: .6; .31; .03; .02 INJECTION, SOLUTION INTRAVENOUS at 12:12

## 2020-12-11 NOTE — ANESTHESIA PREPROCEDURE EVALUATION
2020  Lele Dias is a 40 y.o., female with perianal cellulitis and abscess scheduled for I&D.    Review of patient's allergies indicates:   Allergen Reactions    Celexa [citalopram] Nausea And Vomiting       Past Medical History:   Diagnosis Date    Asthma     Cellulitis of abdominal wall 2017    Depression     Diabetes mellitus, type II     Diastolic dysfunction     Diverticulosis of intestine with bleeding 2016    E coli bacteremia 2018    E. coli pyelonephritis 2015    E. coli UTI 2017    Hernia, epigastric     Hypertension     Nonalcoholic hepatosteatosis     Periumbilical hernia        Past Surgical History:   Procedure Laterality Date    ARM AMPUTATION AT SHOULDER Left 2000s     SECTION       SECTION, CLASSIC      CHOLECYSTECTOMY  age 15    TONSILLECTOMY, ADENOIDECTOMY         Outpatient Meds  No current facility-administered medications on file prior to encounter.      Current Outpatient Medications on File Prior to Encounter   Medication Sig Dispense Refill    albuterol (ACCUNEB) 1.25 mg/3 mL Nebu Take 1.25 mg by nebulization every 4 (four) hours as needed.      albuterol (PROVENTIL/VENTOLIN HFA) 90 mcg/actuation inhaler Inhale 2 puffs into the lungs every 6 (six) hours as needed for Wheezing. Rescue 18 g 0    bacitracin 500 unit/gram Oint Apply topically 2 (two) times daily. 30 g 0    blood sugar diagnostic Strp TEST BLOOD SUAGR ONCE DAILY 100 strip 3    blood-glucose meter Misc use as directed 1 each 0    buprenorphine-naloxone 2-0.5 mg (SUBOXONE) 2-0.5 mg Subl Place 8 mg under the tongue once daily. Pt states once daily      carvedilol (COREG) 3.125 MG tablet Take 3.125 mg by mouth 2 (two) times daily with meals.      clorazepate (TRANXENE) 3.75 MG Tab Take 3.75 mg by mouth 3 (three) times daily.       fluticasone-salmeterol 100-50 mcg/dose (ADVAIR) 100-50 mcg/dose diskus inhaler Inhale 1 puff into the lungs 2 (two) times daily. 60 each 2    insulin lispro (HUMALOG) 100 unit/mL injection Inject 120 Units into the skin 3 (three) times daily before meals.       lancets 30 gauge Misc TEST BLOOD SUGAR ONCE DAILY 100 each 3    lisinopril (PRINIVIL,ZESTRIL) 20 MG tablet Take 10 mg by mouth once daily.       omeprazole (PRILOSEC) 40 MG capsule Take 40 mg by mouth every morning.       pregabalin (LYRICA) 150 MG capsule Take 300 mg by mouth 2 (two) times daily.      promethazine (PHENERGAN) 25 MG tablet Take 25 mg by mouth every 4 (four) hours.      amitriptyline (ELAVIL) 50 MG tablet Take 50 mg by mouth nightly as needed for Insomnia.      fluticasone propionate (FLONASE) 50 mcg/actuation nasal spray 1 spray (50 mcg total) by Each Nostril route 2 (two) times daily as needed for Rhinitis. (Patient not taking: Reported on 3/20/2020) 15 g 0    furosemide (LASIX) 40 MG tablet Take 40 mg by mouth 2 (two) times daily.      hydrOXYzine pamoate (VISTARIL) 50 MG Cap Take 50 mg by mouth every 8 (eight) hours as needed.      LANTUS SOLOSTAR U-100 INSULIN glargine 100 units/mL (3mL) SubQ pen       methylPREDNISolone (MEDROL DOSEPACK) 4 mg tablet Use as directed 1 Package 0    nitroGLYCERIN (NITROSTAT) 0.4 MG SL tablet Place 0.4 mg under the tongue every 5 (five) minutes as needed for Chest pain.      ondansetron (ZOFRAN-ODT) 4 MG TbDL Take 2 tablets (8 mg total) by mouth every 6 (six) hours as needed. 12 tablet 0    potassium chloride SA (K-DUR,KLOR-CON) 20 MEQ tablet Take 20 mEq by mouth once daily.         Inpatient Scheduled Meds   carvediloL  3.125 mg Oral BID    clindamycin (CLEOCIN) IVPB  900 mg Intravenous Q8H    clorazepate  3.75 mg Oral TID    insulin detemir U-100  20 Units Subcutaneous Daily    lactated ringers  1,000 mL Intravenous Once    piperacillin-tazobactam (ZOSYN) IVPB  4.5 g Intravenous Q8H     pregabalin  300 mg Oral BID    vancomycin (VANCOCIN) IVPB  2,000 mg Intravenous Q12H           Anesthesia Evaluation    I have reviewed the Patient Summary Reports.   I have reviewed the NPO Status.   I have reviewed the Medications.     Review of Systems  Anesthesia Hx:  No problems with previous Anesthesia Denies Hx of Anesthetic complications   Denies Personal Hx of Anesthesia complications.   Social:  Smoker    Cardiovascular:   Hypertension CHF ( diastolic failure noted in medical problems, however, TTE from 2018 normal)    Pulmonary:   Asthma    Renal/:   Chronic Renal Disease ( BRIDGET)    Hepatic/GI:   GERD Liver Disease, ( Nonalcoholic hepatosteatosis)    Musculoskeletal:   Left arm amputation above elbow   Neurological:   Phantom limb pain   Endocrine:   Diabetes    Psych:   depression          Physical Exam  General:  Morbid Obesity    Airway/Jaw/Neck:  Airway Findings: Mouth Opening: Normal Tongue: Normal  General Airway Assessment: Adult  Mallampati: I      Dental:  Dental Findings:    Chest/Lungs:  Chest/Lungs Findings: Decreased Breath Sounds Bilateral     Heart/Vascular:  Heart Findings: Rate: Tachycardia        Mental Status:  Mental Status Findings:  Cooperative, Alert and Oriented       Trended Lab Data:  Recent Labs   Lab 12/10/20  1905   WBC 15.28*   HGB 12.2   HCT 36.9*   *   MCV 86   RDW 14.6*      K 4.5      CO2 25   BUN 20*   CREATININE 1.04   *   PROT 6.8   ALBUMIN 3.6   BILITOT 0.6   AST 14*   ALKPHOS 102   ALT 22       Trended Cardiac Data:  Recent Labs   Lab 12/10/20  1905   TROPONINI 0.252*     TTE 7/2018    1 - Normal left ventricular systolic function (EF 55-60%).     2 - Normal left ventricular diastolic function.     3 - Normal right ventricular systolic function .       Anesthesia Plan  Type of Anesthesia, risks & benefits discussed:  Anesthesia Type:  general  Patient's Preference:   Intra-op Monitoring Plan: standard ASA monitors  Intra-op Monitoring  Plan Comments:   Post Op Pain Control Plan:   Post Op Pain Control Plan Comments:   Induction:   IV  Beta Blocker:  Patient is on a Beta-Blocker and has received one dose within the past 24 hours (No further documentation required).       Informed Consent: Patient understands risks and agrees with Anesthesia plan.  Questions answered. Anesthesia consent signed with patient.  ASA Score: 3     Day of Surgery Review of History & Physical:    H&P update referred to the surgeon.         Ready For Surgery From Anesthesia Perspective.

## 2020-12-11 NOTE — NURSING
Pt noted with sustained HR ST 120s on telemetry monitor. Pt denies any complaints. Asymptomatic. Dr Watson called and notified. Home med Coreg 3.125mg PO BID start now ordered. Stated no need for EKG at present.

## 2020-12-11 NOTE — PROGRESS NOTES
Pharmacokinetic Initial Assessment: IV Vancomycin    Assessment/Plan:    Initiate intravenous vancomycin with loading dose of 2500 mg once followed by a maintenance dose of vancomycin 2000mg IV every 12 hours  Desired empiric serum trough concentration is 15 to 20 mcg/mL  Draw vancomycin trough level 60 min prior to fourth dose on 12/12 at approximately 0900  Pharmacy will continue to follow and monitor vancomycin.      Please contact pharmacy at extension [phone] with any questions regarding this assessment.     Thank you for the consult,   Ish Edge       Patient brief summary:  Lele Dias is a 40 y.o. female initiated on antimicrobial therapy with IV Vancomycin for treatment of suspected skin & soft tissue infection    Drug Allergies:   Review of patient's allergies indicates:   Allergen Reactions    Celexa [citalopram] Nausea And Vomiting       Actual Body Weight:   167.8kg    Renal Function:   Estimated Creatinine Clearance: 108.1 mL/min (based on SCr of 1.04 mg/dL).,     Dialysis Method (if applicable):      CBC (last 72 hours):  Recent Labs   Lab Result Units 12/10/20  1905 12/11/20  0142   WBC K/uL 15.28*  --    Hemoglobin g/dL 12.2  --    Hemoglobin A1C %  --  9.4*   Hematocrit % 36.9*  --    Platelets K/uL 128*  --    Gran % % 77.6*  --    Lymph % % 16.1*  --    Mono % % 4.4  --    Eosinophil % % 0.6  --    Basophil % % 0.2  --    Differential Method  Automated  --        Metabolic Panel (last 72 hours):  Recent Labs   Lab Result Units 12/10/20  1905 12/10/20  1949   Sodium mmol/L 137  --    Potassium mmol/L 4.5  --    Chloride mmol/L 103  --    CO2 mmol/L 25  --    Glucose mg/dL 433*  --    Glucose, UA   --  4+*   BUN mg/dL 20*  --    Creatinine mg/dL 1.04  --    Albumin g/dL 3.6  --    Total Bilirubin mg/dL 0.6  --    Alkaline Phosphatase U/L 102  --    AST U/L 14*  --    ALT U/L 22  --        Drug levels (last 3 results):  No results for input(s): VANCOMYCINRA, VANCOMYCINPE, VANCOMYCINTR in the  last 72 hours.    Microbiologic Results:  Microbiology Results (last 7 days)       Procedure Component Value Units Date/Time    Urine culture [[id number]] Collected: 12/10/20 1949    Order Status: No result Specimen: Urine Updated: 12/10/20 2004    Blood culture x two cultures. Draw prior to antibiotics. [[id number]] Collected: 12/10/20 1932    Order Status: Sent Specimen: Blood from Peripheral, Wrist, Right Updated: 12/10/20 1938    Blood culture x two cultures. Draw prior to antibiotics. [[id number]]     Order Status: No result Specimen: Blood

## 2020-12-11 NOTE — ED NOTES
"Pt refusing fluids stating "I'm too fucking cold and those fluids just gonna make me colder. I'll take the antibiotics but yall aint fucking putting any fluids in me I got enough"   Pt provided with multiple warm blankets.  Pt still refusing fluids. PA made aware.  "

## 2020-12-11 NOTE — PLAN OF CARE
Problem: Adult Inpatient Plan of Care  Goal: Plan of Care Review  Outcome: Ongoing, Progressing  VIRTUAL NURSE: Labs, notes and care plan reviewed

## 2020-12-11 NOTE — ED PROVIDER NOTES
Encounter Date: 12/10/2020       History     Chief Complaint   Patient presents with    Abscess     I have a spot in between my vagina and butt and i was here on Monday and it get worse.      Patient is a 40 year old female with history of DM, HTN, obesity and other comorbidities. She was seen in the ED 20 with a skin lesion on the left buttock. She reports that she began to have drainage from the site the day after she was seen. She said Dr. Nelson called her and checked on her. He did advise her to have the wound rechecked in 2 days, but she has not been seen until today. No fever or chills. She has edema to the bilateral lower legs, but states that is normal for her. She has cough and SOB, but also states that is normal as she is smoker. No known exposure to illness.         Review of patient's allergies indicates:   Allergen Reactions    Celexa [citalopram] Nausea And Vomiting     Past Medical History:   Diagnosis Date    Asthma     Cellulitis of abdominal wall 2017    Depression     Diabetes mellitus, type II     Diastolic dysfunction     Diverticulosis of intestine with bleeding 2016    E coli bacteremia 2018    E. coli pyelonephritis 2015    E. 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     SECTION       SECTION, CLASSIC      CHOLECYSTECTOMY  age 15    TONSILLECTOMY, ADENOIDECTOMY       Family History   Problem Relation Age of Onset    Cancer Father     Heart attack Mother     Heart disease Mother     Cancer Maternal Aunt         lung (smoker)    Heart attack Maternal Aunt     Breast cancer Paternal Grandmother     Heart attack Maternal Grandmother      Social History     Tobacco Use    Smoking status: Current Every Day Smoker     Packs/day: 1.00     Years: 15.00     Pack  years: 15.00     Types: Cigarettes    Smokeless tobacco: Never Used   Substance Use Topics    Alcohol use: No    Drug use: No     Review of Systems   Constitutional: Negative for activity change, appetite change, chills, fatigue and fever.   HENT: Negative for congestion, ear pain, rhinorrhea, sore throat and voice change.    Respiratory: Positive for cough and shortness of breath (unchanged from baseline). Negative for wheezing.    Cardiovascular: Positive for leg swelling. Negative for chest pain and palpitations.   Gastrointestinal: Negative for abdominal pain, diarrhea, nausea and vomiting.   Genitourinary: Negative for dysuria, flank pain, frequency and hematuria.   Musculoskeletal: Negative for back pain, neck pain and neck stiffness.   Skin: Negative for rash.   Neurological: Negative for dizziness, weakness, numbness and headaches.   All other systems reviewed and are negative.      Physical Exam     Initial Vitals [12/10/20 1800]   BP Pulse Resp Temp SpO2   (!) 113/55 77 16 98.3 °F (36.8 °C) 98 %      MAP       --         Physical Exam    Nursing note and vitals reviewed.  Constitutional: She appears well-developed and well-nourished. She appears distressed.   Morbidly obese   HENT:   Head: Normocephalic and atraumatic.   Nose: Nose normal.   Mouth/Throat: Oropharynx is clear and moist.   Eyes: Conjunctivae and EOM are normal. Pupils are equal, round, and reactive to light.   Neck: Normal range of motion. Neck supple.   Cardiovascular: Normal rate, regular rhythm, normal heart sounds and intact distal pulses.   Pulmonary/Chest: Breath sounds normal. No respiratory distress.   Abdominal: Soft. Bowel sounds are normal. There is no abdominal tenderness.   Musculoskeletal: Normal range of motion. Edema (Pitting edema bilateral lower legs. ) present.      Comments: No posterior calf tenderness. Good distal pulses.    Lymphadenopathy:     She has no cervical adenopathy.   Neurological: She is alert and  oriented to person, place, and time.   Skin: Skin is warm and dry. No rash noted.   Erythematous, tender area on the left buttock with purulent area in the center and drainage   Psychiatric: She has a normal mood and affect. Her behavior is normal. Judgment and thought content normal.         ED Course   Procedures  Labs Reviewed   CULTURE, BLOOD   CULTURE, BLOOD   SEDIMENTATION RATE   C-REACTIVE PROTEIN   CBC W/ AUTO DIFFERENTIAL   COMPREHENSIVE METABOLIC PANEL   LACTIC ACID, PLASMA   URINALYSIS, REFLEX TO URINE CULTURE   TROPONIN I   NT-PRO NATRIURETIC PEPTIDE   SARS-COV-2 RNA AMPLIFICATION, QUAL          Imaging Results          X-Ray Chest AP Portable (In process)                  Medical Decision Making:   Differential Diagnosis:   Including but not limited to Sepsis, Abscess, necrotizing fasciitis,   Patient was turned over to Dr. Richardson at the end of my shift. He also examined the patient and was involved in the management throughout.               Attending Attestation:             Attending ED Notes:   I provided a face to face evaluation of this patient.    I discussed the patient's care with Advanced Practice Clinician.    I reviewed their note and agree with the history, physical, assessment and plan.      This is an emergent evaluation of a patient who presented with abscess  39 yo female, hx obesity DM, non compliance with worsening abscess. Patient HD stable. Will be transferred for surgical I&D.            ED Course as of Dec 12 1658   Thu Dec 10, 2020   2044 EKG:  Rate 100.  Sinus rhythm with occasional PVCs.  No STEMI.  QTC within normal limits.    [RN]   2209 CT imaging reviewed.  Concerning for soft tissue infection.  Discussed case with Dr. Watson 6 of to the patient for admission.  Patient given antibiotics.  Labs consistent with sepsis.  Patient has refused IV fluids.  Discussed the importance of IV fluids for treating her infection however she is refusing.    [RN]      ED Course User  Index  [RN] Collin Richardson Jr., MD            Clinical Impression:       ICD-10-CM ICD-9-CM   1. Sepsis  A41.9 038.9     995.91                          ED Disposition Condition    Admit                             DELANEY Benjamin  12/10/20 1959       DELANEY Benjamin  12/12/20 1658       Collin Richardson Jr., MD  02/18/21 0650

## 2020-12-11 NOTE — ED PROVIDER NOTES
Encounter Date: 12/10/2020       History     Chief Complaint   Patient presents with    Abscess     I have a spot in between my vagina and butt and i was here on Monday and it get worse.      HPI  Review of patient's allergies indicates:   Allergen Reactions    Celexa [citalopram] Nausea And Vomiting     Past Medical History:   Diagnosis Date    Asthma     Cellulitis of abdominal wall 2017    Depression     Diabetes mellitus, type II     Diastolic dysfunction     Diverticulosis of intestine with bleeding 2016    E coli bacteremia 2018    E. coli pyelonephritis 2015    E. 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     SECTION       SECTION, CLASSIC      CHOLECYSTECTOMY  age 15    TONSILLECTOMY, ADENOIDECTOMY       Family History   Problem Relation Age of Onset    Cancer Father     Heart attack Mother     Heart disease Mother     Cancer Maternal Aunt         lung (smoker)    Heart attack Maternal Aunt     Breast cancer Paternal Grandmother     Heart attack Maternal Grandmother      Social History     Tobacco Use    Smoking status: Current Every Day Smoker     Packs/day: 1.00     Years: 15.00     Pack years: 15.00     Types: Cigarettes    Smokeless tobacco: Never Used   Substance Use Topics    Alcohol use: No    Drug use: No     Review of Systems    Physical Exam     Initial Vitals [12/10/20 1800]   BP Pulse Resp Temp SpO2   (!) 113/55 77 16 98.3 °F (36.8 °C) 98 %      MAP       --         Physical Exam    ED Course   Procedures  Labs Reviewed   CBC W/ AUTO DIFFERENTIAL - Abnormal; Notable for the following components:       Result Value    WBC 15.28 (*)     Hematocrit 36.9 (*)     RDW 14.6 (*)     Platelets 128 (*)     Immature Granulocytes 1.1 (*)     Gran # (ANC) 11.9 (*)      Immature Grans (Abs) 0.17 (*)     Gran % 77.6 (*)     Lymph % 16.1 (*)     All other components within normal limits   COMPREHENSIVE METABOLIC PANEL - Abnormal; Notable for the following components:    Glucose 433 (*)     BUN 20 (*)     Calcium 8.1 (*)     AST 14 (*)     All other components within normal limits   LACTIC ACID, PLASMA - Abnormal; Notable for the following components:    Lactate (Lactic Acid) 3.0 (*)     All other components within normal limits   URINALYSIS, REFLEX TO URINE CULTURE - Abnormal; Notable for the following components:    Appearance, UA Hazy (*)     Protein, UA Trace (*)     Glucose, UA 4+ (*)     Occult Blood UA 3+ (*)     Nitrite, UA Positive (*)     Leukocytes, UA 2+ (*)     All other components within normal limits    Narrative:     Specimen Source->Urine   TROPONIN I - Abnormal; Notable for the following components:    Troponin I 0.252 (*)     All other components within normal limits   NT-PRO NATRIURETIC PEPTIDE - Abnormal; Notable for the following components:    NT-proBNP 1070 (*)     All other components within normal limits   C-REACTIVE PROTEIN - Abnormal; Notable for the following components:    CRP 28.42 (*)     All other components within normal limits   URINALYSIS MICROSCOPIC - Abnormal; Notable for the following components:    WBC, UA 21 (*)     Bacteria Many (*)     All other components within normal limits    Narrative:     Specimen Source->Urine   CULTURE, BLOOD   CULTURE, BLOOD   CULTURE, URINE   SEDIMENTATION RATE   C-REACTIVE PROTEIN   SARS-COV-2 RNA AMPLIFICATION, QUAL   SEDIMENTATION RATE   POCT GLUCOSE MONITORING CONTINUOUS          Imaging Results          CT Pelvis With Contrast (Final result)  Result time 12/10/20 20:23:38    Final result by Mirta Venegas MD (12/10/20 20:23:38)                 Impression:      Interval development of soft tissue gas involving the inferior medial buttocks on the right side of the anus raising concern for AA soft tissue  infection.  A fistula is not excluded.  Recommend clinical correlation      Electronically signed by: Theo Kirby  Date:    12/10/2020  Time:    20:23             Narrative:    EXAMINATION:  CT PELVIS WITH  CONTRAST    CLINICAL HISTORY:  Pelvic infection or abscess;    TECHNIQUE:  CT of the pelvis without    COMPARISON:  None    FINDINGS:  There is foci of soft tissue gas within the medial right presley buttocks new since the prior exam.  The follow-up soft tissue gas covers an area of 28 x 14 x 68 mm along the medial aspect of the buttocks on the right aspect of the anus may relate to a soft tissue infection.  Subcutaneous gas in the inferior abdominal wall near the region of thinning of the anterior abdominal wall and small fat containing hernia.  No bowel obstruction.  No free fluid.  Increased attenuation in the subcutaneous fat of the lumbar region.                               X-Ray Chest AP Portable (Final result)  Result time 12/10/20 19:48:42    Final result by Mirta Venegas MD (12/10/20 19:48:42)                 Impression:      Right hilar fullness likely related to positioning.  Otherwise no acute process seen      Electronically signed by: Theo Kirby  Date:    12/10/2020  Time:    19:48             Narrative:    EXAMINATION:  XR CHEST AP PORTABLE    CLINICAL HISTORY:  Sepsis;    TECHNIQUE:  Single frontal view of the chest was performed.    COMPARISON:  Prior    FINDINGS:  Right hilar fullness likely related to positioning.  No focal consolidation pleural effusion pneumothorax.  No acute osseous injury.  Cardiac silhouette is not enlarged.                                                             Clinical Impression:       ICD-10-CM ICD-9-CM   1. Sepsis  A41.9 038.9     995.91                          ED Disposition Condition    Admit

## 2020-12-11 NOTE — PLAN OF CARE
TN attempted to see patient for DCA planning; pt off unit to surgery at this time. Discharge blue folder and d/c brochure with CM business card inside. CM to follow up for additional d/c needs.         12/11/20 1135   Discharge Assessment   Assessment Type Discharge Planning Assessment   Confirmed/corrected address and phone number on facesheet? Yes   Assessment information obtained from? Medical Record   Expected Length of Stay (days) 2   Communicated expected length of stay with patient/caregiver yes   Prior to hospitilization cognitive status: Alert/Oriented   Prior to hospitalization functional status: Assistive Equipment   Current cognitive status: Alert/Oriented   Current Functional Status: Needs Assistance   Lives With child(marge), adult;significant other   Able to Return to Prior Arrangements yes   Is patient able to care for self after discharge? Yes   Who are your caregiver(s) and their phone number(s)? Juan Coats Significant other   [phone]   Patient's perception of discharge disposition home or selfcare   Readmission Within the Last 30 Days no previous admission in last 30 days   Patient currently being followed by outpatient case management? No   Patient currently receives any other outside agency services? No   Equipment Currently Used at Home cane, straight;rollator   Do you have any problems affording any of your prescribed medications? TBD   Is the patient taking medications as prescribed? no   If no, which medications is patient not taking? per chart   Does the patient have transportation home? Yes   Transportation Anticipated family or friend will provide   Does the patient receive services at the [facility]? No   Discharge Plan A Home with family   Discharge Plan B Home Health   DME Needed Upon Discharge  wound care supplies  (TBD)   Patient/Family in Agreement with Plan yes

## 2020-12-11 NOTE — ED NOTES
"Pt behaving belligerently towards staff. States "I don't have no fever! My temperature was 98 and when I came in here. Yall aren't taking my blankets; the next motherfucker that touches my blanket I'm kicking!" Explained to pt that blankets were being removed s/t increase in body temperature. Pt states "I don't have no fever. I felt my head and it's not hot! Josette gave me this fever and Dr. Richardson gave me this infection!" Notified pt that she has a temperature of 103 per oral thermometer measurement and that she has signs of infection as evidenced by MD explanation to patient regarding CC, lab results, and vitals signs (heart rate and temperature.) Pt states "that heart rate is normal for me! I take medication for that, but I haven't been taking any of my medicines because of the pain!" MD notified of pts behavior.  "

## 2020-12-11 NOTE — ANESTHESIA POSTPROCEDURE EVALUATION
Anesthesia Post Evaluation    Patient: Lele Dias    Procedure(s) Performed: Procedure(s) (LRB):  INCISION AND DRAINAGE, ABSCESS (N/A)    Final Anesthesia Type: general    Patient location during evaluation: PACU  Patient participation: Yes- Able to Participate  Level of consciousness: awake and alert, oriented and awake  Post-procedure vital signs: reviewed and stable  Pain management: adequate  Airway patency: patent    PONV status at discharge: No PONV  Anesthetic complications: no      Cardiovascular status: blood pressure returned to baseline  Respiratory status: unassisted and room air  Hydration status: euvolemic  Follow-up not needed.          Vitals Value Taken Time   /64 12/11/20 1358   Temp 36.7 °C (98 °F) 12/11/20 1325   Pulse 106 12/11/20 1402   Resp 17 12/11/20 1402   SpO2 98 % 12/11/20 1402   Vitals shown include unvalidated device data.      No case tracking events are documented in the log.      Pain/Alanna Score: Pain Rating Prior to Med Admin: 9 (12/11/2020  9:38 AM)  Pain Rating Post Med Admin: 0 (12/11/2020  1:25 PM)

## 2020-12-11 NOTE — PLAN OF CARE
POC reviewed with patient and spouse. Patient AAOx4 and reports pain to perineum. PRN pain medication administered. IV antibiotics administered and continuous IVF maintained. I&D completed today. Tele monitor in place reading NSR. No red alarms or ectopy noted. Blood glucose checks completed ACHS. PRN insulin administered per sliding scale. Bed alarm on, bed locked and low, side rails up x2, and call bell in reach.

## 2020-12-11 NOTE — PLAN OF CARE
Patient has met PACU discharge criteria, VSS, pain well controlled. Family updated by phone. Released from PACU by Dr. Rosa

## 2020-12-11 NOTE — ED NOTES
"Pt stated "I'm going to keep bending my arm so this thing keeps beeping. Yall can just keep fixing it"  "

## 2020-12-11 NOTE — ED NOTES
Pt yelling and making disrespectful comments towards staff. Pt able to be redirected. MD notified of pts behavior

## 2020-12-11 NOTE — H&P
[facility]  General Surgery  History & Physical    Patient Name: Lele Dias  MRN: [id number]  Admission Date: 12/10/2020  Attending Physician: Rich Watson MD   Primary Care Provider: Kari Edge MD (Inactive)    Patient information was obtained from patient and ER records.     Subjective:     Chief Complaint/Reason for Admission: Abscess    History of Present Illness:  Patient is a 40 y.o. female presents with HTN, DM2, asthma, and morbid obesity who presents for an abscess of her peroneum.      She states that she first noticed it on Monday and thought it was a spider bite.  Over the last couple of days it has grown in size and is becoming extremely uncomfortable.  She endorses fevers and chills.  It has had some drainage but nothing significant.  Has had erythema and pain around it.  Has never had anything like this before.      No current facility-administered medications on file prior to encounter.      Current Outpatient Medications on File Prior to Encounter   Medication Sig    albuterol (ACCUNEB) 1.25 mg/3 mL Nebu Take 1.25 mg by nebulization every 4 (four) hours as needed.    albuterol (PROVENTIL/VENTOLIN HFA) 90 mcg/actuation inhaler Inhale 2 puffs into the lungs every 6 (six) hours as needed for Wheezing. Rescue    bacitracin 500 unit/gram Oint Apply topically 2 (two) times daily.    blood sugar diagnostic Strp TEST BLOOD SUAGR ONCE DAILY    blood-glucose meter Misc use as directed    buprenorphine-naloxone 2-0.5 mg (SUBOXONE) 2-0.5 mg Subl Place 8 mg under the tongue once daily. Pt states once daily    carvedilol (COREG) 3.125 MG tablet Take 3.125 mg by mouth 2 (two) times daily with meals.    clorazepate (TRANXENE) 3.75 MG Tab Take 3.75 mg by mouth 3 (three) times daily.    fluticasone-salmeterol 100-50 mcg/dose (ADVAIR) 100-50 mcg/dose diskus inhaler Inhale 1 puff into the lungs 2 (two) times daily.    insulin lispro (HUMALOG) 100 unit/mL injection Inject  120 Units into the skin 3 (three) times daily before meals.     lancets 30 gauge Misc TEST BLOOD SUGAR ONCE DAILY    lisinopril (PRINIVIL,ZESTRIL) 20 MG tablet Take 10 mg by mouth once daily.     omeprazole (PRILOSEC) 40 MG capsule Take 40 mg by mouth every morning.     pregabalin (LYRICA) 150 MG capsule Take 300 mg by mouth 2 (two) times daily.    promethazine (PHENERGAN) 25 MG tablet Take 25 mg by mouth every 4 (four) hours.    amitriptyline (ELAVIL) 50 MG tablet Take 50 mg by mouth nightly as needed for Insomnia.    fluticasone propionate (FLONASE) 50 mcg/actuation nasal spray 1 spray (50 mcg total) by Each Nostril route 2 (two) times daily as needed for Rhinitis. (Patient not taking: Reported on 3/20/2020)    furosemide (LASIX) 40 MG tablet Take 40 mg by mouth 2 (two) times daily.    hydrOXYzine pamoate (VISTARIL) 50 MG Cap Take 50 mg by mouth every 8 (eight) hours as needed.    LANTUS SOLOSTAR U-100 INSULIN glargine 100 units/mL (3mL) SubQ pen     methylPREDNISolone (MEDROL DOSEPACK) 4 mg tablet Use as directed    nitroGLYCERIN (NITROSTAT) 0.4 MG SL tablet Place 0.4 mg under the tongue every 5 (five) minutes as needed for Chest pain.    ondansetron (ZOFRAN-ODT) 4 MG TbDL Take 2 tablets (8 mg total) by mouth every 6 (six) hours as needed.    potassium chloride SA (K-DUR,KLOR-CON) 20 MEQ tablet Take 20 mEq by mouth once daily.       Review of patient's allergies indicates:   Allergen Reactions    Celexa [citalopram] Nausea And Vomiting       Past Medical History:   Diagnosis Date    Asthma     Cellulitis of abdominal wall 12/18/2017    Depression     Diabetes mellitus, type II     Diastolic dysfunction     Diverticulosis of intestine with bleeding 9/14/2016    E coli bacteremia 4/21/2018    E. coli pyelonephritis 6/2/2015    E. coli UTI 12/18/2017    Hernia, epigastric     Hypertension     Nonalcoholic hepatosteatosis     Periumbilical hernia      Past Surgical History:   Procedure  Laterality Date    ARM AMPUTATION AT SHOULDER Left      SECTION       SECTION, CLASSIC      CHOLECYSTECTOMY  age 15    TONSILLECTOMY, ADENOIDECTOMY       Family History     Problem Relation (Age of Onset)    Breast cancer Paternal Grandmother    Cancer Father, Maternal Aunt    Heart attack Mother, Maternal Aunt, Maternal Grandmother    Heart disease Mother        Tobacco Use    Smoking status: Current Every Day Smoker     Packs/day: 1.00     Years: 15.00     Pack years: 15.00     Types: Cigarettes    Smokeless tobacco: Never Used   Substance and Sexual Activity    Alcohol use: No    Drug use: No    Sexual activity: Yes     Partners: Male     Birth control/protection: Injection     Review of Systems   Constitutional: Positive for chills and fever.   HENT: Negative for congestion.    Eyes: Negative for visual disturbance.   Respiratory: Negative for cough, chest tightness and shortness of breath.    Cardiovascular: Negative for chest pain.   Gastrointestinal: Positive for diarrhea. Negative for abdominal pain, constipation, nausea and vomiting.   Genitourinary: Negative for dysuria and hematuria.   Skin: Positive for color change and wound.   Neurological: Negative for syncope.   Psychiatric/Behavioral: Negative for confusion.     Objective:     Vital Signs (Most Recent):  Temp: 99.6 °F (37.6 °C) (20 0050)  Pulse: (!) 117 (20 0615)  Resp: 20 (20 0210)  BP: 118/64 (20 0452)  SpO2: (!) 94 % (20 0353) Vital Signs (24h Range):  Temp:  [98.3 °F (36.8 °C)-103 °F (39.4 °C)] 99.6 °F (37.6 °C)  Pulse:  [] 117  Resp:  [16-20] 20  SpO2:  [94 %-100 %] 94 %  BP: (113-128)/(55-64) 118/64     Weight: (!) 167.8 kg (369 lb 14.9 oz)  Body mass index is 67.66 kg/m².    Physical Exam  Constitutional:       Appearance: Normal appearance.   HENT:      Head: Atraumatic.   Eyes:      Extraocular Movements: Extraocular movements intact.   Neck:      Musculoskeletal: Normal  range of motion.   Cardiovascular:      Rate and Rhythm: Normal rate and regular rhythm.      Pulses: Normal pulses.   Pulmonary:      Effort: Pulmonary effort is normal. No respiratory distress.   Abdominal:      General: There is no distension.      Palpations: Abdomen is soft.      Tenderness: There is no abdominal tenderness.   Genitourinary:     Comments: Right peroneal area of enduration and inflammation.  Measuring around 7cm in size.  Small area that has opened without any significant drainage.  Erythema and tenderness to palpation  Musculoskeletal: Normal range of motion.      Right lower leg: Edema present.      Left lower leg: Edema present.      Comments: Left arm amputation   Neurological:      General: No focal deficit present.      Mental Status: She is alert and oriented to person, place, and time.   Psychiatric:         Mood and Affect: Mood normal.         Behavior: Behavior normal.         Significant Labs:  CBC:   Recent Labs   Lab 12/10/20  1905   WBC 15.28*   RBC 4.30   HGB 12.2   HCT 36.9*   *   MCV 86   MCH 28.4   MCHC 33.1     BMP:   Recent Labs   Lab 12/10/20  1905   *      K 4.5      CO2 25   BUN 20*   CREATININE 1.04   CALCIUM 8.1*     CMP:   Recent Labs   Lab 12/10/20  1905   *   CALCIUM 8.1*   ALBUMIN 3.6   PROT 6.8      K 4.5   CO2 25      BUN 20*   CREATININE 1.04   ALKPHOS 102   ALT 22   AST 14*   BILITOT 0.6       Significant Diagnostics:  I have reviewed all pertinent imaging results/findings within the past 24 hours.   Narrative & Impression     EXAMINATION:  CT PELVIS WITH  CONTRAST     CLINICAL HISTORY:  Pelvic infection or abscess;     TECHNIQUE:  CT of the pelvis without     COMPARISON:  None     FINDINGS:  There is foci of soft tissue gas within the medial right presley buttocks new since the prior exam.  The follow-up soft tissue gas covers an area of 28 x 14 x 68 mm along the medial aspect of the buttocks on the right aspect of the  anus may relate to a soft tissue infection.  Subcutaneous gas in the inferior abdominal wall near the region of thinning of the anterior abdominal wall and small fat containing hernia.  No bowel obstruction.  No free fluid.  Increased attenuation in the subcutaneous fat of the lumbar region.     Impression:     Interval development of soft tissue gas involving the inferior medial buttocks on the right side of the anus raising concern for AA soft tissue infection.  A fistula is not excluded.  Recommend clinical correlation         Assessment/Plan:     Active Diagnoses:    Diagnosis Date Noted POA    Perianal cellulitis [K61.0] 12/10/2020 Yes      Problems Resolved During this Admission:     VTE Risk Mitigation (From admission, onward)         Ordered     IP VTE HIGH RISK PATIENT  Once      12/11/20 0114     Place sequential compression device  Until discontinued      12/11/20 0114              Ms. Dias is our 39yo female with an abscess of her right peroneal region.      Plan for I&D in the OR today.  Likely will leave packing.  Patient to be NPO until after procedure.  Consent obtained this morning.  All questions and concerns addressed at this time.  Case request placed.    Naveed Russell MD  General Surgery  [facility]

## 2020-12-11 NOTE — ED NOTES
Pts  yelling why yall putting the IV in her arm she only got one arm and then she cant move. Pt and younger daughter and  yelling at each other stop talking nasty. I tried asking Ms Royal if we can stick her arm or since sometimes they go in her neck would she prefer that. Pt would not answer me and was yelling at  and daughter.

## 2020-12-11 NOTE — ANESTHESIA PROCEDURE NOTES
Intubation  Performed by: Alexa Noel MD  Authorized by: Rajan Berman MD     Intubation:     Induction:  Rapid sequence induction    Intubated:  Postinduction    Attempts:  1    Attempted By:  Resident anesthesiologist    Method of Intubation:  Direct    Blade:  Bruno 4    Laryngeal View Grade: Grade I - full view of chords      Difficult Airway Encountered?: No      Complications:  None    Airway Device:  Oral endotracheal tube    Airway Device Size:  7.0    Style/Cuff Inflation:  Cuffed    Inflation Amount (mL):  8    Tube secured:  21    Secured at:  The lips    Placement Verified By:  Capnometry    Complicating Factors:  Obesity    Findings Post-Intubation:  BS equal bilateral  Notes:      Pt ramped up during intubation.

## 2020-12-11 NOTE — NURSING
Patient arrived back to room from surgery. Patient AAOx4 and reports perineal pain a 6/10. Patient received percocet prior to arriving back to room. VSS. Tele monitor placed on patient. Bed alarm on, bed locked and low, side rails up x2, and call bell in reach.

## 2020-12-11 NOTE — PROGRESS NOTES
Pharmacokinetic Initial Assessment: IV Vancomycin    Assessment/Plan:    Initiate intravenous vancomycin with loading dose of 2500 mg once ED one time order    Patient brief summary:  Lele Dias is a 40 y.o. female initiated on antimicrobial therapy with IV Vancomycin for treatment of suspected skin & soft tissue infection    Drug Allergies:   Review of patient's allergies indicates:   Allergen Reactions    Celexa [citalopram] Nausea And Vomiting       Actual Body Weight:   149 kg     Renal Function:   CrCl cannot be calculated (Patient's most recent lab result is older than the maximum 7 days allowed.).,     CBC (last 72 hours):  No results for input(s): WHITE BLOOD CELL COUNT, HEMOGLOBIN, HEMATOCRIT, PLATELETS, GRAN%, LYMPH%, MONO%, EOSINOPHIL%, BASOPHIL%, DIFFERENTIAL METHOD in the last 72 hours.    Metabolic Panel (last 72 hours):  No results for input(s): SODIUM, POTASSIUM, CHLORIDE, CO2, GLUCOSE, BUN BLD, CREATININE, ALBUMIN, BILIRUBIN TOTAL, ALK PHOS, AST, ALT, MAGNESIUM, PHOSPHORUS in the last 72 hours.    Drug levels (last 3 results):  No results for input(s): VANCOMYCINRA, VANCOMYCINPE, VANCOMYCINTR in the last 72 hours.    Microbiologic Results:  Microbiology Results (last 7 days)       Procedure Component Value Units Date/Time    Blood culture x two cultures. Draw prior to antibiotics. [[id number]]     Order Status: No result Specimen: Blood     Blood culture x two cultures. Draw prior to antibiotics. [[id number]]     Order Status: No result Specimen: Blood

## 2020-12-11 NOTE — TRANSFER OF CARE
"Anesthesia Transfer of Care Note    Patient: Lele Dias    Procedure(s) Performed: Procedure(s) (LRB):  INCISION AND DRAINAGE, ABSCESS (N/A)    Patient location: PACU    Anesthesia Type: general    Transport from OR: Transported from OR on 2-3 L/min O2 by NC with adequate spontaneous ventilation    Post pain: adequate analgesia    Post assessment: no apparent anesthetic complications and tolerated procedure well    Post vital signs: stable    Level of consciousness: awake and alert    Nausea/Vomiting: no nausea/vomiting    Complications: none    Transfer of care protocol was followed      Last vitals:   Visit Vitals  /61   Pulse (!) 116   Temp 36.7 °C (98 °F) (Skin)   Resp 20   Ht 5' 2" (1.575 m)   Wt (!) 167.8 kg (369 lb 14.9 oz)   SpO2 96%   Breastfeeding No   BMI 67.66 kg/m²     "

## 2020-12-11 NOTE — OP NOTE
[facility]  General Surgery  Operative Note    SUMMARY     Date of Procedure: 12/11/2020     Procedure: Procedure(s) (LRB):  INCISION AND DRAINAGE, ABSCESS (N/A)     Debridment and drainage of Right perineal soft tissue necrotizing infection    Surgeon(s) and Role:     * Rich Watson MD - Primary    Assisting Surgeon: Naveed Russell PGY2    Pre-Operative Diagnosis: Perianal cellulitis [K61.0]    Post-Operative Diagnosis: Post-Op Diagnosis Codes:     * Perianal cellulitis [K61.0]    Anesthesia: General    Technical Procedures Used: Brought into OR and placed in lithotomy. A elizondo catheter was placed. A 2cm area of maria teresa skin necrosis in right perineal area was noted about 7-8 cm from the anal verge. This lead into a cavity that tracked anteriorly into the labia and posteriorly a short distance. The right labia was opened widely. Necrotic fat was sharply debrided down to healthy appearing tissue. The area was irrigated with saline. A counter incision was made far anteriorly and no additional necrotic tissue was noted. A penrose drain was passed through this. Hemostasis was obtained using cautery. The wound was packed with kerlex gauze.    Description of the Findings of the Procedure: right labial necrotizing soft tissue infection    Significant Surgical Tasks Conducted by the Assistant(s), if Applicable:     Complications: No    Estimated Blood Loss (EBL): 50 mL           Implants: * No implants in log *    Specimens:   Specimen (12h ago, onward)    None                  Condition: Stable    Disposition: PACU - hemodynamically stable.    Attestation: I was present and scrubbed for the entire procedure.

## 2020-12-12 NOTE — NURSING
"Called to room by security stating they were explaining to patient's spouse that visiting hours are over and spouse can not stay d/t hospital policy. Patient screaming in room stating "I don't care what the fuck they got to say! They got a problem about my old man staying so I ain't staying!" Nurse explained to patient the visitor policy d/t COVID and informed that she has the right to leave AMA but there are risks. Patient stated "Fuck this shit! Get this IV out of me! Yall don't care and I can just do this at home!" Dr. Watson notified. Gerard catheter, IV, and tele monitor removed. Dressing to perineum reinforced d/t drainage. Patient refusing to sign AMA form. Security at bedside to transport patient out of facility.   "

## 2020-12-13 LAB — BACTERIA SPEC AEROBE CULT: ABNORMAL

## 2020-12-14 LAB — BACTERIA UR CULT: ABNORMAL

## 2020-12-15 LAB
BACTERIA SPEC ANAEROBE CULT: ABNORMAL

## 2020-12-15 NOTE — DISCHARGE SUMMARY
[facility]  Short Stay  Discharge Summary    Admit Date: 12/10/2020    Discharge Date and Time: 12/11/2020  7:12 PM      Discharge Attending Physician: No att. providers found     Hospital Course (synopsis of major diagnoses, care, treatment, and services provided during the course of the hospital stay): 40F with right labial soft tissue infection, hyperglycemia due to uncontrolled DM was taken to OR for uncomplicated I&D and debridement right perineum. Post operatively she was in her regular floor room when she became agitated with the staff and eloped from the building with her .    Final Diagnoses:    Principal Problem: Necrotizing soft tissue infection   Secondary Diagnoses:   Active Hospital Problems    Diagnosis  POA    *Necrotizing soft tissue infection [M79.89]  Yes    Hyperglycemia due to diabetes mellitus [E11.65]  Yes    Perianal cellulitis [K61.0]  Yes    Sepsis [A41.9]  Yes    Type 2 diabetes mellitus with hyperglycemia, with long-term current use of insulin [E11.65, Z79.4]  Not Applicable    Morbid obesity with BMI of 50.0-59.9, adult [E66.01, Z68.43]  Not Applicable      Resolved Hospital Problems   No resolved problems to display.       Discharged Condition: stable    Disposition: Eloped    Follow up/Patient Instructions:    Medications:  Reconciled Home Medications:      Medication List      ASK your doctor about these medications    * albuterol 1.25 mg/3 mL Nebu  Commonly known as: ACCUNEB  Take 1.25 mg by nebulization every 4 (four) hours as needed.     * albuterol 90 mcg/actuation inhaler  Commonly known as: PROVENTIL/VENTOLIN HFA  Inhale 2 puffs into the lungs every 6 (six) hours as needed for Wheezing. Rescue     amitriptyline 50 MG tablet  Commonly known as: ELAVIL  Take 50 mg by mouth nightly as needed for Insomnia.     bacitracin 500 unit/gram Oint  Apply topically 2 (two) times daily.     buprenorphine-naloxone 2-0.5 mg 2-0.5 mg Subl  Commonly known as:  SUBOXONE  Place 8 mg under the tongue once daily. Pt states once daily     carvediloL 3.125 MG tablet  Commonly known as: COREG  Take 3.125 mg by mouth 2 (two) times daily with meals.     clorazepate 3.75 MG Tab  Commonly known as: TRANXENE  Take 3.75 mg by mouth 3 (three) times daily.     fluticasone propionate 50 mcg/actuation nasal spray  Commonly known as: FLONASE  1 spray (50 mcg total) by Each Nostril route 2 (two) times daily as needed for Rhinitis.     fluticasone-salmeterol 100-50 mcg/dose 100-50 mcg/dose diskus inhaler  Commonly known as: ADVAIR  Inhale 1 puff into the lungs 2 (two) times daily.     furosemide 40 MG tablet  Commonly known as: LASIX  Take 40 mg by mouth 2 (two) times daily.     hydrOXYzine pamoate 50 MG Cap  Commonly known as: VISTARIL  Take 50 mg by mouth every 8 (eight) hours as needed.     insulin lispro 100 unit/mL injection  Inject 120 Units into the skin 3 (three) times daily before meals.     LANTUS SOLOSTAR U-100 INSULIN glargine 100 units/mL (3mL) SubQ pen  Generic drug: insulin     lisinopriL 20 MG tablet  Commonly known as: PRINIVIL,ZESTRIL  Take 10 mg by mouth once daily.     methylPREDNISolone 4 mg tablet  Commonly known as: MEDROL DOSEPACK  Use as directed     nitroGLYCERIN 0.4 MG SL tablet  Commonly known as: NITROSTAT  Place 0.4 mg under the tongue every 5 (five) minutes as needed for Chest pain.     omeprazole 40 MG capsule  Commonly known as: PRILOSEC  Take 40 mg by mouth every morning.     ondansetron 4 MG Tbdl  Commonly known as: ZOFRAN-ODT  Take 2 tablets (8 mg total) by mouth every 6 (six) hours as needed.     potassium chloride SA 20 MEQ tablet  Commonly known as: K-DUR,KLOR-CON  Take 20 mEq by mouth once daily.     pregabalin 150 MG capsule  Commonly known as: LYRICA  Take 300 mg by mouth 2 (two) times daily.     promethazine 25 MG tablet  Commonly known as: PHENERGAN  Take 25 mg by mouth every 4 (four) hours.     TRUE METRIX GLUCOSE METER Misc  Generic drug:  blood-glucose meter  use as directed     TRUE METRIX GLUCOSE TEST STRIP Strp  Generic drug: blood sugar diagnostic  TEST BLOOD SUAGR ONCE DAILY     TRUEPLUS LANCETS 30 gauge Misc  Generic drug: lancets  TEST BLOOD SUGAR ONCE DAILY         * This list has 2 medication(s) that are the same as other medications prescribed for you. Read the directions carefully, and ask your doctor or other care provider to review them with you.              No discharge procedures on file.

## 2020-12-16 LAB — BACTERIA BLD CULT: NORMAL

## 2020-12-17 NOTE — PHYSICIAN QUERY
"PT Name: Lele Dias  MR #: [id number]    Procedure Clarification     CDS/: Halima Curry               Contact information: [email or web address]  This form is a permanent document in the medical record.    Query Date: December 17, 2020  By submitting this query, we are merely seeking further clarification of documentation. Please utilize your independent clinical judgment when addressing the question(s) below.    The Medical Record contains the following:   Indicator Supporting Clinical Findings Location in Medical Record   x Documentation of "Debridement" Debridment and drainage of Right perineal soft tissue necrotizing infection     The right labia was opened widely. Necrotic fat was sharply debrided down to healthy appearing tissue. The area was irrigated with saline.     Op Note 12/11      Documentation of "I&D"      Other       Excisional debridement is the surgical removal or cutting away of such tissue, necrosis, or slough and is classified to the root operation "Excision." Use of a sharp instrument does not always indicate that an excisional debridement was performed. Minor removal of loose fragments with scissors or using a sharp instrument to scrape away tissue is not an excisional debridement. Excisional debridement involves the use of a scalpel to remove devitalized tissue.    Nonexcisional debridement is the nonoperative brushing, irrigating, scrubbing, or washing of devitalized tissue, necrosis, slough, or foreign material. Most nonexcisional debridement procedures are classified to the root operation "Extraction" (pulling or stripping out or off all or a portion of a body part by the use of force).     Provider, please provide further clarification on the procedure performed on ___Right Labia     :    [   ] Excisional Debridement of skin   [  x ] Excisional Debridement of subcutaneous tissue/fascia        [   ] Nonexcisional Debridement of skin   [   ] Nonexcisional Debridement of " subcutaneous tissue/fascia     [   ] Other Procedure (please specify): _____________   [  ] Clinically Undetermined     Reference:    ICD-10-CM/PCS Coding Clinic Third Quarter ICD-10, Effective with discharges: October 7, 2015 [facility] § Excisional and nonexcisional debridement (2015).    Form No. 08467

## 2020-12-18 ENCOUNTER — OFFICE VISIT (OUTPATIENT)
Dept: SURGERY | Facility: CLINIC | Age: 40
End: 2020-12-18
Payer: MEDICAID

## 2020-12-18 ENCOUNTER — TELEPHONE (OUTPATIENT)
Dept: SURGERY | Facility: CLINIC | Age: 40
End: 2020-12-18

## 2020-12-18 VITALS — DIASTOLIC BLOOD PRESSURE: 86 MMHG | HEART RATE: 111 BPM | SYSTOLIC BLOOD PRESSURE: 152 MMHG

## 2020-12-18 DIAGNOSIS — M79.89 NECROTIZING SOFT TISSUE INFECTION: Primary | ICD-10-CM

## 2020-12-18 PROCEDURE — 99999 PR PBB SHADOW E&M-EST. PATIENT-LVL III: CPT | Mod: PBBFAC,,, | Performed by: STUDENT IN AN ORGANIZED HEALTH CARE EDUCATION/TRAINING PROGRAM

## 2020-12-18 PROCEDURE — 99999 PR PBB SHADOW E&M-EST. PATIENT-LVL III: ICD-10-PCS | Mod: PBBFAC,,, | Performed by: STUDENT IN AN ORGANIZED HEALTH CARE EDUCATION/TRAINING PROGRAM

## 2020-12-18 PROCEDURE — 99024 POSTOP FOLLOW-UP VISIT: CPT | Mod: ,,, | Performed by: STUDENT IN AN ORGANIZED HEALTH CARE EDUCATION/TRAINING PROGRAM

## 2020-12-18 PROCEDURE — 99024 PR POST-OP FOLLOW-UP VISIT: ICD-10-PCS | Mod: ,,, | Performed by: STUDENT IN AN ORGANIZED HEALTH CARE EDUCATION/TRAINING PROGRAM

## 2020-12-18 PROCEDURE — 99213 OFFICE O/P EST LOW 20 MIN: CPT | Mod: PBBFAC,PO | Performed by: STUDENT IN AN ORGANIZED HEALTH CARE EDUCATION/TRAINING PROGRAM

## 2020-12-18 RX ORDER — HYDROCODONE BITARTRATE AND ACETAMINOPHEN 5; 325 MG/1; MG/1
1 TABLET ORAL EVERY 6 HOURS PRN
Qty: 15 TABLET | Refills: 0 | Status: ON HOLD | OUTPATIENT
Start: 2020-12-18 | End: 2021-01-24

## 2020-12-18 NOTE — PROGRESS NOTES
S/p I&D, debridment of right labial soft tissue infection  Patient eloped from hospital  On bactrim from recent return to ER  Penrose still in place  Some drainage still but improving  Doing washes, dressing changes at home  Says blood sugar was 120 yesterday am  Afebile    Wound open widely, granulating in  Penrose removed  Wound redressed  Discussed sitz baths several times a day, after BM  RTC in a week

## 2020-12-18 NOTE — TELEPHONE ENCOUNTER
----- Message from Ade Wills sent at 12/18/2020 11:15 AM CST -----  Type:  Needs Medical Advice    Who Called: pt  Advice Regarding: pharmacy won't fill Rx HYDROcodone-acetaminophen (NORCO) 5-325 mg per tablet because of suboxone  Would the patient rather a call back or a response via [facility]? call  Best Call Back Number:   Additional Information: n/a

## 2021-01-23 ENCOUNTER — HOSPITAL ENCOUNTER (INPATIENT)
Facility: HOSPITAL | Age: 41
LOS: 3 days | Discharge: HOME | DRG: 247 | End: 2021-01-26
Attending: INTERNAL MEDICINE | Admitting: INTERNAL MEDICINE
Payer: MEDICAID

## 2021-01-23 DIAGNOSIS — E11.65 TYPE 2 DIABETES MELLITUS WITH HYPERGLYCEMIA, WITH LONG-TERM CURRENT USE OF INSULIN: ICD-10-CM

## 2021-01-23 DIAGNOSIS — I25.10 CAD (CORONARY ARTERY DISEASE): ICD-10-CM

## 2021-01-23 DIAGNOSIS — E87.0 HYPERNATREMIA: ICD-10-CM

## 2021-01-23 DIAGNOSIS — F11.10 OPIOID ABUSE: ICD-10-CM

## 2021-01-23 DIAGNOSIS — R07.9 CHEST PAIN: ICD-10-CM

## 2021-01-23 DIAGNOSIS — Z79.4 TYPE 2 DIABETES MELLITUS WITH HYPERGLYCEMIA, WITH LONG-TERM CURRENT USE OF INSULIN: ICD-10-CM

## 2021-01-23 DIAGNOSIS — J45.20 INTERMITTENT ASTHMA WITHOUT COMPLICATION, UNSPECIFIED ASTHMA SEVERITY: Chronic | ICD-10-CM

## 2021-01-23 DIAGNOSIS — I21.4 NSTEMI (NON-ST ELEVATED MYOCARDIAL INFARCTION): Primary | ICD-10-CM

## 2021-01-23 DIAGNOSIS — E66.01 MORBID OBESITY WITH BMI OF 60.0-69.9, ADULT: ICD-10-CM

## 2021-01-23 DIAGNOSIS — F17.200 TOBACCO USE DISORDER: Chronic | ICD-10-CM

## 2021-01-23 DIAGNOSIS — I25.110 CORONARY ARTERY DISEASE INVOLVING NATIVE CORONARY ARTERY OF NATIVE HEART WITH UNSTABLE ANGINA PECTORIS: ICD-10-CM

## 2021-01-23 DIAGNOSIS — N18.31 STAGE 3A CHRONIC KIDNEY DISEASE: ICD-10-CM

## 2021-01-23 DIAGNOSIS — I10 BENIGN ESSENTIAL HYPERTENSION: Chronic | ICD-10-CM

## 2021-01-23 LAB
ALBUMIN SERPL BCP-MCNC: 4 G/DL (ref 3.5–5.2)
ALP SERPL-CCNC: 81 U/L (ref 38–126)
ALT SERPL W/O P-5'-P-CCNC: 28 U/L (ref 10–44)
ANION GAP SERPL CALC-SCNC: 10 MMOL/L (ref 8–16)
APTT BLDCRRT: 21.7 SEC (ref 21–32)
AST SERPL-CCNC: 63 U/L (ref 15–46)
BASOPHILS # BLD AUTO: 0.05 K/UL (ref 0–0.2)
BASOPHILS NFR BLD: 0.4 % (ref 0–1.9)
BILIRUB SERPL-MCNC: 0.3 MG/DL (ref 0.1–1)
CALCIUM SERPL-MCNC: 9 MG/DL (ref 8.7–10.5)
CHLORIDE SERPL-SCNC: 113 MMOL/L (ref 95–110)
CO2 SERPL-SCNC: 23 MMOL/L (ref 23–29)
CREAT SERPL-MCNC: 1.27 MG/DL (ref 0.5–1.4)
DIFFERENTIAL METHOD: ABNORMAL
EOSINOPHIL # BLD AUTO: 0.1 K/UL (ref 0–0.5)
EOSINOPHIL NFR BLD: 0.4 % (ref 0–8)
ERYTHROCYTE [DISTWIDTH] IN BLOOD BY AUTOMATED COUNT: 16 % (ref 11.5–14.5)
EST. GFR  (AFRICAN AMERICAN): >60 ML/MIN/1.73 M^2
EST. GFR  (NON AFRICAN AMERICAN): 52.2 ML/MIN/1.73 M^2
GLUCOSE SERPL-MCNC: 256 MG/DL (ref 70–110)
HCT VFR BLD AUTO: 39.5 % (ref 37–48.5)
HGB BLD-MCNC: 12.2 G/DL (ref 12–16)
IMM GRANULOCYTES # BLD AUTO: 0.26 K/UL (ref 0–0.04)
IMM GRANULOCYTES NFR BLD AUTO: 1.9 % (ref 0–0.5)
INR PPP: 1 (ref 0.8–1.2)
LYMPHOCYTES # BLD AUTO: 3.2 K/UL (ref 1–4.8)
LYMPHOCYTES NFR BLD: 23.4 % (ref 18–48)
MCH RBC QN AUTO: 28.8 PG (ref 27–31)
MCHC RBC AUTO-ENTMCNC: 30.9 G/DL (ref 32–36)
MCV RBC AUTO: 93 FL (ref 82–98)
MONOCYTES # BLD AUTO: 0.9 K/UL (ref 0.3–1)
MONOCYTES NFR BLD: 6.3 % (ref 4–15)
NEUTROPHILS # BLD AUTO: 9.4 K/UL (ref 1.8–7.7)
NEUTROPHILS NFR BLD: 67.6 % (ref 38–73)
NRBC BLD-RTO: 0 /100 WBC
PLATELET # BLD AUTO: 179 K/UL (ref 150–350)
PMV BLD AUTO: 10.9 FL (ref 9.2–12.9)
POTASSIUM SERPL-SCNC: 3.9 MMOL/L (ref 3.5–5.1)
PROT SERPL-MCNC: 7.1 G/DL (ref 6–8.4)
PROTHROMBIN TIME: 10.5 SEC (ref 9–12.5)
RBC # BLD AUTO: 4.23 M/UL (ref 4–5.4)
SARS-COV-2 RDRP RESP QL NAA+PROBE: NEGATIVE
SODIUM SERPL-SCNC: 146 MMOL/L (ref 136–145)
TROPONIN I SERPL-MCNC: 4.08 NG/ML (ref 0.01–0.03)
UUN UR-MCNC: 18 MG/DL (ref 7–17)
WBC # BLD AUTO: 13.82 K/UL (ref 3.9–12.7)

## 2021-01-23 PROCEDURE — 80053 COMPREHEN METABOLIC PANEL: CPT | Mod: ER

## 2021-01-23 PROCEDURE — 99291 CRITICAL CARE FIRST HOUR: CPT | Mod: 25,ER

## 2021-01-23 PROCEDURE — 85730 THROMBOPLASTIN TIME PARTIAL: CPT | Mod: ER

## 2021-01-23 PROCEDURE — 96375 TX/PRO/DX INJ NEW DRUG ADDON: CPT | Mod: ER

## 2021-01-23 PROCEDURE — 85610 PROTHROMBIN TIME: CPT | Mod: ER

## 2021-01-23 PROCEDURE — 85025 COMPLETE CBC W/AUTO DIFF WBC: CPT | Mod: ER

## 2021-01-23 PROCEDURE — 63600175 PHARM REV CODE 636 W HCPCS: Mod: ER | Performed by: STUDENT IN AN ORGANIZED HEALTH CARE EDUCATION/TRAINING PROGRAM

## 2021-01-23 PROCEDURE — 93010 ELECTROCARDIOGRAM REPORT: CPT | Mod: ,,, | Performed by: INTERNAL MEDICINE

## 2021-01-23 PROCEDURE — 93005 ELECTROCARDIOGRAM TRACING: CPT | Mod: ER

## 2021-01-23 PROCEDURE — 25000003 PHARM REV CODE 250: Mod: ER | Performed by: FAMILY MEDICINE

## 2021-01-23 PROCEDURE — 82962 GLUCOSE BLOOD TEST: CPT | Mod: ER

## 2021-01-23 PROCEDURE — 20000000 HC ICU ROOM

## 2021-01-23 PROCEDURE — 63600175 PHARM REV CODE 636 W HCPCS: Mod: ER | Performed by: FAMILY MEDICINE

## 2021-01-23 PROCEDURE — 84484 ASSAY OF TROPONIN QUANT: CPT | Mod: ER

## 2021-01-23 PROCEDURE — 25000242 PHARM REV CODE 250 ALT 637 W/ HCPCS: Mod: ER | Performed by: FAMILY MEDICINE

## 2021-01-23 PROCEDURE — 96374 THER/PROPH/DIAG INJ IV PUSH: CPT | Mod: ER

## 2021-01-23 PROCEDURE — U0002 COVID-19 LAB TEST NON-CDC: HCPCS | Mod: ER

## 2021-01-23 PROCEDURE — 93010 EKG 12-LEAD: ICD-10-PCS | Mod: ,,, | Performed by: INTERNAL MEDICINE

## 2021-01-23 RX ORDER — ASPIRIN 325 MG
50000 TABLET, DELAYED RELEASE (ENTERIC COATED) ORAL
COMMUNITY
Start: 2021-01-18 | End: 2023-01-01

## 2021-01-23 RX ORDER — NITROGLYCERIN 20 MG/100ML
5 INJECTION INTRAVENOUS CONTINUOUS
Status: DISCONTINUED | OUTPATIENT
Start: 2021-01-23 | End: 2021-01-24

## 2021-01-23 RX ORDER — NITROGLYCERIN 0.4 MG/1
0.4 TABLET SUBLINGUAL
Status: COMPLETED | OUTPATIENT
Start: 2021-01-23 | End: 2021-01-23

## 2021-01-23 RX ORDER — ASPIRIN 325 MG
325 TABLET, DELAYED RELEASE (ENTERIC COATED) ORAL
Status: COMPLETED | OUTPATIENT
Start: 2021-01-23 | End: 2021-01-23

## 2021-01-23 RX ORDER — NITROGLYCERIN 0.4 MG/1
0.4 TABLET SUBLINGUAL
Status: DISPENSED | OUTPATIENT
Start: 2021-01-23 | End: 2021-01-24

## 2021-01-23 RX ORDER — CLOPIDOGREL BISULFATE 75 MG/1
600 TABLET ORAL
Status: COMPLETED | OUTPATIENT
Start: 2021-01-23 | End: 2021-01-23

## 2021-01-23 RX ORDER — METOPROLOL TARTRATE 1 MG/ML
2.5 INJECTION, SOLUTION INTRAVENOUS
Status: COMPLETED | OUTPATIENT
Start: 2021-01-23 | End: 2021-01-23

## 2021-01-23 RX ORDER — HEPARIN SODIUM,PORCINE/D5W 25000/250
17 INTRAVENOUS SOLUTION INTRAVENOUS CONTINUOUS
Status: DISCONTINUED | OUTPATIENT
Start: 2021-01-23 | End: 2021-01-26 | Stop reason: HOSPADM

## 2021-01-23 RX ORDER — INSULIN LISPRO 100 [IU]/ML
125 INJECTION, SUSPENSION SUBCUTANEOUS 3 TIMES DAILY
Status: ON HOLD | COMMUNITY
Start: 2021-01-19 | End: 2021-01-25 | Stop reason: HOSPADM

## 2021-01-23 RX ORDER — NITROGLYCERIN 20 MG/100ML
5 INJECTION INTRAVENOUS CONTINUOUS
Status: DISCONTINUED | OUTPATIENT
Start: 2021-01-24 | End: 2021-01-23

## 2021-01-23 RX ORDER — PROMETHAZINE HYDROCHLORIDE 6.25 MG/5ML
25 SYRUP ORAL EVERY 8 HOURS PRN
Status: ON HOLD | COMMUNITY
End: 2021-01-25 | Stop reason: HOSPADM

## 2021-01-23 RX ORDER — TIZANIDINE 4 MG/1
4 TABLET ORAL EVERY 8 HOURS PRN
Status: ON HOLD | COMMUNITY
Start: 2021-01-19 | End: 2021-03-04 | Stop reason: HOSPADM

## 2021-01-23 RX ORDER — MORPHINE SULFATE 4 MG/ML
4 INJECTION, SOLUTION INTRAMUSCULAR; INTRAVENOUS
Status: COMPLETED | OUTPATIENT
Start: 2021-01-23 | End: 2021-01-23

## 2021-01-23 RX ORDER — BUDESONIDE AND FORMOTEROL FUMARATE DIHYDRATE 160; 4.5 UG/1; UG/1
2 AEROSOL RESPIRATORY (INHALATION) EVERY 12 HOURS
COMMUNITY
End: 2021-03-10 | Stop reason: SDUPTHER

## 2021-01-23 RX ADMIN — METOROPROLOL TARTRATE 2.5 MG: 5 INJECTION, SOLUTION INTRAVENOUS at 10:01

## 2021-01-23 RX ADMIN — ASPIRIN 325 MG: 325 TABLET, COATED ORAL at 10:01

## 2021-01-23 RX ADMIN — HEPARIN SODIUM 17 UNITS/KG/HR: 10000 INJECTION, SOLUTION INTRAVENOUS at 11:01

## 2021-01-23 RX ADMIN — CLOPIDOGREL 600 MG: 75 TABLET, FILM COATED ORAL at 10:01

## 2021-01-23 RX ADMIN — MORPHINE SULFATE 4 MG: 4 INJECTION INTRAVENOUS at 10:01

## 2021-01-23 RX ADMIN — NITROGLYCERIN 0.4 MG: 0.4 TABLET SUBLINGUAL at 10:01

## 2021-01-24 PROBLEM — I50.30 (HFPEF) HEART FAILURE WITH PRESERVED EJECTION FRACTION: Status: ACTIVE | Noted: 2021-01-24

## 2021-01-24 PROBLEM — I21.4 NSTEMI (NON-ST ELEVATED MYOCARDIAL INFARCTION): Status: ACTIVE | Noted: 2021-01-24

## 2021-01-24 LAB
ALBUMIN SERPL BCP-MCNC: 3.2 G/DL (ref 3.5–5.2)
ALP SERPL-CCNC: 79 U/L (ref 55–135)
ALT SERPL W/O P-5'-P-CCNC: 25 U/L (ref 10–44)
ANION GAP SERPL CALC-SCNC: 12 MMOL/L (ref 8–16)
APTT BLDCRRT: 24.9 SEC (ref 21–32)
APTT BLDCRRT: 25.1 SEC (ref 21–32)
AST SERPL-CCNC: 60 U/L (ref 10–40)
BASOPHILS # BLD AUTO: 0.05 K/UL (ref 0–0.2)
BASOPHILS NFR BLD: 0.4 % (ref 0–1.9)
BILIRUB SERPL-MCNC: 0.3 MG/DL (ref 0.1–1)
BNP SERPL-MCNC: 502 PG/ML (ref 0–99)
BUN SERPL-MCNC: 20 MG/DL (ref 6–20)
CALCIUM SERPL-MCNC: 8.7 MG/DL (ref 8.7–10.5)
CHLORIDE SERPL-SCNC: 113 MMOL/L (ref 95–110)
CHOLEST SERPL-MCNC: 161 MG/DL (ref 120–199)
CHOLEST/HDLC SERPL: 4.4 {RATIO} (ref 2–5)
CO2 SERPL-SCNC: 19 MMOL/L (ref 23–29)
CREAT SERPL-MCNC: 1 MG/DL (ref 0.5–1.4)
DIFFERENTIAL METHOD: ABNORMAL
EOSINOPHIL # BLD AUTO: 0.1 K/UL (ref 0–0.5)
EOSINOPHIL NFR BLD: 0.4 % (ref 0–8)
ERYTHROCYTE [DISTWIDTH] IN BLOOD BY AUTOMATED COUNT: 15.9 % (ref 11.5–14.5)
EST. GFR  (AFRICAN AMERICAN): >60 ML/MIN/1.73 M^2
EST. GFR  (NON AFRICAN AMERICAN): >60 ML/MIN/1.73 M^2
ESTIMATED AVG GLUCOSE: 189 MG/DL (ref 68–131)
GLUCOSE SERPL-MCNC: 152 MG/DL (ref 70–110)
HBA1C MFR BLD HPLC: 8.2 % (ref 4–5.6)
HCT VFR BLD AUTO: 37.7 % (ref 37–48.5)
HDLC SERPL-MCNC: 37 MG/DL (ref 40–75)
HDLC SERPL: 23 % (ref 20–50)
HGB BLD-MCNC: 11.9 G/DL (ref 12–16)
IMM GRANULOCYTES # BLD AUTO: 0.26 K/UL (ref 0–0.04)
IMM GRANULOCYTES NFR BLD AUTO: 1.9 % (ref 0–0.5)
LDLC SERPL CALC-MCNC: 87.6 MG/DL (ref 63–159)
LYMPHOCYTES # BLD AUTO: 3.7 K/UL (ref 1–4.8)
LYMPHOCYTES NFR BLD: 26.6 % (ref 18–48)
MAGNESIUM SERPL-MCNC: 1.6 MG/DL (ref 1.6–2.6)
MCH RBC QN AUTO: 29 PG (ref 27–31)
MCHC RBC AUTO-ENTMCNC: 31.6 G/DL (ref 32–36)
MCV RBC AUTO: 92 FL (ref 82–98)
MONOCYTES # BLD AUTO: 0.9 K/UL (ref 0.3–1)
MONOCYTES NFR BLD: 6.6 % (ref 4–15)
NEUTROPHILS # BLD AUTO: 9 K/UL (ref 1.8–7.7)
NEUTROPHILS NFR BLD: 64.1 % (ref 38–73)
NONHDLC SERPL-MCNC: 124 MG/DL
NRBC BLD-RTO: 0 /100 WBC
PHOSPHATE SERPL-MCNC: 4.5 MG/DL (ref 2.7–4.5)
PLATELET # BLD AUTO: 172 K/UL (ref 150–350)
PMV BLD AUTO: 11.1 FL (ref 9.2–12.9)
POC ACTIVATED CLOTTING TIME K: 147 SEC (ref 74–137)
POC ACTIVATED CLOTTING TIME K: 202 SEC (ref 74–137)
POC ACTIVATED CLOTTING TIME K: 263 SEC (ref 74–137)
POCT GLUCOSE: 141 MG/DL (ref 70–110)
POCT GLUCOSE: 80 MG/DL (ref 70–110)
POTASSIUM SERPL-SCNC: 3.7 MMOL/L (ref 3.5–5.1)
PROT SERPL-MCNC: 6.7 G/DL (ref 6–8.4)
RBC # BLD AUTO: 4.11 M/UL (ref 4–5.4)
SAMPLE: ABNORMAL
SODIUM SERPL-SCNC: 144 MMOL/L (ref 136–145)
TRIGL SERPL-MCNC: 182 MG/DL (ref 30–150)
TROPONIN I SERPL DL<=0.01 NG/ML-MCNC: 15.27 NG/ML (ref 0–0.03)
TROPONIN I SERPL DL<=0.01 NG/ML-MCNC: 9.13 NG/ML (ref 0–0.03)
TSH SERPL DL<=0.005 MIU/L-ACNC: 1.41 UIU/ML (ref 0.4–4)
WBC # BLD AUTO: 14.03 K/UL (ref 3.9–12.7)

## 2021-01-24 PROCEDURE — 63600175 PHARM REV CODE 636 W HCPCS: Mod: ER | Performed by: FAMILY MEDICINE

## 2021-01-24 PROCEDURE — 25000003 PHARM REV CODE 250: Mod: ER | Performed by: STUDENT IN AN ORGANIZED HEALTH CARE EDUCATION/TRAINING PROGRAM

## 2021-01-24 PROCEDURE — 93010 EKG 12-LEAD: ICD-10-PCS | Mod: ,,, | Performed by: INTERNAL MEDICINE

## 2021-01-24 PROCEDURE — C1769 GUIDE WIRE: HCPCS | Performed by: INTERNAL MEDICINE

## 2021-01-24 PROCEDURE — 84100 ASSAY OF PHOSPHORUS: CPT

## 2021-01-24 PROCEDURE — 93010 ELECTROCARDIOGRAM REPORT: CPT | Mod: 76,,, | Performed by: INTERNAL MEDICINE

## 2021-01-24 PROCEDURE — 83036 HEMOGLOBIN GLYCOSYLATED A1C: CPT

## 2021-01-24 PROCEDURE — 80053 COMPREHEN METABOLIC PANEL: CPT

## 2021-01-24 PROCEDURE — 94640 AIRWAY INHALATION TREATMENT: CPT

## 2021-01-24 PROCEDURE — C1753 CATH, INTRAVAS ULTRASOUND: HCPCS | Performed by: INTERNAL MEDICINE

## 2021-01-24 PROCEDURE — 25000003 PHARM REV CODE 250: Performed by: INTERNAL MEDICINE

## 2021-01-24 PROCEDURE — 80061 LIPID PANEL: CPT

## 2021-01-24 PROCEDURE — 25500020 PHARM REV CODE 255: Performed by: INTERNAL MEDICINE

## 2021-01-24 PROCEDURE — 83880 ASSAY OF NATRIURETIC PEPTIDE: CPT

## 2021-01-24 PROCEDURE — 99153 MOD SED SAME PHYS/QHP EA: CPT | Performed by: INTERNAL MEDICINE

## 2021-01-24 PROCEDURE — 63600175 PHARM REV CODE 636 W HCPCS: Performed by: INTERNAL MEDICINE

## 2021-01-24 PROCEDURE — 94761 N-INVAS EAR/PLS OXIMETRY MLT: CPT

## 2021-01-24 PROCEDURE — 99291 PR CRITICAL CARE, E/M 30-74 MINUTES: ICD-10-PCS | Mod: ,,, | Performed by: INTERNAL MEDICINE

## 2021-01-24 PROCEDURE — 85730 THROMBOPLASTIN TIME PARTIAL: CPT | Mod: 91

## 2021-01-24 PROCEDURE — 25000003 PHARM REV CODE 250: Performed by: FAMILY MEDICINE

## 2021-01-24 PROCEDURE — 25000003 PHARM REV CODE 250: Performed by: STUDENT IN AN ORGANIZED HEALTH CARE EDUCATION/TRAINING PROGRAM

## 2021-01-24 PROCEDURE — 36415 COLL VENOUS BLD VENIPUNCTURE: CPT

## 2021-01-24 PROCEDURE — 27000221 HC OXYGEN, UP TO 24 HOURS

## 2021-01-24 PROCEDURE — 85730 THROMBOPLASTIN TIME PARTIAL: CPT

## 2021-01-24 PROCEDURE — 27201423 OPTIME MED/SURG SUP & DEVICES STERILE SUPPLY: Performed by: INTERNAL MEDICINE

## 2021-01-24 PROCEDURE — 93005 ELECTROCARDIOGRAM TRACING: CPT

## 2021-01-24 PROCEDURE — 85347 COAGULATION TIME ACTIVATED: CPT | Performed by: INTERNAL MEDICINE

## 2021-01-24 PROCEDURE — 83735 ASSAY OF MAGNESIUM: CPT

## 2021-01-24 PROCEDURE — 63600175 PHARM REV CODE 636 W HCPCS: Performed by: STUDENT IN AN ORGANIZED HEALTH CARE EDUCATION/TRAINING PROGRAM

## 2021-01-24 PROCEDURE — 20000000 HC ICU ROOM

## 2021-01-24 PROCEDURE — 25000242 PHARM REV CODE 250 ALT 637 W/ HCPCS: Performed by: STUDENT IN AN ORGANIZED HEALTH CARE EDUCATION/TRAINING PROGRAM

## 2021-01-24 PROCEDURE — 84443 ASSAY THYROID STIM HORMONE: CPT

## 2021-01-24 PROCEDURE — 84484 ASSAY OF TROPONIN QUANT: CPT

## 2021-01-24 PROCEDURE — C1894 INTRO/SHEATH, NON-LASER: HCPCS | Performed by: INTERNAL MEDICINE

## 2021-01-24 PROCEDURE — C1887 CATHETER, GUIDING: HCPCS | Performed by: INTERNAL MEDICINE

## 2021-01-24 PROCEDURE — C9399 UNCLASSIFIED DRUGS OR BIOLOG: HCPCS | Performed by: STUDENT IN AN ORGANIZED HEALTH CARE EDUCATION/TRAINING PROGRAM

## 2021-01-24 PROCEDURE — 85025 COMPLETE CBC W/AUTO DIFF WBC: CPT

## 2021-01-24 PROCEDURE — S4991 NICOTINE PATCH NONLEGEND: HCPCS | Performed by: STUDENT IN AN ORGANIZED HEALTH CARE EDUCATION/TRAINING PROGRAM

## 2021-01-24 PROCEDURE — 25000003 PHARM REV CODE 250: Mod: ER | Performed by: FAMILY MEDICINE

## 2021-01-24 PROCEDURE — C1874 STENT, COATED/COV W/DEL SYS: HCPCS | Performed by: INTERNAL MEDICINE

## 2021-01-24 PROCEDURE — C1725 CATH, TRANSLUMIN NON-LASER: HCPCS | Performed by: INTERNAL MEDICINE

## 2021-01-24 PROCEDURE — 99291 CRITICAL CARE FIRST HOUR: CPT | Mod: ,,, | Performed by: INTERNAL MEDICINE

## 2021-01-24 PROCEDURE — 99152 MOD SED SAME PHYS/QHP 5/>YRS: CPT | Performed by: INTERNAL MEDICINE

## 2021-01-24 DEVICE — XIENCE SIERRA™ EVEROLIMUS ELUTING CORONARY STENT SYSTEM 3.25 MM X 28 MM / RAPID-EXCHANGE
Type: IMPLANTABLE DEVICE | Site: CORONARY | Status: FUNCTIONAL
Brand: XIENCE SIERRA™

## 2021-01-24 RX ORDER — IBUPROFEN 200 MG
1 TABLET ORAL DAILY
Status: DISCONTINUED | OUTPATIENT
Start: 2021-01-24 | End: 2021-01-26 | Stop reason: HOSPADM

## 2021-01-24 RX ORDER — HYDROMORPHONE HYDROCHLORIDE 1 MG/ML
1 INJECTION, SOLUTION INTRAMUSCULAR; INTRAVENOUS; SUBCUTANEOUS ONCE
Status: COMPLETED | OUTPATIENT
Start: 2021-01-24 | End: 2021-01-24

## 2021-01-24 RX ORDER — VERAPAMIL HYDROCHLORIDE 2.5 MG/ML
INJECTION, SOLUTION INTRAVENOUS
Status: DISCONTINUED | OUTPATIENT
Start: 2021-01-24 | End: 2021-01-24 | Stop reason: HOSPADM

## 2021-01-24 RX ORDER — INSULIN ASPART 100 [IU]/ML
1-10 INJECTION, SOLUTION INTRAVENOUS; SUBCUTANEOUS EVERY 6 HOURS PRN
Status: DISCONTINUED | OUTPATIENT
Start: 2021-01-24 | End: 2021-01-26 | Stop reason: HOSPADM

## 2021-01-24 RX ORDER — IODIXANOL 320 MG/ML
INJECTION, SOLUTION INTRAVASCULAR
Status: DISCONTINUED | OUTPATIENT
Start: 2021-01-24 | End: 2021-01-24 | Stop reason: HOSPADM

## 2021-01-24 RX ORDER — NITROGLYCERIN 20 MG/100ML
5 INJECTION INTRAVENOUS CONTINUOUS
Status: DISCONTINUED | OUTPATIENT
Start: 2021-01-24 | End: 2021-01-26 | Stop reason: HOSPADM

## 2021-01-24 RX ORDER — SODIUM CHLORIDE 0.9 % (FLUSH) 0.9 %
10 SYRINGE (ML) INJECTION
Status: DISCONTINUED | OUTPATIENT
Start: 2021-01-24 | End: 2021-01-26 | Stop reason: HOSPADM

## 2021-01-24 RX ORDER — GLUCAGON 1 MG
1 KIT INJECTION
Status: DISCONTINUED | OUTPATIENT
Start: 2021-01-24 | End: 2021-01-26 | Stop reason: HOSPADM

## 2021-01-24 RX ORDER — HEPARIN SODIUM 1000 [USP'U]/ML
INJECTION, SOLUTION INTRAVENOUS; SUBCUTANEOUS
Status: DISCONTINUED | OUTPATIENT
Start: 2021-01-24 | End: 2021-01-24 | Stop reason: HOSPADM

## 2021-01-24 RX ORDER — ACETAMINOPHEN 325 MG/1
650 TABLET ORAL EVERY 4 HOURS PRN
Status: DISCONTINUED | OUTPATIENT
Start: 2021-01-24 | End: 2021-01-26 | Stop reason: HOSPADM

## 2021-01-24 RX ORDER — FLUTICASONE FUROATE AND VILANTEROL 100; 25 UG/1; UG/1
1 POWDER RESPIRATORY (INHALATION) DAILY
Status: DISCONTINUED | OUTPATIENT
Start: 2021-01-24 | End: 2021-01-26 | Stop reason: HOSPADM

## 2021-01-24 RX ORDER — FENTANYL CITRATE 50 UG/ML
INJECTION, SOLUTION INTRAMUSCULAR; INTRAVENOUS
Status: DISCONTINUED | OUTPATIENT
Start: 2021-01-24 | End: 2021-01-24 | Stop reason: HOSPADM

## 2021-01-24 RX ORDER — BUPRENORPHINE HYDROCHLORIDE AND NALOXONE HYDROCHLORIDE DIHYDRATE 8; 2 MG/1; MG/1
8 TABLET SUBLINGUAL DAILY
Status: DISCONTINUED | OUTPATIENT
Start: 2021-01-24 | End: 2021-01-25

## 2021-01-24 RX ORDER — ATORVASTATIN CALCIUM 40 MG/1
80 TABLET, FILM COATED ORAL DAILY
Status: DISCONTINUED | OUTPATIENT
Start: 2021-01-24 | End: 2021-01-26 | Stop reason: HOSPADM

## 2021-01-24 RX ORDER — CLOPIDOGREL BISULFATE 75 MG/1
75 TABLET ORAL DAILY
Status: DISCONTINUED | OUTPATIENT
Start: 2021-01-24 | End: 2021-01-26 | Stop reason: HOSPADM

## 2021-01-24 RX ORDER — TALC
6 POWDER (GRAM) TOPICAL NIGHTLY PRN
Status: DISCONTINUED | OUTPATIENT
Start: 2021-01-24 | End: 2021-01-26 | Stop reason: HOSPADM

## 2021-01-24 RX ORDER — DIPHENHYDRAMINE HYDROCHLORIDE 50 MG/ML
INJECTION INTRAMUSCULAR; INTRAVENOUS
Status: DISCONTINUED | OUTPATIENT
Start: 2021-01-24 | End: 2021-01-24 | Stop reason: HOSPADM

## 2021-01-24 RX ORDER — LISINOPRIL 10 MG/1
10 TABLET ORAL DAILY
Status: DISCONTINUED | OUTPATIENT
Start: 2021-01-24 | End: 2021-01-26 | Stop reason: HOSPADM

## 2021-01-24 RX ORDER — MAG HYDROX/ALUMINUM HYD/SIMETH 200-200-20
30 SUSPENSION, ORAL (FINAL DOSE FORM) ORAL ONCE
Status: COMPLETED | OUTPATIENT
Start: 2021-01-24 | End: 2021-01-24

## 2021-01-24 RX ORDER — MORPHINE SULFATE 4 MG/ML
4 INJECTION, SOLUTION INTRAMUSCULAR; INTRAVENOUS
Status: COMPLETED | OUTPATIENT
Start: 2021-01-24 | End: 2021-01-24

## 2021-01-24 RX ORDER — HYDROMORPHONE HYDROCHLORIDE 1 MG/ML
0.5 INJECTION, SOLUTION INTRAMUSCULAR; INTRAVENOUS; SUBCUTANEOUS ONCE
Status: DISCONTINUED | OUTPATIENT
Start: 2021-01-24 | End: 2021-01-24

## 2021-01-24 RX ORDER — LIDOCAINE HYDROCHLORIDE 10 MG/ML
INJECTION INFILTRATION; PERINEURAL
Status: DISCONTINUED | OUTPATIENT
Start: 2021-01-24 | End: 2021-01-24 | Stop reason: HOSPADM

## 2021-01-24 RX ORDER — MIDAZOLAM HYDROCHLORIDE 1 MG/ML
INJECTION, SOLUTION INTRAMUSCULAR; INTRAVENOUS
Status: DISCONTINUED | OUTPATIENT
Start: 2021-01-24 | End: 2021-01-24 | Stop reason: HOSPADM

## 2021-01-24 RX ORDER — CARVEDILOL 3.12 MG/1
3.12 TABLET ORAL 2 TIMES DAILY WITH MEALS
Status: DISCONTINUED | OUTPATIENT
Start: 2021-01-24 | End: 2021-01-26 | Stop reason: HOSPADM

## 2021-01-24 RX ORDER — TRAZODONE HYDROCHLORIDE 50 MG/1
100 TABLET ORAL NIGHTLY PRN
Status: DISCONTINUED | OUTPATIENT
Start: 2021-01-24 | End: 2021-01-24

## 2021-01-24 RX ORDER — NAPROXEN SODIUM 220 MG/1
81 TABLET, FILM COATED ORAL DAILY
Status: DISCONTINUED | OUTPATIENT
Start: 2021-01-24 | End: 2021-01-26 | Stop reason: HOSPADM

## 2021-01-24 RX ORDER — MUPIROCIN 20 MG/G
OINTMENT TOPICAL 2 TIMES DAILY
Status: DISCONTINUED | OUTPATIENT
Start: 2021-01-24 | End: 2021-01-26 | Stop reason: HOSPADM

## 2021-01-24 RX ORDER — HEPARIN SODIUM 200 [USP'U]/100ML
INJECTION, SOLUTION INTRAVENOUS
Status: DISCONTINUED | OUTPATIENT
Start: 2021-01-24 | End: 2021-01-26 | Stop reason: HOSPADM

## 2021-01-24 RX ORDER — METOPROLOL TARTRATE 1 MG/ML
2.5 INJECTION, SOLUTION INTRAVENOUS
Status: COMPLETED | OUTPATIENT
Start: 2021-01-24 | End: 2021-01-24

## 2021-01-24 RX ORDER — PREGABALIN 75 MG/1
300 CAPSULE ORAL 2 TIMES DAILY
Status: DISCONTINUED | OUTPATIENT
Start: 2021-01-24 | End: 2021-01-26 | Stop reason: HOSPADM

## 2021-01-24 RX ORDER — FUROSEMIDE 10 MG/ML
INJECTION INTRAMUSCULAR; INTRAVENOUS
Status: DISCONTINUED | OUTPATIENT
Start: 2021-01-24 | End: 2021-01-24 | Stop reason: HOSPADM

## 2021-01-24 RX ORDER — NITROGLYCERIN 0.4 MG/1
0.4 TABLET SUBLINGUAL EVERY 5 MIN PRN
Status: DISCONTINUED | OUTPATIENT
Start: 2021-01-24 | End: 2021-01-26 | Stop reason: HOSPADM

## 2021-01-24 RX ORDER — TRAZODONE HYDROCHLORIDE 50 MG/1
50 TABLET ORAL NIGHTLY PRN
Status: DISCONTINUED | OUTPATIENT
Start: 2021-01-24 | End: 2021-01-26 | Stop reason: HOSPADM

## 2021-01-24 RX ORDER — TIZANIDINE 4 MG/1
4 TABLET ORAL EVERY 8 HOURS PRN
Status: DISCONTINUED | OUTPATIENT
Start: 2021-01-24 | End: 2021-01-26 | Stop reason: HOSPADM

## 2021-01-24 RX ADMIN — TIZANIDINE 4 MG: 4 TABLET ORAL at 04:01

## 2021-01-24 RX ADMIN — LISINOPRIL 10 MG: 10 TABLET ORAL at 08:01

## 2021-01-24 RX ADMIN — NITROGLYCERIN 5 MCG/MIN: 20 INJECTION INTRAVENOUS at 05:01

## 2021-01-24 RX ADMIN — ASPIRIN 81 MG 81 MG: 81 TABLET ORAL at 08:01

## 2021-01-24 RX ADMIN — ATORVASTATIN CALCIUM 80 MG: 40 TABLET, FILM COATED ORAL at 08:01

## 2021-01-24 RX ADMIN — FLUTICASONE FUROATE AND VILANTEROL TRIFENATATE 1 PUFF: 100; 25 POWDER RESPIRATORY (INHALATION) at 08:01

## 2021-01-24 RX ADMIN — CARVEDILOL 3.12 MG: 3.12 TABLET, FILM COATED ORAL at 05:01

## 2021-01-24 RX ADMIN — CLOPIDOGREL 75 MG: 75 TABLET, FILM COATED ORAL at 08:01

## 2021-01-24 RX ADMIN — NITROGLYCERIN 15 MCG/MIN: 20 INJECTION INTRAVENOUS at 03:01

## 2021-01-24 RX ADMIN — MUPIROCIN: 20 OINTMENT TOPICAL at 08:01

## 2021-01-24 RX ADMIN — Medication 6 MG: at 03:01

## 2021-01-24 RX ADMIN — INSULIN DETEMIR 10 UNITS: 100 INJECTION, SOLUTION SUBCUTANEOUS at 08:01

## 2021-01-24 RX ADMIN — NITROGLYCERIN 10 MCG/MIN: 20 INJECTION INTRAVENOUS at 12:01

## 2021-01-24 RX ADMIN — NITROGLYCERIN 5 MCG/MIN: 20 INJECTION INTRAVENOUS at 12:01

## 2021-01-24 RX ADMIN — HYDROMORPHONE HYDROCHLORIDE 1 MG: 1 INJECTION, SOLUTION INTRAMUSCULAR; INTRAVENOUS; SUBCUTANEOUS at 04:01

## 2021-01-24 RX ADMIN — MORPHINE SULFATE 4 MG: 4 INJECTION INTRAVENOUS at 01:01

## 2021-01-24 RX ADMIN — PREGABALIN 300 MG: 75 CAPSULE ORAL at 08:01

## 2021-01-24 RX ADMIN — HEPARIN SODIUM 20 UNITS/KG/HR: 10000 INJECTION, SOLUTION INTRAVENOUS at 06:01

## 2021-01-24 RX ADMIN — Medication 1 PATCH: at 03:01

## 2021-01-24 RX ADMIN — ALUMINUM HYDROXIDE, MAGNESIUM HYDROXIDE, AND SIMETHICONE 30 ML: 200; 200; 20 SUSPENSION ORAL at 09:01

## 2021-01-24 RX ADMIN — CARVEDILOL 3.12 MG: 3.12 TABLET, FILM COATED ORAL at 08:01

## 2021-01-24 RX ADMIN — Medication 6 MG: at 08:01

## 2021-01-24 RX ADMIN — METOROPROLOL TARTRATE 2.5 MG: 5 INJECTION, SOLUTION INTRAVENOUS at 12:01

## 2021-01-24 RX ADMIN — INSULIN DETEMIR 10 UNITS: 100 INJECTION, SOLUTION SUBCUTANEOUS at 03:01

## 2021-01-25 ENCOUNTER — CLINICAL SUPPORT (OUTPATIENT)
Dept: SMOKING CESSATION | Facility: CLINIC | Age: 41
End: 2021-01-25
Payer: COMMERCIAL

## 2021-01-25 DIAGNOSIS — F17.210 CIGARETTE SMOKER: Primary | ICD-10-CM

## 2021-01-25 LAB
ALBUMIN SERPL BCP-MCNC: 2.9 G/DL (ref 3.5–5.2)
ALP SERPL-CCNC: 81 U/L (ref 55–135)
ALT SERPL W/O P-5'-P-CCNC: 27 U/L (ref 10–44)
ANION GAP SERPL CALC-SCNC: 10 MMOL/L (ref 8–16)
AORTIC ROOT ANNULUS: 2.87 CM
APTT BLDCRRT: 27 SEC (ref 21–32)
APTT BLDCRRT: 27.9 SEC (ref 21–32)
ASCENDING AORTA: 2.89 CM
AST SERPL-CCNC: 50 U/L (ref 10–40)
AV INDEX (PROSTH): 0.58
AV MEAN GRADIENT: 6 MMHG
AV PEAK GRADIENT: 8 MMHG
AV VALVE AREA: 1.97 CM2
AV VELOCITY RATIO: 0.64
BASOPHILS # BLD AUTO: 0.04 K/UL (ref 0–0.2)
BASOPHILS NFR BLD: 0.3 % (ref 0–1.9)
BILIRUB SERPL-MCNC: 0.8 MG/DL (ref 0.1–1)
BSA FOR ECHO PROCEDURE: 2.68 M2
BUN SERPL-MCNC: 18 MG/DL (ref 6–20)
CALCIUM SERPL-MCNC: 8.7 MG/DL (ref 8.7–10.5)
CHLORIDE SERPL-SCNC: 109 MMOL/L (ref 95–110)
CO2 SERPL-SCNC: 24 MMOL/L (ref 23–29)
CREAT SERPL-MCNC: 1 MG/DL (ref 0.5–1.4)
CREAT SERPL-MCNC: 1 MG/DL (ref 0.5–1.4)
CV ECHO LV RWT: 0.37 CM
DIFFERENTIAL METHOD: ABNORMAL
DOP CALC AO PEAK VEL: 1.41 M/S
DOP CALC AO VTI: 26.14 CM
DOP CALC LVOT AREA: 3.4 CM2
DOP CALC LVOT DIAMETER: 2.08 CM
DOP CALC LVOT PEAK VEL: 0.9 M/S
DOP CALC LVOT STROKE VOLUME: 51.62 CM3
DOP CALCLVOT PEAK VEL VTI: 15.2 CM
E WAVE DECELERATION TIME: 184.65 MSEC
E/A RATIO: 1.17
E/E' RATIO: 18.5 M/S
ECHO LV POSTERIOR WALL: 1.09 CM (ref 0.6–1.1)
EOSINOPHIL # BLD AUTO: 0.1 K/UL (ref 0–0.5)
EOSINOPHIL NFR BLD: 0.7 % (ref 0–8)
ERYTHROCYTE [DISTWIDTH] IN BLOOD BY AUTOMATED COUNT: 16 % (ref 11.5–14.5)
EST. GFR  (AFRICAN AMERICAN): >60 ML/MIN/1.73 M^2
EST. GFR  (AFRICAN AMERICAN): >60 ML/MIN/1.73 M^2
EST. GFR  (NON AFRICAN AMERICAN): >60 ML/MIN/1.73 M^2
EST. GFR  (NON AFRICAN AMERICAN): >60 ML/MIN/1.73 M^2
FRACTIONAL SHORTENING: 17 % (ref 28–44)
GLUCOSE SERPL-MCNC: 105 MG/DL (ref 70–110)
GLUCOSE SERPL-MCNC: 112 MG/DL (ref 70–110)
GLUCOSE SERPL-MCNC: 115 MG/DL (ref 70–110)
HCT VFR BLD AUTO: 34.1 % (ref 37–48.5)
HGB BLD-MCNC: 10.9 G/DL (ref 12–16)
IMM GRANULOCYTES # BLD AUTO: 0.18 K/UL (ref 0–0.04)
IMM GRANULOCYTES NFR BLD AUTO: 1.4 % (ref 0–0.5)
INTERVENTRICULAR SEPTUM: 0.97 CM (ref 0.6–1.1)
IVRT: 108.47 MSEC
LA MAJOR: 5.39 CM
LA MINOR: 4.69 CM
LA WIDTH: 4.3 CM
LEFT ATRIUM SIZE: 4.83 CM
LEFT ATRIUM VOLUME INDEX MOD: 36.2 ML/M2
LEFT ATRIUM VOLUME INDEX: 36 ML/M2
LEFT ATRIUM VOLUME MOD: 89.16 CM3
LEFT ATRIUM VOLUME: 88.55 CM3
LEFT INTERNAL DIMENSION IN SYSTOLE: 4.94 CM (ref 2.1–4)
LEFT VENTRICLE DIASTOLIC VOLUME INDEX: 71.32 ML/M2
LEFT VENTRICLE DIASTOLIC VOLUME: 175.44 ML
LEFT VENTRICLE MASS INDEX: 102 G/M2
LEFT VENTRICLE SYSTOLIC VOLUME INDEX: 46.8 ML/M2
LEFT VENTRICLE SYSTOLIC VOLUME: 115.06 ML
LEFT VENTRICULAR INTERNAL DIMENSION IN DIASTOLE: 5.93 CM (ref 3.5–6)
LEFT VENTRICULAR MASS: 251.49 G
LV LATERAL E/E' RATIO: 18.5 M/S
LV SEPTAL E/E' RATIO: 18.5 M/S
LYMPHOCYTES # BLD AUTO: 4.8 K/UL (ref 1–4.8)
LYMPHOCYTES NFR BLD: 38.3 % (ref 18–48)
MAGNESIUM SERPL-MCNC: 1.7 MG/DL (ref 1.6–2.6)
MCH RBC QN AUTO: 29.1 PG (ref 27–31)
MCHC RBC AUTO-ENTMCNC: 32 G/DL (ref 32–36)
MCV RBC AUTO: 91 FL (ref 82–98)
MONOCYTES # BLD AUTO: 1 K/UL (ref 0.3–1)
MONOCYTES NFR BLD: 7.5 % (ref 4–15)
MV A" WAVE DURATION": 11.7 MSEC
MV PEAK A VEL: 0.95 M/S
MV PEAK E VEL: 1.11 M/S
NEUTROPHILS # BLD AUTO: 6.6 K/UL (ref 1.8–7.7)
NEUTROPHILS NFR BLD: 51.8 % (ref 38–73)
NRBC BLD-RTO: 0 /100 WBC
PHOSPHATE SERPL-MCNC: 3.9 MG/DL (ref 2.7–4.5)
PLATELET # BLD AUTO: 177 K/UL (ref 150–350)
PMV BLD AUTO: 11.1 FL (ref 9.2–12.9)
POCT GLUCOSE: 112 MG/DL (ref 70–110)
POCT GLUCOSE: 213 MG/DL (ref 70–110)
POCT GLUCOSE: 248 MG/DL (ref 70–110)
POCT GLUCOSE: 96 MG/DL (ref 70–110)
POTASSIUM SERPL-SCNC: 3.9 MMOL/L (ref 3.5–5.1)
PROT SERPL-MCNC: 6.1 G/DL (ref 6–8.4)
PULM VEIN S/D RATIO: 1.27
PV PEAK D VEL: 0.22 M/S
PV PEAK S VEL: 0.28 M/S
RA MAJOR: 5.05 CM
RA PRESSURE: 8 MMHG
RA WIDTH: 3.28 CM
RBC # BLD AUTO: 3.74 M/UL (ref 4–5.4)
RIGHT VENTRICULAR END-DIASTOLIC DIMENSION: 2.67 CM
RV TISSUE DOPPLER FREE WALL SYSTOLIC VELOCITY 1 (APICAL 4 CHAMBER VIEW): 7.42 CM/S
SODIUM SERPL-SCNC: 143 MMOL/L (ref 136–145)
STJ: 2.56 CM
TDI LATERAL: 0.06 M/S
TDI SEPTAL: 0.06 M/S
TDI: 0.06 M/S
TRICUSPID ANNULAR PLANE SYSTOLIC EXCURSION: 2.22 CM
TROPONIN I SERPL DL<=0.01 NG/ML-MCNC: 14.06 NG/ML (ref 0–0.03)
TROPONIN I SERPL DL<=0.01 NG/ML-MCNC: 17.51 NG/ML (ref 0–0.03)
WBC # BLD AUTO: 12.65 K/UL (ref 3.9–12.7)

## 2021-01-25 PROCEDURE — 63600175 PHARM REV CODE 636 W HCPCS: Performed by: INTERNAL MEDICINE

## 2021-01-25 PROCEDURE — 94640 AIRWAY INHALATION TREATMENT: CPT

## 2021-01-25 PROCEDURE — 94761 N-INVAS EAR/PLS OXIMETRY MLT: CPT

## 2021-01-25 PROCEDURE — 93005 ELECTROCARDIOGRAM TRACING: CPT

## 2021-01-25 PROCEDURE — 85730 THROMBOPLASTIN TIME PARTIAL: CPT

## 2021-01-25 PROCEDURE — 85025 COMPLETE CBC W/AUTO DIFF WBC: CPT

## 2021-01-25 PROCEDURE — 99291 PR CRITICAL CARE, E/M 30-74 MINUTES: ICD-10-PCS | Mod: ,,, | Performed by: INTERNAL MEDICINE

## 2021-01-25 PROCEDURE — 85730 THROMBOPLASTIN TIME PARTIAL: CPT | Mod: 91

## 2021-01-25 PROCEDURE — 93010 EKG 12-LEAD: ICD-10-PCS | Mod: ,,, | Performed by: INTERNAL MEDICINE

## 2021-01-25 PROCEDURE — 20000000 HC ICU ROOM

## 2021-01-25 PROCEDURE — 99291 CRITICAL CARE FIRST HOUR: CPT | Mod: ,,, | Performed by: INTERNAL MEDICINE

## 2021-01-25 PROCEDURE — 25000003 PHARM REV CODE 250: Performed by: STUDENT IN AN ORGANIZED HEALTH CARE EDUCATION/TRAINING PROGRAM

## 2021-01-25 PROCEDURE — 25000242 PHARM REV CODE 250 ALT 637 W/ HCPCS: Performed by: STUDENT IN AN ORGANIZED HEALTH CARE EDUCATION/TRAINING PROGRAM

## 2021-01-25 PROCEDURE — 82565 ASSAY OF CREATININE: CPT

## 2021-01-25 PROCEDURE — 25000003 PHARM REV CODE 250: Performed by: FAMILY MEDICINE

## 2021-01-25 PROCEDURE — 99407 BEHAV CHNG SMOKING > 10 MIN: CPT | Mod: S$GLB,,,

## 2021-01-25 PROCEDURE — S4991 NICOTINE PATCH NONLEGEND: HCPCS | Performed by: STUDENT IN AN ORGANIZED HEALTH CARE EDUCATION/TRAINING PROGRAM

## 2021-01-25 PROCEDURE — 80053 COMPREHEN METABOLIC PANEL: CPT

## 2021-01-25 PROCEDURE — 63600175 PHARM REV CODE 636 W HCPCS: Performed by: STUDENT IN AN ORGANIZED HEALTH CARE EDUCATION/TRAINING PROGRAM

## 2021-01-25 PROCEDURE — 84100 ASSAY OF PHOSPHORUS: CPT

## 2021-01-25 PROCEDURE — 36415 COLL VENOUS BLD VENIPUNCTURE: CPT

## 2021-01-25 PROCEDURE — 84484 ASSAY OF TROPONIN QUANT: CPT

## 2021-01-25 PROCEDURE — 99407 PR TOBACCO USE CESSATION INTENSIVE >10 MINUTES: ICD-10-PCS | Mod: S$GLB,,,

## 2021-01-25 PROCEDURE — 99999 PR PBB SHADOW E&M-EST. PATIENT-LVL I: ICD-10-PCS | Mod: PBBFAC,,,

## 2021-01-25 PROCEDURE — 99999 PR PBB SHADOW E&M-EST. PATIENT-LVL I: CPT | Mod: PBBFAC,,,

## 2021-01-25 PROCEDURE — 93010 ELECTROCARDIOGRAM REPORT: CPT | Mod: ,,, | Performed by: INTERNAL MEDICINE

## 2021-01-25 PROCEDURE — 83735 ASSAY OF MAGNESIUM: CPT

## 2021-01-25 RX ORDER — BUPRENORPHINE AND NALOXONE 2; .5 MG/1; MG/1
2 FILM, SOLUBLE BUCCAL; SUBLINGUAL 3 TIMES DAILY
Status: DISCONTINUED | OUTPATIENT
Start: 2021-01-25 | End: 2021-01-26 | Stop reason: HOSPADM

## 2021-01-25 RX ORDER — ERGOCALCIFEROL 1.25 MG/1
50000 CAPSULE ORAL
Status: DISCONTINUED | OUTPATIENT
Start: 2021-01-25 | End: 2021-01-26 | Stop reason: HOSPADM

## 2021-01-25 RX ORDER — IPRATROPIUM BROMIDE AND ALBUTEROL SULFATE 2.5; .5 MG/3ML; MG/3ML
3 SOLUTION RESPIRATORY (INHALATION) EVERY 4 HOURS PRN
Status: DISCONTINUED | OUTPATIENT
Start: 2021-01-25 | End: 2021-01-26 | Stop reason: HOSPADM

## 2021-01-25 RX ORDER — DICYCLOMINE HYDROCHLORIDE 10 MG/1
10 CAPSULE ORAL ONCE
Status: COMPLETED | OUTPATIENT
Start: 2021-01-25 | End: 2021-01-25

## 2021-01-25 RX ORDER — NAPROXEN SODIUM 220 MG/1
81 TABLET, FILM COATED ORAL DAILY
Qty: 90 TABLET | Refills: 3 | Status: SHIPPED | OUTPATIENT
Start: 2021-01-25 | End: 2022-03-28 | Stop reason: SDUPTHER

## 2021-01-25 RX ORDER — ATORVASTATIN CALCIUM 80 MG/1
80 TABLET, FILM COATED ORAL DAILY
Qty: 90 TABLET | Refills: 3 | Status: SHIPPED | OUTPATIENT
Start: 2021-01-25 | End: 2022-03-28 | Stop reason: SDUPTHER

## 2021-01-25 RX ORDER — BUPRENORPHINE 2 MG/1
4 TABLET SUBLINGUAL 3 TIMES DAILY
Status: DISCONTINUED | OUTPATIENT
Start: 2021-01-25 | End: 2021-01-25

## 2021-01-25 RX ORDER — CLOPIDOGREL BISULFATE 75 MG/1
75 TABLET ORAL DAILY
Qty: 30 TABLET | Refills: 11 | Status: SHIPPED | OUTPATIENT
Start: 2021-01-25 | End: 2022-03-28 | Stop reason: SDUPTHER

## 2021-01-25 RX ORDER — BUPRENORPHINE HYDROCHLORIDE AND NALOXONE HYDROCHLORIDE DIHYDRATE 8; 2 MG/1; MG/1
8 TABLET SUBLINGUAL 2 TIMES DAILY
Status: DISCONTINUED | OUTPATIENT
Start: 2021-01-25 | End: 2021-01-25

## 2021-01-25 RX ADMIN — BUPRENORPHINE HYDROCHLORIDE AND NALOXONE HYDROCHLORIDE DIHYDRATE 8 MG: 8; 2 TABLET SUBLINGUAL at 08:01

## 2021-01-25 RX ADMIN — INSULIN DETEMIR 10 UNITS: 100 INJECTION, SOLUTION SUBCUTANEOUS at 08:01

## 2021-01-25 RX ADMIN — PREGABALIN 300 MG: 75 CAPSULE ORAL at 08:01

## 2021-01-25 RX ADMIN — MUPIROCIN: 20 OINTMENT TOPICAL at 08:01

## 2021-01-25 RX ADMIN — DICYCLOMINE HYDROCHLORIDE 10 MG: 10 CAPSULE ORAL at 09:01

## 2021-01-25 RX ADMIN — CLOPIDOGREL 75 MG: 75 TABLET, FILM COATED ORAL at 08:01

## 2021-01-25 RX ADMIN — ASPIRIN 81 MG 81 MG: 81 TABLET ORAL at 08:01

## 2021-01-25 RX ADMIN — BUPRENORPHINE HYDROCHLORIDE, NALOXONE HYDROCHLORIDE 2 FILM: 2; .5 FILM, SOLUBLE BUCCAL; SUBLINGUAL at 08:01

## 2021-01-25 RX ADMIN — INSULIN ASPART 4 UNITS: 100 INJECTION, SOLUTION INTRAVENOUS; SUBCUTANEOUS at 06:01

## 2021-01-25 RX ADMIN — Medication 1 PATCH: at 05:01

## 2021-01-25 RX ADMIN — LISINOPRIL 10 MG: 10 TABLET ORAL at 08:01

## 2021-01-25 RX ADMIN — ERGOCALCIFEROL 50000 UNITS: 1.25 CAPSULE ORAL at 04:01

## 2021-01-25 RX ADMIN — CARVEDILOL 3.12 MG: 3.12 TABLET, FILM COATED ORAL at 04:01

## 2021-01-25 RX ADMIN — INSULIN ASPART 4 UNITS: 100 INJECTION, SOLUTION INTRAVENOUS; SUBCUTANEOUS at 11:01

## 2021-01-25 RX ADMIN — IPRATROPIUM BROMIDE AND ALBUTEROL SULFATE 3 ML: .5; 3 SOLUTION RESPIRATORY (INHALATION) at 06:01

## 2021-01-25 RX ADMIN — Medication 6 MG: at 11:01

## 2021-01-25 RX ADMIN — TIZANIDINE 4 MG: 4 TABLET ORAL at 12:01

## 2021-01-25 RX ADMIN — ATORVASTATIN CALCIUM 80 MG: 40 TABLET, FILM COATED ORAL at 08:01

## 2021-01-25 RX ADMIN — BUPRENORPHINE HYDROCHLORIDE, NALOXONE HYDROCHLORIDE 2 FILM: 2; .5 FILM, SOLUBLE BUCCAL; SUBLINGUAL at 02:01

## 2021-01-25 RX ADMIN — HEPARIN SODIUM 23 UNITS/KG/HR: 10000 INJECTION, SOLUTION INTRAVENOUS at 06:01

## 2021-01-25 RX ADMIN — FLUTICASONE FUROATE AND VILANTEROL TRIFENATATE 1 PUFF: 100; 25 POWDER RESPIRATORY (INHALATION) at 09:01

## 2021-01-25 RX ADMIN — CARVEDILOL 3.12 MG: 3.12 TABLET, FILM COATED ORAL at 08:01

## 2021-01-25 RX ADMIN — Medication 1 PATCH: at 08:01

## 2021-01-26 VITALS
BODY MASS INDEX: 53.92 KG/M2 | HEIGHT: 62 IN | OXYGEN SATURATION: 94 % | HEART RATE: 93 BPM | WEIGHT: 293 LBS | DIASTOLIC BLOOD PRESSURE: 61 MMHG | TEMPERATURE: 99 F | RESPIRATION RATE: 17 BRPM | SYSTOLIC BLOOD PRESSURE: 125 MMHG

## 2021-01-26 LAB
ALBUMIN SERPL BCP-MCNC: 2.8 G/DL (ref 3.5–5.2)
ALP SERPL-CCNC: 86 U/L (ref 55–135)
ALT SERPL W/O P-5'-P-CCNC: 22 U/L (ref 10–44)
ANION GAP SERPL CALC-SCNC: 12 MMOL/L (ref 8–16)
APTT BLDCRRT: 26.5 SEC (ref 21–32)
APTT BLDCRRT: 29.1 SEC (ref 21–32)
AST SERPL-CCNC: 26 U/L (ref 10–40)
BASOPHILS # BLD AUTO: 0.05 K/UL (ref 0–0.2)
BASOPHILS NFR BLD: 0.5 % (ref 0–1.9)
BILIRUB SERPL-MCNC: 0.4 MG/DL (ref 0.1–1)
BUN SERPL-MCNC: 22 MG/DL (ref 6–20)
CALCIUM SERPL-MCNC: 8.7 MG/DL (ref 8.7–10.5)
CHLORIDE SERPL-SCNC: 104 MMOL/L (ref 95–110)
CO2 SERPL-SCNC: 25 MMOL/L (ref 23–29)
CREAT SERPL-MCNC: 1 MG/DL (ref 0.5–1.4)
DIFFERENTIAL METHOD: ABNORMAL
EOSINOPHIL # BLD AUTO: 0.1 K/UL (ref 0–0.5)
EOSINOPHIL NFR BLD: 1 % (ref 0–8)
ERYTHROCYTE [DISTWIDTH] IN BLOOD BY AUTOMATED COUNT: 15.7 % (ref 11.5–14.5)
EST. GFR  (AFRICAN AMERICAN): >60 ML/MIN/1.73 M^2
EST. GFR  (NON AFRICAN AMERICAN): >60 ML/MIN/1.73 M^2
GLUCOSE SERPL-MCNC: 248 MG/DL (ref 70–110)
HCT VFR BLD AUTO: 33.2 % (ref 37–48.5)
HGB BLD-MCNC: 10.5 G/DL (ref 12–16)
IMM GRANULOCYTES # BLD AUTO: 0.21 K/UL (ref 0–0.04)
IMM GRANULOCYTES NFR BLD AUTO: 1.9 % (ref 0–0.5)
LYMPHOCYTES # BLD AUTO: 4.5 K/UL (ref 1–4.8)
LYMPHOCYTES NFR BLD: 41.8 % (ref 18–48)
MAGNESIUM SERPL-MCNC: 1.5 MG/DL (ref 1.6–2.6)
MCH RBC QN AUTO: 29 PG (ref 27–31)
MCHC RBC AUTO-ENTMCNC: 31.6 G/DL (ref 32–36)
MCV RBC AUTO: 92 FL (ref 82–98)
MONOCYTES # BLD AUTO: 0.7 K/UL (ref 0.3–1)
MONOCYTES NFR BLD: 6.3 % (ref 4–15)
NEUTROPHILS # BLD AUTO: 5.2 K/UL (ref 1.8–7.7)
NEUTROPHILS NFR BLD: 48.5 % (ref 38–73)
NRBC BLD-RTO: 0 /100 WBC
PHOSPHATE SERPL-MCNC: 5.2 MG/DL (ref 2.7–4.5)
PLATELET # BLD AUTO: 162 K/UL (ref 150–350)
PMV BLD AUTO: 11.3 FL (ref 9.2–12.9)
POCT GLUCOSE: 233 MG/DL (ref 70–110)
POCT GLUCOSE: 259 MG/DL (ref 70–110)
POTASSIUM SERPL-SCNC: 3.9 MMOL/L (ref 3.5–5.1)
PROT SERPL-MCNC: 6.1 G/DL (ref 6–8.4)
RBC # BLD AUTO: 3.62 M/UL (ref 4–5.4)
SODIUM SERPL-SCNC: 141 MMOL/L (ref 136–145)
TROPONIN I SERPL DL<=0.01 NG/ML-MCNC: 9.02 NG/ML (ref 0–0.03)
TROPONIN I SERPL DL<=0.01 NG/ML-MCNC: 9.99 NG/ML (ref 0–0.03)
WBC # BLD AUTO: 10.78 K/UL (ref 3.9–12.7)

## 2021-01-26 PROCEDURE — 84484 ASSAY OF TROPONIN QUANT: CPT

## 2021-01-26 PROCEDURE — 99233 SBSQ HOSP IP/OBS HIGH 50: CPT | Mod: ,,, | Performed by: INTERNAL MEDICINE

## 2021-01-26 PROCEDURE — 83735 ASSAY OF MAGNESIUM: CPT

## 2021-01-26 PROCEDURE — 84100 ASSAY OF PHOSPHORUS: CPT

## 2021-01-26 PROCEDURE — 25000003 PHARM REV CODE 250: Performed by: STUDENT IN AN ORGANIZED HEALTH CARE EDUCATION/TRAINING PROGRAM

## 2021-01-26 PROCEDURE — 63600175 PHARM REV CODE 636 W HCPCS: Performed by: INTERNAL MEDICINE

## 2021-01-26 PROCEDURE — 80053 COMPREHEN METABOLIC PANEL: CPT

## 2021-01-26 PROCEDURE — 84484 ASSAY OF TROPONIN QUANT: CPT | Mod: 91

## 2021-01-26 PROCEDURE — 99233 PR SUBSEQUENT HOSPITAL CARE,LEVL III: ICD-10-PCS | Mod: ,,, | Performed by: INTERNAL MEDICINE

## 2021-01-26 PROCEDURE — 85730 THROMBOPLASTIN TIME PARTIAL: CPT | Mod: 91

## 2021-01-26 PROCEDURE — 36415 COLL VENOUS BLD VENIPUNCTURE: CPT

## 2021-01-26 PROCEDURE — 85025 COMPLETE CBC W/AUTO DIFF WBC: CPT

## 2021-01-26 PROCEDURE — 85730 THROMBOPLASTIN TIME PARTIAL: CPT

## 2021-01-26 PROCEDURE — S4991 NICOTINE PATCH NONLEGEND: HCPCS | Performed by: STUDENT IN AN ORGANIZED HEALTH CARE EDUCATION/TRAINING PROGRAM

## 2021-01-26 RX ORDER — BUPRENORPHINE AND NALOXONE 2; .5 MG/1; MG/1
1 FILM, SOLUBLE BUCCAL; SUBLINGUAL 3 TIMES DAILY
Start: 2021-01-26 | End: 2021-01-26 | Stop reason: HOSPADM

## 2021-01-26 RX ADMIN — ATORVASTATIN CALCIUM 80 MG: 40 TABLET, FILM COATED ORAL at 09:01

## 2021-01-26 RX ADMIN — CARVEDILOL 3.12 MG: 3.12 TABLET, FILM COATED ORAL at 09:01

## 2021-01-26 RX ADMIN — Medication 1 PATCH: at 09:01

## 2021-01-26 RX ADMIN — BUPRENORPHINE HYDROCHLORIDE, NALOXONE HYDROCHLORIDE 2 FILM: 2; .5 FILM, SOLUBLE BUCCAL; SUBLINGUAL at 09:01

## 2021-01-26 RX ADMIN — TIZANIDINE 4 MG: 4 TABLET ORAL at 12:01

## 2021-01-26 RX ADMIN — PREGABALIN 300 MG: 75 CAPSULE ORAL at 09:01

## 2021-01-26 RX ADMIN — ASPIRIN 81 MG 81 MG: 81 TABLET ORAL at 09:01

## 2021-01-26 RX ADMIN — INSULIN ASPART 6 UNITS: 100 INJECTION, SOLUTION INTRAVENOUS; SUBCUTANEOUS at 06:01

## 2021-01-26 RX ADMIN — INSULIN ASPART 2 UNITS: 100 INJECTION, SOLUTION INTRAVENOUS; SUBCUTANEOUS at 12:01

## 2021-01-26 RX ADMIN — CLOPIDOGREL 75 MG: 75 TABLET, FILM COATED ORAL at 09:01

## 2021-01-26 RX ADMIN — LISINOPRIL 10 MG: 10 TABLET ORAL at 09:01

## 2021-01-27 ENCOUNTER — PATIENT OUTREACH (OUTPATIENT)
Dept: ADMINISTRATIVE | Facility: CLINIC | Age: 41
End: 2021-01-27

## 2021-02-04 ENCOUNTER — CLINICAL SUPPORT (OUTPATIENT)
Dept: SMOKING CESSATION | Facility: CLINIC | Age: 41
End: 2021-02-04
Payer: COMMERCIAL

## 2021-02-04 DIAGNOSIS — F17.200 NICOTINE DEPENDENCE: Primary | ICD-10-CM

## 2021-02-04 PROCEDURE — 99999 PR PBB SHADOW E&M-EST. PATIENT-LVL I: ICD-10-PCS | Mod: PBBFAC,,,

## 2021-02-04 PROCEDURE — 99999 PR PBB SHADOW E&M-EST. PATIENT-LVL I: CPT | Mod: PBBFAC,,,

## 2021-02-04 PROCEDURE — 99404 PREV MED CNSL INDIV APPRX 60: CPT | Mod: S$GLB,,,

## 2021-02-04 PROCEDURE — 99404 PR PREVENT COUNSEL,INDIV,60 MIN: ICD-10-PCS | Mod: S$GLB,,,

## 2021-02-04 RX ORDER — IBUPROFEN 200 MG
1 TABLET ORAL DAILY
Qty: 14 PATCH | Refills: 0 | Status: SHIPPED | OUTPATIENT
Start: 2021-02-04 | End: 2021-02-24 | Stop reason: SDUPTHER

## 2021-02-04 RX ORDER — DM/P-EPHED/ACETAMINOPH/DOXYLAM 30-7.5/3
2 LIQUID (ML) ORAL
Qty: 144 LOZENGE | Refills: 0 | Status: SHIPPED | OUTPATIENT
Start: 2021-02-04 | End: 2021-02-24 | Stop reason: SDUPTHER

## 2021-02-04 RX ORDER — BUPRENORPHINE AND NALOXONE 8; 2 MG/1; MG/1
1 FILM, SOLUBLE BUCCAL; SUBLINGUAL 3 TIMES DAILY
Status: ON HOLD | COMMUNITY
End: 2023-01-01

## 2021-02-10 PROBLEM — R07.9 CHEST PAIN: Status: RESOLVED | Noted: 2018-09-11 | Resolved: 2021-02-10

## 2021-02-10 PROBLEM — D72.829 LEUKOCYTOSIS: Status: RESOLVED | Noted: 2018-08-09 | Resolved: 2021-02-10

## 2021-02-10 PROBLEM — A41.9 SEPSIS: Status: RESOLVED | Noted: 2018-09-12 | Resolved: 2021-02-10

## 2021-02-10 PROBLEM — E11.65 HYPERGLYCEMIA DUE TO DIABETES MELLITUS: Status: RESOLVED | Noted: 2020-12-11 | Resolved: 2021-02-10

## 2021-02-10 PROBLEM — N30.90 CYSTITIS: Status: RESOLVED | Noted: 2018-09-11 | Resolved: 2021-02-10

## 2021-02-10 PROBLEM — I50.33 ACUTE ON CHRONIC DIASTOLIC CONGESTIVE HEART FAILURE: Status: RESOLVED | Noted: 2018-07-17 | Resolved: 2021-02-10

## 2021-02-10 PROBLEM — L98.492 ULCER OF ABDOMEN WALL WITH FAT LAYER EXPOSED: Status: RESOLVED | Noted: 2018-08-09 | Resolved: 2021-02-10

## 2021-02-10 PROBLEM — L02.211 ABDOMINAL WALL ABSCESS: Status: RESOLVED | Noted: 2018-09-14 | Resolved: 2021-02-10

## 2021-02-10 PROBLEM — I50.30 (HFPEF) HEART FAILURE WITH PRESERVED EJECTION FRACTION: Status: RESOLVED | Noted: 2021-01-24 | Resolved: 2021-02-10

## 2021-02-10 PROBLEM — E87.20 LACTIC ACIDOSIS: Status: RESOLVED | Noted: 2018-09-11 | Resolved: 2021-02-10

## 2021-02-10 PROBLEM — K61.0 PERIANAL CELLULITIS: Status: RESOLVED | Noted: 2020-12-10 | Resolved: 2021-02-10

## 2021-02-12 ENCOUNTER — OFFICE VISIT (OUTPATIENT)
Dept: CARDIOLOGY | Facility: CLINIC | Age: 41
End: 2021-02-12
Payer: MEDICAID

## 2021-02-12 ENCOUNTER — TELEPHONE (OUTPATIENT)
Dept: CARDIOLOGY | Facility: CLINIC | Age: 41
End: 2021-02-12

## 2021-02-12 VITALS
BODY MASS INDEX: 71.26 KG/M2 | HEART RATE: 89 BPM | DIASTOLIC BLOOD PRESSURE: 56 MMHG | OXYGEN SATURATION: 95 % | WEIGHT: 293 LBS | SYSTOLIC BLOOD PRESSURE: 124 MMHG

## 2021-02-12 DIAGNOSIS — E11.69 HYPERLIPIDEMIA ASSOCIATED WITH TYPE 2 DIABETES MELLITUS: ICD-10-CM

## 2021-02-12 DIAGNOSIS — E11.65 TYPE 2 DIABETES MELLITUS WITH HYPERGLYCEMIA, WITH LONG-TERM CURRENT USE OF INSULIN: ICD-10-CM

## 2021-02-12 DIAGNOSIS — E66.01 MORBID OBESITY: ICD-10-CM

## 2021-02-12 DIAGNOSIS — I15.2 HYPERTENSION ASSOCIATED WITH DIABETES: ICD-10-CM

## 2021-02-12 DIAGNOSIS — Z79.4 TYPE 2 DIABETES MELLITUS WITH HYPERGLYCEMIA, WITH LONG-TERM CURRENT USE OF INSULIN: ICD-10-CM

## 2021-02-12 DIAGNOSIS — E11.59 HYPERTENSION ASSOCIATED WITH DIABETES: ICD-10-CM

## 2021-02-12 DIAGNOSIS — E78.5 HYPERLIPIDEMIA ASSOCIATED WITH TYPE 2 DIABETES MELLITUS: ICD-10-CM

## 2021-02-12 DIAGNOSIS — I25.110 CORONARY ARTERY DISEASE INVOLVING NATIVE CORONARY ARTERY OF NATIVE HEART WITH UNSTABLE ANGINA PECTORIS: Primary | ICD-10-CM

## 2021-02-12 DIAGNOSIS — I21.4 NSTEMI (NON-ST ELEVATED MYOCARDIAL INFARCTION): ICD-10-CM

## 2021-02-12 PROCEDURE — 99214 PR OFFICE/OUTPT VISIT, EST, LEVL IV, 30-39 MIN: ICD-10-PCS | Mod: S$GLB,,, | Performed by: INTERNAL MEDICINE

## 2021-02-12 PROCEDURE — 99214 OFFICE O/P EST MOD 30 MIN: CPT | Mod: S$GLB,,, | Performed by: INTERNAL MEDICINE

## 2021-02-12 RX ORDER — SODIUM CHLORIDE 0.9 % (FLUSH) 0.9 %
10 SYRINGE (ML) INJECTION
Status: DISCONTINUED | OUTPATIENT
Start: 2021-02-12 | End: 2021-03-04 | Stop reason: HOSPADM

## 2021-02-17 ENCOUNTER — HOSPITAL ENCOUNTER (OUTPATIENT)
Facility: HOSPITAL | Age: 41
Discharge: HOME | End: 2021-02-17
Attending: INTERNAL MEDICINE | Admitting: INTERNAL MEDICINE
Payer: MEDICAID

## 2021-02-17 VITALS
RESPIRATION RATE: 20 BRPM | OXYGEN SATURATION: 94 % | HEIGHT: 62 IN | SYSTOLIC BLOOD PRESSURE: 126 MMHG | HEART RATE: 92 BPM | TEMPERATURE: 97 F | BODY MASS INDEX: 53.92 KG/M2 | DIASTOLIC BLOOD PRESSURE: 64 MMHG | WEIGHT: 293 LBS

## 2021-02-17 DIAGNOSIS — Z98.890 STATUS POST CARDIAC CATHETERIZATION: ICD-10-CM

## 2021-02-17 DIAGNOSIS — I25.110 CORONARY ARTERY DISEASE INVOLVING NATIVE CORONARY ARTERY OF NATIVE HEART WITH UNSTABLE ANGINA PECTORIS: ICD-10-CM

## 2021-02-17 LAB
ANION GAP SERPL CALC-SCNC: 12 MMOL/L (ref 8–16)
BASOPHILS # BLD AUTO: 0.08 K/UL (ref 0–0.2)
BASOPHILS NFR BLD: 0.5 % (ref 0–1.9)
BUN SERPL-MCNC: 23 MG/DL (ref 6–20)
CALCIUM SERPL-MCNC: 9.6 MG/DL (ref 8.7–10.5)
CHLORIDE SERPL-SCNC: 105 MMOL/L (ref 95–110)
CO2 SERPL-SCNC: 24 MMOL/L (ref 23–29)
CREAT SERPL-MCNC: 1.1 MG/DL (ref 0.5–1.4)
DIFFERENTIAL METHOD: ABNORMAL
EOSINOPHIL # BLD AUTO: 0.2 K/UL (ref 0–0.5)
EOSINOPHIL NFR BLD: 1.2 % (ref 0–8)
ERYTHROCYTE [DISTWIDTH] IN BLOOD BY AUTOMATED COUNT: 15.5 % (ref 11.5–14.5)
EST. GFR  (AFRICAN AMERICAN): >60 ML/MIN/1.73 M^2
EST. GFR  (NON AFRICAN AMERICAN): >60 ML/MIN/1.73 M^2
GLUCOSE SERPL-MCNC: 125 MG/DL (ref 70–110)
HCG INTACT+B SERPL-ACNC: <1.2 MIU/ML
HCT VFR BLD AUTO: 44.6 % (ref 37–48.5)
HGB BLD-MCNC: 14 G/DL (ref 12–16)
IMM GRANULOCYTES # BLD AUTO: 0.25 K/UL (ref 0–0.04)
IMM GRANULOCYTES NFR BLD AUTO: 1.4 % (ref 0–0.5)
LYMPHOCYTES # BLD AUTO: 4 K/UL (ref 1–4.8)
LYMPHOCYTES NFR BLD: 22.9 % (ref 18–48)
MCH RBC QN AUTO: 28.9 PG (ref 27–31)
MCHC RBC AUTO-ENTMCNC: 31.4 G/DL (ref 32–36)
MCV RBC AUTO: 92 FL (ref 82–98)
MONOCYTES # BLD AUTO: 1 K/UL (ref 0.3–1)
MONOCYTES NFR BLD: 5.6 % (ref 4–15)
NEUTROPHILS # BLD AUTO: 11.9 K/UL (ref 1.8–7.7)
NEUTROPHILS NFR BLD: 68.4 % (ref 38–73)
NRBC BLD-RTO: 0 /100 WBC
PLATELET # BLD AUTO: 181 K/UL (ref 150–350)
PMV BLD AUTO: 11.2 FL (ref 9.2–12.9)
POCT GLUCOSE: 83 MG/DL (ref 70–110)
POTASSIUM SERPL-SCNC: 4.9 MMOL/L (ref 3.5–5.1)
RBC # BLD AUTO: 4.85 M/UL (ref 4–5.4)
SARS-COV-2 RDRP RESP QL NAA+PROBE: NEGATIVE
SODIUM SERPL-SCNC: 141 MMOL/L (ref 136–145)
WBC # BLD AUTO: 17.35 K/UL (ref 3.9–12.7)

## 2021-02-17 PROCEDURE — U0002 COVID-19 LAB TEST NON-CDC: HCPCS

## 2021-02-17 PROCEDURE — 25000003 PHARM REV CODE 250: Performed by: INTERNAL MEDICINE

## 2021-02-17 PROCEDURE — 25500020 PHARM REV CODE 255: Performed by: INTERNAL MEDICINE

## 2021-02-17 PROCEDURE — C1894 INTRO/SHEATH, NON-LASER: HCPCS | Performed by: INTERNAL MEDICINE

## 2021-02-17 PROCEDURE — 80048 BASIC METABOLIC PNL TOTAL CA: CPT

## 2021-02-17 PROCEDURE — 99153 MOD SED SAME PHYS/QHP EA: CPT | Performed by: INTERNAL MEDICINE

## 2021-02-17 PROCEDURE — C1887 CATHETER, GUIDING: HCPCS | Performed by: INTERNAL MEDICINE

## 2021-02-17 PROCEDURE — 63600175 PHARM REV CODE 636 W HCPCS: Performed by: INTERNAL MEDICINE

## 2021-02-17 PROCEDURE — 93005 ELECTROCARDIOGRAM TRACING: CPT | Mod: 59

## 2021-02-17 PROCEDURE — 93010 ELECTROCARDIOGRAM REPORT: CPT | Mod: ,,, | Performed by: INTERNAL MEDICINE

## 2021-02-17 PROCEDURE — 99152 PR MOD CONSCIOUS SEDATION, SAME PHYS, 5+ YRS, FIRST 15 MIN: ICD-10-PCS | Mod: ,,, | Performed by: INTERNAL MEDICINE

## 2021-02-17 PROCEDURE — 93458 PR CATH PLACE/CORON ANGIO, IMG SUPER/INTERP,W LEFT HEART VENTRICULOGRAPHY: ICD-10-PCS | Mod: 26,59,, | Performed by: INTERNAL MEDICINE

## 2021-02-17 PROCEDURE — 93010 EKG 12-LEAD: ICD-10-PCS | Mod: ,,, | Performed by: INTERNAL MEDICINE

## 2021-02-17 PROCEDURE — 99152 MOD SED SAME PHYS/QHP 5/>YRS: CPT | Performed by: INTERNAL MEDICINE

## 2021-02-17 PROCEDURE — 92978 PR IVUS, CORONARY, 1ST VESSEL: ICD-10-PCS | Mod: 26,LD,, | Performed by: INTERNAL MEDICINE

## 2021-02-17 PROCEDURE — 93458 L HRT ARTERY/VENTRICLE ANGIO: CPT | Mod: 59 | Performed by: INTERNAL MEDICINE

## 2021-02-17 PROCEDURE — 27201423 OPTIME MED/SURG SUP & DEVICES STERILE SUPPLY: Performed by: INTERNAL MEDICINE

## 2021-02-17 PROCEDURE — 92928 PRQ TCAT PLMT NTRAC ST 1 LES: CPT | Mod: LD,,, | Performed by: INTERNAL MEDICINE

## 2021-02-17 PROCEDURE — 93571 IV DOP VEL&/PRESS C FLO 1ST: CPT | Performed by: INTERNAL MEDICINE

## 2021-02-17 PROCEDURE — 92978 ENDOLUMINL IVUS OCT C 1ST: CPT | Mod: 26,LD,, | Performed by: INTERNAL MEDICINE

## 2021-02-17 PROCEDURE — 99152 MOD SED SAME PHYS/QHP 5/>YRS: CPT | Mod: ,,, | Performed by: INTERNAL MEDICINE

## 2021-02-17 PROCEDURE — C1753 CATH, INTRAVAS ULTRASOUND: HCPCS | Performed by: INTERNAL MEDICINE

## 2021-02-17 PROCEDURE — 84702 CHORIONIC GONADOTROPIN TEST: CPT

## 2021-02-17 PROCEDURE — C1725 CATH, TRANSLUMIN NON-LASER: HCPCS | Performed by: INTERNAL MEDICINE

## 2021-02-17 PROCEDURE — 92928 PR STENT: ICD-10-PCS | Mod: LD,,, | Performed by: INTERNAL MEDICINE

## 2021-02-17 PROCEDURE — C1874 STENT, COATED/COV W/DEL SYS: HCPCS | Performed by: INTERNAL MEDICINE

## 2021-02-17 PROCEDURE — 85347 COAGULATION TIME ACTIVATED: CPT | Performed by: INTERNAL MEDICINE

## 2021-02-17 PROCEDURE — 36415 COLL VENOUS BLD VENIPUNCTURE: CPT

## 2021-02-17 PROCEDURE — 92978 ENDOLUMINL IVUS OCT C 1ST: CPT | Mod: LD | Performed by: INTERNAL MEDICINE

## 2021-02-17 PROCEDURE — C1769 GUIDE WIRE: HCPCS | Performed by: INTERNAL MEDICINE

## 2021-02-17 PROCEDURE — C1760 CLOSURE DEV, VASC: HCPCS | Performed by: INTERNAL MEDICINE

## 2021-02-17 PROCEDURE — 93571 PR HEART FLOW RESERV MEASURE,INIT VESSL: ICD-10-PCS | Mod: 26,,, | Performed by: INTERNAL MEDICINE

## 2021-02-17 PROCEDURE — 93458 L HRT ARTERY/VENTRICLE ANGIO: CPT | Mod: 26,59,, | Performed by: INTERNAL MEDICINE

## 2021-02-17 PROCEDURE — 93571 IV DOP VEL&/PRESS C FLO 1ST: CPT | Mod: 26,,, | Performed by: INTERNAL MEDICINE

## 2021-02-17 PROCEDURE — C9600 PERC DRUG-EL COR STENT SING: HCPCS | Mod: LD | Performed by: INTERNAL MEDICINE

## 2021-02-17 PROCEDURE — 85025 COMPLETE CBC W/AUTO DIFF WBC: CPT

## 2021-02-17 DEVICE — STENT RONYX35030UX RESOLUTE ONYX 3.50X30
Type: IMPLANTABLE DEVICE | Site: CORONARY | Status: FUNCTIONAL
Brand: RESOLUTE ONYX™

## 2021-02-17 RX ORDER — VERAPAMIL HYDROCHLORIDE 2.5 MG/ML
INJECTION, SOLUTION INTRAVENOUS
Status: DISCONTINUED | OUTPATIENT
Start: 2021-02-17 | End: 2021-02-17 | Stop reason: HOSPADM

## 2021-02-17 RX ORDER — HEPARIN SODIUM 1000 [USP'U]/ML
INJECTION, SOLUTION INTRAVENOUS; SUBCUTANEOUS
Status: DISCONTINUED | OUTPATIENT
Start: 2021-02-17 | End: 2021-02-17 | Stop reason: HOSPADM

## 2021-02-17 RX ORDER — INSULIN ASPART 100 [IU]/ML
0-5 INJECTION, SOLUTION INTRAVENOUS; SUBCUTANEOUS
Status: DISCONTINUED | OUTPATIENT
Start: 2021-02-17 | End: 2021-02-17 | Stop reason: HOSPADM

## 2021-02-17 RX ORDER — CEFAZOLIN SODIUM 1 G/3ML
INJECTION, POWDER, FOR SOLUTION INTRAMUSCULAR; INTRAVENOUS
Status: DISCONTINUED | OUTPATIENT
Start: 2021-02-17 | End: 2021-02-17 | Stop reason: HOSPADM

## 2021-02-17 RX ORDER — MIDAZOLAM HYDROCHLORIDE 1 MG/ML
INJECTION, SOLUTION INTRAMUSCULAR; INTRAVENOUS
Status: DISCONTINUED | OUTPATIENT
Start: 2021-02-17 | End: 2021-02-17 | Stop reason: HOSPADM

## 2021-02-17 RX ORDER — HEPARIN SODIUM 200 [USP'U]/100ML
INJECTION, SOLUTION INTRAVENOUS
Status: DISCONTINUED | OUTPATIENT
Start: 2021-02-17 | End: 2021-02-17 | Stop reason: HOSPADM

## 2021-02-17 RX ORDER — IBUPROFEN 200 MG
24 TABLET ORAL
Status: DISCONTINUED | OUTPATIENT
Start: 2021-02-17 | End: 2021-02-17 | Stop reason: HOSPADM

## 2021-02-17 RX ORDER — LIDOCAINE HYDROCHLORIDE 10 MG/ML
INJECTION INFILTRATION; PERINEURAL
Status: DISCONTINUED | OUTPATIENT
Start: 2021-02-17 | End: 2021-02-17 | Stop reason: HOSPADM

## 2021-02-17 RX ORDER — GLUCAGON 1 MG
1 KIT INJECTION
Status: DISCONTINUED | OUTPATIENT
Start: 2021-02-17 | End: 2021-02-17 | Stop reason: HOSPADM

## 2021-02-17 RX ORDER — ONDANSETRON 4 MG/1
8 TABLET, ORALLY DISINTEGRATING ORAL EVERY 8 HOURS PRN
Status: DISCONTINUED | OUTPATIENT
Start: 2021-02-17 | End: 2021-02-17 | Stop reason: HOSPADM

## 2021-02-17 RX ORDER — IBUPROFEN 200 MG
16 TABLET ORAL
Status: DISCONTINUED | OUTPATIENT
Start: 2021-02-17 | End: 2021-02-17 | Stop reason: HOSPADM

## 2021-02-17 RX ORDER — FENTANYL CITRATE 50 UG/ML
INJECTION, SOLUTION INTRAMUSCULAR; INTRAVENOUS
Status: DISCONTINUED | OUTPATIENT
Start: 2021-02-17 | End: 2021-02-17 | Stop reason: HOSPADM

## 2021-02-17 RX ORDER — DIPHENHYDRAMINE HYDROCHLORIDE 50 MG/ML
INJECTION INTRAMUSCULAR; INTRAVENOUS
Status: DISCONTINUED | OUTPATIENT
Start: 2021-02-17 | End: 2021-02-17 | Stop reason: HOSPADM

## 2021-02-17 RX ORDER — OXYCODONE AND ACETAMINOPHEN 5; 325 MG/1; MG/1
1 TABLET ORAL EVERY 4 HOURS PRN
Status: DISCONTINUED | OUTPATIENT
Start: 2021-02-17 | End: 2021-02-17 | Stop reason: HOSPADM

## 2021-02-17 RX ORDER — IODIXANOL 320 MG/ML
INJECTION, SOLUTION INTRAVASCULAR
Status: DISCONTINUED | OUTPATIENT
Start: 2021-02-17 | End: 2021-02-17 | Stop reason: HOSPADM

## 2021-02-17 RX ORDER — ACETAMINOPHEN 325 MG/1
650 TABLET ORAL EVERY 4 HOURS PRN
Status: DISCONTINUED | OUTPATIENT
Start: 2021-02-17 | End: 2021-02-17 | Stop reason: HOSPADM

## 2021-02-17 RX ADMIN — OXYCODONE HYDROCHLORIDE AND ACETAMINOPHEN 1 TABLET: 5; 325 TABLET ORAL at 11:02

## 2021-02-18 ENCOUNTER — TELEPHONE (OUTPATIENT)
Dept: SMOKING CESSATION | Facility: CLINIC | Age: 41
End: 2021-02-18

## 2021-02-23 ENCOUNTER — TELEPHONE (OUTPATIENT)
Dept: CARDIOLOGY | Facility: CLINIC | Age: 41
End: 2021-02-23

## 2021-02-23 LAB
POC ACTIVATED CLOTTING TIME K: 191 SEC (ref 74–137)
POC ACTIVATED CLOTTING TIME K: 208 SEC (ref 74–137)
POC ACTIVATED CLOTTING TIME K: 208 SEC (ref 74–137)
SAMPLE: ABNORMAL

## 2021-02-24 ENCOUNTER — CLINICAL SUPPORT (OUTPATIENT)
Dept: SMOKING CESSATION | Facility: CLINIC | Age: 41
End: 2021-02-24
Payer: COMMERCIAL

## 2021-02-24 DIAGNOSIS — F17.200 NICOTINE DEPENDENCE: Primary | ICD-10-CM

## 2021-02-24 PROCEDURE — 99999 PR PBB SHADOW E&M-EST. PATIENT-LVL I: ICD-10-PCS | Mod: PBBFAC,,,

## 2021-02-24 PROCEDURE — 99402 PREV MED CNSL INDIV APPRX 30: CPT | Mod: S$GLB,,,

## 2021-02-24 PROCEDURE — 99402 PR PREVENT COUNSEL,INDIV,30 MIN: ICD-10-PCS | Mod: S$GLB,,,

## 2021-02-24 PROCEDURE — 99999 PR PBB SHADOW E&M-EST. PATIENT-LVL I: CPT | Mod: PBBFAC,,,

## 2021-02-24 RX ORDER — DM/P-EPHED/ACETAMINOPH/DOXYLAM 30-7.5/3
2 LIQUID (ML) ORAL
Qty: 270 LOZENGE | Refills: 0 | Status: SHIPPED | OUTPATIENT
Start: 2021-02-24 | End: 2023-01-01

## 2021-02-24 RX ORDER — IBUPROFEN 200 MG
1 TABLET ORAL DAILY
Qty: 14 PATCH | Refills: 0 | Status: SHIPPED | OUTPATIENT
Start: 2021-02-24 | End: 2023-01-01

## 2021-03-01 ENCOUNTER — HOSPITAL ENCOUNTER (INPATIENT)
Facility: HOSPITAL | Age: 41
LOS: 3 days | Discharge: HOME | DRG: 871 | End: 2021-03-04
Attending: HOSPITALIST | Admitting: HOSPITALIST
Payer: MEDICAID

## 2021-03-01 DIAGNOSIS — R53.81 DEBILITATED: ICD-10-CM

## 2021-03-01 DIAGNOSIS — N39.0 URINARY TRACT INFECTION WITHOUT HEMATURIA, SITE UNSPECIFIED: ICD-10-CM

## 2021-03-01 DIAGNOSIS — E16.2 HYPOGLYCEMIA: ICD-10-CM

## 2021-03-01 DIAGNOSIS — I10 BENIGN ESSENTIAL HYPERTENSION: ICD-10-CM

## 2021-03-01 DIAGNOSIS — E11.649 HYPOGLYCEMIA ASSOCIATED WITH TYPE 2 DIABETES MELLITUS: ICD-10-CM

## 2021-03-01 DIAGNOSIS — A41.9 SEPSIS, DUE TO UNSPECIFIED ORGANISM, UNSPECIFIED WHETHER ACUTE ORGAN DYSFUNCTION PRESENT: Primary | ICD-10-CM

## 2021-03-01 DIAGNOSIS — F13.10 BENZODIAZEPINE ABUSE: ICD-10-CM

## 2021-03-01 DIAGNOSIS — I50.42 CHRONIC COMBINED SYSTOLIC AND DIASTOLIC CONGESTIVE HEART FAILURE: ICD-10-CM

## 2021-03-01 DIAGNOSIS — I50.9 CHF (CONGESTIVE HEART FAILURE): ICD-10-CM

## 2021-03-01 DIAGNOSIS — Z45.2 ENCOUNTER FOR CENTRAL LINE PLACEMENT: ICD-10-CM

## 2021-03-01 LAB
ALBUMIN SERPL BCP-MCNC: 3.8 G/DL (ref 3.5–5.2)
ALP SERPL-CCNC: 75 U/L (ref 38–126)
ALT SERPL W/O P-5'-P-CCNC: 49 U/L (ref 10–44)
AMPHET+METHAMPHET UR QL: NEGATIVE
ANION GAP SERPL CALC-SCNC: 5 MMOL/L (ref 8–16)
AST SERPL-CCNC: 48 U/L (ref 15–46)
BACTERIA #/AREA URNS AUTO: ABNORMAL /HPF
BARBITURATES UR QL SCN>200 NG/ML: NEGATIVE
BASOPHILS # BLD AUTO: 0.08 K/UL (ref 0–0.2)
BASOPHILS NFR BLD: 0.3 % (ref 0–1.9)
BENZODIAZ UR QL SCN>200 NG/ML: NORMAL
BILIRUB SERPL-MCNC: 1.1 MG/DL (ref 0.1–1)
BILIRUB UR QL STRIP: NEGATIVE
BZE UR QL SCN: NEGATIVE
CALCIUM SERPL-MCNC: 8.7 MG/DL (ref 8.7–10.5)
CANNABINOIDS UR QL SCN: NEGATIVE
CHLORIDE SERPL-SCNC: 102 MMOL/L (ref 95–110)
CLARITY UR REFRACT.AUTO: ABNORMAL
CO2 SERPL-SCNC: 31 MMOL/L (ref 23–29)
COLOR UR AUTO: ABNORMAL
CREAT SERPL-MCNC: 1.79 MG/DL (ref 0.5–1.4)
CREAT UR-MCNC: 110 MG/DL (ref 15–325)
DIFFERENTIAL METHOD: ABNORMAL
EOSINOPHIL # BLD AUTO: 0.2 K/UL (ref 0–0.5)
EOSINOPHIL NFR BLD: 0.9 % (ref 0–8)
ERYTHROCYTE [DISTWIDTH] IN BLOOD BY AUTOMATED COUNT: 15.7 % (ref 11.5–14.5)
EST. GFR  (AFRICAN AMERICAN): 39.7 ML/MIN/1.73 M^2
EST. GFR  (NON AFRICAN AMERICAN): 34.5 ML/MIN/1.73 M^2
GLUCOSE SERPL-MCNC: 72 MG/DL (ref 70–110)
GLUCOSE UR QL STRIP: NEGATIVE
HCT VFR BLD AUTO: 38.9 % (ref 37–48.5)
HGB BLD-MCNC: 12.2 G/DL (ref 12–16)
HGB UR QL STRIP: ABNORMAL
HYALINE CASTS UR QL AUTO: 0 /LPF
IMM GRANULOCYTES # BLD AUTO: 0.57 K/UL (ref 0–0.04)
IMM GRANULOCYTES NFR BLD AUTO: 2.4 % (ref 0–0.5)
KETONES UR QL STRIP: NEGATIVE
LACTATE SERPL-SCNC: 0.7 MMOL/L (ref 0.5–2.2)
LEUKOCYTE ESTERASE UR QL STRIP: ABNORMAL
LYMPHOCYTES # BLD AUTO: 3.3 K/UL (ref 1–4.8)
LYMPHOCYTES NFR BLD: 13.5 % (ref 18–48)
MCH RBC QN AUTO: 29.1 PG (ref 27–31)
MCHC RBC AUTO-ENTMCNC: 31.4 G/DL (ref 32–36)
MCV RBC AUTO: 93 FL (ref 82–98)
METHADONE UR QL SCN>300 NG/ML: NEGATIVE
MICROSCOPIC COMMENT: ABNORMAL
MONOCYTES # BLD AUTO: 1 K/UL (ref 0.3–1)
MONOCYTES NFR BLD: 4 % (ref 4–15)
NEUTROPHILS # BLD AUTO: 19.1 K/UL (ref 1.8–7.7)
NEUTROPHILS NFR BLD: 78.9 % (ref 38–73)
NITRITE UR QL STRIP: NEGATIVE
NRBC BLD-RTO: 0 /100 WBC
OPIATES UR QL SCN: NEGATIVE
PCP UR QL SCN>25 NG/ML: NEGATIVE
PH UR STRIP: 5 [PH] (ref 5–8)
PLATELET # BLD AUTO: 232 K/UL (ref 150–350)
PMV BLD AUTO: 10.4 FL (ref 9.2–12.9)
POCT GLUCOSE: 33 MG/DL (ref 70–110)
POCT GLUCOSE: 46 MG/DL (ref 70–110)
POCT GLUCOSE: 49 MG/DL (ref 70–110)
POCT GLUCOSE: 51 MG/DL (ref 70–110)
POCT GLUCOSE: 92 MG/DL (ref 70–110)
POTASSIUM SERPL-SCNC: 4.5 MMOL/L (ref 3.5–5.1)
PROT SERPL-MCNC: 6.6 G/DL (ref 6–8.4)
PROT UR QL STRIP: ABNORMAL
RBC # BLD AUTO: 4.19 M/UL (ref 4–5.4)
RBC #/AREA URNS AUTO: 10 /HPF (ref 0–4)
SARS-COV-2 RDRP RESP QL NAA+PROBE: NEGATIVE
SODIUM SERPL-SCNC: 138 MMOL/L (ref 136–145)
SP GR UR STRIP: 1.01 (ref 1–1.03)
SQUAMOUS #/AREA URNS AUTO: ABNORMAL /HPF
TOXICOLOGY INFORMATION: NORMAL
URN SPEC COLLECT METH UR: ABNORMAL
UROBILINOGEN UR STRIP-ACNC: NEGATIVE EU/DL
UUN UR-MCNC: 39 MG/DL (ref 7–17)
WBC # BLD AUTO: 24.17 K/UL (ref 3.9–12.7)
WBC #/AREA URNS AUTO: >100 /HPF (ref 0–5)
WBC CLUMPS UR QL AUTO: ABNORMAL

## 2021-03-01 PROCEDURE — 80053 COMPREHEN METABOLIC PANEL: CPT | Mod: ER

## 2021-03-01 PROCEDURE — 84145 PROCALCITONIN (PCT): CPT | Mod: ER

## 2021-03-01 PROCEDURE — 83605 ASSAY OF LACTIC ACID: CPT | Mod: ER

## 2021-03-01 PROCEDURE — 25000003 PHARM REV CODE 250: Mod: ER

## 2021-03-01 PROCEDURE — 81000 URINALYSIS NONAUTO W/SCOPE: CPT | Mod: ER,59

## 2021-03-01 PROCEDURE — 87040 BLOOD CULTURE FOR BACTERIA: CPT | Mod: 59,ER

## 2021-03-01 PROCEDURE — 80307 DRUG TEST PRSMV CHEM ANLYZR: CPT | Mod: ER

## 2021-03-01 PROCEDURE — 25000003 PHARM REV CODE 250: Mod: ER | Performed by: EMERGENCY MEDICINE

## 2021-03-01 PROCEDURE — 87088 URINE BACTERIA CULTURE: CPT | Mod: ER | Performed by: EMERGENCY MEDICINE

## 2021-03-01 PROCEDURE — 87086 URINE CULTURE/COLONY COUNT: CPT | Mod: ER

## 2021-03-01 PROCEDURE — U0002 COVID-19 LAB TEST NON-CDC: HCPCS | Mod: ER

## 2021-03-01 PROCEDURE — 85025 COMPLETE CBC W/AUTO DIFF WBC: CPT | Mod: ER

## 2021-03-01 PROCEDURE — 87077 CULTURE AEROBIC IDENTIFY: CPT | Mod: ER | Performed by: EMERGENCY MEDICINE

## 2021-03-01 PROCEDURE — 82962 GLUCOSE BLOOD TEST: CPT

## 2021-03-01 PROCEDURE — 87186 SC STD MICRODIL/AGAR DIL: CPT | Mod: ER | Performed by: EMERGENCY MEDICINE

## 2021-03-01 PROCEDURE — 20000000 HC ICU ROOM

## 2021-03-01 RX ORDER — DEXTROSE MONOHYDRATE 100 MG/ML
INJECTION, SOLUTION INTRAVENOUS
Status: COMPLETED | OUTPATIENT
Start: 2021-03-01 | End: 2021-03-01

## 2021-03-01 RX ORDER — DEXTROSE 50 % IN WATER (D50W) INTRAVENOUS SYRINGE
25
Status: COMPLETED | OUTPATIENT
Start: 2021-03-01 | End: 2021-03-01

## 2021-03-01 RX ORDER — DEXTROSE 50 % IN WATER (D50W) INTRAVENOUS SYRINGE
12.5
Status: COMPLETED | OUTPATIENT
Start: 2021-03-01 | End: 2021-03-01

## 2021-03-01 RX ADMIN — DEXTROSE MONOHYDRATE 12.5 G: 500 INJECTION PARENTERAL at 07:03

## 2021-03-01 RX ADMIN — DEXTROSE 50 % IN WATER (D50W) INTRAVENOUS SYRINGE 25 G: at 10:03

## 2021-03-01 RX ADMIN — SODIUM CHLORIDE 1000 ML: 0.9 INJECTION, SOLUTION INTRAVENOUS at 08:03

## 2021-03-01 RX ADMIN — DEXTROSE: 10 SOLUTION INTRAVENOUS at 10:03

## 2021-03-01 RX ADMIN — DEXTROSE MONOHYDRATE 12.5 G: 500 INJECTION PARENTERAL at 08:03

## 2021-03-01 RX ADMIN — DEXTROSE 50 % IN WATER (D50W) INTRAVENOUS SYRINGE 12.5 G: at 07:03

## 2021-03-01 RX ADMIN — DEXTROSE MONOHYDRATE 25 G: 500 INJECTION PARENTERAL at 10:03
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
## 2021-03-02 PROBLEM — N39.0 SEPSIS DUE TO URINARY TRACT INFECTION: Status: ACTIVE | Noted: 2018-09-12

## 2021-03-02 PROBLEM — F13.10 BENZODIAZEPINE ABUSE: Status: ACTIVE | Noted: 2021-03-02

## 2021-03-02 PROBLEM — F43.20 GRIEF REACTION: Status: ACTIVE | Noted: 2021-03-02

## 2021-03-02 PROBLEM — G93.41 ENCEPHALOPATHY, METABOLIC: Status: ACTIVE | Noted: 2021-03-02

## 2021-03-02 PROBLEM — E11.649 HYPOGLYCEMIA ASSOCIATED WITH TYPE 2 DIABETES MELLITUS: Status: ACTIVE | Noted: 2021-03-02

## 2021-03-02 PROBLEM — R53.81 DEBILITATED: Status: ACTIVE | Noted: 2021-03-02

## 2021-03-02 PROBLEM — F43.21 GRIEF REACTION: Status: ACTIVE | Noted: 2021-03-02

## 2021-03-02 LAB
ALBUMIN SERPL BCP-MCNC: 2.7 G/DL (ref 3.5–5.2)
ALP SERPL-CCNC: 69 U/L (ref 55–135)
ALT SERPL W/O P-5'-P-CCNC: 33 U/L (ref 10–44)
ANION GAP SERPL CALC-SCNC: 8 MMOL/L (ref 8–16)
AORTIC ROOT ANNULUS: 2.97 CM
AST SERPL-CCNC: 20 U/L (ref 10–40)
AV INDEX (PROSTH): 0.75
AV MEAN GRADIENT: 9 MMHG
AV PEAK GRADIENT: 15 MMHG
AV VALVE AREA: 3.28 CM2
AV VELOCITY RATIO: 0.75
BASOPHILS # BLD AUTO: 0.05 K/UL (ref 0–0.2)
BASOPHILS NFR BLD: 0.3 % (ref 0–1.9)
BILIRUB SERPL-MCNC: 1.3 MG/DL (ref 0.1–1)
BNP SERPL-MCNC: 99 PG/ML (ref 0–99)
BSA FOR ECHO PROCEDURE: 2.75 M2
BUN SERPL-MCNC: 40 MG/DL (ref 6–20)
CALCIUM SERPL-MCNC: 7.5 MG/DL (ref 8.7–10.5)
CHLORIDE SERPL-SCNC: 103 MMOL/L (ref 95–110)
CO2 SERPL-SCNC: 24 MMOL/L (ref 23–29)
CREAT SERPL-MCNC: 2 MG/DL (ref 0.5–1.4)
CV ECHO LV RWT: 0.38 CM
DIFFERENTIAL METHOD: ABNORMAL
DOP CALC AO PEAK VEL: 1.95 M/S
DOP CALC AO VTI: 34.68 CM
DOP CALC LVOT AREA: 4.4 CM2
DOP CALC LVOT DIAMETER: 2.36 CM
DOP CALC LVOT PEAK VEL: 1.46 M/S
DOP CALC LVOT STROKE VOLUME: 113.89 CM3
DOP CALCLVOT PEAK VEL VTI: 26.05 CM
E WAVE DECELERATION TIME: 147.05 MSEC
E/A RATIO: 0.98
E/E' RATIO: 13.58 M/S
ECHO LV POSTERIOR WALL: 1.05 CM (ref 0.6–1.1)
EOSINOPHIL # BLD AUTO: 0.3 K/UL (ref 0–0.5)
EOSINOPHIL NFR BLD: 1.5 % (ref 0–8)
ERYTHROCYTE [DISTWIDTH] IN BLOOD BY AUTOMATED COUNT: 15.8 % (ref 11.5–14.5)
EST. GFR  (AFRICAN AMERICAN): 35 ML/MIN/1.73 M^2
EST. GFR  (NON AFRICAN AMERICAN): 30 ML/MIN/1.73 M^2
FRACTIONAL SHORTENING: 22 % (ref 28–44)
GLUCOSE SERPL-MCNC: 86 MG/DL (ref 70–110)
HCT VFR BLD AUTO: 33.4 % (ref 37–48.5)
HGB BLD-MCNC: 10.5 G/DL (ref 12–16)
IMM GRANULOCYTES # BLD AUTO: 0.47 K/UL (ref 0–0.04)
IMM GRANULOCYTES NFR BLD AUTO: 2.6 % (ref 0–0.5)
INR PPP: 1.1 (ref 0.8–1.2)
INTERVENTRICULAR SEPTUM: 1.22 CM (ref 0.6–1.1)
LA MAJOR: 5.82 CM
LA MINOR: 5.1 CM
LA WIDTH: 5 CM
LEFT ATRIUM SIZE: 4.4 CM
LEFT ATRIUM VOLUME INDEX MOD: 30.3 ML/M2
LEFT ATRIUM VOLUME INDEX: 40.5 ML/M2
LEFT ATRIUM VOLUME MOD: 76.15 CM3
LEFT ATRIUM VOLUME: 101.66 CM3
LEFT INTERNAL DIMENSION IN SYSTOLE: 4.35 CM (ref 2.1–4)
LEFT VENTRICLE DIASTOLIC VOLUME INDEX: 60.62 ML/M2
LEFT VENTRICLE DIASTOLIC VOLUME: 152.15 ML
LEFT VENTRICLE MASS INDEX: 103 G/M2
LEFT VENTRICLE SYSTOLIC VOLUME INDEX: 34 ML/M2
LEFT VENTRICLE SYSTOLIC VOLUME: 85.24 ML
LEFT VENTRICULAR INTERNAL DIMENSION IN DIASTOLE: 5.58 CM (ref 3.5–6)
LEFT VENTRICULAR MASS: 258.52 G
LV LATERAL E/E' RATIO: 11.73 M/S
LV SEPTAL E/E' RATIO: 16.13 M/S
LYMPHOCYTES # BLD AUTO: 3.1 K/UL (ref 1–4.8)
LYMPHOCYTES NFR BLD: 17.4 % (ref 18–48)
MAGNESIUM SERPL-MCNC: 1.7 MG/DL (ref 1.6–2.6)
MCH RBC QN AUTO: 29.1 PG (ref 27–31)
MCHC RBC AUTO-ENTMCNC: 31.4 G/DL (ref 32–36)
MCV RBC AUTO: 93 FL (ref 82–98)
MONOCYTES # BLD AUTO: 1 K/UL (ref 0.3–1)
MONOCYTES NFR BLD: 5.3 % (ref 4–15)
MV MEAN GRADIENT: 1 MMHG
MV PEAK A VEL: 1.32 M/S
MV PEAK E VEL: 1.29 M/S
MV PEAK GRADIENT: 7 MMHG
MV STENOSIS PRESSURE HALF TIME: 42.64 MS
MV VALVE AREA P 1/2 METHOD: 5.16 CM2
NEUTROPHILS # BLD AUTO: 13.1 K/UL (ref 1.8–7.7)
NEUTROPHILS NFR BLD: 72.9 % (ref 38–73)
NRBC BLD-RTO: 0 /100 WBC
PHOSPHATE SERPL-MCNC: 5.8 MG/DL (ref 2.7–4.5)
PLATELET # BLD AUTO: 192 K/UL (ref 150–350)
PMV BLD AUTO: 10.6 FL (ref 9.2–12.9)
POCT GLUCOSE: 103 MG/DL (ref 70–110)
POCT GLUCOSE: 111 MG/DL (ref 70–110)
POCT GLUCOSE: 131 MG/DL (ref 70–110)
POCT GLUCOSE: 143 MG/DL (ref 70–110)
POCT GLUCOSE: 150 MG/DL (ref 70–110)
POCT GLUCOSE: 158 MG/DL (ref 70–110)
POCT GLUCOSE: 163 MG/DL (ref 70–110)
POCT GLUCOSE: 173 MG/DL (ref 70–110)
POCT GLUCOSE: 176 MG/DL (ref 70–110)
POCT GLUCOSE: 178 MG/DL (ref 70–110)
POCT GLUCOSE: 185 MG/DL (ref 70–110)
POCT GLUCOSE: 187 MG/DL (ref 70–110)
POCT GLUCOSE: 193 MG/DL (ref 70–110)
POCT GLUCOSE: 195 MG/DL (ref 70–110)
POCT GLUCOSE: 211 MG/DL (ref 70–110)
POCT GLUCOSE: 227 MG/DL (ref 70–110)
POCT GLUCOSE: 78 MG/DL (ref 70–110)
POCT GLUCOSE: 91 MG/DL (ref 70–110)
POCT GLUCOSE: 96 MG/DL (ref 70–110)
POCT GLUCOSE: 99 MG/DL (ref 70–110)
POTASSIUM SERPL-SCNC: 4.7 MMOL/L (ref 3.5–5.1)
PROCALCITONIN SERPL IA-MCNC: 4.82 NG/ML
PROT SERPL-MCNC: 5.7 G/DL (ref 6–8.4)
PROTHROMBIN TIME: 11.9 SEC (ref 9–12.5)
PV PEAK VELOCITY: 1.32 CM/S
RA MAJOR: 4.2 CM
RA PRESSURE: 8 MMHG
RA WIDTH: 3.59 CM
RBC # BLD AUTO: 3.61 M/UL (ref 4–5.4)
RIGHT VENTRICULAR END-DIASTOLIC DIMENSION: 2.42 CM
RV TISSUE DOPPLER FREE WALL SYSTOLIC VELOCITY 1 (APICAL 4 CHAMBER VIEW): 18.66 CM/S
SODIUM SERPL-SCNC: 135 MMOL/L (ref 136–145)
TDI LATERAL: 0.11 M/S
TDI SEPTAL: 0.08 M/S
TDI: 0.1 M/S
TRICUSPID ANNULAR PLANE SYSTOLIC EXCURSION: 2 CM
TROPONIN I SERPL DL<=0.01 NG/ML-MCNC: 0.23 NG/ML (ref 0–0.03)
TROPONIN I SERPL DL<=0.01 NG/ML-MCNC: 0.27 NG/ML (ref 0–0.03)
WBC # BLD AUTO: 17.9 K/UL (ref 3.9–12.7)

## 2021-03-02 PROCEDURE — 99285 EMERGENCY DEPT VISIT HI MDM: CPT | Mod: 25

## 2021-03-02 PROCEDURE — 99900035 HC TECH TIME PER 15 MIN (STAT)

## 2021-03-02 PROCEDURE — 63600175 PHARM REV CODE 636 W HCPCS: Performed by: NURSE PRACTITIONER

## 2021-03-02 PROCEDURE — 25000003 PHARM REV CODE 250: Performed by: NURSE PRACTITIONER

## 2021-03-02 PROCEDURE — 80053 COMPREHEN METABOLIC PANEL: CPT

## 2021-03-02 PROCEDURE — 94640 AIRWAY INHALATION TREATMENT: CPT

## 2021-03-02 PROCEDURE — 25000003 PHARM REV CODE 250: Mod: ER | Performed by: EMERGENCY MEDICINE

## 2021-03-02 PROCEDURE — 63600175 PHARM REV CODE 636 W HCPCS: Mod: ER | Performed by: EMERGENCY MEDICINE

## 2021-03-02 PROCEDURE — 63600175 PHARM REV CODE 636 W HCPCS: Performed by: HOSPITALIST

## 2021-03-02 PROCEDURE — 96375 TX/PRO/DX INJ NEW DRUG ADDON: CPT

## 2021-03-02 PROCEDURE — 83880 ASSAY OF NATRIURETIC PEPTIDE: CPT

## 2021-03-02 PROCEDURE — 20000000 HC ICU ROOM

## 2021-03-02 PROCEDURE — 25000003 PHARM REV CODE 250: Performed by: HOSPITALIST

## 2021-03-02 PROCEDURE — 85025 COMPLETE CBC W/AUTO DIFF WBC: CPT

## 2021-03-02 PROCEDURE — 25500020 PHARM REV CODE 255: Performed by: INTERNAL MEDICINE

## 2021-03-02 PROCEDURE — 85610 PROTHROMBIN TIME: CPT

## 2021-03-02 PROCEDURE — 82962 GLUCOSE BLOOD TEST: CPT

## 2021-03-02 PROCEDURE — 83735 ASSAY OF MAGNESIUM: CPT

## 2021-03-02 PROCEDURE — 25000242 PHARM REV CODE 250 ALT 637 W/ HCPCS: Performed by: HOSPITALIST

## 2021-03-02 PROCEDURE — S4991 NICOTINE PATCH NONLEGEND: HCPCS | Performed by: HOSPITALIST

## 2021-03-02 PROCEDURE — 96365 THER/PROPH/DIAG IV INF INIT: CPT

## 2021-03-02 PROCEDURE — 84100 ASSAY OF PHOSPHORUS: CPT

## 2021-03-02 PROCEDURE — 96376 TX/PRO/DX INJ SAME DRUG ADON: CPT

## 2021-03-02 PROCEDURE — 84484 ASSAY OF TROPONIN QUANT: CPT

## 2021-03-02 PROCEDURE — 94761 N-INVAS EAR/PLS OXIMETRY MLT: CPT

## 2021-03-02 PROCEDURE — 84484 ASSAY OF TROPONIN QUANT: CPT | Mod: 91

## 2021-03-02 PROCEDURE — 25000242 PHARM REV CODE 250 ALT 637 W/ HCPCS: Performed by: NURSE PRACTITIONER

## 2021-03-02 RX ORDER — INSULIN LISPRO 100 [IU]/ML
INJECTION, SUSPENSION SUBCUTANEOUS
Status: ON HOLD | COMMUNITY
Start: 2021-02-23 | End: 2021-03-04 | Stop reason: SDUPTHER

## 2021-03-02 RX ORDER — BUPRENORPHINE HYDROCHLORIDE AND NALOXONE HYDROCHLORIDE DIHYDRATE 8; 2 MG/1; MG/1
8 TABLET SUBLINGUAL 3 TIMES DAILY
Status: DISCONTINUED | OUTPATIENT
Start: 2021-03-02 | End: 2021-03-04 | Stop reason: HOSPADM

## 2021-03-02 RX ORDER — AMLODIPINE BESYLATE 10 MG/1
10 TABLET ORAL
COMMUNITY
End: 2021-03-02

## 2021-03-02 RX ORDER — NAPROXEN SODIUM 220 MG/1
81 TABLET, FILM COATED ORAL DAILY
Status: DISCONTINUED | OUTPATIENT
Start: 2021-03-02 | End: 2021-03-04 | Stop reason: HOSPADM

## 2021-03-02 RX ORDER — PANTOPRAZOLE SODIUM 40 MG/1
40 TABLET, DELAYED RELEASE ORAL DAILY
Status: DISCONTINUED | OUTPATIENT
Start: 2021-03-02 | End: 2021-03-04 | Stop reason: HOSPADM

## 2021-03-02 RX ORDER — PROMETHAZINE HYDROCHLORIDE 6.25 MG/5ML
SYRUP ORAL
Status: ON HOLD | COMMUNITY
Start: 2021-02-23 | End: 2021-03-04 | Stop reason: HOSPADM

## 2021-03-02 RX ORDER — SIMETHICONE 125 MG
125 TABLET,CHEWABLE ORAL EVERY 6 HOURS PRN
Status: DISCONTINUED | OUTPATIENT
Start: 2021-03-02 | End: 2021-03-04 | Stop reason: HOSPADM

## 2021-03-02 RX ORDER — BUSPIRONE HYDROCHLORIDE 10 MG/1
50 TABLET ORAL
COMMUNITY
End: 2021-03-02

## 2021-03-02 RX ORDER — FLUTICASONE PROPIONATE 50 MCG
1 SPRAY, SUSPENSION (ML) NASAL DAILY
Status: DISCONTINUED | OUTPATIENT
Start: 2021-03-02 | End: 2021-03-04 | Stop reason: HOSPADM

## 2021-03-02 RX ORDER — ATORVASTATIN CALCIUM 40 MG/1
80 TABLET, FILM COATED ORAL DAILY
Status: DISCONTINUED | OUTPATIENT
Start: 2021-03-02 | End: 2021-03-04 | Stop reason: HOSPADM

## 2021-03-02 RX ORDER — ENOXAPARIN SODIUM 100 MG/ML
30 INJECTION SUBCUTANEOUS EVERY 24 HOURS
Status: DISCONTINUED | OUTPATIENT
Start: 2021-03-02 | End: 2021-03-02

## 2021-03-02 RX ORDER — CLOPIDOGREL BISULFATE 75 MG/1
75 TABLET ORAL DAILY
Status: DISCONTINUED | OUTPATIENT
Start: 2021-03-02 | End: 2021-03-04 | Stop reason: HOSPADM

## 2021-03-02 RX ORDER — FAMOTIDINE 10 MG/ML
20 INJECTION INTRAVENOUS EVERY 12 HOURS
Status: DISCONTINUED | OUTPATIENT
Start: 2021-03-02 | End: 2021-03-03

## 2021-03-02 RX ORDER — INSULIN GLARGINE 100 [IU]/ML
INJECTION, SOLUTION SUBCUTANEOUS
Status: ON HOLD | COMMUNITY
Start: 2021-02-23 | End: 2021-03-04 | Stop reason: SDUPTHER

## 2021-03-02 RX ORDER — FLUTICASONE PROPIONATE AND SALMETEROL 50; 500 UG/1; UG/1
POWDER RESPIRATORY (INHALATION)
Status: ON HOLD | COMMUNITY
Start: 2021-01-05 | End: 2021-03-04 | Stop reason: HOSPADM

## 2021-03-02 RX ORDER — POTASSIUM CHLORIDE 750 MG/1
CAPSULE, EXTENDED RELEASE ORAL
Status: ON HOLD | COMMUNITY
Start: 2021-02-09 | End: 2021-03-04 | Stop reason: HOSPADM

## 2021-03-02 RX ORDER — OXYCODONE AND ACETAMINOPHEN 5; 325 MG/1; MG/1
TABLET ORAL
Status: ON HOLD | COMMUNITY
Start: 2020-12-15 | End: 2021-03-04 | Stop reason: HOSPADM

## 2021-03-02 RX ORDER — GLUCAGON 1 MG
1 KIT INJECTION
Status: DISCONTINUED | OUTPATIENT
Start: 2021-03-02 | End: 2021-03-04 | Stop reason: SDUPTHER

## 2021-03-02 RX ORDER — PEN NEEDLE, DIABETIC 31 GX5/16"
NEEDLE, DISPOSABLE MISCELLANEOUS
Status: ON HOLD | COMMUNITY
Start: 2021-02-23 | End: 2023-01-01

## 2021-03-02 RX ORDER — LISINOPRIL 10 MG/1
TABLET ORAL
Status: ON HOLD | COMMUNITY
Start: 2021-01-21 | End: 2021-03-04 | Stop reason: HOSPADM

## 2021-03-02 RX ORDER — FUROSEMIDE 40 MG/1
40 TABLET ORAL 2 TIMES DAILY
Status: DISCONTINUED | OUTPATIENT
Start: 2021-03-03 | End: 2021-03-04 | Stop reason: HOSPADM

## 2021-03-02 RX ORDER — INSULIN LISPRO 100 [IU]/ML
60 INJECTION, SOLUTION INTRAVENOUS; SUBCUTANEOUS
Status: ON HOLD | COMMUNITY
End: 2021-03-04 | Stop reason: HOSPADM

## 2021-03-02 RX ORDER — DOXYCYCLINE 100 MG/1
CAPSULE ORAL
COMMUNITY
Start: 2021-01-06 | End: 2021-03-02

## 2021-03-02 RX ORDER — ENOXAPARIN SODIUM 100 MG/ML
40 INJECTION SUBCUTANEOUS EVERY 24 HOURS
Status: DISCONTINUED | OUTPATIENT
Start: 2021-03-02 | End: 2021-03-04 | Stop reason: HOSPADM

## 2021-03-02 RX ORDER — DEXTROSE MONOHYDRATE 100 MG/ML
INJECTION, SOLUTION INTRAVENOUS CONTINUOUS
Status: DISCONTINUED | OUTPATIENT
Start: 2021-03-02 | End: 2021-03-03

## 2021-03-02 RX ORDER — IPRATROPIUM BROMIDE AND ALBUTEROL SULFATE 2.5; .5 MG/3ML; MG/3ML
3 SOLUTION RESPIRATORY (INHALATION) EVERY 6 HOURS PRN
Status: DISCONTINUED | OUTPATIENT
Start: 2021-03-02 | End: 2021-03-04 | Stop reason: HOSPADM

## 2021-03-02 RX ORDER — MEDROXYPROGESTERONE ACETATE 150 MG/ML
150 INJECTION, SUSPENSION INTRAMUSCULAR
Status: ON HOLD | COMMUNITY
Start: 2021-01-14 | End: 2023-01-01

## 2021-03-02 RX ORDER — ALBUTEROL SULFATE 90 UG/1
AEROSOL, METERED RESPIRATORY (INHALATION)
Status: ON HOLD | COMMUNITY
Start: 2021-02-24 | End: 2021-03-04 | Stop reason: HOSPADM

## 2021-03-02 RX ORDER — INSULIN ASPART 100 [IU]/ML
0-5 INJECTION, SOLUTION INTRAVENOUS; SUBCUTANEOUS EVERY 6 HOURS PRN
Status: DISCONTINUED | OUTPATIENT
Start: 2021-03-02 | End: 2021-03-04 | Stop reason: HOSPADM

## 2021-03-02 RX ORDER — NICOTINE POLACRILEX 4 MG/1
LOZENGE ORAL
Status: ON HOLD | COMMUNITY
Start: 2021-02-01 | End: 2021-03-04 | Stop reason: HOSPADM

## 2021-03-02 RX ORDER — MUPIROCIN 20 MG/G
OINTMENT TOPICAL 2 TIMES DAILY
Status: DISCONTINUED | OUTPATIENT
Start: 2021-03-02 | End: 2021-03-04 | Stop reason: HOSPADM

## 2021-03-02 RX ORDER — CEFEPIME HYDROCHLORIDE 1 G/50ML
2 INJECTION, SOLUTION INTRAVENOUS
Status: DISCONTINUED | OUTPATIENT
Start: 2021-03-02 | End: 2021-03-02

## 2021-03-02 RX ORDER — PREGABALIN 100 MG/1
300 CAPSULE ORAL
COMMUNITY
End: 2021-03-02

## 2021-03-02 RX ORDER — ONDANSETRON 2 MG/ML
4 INJECTION INTRAMUSCULAR; INTRAVENOUS EVERY 8 HOURS PRN
Status: DISCONTINUED | OUTPATIENT
Start: 2021-03-02 | End: 2021-03-04 | Stop reason: HOSPADM

## 2021-03-02 RX ORDER — SODIUM CHLORIDE 0.9 % (FLUSH) 0.9 %
10 SYRINGE (ML) INJECTION
Status: DISCONTINUED | OUTPATIENT
Start: 2021-03-02 | End: 2021-03-04 | Stop reason: HOSPADM

## 2021-03-02 RX ORDER — PREGABALIN 300 MG/1
CAPSULE ORAL
Status: ON HOLD | COMMUNITY
Start: 2021-02-23 | End: 2021-03-04 | Stop reason: HOSPADM

## 2021-03-02 RX ORDER — IBUPROFEN 200 MG
1 TABLET ORAL DAILY
Status: DISCONTINUED | OUTPATIENT
Start: 2021-03-02 | End: 2021-03-04 | Stop reason: HOSPADM

## 2021-03-02 RX ORDER — ACETAMINOPHEN 325 MG/1
650 TABLET ORAL EVERY 6 HOURS PRN
Status: DISCONTINUED | OUTPATIENT
Start: 2021-03-02 | End: 2021-03-04 | Stop reason: HOSPADM

## 2021-03-02 RX ORDER — FLUTICASONE FUROATE AND VILANTEROL 100; 25 UG/1; UG/1
1 POWDER RESPIRATORY (INHALATION) DAILY
Status: DISCONTINUED | OUTPATIENT
Start: 2021-03-02 | End: 2021-03-04 | Stop reason: HOSPADM

## 2021-03-02 RX ORDER — PREGABALIN 75 MG/1
300 CAPSULE ORAL 2 TIMES DAILY
Status: DISCONTINUED | OUTPATIENT
Start: 2021-03-02 | End: 2021-03-04 | Stop reason: HOSPADM

## 2021-03-02 RX ADMIN — HUMAN ALBUMIN MICROSPHERES AND PERFLUTREN 0.11 MG: 10; .22 INJECTION, SOLUTION INTRAVENOUS at 10:03

## 2021-03-02 RX ADMIN — FAMOTIDINE 20 MG: 10 INJECTION, SOLUTION INTRAVENOUS at 08:03

## 2021-03-02 RX ADMIN — PIPERACILLIN AND TAZOBACTAM 4.5 G: 4; .5 INJECTION, POWDER, LYOPHILIZED, FOR SOLUTION INTRAVENOUS; PARENTERAL at 12:03

## 2021-03-02 RX ADMIN — SODIUM CHLORIDE 1000 ML: 0.9 INJECTION, SOLUTION INTRAVENOUS at 12:03

## 2021-03-02 RX ADMIN — SIMETHICONE 125 MG: 125 TABLET, CHEWABLE ORAL at 07:03

## 2021-03-02 RX ADMIN — BUPRENORPHINE HYDROCHLORIDE AND NALOXONE HYDROCHLORIDE DIHYDRATE 8 MG: 8; 2 TABLET SUBLINGUAL at 10:03

## 2021-03-02 RX ADMIN — PIPERACILLIN AND TAZOBACTAM 4.5 G: 4; .5 INJECTION, POWDER, LYOPHILIZED, FOR SOLUTION INTRAVENOUS; PARENTERAL at 09:03

## 2021-03-02 RX ADMIN — IPRATROPIUM BROMIDE AND ALBUTEROL SULFATE 3 ML: .5; 2.5 SOLUTION RESPIRATORY (INHALATION) at 03:03

## 2021-03-02 RX ADMIN — ONDANSETRON 4 MG: 2 INJECTION INTRAMUSCULAR; INTRAVENOUS at 07:03

## 2021-03-02 RX ADMIN — BUPRENORPHINE HYDROCHLORIDE AND NALOXONE HYDROCHLORIDE DIHYDRATE 8 MG: 8; 2 TABLET SUBLINGUAL at 08:03

## 2021-03-02 RX ADMIN — BUPRENORPHINE HYDROCHLORIDE AND NALOXONE HYDROCHLORIDE DIHYDRATE 8 MG: 8; 2 TABLET SUBLINGUAL at 03:03

## 2021-03-02 RX ADMIN — PANTOPRAZOLE SODIUM 40 MG: 40 TABLET, DELAYED RELEASE ORAL at 10:03

## 2021-03-02 RX ADMIN — CLOPIDOGREL 75 MG: 75 TABLET, FILM COATED ORAL at 10:03

## 2021-03-02 RX ADMIN — MUPIROCIN: 20 OINTMENT TOPICAL at 08:03

## 2021-03-02 RX ADMIN — FLUTICASONE FUROATE AND VILANTEROL TRIFENATATE 1 PUFF: 100; 25 POWDER RESPIRATORY (INHALATION) at 03:03

## 2021-03-02 RX ADMIN — ENOXAPARIN SODIUM 40 MG: 40 INJECTION SUBCUTANEOUS at 05:03

## 2021-03-02 RX ADMIN — ATORVASTATIN CALCIUM 80 MG: 40 TABLET, FILM COATED ORAL at 10:03

## 2021-03-02 RX ADMIN — PIPERACILLIN AND TAZOBACTAM 4.5 G: 4; .5 INJECTION, POWDER, LYOPHILIZED, FOR SOLUTION INTRAVENOUS; PARENTERAL at 03:03

## 2021-03-02 RX ADMIN — Medication 1 PATCH: at 10:03

## 2021-03-02 RX ADMIN — SODIUM CHLORIDE, SODIUM LACTATE, POTASSIUM CHLORIDE, AND CALCIUM CHLORIDE 1000 ML: .6; .31; .03; .02 INJECTION, SOLUTION INTRAVENOUS at 09:03

## 2021-03-02 RX ADMIN — ACETAMINOPHEN 650 MG: 325 TABLET ORAL at 09:03

## 2021-03-02 RX ADMIN — DEXTROSE: 10 SOLUTION INTRAVENOUS at 01:03

## 2021-03-02 RX ADMIN — PREGABALIN 300 MG: 75 CAPSULE ORAL at 08:03

## 2021-03-02 RX ADMIN — PREGABALIN 300 MG: 75 CAPSULE ORAL at 10:03

## 2021-03-02 RX ADMIN — FAMOTIDINE 20 MG: 10 INJECTION, SOLUTION INTRAVENOUS at 09:03

## 2021-03-03 ENCOUNTER — CLINICAL SUPPORT (OUTPATIENT)
Dept: SMOKING CESSATION | Facility: CLINIC | Age: 41
End: 2021-03-03
Payer: COMMERCIAL

## 2021-03-03 DIAGNOSIS — F17.210 CIGARETTE SMOKER: Primary | ICD-10-CM

## 2021-03-03 PROBLEM — R00.0 SINUS TACHYCARDIA: Status: ACTIVE | Noted: 2021-03-03

## 2021-03-03 LAB
ALBUMIN SERPL BCP-MCNC: 2.2 G/DL (ref 3.5–5.2)
ALP SERPL-CCNC: 80 U/L (ref 55–135)
ALT SERPL W/O P-5'-P-CCNC: 23 U/L (ref 10–44)
ANION GAP SERPL CALC-SCNC: 11 MMOL/L (ref 8–16)
AST SERPL-CCNC: 12 U/L (ref 10–40)
BASOPHILS # BLD AUTO: 0.03 K/UL (ref 0–0.2)
BASOPHILS NFR BLD: 0.2 % (ref 0–1.9)
BILIRUB SERPL-MCNC: 1.8 MG/DL (ref 0.1–1)
BUN SERPL-MCNC: 43 MG/DL (ref 6–20)
CALCIUM SERPL-MCNC: 7.3 MG/DL (ref 8.7–10.5)
CHLORIDE SERPL-SCNC: 103 MMOL/L (ref 95–110)
CO2 SERPL-SCNC: 19 MMOL/L (ref 23–29)
CREAT SERPL-MCNC: 2 MG/DL (ref 0.5–1.4)
DIFFERENTIAL METHOD: ABNORMAL
EOSINOPHIL # BLD AUTO: 0.2 K/UL (ref 0–0.5)
EOSINOPHIL NFR BLD: 1.7 % (ref 0–8)
ERYTHROCYTE [DISTWIDTH] IN BLOOD BY AUTOMATED COUNT: 15.9 % (ref 11.5–14.5)
EST. GFR  (AFRICAN AMERICAN): 35 ML/MIN/1.73 M^2
EST. GFR  (NON AFRICAN AMERICAN): 30 ML/MIN/1.73 M^2
GLUCOSE SERPL-MCNC: 250 MG/DL (ref 70–110)
HCT VFR BLD AUTO: 28.9 % (ref 37–48.5)
HGB BLD-MCNC: 8.8 G/DL (ref 12–16)
IMM GRANULOCYTES # BLD AUTO: 0.3 K/UL (ref 0–0.04)
IMM GRANULOCYTES NFR BLD AUTO: 2.3 % (ref 0–0.5)
LYMPHOCYTES # BLD AUTO: 1.9 K/UL (ref 1–4.8)
LYMPHOCYTES NFR BLD: 14.1 % (ref 18–48)
MAGNESIUM SERPL-MCNC: 1.9 MG/DL (ref 1.6–2.6)
MCH RBC QN AUTO: 28.6 PG (ref 27–31)
MCHC RBC AUTO-ENTMCNC: 30.4 G/DL (ref 32–36)
MCV RBC AUTO: 94 FL (ref 82–98)
MONOCYTES # BLD AUTO: 0.8 K/UL (ref 0.3–1)
MONOCYTES NFR BLD: 6.2 % (ref 4–15)
NEUTROPHILS # BLD AUTO: 10 K/UL (ref 1.8–7.7)
NEUTROPHILS NFR BLD: 75.5 % (ref 38–73)
NRBC BLD-RTO: 0 /100 WBC
PHOSPHATE SERPL-MCNC: 5.5 MG/DL (ref 2.7–4.5)
PLATELET # BLD AUTO: 154 K/UL (ref 150–350)
PMV BLD AUTO: 10.2 FL (ref 9.2–12.9)
POCT GLUCOSE: 191 MG/DL (ref 70–110)
POCT GLUCOSE: 195 MG/DL (ref 70–110)
POCT GLUCOSE: 202 MG/DL (ref 70–110)
POCT GLUCOSE: 203 MG/DL (ref 70–110)
POCT GLUCOSE: 204 MG/DL (ref 70–110)
POCT GLUCOSE: 205 MG/DL (ref 70–110)
POCT GLUCOSE: 212 MG/DL (ref 70–110)
POCT GLUCOSE: 214 MG/DL (ref 70–110)
POCT GLUCOSE: 217 MG/DL (ref 70–110)
POCT GLUCOSE: 221 MG/DL (ref 70–110)
POCT GLUCOSE: 222 MG/DL (ref 70–110)
POCT GLUCOSE: 225 MG/DL (ref 70–110)
POCT GLUCOSE: 256 MG/DL (ref 70–110)
POTASSIUM SERPL-SCNC: 4.9 MMOL/L (ref 3.5–5.1)
PROT SERPL-MCNC: 5.4 G/DL (ref 6–8.4)
RBC # BLD AUTO: 3.08 M/UL (ref 4–5.4)
SODIUM SERPL-SCNC: 133 MMOL/L (ref 136–145)
WBC # BLD AUTO: 13.27 K/UL (ref 3.9–12.7)

## 2021-03-03 PROCEDURE — 11000001 HC ACUTE MED/SURG PRIVATE ROOM

## 2021-03-03 PROCEDURE — 90833 PR PSYCHOTHERAPY W/PATIENT W/E&M, 30 MIN (ADD ON): ICD-10-PCS | Mod: AF,HB,, | Performed by: PSYCHIATRY & NEUROLOGY

## 2021-03-03 PROCEDURE — 85025 COMPLETE CBC W/AUTO DIFF WBC: CPT

## 2021-03-03 PROCEDURE — 99407 BEHAV CHNG SMOKING > 10 MIN: CPT | Mod: S$GLB,,,

## 2021-03-03 PROCEDURE — 99223 PR INITIAL HOSPITAL CARE,LEVL III: ICD-10-PCS | Mod: AF,HB,, | Performed by: PSYCHIATRY & NEUROLOGY

## 2021-03-03 PROCEDURE — 63600175 PHARM REV CODE 636 W HCPCS: Performed by: NURSE PRACTITIONER

## 2021-03-03 PROCEDURE — 80053 COMPREHEN METABOLIC PANEL: CPT

## 2021-03-03 PROCEDURE — 97165 OT EVAL LOW COMPLEX 30 MIN: CPT

## 2021-03-03 PROCEDURE — 25000242 PHARM REV CODE 250 ALT 637 W/ HCPCS: Performed by: HOSPITALIST

## 2021-03-03 PROCEDURE — 84100 ASSAY OF PHOSPHORUS: CPT

## 2021-03-03 PROCEDURE — 83735 ASSAY OF MAGNESIUM: CPT

## 2021-03-03 PROCEDURE — 97530 THERAPEUTIC ACTIVITIES: CPT | Performed by: PHYSICAL THERAPIST

## 2021-03-03 PROCEDURE — 90833 PSYTX W PT W E/M 30 MIN: CPT | Mod: AF,HB,, | Performed by: PSYCHIATRY & NEUROLOGY

## 2021-03-03 PROCEDURE — S4991 NICOTINE PATCH NONLEGEND: HCPCS | Performed by: HOSPITALIST

## 2021-03-03 PROCEDURE — 94640 AIRWAY INHALATION TREATMENT: CPT

## 2021-03-03 PROCEDURE — 25000003 PHARM REV CODE 250: Performed by: NURSE PRACTITIONER

## 2021-03-03 PROCEDURE — 94761 N-INVAS EAR/PLS OXIMETRY MLT: CPT

## 2021-03-03 PROCEDURE — 63600175 PHARM REV CODE 636 W HCPCS: Performed by: HOSPITALIST

## 2021-03-03 PROCEDURE — 25000003 PHARM REV CODE 250: Performed by: HOSPITALIST

## 2021-03-03 PROCEDURE — 99407 PR TOBACCO USE CESSATION INTENSIVE >10 MINUTES: ICD-10-PCS | Mod: S$GLB,,,

## 2021-03-03 PROCEDURE — 99223 1ST HOSP IP/OBS HIGH 75: CPT | Mod: AF,HB,, | Performed by: PSYCHIATRY & NEUROLOGY

## 2021-03-03 PROCEDURE — 97162 PT EVAL MOD COMPLEX 30 MIN: CPT | Performed by: PHYSICAL THERAPIST

## 2021-03-03 PROCEDURE — 97535 SELF CARE MNGMENT TRAINING: CPT

## 2021-03-03 PROCEDURE — 25000003 PHARM REV CODE 250: Performed by: INTERNAL MEDICINE

## 2021-03-03 RX ORDER — CARVEDILOL 3.12 MG/1
3.12 TABLET ORAL 2 TIMES DAILY
Status: DISCONTINUED | OUTPATIENT
Start: 2021-03-04 | End: 2021-03-04 | Stop reason: HOSPADM

## 2021-03-03 RX ORDER — FAMOTIDINE 20 MG/1
20 TABLET, FILM COATED ORAL 2 TIMES DAILY
Status: DISCONTINUED | OUTPATIENT
Start: 2021-03-03 | End: 2021-03-04 | Stop reason: HOSPADM

## 2021-03-03 RX ADMIN — ACETAMINOPHEN 650 MG: 325 TABLET ORAL at 04:03

## 2021-03-03 RX ADMIN — PANTOPRAZOLE SODIUM 40 MG: 40 TABLET, DELAYED RELEASE ORAL at 08:03

## 2021-03-03 RX ADMIN — PREGABALIN 300 MG: 75 CAPSULE ORAL at 08:03

## 2021-03-03 RX ADMIN — MUPIROCIN: 20 OINTMENT TOPICAL at 08:03

## 2021-03-03 RX ADMIN — ATORVASTATIN CALCIUM 80 MG: 40 TABLET, FILM COATED ORAL at 08:03

## 2021-03-03 RX ADMIN — BUPRENORPHINE HYDROCHLORIDE AND NALOXONE HYDROCHLORIDE DIHYDRATE 8 MG: 8; 2 TABLET SUBLINGUAL at 08:03

## 2021-03-03 RX ADMIN — SIMETHICONE 125 MG: 125 TABLET, CHEWABLE ORAL at 04:03

## 2021-03-03 RX ADMIN — PIPERACILLIN AND TAZOBACTAM 4.5 G: 4; .5 INJECTION, POWDER, LYOPHILIZED, FOR SOLUTION INTRAVENOUS; PARENTERAL at 07:03

## 2021-03-03 RX ADMIN — DEXTROSE: 10 SOLUTION INTRAVENOUS at 12:03

## 2021-03-03 RX ADMIN — ENOXAPARIN SODIUM 40 MG: 40 INJECTION SUBCUTANEOUS at 04:03

## 2021-03-03 RX ADMIN — INSULIN ASPART 1 UNITS: 100 INJECTION, SOLUTION INTRAVENOUS; SUBCUTANEOUS at 08:03

## 2021-03-03 RX ADMIN — SIMETHICONE 125 MG: 125 TABLET, CHEWABLE ORAL at 03:03

## 2021-03-03 RX ADMIN — BUPRENORPHINE HYDROCHLORIDE AND NALOXONE HYDROCHLORIDE DIHYDRATE 8 MG: 8; 2 TABLET SUBLINGUAL at 03:03

## 2021-03-03 RX ADMIN — Medication 1 PATCH: at 08:03

## 2021-03-03 RX ADMIN — PIPERACILLIN AND TAZOBACTAM 4.5 G: 4; .5 INJECTION, POWDER, LYOPHILIZED, FOR SOLUTION INTRAVENOUS; PARENTERAL at 12:03

## 2021-03-03 RX ADMIN — CLOPIDOGREL 75 MG: 75 TABLET, FILM COATED ORAL at 08:03

## 2021-03-03 RX ADMIN — SODIUM CHLORIDE 500 ML: 0.9 INJECTION, SOLUTION INTRAVENOUS at 01:03

## 2021-03-03 RX ADMIN — FUROSEMIDE 40 MG: 40 TABLET ORAL at 08:03

## 2021-03-03 RX ADMIN — ACETAMINOPHEN 650 MG: 325 TABLET ORAL at 03:03

## 2021-03-03 RX ADMIN — PIPERACILLIN AND TAZOBACTAM 4.5 G: 4; .5 INJECTION, POWDER, LYOPHILIZED, FOR SOLUTION INTRAVENOUS; PARENTERAL at 11:03

## 2021-03-03 RX ADMIN — FLUTICASONE FUROATE AND VILANTEROL TRIFENATATE 1 PUFF: 100; 25 POWDER RESPIRATORY (INHALATION) at 07:03

## 2021-03-03 RX ADMIN — PIPERACILLIN AND TAZOBACTAM 4.5 G: 4; .5 INJECTION, POWDER, LYOPHILIZED, FOR SOLUTION INTRAVENOUS; PARENTERAL at 03:03

## 2021-03-03 RX ADMIN — ASPIRIN 81 MG: 81 TABLET, CHEWABLE ORAL at 08:03

## 2021-03-03 RX ADMIN — FAMOTIDINE 20 MG: 10 INJECTION, SOLUTION INTRAVENOUS at 08:03

## 2021-03-03 RX ADMIN — FAMOTIDINE 20 MG: 20 TABLET ORAL at 08:03

## 2021-03-04 VITALS
DIASTOLIC BLOOD PRESSURE: 60 MMHG | SYSTOLIC BLOOD PRESSURE: 114 MMHG | HEIGHT: 62 IN | TEMPERATURE: 99 F | BODY MASS INDEX: 53.92 KG/M2 | WEIGHT: 293 LBS | HEART RATE: 102 BPM | OXYGEN SATURATION: 96 % | RESPIRATION RATE: 18 BRPM

## 2021-03-04 LAB
ALBUMIN SERPL BCP-MCNC: 2.3 G/DL (ref 3.5–5.2)
ALP SERPL-CCNC: 101 U/L (ref 55–135)
ALT SERPL W/O P-5'-P-CCNC: 23 U/L (ref 10–44)
ANION GAP SERPL CALC-SCNC: 9 MMOL/L (ref 8–16)
AST SERPL-CCNC: 17 U/L (ref 10–40)
BACTERIA UR CULT: ABNORMAL
BASOPHILS # BLD AUTO: 0.01 K/UL (ref 0–0.2)
BASOPHILS NFR BLD: 0.1 % (ref 0–1.9)
BILIRUB SERPL-MCNC: 0.8 MG/DL (ref 0.1–1)
BUN SERPL-MCNC: 38 MG/DL (ref 6–20)
CALCIUM SERPL-MCNC: 8.1 MG/DL (ref 8.7–10.5)
CHLORIDE SERPL-SCNC: 106 MMOL/L (ref 95–110)
CO2 SERPL-SCNC: 23 MMOL/L (ref 23–29)
CREAT SERPL-MCNC: 1.7 MG/DL (ref 0.5–1.4)
DIFFERENTIAL METHOD: ABNORMAL
EOSINOPHIL # BLD AUTO: 0.3 K/UL (ref 0–0.5)
EOSINOPHIL NFR BLD: 2.9 % (ref 0–8)
ERYTHROCYTE [DISTWIDTH] IN BLOOD BY AUTOMATED COUNT: 15.6 % (ref 11.5–14.5)
EST. GFR  (AFRICAN AMERICAN): 42 ML/MIN/1.73 M^2
EST. GFR  (NON AFRICAN AMERICAN): 37 ML/MIN/1.73 M^2
GLUCOSE SERPL-MCNC: 315 MG/DL (ref 70–110)
HCT VFR BLD AUTO: 28.4 % (ref 37–48.5)
HGB BLD-MCNC: 8.8 G/DL (ref 12–16)
IMM GRANULOCYTES # BLD AUTO: 0.16 K/UL (ref 0–0.04)
IMM GRANULOCYTES NFR BLD AUTO: 1.8 % (ref 0–0.5)
LYMPHOCYTES # BLD AUTO: 1.4 K/UL (ref 1–4.8)
LYMPHOCYTES NFR BLD: 15.7 % (ref 18–48)
MAGNESIUM SERPL-MCNC: 2 MG/DL (ref 1.6–2.6)
MCH RBC QN AUTO: 28.8 PG (ref 27–31)
MCHC RBC AUTO-ENTMCNC: 31 G/DL (ref 32–36)
MCV RBC AUTO: 93 FL (ref 82–98)
MONOCYTES # BLD AUTO: 0.5 K/UL (ref 0.3–1)
MONOCYTES NFR BLD: 5.8 % (ref 4–15)
NEUTROPHILS # BLD AUTO: 6.4 K/UL (ref 1.8–7.7)
NEUTROPHILS NFR BLD: 73.7 % (ref 38–73)
NRBC BLD-RTO: 0 /100 WBC
PHOSPHATE SERPL-MCNC: 3.8 MG/DL (ref 2.7–4.5)
PLATELET # BLD AUTO: 148 K/UL (ref 150–350)
PMV BLD AUTO: 10.8 FL (ref 9.2–12.9)
POCT GLUCOSE: 225 MG/DL (ref 70–110)
POCT GLUCOSE: 274 MG/DL (ref 70–110)
POCT GLUCOSE: 289 MG/DL (ref 70–110)
POTASSIUM SERPL-SCNC: 4.9 MMOL/L (ref 3.5–5.1)
PROT SERPL-MCNC: 6.2 G/DL (ref 6–8.4)
RBC # BLD AUTO: 3.06 M/UL (ref 4–5.4)
SODIUM SERPL-SCNC: 138 MMOL/L (ref 136–145)
WBC # BLD AUTO: 8.66 K/UL (ref 3.9–12.7)

## 2021-03-04 PROCEDURE — S4991 NICOTINE PATCH NONLEGEND: HCPCS | Performed by: HOSPITALIST

## 2021-03-04 PROCEDURE — 94640 AIRWAY INHALATION TREATMENT: CPT

## 2021-03-04 PROCEDURE — 80053 COMPREHEN METABOLIC PANEL: CPT | Performed by: NURSE PRACTITIONER

## 2021-03-04 PROCEDURE — 25000003 PHARM REV CODE 250: Performed by: HOSPITALIST

## 2021-03-04 PROCEDURE — 85025 COMPLETE CBC W/AUTO DIFF WBC: CPT | Performed by: NURSE PRACTITIONER

## 2021-03-04 PROCEDURE — 84100 ASSAY OF PHOSPHORUS: CPT | Performed by: NURSE PRACTITIONER

## 2021-03-04 PROCEDURE — 63600175 PHARM REV CODE 636 W HCPCS: Performed by: HOSPITALIST

## 2021-03-04 PROCEDURE — 83735 ASSAY OF MAGNESIUM: CPT | Performed by: NURSE PRACTITIONER

## 2021-03-04 PROCEDURE — 63600175 PHARM REV CODE 636 W HCPCS: Performed by: NURSE PRACTITIONER

## 2021-03-04 PROCEDURE — C9399 UNCLASSIFIED DRUGS OR BIOLOG: HCPCS | Performed by: HOSPITALIST

## 2021-03-04 RX ORDER — INSULIN ASPART 100 [IU]/ML
3 INJECTION, SOLUTION INTRAVENOUS; SUBCUTANEOUS
Status: DISCONTINUED | OUTPATIENT
Start: 2021-03-04 | End: 2021-03-04

## 2021-03-04 RX ORDER — INSULIN GLARGINE 100 [IU]/ML
30 INJECTION, SOLUTION SUBCUTANEOUS 2 TIMES DAILY
Refills: 0
Start: 2021-03-04 | End: 2021-03-10

## 2021-03-04 RX ORDER — GLUCAGON 1 MG
1 KIT INJECTION
Status: DISCONTINUED | OUTPATIENT
Start: 2021-03-04 | End: 2021-03-04 | Stop reason: HOSPADM

## 2021-03-04 RX ORDER — IBUPROFEN 200 MG
16 TABLET ORAL
Status: DISCONTINUED | OUTPATIENT
Start: 2021-03-04 | End: 2021-03-04 | Stop reason: HOSPADM

## 2021-03-04 RX ORDER — CIPROFLOXACIN 500 MG/1
500 TABLET ORAL EVERY 12 HOURS
Qty: 10 TABLET | Refills: 0 | Status: SHIPPED | OUTPATIENT
Start: 2021-03-04 | End: 2021-03-09

## 2021-03-04 RX ORDER — PREGABALIN 150 MG/1
300 CAPSULE ORAL 2 TIMES DAILY
Status: ON HOLD
Start: 2021-03-04 | End: 2023-01-01

## 2021-03-04 RX ORDER — IBUPROFEN 200 MG
24 TABLET ORAL
Status: DISCONTINUED | OUTPATIENT
Start: 2021-03-04 | End: 2021-03-04 | Stop reason: HOSPADM

## 2021-03-04 RX ORDER — INSULIN ASPART 100 [IU]/ML
5 INJECTION, SOLUTION INTRAVENOUS; SUBCUTANEOUS
Status: DISCONTINUED | OUTPATIENT
Start: 2021-03-04 | End: 2021-03-04 | Stop reason: HOSPADM

## 2021-03-04 RX ORDER — INSULIN LISPRO 100 [IU]/ML
10 INJECTION, SUSPENSION SUBCUTANEOUS
Start: 2021-03-04 | End: 2021-03-10

## 2021-03-04 RX ADMIN — FUROSEMIDE 40 MG: 40 TABLET ORAL at 09:03

## 2021-03-04 RX ADMIN — ASPIRIN 81 MG: 81 TABLET, CHEWABLE ORAL at 09:03

## 2021-03-04 RX ADMIN — ATORVASTATIN CALCIUM 80 MG: 40 TABLET, FILM COATED ORAL at 09:03

## 2021-03-04 RX ADMIN — INSULIN ASPART 5 UNITS: 100 INJECTION, SOLUTION INTRAVENOUS; SUBCUTANEOUS at 09:03

## 2021-03-04 RX ADMIN — INSULIN DETEMIR 15 UNITS: 100 INJECTION, SOLUTION SUBCUTANEOUS at 09:03

## 2021-03-04 RX ADMIN — INSULIN ASPART 2 UNITS: 100 INJECTION, SOLUTION INTRAVENOUS; SUBCUTANEOUS at 12:03

## 2021-03-04 RX ADMIN — CARVEDILOL 3.12 MG: 3.12 TABLET, FILM COATED ORAL at 09:03

## 2021-03-04 RX ADMIN — INSULIN ASPART 5 UNITS: 100 INJECTION, SOLUTION INTRAVENOUS; SUBCUTANEOUS at 12:03

## 2021-03-04 RX ADMIN — FLUTICASONE FUROATE AND VILANTEROL TRIFENATATE 1 PUFF: 100; 25 POWDER RESPIRATORY (INHALATION) at 08:03

## 2021-03-04 RX ADMIN — PREGABALIN 300 MG: 75 CAPSULE ORAL at 09:03

## 2021-03-04 RX ADMIN — INSULIN ASPART 3 UNITS: 100 INJECTION, SOLUTION INTRAVENOUS; SUBCUTANEOUS at 09:03

## 2021-03-04 RX ADMIN — Medication 1 PATCH: at 09:03

## 2021-03-04 RX ADMIN — BUPRENORPHINE HYDROCHLORIDE AND NALOXONE HYDROCHLORIDE DIHYDRATE 8 MG: 8; 2 TABLET SUBLINGUAL at 02:03

## 2021-03-04 RX ADMIN — PANTOPRAZOLE SODIUM 40 MG: 40 TABLET, DELAYED RELEASE ORAL at 09:03

## 2021-03-04 RX ADMIN — FAMOTIDINE 20 MG: 20 TABLET ORAL at 09:03

## 2021-03-04 RX ADMIN — BUPRENORPHINE HYDROCHLORIDE AND NALOXONE HYDROCHLORIDE DIHYDRATE 8 MG: 8; 2 TABLET SUBLINGUAL at 09:03

## 2021-03-04 RX ADMIN — PIPERACILLIN AND TAZOBACTAM 4.5 G: 4; .5 INJECTION, POWDER, LYOPHILIZED, FOR SOLUTION INTRAVENOUS; PARENTERAL at 09:03

## 2021-03-04 RX ADMIN — MUPIROCIN: 20 OINTMENT TOPICAL at 09:03

## 2021-03-04 RX ADMIN — CLOPIDOGREL 75 MG: 75 TABLET, FILM COATED ORAL at 09:03

## 2021-03-05 ENCOUNTER — PATIENT OUTREACH (OUTPATIENT)
Dept: ADMINISTRATIVE | Facility: CLINIC | Age: 41
End: 2021-03-05

## 2021-03-07 LAB
BACTERIA BLD CULT: NORMAL
BACTERIA BLD CULT: NORMAL

## 2021-03-10 ENCOUNTER — TELEPHONE (OUTPATIENT)
Dept: SMOKING CESSATION | Facility: CLINIC | Age: 41
End: 2021-03-10

## 2021-03-10 ENCOUNTER — OFFICE VISIT (OUTPATIENT)
Dept: PRIMARY CARE CLINIC | Facility: CLINIC | Age: 41
End: 2021-03-10
Payer: MEDICAID

## 2021-03-10 VITALS
SYSTOLIC BLOOD PRESSURE: 129 MMHG | DIASTOLIC BLOOD PRESSURE: 78 MMHG | TEMPERATURE: 98 F | OXYGEN SATURATION: 98 % | HEART RATE: 96 BPM

## 2021-03-10 DIAGNOSIS — N17.9 AKI (ACUTE KIDNEY INJURY): ICD-10-CM

## 2021-03-10 DIAGNOSIS — N39.0 UTI DUE TO KLEBSIELLA SPECIES: ICD-10-CM

## 2021-03-10 DIAGNOSIS — E11.649 TYPE 2 DIABETES MELLITUS WITH HYPOGLYCEMIA WITHOUT COMA, WITH LONG-TERM CURRENT USE OF INSULIN: ICD-10-CM

## 2021-03-10 DIAGNOSIS — N39.0 SEPSIS DUE TO URINARY TRACT INFECTION: Primary | ICD-10-CM

## 2021-03-10 DIAGNOSIS — I10 BENIGN ESSENTIAL HYPERTENSION: Chronic | ICD-10-CM

## 2021-03-10 DIAGNOSIS — Z79.4 TYPE 2 DIABETES MELLITUS WITH HYPOGLYCEMIA WITHOUT COMA, WITH LONG-TERM CURRENT USE OF INSULIN: ICD-10-CM

## 2021-03-10 DIAGNOSIS — I50.42 CHRONIC COMBINED SYSTOLIC AND DIASTOLIC CONGESTIVE HEART FAILURE: ICD-10-CM

## 2021-03-10 DIAGNOSIS — A41.9 SEPSIS DUE TO URINARY TRACT INFECTION: Primary | ICD-10-CM

## 2021-03-10 DIAGNOSIS — B96.89 UTI DUE TO KLEBSIELLA SPECIES: ICD-10-CM

## 2021-03-10 DIAGNOSIS — F17.200 TOBACCO USE DISORDER: Chronic | ICD-10-CM

## 2021-03-10 DIAGNOSIS — E66.01 MORBID OBESITY WITH BMI OF 70 AND OVER, ADULT: ICD-10-CM
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
## 2021-03-10 PROBLEM — I21.4 NSTEMI (NON-ST ELEVATED MYOCARDIAL INFARCTION): Status: RESOLVED | Noted: 2021-01-24 | Resolved: 2021-03-10

## 2021-03-10 PROBLEM — R00.0 SINUS TACHYCARDIA: Status: RESOLVED | Noted: 2021-03-03 | Resolved: 2021-03-10

## 2021-03-10 PROBLEM — G93.41 ENCEPHALOPATHY, METABOLIC: Status: RESOLVED | Noted: 2021-03-02 | Resolved: 2021-03-10

## 2021-03-10 PROCEDURE — 99496 TRANSITIONAL CARE MANAGE SERVICE 7 DAY DISCHARGE: ICD-10-PCS | Mod: S$PBB,,, | Performed by: INTERNAL MEDICINE

## 2021-03-10 PROCEDURE — 99496 TRANSJ CARE MGMT HIGH F2F 7D: CPT | Mod: S$PBB,,, | Performed by: INTERNAL MEDICINE

## 2021-03-10 PROCEDURE — 99999 PR PBB SHADOW E&M-EST. PATIENT-LVL III: CPT | Mod: PBBFAC,,, | Performed by: INTERNAL MEDICINE

## 2021-03-10 PROCEDURE — 99213 OFFICE O/P EST LOW 20 MIN: CPT | Mod: PBBFAC,PO | Performed by: INTERNAL MEDICINE

## 2021-03-10 PROCEDURE — 99999 PR PBB SHADOW E&M-EST. PATIENT-LVL III: ICD-10-PCS | Mod: PBBFAC,,, | Performed by: INTERNAL MEDICINE

## 2021-03-10 RX ORDER — INSULIN LISPRO 100 [IU]/ML
25 INJECTION, SUSPENSION SUBCUTANEOUS 2 TIMES DAILY
Status: ON HOLD
Start: 2021-03-10 | End: 2023-01-01

## 2021-03-10 RX ORDER — ALBUTEROL SULFATE 90 UG/1
2 AEROSOL, METERED RESPIRATORY (INHALATION) EVERY 6 HOURS PRN
Qty: 18 G | Refills: 3 | Status: SHIPPED | OUTPATIENT
Start: 2021-03-10 | End: 2022-03-10

## 2021-03-10 RX ORDER — POTASSIUM CHLORIDE 750 MG/1
10 CAPSULE, EXTENDED RELEASE ORAL 2 TIMES DAILY PRN
Status: ON HOLD | COMMUNITY
Start: 2021-03-07 | End: 2023-01-01

## 2021-03-10 RX ORDER — INSULIN GLARGINE 100 [IU]/ML
50 INJECTION, SOLUTION SUBCUTANEOUS 3 TIMES DAILY
Refills: 0
Start: 2021-03-10 | End: 2023-01-01

## 2021-03-10 RX ORDER — BUDESONIDE AND FORMOTEROL FUMARATE DIHYDRATE 160; 4.5 UG/1; UG/1
2 AEROSOL RESPIRATORY (INHALATION) EVERY 12 HOURS
Qty: 10.2 G | Refills: 3 | Status: ON HOLD | OUTPATIENT
Start: 2021-03-10 | End: 2023-01-01

## 2021-03-10 RX ORDER — PROMETHAZINE HYDROCHLORIDE 6.25 MG/5ML
12.5 SYRUP ORAL EVERY 6 HOURS PRN
Status: ON HOLD | COMMUNITY
Start: 2021-03-05 | End: 2023-01-01

## 2021-03-12 ENCOUNTER — OFFICE VISIT (OUTPATIENT)
Dept: CARDIOLOGY | Facility: CLINIC | Age: 41
End: 2021-03-12
Payer: MEDICAID

## 2021-03-12 ENCOUNTER — PATIENT MESSAGE (OUTPATIENT)
Dept: CARDIOLOGY | Facility: CLINIC | Age: 41
End: 2021-03-12

## 2021-03-12 ENCOUNTER — TELEPHONE (OUTPATIENT)
Dept: CARDIOLOGY | Facility: CLINIC | Age: 41
End: 2021-03-12

## 2021-03-12 VITALS
BODY MASS INDEX: 67.86 KG/M2 | HEART RATE: 115 BPM | DIASTOLIC BLOOD PRESSURE: 78 MMHG | WEIGHT: 293 LBS | OXYGEN SATURATION: 99 % | SYSTOLIC BLOOD PRESSURE: 140 MMHG

## 2021-03-12 DIAGNOSIS — E78.5 HYPERLIPIDEMIA ASSOCIATED WITH TYPE 2 DIABETES MELLITUS: ICD-10-CM

## 2021-03-12 DIAGNOSIS — I25.10 CORONARY ARTERY DISEASE INVOLVING NATIVE CORONARY ARTERY OF NATIVE HEART WITHOUT ANGINA PECTORIS: Primary | ICD-10-CM

## 2021-03-12 DIAGNOSIS — Z79.4 TYPE 2 DIABETES MELLITUS WITH HYPOGLYCEMIA WITHOUT COMA, WITH LONG-TERM CURRENT USE OF INSULIN: ICD-10-CM

## 2021-03-12 DIAGNOSIS — E66.01 MORBID OBESITY: ICD-10-CM

## 2021-03-12 DIAGNOSIS — I50.42 CHRONIC COMBINED SYSTOLIC AND DIASTOLIC CONGESTIVE HEART FAILURE: ICD-10-CM

## 2021-03-12 DIAGNOSIS — E11.649 TYPE 2 DIABETES MELLITUS WITH HYPOGLYCEMIA WITHOUT COMA, WITH LONG-TERM CURRENT USE OF INSULIN: ICD-10-CM

## 2021-03-12 DIAGNOSIS — E11.59 HYPERTENSION ASSOCIATED WITH DIABETES: ICD-10-CM

## 2021-03-12 DIAGNOSIS — I15.2 HYPERTENSION ASSOCIATED WITH DIABETES: ICD-10-CM

## 2021-03-12 DIAGNOSIS — E11.69 HYPERLIPIDEMIA ASSOCIATED WITH TYPE 2 DIABETES MELLITUS: ICD-10-CM

## 2021-03-12 PROCEDURE — 99214 OFFICE O/P EST MOD 30 MIN: CPT | Mod: S$GLB,,, | Performed by: INTERNAL MEDICINE

## 2021-03-12 PROCEDURE — 99214 PR OFFICE/OUTPT VISIT, EST, LEVL IV, 30-39 MIN: ICD-10-PCS | Mod: S$GLB,,, | Performed by: INTERNAL MEDICINE

## 2021-03-16 ENCOUNTER — CLINICAL SUPPORT (OUTPATIENT)
Dept: DIABETES | Facility: CLINIC | Age: 41
End: 2021-03-16
Payer: MEDICAID

## 2021-03-16 DIAGNOSIS — Z79.4 TYPE 2 DIABETES MELLITUS WITH HYPOGLYCEMIA WITHOUT COMA, WITH LONG-TERM CURRENT USE OF INSULIN: ICD-10-CM

## 2021-03-16 DIAGNOSIS — E11.649 TYPE 2 DIABETES MELLITUS WITH HYPOGLYCEMIA WITHOUT COMA, WITH LONG-TERM CURRENT USE OF INSULIN: ICD-10-CM

## 2021-03-16 DIAGNOSIS — E16.2 HYPOGLYCEMIA: ICD-10-CM

## 2021-03-16 DIAGNOSIS — E11.649 HYPOGLYCEMIA ASSOCIATED WITH TYPE 2 DIABETES MELLITUS: ICD-10-CM

## 2021-03-16 PROCEDURE — 99999 PR PBB SHADOW E&M-EST. PATIENT-LVL I: ICD-10-PCS | Mod: PBBFAC,,, | Performed by: DIETITIAN, REGISTERED

## 2021-03-16 PROCEDURE — 99211 OFF/OP EST MAY X REQ PHY/QHP: CPT | Mod: PBBFAC,PN | Performed by: DIETITIAN, REGISTERED

## 2021-03-16 PROCEDURE — 99999 PR PBB SHADOW E&M-EST. PATIENT-LVL I: CPT | Mod: PBBFAC,,, | Performed by: DIETITIAN, REGISTERED

## 2021-03-16 PROCEDURE — G0108 DIAB MANAGE TRN  PER INDIV: HCPCS | Mod: PBBFAC,PN | Performed by: DIETITIAN, REGISTERED

## 2021-03-23 ENCOUNTER — TELEPHONE (OUTPATIENT)
Dept: CARDIOLOGY | Facility: CLINIC | Age: 41
End: 2021-03-23

## 2021-03-29 ENCOUNTER — TELEPHONE (OUTPATIENT)
Dept: PRIMARY CARE CLINIC | Facility: CLINIC | Age: 41
End: 2021-03-29

## 2021-03-29 ENCOUNTER — HOSPITAL ENCOUNTER (OUTPATIENT)
Dept: RADIOLOGY | Facility: HOSPITAL | Age: 41
Discharge: HOME | End: 2021-03-29
Attending: INTERNAL MEDICINE
Payer: MEDICAID

## 2021-03-29 DIAGNOSIS — N18.31 STAGE 3A CHRONIC KIDNEY DISEASE: ICD-10-CM

## 2021-03-29 PROCEDURE — 76770 US EXAM ABDO BACK WALL COMP: CPT | Mod: TC,PO

## 2021-06-07 ENCOUNTER — TELEPHONE (OUTPATIENT)
Dept: CARDIOLOGY | Facility: CLINIC | Age: 41
End: 2021-06-07

## 2021-07-08 ENCOUNTER — TELEPHONE (OUTPATIENT)
Dept: HEMATOLOGY/ONCOLOGY | Facility: CLINIC | Age: 41
End: 2021-07-08

## 2021-09-07 ENCOUNTER — TELEPHONE (OUTPATIENT)
Dept: SMOKING CESSATION | Facility: CLINIC | Age: 41
End: 2021-09-07

## 2021-09-28 ENCOUNTER — TELEPHONE (OUTPATIENT)
Dept: SMOKING CESSATION | Facility: CLINIC | Age: 41
End: 2021-09-28

## 2021-10-11 ENCOUNTER — TELEPHONE (OUTPATIENT)
Dept: SMOKING CESSATION | Facility: CLINIC | Age: 41
End: 2021-10-11

## 2021-12-28 ENCOUNTER — HOSPITAL ENCOUNTER (EMERGENCY)
Facility: HOSPITAL | Age: 41
Discharge: HOME | End: 2021-12-28
Attending: EMERGENCY MEDICINE
Payer: MEDICAID

## 2021-12-28 VITALS
WEIGHT: 293 LBS | BODY MASS INDEX: 53.92 KG/M2 | RESPIRATION RATE: 16 BRPM | TEMPERATURE: 99 F | HEART RATE: 87 BPM | SYSTOLIC BLOOD PRESSURE: 136 MMHG | OXYGEN SATURATION: 96 % | HEIGHT: 62 IN | DIASTOLIC BLOOD PRESSURE: 87 MMHG

## 2021-12-28 DIAGNOSIS — J20.9 ACUTE BRONCHITIS, UNSPECIFIED ORGANISM: Primary | ICD-10-CM

## 2021-12-28 LAB
INFLUENZA A, MOLECULAR: NEGATIVE
INFLUENZA B, MOLECULAR: NEGATIVE
SARS-COV-2 RDRP RESP QL NAA+PROBE: NEGATIVE
SPECIMEN SOURCE: NORMAL

## 2021-12-28 PROCEDURE — 99284 EMERGENCY DEPT VISIT MOD MDM: CPT | Mod: ER

## 2021-12-28 PROCEDURE — 87502 INFLUENZA DNA AMP PROBE: CPT | Mod: ER | Performed by: EMERGENCY MEDICINE

## 2021-12-28 PROCEDURE — U0002 COVID-19 LAB TEST NON-CDC: HCPCS | Mod: ER | Performed by: EMERGENCY MEDICINE

## 2021-12-28 RX ORDER — BENZONATATE 100 MG/1
100 CAPSULE ORAL 3 TIMES DAILY PRN
Qty: 20 CAPSULE | Refills: 0 | Status: SHIPPED | OUTPATIENT
Start: 2021-12-28 | End: 2022-01-07

## 2021-12-28 RX ORDER — AZITHROMYCIN 250 MG/1
250 TABLET, FILM COATED ORAL DAILY
Qty: 6 TABLET | Refills: 0 | Status: ON HOLD | OUTPATIENT
Start: 2021-12-28 | End: 2022-06-18

## 2021-12-28 NOTE — ED PROVIDER NOTES
"Encounter Date: 2021       History     Chief Complaint   Patient presents with    COVID-19 Concerns     Patient presents to ED after COVID exposure; " I dont think I have it but I'm at risk and have been around people with it." denies symptoms at present.      Patient is a 42 y/o female with history of asthma who presents to ED with c/o headaches, cough, and wheezing. Using inhalers.  Onset 1 week.  Denies any chest pain, shortness of breath, nausea, vomiting, diarrhea.  + covid exposure.         Review of patient's allergies indicates:   Allergen Reactions    Celexa [citalopram] Nausea And Vomiting     Past Medical History:   Diagnosis Date    Anemia     Asthma     Cellulitis of abdominal wall 2017    CKD (chronic kidney disease) stage 3, GFR 30-59 ml/min     Depression     Diabetes mellitus, type II     Diastolic dysfunction     Diverticulosis of intestine with bleeding 2016    E coli bacteremia 2018    E. coli pyelonephritis 2015    E. coli UTI 2017    Hematuria     Hernia, epigastric     Hypertension     Hypocalcemia     Nonalcoholic hepatosteatosis     Periumbilical hernia     Proteinuria      Past Surgical History:   Procedure Laterality Date    ARM AMPUTATION AT SHOULDER Left 2000s     SECTION       SECTION, CLASSIC      CHOLECYSTECTOMY  age 15    CORONARY ANGIOGRAPHY N/A 2021    Procedure: ANGIOGRAM, CORONARY ARTERY;  Surgeon: Neelam Hicks MD;  Location: [facility] CATH LAB/EP;  Service: Cardiology;  Laterality: N/A;    INCISION AND DRAINAGE OF ABSCESS N/A 2020    Procedure: INCISION AND DRAINAGE, ABSCESS;  Surgeon: Rich Watson MD;  Location: [facility] OR;  Service: General;  Laterality: N/A;    LEFT HEART CATHETERIZATION Left 2021    Procedure: Left heart cath;  Surgeon: Neelam Hicks MD;  Location: [facility] CATH LAB/EP;  Service: Cardiology;  Laterality: Left;    LEFT HEART CATHETERIZATION N/A 2021    " Procedure: Left heart cath;  Surgeon: Neelam Hicks MD;  Location: [facility] CATH LAB/EP;  Service: Cardiology;  Laterality: N/A;    TONSILLECTOMY, ADENOIDECTOMY       Family History   Problem Relation Age of Onset    Cancer Father     Heart attack Mother     Heart disease Mother     Cancer Maternal Aunt         lung (smoker)    Heart attack Maternal Aunt     Breast cancer Paternal Grandmother     Heart attack Maternal Grandmother      Social History     Tobacco Use    Smoking status: Current Every Day Smoker     Packs/day: 2.50     Years: 31.00     Pack years: 77.50     Types: Cigarettes     Start date: 1987    Smokeless tobacco: Never Used    Tobacco comment: Pt enrolled in the [employer] on 9/12/18. ([employer] Member ID # [id number]).   Substance Use Topics    Alcohol use: No    Drug use: No     Review of Systems   Constitutional: Negative for chills, diaphoresis, fatigue and fever.   HENT: Negative for congestion, rhinorrhea and sore throat.    Respiratory: Positive for cough and wheezing. Negative for shortness of breath.    Cardiovascular: Negative for chest pain.   Gastrointestinal: Negative for diarrhea, nausea and vomiting.   Musculoskeletal: Negative for arthralgias, back pain and myalgias.   Skin: Negative for rash.   Neurological: Positive for headaches. Negative for weakness.       Physical Exam     Initial Vitals [12/28/21 0839]   BP Pulse Resp Temp SpO2   136/87 87 16 98.7 °F (37.1 °C) 96 %      MAP       --         Physical Exam    Nursing note and vitals reviewed.  Constitutional: She appears well-developed and well-nourished. She is not diaphoretic. She is Obese . No distress.   HENT:   Head: Normocephalic and atraumatic.   Eyes: Conjunctivae and EOM are normal. Pupils are equal, round, and reactive to light.   Neck: Neck supple.   Normal range of motion.  Cardiovascular: Normal rate, regular rhythm, normal heart sounds and intact distal pulses.   Pulmonary/Chest: Breath sounds normal. No  respiratory distress. She has no wheezes. She has no rhonchi. She has no rales.   Abdominal: Abdomen is soft. Bowel sounds are normal. There is no abdominal tenderness.   Musculoskeletal:         General: No tenderness or edema. Normal range of motion.      Cervical back: Normal range of motion and neck supple.     Neurological: She is alert and oriented to person, place, and time. She has normal strength. GCS score is 15. GCS eye subscore is 4. GCS verbal subscore is 5. GCS motor subscore is 6.   Skin: Skin is warm. Capillary refill takes less than 2 seconds. No rash noted.         ED Course   Procedures  Labs Reviewed   INFLUENZA A & B BY MOLECULAR   SARS-COV-2 RNA AMPLIFICATION, QUAL    Narrative:     Is the patient symptomatic?->Yes          Imaging Results    None          Medications - No data to display  Medical Decision Making:   ED Management:  COVID and flu negative.  Discussed CDC guidelines regarding COVID exposure.  Patient will be treated for acute bronchitis with azithromycin.  ED precautions were discussed patient at length to return for any worsening or change in symptoms.  Patient voiced an understanding and agreement to plan of care.  All questions were answered.                      Clinical Impression:   Final diagnoses:  [J20.9] Acute bronchitis, unspecified organism (Primary)          ED Disposition Condition    Discharge Stable        ED Prescriptions     Medication Sig Dispense Start Date End Date Auth. Provider    benzonatate (TESSALON) 100 MG capsule Take 1 capsule (100 mg total) by mouth 3 (three) times daily as needed for Cough. 20 capsule 12/28/2021 1/7/2022 Annabel Gerard PA-C    azithromycin (Z-BRYAN) 250 MG tablet Take 1 tablet (250 mg total) by mouth once daily. Take first 2 tablets together, then 1 every day until finished. 6 tablet 12/28/2021  Annabel Gerard PA-C        Follow-up Information     Follow up With Specialties Details Why Contact Info    Sammi Edge MD (Cindy)  Family Medicine   [address] [facility] [address]  [phone]             Annabel Gerard PA-C  12/28/21 5461

## 2022-01-28 ENCOUNTER — TELEPHONE (OUTPATIENT)
Dept: CARDIOLOGY | Facility: CLINIC | Age: 42
End: 2022-01-28
Payer: MEDICAID

## 2022-01-28 NOTE — TELEPHONE ENCOUNTER
----- Message from Abigail Hart sent at 1/28/2022  3:59 PM CST -----  Regarding: Would Like F/U Appt  Type:  Needs Medical Advice    Who Called: pt    Would the patient rather a call back or a response via [facility]? call    Best Call Back Number: [phone]    Additional Information: needs to consult about blood thinners

## 2022-01-28 NOTE — TELEPHONE ENCOUNTER
Call was transferred from call center    Scheduled pt follow up with Dr. Hicks at [facility] on Friday 02-11-22 at 10:20 am    Mailed appt reminder to the pt    ND

## 2022-02-14 ENCOUNTER — TELEPHONE (OUTPATIENT)
Dept: HEMATOLOGY/ONCOLOGY | Facility: CLINIC | Age: 42
End: 2022-02-14
Payer: MEDICAID

## 2022-02-15 ENCOUNTER — TELEPHONE (OUTPATIENT)
Dept: CARDIOLOGY | Facility: CLINIC | Age: 42
End: 2022-02-15
Payer: MEDICAID

## 2022-02-15 NOTE — TELEPHONE ENCOUNTER
Called the pt in regards to this message.     The pt did not answer, but I left a detailed voice message as well as a call back number.        ND

## 2022-02-15 NOTE — TELEPHONE ENCOUNTER
----- Message from Abigail Hart sent at 2/15/2022  1:06 PM CST -----  Regarding: Needs Appt  Type:  Needs Medical Advice    Who Called: pt    Symptoms (please be specific): heart complications, stress    Would the patient rather a call back or a response via [facility]? Call    Best Call Back Number: [phone]

## 2022-02-15 NOTE — TELEPHONE ENCOUNTER
Called pt back in regards to this message.     Pt stated had a recent EKG and ECHO from PCP at [facility]  Will provide cardiology office fax number to PCP to have records sent     Scheduled follow up appointment with Dr. Hicks on Friday 03-11-22 at 10 am at [facility]    Mailed appt reminder to the pt    ND

## 2022-02-15 NOTE — TELEPHONE ENCOUNTER
----- Message from Cynthia Skinner sent at 2/15/2022  2:51 PM CST -----  Regarding: returning a call  Contact: [phone]  Type:  Patient Returning Call    Who Called: self   Who Left Message for Patient: Holli Cristina MA   Does the patient know what this is regarding?: appt  Would the patient rather a call back or a response via [facility]?  call  Best Call Back Number: [phone]  Additional Information:

## 2022-03-10 NOTE — PROGRESS NOTES
Cardiology Clinic note    Subjective:   Patient ID:  Lele Dias is a 41 y.o. female who presents for CAD FUP    HPI:   CAD: Denies CP, CAZARES. Not exercising; walks around the house with no symptoms    HFrEF: No orthopnea, PND. LLE swelling -being treated for cellulitis. Salt restriction    HTN: On medications    HLD: Tolerating statin    SH Tobacco active 1-2 ppd. Unfortunately mirela passed away Feb 2021. Cessation referral placed  FH Mother had fatal MI at age 40    Patient Active Problem List    Diagnosis Date Noted    Hypoglycemia associated with type 2 diabetes mellitus 03/02/2021    Benzodiazepine abuse 03/02/2021    Grief reaction 03/02/2021    Debilitated 03/02/2021    Coronary artery disease involving native coronary artery of native heart without angina pectoris     Morbid obesity with BMI of 70 and over, adult     Opioid abuse     Sepsis due to urinary tract infection 09/12/2018    Anemia 04/22/2018    Tobacco use disorder 04/20/2018    UTI due to Klebsiella species 12/18/2017    Periumbilical hernia     BRIDGET (acute kidney injury) 06/02/2015    Nonalcoholic hepatosteatosis 06/02/2015    Opioid dependence on agonist therapy 06/02/2015    Phantom limb pain 06/02/2015     Left arm      Chronic combined systolic and diastolic congestive heart failure 07/15/2014    Asthma 07/15/2014    Type 2 diabetes mellitus with hypoglycemia, with long-term current use of insulin 07/15/2014    Benign essential hypertension 07/15/2014       Patient's Medications   New Prescriptions    No medications on file   Previous Medications    ALBUTEROL (ACCUNEB) 1.25 MG/3 ML NEBU    Take 1.25 mg by nebulization every 4 (four) hours as needed.    ALBUTEROL (VENTOLIN HFA) 90 MCG/ACTUATION INHALER    Inhale 2 puffs into the lungs every 6 (six) hours as needed for Wheezing. Rescue    ASPIRIN 81 MG CHEW    Take 1 tablet (81 mg total) by mouth once daily.    ATORVASTATIN (LIPITOR) 80 MG TABLET    Take 1 tablet (80  "mg total) by mouth once daily.    AZITHROMYCIN (Z-BRYAN) 250 MG TABLET    Take 1 tablet (250 mg total) by mouth once daily. Take first 2 tablets together, then 1 every day until finished.    BASAGLAR KWIKPEN U-100 INSULIN GLARGINE 100 UNITS/ML (3ML) SUBQ PEN    Inject 50 Units into the skin 3 (three) times daily.    BD ULTRA-FINE MINI PEN NEEDLE 31 GAUGE X 3/16" NDLE        BLOOD SUGAR DIAGNOSTIC STRP    TEST BLOOD SUAGR ONCE DAILY    BLOOD-GLUCOSE METER MISC    use as directed    BUDESONIDE-FORMOTEROL 160-4.5 MCG (SYMBICORT) 160-4.5 MCG/ACTUATION HFAA    Inhale 2 puffs into the lungs every 12 (twelve) hours. Controller    BUPRENORPHINE-NALOXONE (SUBOXONE) 8-2 MG FILM    Place 3 tablets under the tongue Daily.    CARVEDILOL (COREG) 3.125 MG TABLET    Take 1 tablet (3.125 mg total) by mouth 2 (two) times daily with meals.    CEPHALEXIN (KEFLEX) 500 MG CAPSULE    Take 500 mg by mouth 2 (two) times daily.    CHOLECALCIFEROL, VITAMIN D3, 1,250 MCG (50,000 UNIT) CAPSULE    Take 50,000 Units by mouth every 7 days.    CLOPIDOGREL (PLAVIX) 75 MG TABLET    Take 1 tablet (75 mg total) by mouth once daily.    DOCOSAHEXAENOIC ACID/EPA (FISH OIL ORAL)    Take by mouth.    FLUTICASONE PROPIONATE (FLONASE) 50 MCG/ACTUATION NASAL SPRAY    1 spray (50 mcg total) by Each Nostril route 2 (two) times daily as needed for Rhinitis.    FUROSEMIDE (LASIX) 40 MG TABLET    Take 40 mg by mouth 2 (two) times daily as needed. Only takes as needed for swelling in legs    HUMALOG MIX 75-25 KWIKPEN 100 UNIT/ML (75-25) INPN    Inject 25 Units into the skin 2 (two) times a day.    ICOSAPENT ETHYL (VASCEPA) 1 GRAM CAP        LISINOPRIL (PRINIVIL,ZESTRIL) 20 MG TABLET    Take 1 tablet (20 mg total) by mouth once daily.    MEDROXYPROGESTERONE (DEPO-PROVERA) 150 MG/ML SYRG        MULTIVITAMIN WITH MINERALS TABLET    Take 1 tablet by mouth once daily.    NICOTINE (NICODERM CQ) 21 MG/24 HR    Place 1 patch onto the skin once daily.    NICOTINE POLACRILEX 2 " "MG LOZG    Take 1 lozenge (2 mg total) by mouth as needed (prn). 1-2 per hour in the place of cigarette (5-16 daily max)    NITROGLYCERIN (NITROSTAT) 0.4 MG SL TABLET    Place 0.4 mg under the tongue every 5 (five) minutes as needed for Chest pain.    OMEGA-3 FATTY ACIDS/FISH OIL (FISH OIL-OMEGA-3 FATTY ACIDS) 300-1,000 MG CAPSULE    Take 1 capsule by mouth 2 (two) times daily.    OMEPRAZOLE (PRILOSEC) 40 MG CAPSULE    Take 40 mg by mouth every morning.     POTASSIUM CHLORIDE (MICRO-K) 10 MEQ CPSR    Take by mouth 2 (two) times daily.    PREGABALIN (LYRICA) 150 MG CAPSULE    Take 2 capsules (300 mg total) by mouth 2 (two) times daily.    PROMETHAZINE (PHENERGAN) 6.25 MG/5 ML SYRUP       Modified Medications    No medications on file   Discontinued Medications    No medications on file        Review of Systems   Constitutional: Negative for fever.   HENT: Negative for nosebleeds.    Cardiovascular: Negative for chest pain, dyspnea on exertion, irregular heartbeat, near-syncope, orthopnea, palpitations, paroxysmal nocturnal dyspnea and syncope. Leg swelling: Left foot swelling- being treated for cellulitis.        As above   Respiratory: Negative for hemoptysis.    Hematologic/Lymphatic: Negative for bleeding problem.   Musculoskeletal: Positive for arthritis.   Gastrointestinal: Negative for hematochezia.   Genitourinary: Negative for hematuria.   Neurological: Negative for seizures.   Allergic/Immunologic: Negative for environmental allergies.         Objective:   Vitals  Vitals:    03/11/22 1027   BP: 127/64   Pulse: (!) 111   SpO2: 95%   Height: 5' 2" (1.575 m)          Physical Exam  Constitutional:       General: She is not in acute distress.     Appearance: She is well-developed. She is not diaphoretic.   Eyes:      Conjunctiva/sclera: Conjunctivae normal.   Neck:      Vascular: No JVD.   Cardiovascular:      Rate and Rhythm: Normal rate and regular rhythm.      Heart sounds: No murmur heard.    No friction " rub. No gallop.   Pulmonary:      Effort: Pulmonary effort is normal. No respiratory distress.      Breath sounds: Normal breath sounds.   Abdominal:      Palpations: Abdomen is soft.      Tenderness: There is no abdominal tenderness.   Musculoskeletal:         General: Swelling: b/l LE swelling; mild pitting edema b/l - improving.      Cervical back: Normal range of motion.   Skin:     General: Skin is warm.   Neurological:      Mental Status: She is alert.             Assessment:     1. Coronary artery disease involving native coronary artery of native heart without angina pectoris    2. Heart failure with reduced ejection fraction    3. Type 2 diabetes mellitus with hypoglycemia without coma, with long-term current use of insulin    4. Hypertension associated with diabetes    5. Hyperlipidemia associated with type 2 diabetes mellitus    6. Morbid obesity    7. Tobacco use        Plan:   Lele Dias is a 41 y.o. female h/o CAD, DM, HTN, HLD    Adm Jan 2021 for NSTEMI. Sp BARBARA mid-LCx-OM2. Plan for OP repeat LAD iFR +/- PCI  Subsequent OP  IVUS-guided PCI p-mLAD with 3.5 x 30 BARBARA Feb 2021    EKG personally reviewed. My interpretation:  3/10/22: STac 100s, normal axis. Inferior ST-T abn. QTc 469  2/17/21: SR 80s, normal axis, PVC. Non-sp ST-T abn. QTc 444  1/25/21: NSR 90s, normal axis. STD IL leads. QTc 485    CAD  - Cath Feb 2021: IVUS-guided PCI p-mLAD with 3.5 x 30 BARBARA, post-dilated with 3.5 and 3.75 NC balloon  - Cath Jan 2021: IVUS guided PCI mid-LCx-OM2. IV furosemide 20 mg x1. iFR LAD 0.76  - Aspirin, clopidogrel, statin    HFrEF, improved  NYHA II  - Echo March 2021: LVEF 55%, DD indeterminate. Normal RVSF. Mild LAE. Mild MR. CVP 8. Technically difficult  - Echo Jan 2021: LVEF 40% (not well visualized), DD2. Normal RVSF. Mild MR. Mild LAE. CVP 8  - Lisinopril, carvedilol  Lab Results   Component Value Date    CREATININE 0.88 03/29/2021    K 3.8 03/29/2021   - Furosemide  - Daily weights  - First thing  "in the morning: Pee, take off clothes, step on the scale.  - Write down the date, and the weight. Maintain this chart everyday  - If weight increases by > 3 lbs in a day, or > 5 lbs in a week, take an extra pill of furosemide. Call the office.  - If worsening symptoms, go to the ED  - Salt restriction  - Echo    DM  Lab Results   Component Value Date    HGBA1C 8.2 (H) 01/24/2021   Managed by PCP    HTN  BP well controlled  Carvedilol, lisinopril    HLD  Lab Results   Component Value Date    LDLCALC 87.6 01/24/2021   Statin as above    Obesity  Estimated body mass index is 64.75 kg/m² as calculated from the following:    Height as of this encounter: 5' 2" (1.575 m).    Weight as of 12/28/21: 160.6 kg (354 lb).  Discussed diet and exercise  396-->371 lbs-->354lbs    Continue with current medical plan and lifestyle changes.    Orders Placed This Encounter   Procedures    Ambulatory referral/consult to Smoking Cessation Program     Standing Status:   Future     Standing Expiration Date:   4/11/2023     Referral Priority:   Routine     Referral Type:   Consultation     Referral Reason:   Specialty Services Required     Requested Specialty:   CTTS     Number of Visits Requested:   1    EKG 12-lead    Echo     Standing Status:   Future     Standing Expiration Date:   3/10/2023     Order Specific Question:   Release to patient     Answer:   Immediate       Follow up as scheduled  Return sooner for concerns or questions. If symptoms persist go to the ED    She expressed verbal understanding and agreed with the plan      Neelam Hicks MD  Interventional Cardiology  [facility]  Phone: [phone]    "

## 2022-03-11 ENCOUNTER — OFFICE VISIT (OUTPATIENT)
Dept: CARDIOLOGY | Facility: CLINIC | Age: 42
End: 2022-03-11
Payer: MEDICAID

## 2022-03-11 VITALS
HEIGHT: 62 IN | DIASTOLIC BLOOD PRESSURE: 64 MMHG | SYSTOLIC BLOOD PRESSURE: 127 MMHG | BODY MASS INDEX: 64.75 KG/M2 | HEART RATE: 111 BPM | OXYGEN SATURATION: 95 %

## 2022-03-11 DIAGNOSIS — E11.649 TYPE 2 DIABETES MELLITUS WITH HYPOGLYCEMIA WITHOUT COMA, WITH LONG-TERM CURRENT USE OF INSULIN: ICD-10-CM

## 2022-03-11 DIAGNOSIS — E11.59 HYPERTENSION ASSOCIATED WITH DIABETES: ICD-10-CM

## 2022-03-11 DIAGNOSIS — E11.69 HYPERLIPIDEMIA ASSOCIATED WITH TYPE 2 DIABETES MELLITUS: ICD-10-CM

## 2022-03-11 DIAGNOSIS — I25.10 CORONARY ARTERY DISEASE INVOLVING NATIVE CORONARY ARTERY OF NATIVE HEART WITHOUT ANGINA PECTORIS: Primary | ICD-10-CM

## 2022-03-11 DIAGNOSIS — Z72.0 TOBACCO USE: ICD-10-CM

## 2022-03-11 DIAGNOSIS — I15.2 HYPERTENSION ASSOCIATED WITH DIABETES: ICD-10-CM

## 2022-03-11 DIAGNOSIS — Z79.4 TYPE 2 DIABETES MELLITUS WITH HYPOGLYCEMIA WITHOUT COMA, WITH LONG-TERM CURRENT USE OF INSULIN: ICD-10-CM

## 2022-03-11 DIAGNOSIS — E66.01 MORBID OBESITY: ICD-10-CM

## 2022-03-11 DIAGNOSIS — E78.5 HYPERLIPIDEMIA ASSOCIATED WITH TYPE 2 DIABETES MELLITUS: ICD-10-CM

## 2022-03-11 DIAGNOSIS — I50.20 HEART FAILURE WITH REDUCED EJECTION FRACTION: ICD-10-CM

## 2022-03-11 PROCEDURE — 3078F DIAST BP <80 MM HG: CPT | Mod: CPTII,S$GLB,, | Performed by: INTERNAL MEDICINE

## 2022-03-11 PROCEDURE — 1159F PR MEDICATION LIST DOCUMENTED IN MEDICAL RECORD: ICD-10-PCS | Mod: CPTII,S$GLB,, | Performed by: INTERNAL MEDICINE

## 2022-03-11 PROCEDURE — 3074F SYST BP LT 130 MM HG: CPT | Mod: CPTII,S$GLB,, | Performed by: INTERNAL MEDICINE

## 2022-03-11 PROCEDURE — 3008F BODY MASS INDEX DOCD: CPT | Mod: CPTII,S$GLB,, | Performed by: INTERNAL MEDICINE

## 2022-03-11 PROCEDURE — 3008F PR BODY MASS INDEX (BMI) DOCUMENTED: ICD-10-PCS | Mod: CPTII,S$GLB,, | Performed by: INTERNAL MEDICINE

## 2022-03-11 PROCEDURE — 1159F MED LIST DOCD IN RCRD: CPT | Mod: CPTII,S$GLB,, | Performed by: INTERNAL MEDICINE

## 2022-03-11 PROCEDURE — 1160F RVW MEDS BY RX/DR IN RCRD: CPT | Mod: CPTII,S$GLB,, | Performed by: INTERNAL MEDICINE

## 2022-03-11 PROCEDURE — 99215 OFFICE O/P EST HI 40 MIN: CPT | Mod: S$GLB,,, | Performed by: INTERNAL MEDICINE

## 2022-03-11 PROCEDURE — 99215 PR OFFICE/OUTPT VISIT, EST, LEVL V, 40-54 MIN: ICD-10-PCS | Mod: S$GLB,,, | Performed by: INTERNAL MEDICINE

## 2022-03-11 PROCEDURE — 3074F PR MOST RECENT SYSTOLIC BLOOD PRESSURE < 130 MM HG: ICD-10-PCS | Mod: CPTII,S$GLB,, | Performed by: INTERNAL MEDICINE

## 2022-03-11 PROCEDURE — 4010F PR ACE/ARB THEARPY RXD/TAKEN: ICD-10-PCS | Mod: CPTII,S$GLB,, | Performed by: INTERNAL MEDICINE

## 2022-03-11 PROCEDURE — 1160F PR REVIEW ALL MEDS BY PRESCRIBER/CLIN PHARMACIST DOCUMENTED: ICD-10-PCS | Mod: CPTII,S$GLB,, | Performed by: INTERNAL MEDICINE

## 2022-03-11 PROCEDURE — 3078F PR MOST RECENT DIASTOLIC BLOOD PRESSURE < 80 MM HG: ICD-10-PCS | Mod: CPTII,S$GLB,, | Performed by: INTERNAL MEDICINE

## 2022-03-11 PROCEDURE — 4010F ACE/ARB THERAPY RXD/TAKEN: CPT | Mod: CPTII,S$GLB,, | Performed by: INTERNAL MEDICINE

## 2022-03-11 RX ORDER — CEPHALEXIN 500 MG/1
500 CAPSULE ORAL 2 TIMES DAILY
Status: ON HOLD | COMMUNITY
Start: 2022-03-10 | End: 2022-06-18

## 2022-03-11 RX ORDER — AMOXICILLIN 500 MG
1 CAPSULE ORAL 2 TIMES DAILY
COMMUNITY
End: 2023-01-01

## 2022-03-11 RX ORDER — ICOSAPENT ETHYL 1000 MG/1
CAPSULE ORAL
COMMUNITY
Start: 2022-03-07 | End: 2023-01-01

## 2022-03-24 ENCOUNTER — HOSPITAL ENCOUNTER (OUTPATIENT)
Dept: CARDIOLOGY | Facility: HOSPITAL | Age: 42
Discharge: HOME | End: 2022-03-24
Attending: INTERNAL MEDICINE
Payer: MEDICAID

## 2022-03-24 VITALS — WEIGHT: 293 LBS | BODY MASS INDEX: 53.92 KG/M2 | HEIGHT: 62 IN

## 2022-03-24 DIAGNOSIS — I50.20 HEART FAILURE WITH REDUCED EJECTION FRACTION: ICD-10-CM

## 2022-03-24 LAB
AORTIC ROOT ANNULUS: 2.77 CM
AORTIC VALVE CUSP SEPERATION: 1.95 CM
AV INDEX (PROSTH): 0.64
AV MEAN GRADIENT: 10 MMHG
AV PEAK GRADIENT: 15 MMHG
AV VELOCITY RATIO: 0.68
BSA FOR ECHO PROCEDURE: 2.65 M2
CV ECHO LV RWT: 0.39 CM
DOP CALC AO PEAK VEL: 1.92 M/S
DOP CALC AO VTI: 38.55 CM
DOP CALC LVOT PEAK VEL: 1.31 M/S
DOP CALC MV VTI: 44.54 CM
DOP CALCLVOT PEAK VEL VTI: 24.78 CM
E WAVE DECELERATION TIME: 223.66 MSEC
E/A RATIO: 1.11
E/E' RATIO: 12.35 M/S
ECHO LV POSTERIOR WALL: 1 CM (ref 0.6–1.1)
EJECTION FRACTION: 55 %
FRACTIONAL SHORTENING: 51 % (ref 28–44)
INTERVENTRICULAR SEPTUM: 1 CM (ref 0.6–1.1)
LA MAJOR: 4.3 CM
LA MINOR: 4.8 CM
LA WIDTH: 5 CM
LEFT ATRIUM SIZE: 3.9 CM
LEFT ATRIUM VOLUME INDEX MOD: 25.1 ML/M2
LEFT ATRIUM VOLUME INDEX: 30.8 ML/M2
LEFT ATRIUM VOLUME MOD: 61.35 CM3
LEFT ATRIUM VOLUME: 75.19 CM3
LEFT INTERNAL DIMENSION IN SYSTOLE: 2.5 CM (ref 2.1–4)
LEFT VENTRICLE DIASTOLIC VOLUME INDEX: 50.09 ML/M2
LEFT VENTRICLE DIASTOLIC VOLUME: 122.21 ML
LEFT VENTRICLE MASS INDEX: 76 G/M2
LEFT VENTRICLE SYSTOLIC VOLUME INDEX: 24.5 ML/M2
LEFT VENTRICLE SYSTOLIC VOLUME: 59.86 ML
LEFT VENTRICULAR INTERNAL DIMENSION IN DIASTOLE: 5.07 CM (ref 3.5–6)
LEFT VENTRICULAR MASS: 186.19 G
LV LATERAL E/E' RATIO: 9.47 M/S
LV SEPTAL E/E' RATIO: 17.75 M/S
MV MEAN GRADIENT: 1 MMHG
MV PEAK A VEL: 1.28 M/S
MV PEAK E VEL: 1.42 M/S
MV PEAK GRADIENT: 9 MMHG
MV STENOSIS PRESSURE HALF TIME: 64.86 MS
MV VALVE AREA P 1/2 METHOD: 3.39 CM2
PISA MRMAX VEL: 0.06 M/S
PULM VEIN S/D RATIO: 0.96
PV PEAK D VEL: 0.54 M/S
PV PEAK S VEL: 0.52 M/S
PV PEAK VELOCITY: 1.24 CM/S
RA MAJOR: 3.61 CM
RA PRESSURE: 3 MMHG
RA WIDTH: 3.24 CM
RIGHT VENTRICULAR END-DIASTOLIC DIMENSION: 2.4 CM
RIGHT VENTRICULAR LENGTH IN DIASTOLE (APICAL 4-CHAMBER VIEW): 4.9 CM
RV TISSUE DOPPLER FREE WALL SYSTOLIC VELOCITY 1 (APICAL 4 CHAMBER VIEW): 13.4 CM/S
TDI LATERAL: 0.15 M/S
TDI SEPTAL: 0.08 M/S
TDI: 0.12 M/S
TRICUSPID ANNULAR PLANE SYSTOLIC EXCURSION: 2.03 CM

## 2022-03-24 PROCEDURE — 93306 ECHO (CUPID ONLY): ICD-10-PCS | Mod: 26,,, | Performed by: INTERNAL MEDICINE

## 2022-03-24 PROCEDURE — 93306 TTE W/DOPPLER COMPLETE: CPT | Mod: 26,,, | Performed by: INTERNAL MEDICINE

## 2022-03-24 PROCEDURE — 93306 TTE W/DOPPLER COMPLETE: CPT | Mod: PO

## 2022-03-28 ENCOUNTER — TELEPHONE (OUTPATIENT)
Dept: CARDIOLOGY | Facility: CLINIC | Age: 42
End: 2022-03-28
Payer: MEDICAID

## 2022-03-28 RX ORDER — NAPROXEN SODIUM 220 MG/1
81 TABLET, FILM COATED ORAL DAILY
Qty: 90 TABLET | Refills: 3 | Status: SHIPPED | OUTPATIENT
Start: 2022-03-28 | End: 2023-01-01

## 2022-03-28 RX ORDER — ATORVASTATIN CALCIUM 80 MG/1
80 TABLET, FILM COATED ORAL DAILY
Qty: 90 TABLET | Refills: 3 | Status: ON HOLD | OUTPATIENT
Start: 2022-03-28 | End: 2023-01-01

## 2022-03-28 RX ORDER — CLOPIDOGREL BISULFATE 75 MG/1
75 TABLET ORAL DAILY
Qty: 30 TABLET | Refills: 11 | Status: ON HOLD | OUTPATIENT
Start: 2022-03-28 | End: 2023-01-01

## 2022-03-28 RX ORDER — NITROGLYCERIN 0.4 MG/1
0.4 TABLET SUBLINGUAL EVERY 5 MIN PRN
Qty: 30 TABLET | Refills: 3 | Status: ON HOLD | OUTPATIENT
Start: 2022-03-28 | End: 2022-06-18

## 2022-03-28 NOTE — TELEPHONE ENCOUNTER
----- Message from Randy Torres sent at 3/28/2022 11:47 AM CDT -----  Contact: Patient  The pt called and believes that she missed a call from your office    She can be reached at [phone]

## 2022-03-28 NOTE — TELEPHONE ENCOUNTER
Called the pt in regards to echo results    The pt did not answer, but I left a detailed voice message as well as a call back number.    ND

## 2022-03-28 NOTE — TELEPHONE ENCOUNTER
Called pt back in regards to this message.     Informed pt of results       Request refills sent to preferred pharmacy    Pt would like to know how long will she have to be on the heart medications    Scheduled pt for follow up appointment with Dr. Hicks on Friday 04-08-22 ND

## 2022-03-28 NOTE — TELEPHONE ENCOUNTER
----- Message from Neelam Hicks MD sent at 3/24/2022  4:42 PM CDT -----  Please call patient re normal pumping function of heart on echo

## 2022-06-17 ENCOUNTER — HOSPITAL ENCOUNTER (INPATIENT)
Facility: HOSPITAL | Age: 42
LOS: 4 days | Discharge: HOME | DRG: 281 | End: 2022-06-21
Attending: EMERGENCY MEDICINE | Admitting: INTERNAL MEDICINE
Payer: MEDICAID

## 2022-06-17 DIAGNOSIS — K59.00 CONSTIPATION, UNSPECIFIED CONSTIPATION TYPE: ICD-10-CM

## 2022-06-17 DIAGNOSIS — Z79.4 TYPE 2 DIABETES MELLITUS WITH DIABETIC POLYNEUROPATHY, WITH LONG-TERM CURRENT USE OF INSULIN: ICD-10-CM

## 2022-06-17 DIAGNOSIS — R07.9 CHEST PAIN IN ADULT: ICD-10-CM

## 2022-06-17 DIAGNOSIS — R07.9 CHEST PAIN: ICD-10-CM

## 2022-06-17 DIAGNOSIS — F11.10 OPIOID ABUSE: ICD-10-CM

## 2022-06-17 DIAGNOSIS — I50.42 CHRONIC COMBINED SYSTOLIC AND DIASTOLIC CONGESTIVE HEART FAILURE: ICD-10-CM

## 2022-06-17 DIAGNOSIS — E11.42 TYPE 2 DIABETES MELLITUS WITH DIABETIC POLYNEUROPATHY, WITH LONG-TERM CURRENT USE OF INSULIN: ICD-10-CM

## 2022-06-17 DIAGNOSIS — R60.0 LEG EDEMA, LEFT: ICD-10-CM

## 2022-06-17 DIAGNOSIS — R79.89 ELEVATED TROPONIN: Primary | ICD-10-CM

## 2022-06-17 DIAGNOSIS — R07.9 CHEST PAIN, UNSPECIFIED TYPE: ICD-10-CM

## 2022-06-17 DIAGNOSIS — I10 BENIGN ESSENTIAL HYPERTENSION: Chronic | ICD-10-CM

## 2022-06-17 DIAGNOSIS — I21.4 NSTEMI (NON-ST ELEVATED MYOCARDIAL INFARCTION): ICD-10-CM

## 2022-06-17 DIAGNOSIS — N94.9 VAGINAL DISCOMFORT: ICD-10-CM

## 2022-06-17 DIAGNOSIS — I25.10 CORONARY ARTERY DISEASE INVOLVING NATIVE CORONARY ARTERY OF NATIVE HEART WITHOUT ANGINA PECTORIS: ICD-10-CM

## 2022-06-17 LAB
ALBUMIN SERPL BCP-MCNC: 4.4 G/DL (ref 3.5–5.2)
ALP SERPL-CCNC: 61 U/L (ref 38–126)
ALT SERPL W/O P-5'-P-CCNC: 17 U/L (ref 10–44)
ANION GAP SERPL CALC-SCNC: 9 MMOL/L (ref 8–16)
AST SERPL-CCNC: 25 U/L (ref 15–46)
B-HCG UR QL: NEGATIVE
BASOPHILS # BLD AUTO: 0.06 K/UL (ref 0–0.2)
BASOPHILS NFR BLD: 0.5 % (ref 0–1.9)
BILIRUB SERPL-MCNC: 1 MG/DL (ref 0.1–1)
CALCIUM SERPL-MCNC: 9.1 MG/DL (ref 8.7–10.5)
CHLORIDE SERPL-SCNC: 106 MMOL/L (ref 95–110)
CO2 SERPL-SCNC: 25 MMOL/L (ref 23–29)
CREAT SERPL-MCNC: 0.82 MG/DL (ref 0.5–1.4)
DIFFERENTIAL METHOD: ABNORMAL
EOSINOPHIL # BLD AUTO: 0.2 K/UL (ref 0–0.5)
EOSINOPHIL NFR BLD: 1.4 % (ref 0–8)
ERYTHROCYTE [DISTWIDTH] IN BLOOD BY AUTOMATED COUNT: 14.6 % (ref 11.5–14.5)
EST. GFR  (AFRICAN AMERICAN): >60 ML/MIN/1.73 M^2
EST. GFR  (NON AFRICAN AMERICAN): >60 ML/MIN/1.73 M^2
GLUCOSE SERPL-MCNC: 123 MG/DL (ref 70–110)
HCT VFR BLD AUTO: 39.1 % (ref 37–48.5)
HGB BLD-MCNC: 12.6 G/DL (ref 12–16)
IMM GRANULOCYTES # BLD AUTO: 0.09 K/UL (ref 0–0.04)
IMM GRANULOCYTES NFR BLD AUTO: 0.7 % (ref 0–0.5)
LYMPHOCYTES # BLD AUTO: 4 K/UL (ref 1–4.8)
LYMPHOCYTES NFR BLD: 32.4 % (ref 18–48)
MCH RBC QN AUTO: 29.1 PG (ref 27–31)
MCHC RBC AUTO-ENTMCNC: 32.2 G/DL (ref 32–36)
MCV RBC AUTO: 90 FL (ref 82–98)
MONOCYTES # BLD AUTO: 0.8 K/UL (ref 0.3–1)
MONOCYTES NFR BLD: 6.2 % (ref 4–15)
NEUTROPHILS # BLD AUTO: 7.2 K/UL (ref 1.8–7.7)
NEUTROPHILS NFR BLD: 58.8 % (ref 38–73)
NRBC BLD-RTO: 0 /100 WBC
NT-PROBNP SERPL-MCNC: 790 PG/ML (ref 5–450)
PLATELET # BLD AUTO: 162 K/UL (ref 150–450)
PMV BLD AUTO: 10.6 FL (ref 9.2–12.9)
POTASSIUM SERPL-SCNC: 4 MMOL/L (ref 3.5–5.1)
PROT SERPL-MCNC: 7.1 G/DL (ref 6–8.4)
RBC # BLD AUTO: 4.33 M/UL (ref 4–5.4)
SARS-COV-2 RDRP RESP QL NAA+PROBE: NEGATIVE
SODIUM SERPL-SCNC: 140 MMOL/L (ref 136–145)
TROPONIN I SERPL-MCNC: 0.12 NG/ML (ref 0.01–0.03)
UUN UR-MCNC: 12 MG/DL (ref 7–17)
WBC # BLD AUTO: 12.18 K/UL (ref 3.9–12.7)

## 2022-06-17 PROCEDURE — 93005 ELECTROCARDIOGRAM TRACING: CPT | Mod: ER

## 2022-06-17 PROCEDURE — 63600175 PHARM REV CODE 636 W HCPCS: Mod: ER | Performed by: EMERGENCY MEDICINE

## 2022-06-17 PROCEDURE — 25000003 PHARM REV CODE 250: Mod: ER | Performed by: EMERGENCY MEDICINE

## 2022-06-17 PROCEDURE — 81025 URINE PREGNANCY TEST: CPT | Mod: ER | Performed by: EMERGENCY MEDICINE

## 2022-06-17 PROCEDURE — U0002 COVID-19 LAB TEST NON-CDC: HCPCS | Mod: ER | Performed by: EMERGENCY MEDICINE

## 2022-06-17 PROCEDURE — 11000001 HC ACUTE MED/SURG PRIVATE ROOM

## 2022-06-17 PROCEDURE — 93010 ELECTROCARDIOGRAM REPORT: CPT | Mod: ,,, | Performed by: INTERNAL MEDICINE

## 2022-06-17 PROCEDURE — 80053 COMPREHEN METABOLIC PANEL: CPT | Mod: ER | Performed by: EMERGENCY MEDICINE

## 2022-06-17 PROCEDURE — 84484 ASSAY OF TROPONIN QUANT: CPT | Mod: ER | Performed by: EMERGENCY MEDICINE

## 2022-06-17 PROCEDURE — 99285 EMERGENCY DEPT VISIT HI MDM: CPT | Mod: 25,ER

## 2022-06-17 PROCEDURE — 85025 COMPLETE CBC W/AUTO DIFF WBC: CPT | Mod: ER | Performed by: EMERGENCY MEDICINE

## 2022-06-17 PROCEDURE — 93010 EKG 12-LEAD: ICD-10-PCS | Mod: ,,, | Performed by: INTERNAL MEDICINE

## 2022-06-17 PROCEDURE — 83880 ASSAY OF NATRIURETIC PEPTIDE: CPT | Mod: ER | Performed by: EMERGENCY MEDICINE

## 2022-06-17 RX ORDER — AMLODIPINE BESYLATE 5 MG/1
5 TABLET ORAL
Status: COMPLETED | OUTPATIENT
Start: 2022-06-17 | End: 2022-06-17

## 2022-06-17 RX ORDER — MORPHINE SULFATE 4 MG/ML
4 INJECTION, SOLUTION INTRAMUSCULAR; INTRAVENOUS
Status: COMPLETED | OUTPATIENT
Start: 2022-06-17 | End: 2022-06-17

## 2022-06-17 RX ORDER — MORPHINE SULFATE 4 MG/ML
INJECTION, SOLUTION INTRAMUSCULAR; INTRAVENOUS
Status: DISPENSED
Start: 2022-06-17 | End: 2022-06-18

## 2022-06-17 RX ORDER — METOCLOPRAMIDE HYDROCHLORIDE 5 MG/ML
INJECTION INTRAMUSCULAR; INTRAVENOUS
Status: DISPENSED
Start: 2022-06-17 | End: 2022-06-18

## 2022-06-17 RX ORDER — ASPIRIN 325 MG
325 TABLET ORAL
Status: COMPLETED | OUTPATIENT
Start: 2022-06-17 | End: 2022-06-17

## 2022-06-17 RX ORDER — IBUPROFEN 200 MG
1 TABLET ORAL
Status: COMPLETED | OUTPATIENT
Start: 2022-06-18 | End: 2022-06-18

## 2022-06-17 RX ORDER — METOCLOPRAMIDE HYDROCHLORIDE 5 MG/ML
10 INJECTION INTRAMUSCULAR; INTRAVENOUS
Status: COMPLETED | OUTPATIENT
Start: 2022-06-17 | End: 2022-06-17

## 2022-06-17 RX ADMIN — ASPIRIN 325 MG ORAL TABLET 325 MG: 325 PILL ORAL at 09:06

## 2022-06-17 RX ADMIN — MORPHINE SULFATE 4 MG: 4 INJECTION INTRAVENOUS at 10:06

## 2022-06-17 RX ADMIN — METOCLOPRAMIDE 10 MG: 5 INJECTION, SOLUTION INTRAMUSCULAR; INTRAVENOUS at 10:06

## 2022-06-17 RX ADMIN — Medication 1 PATCH: at 11:06

## 2022-06-17 RX ADMIN — AMLODIPINE BESYLATE 5 MG: 5 TABLET ORAL at 10:06

## 2022-06-18 PROBLEM — I21.4 NSTEMI (NON-ST ELEVATED MYOCARDIAL INFARCTION): Status: ACTIVE | Noted: 2018-04-20

## 2022-06-18 LAB
ALBUMIN SERPL BCP-MCNC: 4 G/DL (ref 3.5–5.2)
ALP SERPL-CCNC: 79 U/L (ref 55–135)
ALT SERPL W/O P-5'-P-CCNC: 24 U/L (ref 10–44)
ANION GAP SERPL CALC-SCNC: 13 MMOL/L (ref 8–16)
APTT BLDCRRT: 23.7 SEC (ref 21–32)
APTT BLDCRRT: 46.2 SEC (ref 21–32)
AST SERPL-CCNC: 32 U/L (ref 10–40)
AV INDEX (PROSTH): 0.6
AV MEAN GRADIENT: 4 MMHG
AV PEAK GRADIENT: 8 MMHG
AV VALVE AREA: 3.19 CM2
AV VELOCITY RATIO: 0.57
BASOPHILS # BLD AUTO: 0.05 K/UL (ref 0–0.2)
BASOPHILS NFR BLD: 0.4 % (ref 0–1.9)
BILIRUB SERPL-MCNC: 1 MG/DL (ref 0.1–1)
BILIRUB UR QL STRIP: NEGATIVE
BSA FOR ECHO PROCEDURE: 2.6 M2
BUN SERPL-MCNC: 12 MG/DL (ref 6–20)
CALCIUM SERPL-MCNC: 9.8 MG/DL (ref 8.7–10.5)
CHLORIDE SERPL-SCNC: 105 MMOL/L (ref 95–110)
CHOLEST SERPL-MCNC: 116 MG/DL (ref 120–199)
CHOLEST/HDLC SERPL: 3.9 {RATIO} (ref 2–5)
CLARITY UR: CLEAR
CO2 SERPL-SCNC: 26 MMOL/L (ref 23–29)
COLOR UR: YELLOW
CREAT SERPL-MCNC: 0.9 MG/DL (ref 0.5–1.4)
CV ECHO LV RWT: 0.47 CM
D DIMER PPP IA.FEU-MCNC: 0.61 MG/L FEU
DIFFERENTIAL METHOD: ABNORMAL
DOP CALC AO PEAK VEL: 1.43 M/S
DOP CALC AO VTI: 34.38 CM
DOP CALC LVOT AREA: 5.3 CM2
DOP CALC LVOT DIAMETER: 2.6 CM
DOP CALC LVOT PEAK VEL: 0.82 M/S
DOP CALC LVOT STROKE VOLUME: 109.79 CM3
DOP CALC MV VTI: 28.54 CM
DOP CALCLVOT PEAK VEL VTI: 20.69 CM
E WAVE DECELERATION TIME: 141.17 MSEC
E/A RATIO: 1.09
E/E' RATIO: 12.35 M/S
ECHO LV POSTERIOR WALL: 1.35 CM (ref 0.6–1.1)
EJECTION FRACTION: 45 %
EOSINOPHIL # BLD AUTO: 0.2 K/UL (ref 0–0.5)
EOSINOPHIL NFR BLD: 1.4 % (ref 0–8)
ERYTHROCYTE [DISTWIDTH] IN BLOOD BY AUTOMATED COUNT: 15 % (ref 11.5–14.5)
EST. GFR  (AFRICAN AMERICAN): >60 ML/MIN/1.73 M^2
EST. GFR  (NON AFRICAN AMERICAN): >60 ML/MIN/1.73 M^2
ESTIMATED AVG GLUCOSE: 160 MG/DL (ref 68–131)
FRACTIONAL SHORTENING: 25 % (ref 28–44)
GLUCOSE SERPL-MCNC: 121 MG/DL (ref 70–110)
GLUCOSE UR QL STRIP: NEGATIVE
HBA1C MFR BLD: 7.2 % (ref 4–5.6)
HCT VFR BLD AUTO: 41.4 % (ref 37–48.5)
HDLC SERPL-MCNC: 30 MG/DL (ref 40–75)
HDLC SERPL: 25.9 % (ref 20–50)
HGB BLD-MCNC: 13.3 G/DL (ref 12–16)
HGB UR QL STRIP: NEGATIVE
IMM GRANULOCYTES # BLD AUTO: 0.13 K/UL (ref 0–0.04)
IMM GRANULOCYTES NFR BLD AUTO: 1 % (ref 0–0.5)
INR PPP: 1.1 (ref 0.8–1.2)
INTERVENTRICULAR SEPTUM: 1.12 CM (ref 0.6–1.1)
IVRT: 76.12 MSEC
KETONES UR QL STRIP: NEGATIVE
LA MAJOR: 5.58 CM
LA MINOR: 4.83 CM
LA WIDTH: 3.15 CM
LDLC SERPL CALC-MCNC: 46.2 MG/DL (ref 63–159)
LEFT ATRIUM SIZE: 4.2 CM
LEFT ATRIUM VOLUME INDEX MOD: 24.7 ML/M2
LEFT ATRIUM VOLUME INDEX: 24.3 ML/M2
LEFT ATRIUM VOLUME MOD: 59.19 CM3
LEFT ATRIUM VOLUME: 58.23 CM3
LEFT INTERNAL DIMENSION IN SYSTOLE: 4.32 CM (ref 2.1–4)
LEFT VENTRICLE DIASTOLIC VOLUME INDEX: 67.84 ML/M2
LEFT VENTRICLE DIASTOLIC VOLUME: 162.82 ML
LEFT VENTRICLE MASS INDEX: 127 G/M2
LEFT VENTRICLE SYSTOLIC VOLUME INDEX: 34.9 ML/M2
LEFT VENTRICLE SYSTOLIC VOLUME: 83.78 ML
LEFT VENTRICULAR INTERNAL DIMENSION IN DIASTOLE: 5.74 CM (ref 3.5–6)
LEFT VENTRICULAR MASS: 303.67 G
LEUKOCYTE ESTERASE UR QL STRIP: NEGATIVE
LV LATERAL E/E' RATIO: 13.13 M/S
LV SEPTAL E/E' RATIO: 11.67 M/S
LYMPHOCYTES # BLD AUTO: 4.8 K/UL (ref 1–4.8)
LYMPHOCYTES NFR BLD: 35.7 % (ref 18–48)
MAGNESIUM SERPL-MCNC: 1.6 MG/DL (ref 1.6–2.6)
MCH RBC QN AUTO: 28.7 PG (ref 27–31)
MCHC RBC AUTO-ENTMCNC: 32.1 G/DL (ref 32–36)
MCV RBC AUTO: 89 FL (ref 82–98)
MONOCYTES # BLD AUTO: 0.8 K/UL (ref 0.3–1)
MONOCYTES NFR BLD: 6.2 % (ref 4–15)
MV A" WAVE DURATION": 10.28 MSEC
MV MEAN GRADIENT: 0 MMHG
MV PEAK A VEL: 0.96 M/S
MV PEAK E VEL: 1.05 M/S
MV PEAK GRADIENT: 4 MMHG
MV STENOSIS PRESSURE HALF TIME: 45.18 MS
MV VALVE AREA BY CONTINUITY EQUATION: 3.85 CM2
MV VALVE AREA P 1/2 METHOD: 4.87 CM2
NEUTROPHILS # BLD AUTO: 7.4 K/UL (ref 1.8–7.7)
NEUTROPHILS NFR BLD: 55.3 % (ref 38–73)
NITRITE UR QL STRIP: NEGATIVE
NONHDLC SERPL-MCNC: 86 MG/DL
NRBC BLD-RTO: 0 /100 WBC
PH UR STRIP: 7 [PH] (ref 5–8)
PISA TR MAX VEL: 1.92 M/S
PLATELET # BLD AUTO: 199 K/UL (ref 150–450)
PMV BLD AUTO: 10.6 FL (ref 9.2–12.9)
POCT GLUCOSE: 174 MG/DL (ref 70–110)
POCT GLUCOSE: 189 MG/DL (ref 70–110)
POCT GLUCOSE: 194 MG/DL (ref 70–110)
POCT GLUCOSE: 260 MG/DL (ref 70–110)
POTASSIUM SERPL-SCNC: 3.6 MMOL/L (ref 3.5–5.1)
PROT SERPL-MCNC: 7.4 G/DL (ref 6–8.4)
PROT UR QL STRIP: ABNORMAL
PROTHROMBIN TIME: 11.3 SEC (ref 9–12.5)
PULM VEIN S/D RATIO: 2.48
PV PEAK D VEL: 0.25 M/S
PV PEAK S VEL: 0.62 M/S
RA MAJOR: 4.17 CM
RA WIDTH: 3.95 CM
RBC # BLD AUTO: 4.64 M/UL (ref 4–5.4)
RIGHT VENTRICULAR END-DIASTOLIC DIMENSION: 2.03 CM
SINUS: 4 CM
SODIUM SERPL-SCNC: 144 MMOL/L (ref 136–145)
SP GR UR STRIP: 1.02 (ref 1–1.03)
TDI LATERAL: 0.08 M/S
TDI SEPTAL: 0.09 M/S
TDI: 0.09 M/S
TR MAX PG: 15 MMHG
TRIGL SERPL-MCNC: 199 MG/DL (ref 30–150)
TROPONIN I SERPL DL<=0.01 NG/ML-MCNC: 0.11 NG/ML (ref 0–0.03)
TROPONIN I SERPL DL<=0.01 NG/ML-MCNC: 0.12 NG/ML (ref 0–0.03)
TROPONIN I SERPL DL<=0.01 NG/ML-MCNC: 0.13 NG/ML (ref 0–0.03)
TROPONIN I SERPL DL<=0.01 NG/ML-MCNC: 0.16 NG/ML (ref 0–0.03)
URN SPEC COLLECT METH UR: ABNORMAL
UROBILINOGEN UR STRIP-ACNC: NEGATIVE EU/DL
WBC # BLD AUTO: 13.31 K/UL (ref 3.9–12.7)

## 2022-06-18 PROCEDURE — 25500020 PHARM REV CODE 255: Performed by: INTERNAL MEDICINE

## 2022-06-18 PROCEDURE — S4991 NICOTINE PATCH NONLEGEND: HCPCS | Mod: ER | Performed by: EMERGENCY MEDICINE

## 2022-06-18 PROCEDURE — 63600175 PHARM REV CODE 636 W HCPCS: Performed by: STUDENT IN AN ORGANIZED HEALTH CARE EDUCATION/TRAINING PROGRAM

## 2022-06-18 PROCEDURE — 83036 HEMOGLOBIN GLYCOSYLATED A1C: CPT | Performed by: STUDENT IN AN ORGANIZED HEALTH CARE EDUCATION/TRAINING PROGRAM

## 2022-06-18 PROCEDURE — 25000003 PHARM REV CODE 250: Performed by: STUDENT IN AN ORGANIZED HEALTH CARE EDUCATION/TRAINING PROGRAM

## 2022-06-18 PROCEDURE — 99900035 HC TECH TIME PER 15 MIN (STAT)

## 2022-06-18 PROCEDURE — 80061 LIPID PANEL: CPT | Performed by: STUDENT IN AN ORGANIZED HEALTH CARE EDUCATION/TRAINING PROGRAM

## 2022-06-18 PROCEDURE — 99214 PR OFFICE/OUTPT VISIT, EST, LEVL IV, 30-39 MIN: ICD-10-PCS | Mod: 25,,, | Performed by: INTERNAL MEDICINE

## 2022-06-18 PROCEDURE — 85730 THROMBOPLASTIN TIME PARTIAL: CPT | Performed by: STUDENT IN AN ORGANIZED HEALTH CARE EDUCATION/TRAINING PROGRAM

## 2022-06-18 PROCEDURE — 11000001 HC ACUTE MED/SURG PRIVATE ROOM

## 2022-06-18 PROCEDURE — C9399 UNCLASSIFIED DRUGS OR BIOLOG: HCPCS | Performed by: STUDENT IN AN ORGANIZED HEALTH CARE EDUCATION/TRAINING PROGRAM

## 2022-06-18 PROCEDURE — 81003 URINALYSIS AUTO W/O SCOPE: CPT | Performed by: STUDENT IN AN ORGANIZED HEALTH CARE EDUCATION/TRAINING PROGRAM

## 2022-06-18 PROCEDURE — 93010 ELECTROCARDIOGRAM REPORT: CPT | Mod: ,,, | Performed by: INTERNAL MEDICINE

## 2022-06-18 PROCEDURE — 85025 COMPLETE CBC W/AUTO DIFF WBC: CPT | Performed by: STUDENT IN AN ORGANIZED HEALTH CARE EDUCATION/TRAINING PROGRAM

## 2022-06-18 PROCEDURE — 93005 ELECTROCARDIOGRAM TRACING: CPT

## 2022-06-18 PROCEDURE — 84484 ASSAY OF TROPONIN QUANT: CPT | Mod: 91 | Performed by: STUDENT IN AN ORGANIZED HEALTH CARE EDUCATION/TRAINING PROGRAM

## 2022-06-18 PROCEDURE — 36415 COLL VENOUS BLD VENIPUNCTURE: CPT | Performed by: STUDENT IN AN ORGANIZED HEALTH CARE EDUCATION/TRAINING PROGRAM

## 2022-06-18 PROCEDURE — 25000242 PHARM REV CODE 250 ALT 637 W/ HCPCS: Performed by: STUDENT IN AN ORGANIZED HEALTH CARE EDUCATION/TRAINING PROGRAM

## 2022-06-18 PROCEDURE — 25000003 PHARM REV CODE 250: Mod: ER | Performed by: EMERGENCY MEDICINE

## 2022-06-18 PROCEDURE — 94761 N-INVAS EAR/PLS OXIMETRY MLT: CPT

## 2022-06-18 PROCEDURE — 94760 N-INVAS EAR/PLS OXIMETRY 1: CPT

## 2022-06-18 PROCEDURE — 93010 EKG 12-LEAD: ICD-10-PCS | Mod: 59,76,, | Performed by: INTERNAL MEDICINE

## 2022-06-18 PROCEDURE — G0378 HOSPITAL OBSERVATION PER HR: HCPCS

## 2022-06-18 PROCEDURE — 85730 THROMBOPLASTIN TIME PARTIAL: CPT | Mod: 91 | Performed by: INTERNAL MEDICINE

## 2022-06-18 PROCEDURE — 85610 PROTHROMBIN TIME: CPT | Performed by: STUDENT IN AN ORGANIZED HEALTH CARE EDUCATION/TRAINING PROGRAM

## 2022-06-18 PROCEDURE — 96372 THER/PROPH/DIAG INJ SC/IM: CPT | Mod: 59 | Performed by: STUDENT IN AN ORGANIZED HEALTH CARE EDUCATION/TRAINING PROGRAM

## 2022-06-18 PROCEDURE — 93010 ELECTROCARDIOGRAM REPORT: CPT | Mod: 59,76,, | Performed by: INTERNAL MEDICINE

## 2022-06-18 PROCEDURE — 80053 COMPREHEN METABOLIC PANEL: CPT | Performed by: STUDENT IN AN ORGANIZED HEALTH CARE EDUCATION/TRAINING PROGRAM

## 2022-06-18 PROCEDURE — 85379 FIBRIN DEGRADATION QUANT: CPT | Performed by: STUDENT IN AN ORGANIZED HEALTH CARE EDUCATION/TRAINING PROGRAM

## 2022-06-18 PROCEDURE — 83735 ASSAY OF MAGNESIUM: CPT | Performed by: STUDENT IN AN ORGANIZED HEALTH CARE EDUCATION/TRAINING PROGRAM

## 2022-06-18 PROCEDURE — 99214 OFFICE O/P EST MOD 30 MIN: CPT | Mod: 25,,, | Performed by: INTERNAL MEDICINE

## 2022-06-18 PROCEDURE — 36415 COLL VENOUS BLD VENIPUNCTURE: CPT | Performed by: INTERNAL MEDICINE

## 2022-06-18 RX ORDER — GLUCAGON 1 MG
1 KIT INJECTION
Status: DISCONTINUED | OUTPATIENT
Start: 2022-06-18 | End: 2022-06-21 | Stop reason: HOSPADM

## 2022-06-18 RX ORDER — CARVEDILOL 6.25 MG/1
6.25 TABLET ORAL 2 TIMES DAILY WITH MEALS
Status: DISCONTINUED | OUTPATIENT
Start: 2022-06-18 | End: 2022-06-21 | Stop reason: HOSPADM

## 2022-06-18 RX ORDER — ALLOPURINOL 100 MG/1
100 TABLET ORAL DAILY
Status: DISCONTINUED | OUTPATIENT
Start: 2022-06-18 | End: 2022-06-21 | Stop reason: HOSPADM

## 2022-06-18 RX ORDER — ENOXAPARIN SODIUM 100 MG/ML
40 INJECTION SUBCUTANEOUS EVERY 12 HOURS
Status: DISCONTINUED | OUTPATIENT
Start: 2022-06-18 | End: 2022-06-18

## 2022-06-18 RX ORDER — ATORVASTATIN CALCIUM 40 MG/1
80 TABLET, FILM COATED ORAL DAILY
Status: DISCONTINUED | OUTPATIENT
Start: 2022-06-18 | End: 2022-06-21 | Stop reason: HOSPADM

## 2022-06-18 RX ORDER — ESCITALOPRAM OXALATE 10 MG/1
10 TABLET ORAL DAILY
Status: DISCONTINUED | OUTPATIENT
Start: 2022-06-18 | End: 2022-06-21 | Stop reason: HOSPADM

## 2022-06-18 RX ORDER — CLINDAMYCIN HYDROCHLORIDE 150 MG/1
450 CAPSULE ORAL EVERY 8 HOURS
Status: DISCONTINUED | OUTPATIENT
Start: 2022-06-18 | End: 2022-06-21 | Stop reason: HOSPADM

## 2022-06-18 RX ORDER — LISINOPRIL 20 MG/1
20 TABLET ORAL DAILY
Status: DISCONTINUED | OUTPATIENT
Start: 2022-06-18 | End: 2022-06-21 | Stop reason: HOSPADM

## 2022-06-18 RX ORDER — TIZANIDINE 4 MG/1
4 TABLET ORAL EVERY 8 HOURS PRN
Status: ON HOLD | COMMUNITY
End: 2022-06-21 | Stop reason: HOSPADM

## 2022-06-18 RX ORDER — IBUPROFEN 200 MG
24 TABLET ORAL
Status: DISCONTINUED | OUTPATIENT
Start: 2022-06-18 | End: 2022-06-21 | Stop reason: HOSPADM

## 2022-06-18 RX ORDER — SODIUM CHLORIDE 0.9 % (FLUSH) 0.9 %
10 SYRINGE (ML) INJECTION
Status: DISCONTINUED | OUTPATIENT
Start: 2022-06-18 | End: 2022-06-21 | Stop reason: HOSPADM

## 2022-06-18 RX ORDER — ESCITALOPRAM OXALATE 10 MG/1
10 TABLET ORAL DAILY
Status: ON HOLD | COMMUNITY
End: 2023-01-01

## 2022-06-18 RX ORDER — PANTOPRAZOLE SODIUM 40 MG/1
40 TABLET, DELAYED RELEASE ORAL DAILY
Status: DISCONTINUED | OUTPATIENT
Start: 2022-06-18 | End: 2022-06-21 | Stop reason: HOSPADM

## 2022-06-18 RX ORDER — CARVEDILOL 3.12 MG/1
3.12 TABLET ORAL 2 TIMES DAILY WITH MEALS
Status: DISCONTINUED | OUTPATIENT
Start: 2022-06-18 | End: 2022-06-18

## 2022-06-18 RX ORDER — INSULIN ASPART 100 [IU]/ML
1-10 INJECTION, SOLUTION INTRAVENOUS; SUBCUTANEOUS
Status: DISCONTINUED | OUTPATIENT
Start: 2022-06-18 | End: 2022-06-21 | Stop reason: HOSPADM

## 2022-06-18 RX ORDER — IBUPROFEN 200 MG
16 TABLET ORAL
Status: DISCONTINUED | OUTPATIENT
Start: 2022-06-18 | End: 2022-06-21 | Stop reason: HOSPADM

## 2022-06-18 RX ORDER — CLOPIDOGREL BISULFATE 75 MG/1
75 TABLET ORAL DAILY
Status: DISCONTINUED | OUTPATIENT
Start: 2022-06-18 | End: 2022-06-21 | Stop reason: HOSPADM

## 2022-06-18 RX ORDER — NAPROXEN SODIUM 220 MG/1
81 TABLET, FILM COATED ORAL DAILY
Status: DISCONTINUED | OUTPATIENT
Start: 2022-06-18 | End: 2022-06-21 | Stop reason: HOSPADM

## 2022-06-18 RX ORDER — PREGABALIN 75 MG/1
150 CAPSULE ORAL 2 TIMES DAILY
Status: DISCONTINUED | OUTPATIENT
Start: 2022-06-18 | End: 2022-06-21 | Stop reason: HOSPADM

## 2022-06-18 RX ORDER — TIZANIDINE 2 MG/1
4 TABLET ORAL EVERY 8 HOURS PRN
Status: DISCONTINUED | OUTPATIENT
Start: 2022-06-18 | End: 2022-06-21 | Stop reason: HOSPADM

## 2022-06-18 RX ORDER — NITROGLYCERIN 0.4 MG/1
0.4 TABLET SUBLINGUAL EVERY 5 MIN PRN
Status: DISCONTINUED | OUTPATIENT
Start: 2022-06-18 | End: 2022-06-21 | Stop reason: HOSPADM

## 2022-06-18 RX ORDER — ALLOPURINOL 100 MG/1
100 TABLET ORAL DAILY
COMMUNITY
End: 2023-01-01

## 2022-06-18 RX ORDER — TALC
12 POWDER (GRAM) TOPICAL NIGHTLY PRN
Status: DISCONTINUED | OUTPATIENT
Start: 2022-06-18 | End: 2022-06-21 | Stop reason: HOSPADM

## 2022-06-18 RX ORDER — FLUTICASONE FUROATE AND VILANTEROL 100; 25 UG/1; UG/1
1 POWDER RESPIRATORY (INHALATION) DAILY
Status: DISCONTINUED | OUTPATIENT
Start: 2022-06-19 | End: 2022-06-21 | Stop reason: HOSPADM

## 2022-06-18 RX ORDER — SIMETHICONE 125 MG
125 TABLET,CHEWABLE ORAL ONCE
Status: COMPLETED | OUTPATIENT
Start: 2022-06-18 | End: 2022-06-18

## 2022-06-18 RX ORDER — FLUTICASONE FUROATE AND VILANTEROL 100; 25 UG/1; UG/1
1 POWDER RESPIRATORY (INHALATION) NIGHTLY
Refills: 3 | Status: DISCONTINUED | OUTPATIENT
Start: 2022-06-18 | End: 2022-06-18

## 2022-06-18 RX ORDER — BUPRENORPHINE HYDROCHLORIDE AND NALOXONE HYDROCHLORIDE DIHYDRATE 8; 2 MG/1; MG/1
8 TABLET SUBLINGUAL DAILY
Status: DISCONTINUED | OUTPATIENT
Start: 2022-06-18 | End: 2022-06-18

## 2022-06-18 RX ORDER — HEPARIN SODIUM,PORCINE/D5W 25000/250
0-40 INTRAVENOUS SOLUTION INTRAVENOUS CONTINUOUS
Status: DISCONTINUED | OUTPATIENT
Start: 2022-06-18 | End: 2022-06-21

## 2022-06-18 RX ORDER — ACETAMINOPHEN 325 MG/1
650 TABLET ORAL EVERY 6 HOURS PRN
Status: DISCONTINUED | OUTPATIENT
Start: 2022-06-18 | End: 2022-06-21 | Stop reason: HOSPADM

## 2022-06-18 RX ORDER — ENOXAPARIN SODIUM 100 MG/ML
40 INJECTION SUBCUTANEOUS EVERY 24 HOURS
Status: DISCONTINUED | OUTPATIENT
Start: 2022-06-18 | End: 2022-06-18

## 2022-06-18 RX ORDER — ALBUTEROL SULFATE 90 UG/1
2 AEROSOL, METERED RESPIRATORY (INHALATION) EVERY 4 HOURS PRN
Status: DISCONTINUED | OUTPATIENT
Start: 2022-06-18 | End: 2022-06-21 | Stop reason: HOSPADM

## 2022-06-18 RX ORDER — BUPRENORPHINE HYDROCHLORIDE AND NALOXONE HYDROCHLORIDE DIHYDRATE 8; 2 MG/1; MG/1
8 TABLET SUBLINGUAL 3 TIMES DAILY
Status: DISCONTINUED | OUTPATIENT
Start: 2022-06-18 | End: 2022-06-21 | Stop reason: HOSPADM

## 2022-06-18 RX ORDER — POLYETHYLENE GLYCOL 3350 17 G/17G
17 POWDER, FOR SOLUTION ORAL ONCE
Status: COMPLETED | OUTPATIENT
Start: 2022-06-18 | End: 2022-06-18

## 2022-06-18 RX ADMIN — ASPIRIN 81 MG CHEWABLE TABLET 81 MG: 81 TABLET CHEWABLE at 08:06

## 2022-06-18 RX ADMIN — NITROGLYCERIN 0.4 MG: 0.4 TABLET, ORALLY DISINTEGRATING SUBLINGUAL at 08:06

## 2022-06-18 RX ADMIN — ALLOPURINOL 100 MG: 100 TABLET ORAL at 08:06

## 2022-06-18 RX ADMIN — ESCITALOPRAM OXALATE 10 MG: 10 TABLET ORAL at 08:06

## 2022-06-18 RX ADMIN — INSULIN DETEMIR 50 UNITS: 100 INJECTION, SOLUTION SUBCUTANEOUS at 08:06

## 2022-06-18 RX ADMIN — CARVEDILOL 6.25 MG: 6.25 TABLET, FILM COATED ORAL at 05:06

## 2022-06-18 RX ADMIN — LISINOPRIL 20 MG: 20 TABLET ORAL at 12:06

## 2022-06-18 RX ADMIN — BUPRENORPHINE AND NALOXONE 8 MG: 8; 2 TABLET SUBLINGUAL at 08:06

## 2022-06-18 RX ADMIN — HEPARIN SODIUM 12 UNITS/KG/HR: 5000 INJECTION INTRAVENOUS; SUBCUTANEOUS at 12:06

## 2022-06-18 RX ADMIN — PREGABALIN 150 MG: 75 CAPSULE ORAL at 08:06

## 2022-06-18 RX ADMIN — INSULIN ASPART 6 UNITS: 100 INJECTION, SOLUTION INTRAVENOUS; SUBCUTANEOUS at 11:06

## 2022-06-18 RX ADMIN — CLOPIDOGREL 75 MG: 75 TABLET, FILM COATED ORAL at 08:06

## 2022-06-18 RX ADMIN — CLINDAMYCIN HYDROCHLORIDE 450 MG: 150 CAPSULE ORAL at 08:06

## 2022-06-18 RX ADMIN — BUPRENORPHINE HYDROCHLORIDE AND NALOXONE HYDROCHLORIDE DIHYDRATE 8 MG: 8; 2 TABLET SUBLINGUAL at 08:06

## 2022-06-18 RX ADMIN — BUPRENORPHINE AND NALOXONE 8 MG: 8; 2 TABLET SUBLINGUAL at 02:06

## 2022-06-18 RX ADMIN — CARVEDILOL 6.25 MG: 6.25 TABLET, FILM COATED ORAL at 08:06

## 2022-06-18 RX ADMIN — PANTOPRAZOLE SODIUM 40 MG: 40 TABLET, DELAYED RELEASE ORAL at 08:06

## 2022-06-18 RX ADMIN — CLINDAMYCIN HYDROCHLORIDE 450 MG: 150 CAPSULE ORAL at 03:06

## 2022-06-18 RX ADMIN — ATORVASTATIN CALCIUM 80 MG: 40 TABLET, FILM COATED ORAL at 08:06

## 2022-06-18 RX ADMIN — NITROGLYCERIN 0.4 MG: 0.4 TABLET, ORALLY DISINTEGRATING SUBLINGUAL at 03:06

## 2022-06-18 RX ADMIN — IOHEXOL 100 ML: 350 INJECTION, SOLUTION INTRAVENOUS at 09:06

## 2022-06-18 RX ADMIN — ALBUTEROL SULFATE 2 PUFF: 90 AEROSOL, METERED RESPIRATORY (INHALATION) at 02:06

## 2022-06-18 RX ADMIN — Medication 125 MG: at 05:06

## 2022-06-18 RX ADMIN — POLYETHYLENE GLYCOL 3350 17 G: 17 POWDER, FOR SOLUTION ORAL at 05:06

## 2022-06-18 RX ADMIN — CLINDAMYCIN HYDROCHLORIDE 450 MG: 150 CAPSULE ORAL at 02:06

## 2022-06-18 RX ADMIN — INSULIN ASPART 2 UNITS: 100 INJECTION, SOLUTION INTRAVENOUS; SUBCUTANEOUS at 06:06

## 2022-06-18 RX ADMIN — TIZANIDINE 4 MG: 2 TABLET ORAL at 08:06

## 2022-06-18 RX ADMIN — NITROGLYCERIN 0.4 MG: 0.4 TABLET, ORALLY DISINTEGRATING SUBLINGUAL at 02:06

## 2022-06-18 RX ADMIN — TIZANIDINE 4 MG: 2 TABLET ORAL at 11:06

## 2022-06-18 NOTE — PROGRESS NOTES
06/18/22 0211   Nurse Notification   Bedside Nurse Notified? Yes   Name of Bedside Nurse Christen Chu LPN   Nurse Notfication Method Secure Chat   Nurse Notified Of Other  (pt c/o chest pain 9/10; pt keeps going off and on on telemetry)   Provider Notification   Provider Notified? Yes   Name of Provider Dr Pedroza   Provider Notification Method Telephone   Provider Notified Of Other (See Comment)  (pt c/o chest pain 9/10)   Admission   Initial VN Admission Questions Complete   Shift   Virtual Nurse - Patient Verbalized Approval Of Camera Use;VN Rounding   Safety/Activity   Patient Rounds bed in low position;call light in patient/parent reach;clutter free environment maintained;visualized patient;placement of personal items at bedside   Safety Promotion/Fall Prevention assistive device/personal item within reach;Fall Risk reviewed with patient/family;side rails raised x 2   Positioning   Body Position right   Head of Bed (HOB) Positioning HOB at 30-45 degrees   Pain/Comfort/Sleep   Preferred Pain Scale number (Numeric Rating Pain Scale)   Comfort/Acceptable Pain Level 0   Pain Body Location - Side Left   Pain Body Location chest   Pain Rating (0-10): Rest 9   VN cued in to pt's room with permission. Admission questions completed.  Call bell w/in reach. Instructed to call for needs/assist oob.

## 2022-06-18 NOTE — CONSULTS
[facility] - Telemetry  Cardiology  Consult Note    Patient Name: Lele Dias  MRN: [id number]  Admission Date: 2022  Hospital Length of Stay: 0 days  Code Status: Full Code   Attending Provider: Eli Stephens MD   Consulting Provider: Neelam Hicks MD  Primary Care Physician: Sammi Edge MD  Principal Problem:NSTEMI (non-ST elevated myocardial infarction)    Patient information was obtained from patient, past medical records and ER records.     Inpatient consult to [facility]  Consult performed by: Neelam Hicks MD  Consult ordered by: Billy Pedroza MD        Subjective:     Chief Complaint:  CP     HPI:  41 yo female h/o CAD, HFrEF, DM, HTN, HLD    Adm 2021 for NSTEMI. Sp BARBARA mid-LCx-OM2. Plan for OP repeat LAD iFR +/- PCI  Subsequent OP  IVUS-guided PCI p-mLAD with 3.5 x 30 BARBARA 2021    Started having CP . Noted at rest. Reports came in as was concerned, but reports not as similar to her prior episode when she had the stent placed. Currently asymptomatic.    Past Medical History:   Diagnosis Date    Anemia     Asthma     Cellulitis of abdominal wall 2017    CKD (chronic kidney disease) stage 3, GFR 30-59 ml/min     Depression     Diabetes mellitus, type II     Diastolic dysfunction     Diverticulosis of intestine with bleeding 2016    E coli bacteremia 2018    E. coli pyelonephritis 2015    E. coli UTI 2017    Hematuria     Hernia, epigastric     Hypertension     Hypocalcemia     Nonalcoholic hepatosteatosis     Periumbilical hernia     Proteinuria        Past Surgical History:   Procedure Laterality Date    ARM AMPUTATION AT SHOULDER Left 2000s     SECTION       SECTION, CLASSIC      CHOLECYSTECTOMY  age 15    CORONARY ANGIOGRAPHY N/A 2021    Procedure: ANGIOGRAM, CORONARY ARTERY;  Surgeon: Neelam Hicks MD;  Location: [facility] CATH LAB/EP;  Service: Cardiology;  Laterality: N/A;    INCISION AND  "DRAINAGE OF ABSCESS N/A 12/11/2020    Procedure: INCISION AND DRAINAGE, ABSCESS;  Surgeon: Rich Watson MD;  Location: [facility] OR;  Service: General;  Laterality: N/A;    LEFT HEART CATHETERIZATION Left 1/24/2021    Procedure: Left heart cath;  Surgeon: Neelam Hicks MD;  Location: [facility] CATH LAB/EP;  Service: Cardiology;  Laterality: Left;    LEFT HEART CATHETERIZATION N/A 2/17/2021    Procedure: Left heart cath;  Surgeon: Neelam Hicks MD;  Location: [facility] CATH LAB/EP;  Service: Cardiology;  Laterality: N/A;    TONSILLECTOMY, ADENOIDECTOMY         Review of patient's allergies indicates:   Allergen Reactions    Celexa [citalopram] Nausea And Vomiting       No current facility-administered medications on file prior to encounter.     Current Outpatient Medications on File Prior to Encounter   Medication Sig    allopurinoL (ZYLOPRIM) 100 MG tablet Take 100 mg by mouth once daily.    aspirin 81 MG Chew Take 1 tablet (81 mg total) by mouth once daily.    atorvastatin (LIPITOR) 80 MG tablet Take 1 tablet (80 mg total) by mouth once daily.    BD ULTRA-FINE MINI PEN NEEDLE 31 gauge x 3/16" Ndle     budesonide-formoterol 160-4.5 mcg (SYMBICORT) 160-4.5 mcg/actuation HFAA Inhale 2 puffs into the lungs every 12 (twelve) hours. Controller    buprenorphine-naloxone (SUBOXONE) 8-2 mg Film Place 3 tablets under the tongue Daily.    cholecalciferol, vitamin D3, 1,250 mcg (50,000 unit) capsule Take 50,000 Units by mouth every 7 days.    clopidogreL (PLAVIX) 75 mg tablet Take 1 tablet (75 mg total) by mouth once daily.    docosahexaenoic acid/epa (FISH OIL ORAL) Take by mouth.    EScitalopram oxalate (LEXAPRO) 10 MG tablet Take 10 mg by mouth once daily.    HUMALOG MIX 75-25 KWIKPEN 100 unit/mL (75-25) InPn Inject 25 Units into the skin 2 (two) times a day.    icosapent ethyL (VASCEPA) 1 gram Cap     medroxyPROGESTERone (DEPO-PROVERA) 150 mg/mL Syrg     multivitamin with minerals tablet Take 1 tablet by " mouth once daily.    omega-3 fatty acids/fish oil (FISH OIL-OMEGA-3 FATTY ACIDS) 300-1,000 mg capsule Take 1 capsule by mouth 2 (two) times daily.    omeprazole (PRILOSEC) 40 MG capsule Take 40 mg by mouth every morning.     potassium chloride (MICRO-K) 10 MEQ CpSR Take by mouth 2 (two) times daily.    pregabalin (LYRICA) 150 MG capsule Take 2 capsules (300 mg total) by mouth 2 (two) times daily. (Patient taking differently: Take 150 mg by mouth 2 (two) times daily. 75MG twice daily)    promethazine (PHENERGAN) 6.25 mg/5 mL syrup     [DISCONTINUED] cephALEXin (KEFLEX) 500 MG capsule Take 500 mg by mouth 2 (two) times daily.    albuterol (ACCUNEB) 1.25 mg/3 mL Nebu Take 1.25 mg by nebulization every 4 (four) hours as needed.    BASAGLAR KWIKPEN U-100 INSULIN glargine 100 units/mL (3mL) SubQ pen Inject 50 Units into the skin 3 (three) times daily.    blood sugar diagnostic Strp TEST BLOOD SUAGR ONCE DAILY    blood-glucose meter Misc use as directed (Patient taking differently: use as directed)    carvediloL (COREG) 3.125 MG tablet Take 1 tablet (3.125 mg total) by mouth 2 (two) times daily with meals.    fluticasone propionate (FLONASE) 50 mcg/actuation nasal spray 1 spray (50 mcg total) by Each Nostril route 2 (two) times daily as needed for Rhinitis.    furosemide (LASIX) 40 MG tablet Take 40 mg by mouth 2 (two) times daily as needed. Only takes as needed for swelling in legs    lisinopriL (PRINIVIL,ZESTRIL) 20 MG tablet Take 1 tablet (20 mg total) by mouth once daily.    nicotine (NICODERM CQ) 21 mg/24 hr Place 1 patch onto the skin once daily.    nicotine polacrilex 2 MG Lozg Take 1 lozenge (2 mg total) by mouth as needed (prn). 1-2 per hour in the place of cigarette (5-16 daily max) (Patient not taking: Reported on 3/11/2022)    tiZANidine (ZANAFLEX) 4 MG tablet Take 4 mg by mouth every 8 (eight) hours as needed (muscle spasms).    [DISCONTINUED] azithromycin (Z-BRYAN) 250 MG tablet Take 1 tablet  (250 mg total) by mouth once daily. Take first 2 tablets together, then 1 every day until finished. (Patient not taking: No sig reported)    [DISCONTINUED] nitroGLYCERIN (NITROSTAT) 0.4 MG SL tablet Place 1 tablet (0.4 mg total) under the tongue every 5 (five) minutes as needed for Chest pain.     Family History     Problem Relation (Age of Onset)    Breast cancer Paternal Grandmother    Cancer Father, Maternal Aunt    Heart attack Mother, Maternal Aunt, Maternal Grandmother    Heart disease Mother        Tobacco Use    Smoking status: Current Every Day Smoker     Packs/day: 2.50     Years: 31.00     Pack years: 77.50     Types: Cigarettes     Start date: 1987    Smokeless tobacco: Never Used    Tobacco comment: Pt enrolled in the [employer] on 9/12/18. ([employer]CT Member ID # [id number]).   Substance and Sexual Activity    Alcohol use: No    Drug use: No    Sexual activity: Yes     Partners: Male     Birth control/protection: Injection     Review of Systems   Constitutional: Negative for fever.   HENT: Negative for nosebleeds.    Cardiovascular: Negative for chest pain, leg swelling and orthopnea.        As above   Respiratory: Negative for hemoptysis.    Hematologic/Lymphatic: Negative for bleeding problem.   Skin: Negative for poor wound healing.   Gastrointestinal: Hematochezia: Some spotting (had constipation; h/o hemorrhoids)   Genitourinary: Negative for hematuria.   Neurological: Negative for seizures.   Allergic/Immunologic: Negative for environmental allergies.     Objective:     Vital Signs (Most Recent):  Temp: 97.8 °F (36.6 °C) (06/18/22 0522)  Pulse: 63 (06/18/22 0916)  Resp: 17 (06/18/22 0746)  BP: (!) 114/55 (06/18/22 0916)  SpO2: 97 % (06/18/22 0857) Vital Signs (24h Range):  Temp:  [97.8 °F (36.6 °C)-98.4 °F (36.9 °C)] 97.8 °F (36.6 °C)  Pulse:  [63-80] 63  Resp:  [14-19] 17  SpO2:  [94 %-97 %] 97 %  BP: (114-170)/(52-92) 114/55     Weight: (!) 154.7 kg (341 lb 0.8 oz)  Body mass index is 62.36  kg/m².    SpO2: 97 %  O2 Device (Oxygen Therapy): room air    No intake or output data in the 24 hours ending 06/18/22 1023    Lines/Drains/Airways     Peripheral Intravenous Line  Duration                Peripheral IV - Single Lumen 06/17/22 2227 20 G Right Wrist <1 day                Physical Exam  Constitutional:       General: She is not in acute distress.     Appearance: She is well-developed. She is not diaphoretic.   HENT:      Head: Normocephalic.   Neck:      Vascular: No JVD.   Cardiovascular:      Rate and Rhythm: Normal rate and regular rhythm.      Heart sounds: No murmur heard.    No friction rub. No gallop.   Pulmonary:      Effort: Pulmonary effort is normal. No respiratory distress.      Breath sounds: Normal breath sounds.   Abdominal:      Palpations: Abdomen is soft.      Tenderness: There is no abdominal tenderness.   Musculoskeletal:         General: Swelling: LLE.      Cervical back: Normal range of motion.   Skin:     General: Skin is warm.   Neurological:      Mental Status: She is alert.   Psychiatric:         Mood and Affect: Mood normal.         Significant Labs:   CMP   Recent Labs   Lab 06/17/22 2130 06/18/22  0256    144   K 4.0 3.6    105   CO2 25 26   * 121*   BUN 12 12   CREATININE 0.82 0.9   CALCIUM 9.1 9.8   PROT 7.1 7.4   ALBUMIN 4.4 4.0   BILITOT 1.0 1.0   ALKPHOS 61 79   AST 25 32   ALT 17 24   ANIONGAP 9 13   ESTGFRAFRICA >60.0 >60   EGFRNONAA >60.0 >60   , CBC   Recent Labs   Lab 06/17/22 2130 06/18/22  0256   WBC 12.18 13.31*   HGB 12.6 13.3   HCT 39.1 41.4    199    and Troponin   Recent Labs   Lab 06/17/22 2130 06/18/22  0256 06/18/22  0814   TROPONINI 0.124* 0.163* 0.120*       Assessment and Plan:     42 y.o. female h/o CAD, HFrEF, DM, HTN, HLD     - Adm Jan 2021 for NSTEMI. Sp BARBARA mid-LCx-OM2. Plan for OP repeat LAD iFR +/- PCI  - Subsequent OP  IVUS-guided PCI p-mLAD with 3.5 x 30 BARBARA Feb 2021     EKG personally reviewed. My  "interpretation:  6/18/22: SR 70s, normal axis. Non-sp ST-T abn. QTc 449     CP, elevated troponin, CAD  - Cath Feb 2021: IVUS-guided PCI p-mLAD with 3.5 x 30 BARBARA, post-dilated with 3.5 and 3.75 NC balloon  - Cath Jan 2021: IVUS guided PCI mid-LCx-OM2. IV furosemide 20 mg x1. iFR LAD 0.76  - Aspirin, clopidogrel, statin  - Hep gtt  - CTA pending  - LLE Venous US: "The left anterior tibial vein could not be identified.  Otherwise, no evidence of deep venous thrombosis in the left lower extremity."    HFrEF, improved  NYHA II  - Echo March 2022  · The estimated ejection fraction is 55%.  · Normal systolic function.  · Normal left ventricular diastolic function.  · Normal right ventricular size with normal right ventricular systolic function.  · Normal central venous pressure (3 mmHg).  - Echo March 2021: LVEF 55%, DD indeterminate. Normal RVSF. Mild LAE. Mild MR. CVP 8. Technically difficult  - Echo Jan 2021: LVEF 40% (not well visualized), DD2. Normal RVSF. Mild MR. Mild LAE. CVP 8  - Lisinopril, carvedilol  - Furosemide PRN  - Daily weights  - First thing in the morning: Pee, take off clothes, step on the scale.  - Write down the date, and the weight. Maintain this chart everyday  - If weight increases by > 3 lbs in a day, or > 5 lbs in a week, take an extra pill of furosemide. Call the office.  - If worsening symptoms, go to the ED  - Salt restriction  - Echo     DM  Management as per primary team     HTN  BP well controlled  Carvedilol, lisinopril     HLD  Statin as above    Discussed with daughter Jessica over the phone    Active Diagnoses:    Diagnosis Date Noted POA    PRINCIPAL PROBLEM:  NSTEMI (non-ST elevated myocardial infarction) [I21.4] 04/20/2018 Yes    Chest pain in adult [R07.9]  Yes    Leg edema, left [R60.0]  Yes    Type 2 diabetes mellitus with diabetic polyneuropathy, with long-term current use of insulin [E11.42, Z79.4]  Not Applicable    Coronary artery disease involving native coronary " artery of native heart without angina pectoris [I25.10]  Yes    Opioid abuse [F11.10]  Yes    Chronic combined systolic and diastolic congestive heart failure [I50.42] 07/15/2014 Yes    Asthma [J45.909] 07/15/2014 Yes     Chronic    Benign essential hypertension [I10] 07/15/2014 Yes     Chronic      Problems Resolved During this Admission:       VTE Risk Mitigation (From admission, onward)         Ordered     heparin 25,000 units in dextrose 5% (100 units/ml) IV bolus from bag - ADDITIONAL PRN BOLUS - 60 units/kg (max bolus 4000 units)  As needed (PRN)        Question:  Heparin Infusion Adjustment (DO NOT MODIFY ANSWER)  Answer:  [email or web address]\epic\Images\Pharmacy\HeparinInfusions\heparin LOW INTENSITY nomogram for [facility] [id number]    06/18/22 0758     heparin 25,000 units in dextrose 5% (100 units/ml) IV bolus from bag - ADDITIONAL PRN BOLUS - 30 units/kg (max bolus 4000 units)  As needed (PRN)        Question:  Heparin Infusion Adjustment (DO NOT MODIFY ANSWER)  Answer:  [email or web address]\[email or web address]\Images\Pharmacy\HeparinInfusions\heparin LOW INTENSITY nomogram for [facility] [id number]    06/18/22 0758     heparin 25,000 units in dextrose 5% (100 units/ml) IV bolus from bag INITIAL BOLUS (max bolus 4000 units)  Once        Question:  Heparin Infusion Adjustment (DO NOT MODIFY ANSWER)  Answer:  [email or web address]\epic\Images\Pharmacy\HeparinInfusions\heparin LOW INTENSITY nomogram for [facility] [id number]    06/18/22 0758     heparin 25,000 units in dextrose 5% 250 mL (100 units/mL) infusion LOW INTENSITY nomogram - [facility]  Continuous        Question Answer Comment   Heparin Infusion Adjustment (DO NOT MODIFY ANSWER) [email or web address]\epic\Images\Pharmacy\HeparinInfusions\heparin LOW INTENSITY nomogram for [facility] [id number]    Begin at (in units/kg/hr) 12        06/18/22 0758     IP VTE HIGH RISK PATIENT  Once         06/18/22 0147     Place sequential compression device  Until discontinued         06/18/22 0147                Thank you for your  consult. I will follow-up with patient. Please contact us if you have any additional questions.    Neelam Hicks MD  Cardiology   [place] [facility]
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
## 2022-06-18 NOTE — NURSING
Patient called and asked for nurse. Patient wanted the DR called because she sees her dead  when she closes her eyes. Patient informed of the urine test that was ordered. Hat in place. Instructed to call nurse when she goes to the bathroom.

## 2022-06-18 NOTE — ED NOTES
Pt aware of POC to be transferred to [facility]  Pt prefers to stay in own clothes

## 2022-06-18 NOTE — NURSING
Patient arrived to unit via stretcher. Alert and oriented. BBS clear. Continent of bowel and bladder. Ambulates with out assistance.  Patient complained of chest pains when laying down. Nitro given as ordered.  Bed low. Call light in reach.

## 2022-06-18 NOTE — ED PROVIDER NOTES
Encounter Date: 2022       History     Chief Complaint   Patient presents with    Chest Pain     Mid sternal chest pain x 2 days. No radiation, or SOB. Aspirin taken when on phone when EMS dispatcher. PT states chest pain is intermittent with activity. No nausea or vomiting.      42-year-old female presents emergency department complaining of chest pain.  Onset yesterday.  Notes episodes of chest pain lasting minutes to almost an hour at a time.  States sometimes there elicited and exacerbated by exertion and symptoms, without eliciting or exacerbating factors.  Denies any associated shortness of breath.  Thought it may been pain from her left arm stump.  However it worsened this evening prompted to come to the department.  Denies any lightheadedness, dizziness, nausea, vomiting, diarrhea, cough congestion, shortness of breath, or fever.  No other symptoms reported at this time.        Review of patient's allergies indicates:   Allergen Reactions    Celexa [citalopram] Nausea And Vomiting     Past Medical History:   Diagnosis Date    Anemia     Asthma     Cellulitis of abdominal wall 2017    CKD (chronic kidney disease) stage 3, GFR 30-59 ml/min     Depression     Diabetes mellitus, type II     Diastolic dysfunction     Diverticulosis of intestine with bleeding 2016    E coli bacteremia 2018    E. coli pyelonephritis 2015    E. coli UTI 2017    Hematuria     Hernia, epigastric     Hypertension     Hypocalcemia     Nonalcoholic hepatosteatosis     Periumbilical hernia     Proteinuria      Past Surgical History:   Procedure Laterality Date    ARM AMPUTATION AT SHOULDER Left 2000s     SECTION       SECTION, CLASSIC      CHOLECYSTECTOMY  age 15    CORONARY ANGIOGRAPHY N/A 2021    Procedure: ANGIOGRAM, CORONARY ARTERY;  Surgeon: Neelam Hicks MD;  Location: [facility] CATH LAB[facility]EP;  Service: Cardiology;  Laterality: N/A;    INCISION AND DRAINAGE OF  ABSCESS N/A 12/11/2020    Procedure: INCISION AND DRAINAGE, ABSCESS;  Surgeon: Rich Watson MD;  Location: [facility] OR;  Service: General;  Laterality: N/A;    LEFT HEART CATHETERIZATION Left 1/24/2021    Procedure: Left heart cath;  Surgeon: Neelam Hicks MD;  Location: [facility] CATH LAB/EP;  Service: Cardiology;  Laterality: Left;    LEFT HEART CATHETERIZATION N/A 2/17/2021    Procedure: Left heart cath;  Surgeon: Neelam Hicks MD;  Location: [facility] CATH LAB/EP;  Service: Cardiology;  Laterality: N/A;    TONSILLECTOMY, ADENOIDECTOMY       Family History   Problem Relation Age of Onset    Cancer Father     Heart attack Mother     Heart disease Mother     Cancer Maternal Aunt         lung (smoker)    Heart attack Maternal Aunt     Breast cancer Paternal Grandmother     Heart attack Maternal Grandmother      Social History     Tobacco Use    Smoking status: Current Every Day Smoker     Packs/day: 2.50     Years: 31.00     Pack years: 77.50     Types: Cigarettes     Start date: 1987    Smokeless tobacco: Never Used    Tobacco comment: Pt enrolled in the [employer] on 9/12/18. (SCT Member ID # [id number]).   Substance Use Topics    Alcohol use: No    Drug use: No     Review of Systems   Constitutional: Negative for chills, fatigue and fever.   HENT: Negative for congestion and sore throat.    Eyes: Negative for photophobia and visual disturbance.   Respiratory: Negative for cough and shortness of breath.    Cardiovascular: Positive for chest pain. Negative for palpitations.   Gastrointestinal: Negative for abdominal pain, diarrhea and vomiting.   Musculoskeletal: Negative for back pain, neck pain and neck stiffness.   Neurological: Negative for light-headedness, numbness and headaches.       Physical Exam     Initial Vitals [06/17/22 2114]   BP Pulse Resp Temp SpO2   (!) 170/92 78 18 98.4 °F (36.9 °C) 95 %      MAP       --         Physical Exam    Nursing note and vitals reviewed.  Constitutional:  She appears well-developed and well-nourished. No distress.   HENT:   Head: Normocephalic and atraumatic.   Eyes: Conjunctivae and EOM are normal. Pupils are equal, round, and reactive to light.   Neck: Neck supple. No tracheal deviation present.   Normal range of motion.  Cardiovascular: Normal rate and intact distal pulses.   Pulmonary/Chest: No respiratory distress.   Musculoskeletal:         General: No tenderness. Normal range of motion.      Cervical back: Normal range of motion and neck supple.     Neurological: She is alert and oriented to person, place, and time. She has normal strength. No cranial nerve deficit. GCS score is 15. GCS eye subscore is 4. GCS verbal subscore is 5. GCS motor subscore is 6.   Skin: Skin is warm and dry.         ED Course   Procedures  Labs Reviewed   CBC W/ AUTO DIFFERENTIAL - Abnormal; Notable for the following components:       Result Value    RDW 14.6 (*)     Immature Granulocytes 0.7 (*)     Immature Grans (Abs) 0.09 (*)     All other components within normal limits   COMPREHENSIVE METABOLIC PANEL - Abnormal; Notable for the following components:    Glucose 123 (*)     All other components within normal limits   TROPONIN I - Abnormal; Notable for the following components:    Troponin I 0.124 (*)     All other components within normal limits   NT-PRO NATRIURETIC PEPTIDE - Abnormal; Notable for the following components:    NT-proBNP 790 (*)     All other components within normal limits   PREGNANCY TEST, URINE RAPID    Narrative:     Specimen Source->Urine   SARS-COV-2 RNA AMPLIFICATION, QUAL     EKG Readings: (Independently Interpreted)   Initial Reading: No STEMI. Previous EKG: Compared with most recent EKG Previous EKG Date: 2/17/2021 (Minimal change) . Rhythm: Normal Sinus Rhythm. Heart Rate: 66. Ectopy: No Ectopy. Conduction: Normal. ST Segments: Normal ST Segments. Axis: Normal.         X-Rays:   Independently Interpreted Readings:   Other Readings:  Chest x-ray  interpreted by me pending radiology over read:  No acute infiltrate, no pneumothorax, no effusion     Medications   morphine injection 4 mg (has no administration in time range)   metoclopramide HCl injection 10 mg (has no administration in time range)   aspirin tablet 325 mg (325 mg Oral Given 6/17/22 2138)   amLODIPine tablet 5 mg (5 mg Oral Given 6/17/22 2220)     Medical Decision Making:   Initial Assessment:   42-year-old female presents emergency department complaining of chest pain  Differential Diagnosis:   ACS, dissection, pneumonia, pneumothorax, skeletal, GERD  Independently Interpreted Test(s):   I have ordered and independently interpreted X-rays - see prior notes.  I have ordered and independently interpreted EKG Reading(s) - see prior notes  Clinical Tests:   Lab Tests: Reviewed       <> Summary of Lab: Concerning for elevated troponin  ED Management:  Patient full aspirin at home just prior to arrival.  Her labs are concerning for elevated troponin.  Discussed with [facility] internal medicine who agree with plan to transfer to [place] for admission for further evaluation and management.  Patient given some amlodipine given her elevated blood pressure, also given some morphine for pain.  Given Reglan for nausea.  Vital signs otherwise stable.  Informed patient results as well as plan to transfer and she is comfortable with transfer at this time.                       Clinical Impression:   Final diagnoses:  [R07.9] Chest pain  [R77.8] Elevated troponin (Primary)          ED Disposition Condition    Observation               Eh Sanchez MD  06/17/22 3585

## 2022-06-19 LAB
ALBUMIN SERPL BCP-MCNC: 3.8 G/DL (ref 3.5–5.2)
ALP SERPL-CCNC: 78 U/L (ref 55–135)
ALT SERPL W/O P-5'-P-CCNC: 17 U/L (ref 10–44)
ANION GAP SERPL CALC-SCNC: 15 MMOL/L (ref 8–16)
APTT BLDCRRT: 24.8 SEC (ref 21–32)
APTT BLDCRRT: 24.9 SEC (ref 21–32)
APTT BLDCRRT: 34.7 SEC (ref 21–32)
APTT BLDCRRT: 79.4 SEC (ref 21–32)
AST SERPL-CCNC: 11 U/L (ref 10–40)
BASOPHILS # BLD AUTO: 0.05 K/UL (ref 0–0.2)
BASOPHILS NFR BLD: 0.5 % (ref 0–1.9)
BILIRUB SERPL-MCNC: 0.7 MG/DL (ref 0.1–1)
BUN SERPL-MCNC: 24 MG/DL (ref 6–20)
CALCIUM SERPL-MCNC: 9.3 MG/DL (ref 8.7–10.5)
CHLORIDE SERPL-SCNC: 105 MMOL/L (ref 95–110)
CO2 SERPL-SCNC: 22 MMOL/L (ref 23–29)
CREAT SERPL-MCNC: 1.3 MG/DL (ref 0.5–1.4)
DIFFERENTIAL METHOD: ABNORMAL
EOSINOPHIL # BLD AUTO: 0.1 K/UL (ref 0–0.5)
EOSINOPHIL NFR BLD: 1.3 % (ref 0–8)
ERYTHROCYTE [DISTWIDTH] IN BLOOD BY AUTOMATED COUNT: 15.1 % (ref 11.5–14.5)
EST. GFR  (AFRICAN AMERICAN): 58 ML/MIN/1.73 M^2
EST. GFR  (NON AFRICAN AMERICAN): 50 ML/MIN/1.73 M^2
GLUCOSE SERPL-MCNC: 219 MG/DL (ref 70–110)
HCT VFR BLD AUTO: 39.2 % (ref 37–48.5)
HGB BLD-MCNC: 12.5 G/DL (ref 12–16)
IMM GRANULOCYTES # BLD AUTO: 0.16 K/UL (ref 0–0.04)
IMM GRANULOCYTES NFR BLD AUTO: 1.5 % (ref 0–0.5)
LYMPHOCYTES # BLD AUTO: 4 K/UL (ref 1–4.8)
LYMPHOCYTES NFR BLD: 36.8 % (ref 18–48)
MAGNESIUM SERPL-MCNC: 1.6 MG/DL (ref 1.6–2.6)
MCH RBC QN AUTO: 29.1 PG (ref 27–31)
MCHC RBC AUTO-ENTMCNC: 31.9 G/DL (ref 32–36)
MCV RBC AUTO: 91 FL (ref 82–98)
MONOCYTES # BLD AUTO: 0.6 K/UL (ref 0.3–1)
MONOCYTES NFR BLD: 6 % (ref 4–15)
NEUTROPHILS # BLD AUTO: 5.8 K/UL (ref 1.8–7.7)
NEUTROPHILS NFR BLD: 53.9 % (ref 38–73)
NRBC BLD-RTO: 0 /100 WBC
PLATELET # BLD AUTO: 160 K/UL (ref 150–450)
PMV BLD AUTO: 10.7 FL (ref 9.2–12.9)
POCT GLUCOSE: 205 MG/DL (ref 70–110)
POCT GLUCOSE: 207 MG/DL (ref 70–110)
POCT GLUCOSE: 267 MG/DL (ref 70–110)
POCT GLUCOSE: 272 MG/DL (ref 70–110)
POTASSIUM SERPL-SCNC: 4.1 MMOL/L (ref 3.5–5.1)
PROT SERPL-MCNC: 7.2 G/DL (ref 6–8.4)
RBC # BLD AUTO: 4.3 M/UL (ref 4–5.4)
SODIUM SERPL-SCNC: 142 MMOL/L (ref 136–145)
WBC # BLD AUTO: 10.72 K/UL (ref 3.9–12.7)

## 2022-06-19 PROCEDURE — 80053 COMPREHEN METABOLIC PANEL: CPT | Performed by: STUDENT IN AN ORGANIZED HEALTH CARE EDUCATION/TRAINING PROGRAM

## 2022-06-19 PROCEDURE — 85730 THROMBOPLASTIN TIME PARTIAL: CPT | Mod: 91 | Performed by: STUDENT IN AN ORGANIZED HEALTH CARE EDUCATION/TRAINING PROGRAM

## 2022-06-19 PROCEDURE — 94640 AIRWAY INHALATION TREATMENT: CPT

## 2022-06-19 PROCEDURE — 25000003 PHARM REV CODE 250: Performed by: STUDENT IN AN ORGANIZED HEALTH CARE EDUCATION/TRAINING PROGRAM

## 2022-06-19 PROCEDURE — 99233 PR SUBSEQUENT HOSPITAL CARE,LEVL III: ICD-10-PCS | Mod: ,,, | Performed by: INTERNAL MEDICINE

## 2022-06-19 PROCEDURE — 83735 ASSAY OF MAGNESIUM: CPT | Performed by: STUDENT IN AN ORGANIZED HEALTH CARE EDUCATION/TRAINING PROGRAM

## 2022-06-19 PROCEDURE — 85730 THROMBOPLASTIN TIME PARTIAL: CPT | Mod: 91 | Performed by: INTERNAL MEDICINE

## 2022-06-19 PROCEDURE — 99900035 HC TECH TIME PER 15 MIN (STAT)

## 2022-06-19 PROCEDURE — 99233 SBSQ HOSP IP/OBS HIGH 50: CPT | Mod: ,,, | Performed by: INTERNAL MEDICINE

## 2022-06-19 PROCEDURE — 85730 THROMBOPLASTIN TIME PARTIAL: CPT | Performed by: INTERNAL MEDICINE

## 2022-06-19 PROCEDURE — 87491 CHLMYD TRACH DNA AMP PROBE: CPT | Performed by: STUDENT IN AN ORGANIZED HEALTH CARE EDUCATION/TRAINING PROGRAM

## 2022-06-19 PROCEDURE — 25000242 PHARM REV CODE 250 ALT 637 W/ HCPCS: Performed by: STUDENT IN AN ORGANIZED HEALTH CARE EDUCATION/TRAINING PROGRAM

## 2022-06-19 PROCEDURE — 94761 N-INVAS EAR/PLS OXIMETRY MLT: CPT

## 2022-06-19 PROCEDURE — 11000001 HC ACUTE MED/SURG PRIVATE ROOM

## 2022-06-19 PROCEDURE — 63600175 PHARM REV CODE 636 W HCPCS: Performed by: STUDENT IN AN ORGANIZED HEALTH CARE EDUCATION/TRAINING PROGRAM

## 2022-06-19 PROCEDURE — 85025 COMPLETE CBC W/AUTO DIFF WBC: CPT | Performed by: STUDENT IN AN ORGANIZED HEALTH CARE EDUCATION/TRAINING PROGRAM

## 2022-06-19 PROCEDURE — 36415 COLL VENOUS BLD VENIPUNCTURE: CPT | Performed by: INTERNAL MEDICINE

## 2022-06-19 PROCEDURE — 87591 N.GONORRHOEAE DNA AMP PROB: CPT | Performed by: STUDENT IN AN ORGANIZED HEALTH CARE EDUCATION/TRAINING PROGRAM

## 2022-06-19 RX ORDER — SENNOSIDES 8.6 MG/1
8.6 TABLET ORAL DAILY
Status: DISCONTINUED | OUTPATIENT
Start: 2022-06-19 | End: 2022-06-21 | Stop reason: HOSPADM

## 2022-06-19 RX ORDER — POLYETHYLENE GLYCOL 3350 17 G/17G
17 POWDER, FOR SOLUTION ORAL 3 TIMES DAILY
Status: DISCONTINUED | OUTPATIENT
Start: 2022-06-19 | End: 2022-06-21 | Stop reason: HOSPADM

## 2022-06-19 RX ORDER — NICOTINE 7MG/24HR
1 PATCH, TRANSDERMAL 24 HOURS TRANSDERMAL DAILY
Status: CANCELLED | OUTPATIENT
Start: 2022-06-19

## 2022-06-19 RX ADMIN — PREGABALIN 150 MG: 75 CAPSULE ORAL at 08:06

## 2022-06-19 RX ADMIN — INSULIN ASPART 4 UNITS: 100 INJECTION, SOLUTION INTRAVENOUS; SUBCUTANEOUS at 08:06

## 2022-06-19 RX ADMIN — BUPRENORPHINE AND NALOXONE 8 MG: 8; 2 TABLET SUBLINGUAL at 08:06

## 2022-06-19 RX ADMIN — POLYETHYLENE GLYCOL 3350 17 G: 17 POWDER, FOR SOLUTION ORAL at 08:06

## 2022-06-19 RX ADMIN — ALLOPURINOL 100 MG: 100 TABLET ORAL at 08:06

## 2022-06-19 RX ADMIN — ASPIRIN 81 MG CHEWABLE TABLET 81 MG: 81 TABLET CHEWABLE at 08:06

## 2022-06-19 RX ADMIN — POLYETHYLENE GLYCOL 3350 17 G: 17 POWDER, FOR SOLUTION ORAL at 03:06

## 2022-06-19 RX ADMIN — CLINDAMYCIN HYDROCHLORIDE 450 MG: 150 CAPSULE ORAL at 05:06

## 2022-06-19 RX ADMIN — BUPRENORPHINE AND NALOXONE 8 MG: 8; 2 TABLET SUBLINGUAL at 03:06

## 2022-06-19 RX ADMIN — TIZANIDINE 4 MG: 2 TABLET ORAL at 09:06

## 2022-06-19 RX ADMIN — HEPARIN SODIUM 14 UNITS/KG/HR: 5000 INJECTION INTRAVENOUS; SUBCUTANEOUS at 03:06

## 2022-06-19 RX ADMIN — ALBUTEROL SULFATE 2 PUFF: 90 AEROSOL, METERED RESPIRATORY (INHALATION) at 08:06

## 2022-06-19 RX ADMIN — INSULIN ASPART 6 UNITS: 100 INJECTION, SOLUTION INTRAVENOUS; SUBCUTANEOUS at 03:06

## 2022-06-19 RX ADMIN — CLINDAMYCIN HYDROCHLORIDE 450 MG: 150 CAPSULE ORAL at 09:06

## 2022-06-19 RX ADMIN — CLINDAMYCIN HYDROCHLORIDE 450 MG: 150 CAPSULE ORAL at 01:06

## 2022-06-19 RX ADMIN — INSULIN ASPART 6 UNITS: 100 INJECTION, SOLUTION INTRAVENOUS; SUBCUTANEOUS at 12:06

## 2022-06-19 RX ADMIN — INSULIN ASPART 2 UNITS: 100 INJECTION, SOLUTION INTRAVENOUS; SUBCUTANEOUS at 09:06

## 2022-06-19 RX ADMIN — ESCITALOPRAM OXALATE 10 MG: 10 TABLET ORAL at 08:06

## 2022-06-19 RX ADMIN — CLOPIDOGREL 75 MG: 75 TABLET, FILM COATED ORAL at 08:06

## 2022-06-19 RX ADMIN — TIZANIDINE 4 MG: 2 TABLET ORAL at 10:06

## 2022-06-19 RX ADMIN — HEPARIN SODIUM 14 UNITS/KG/HR: 5000 INJECTION INTRAVENOUS; SUBCUTANEOUS at 04:06

## 2022-06-19 RX ADMIN — HEPARIN SODIUM 14 UNITS/KG/HR: 5000 INJECTION INTRAVENOUS; SUBCUTANEOUS at 09:06

## 2022-06-19 RX ADMIN — ATORVASTATIN CALCIUM 80 MG: 40 TABLET, FILM COATED ORAL at 08:06

## 2022-06-19 RX ADMIN — INSULIN DETEMIR 50 UNITS: 100 INJECTION, SOLUTION SUBCUTANEOUS at 08:06

## 2022-06-19 RX ADMIN — SENNOSIDES 8.6 MG: 8.6 TABLET, FILM COATED ORAL at 11:06

## 2022-06-19 RX ADMIN — MINERAL OIL, PETROLATUM, PHENYLEPHRINE HCL 1 APPLICATOR: 2.5; 140; 749 OINTMENT TOPICAL at 09:06

## 2022-06-19 RX ADMIN — LISINOPRIL 20 MG: 20 TABLET ORAL at 08:06

## 2022-06-19 RX ADMIN — FLUTICASONE FUROATE AND VILANTEROL TRIFENATATE 1 PUFF: 100; 25 POWDER RESPIRATORY (INHALATION) at 09:06

## 2022-06-19 RX ADMIN — PANTOPRAZOLE SODIUM 40 MG: 40 TABLET, DELAYED RELEASE ORAL at 08:06

## 2022-06-19 NOTE — NURSING
Pt reports lab coming by to draw blood around 1830. Attempted to call lab about PTT results. Could not get in touch. Will call again soon.
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
## 2022-06-19 NOTE — NURSING
Shift assessment complete. Pt is alert and oriented x 4. Denies chest pain/sob at this time. Continuous heparin infusing @ 12 units/kg/hr 11 ml/hr.  Bed in lowest position, locked, and side rails up x 3. Bed alarm on. Call light and belongings in reach.

## 2022-06-19 NOTE — PROGRESS NOTES
[facility] - Telemetry  Cardiology  Progress Note    Patient Name: Lele Dias  MRN: [id number]  Admission Date: 6/17/2022  Hospital Length of Stay: 0 days  Code Status: Full Code   Attending Physician: Eli Stephens MD   Primary Care Physician: Sammi Edge MD  Expected Discharge Date: 6/20/2022  Principal Problem:NSTEMI (non-ST elevated myocardial infarction)    Subjective:       41 yo female h/o CAD, HFrEF, DM, HTN, HLD     Adm Jan 2021 for NSTEMI. Sp BARBARA mid-LCx-OM2. Plan for OP repeat LAD iFR +/- PCI  Subsequent OP  IVUS-guided PCI p-mLAD with 3.5 x 30 BARBARA Feb 2021     Started having CP 6/17. Noted at rest. Reports came in as was concerned, but reports not as similar to her prior episode when she had the stent placed. Currently asymptomatic.    6/19: Denies CP. Discussed blood test results. Troponins positive but flat. Consider RHC in AM      Review of Systems   Cardiovascular: Negative for chest pain.   Respiratory: Negative for shortness of breath.    Hematologic/Lymphatic: Negative for bleeding problem.     Objective:     Vital Signs (Most Recent):  Temp: 97.3 °F (36.3 °C) (06/19/22 0756)  Pulse: 70 (06/19/22 0941)  Resp: 20 (06/19/22 0941)  BP: (!) 122/58 (06/19/22 0756)  SpO2: 96 % (06/19/22 0941) Vital Signs (24h Range):  Temp:  [96.1 °F (35.6 °C)-98.8 °F (37.1 °C)] 97.3 °F (36.3 °C)  Pulse:  [54-72] 70  Resp:  [18-20] 20  SpO2:  [94 %-98 %] 96 %  BP: (119-149)/(58-67) 122/58     Weight: (!) 155.1 kg (341 lb 14.9 oz)  Body mass index is 62.54 kg/m².    SpO2: 96 %  O2 Device (Oxygen Therapy): room air    No intake or output data in the 24 hours ending 06/19/22 1111    Lines/Drains/Airways     Peripheral Intravenous Line  Duration                Peripheral IV - Single Lumen 06/17/22 2227 20 G Right Wrist 1 day                Physical Exam  Constitutional:       General: She is not in acute distress.     Appearance: She is well-developed. She is not diaphoretic.   HENT:      Head: Normocephalic.   Neck:  "     Vascular: JVD: Unable to assess d/t habitus.   Cardiovascular:      Rate and Rhythm: Normal rate and regular rhythm.      Heart sounds: No murmur heard.    No friction rub. No gallop.   Pulmonary:      Effort: Pulmonary effort is normal. No respiratory distress.      Breath sounds: Normal breath sounds.   Abdominal:      Palpations: Abdomen is soft.      Tenderness: There is no abdominal tenderness.   Musculoskeletal:         General: Swelling: LLE.      Cervical back: Normal range of motion.   Skin:     General: Skin is warm.   Neurological:      Mental Status: She is alert.   Psychiatric:         Mood and Affect: Mood normal.         Significant Labs:   CMP   Recent Labs   Lab 06/17/22 2130 06/18/22  0256 06/19/22  0637    144 142   K 4.0 3.6 4.1    105 105   CO2 25 26 22*   * 121* 219*   BUN 12 12 24*   CREATININE 0.82 0.9 1.3   CALCIUM 9.1 9.8 9.3   PROT 7.1 7.4 7.2   ALBUMIN 4.4 4.0 3.8   BILITOT 1.0 1.0 0.7   ALKPHOS 61 79 78   AST 25 32 11   ALT 17 24 17   ANIONGAP 9 13 15   ESTGFRAFRICA >60.0 >60 58*   EGFRNONAA >60.0 >60 50*   , CBC   Recent Labs   Lab 06/17/22 2130 06/18/22  0256 06/19/22  0637   WBC 12.18 13.31* 10.72   HGB 12.6 13.3 12.5   HCT 39.1 41.4 39.2    199 160    and Troponin   Recent Labs   Lab 06/18/22  0814 06/18/22  1553 06/18/22 2053   TROPONINI 0.120* 0.112* 0.126*       Assessment and Plan:     42 y.o. female h/o CAD, HFrEF, DM, HTN, HLD     - Adm Jan 2021 for NSTEMI. Sp BARBARA mid-LCx-OM2. Plan for OP repeat LAD iFR +/- PCI  - Subsequent OP  IVUS-guided PCI p-mLAD with 3.5 x 30 BARBARA Feb 2021     EKG personally reviewed. My interpretation:  6/18/22: SR 70s, normal axis. Non-sp ST-T abn. QTc 449     CP, elevated troponin, CAD  - Cath Feb 2021: IVUS-guided PCI p-mLAD with 3.5 x 30 BARBARA, post-dilated with 3.5 and 3.75 NC balloon  - Cath Jan 2021: IVUS guided PCI mid-LCx-OM2. IV furosemide 20 mg x1. iFR LAD 0.76  - Aspirin, clopidogrel, statin  - CTA: "No evidence " "for aortic dissection or pulmonary embolus through the proximal segmental level."  - LLE Venous US: "The left anterior tibial vein could not be identified.  Otherwise, no evidence of deep venous thrombosis in the left lower extremity."     HFrEF, improved  NYHA II  - Echo June 2022  · Technically significantly limited echocardiogram.  · The left ventricle is moderately enlarged with concentric hypertrophy and mildly decreased systolic function.  · The estimated ejection fraction is 45-50%.  · Grade I left ventricular diastolic dysfunction.  · Normal right ventricular size with normal right ventricular systolic function.  · Mild mitral regurgitation.  · The sinuses of Valsalva is mildly dilated.  - Echo March 2022  · The estimated ejection fraction is 55%.  · Normal systolic function.  · Normal left ventricular diastolic function.  · Normal right ventricular size with normal right ventricular systolic function.  · Normal central venous pressure (3 mmHg).  - Echo March 2021: LVEF 55%, DD indeterminate. Normal RVSF. Mild LAE. Mild MR. CVP 8. Technically difficult  - Echo Jan 2021: LVEF 40% (not well visualized), DD2. Normal RVSF. Mild MR. Mild LAE. CVP 8  - Lisinopril, carvedilol  - Furosemide PRN  - Daily weights  - First thing in the morning: Pee, take off clothes, step on the scale.  - Write down the date, and the weight. Maintain this chart everyday  - If weight increases by > 3 lbs in a day, or > 5 lbs in a week, take an extra pill of furosemide. Call the office.  - If worsening symptoms, go to the ED  - Salt restriction  - RHC in AM     DM  Management as per primary team     HTN  BP well controlled  Carvedilol, lisinopril     HLD  Statin as above    Discussed with daughter Jessica over the phone      Active Diagnoses:    Diagnosis Date Noted POA    PRINCIPAL PROBLEM:  NSTEMI (non-ST elevated myocardial infarction) [I21.4] 04/20/2018 Yes    Chest pain in adult [R07.9]  Yes    Leg edema, left [R60.0]  Yes    " Type 2 diabetes mellitus with diabetic polyneuropathy, with long-term current use of insulin [E11.42, Z79.4]  Not Applicable    Coronary artery disease involving native coronary artery of native heart without angina pectoris [I25.10]  Yes    Opioid abuse [F11.10]  Yes    Chronic combined systolic and diastolic congestive heart failure [I50.42] 07/15/2014 Yes    Asthma [J45.909] 07/15/2014 Yes     Chronic    Benign essential hypertension [I10] 07/15/2014 Yes     Chronic      Problems Resolved During this Admission:       VTE Risk Mitigation (From admission, onward)         Ordered     heparin 25,000 units in dextrose 5% (100 units/ml) IV bolus from bag - ADDITIONAL PRN BOLUS - 60 units/kg (max bolus 4000 units)  As needed (PRN)        Question:  Heparin Infusion Adjustment (DO NOT MODIFY ANSWER)  Answer:  [email or web address]\epic\Images\Pharmacy\HeparinInfusions\heparin LOW INTENSITY nomogram for [facility] [id number]    06/18/22 0758     heparin 25,000 units in dextrose 5% (100 units/ml) IV bolus from bag - ADDITIONAL PRN BOLUS - 30 units/kg (max bolus 4000 units)  As needed (PRN)        Question:  Heparin Infusion Adjustment (DO NOT MODIFY ANSWER)  Answer:  \\[email or web address]\epic\Images\Pharmacy\HeparinInfusions\heparin LOW INTENSITY nomogram for [facility] [id number].pdf    06/18/22 0758     heparin 25,000 units in dextrose 5% 250 mL (100 units/mL) infusion LOW INTENSITY nomogram - [facility]  Continuous        Question Answer Comment   Heparin Infusion Adjustment (DO NOT MODIFY ANSWER) \\[email or web address]\epic\Images\Pharmacy\HeparinInfusions\heparin LOW INTENSITY nomogram for [facility] [id number].pdf    Begin at (in units/kg/hr) 12        06/18/22 0758     IP VTE HIGH RISK PATIENT  Once         06/18/22 0147     Place sequential compression device  Until discontinued         06/18/22 0147                Neelam Hicks MD  Cardiology  [place] - Telemetry

## 2022-06-20 ENCOUNTER — CLINICAL SUPPORT (OUTPATIENT)
Dept: SMOKING CESSATION | Facility: CLINIC | Age: 42
End: 2022-06-20
Payer: COMMERCIAL

## 2022-06-20 DIAGNOSIS — F17.210 CIGARETTE SMOKER: Primary | ICD-10-CM

## 2022-06-20 LAB
ALBUMIN SERPL BCP-MCNC: 3.6 G/DL (ref 3.5–5.2)
ALP SERPL-CCNC: 71 U/L (ref 55–135)
ALT SERPL W/O P-5'-P-CCNC: 14 U/L (ref 10–44)
ANION GAP SERPL CALC-SCNC: 15 MMOL/L (ref 8–16)
APTT BLDCRRT: 30.6 SEC (ref 21–32)
APTT BLDCRRT: 33.2 SEC (ref 21–32)
APTT BLDCRRT: 42 SEC (ref 21–32)
AST SERPL-CCNC: 12 U/L (ref 10–40)
BASOPHILS # BLD AUTO: 0.04 K/UL (ref 0–0.2)
BASOPHILS NFR BLD: 0.4 % (ref 0–1.9)
BILIRUB SERPL-MCNC: 0.6 MG/DL (ref 0.1–1)
BUN SERPL-MCNC: 32 MG/DL (ref 6–20)
CALCIUM SERPL-MCNC: 8.7 MG/DL (ref 8.7–10.5)
CHLORIDE SERPL-SCNC: 106 MMOL/L (ref 95–110)
CO2 SERPL-SCNC: 20 MMOL/L (ref 23–29)
CREAT SERPL-MCNC: 1.3 MG/DL (ref 0.5–1.4)
CV STRESS BASE HR: 53 BPM
DIASTOLIC BLOOD PRESSURE: 49 MMHG
DIFFERENTIAL METHOD: ABNORMAL
EOSINOPHIL # BLD AUTO: 0.1 K/UL (ref 0–0.5)
EOSINOPHIL NFR BLD: 1.1 % (ref 0–8)
ERYTHROCYTE [DISTWIDTH] IN BLOOD BY AUTOMATED COUNT: 15.1 % (ref 11.5–14.5)
EST. GFR  (AFRICAN AMERICAN): 58 ML/MIN/1.73 M^2
EST. GFR  (NON AFRICAN AMERICAN): 50 ML/MIN/1.73 M^2
GLUCOSE SERPL-MCNC: 200 MG/DL (ref 70–110)
HCT VFR BLD AUTO: 35.3 % (ref 37–48.5)
HGB BLD-MCNC: 11.3 G/DL (ref 12–16)
IMM GRANULOCYTES # BLD AUTO: 0.1 K/UL (ref 0–0.04)
IMM GRANULOCYTES NFR BLD AUTO: 1 % (ref 0–0.5)
LYMPHOCYTES # BLD AUTO: 3.8 K/UL (ref 1–4.8)
LYMPHOCYTES NFR BLD: 39.2 % (ref 18–48)
MAGNESIUM SERPL-MCNC: 1.8 MG/DL (ref 1.6–2.6)
MCH RBC QN AUTO: 29 PG (ref 27–31)
MCHC RBC AUTO-ENTMCNC: 32 G/DL (ref 32–36)
MCV RBC AUTO: 91 FL (ref 82–98)
MONOCYTES # BLD AUTO: 0.6 K/UL (ref 0.3–1)
MONOCYTES NFR BLD: 6 % (ref 4–15)
NEUTROPHILS # BLD AUTO: 5 K/UL (ref 1.8–7.7)
NEUTROPHILS NFR BLD: 52.3 % (ref 38–73)
NRBC BLD-RTO: 0 /100 WBC
OHS CV CPX 85 PERCENT MAX PREDICTED HEART RATE MALE: 142
OHS CV CPX MAX PREDICTED HEART RATE: 167
OHS CV CPX PATIENT IS FEMALE: 1
OHS CV CPX PATIENT IS MALE: 0
OHS CV CPX PEAK DIASTOLIC BLOOD PRESSURE: 52 MMHG
OHS CV CPX PEAK HEAR RATE: 83 BPM
OHS CV CPX PEAK RATE PRESSURE PRODUCT: NORMAL
OHS CV CPX PEAK SYSTOLIC BLOOD PRESSURE: 161 MMHG
OHS CV CPX PERCENT MAX PREDICTED HEART RATE ACHIEVED: 50
OHS CV CPX RATE PRESSURE PRODUCT PRESENTING: 6731
PLATELET # BLD AUTO: 138 K/UL (ref 150–450)
PMV BLD AUTO: 10.6 FL (ref 9.2–12.9)
POCT GLUCOSE: 167 MG/DL (ref 70–110)
POCT GLUCOSE: 180 MG/DL (ref 70–110)
POCT GLUCOSE: 187 MG/DL (ref 70–110)
POCT GLUCOSE: 287 MG/DL (ref 70–110)
POTASSIUM SERPL-SCNC: 4.5 MMOL/L (ref 3.5–5.1)
PROT SERPL-MCNC: 6.2 G/DL (ref 6–8.4)
RBC # BLD AUTO: 3.89 M/UL (ref 4–5.4)
SODIUM SERPL-SCNC: 141 MMOL/L (ref 136–145)
SYSTOLIC BLOOD PRESSURE: 127 MMHG
WBC # BLD AUTO: 9.62 K/UL (ref 3.9–12.7)

## 2022-06-20 PROCEDURE — 85730 THROMBOPLASTIN TIME PARTIAL: CPT | Mod: 91 | Performed by: INTERNAL MEDICINE

## 2022-06-20 PROCEDURE — 85025 COMPLETE CBC W/AUTO DIFF WBC: CPT | Performed by: STUDENT IN AN ORGANIZED HEALTH CARE EDUCATION/TRAINING PROGRAM

## 2022-06-20 PROCEDURE — 80053 COMPREHEN METABOLIC PANEL: CPT | Performed by: STUDENT IN AN ORGANIZED HEALTH CARE EDUCATION/TRAINING PROGRAM

## 2022-06-20 PROCEDURE — 83735 ASSAY OF MAGNESIUM: CPT | Performed by: STUDENT IN AN ORGANIZED HEALTH CARE EDUCATION/TRAINING PROGRAM

## 2022-06-20 PROCEDURE — 99407 BEHAV CHNG SMOKING > 10 MIN: CPT | Mod: S$GLB,,,

## 2022-06-20 PROCEDURE — 25000003 PHARM REV CODE 250: Performed by: STUDENT IN AN ORGANIZED HEALTH CARE EDUCATION/TRAINING PROGRAM

## 2022-06-20 PROCEDURE — 11000001 HC ACUTE MED/SURG PRIVATE ROOM

## 2022-06-20 PROCEDURE — 99900035 HC TECH TIME PER 15 MIN (STAT)

## 2022-06-20 PROCEDURE — 36415 COLL VENOUS BLD VENIPUNCTURE: CPT | Performed by: INTERNAL MEDICINE

## 2022-06-20 PROCEDURE — 94640 AIRWAY INHALATION TREATMENT: CPT

## 2022-06-20 PROCEDURE — 99407 PR TOBACCO USE CESSATION INTENSIVE >10 MINUTES: ICD-10-PCS | Mod: S$GLB,,,

## 2022-06-20 PROCEDURE — 63600175 PHARM REV CODE 636 W HCPCS: Performed by: STUDENT IN AN ORGANIZED HEALTH CARE EDUCATION/TRAINING PROGRAM

## 2022-06-20 PROCEDURE — 63600175 PHARM REV CODE 636 W HCPCS: Performed by: INTERNAL MEDICINE

## 2022-06-20 PROCEDURE — 99233 PR SUBSEQUENT HOSPITAL CARE,LEVL III: ICD-10-PCS | Mod: ,,, | Performed by: NURSE PRACTITIONER

## 2022-06-20 PROCEDURE — 99233 SBSQ HOSP IP/OBS HIGH 50: CPT | Mod: ,,, | Performed by: NURSE PRACTITIONER

## 2022-06-20 PROCEDURE — 94761 N-INVAS EAR/PLS OXIMETRY MLT: CPT

## 2022-06-20 RX ORDER — REGADENOSON 0.08 MG/ML
0.4 INJECTION, SOLUTION INTRAVENOUS ONCE
Status: COMPLETED | OUTPATIENT
Start: 2022-06-20 | End: 2022-06-20

## 2022-06-20 RX ADMIN — SENNOSIDES 8.6 MG: 8.6 TABLET, FILM COATED ORAL at 10:06

## 2022-06-20 RX ADMIN — INSULIN ASPART 3 UNITS: 100 INJECTION, SOLUTION INTRAVENOUS; SUBCUTANEOUS at 09:06

## 2022-06-20 RX ADMIN — ESCITALOPRAM OXALATE 10 MG: 10 TABLET ORAL at 10:06

## 2022-06-20 RX ADMIN — POLYETHYLENE GLYCOL 3350 17 G: 17 POWDER, FOR SOLUTION ORAL at 10:06

## 2022-06-20 RX ADMIN — ATORVASTATIN CALCIUM 80 MG: 40 TABLET, FILM COATED ORAL at 10:06

## 2022-06-20 RX ADMIN — FLUTICASONE FUROATE AND VILANTEROL TRIFENATATE 1 PUFF: 100; 25 POWDER RESPIRATORY (INHALATION) at 09:06

## 2022-06-20 RX ADMIN — CARVEDILOL 6.25 MG: 6.25 TABLET, FILM COATED ORAL at 10:06

## 2022-06-20 RX ADMIN — POLYETHYLENE GLYCOL 3350 17 G: 17 POWDER, FOR SOLUTION ORAL at 09:06

## 2022-06-20 RX ADMIN — ASPIRIN 81 MG CHEWABLE TABLET 81 MG: 81 TABLET CHEWABLE at 10:06

## 2022-06-20 RX ADMIN — ALLOPURINOL 100 MG: 100 TABLET ORAL at 10:06

## 2022-06-20 RX ADMIN — BUPRENORPHINE AND NALOXONE 8 MG: 8; 2 TABLET SUBLINGUAL at 02:06

## 2022-06-20 RX ADMIN — CARVEDILOL 6.25 MG: 6.25 TABLET, FILM COATED ORAL at 05:06

## 2022-06-20 RX ADMIN — BUPRENORPHINE AND NALOXONE 8 MG: 8; 2 TABLET SUBLINGUAL at 10:06

## 2022-06-20 RX ADMIN — HEPARIN SODIUM 22 UNITS/KG/HR: 5000 INJECTION INTRAVENOUS; SUBCUTANEOUS at 09:06

## 2022-06-20 RX ADMIN — CLINDAMYCIN HYDROCHLORIDE 450 MG: 150 CAPSULE ORAL at 02:06

## 2022-06-20 RX ADMIN — PANTOPRAZOLE SODIUM 40 MG: 40 TABLET, DELAYED RELEASE ORAL at 10:06

## 2022-06-20 RX ADMIN — INSULIN DETEMIR 50 UNITS: 100 INJECTION, SOLUTION SUBCUTANEOUS at 01:06

## 2022-06-20 RX ADMIN — HEPARIN SODIUM 19 UNITS/KG/HR: 5000 INJECTION INTRAVENOUS; SUBCUTANEOUS at 10:06

## 2022-06-20 RX ADMIN — LISINOPRIL 20 MG: 20 TABLET ORAL at 10:06

## 2022-06-20 RX ADMIN — TIZANIDINE 4 MG: 2 TABLET ORAL at 02:06

## 2022-06-20 RX ADMIN — PREGABALIN 150 MG: 75 CAPSULE ORAL at 10:06

## 2022-06-20 RX ADMIN — REGADENOSON 0.4 MG: 0.08 INJECTION, SOLUTION INTRAVENOUS at 03:06

## 2022-06-20 RX ADMIN — PREGABALIN 150 MG: 75 CAPSULE ORAL at 09:06

## 2022-06-20 RX ADMIN — BUPRENORPHINE AND NALOXONE 8 MG: 8; 2 TABLET SUBLINGUAL at 09:06

## 2022-06-20 RX ADMIN — CLINDAMYCIN HYDROCHLORIDE 450 MG: 150 CAPSULE ORAL at 09:06

## 2022-06-20 RX ADMIN — Medication 12 MG: at 09:06

## 2022-06-20 RX ADMIN — HEPARIN SODIUM 22 UNITS/KG/HR: 5000 INJECTION INTRAVENOUS; SUBCUTANEOUS at 03:06

## 2022-06-20 RX ADMIN — CLOPIDOGREL 75 MG: 75 TABLET, FILM COATED ORAL at 10:06

## 2022-06-20 NOTE — HOSPITAL COURSE
6/20/2022 Patient admitted over the weekend with complaints of chest pain. Troponin flat at 0.12-0.16 with pain different than previous PCI. Chest pain free overnight. HR and BP stable. NPO for stress test today   6/21/2022Stress test yesterday with pending results. HR and BP stable. CBC and BMP WNL

## 2022-06-20 NOTE — SUBJECTIVE & OBJECTIVE
Review of Systems   Constitutional: Negative for chills, decreased appetite, diaphoresis and fever.   Cardiovascular:  Positive for chest pain (resolved). Negative for claudication, cyanosis, dyspnea on exertion, irregular heartbeat, leg swelling, near-syncope, orthopnea, palpitations, paroxysmal nocturnal dyspnea and syncope.   Respiratory:  Negative for cough, hemoptysis, shortness of breath and wheezing.    Gastrointestinal:  Negative for bloating, abdominal pain, constipation, diarrhea, melena, nausea and vomiting.   Neurological:  Negative for dizziness and weakness.   Objective:     Vital Signs (Most Recent):  Temp: 96.4 °F (35.8 °C) (06/20/22 0616)  Pulse: 63 (06/20/22 0903)  Resp: 18 (06/20/22 0903)  BP: (!) 125/58 (06/20/22 0616)  SpO2: (!) 94 % (06/20/22 0903)   Vital Signs (24h Range):  Temp:  [96.3 °F (35.7 °C)-98.5 °F (36.9 °C)] 96.4 °F (35.8 °C)  Pulse:  [53-70] 63  Resp:  [18-20] 18  SpO2:  [93 %-98 %] 94 %  BP: ()/(48-60) 125/58     Weight: (!) 155.1 kg (341 lb 14.9 oz)  Body mass index is 62.54 kg/m².     SpO2: (!) 94 %  O2 Device (Oxygen Therapy): room air      Intake/Output Summary (Last 24 hours) at 6/20/2022 0940  Last data filed at 6/20/2022 0400  Gross per 24 hour   Intake 200 ml   Output 700 ml   Net -500 ml       Lines/Drains/Airways       Peripheral Intravenous Line  Duration                  Peripheral IV - Single Lumen 06/17/22 2227 20 G Right Wrist 2 days                    Physical Exam  Constitutional:       General: She is not in acute distress.     Appearance: She is well-developed.   Cardiovascular:      Rate and Rhythm: Normal rate and regular rhythm.      Heart sounds: No murmur heard.    No gallop.   Pulmonary:      Effort: Pulmonary effort is normal. No respiratory distress.      Breath sounds: Normal breath sounds. No wheezing.   Abdominal:      General: Bowel sounds are normal. There is no distension.      Palpations: Abdomen is soft.      Tenderness: There is no  "abdominal tenderness.   Skin:     General: Skin is warm and dry.   Neurological:      Mental Status: She is alert and oriented to person, place, and time.       Significant Labs: BMP:   Recent Labs   Lab 06/19/22  0637 06/20/22  0513   * 200*    141   K 4.1 4.5    106   CO2 22* 20*   BUN 24* 32*   CREATININE 1.3 1.3   CALCIUM 9.3 8.7   MG 1.6 1.8   , CBC   Recent Labs   Lab 06/19/22  0637 06/20/22  0513   WBC 10.72 9.62   HGB 12.5 11.3*   HCT 39.2 35.3*    138*   , and Troponin   Recent Labs   Lab 06/18/22  1553 06/18/22 2053   TROPONINI 0.112* 0.126*       Significant Imaging: Echocardiogram: Transthoracic echo (TTE) complete ([facility] Only):   Results for orders placed or performed during the hospital encounter of 06/17/22   Echo   Result Value Ref Range    AV mean gradient 4 mmHg    Ao peak milton 1.43 m/s    Ao VTI 34.38 cm    IVRT 76.12 msec    IVS 1.12 (A) 0.6 - 1.1 cm    LA size 4.20 cm    Left Atrium Major Axis 5.58 cm    Left Atrium Minor Axis 4.83 cm    LVIDd 5.74 3.5 - 6.0 cm    LVIDs 4.32 (A) 2.1 - 4.0 cm    LVOT diameter 2.60 cm    LVOT peak VTI 20.69 cm    Posterior Wall 1.35 (A) 0.6 - 1.1 cm    MV Peak A Milton 0.96 m/s    E wave deceleration time 141.17 msec    MV Peak E [place] 1.05 m/s    PV Peak D Milton 0.25 m/s    PV Peak S Milton 0.62 m/s    RA Major Axis 4.17 cm    RA Width 3.95 cm    RVDD 2.03 cm    TR Max Milton 1.92 m/s    LA WIDTH 3.15 cm    MV stenosis pressure 1/2 time 45.18 ms    LV Diastolic Volume 162.82 mL    LV Systolic Volume 83.78 mL    LVOT peak milton 0.82 m/s    TDI LATERAL 0.08 m/s    TDI SEPTAL 0.09 m/s    LA volume (mod) 59.19 cm3    MV "A" wave duration 10.28 msec    MV mean gradient 0 mmHg    MV peak gradient 4 mmHg    MV VTI 28.54 cm    LV LATERAL E/E' RATIO 13.13 m/s    LV SEPTAL E/E' RATIO 11.67 m/s    FS 25 %    LA volume 58.23 cm3    LV mass 303.67 g    Left Ventricle Relative Wall Thickness 0.47 cm    AV valve area 3.19 cm2    AV Velocity Ratio 0.57     AV index " (prosthetic) 0.60     MV valve area p 1/2 method 4.87 cm2    MV valve area by continuity eq 3.85 cm2    E/A ratio 1.09     Mean e' 0.09 m/s    Pulm vein S/D ratio 2.48     LVOT area 5.3 cm2    LVOT stroke volume 109.79 cm3    AV peak gradient 8 mmHg    E/E' ratio 12.35 m/s    LV Systolic Volume Index 34.9 mL/m2    LV Diastolic Volume Index 67.84 mL/m2    LA Volume Index 24.3 mL/m2    LV Mass Index 127 g/m2    Triscuspid Valve Regurgitation Peak Gradient 15 mmHg    LA Volume Index (Mod) 24.7 mL/m2    BSA 2.6 m2    EF 45 %    Sinus 4.00 cm    Narrative    · Technically significantly limited echocardiogram.  · The left ventricle is moderately enlarged with concentric hypertrophy   and mildly decreased systolic function.  · The estimated ejection fraction is 45-50%.  · Grade I left ventricular diastolic dysfunction.  · Normal right ventricular size with normal right ventricular systolic   function.  · Mild mitral regurgitation.  · The sinuses of Valsalva is mildly dilated.

## 2022-06-20 NOTE — ASSESSMENT & PLAN NOTE
- echo on 6/18/2022 with EF 45% down from 55-60% previously  - NT pro  upon admission; no complaints of SOB and no ADHF noted on exam  - SBP 100s-120s; continue ACEI and BB

## 2022-06-20 NOTE — PLAN OF CARE
Problem: Adult Inpatient Plan of Care  Goal: Plan of Care Review  Outcome: Ongoing, Progressing  NPO at midnight. Pt not in distress. Safety maintained at all times. Call bell within hand. Bed alarm on. Will continue to monitor.
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
## 2022-06-20 NOTE — ASSESSMENT & PLAN NOTE
- previous bifurcating LCX-OM PCI and LAD PCI in 2021  - presented with chest pain; troponin flat and lower than baseline; chest pain different than presentation for PCI in 2021  - continue medical management  - proceed with stress test today for further evaluation; recommendations as detailed previously

## 2022-06-20 NOTE — PROGRESS NOTES
[facility]  [facility]  Progress Note    Patient Name: Lele Dias  MRN: [id number]  Admission Date: 6/17/2022  Hospital Length of Stay: 1 days  Code Status: Full Code   Attending Physician: Eli Stephens MD   Primary Care Physician: Sammi Edge MD  Expected Discharge Date: 6/20/2022  Principal Problem:NSTEMI (non-ST elevated myocardial infarction)    Subjective:     Hospital Course:   6/20/2022 Patient admitted over the weekend with complaints of chest pain. Troponin flat at 0.12-0.16 with pain different than previous PCI. Chest pain free overnight. HR and BP stable. NPO for stress test today           Review of Systems   Constitutional: Negative for chills, decreased appetite, diaphoresis and fever.   Cardiovascular:  Positive for chest pain (resolved). Negative for claudication, cyanosis, dyspnea on exertion, irregular heartbeat, leg swelling, near-syncope, orthopnea, palpitations, paroxysmal nocturnal dyspnea and syncope.   Respiratory:  Negative for cough, hemoptysis, shortness of breath and wheezing.    Gastrointestinal:  Negative for bloating, abdominal pain, constipation, diarrhea, melena, nausea and vomiting.   Neurological:  Negative for dizziness and weakness.   Objective:     Vital Signs (Most Recent):  Temp: 96.4 °F (35.8 °C) (06/20/22 0616)  Pulse: 63 (06/20/22 0903)  Resp: 18 (06/20/22 0903)  BP: (!) 125/58 (06/20/22 0616)  SpO2: (!) 94 % (06/20/22 0903)   Vital Signs (24h Range):  Temp:  [96.3 °F (35.7 °C)-98.5 °F (36.9 °C)] 96.4 °F (35.8 °C)  Pulse:  [53-70] 63  Resp:  [18-20] 18  SpO2:  [93 %-98 %] 94 %  BP: ()/(48-60) 125/58     Weight: (!) 155.1 kg (341 lb 14.9 oz)  Body mass index is 62.54 kg/m².     SpO2: (!) 94 %  O2 Device (Oxygen Therapy): room air      Intake/Output Summary (Last 24 hours) at 6/20/2022 0940  Last data filed at 6/20/2022 0400  Gross per 24 hour   Intake 200 ml   Output 700 ml   Net -500 ml       Lines/Drains/Airways       Peripheral Intravenous Line   Duration                  Peripheral IV - Single Lumen 06/17/22 2227 20 G Right Wrist 2 days                    Physical Exam  Constitutional:       General: She is not in acute distress.     Appearance: She is well-developed.   Cardiovascular:      Rate and Rhythm: Normal rate and regular rhythm.      Heart sounds: No murmur heard.    No gallop.   Pulmonary:      Effort: Pulmonary effort is normal. No respiratory distress.      Breath sounds: Normal breath sounds. No wheezing.   Abdominal:      General: Bowel sounds are normal. There is no distension.      Palpations: Abdomen is soft.      Tenderness: There is no abdominal tenderness.   Skin:     General: Skin is warm and dry.   Neurological:      Mental Status: She is alert and oriented to person, place, and time.       Significant Labs: BMP:   Recent Labs   Lab 06/19/22  0637 06/20/22  0513   * 200*    141   K 4.1 4.5    106   CO2 22* 20*   BUN 24* 32*   CREATININE 1.3 1.3   CALCIUM 9.3 8.7   MG 1.6 1.8   , CBC   Recent Labs   Lab 06/19/22  0637 06/20/22  0513   WBC 10.72 9.62   HGB 12.5 11.3*   HCT 39.2 35.3*    138*   , and Troponin   Recent Labs   Lab 06/18/22  1553 06/18/22  2053   TROPONINI 0.112* 0.126*       Significant Imaging: Echocardiogram: Transthoracic echo (TTE) complete (Cupid Only):   Results for orders placed or performed during the hospital encounter of 06/17/22   Echo   Result Value Ref Range    AV mean gradient 4 mmHg    Ao peak milton 1.43 m/s    Ao VTI 34.38 cm    IVRT 76.12 msec    IVS 1.12 (A) 0.6 - 1.1 cm    LA size 4.20 cm    Left Atrium Major Axis 5.58 cm    Left Atrium Minor Axis 4.83 cm    LVIDd 5.74 3.5 - 6.0 cm    LVIDs 4.32 (A) 2.1 - 4.0 cm    LVOT diameter 2.60 cm    LVOT peak VTI 20.69 cm    Posterior Wall 1.35 (A) 0.6 - 1.1 cm    MV Peak A Milton 0.96 m/s    E wave deceleration time 141.17 msec    MV Peak E Milton 1.05 m/s    PV Peak D Milton 0.25 m/s    PV Peak S Milton 0.62 m/s    RA Major Axis 4.17 cm    RA Width  "3.95 cm    RVDD 2.03 cm    TR Max Milton 1.92 m/s    LA WIDTH 3.15 cm    MV stenosis pressure 1/2 time 45.18 ms    LV Diastolic Volume 162.82 mL    LV Systolic Volume 83.78 mL    LVOT peak miltno 0.82 m/s    TDI LATERAL 0.08 m/s    TDI SEPTAL 0.09 m/s    LA volume (mod) 59.19 cm3    MV "A" wave duration 10.28 msec    MV mean gradient 0 mmHg    MV peak gradient 4 mmHg    MV VTI 28.54 cm    LV LATERAL E/E' RATIO 13.13 m/s    LV SEPTAL E/E' RATIO 11.67 m/s    FS 25 %    LA volume 58.23 cm3    LV mass 303.67 g    Left Ventricle Relative Wall Thickness 0.47 cm    AV valve area 3.19 cm2    AV Velocity Ratio 0.57     AV index (prosthetic) 0.60     MV valve area p 1/2 method 4.87 cm2    MV valve area by continuity eq 3.85 cm2    E/A ratio 1.09     Mean e' 0.09 m/s    Pulm vein S/D ratio 2.48     LVOT area 5.3 cm2    LVOT stroke volume 109.79 cm3    AV peak gradient 8 mmHg    E/E' ratio 12.35 m/s    LV Systolic Volume Index 34.9 mL/m2    LV Diastolic Volume Index 67.84 mL/m2    LA Volume Index 24.3 mL/m2    LV Mass Index 127 g/m2    Triscuspid Valve Regurgitation Peak Gradient 15 mmHg    LA Volume Index (Mod) 24.7 mL/m2    BSA 2.6 m2    EF 45 %    Sinus 4.00 cm    Narrative    · Technically significantly limited echocardiogram.  · The left ventricle is moderately enlarged with concentric hypertrophy   and mildly decreased systolic function.  · The estimated ejection fraction is 45-50%.  · Grade I left ventricular diastolic dysfunction.  · Normal right ventricular size with normal right ventricular systolic   function.  · Mild mitral regurgitation.  · The sinuses of Valsalva is mildly dilated.        Assessment and Plan:     Brief HPI: Seen this morning on AM rounds while resting in bed. Denies any chest pain overnight.Discussed POC as detailed below-verbalized understanding and agrees with POC     * NSTEMI (non-ST elevated myocardial infarction)  - history of PCI  - presented with chest pain; troponin flat upon admission at " 0.12-0.16 lower than previous baseline  - chest pain different than symptoms prior to previous PCI  - continue DAPT, statin, BB and ACEI as well as IV Heparin drip  - will proceed with stress test today; if stress test negative then likely suitable for discharge; if stress test positive then will need to re evaluate to determine further workup/evaluation     Chest pain in adult  - denies any chest pain overnight  - plan as detailed under CAD and NSTEMI     Coronary artery disease involving native coronary artery of native heart without angina pectoris  - previous bifurcating LCX-OM PCI and LAD PCI in 2021  - presented with chest pain; troponin flat and lower than baseline; chest pain different than presentation for PCI in 2021  - continue medical management  - proceed with stress test today for further evaluation; recommendations as detailed previously     Benign essential hypertension  -SBP 100s-120s overnight  - continue Lisinopril and Coreg     Chronic combined systolic and diastolic congestive heart failure  - echo on 6/18/2022 with EF 45% down from 55-60% previously  - NT pro  upon admission; no complaints of SOB and no ADHF noted on exam  - SBP 100s-120s; continue ACEI and BB           VTE Risk Mitigation (From admission, onward)         Ordered     heparin 25,000 units in dextrose 5% (100 units/ml) IV bolus from bag - ADDITIONAL PRN BOLUS - 60 units/kg (max bolus 4000 units)  As needed (PRN)        Question:  Heparin Infusion Adjustment (DO NOT MODIFY ANSWER)  Answer:  \\[email or web address]\Images\Pharmacy\HeparinInfusions\heparin LOW INTENSITY nomogram for [facility] [id number]    06/18/22 0758     heparin 25,000 units in dextrose 5% (100 units/ml) IV bolus from bag - ADDITIONAL PRN BOLUS - 30 units/kg (max bolus 4000 units)  As needed (PRN)        Question:  Heparin Infusion Adjustment (DO NOT MODIFY ANSWER)  Answer:  \\[email or web address]\Images\Pharmacy\HeparinInfusions\heparin LOW INTENSITY nomogram for [facility]  [id number]    06/18/22 0758     heparin 25,000 units in dextrose 5% 250 mL (100 units/mL) infusion LOW INTENSITY nomogram - [facility]  Continuous        Question Answer Comment   Heparin Infusion Adjustment (DO NOT MODIFY ANSWER) [email or web address]\epic\Images\Pharmacy\HeparinInfusions\heparin LOW INTENSITY nomogram for [facility] [id number]    Begin at (in units/kg/hr) 12        06/18/22 0758     IP VTE HIGH RISK PATIENT  Once         06/18/22 0147     Place sequential compression device  Until discontinued         06/18/22 0147                Jessica Jose, APRN, ANP  Cardiology  [facility] - Telemetry

## 2022-06-20 NOTE — ASSESSMENT & PLAN NOTE
- history of PCI  - presented with chest pain; troponin flat upon admission at 0.12-0.16 lower than previous baseline  - chest pain different than symptoms prior to previous PCI  - continue DAPT, statin, BB and ACEI as well as IV Heparin drip  - will proceed with stress test today; if stress test negative then likely suitable for discharge; if stress test positive then will need to re evaluate to determine further workup/evaluation

## 2022-06-20 NOTE — PROGRESS NOTES
Individual Follow-Up Form    6/20/2022    Quit Date: To be determined    Clinical Status of Patient: Inpatient    Length of Service: 30 minutes    Comments: Smoking cessation education note: Pt is a 2.5 pk/day cigarette smoker x 32 yrs. She states that she cut down to 1 pk/day on average approximately 2 months ago, and that she is ready to quit. Order requested for 21 mg nicotine patch Q day. Pt is currently enrolled in the [place] [employer]. Ambulatory referral to Smoking Cessation Program following hospital dischrge.     Diagnosis: F17.210    Next Visit:   7/6/2022 4pm with [facility] ([facility])

## 2022-06-21 ENCOUNTER — TELEPHONE (OUTPATIENT)
Dept: CARDIOLOGY | Facility: CLINIC | Age: 42
End: 2022-06-21
Payer: MEDICAID

## 2022-06-21 ENCOUNTER — CLINICAL SUPPORT (OUTPATIENT)
Dept: SMOKING CESSATION | Facility: CLINIC | Age: 42
End: 2022-06-21

## 2022-06-21 VITALS
TEMPERATURE: 97 F | HEIGHT: 62 IN | SYSTOLIC BLOOD PRESSURE: 120 MMHG | BODY MASS INDEX: 53.92 KG/M2 | RESPIRATION RATE: 16 BRPM | OXYGEN SATURATION: 97 % | WEIGHT: 293 LBS | HEART RATE: 66 BPM | DIASTOLIC BLOOD PRESSURE: 55 MMHG

## 2022-06-21 DIAGNOSIS — F17.210 CIGARETTE SMOKER: Primary | ICD-10-CM

## 2022-06-21 LAB
ALBUMIN SERPL BCP-MCNC: 3.5 G/DL (ref 3.5–5.2)
ALP SERPL-CCNC: 70 U/L (ref 55–135)
ALT SERPL W/O P-5'-P-CCNC: 11 U/L (ref 10–44)
ANION GAP SERPL CALC-SCNC: 14 MMOL/L (ref 8–16)
APTT BLDCRRT: 47.8 SEC (ref 21–32)
APTT BLDCRRT: 47.8 SEC (ref 21–32)
AST SERPL-CCNC: 9 U/L (ref 10–40)
BASOPHILS # BLD AUTO: 0.04 K/UL (ref 0–0.2)
BASOPHILS NFR BLD: 0.5 % (ref 0–1.9)
BILIRUB SERPL-MCNC: 0.6 MG/DL (ref 0.1–1)
BUN SERPL-MCNC: 34 MG/DL (ref 6–20)
C TRACH DNA SPEC QL NAA+PROBE: NOT DETECTED
CALCIUM SERPL-MCNC: 8.6 MG/DL (ref 8.7–10.5)
CHLORIDE SERPL-SCNC: 105 MMOL/L (ref 95–110)
CO2 SERPL-SCNC: 20 MMOL/L (ref 23–29)
CREAT SERPL-MCNC: 1.4 MG/DL (ref 0.5–1.4)
DIFFERENTIAL METHOD: ABNORMAL
EOSINOPHIL # BLD AUTO: 0.1 K/UL (ref 0–0.5)
EOSINOPHIL NFR BLD: 1.2 % (ref 0–8)
ERYTHROCYTE [DISTWIDTH] IN BLOOD BY AUTOMATED COUNT: 15.1 % (ref 11.5–14.5)
EST. GFR  (AFRICAN AMERICAN): 53 ML/MIN/1.73 M^2
EST. GFR  (NON AFRICAN AMERICAN): 46 ML/MIN/1.73 M^2
GLUCOSE SERPL-MCNC: 221 MG/DL (ref 70–110)
HCT VFR BLD AUTO: 36.7 % (ref 37–48.5)
HGB BLD-MCNC: 11.7 G/DL (ref 12–16)
IMM GRANULOCYTES # BLD AUTO: 0.13 K/UL (ref 0–0.04)
IMM GRANULOCYTES NFR BLD AUTO: 1.5 % (ref 0–0.5)
LYMPHOCYTES # BLD AUTO: 3.5 K/UL (ref 1–4.8)
LYMPHOCYTES NFR BLD: 39.4 % (ref 18–48)
MAGNESIUM SERPL-MCNC: 1.8 MG/DL (ref 1.6–2.6)
MCH RBC QN AUTO: 29 PG (ref 27–31)
MCHC RBC AUTO-ENTMCNC: 31.9 G/DL (ref 32–36)
MCV RBC AUTO: 91 FL (ref 82–98)
MONOCYTES # BLD AUTO: 0.6 K/UL (ref 0.3–1)
MONOCYTES NFR BLD: 6.5 % (ref 4–15)
N GONORRHOEA DNA SPEC QL NAA+PROBE: NOT DETECTED
NEUTROPHILS # BLD AUTO: 4.5 K/UL (ref 1.8–7.7)
NEUTROPHILS NFR BLD: 50.9 % (ref 38–73)
NRBC BLD-RTO: 0 /100 WBC
PLATELET # BLD AUTO: 138 K/UL (ref 150–450)
PMV BLD AUTO: 11.3 FL (ref 9.2–12.9)
POCT GLUCOSE: 227 MG/DL (ref 70–110)
POCT GLUCOSE: 293 MG/DL (ref 70–110)
POTASSIUM SERPL-SCNC: 4.6 MMOL/L (ref 3.5–5.1)
PROT SERPL-MCNC: 6.1 G/DL (ref 6–8.4)
RBC # BLD AUTO: 4.03 M/UL (ref 4–5.4)
SODIUM SERPL-SCNC: 139 MMOL/L (ref 136–145)
WBC # BLD AUTO: 8.86 K/UL (ref 3.9–12.7)

## 2022-06-21 PROCEDURE — 99233 SBSQ HOSP IP/OBS HIGH 50: CPT | Mod: ,,, | Performed by: NURSE PRACTITIONER

## 2022-06-21 PROCEDURE — 99406 PT REFUSED TOBACCO CESSATION: ICD-10-PCS | Mod: S$GLB,,,

## 2022-06-21 PROCEDURE — 99406 BEHAV CHNG SMOKING 3-10 MIN: CPT | Mod: S$GLB,,,

## 2022-06-21 PROCEDURE — 94640 AIRWAY INHALATION TREATMENT: CPT

## 2022-06-21 PROCEDURE — 25000003 PHARM REV CODE 250: Performed by: NURSE PRACTITIONER

## 2022-06-21 PROCEDURE — 83735 ASSAY OF MAGNESIUM: CPT | Performed by: STUDENT IN AN ORGANIZED HEALTH CARE EDUCATION/TRAINING PROGRAM

## 2022-06-21 PROCEDURE — 85025 COMPLETE CBC W/AUTO DIFF WBC: CPT | Performed by: STUDENT IN AN ORGANIZED HEALTH CARE EDUCATION/TRAINING PROGRAM

## 2022-06-21 PROCEDURE — 80053 COMPREHEN METABOLIC PANEL: CPT | Performed by: STUDENT IN AN ORGANIZED HEALTH CARE EDUCATION/TRAINING PROGRAM

## 2022-06-21 PROCEDURE — 94761 N-INVAS EAR/PLS OXIMETRY MLT: CPT

## 2022-06-21 PROCEDURE — 99233 PR SUBSEQUENT HOSPITAL CARE,LEVL III: ICD-10-PCS | Mod: ,,, | Performed by: NURSE PRACTITIONER

## 2022-06-21 PROCEDURE — 36415 COLL VENOUS BLD VENIPUNCTURE: CPT | Performed by: INTERNAL MEDICINE

## 2022-06-21 PROCEDURE — 63600175 PHARM REV CODE 636 W HCPCS: Performed by: STUDENT IN AN ORGANIZED HEALTH CARE EDUCATION/TRAINING PROGRAM

## 2022-06-21 PROCEDURE — 25000003 PHARM REV CODE 250: Performed by: STUDENT IN AN ORGANIZED HEALTH CARE EDUCATION/TRAINING PROGRAM

## 2022-06-21 PROCEDURE — 99900035 HC TECH TIME PER 15 MIN (STAT)

## 2022-06-21 PROCEDURE — 85730 THROMBOPLASTIN TIME PARTIAL: CPT | Performed by: INTERNAL MEDICINE

## 2022-06-21 PROCEDURE — S4991 NICOTINE PATCH NONLEGEND: HCPCS | Performed by: NURSE PRACTITIONER

## 2022-06-21 RX ORDER — IBUPROFEN 200 MG
1 TABLET ORAL DAILY
Status: DISCONTINUED | OUTPATIENT
Start: 2022-06-21 | End: 2022-06-21 | Stop reason: HOSPADM

## 2022-06-21 RX ADMIN — CLINDAMYCIN HYDROCHLORIDE 450 MG: 150 CAPSULE ORAL at 01:06

## 2022-06-21 RX ADMIN — LISINOPRIL 20 MG: 20 TABLET ORAL at 10:06

## 2022-06-21 RX ADMIN — ESCITALOPRAM OXALATE 10 MG: 10 TABLET ORAL at 10:06

## 2022-06-21 RX ADMIN — FLUTICASONE FUROATE AND VILANTEROL TRIFENATATE 1 PUFF: 100; 25 POWDER RESPIRATORY (INHALATION) at 07:06

## 2022-06-21 RX ADMIN — CLINDAMYCIN HYDROCHLORIDE 450 MG: 150 CAPSULE ORAL at 06:06

## 2022-06-21 RX ADMIN — PREGABALIN 150 MG: 75 CAPSULE ORAL at 10:06

## 2022-06-21 RX ADMIN — ACETAMINOPHEN 650 MG: 325 TABLET ORAL at 03:06

## 2022-06-21 RX ADMIN — CARVEDILOL 6.25 MG: 6.25 TABLET, FILM COATED ORAL at 10:06

## 2022-06-21 RX ADMIN — INSULIN DETEMIR 50 UNITS: 100 INJECTION, SOLUTION SUBCUTANEOUS at 10:06

## 2022-06-21 RX ADMIN — SENNOSIDES 8.6 MG: 8.6 TABLET, FILM COATED ORAL at 10:06

## 2022-06-21 RX ADMIN — PANTOPRAZOLE SODIUM 40 MG: 40 TABLET, DELAYED RELEASE ORAL at 10:06

## 2022-06-21 RX ADMIN — NICOTINE 1 PATCH: 21 PATCH, EXTENDED RELEASE TRANSDERMAL at 12:06

## 2022-06-21 RX ADMIN — POLYETHYLENE GLYCOL 3350 17 G: 17 POWDER, FOR SOLUTION ORAL at 10:06

## 2022-06-21 RX ADMIN — TIZANIDINE 4 MG: 2 TABLET ORAL at 01:06

## 2022-06-21 RX ADMIN — INSULIN ASPART 6 UNITS: 100 INJECTION, SOLUTION INTRAVENOUS; SUBCUTANEOUS at 12:06

## 2022-06-21 RX ADMIN — ASPIRIN 81 MG CHEWABLE TABLET 81 MG: 81 TABLET CHEWABLE at 10:06

## 2022-06-21 RX ADMIN — BUPRENORPHINE AND NALOXONE 8 MG: 8; 2 TABLET SUBLINGUAL at 10:06

## 2022-06-21 RX ADMIN — CLOPIDOGREL 75 MG: 75 TABLET, FILM COATED ORAL at 10:06

## 2022-06-21 RX ADMIN — ATORVASTATIN CALCIUM 80 MG: 40 TABLET, FILM COATED ORAL at 10:06

## 2022-06-21 RX ADMIN — ALLOPURINOL 100 MG: 100 TABLET ORAL at 10:06

## 2022-06-21 RX ADMIN — ACETAMINOPHEN 650 MG: 325 TABLET ORAL at 01:06

## 2022-06-21 NOTE — TELEPHONE ENCOUNTER
----- Message from Taqueria Jimenez sent at 6/21/2022 11:59 AM CDT -----  Regarding: Patient advice  Contact: Pt  Pt called in regards to questions/concerns about medication       Please advise , Pt can be reached at [phone]

## 2022-06-21 NOTE — SUBJECTIVE & OBJECTIVE
ROS  Objective:     Vital Signs (Most Recent):  Temp: 97.6 °F (36.4 °C) (06/21/22 1048)  Pulse: (!) 51 (06/21/22 1048)  Resp: 16 (06/21/22 1048)  BP: 139/67 (06/21/22 1048)  SpO2: 96 % (06/21/22 1048)   Vital Signs (24h Range):  Temp:  [96.6 °F (35.9 °C)-99.8 °F (37.7 °C)] 97.6 °F (36.4 °C)  Pulse:  [51-61] 51  Resp:  [16-18] 16  SpO2:  [94 %-97 %] 96 %  BP: ()/(40-67) 139/67     Weight: (!) 154.7 kg (341 lb)  Body mass index is 62.37 kg/m².     SpO2: 96 %  O2 Device (Oxygen Therapy): room air    No intake or output data in the 24 hours ending 06/21/22 1118    Lines/Drains/Airways       Peripheral Intravenous Line  Duration                  Peripheral IV - Single Lumen 06/17/22 2227 20 G Right Wrist 3 days                    Physical Exam    Significant Labs: BMP:   Recent Labs   Lab 06/20/22  0513 06/21/22  0453   * 221*    139   K 4.5 4.6    105   CO2 20* 20*   BUN 32* 34*   CREATININE 1.3 1.4   CALCIUM 8.7 8.6*   MG 1.8 1.8    and CBC   Recent Labs   Lab 06/20/22  0513 06/21/22  0453   WBC 9.62 8.86   HGB 11.3* 11.7*   HCT 35.3* 36.7*   * 138*       Significant Imaging: Echocardiogram: Transthoracic echo (TTE) complete ([facility] Only):   Results for orders placed or performed during the hospital encounter of 06/17/22   Echo   Result Value Ref Range    AV mean gradient 4 mmHg    Ao peak milton 1.43 m/s    Ao VTI 34.38 cm    IVRT 76.12 msec    IVS 1.12 (A) 0.6 - 1.1 cm    LA size 4.20 cm    Left Atrium Major Axis 5.58 cm    Left Atrium Minor Axis 4.83 cm    LVIDd 5.74 3.5 - 6.0 cm    LVIDs 4.32 (A) 2.1 - 4.0 cm    LVOT diameter 2.60 cm    LVOT peak VTI 20.69 cm    Posterior Wall 1.35 (A) 0.6 - 1.1 cm    MV Peak A Milton 0.96 m/s    E wave deceleration time 141.17 msec    MV Peak E Milton 1.05 m/s    PV Peak D Milton 0.25 m/s    PV Peak S Milton 0.62 m/s    RA Major Axis 4.17 cm    RA Width 3.95 cm    RVDD 2.03 cm    TR Max Milton 1.92 m/s    LA WIDTH 3.15 cm    MV stenosis pressure 1/2 time 45.18 ms  "   LV Diastolic Volume 162.82 mL    LV Systolic Volume 83.78 mL    LVOT peak orin 0.82 m/s    TDI LATERAL 0.08 m/s    TDI SEPTAL 0.09 m/s    LA volume (mod) 59.19 cm3    MV "A" wave duration 10.28 msec    MV mean gradient 0 mmHg    MV peak gradient 4 mmHg    MV VTI 28.54 cm    LV LATERAL E/E' RATIO 13.13 m/s    LV SEPTAL E/E' RATIO 11.67 m/s    FS 25 %    LA volume 58.23 cm3    LV mass 303.67 g    Left Ventricle Relative Wall Thickness 0.47 cm    AV valve area 3.19 cm2    AV Velocity Ratio 0.57     AV index (prosthetic) 0.60     MV valve area p 1/2 method 4.87 cm2    MV valve area by continuity eq 3.85 cm2    E/A ratio 1.09     Mean e' 0.09 m/s    Pulm vein S/D ratio 2.48     LVOT area 5.3 cm2    LVOT stroke volume 109.79 cm3    AV peak gradient 8 mmHg    E/E' ratio 12.35 m/s    LV Systolic Volume Index 34.9 mL/m2    LV Diastolic Volume Index 67.84 mL/m2    LA Volume Index 24.3 mL/m2    LV Mass Index 127 g/m2    Triscuspid Valve Regurgitation Peak Gradient 15 mmHg    LA Volume Index (Mod) 24.7 mL/m2    BSA 2.6 m2    EF 45 %    Sinus 4.00 cm    Narrative    · Technically significantly limited echocardiogram.  · The left ventricle is moderately enlarged with concentric hypertrophy   and mildly decreased systolic function.  · The estimated ejection fraction is 45-50%.  · Grade I left ventricular diastolic dysfunction.  · Normal right ventricular size with normal right ventricular systolic   function.  · Mild mitral regurgitation.  · The sinuses of Valsalva is mildly dilated.        "

## 2022-06-21 NOTE — ASSESSMENT & PLAN NOTE
- history of PCI  - presented with chest pain; troponin flat upon admission at 0.12-0.16 lower than previous baseline  - chest pain different than symptoms prior to previous PCI  - continue DAPT, statin, BB and ACEI as well as IV Heparin drip- if vaginal bleeding persists okay with discontinuation of IV Heparin   - stress test with pending results; if stress test negative then likely suitable for discharge; if stress test positive then will need to re evaluate to determine further workup/evaluation

## 2022-06-21 NOTE — PROGRESS NOTES
Individual Follow-Up Form    6/21/2022    Quit Date: To be determined    Clinical Status of Patient: Inpatient    Length of Service: 30 minutes    Comments: Smoking cessation education follow-up: Order obtained for 21 mg nicotine patch Q day. Reviewed details of pt's initial Ambulatory Smoking Cessation clinic appointment and she states no conflicts with appointment date and time.     Diagnosis: F17.210    Next Visit:  7/6/22 at 4 o'clock pm with BOBBY An

## 2022-06-21 NOTE — PLAN OF CARE
SW met with pt at bedside to complete final assessment. Pt will be transported by  van home today at 4:30pm today. Pt has f/u appts listed on avs. SW will update team once official eta is set. Rounds completed on pt.  All questions addressed.  Bedside nurse to discuss d/c medications.  Discussed importance to attend all f/u appts and take medications as prescribed.  Verbalized understanding.    Sergey Flynn MSANDI  [phone]    Future Appointments   Date Time Provider Department Center   6/28/2022  4:00 PM Neelam Hicks MD [facility] CARDIO [facility]   7/6/2022  4:00 PM Nataliya Almeida, ROBIN [facility]        06/21/22 1420   Final Note   Assessment Type Final Discharge Note   Anticipated Discharge Disposition Home   What phone number can be called within the next 1-3 days to see how you are doing after discharge? [phone]   Hospital Resources/Appts/Education Provided Appointments scheduled and added to AVS   Post-Acute Status   Coverage MEDICAID   Discharge Delays (!) Ambulance Transport/Facility Transport

## 2022-06-21 NOTE — PLAN OF CARE
U Plan of Care    Mrs. Lele Dias was admitted on 6/17/2022 for NSTEMI and underwent adenosine stress test for ischemic evaluation. We are still awaiting for the nuclear portion of the stress test to be read by radiology, however per guidance of Cardiology, if patient is ambulated at bedside without chest pain, the patient can go home with close follow-up with Dr. Hicks. I personally met the patient at bedside and ambulated patient throughout her room.  She exhibited no signs of distress and reported no chest pain  She states she has been ambulating to and from the bathroom without chest pain as well.  Per Cardiology, patient can be discharged with close follow-up.    Stewart Rey M.D.  [facility] Internal Medicine PGY-2

## 2022-06-21 NOTE — PROGRESS NOTES
"[facility] Medicine Progress Note    Primary Team: [facility] Hospitalist Team A  Attending Physician: Eli Stephens MD  Resident: Candido Watson  Intern: Madi Pan    Subjective:      Pt seen and examined by me this AM. Continues to deny CP, palpitations, SOB, N/V.     Does mention she is now having vaginal bleeding as of this AM, says she is on the depo shot and has not menstruated for many years; attributes this to being on the heparin gtt and was inquiring on when it can be discontinued. Denies BM since Saturday; is on bowel regimen, is producing flatus, denies abd pain or discomfort, abd non-TTP and not distended/soft. Says milk helps her go - provided her with a carton at bedside.     Objective:     Last 24 Hour Vital Signs:  BP  Min: 93/40  Max: 128/62  Temp  Av.8 °F (36.6 °C)  Min: 96.6 °F (35.9 °C)  Max: 99.8 °F (37.7 °C)  Pulse  Av.2  Min: 52  Max: 62  Resp  Av.7  Min: 16  Max: 18  SpO2  Av.8 %  Min: 94 %  Max: 97 %  Height  Av' 2" (157.5 cm)  Min: 5' 2" (157.5 cm)  Max: 5' 2" (157.5 cm)  Weight  Av.7 kg (341 lb)  Min: 154.7 kg (341 lb)  Max: 154.7 kg (341 lb)    Net IO Since Admission: -260 mL [22 1028]    Physical Examination:  Constitutional: She is oriented to person, place, and time. She appears obese. No distress.   HENT:   Head: Normocephalic and atraumatic.   Right Ear: External ear normal.   Left Ear: External ear normal.   Nose: Nose normal. No rhinorrhea or nasal congestion.   Mouth/Throat: Mucous membranes are moist. No oropharyngeal exudate or posterior oropharyngeal erythema. Oropharynx is clear.   Eyes: Pupils are equal, round, and reactive to light. Conjunctivae are normal. Right eye exhibits no discharge. Left eye exhibits no discharge. No scleral icterus.   Cardiovascular: Normal rate, regular rhythm, normal heart sounds and normal pulses. Exam reveals no gallop and no friction rub.   No murmur heard.  Pulmonary/Chest: Effort normal. No respiratory " distress. She has wheezes (diffuse in posterior lung fields). She has no rhonchi. She has no rales.   Abdominal: Soft. Bowel sounds are normal. She exhibits no distension. There is no abdominal tenderness. There is no rebound and no guarding.   Musculoskeletal:         General: Normal range of motion.      Cervical back: Normal range of motion and neck supple.      Right lower leg: Edema present.      Left lower leg: Edema  present.      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   Skin: Skin is warm and dry. There is erythema (to distal medial LLE).   Psychiatric: Her behavior is normal. Mood, judgment and thought content normal.   Nursing note and vitals reviewed.    Laboratory:  Laboratory Data Reviewed: yes  Pertinent Findings:  Recent Labs   Lab 06/19/22  0637 06/20/22  0513 06/21/22  0453   WBC 10.72 9.62 8.86   HGB 12.5 11.3* 11.7*   HCT 39.2 35.3* 36.7*    138* 138*    141 139   K 4.1 4.5 4.6    106 105   CREATININE 1.3 1.3 1.4   BUN 24* 32* 34*   CO2 22* 20* 20*   ALT 17 14 11   AST 11 12 9*         Microbiology Data Reviewed: yes  Pertinent Findings:  Microbiology Results (last 7 days)     Procedure Component Value Units Date/Time    C. trachomatis/N. gonorrhoeae by AMP DNA [facility]; Urine [[id number]] Collected: 06/19/22 1401    Order Status: Completed Specimen: Genital Updated: 06/21/22 0041     Chlamydia, Amplified DNA Not Detected     N gonorrhoeae, amplified DNA Not Detected    Narrative:      Sources by Resulting Lab:->[facility]  Release to patient->Immediate    Vaginosis Screen by DNA Probe [[id number]] Collected: 06/19/22 1646    Order Status: Canceled Specimen: Genital from [facility]     Trichomonas vaginalis, RNA, Qual, Urine [[id number]] Collected: 06/19/22 1401    Order Status: Canceled Specimen: Urine     Blood culture [[id number]]     Order Status: Canceled Specimen: Blood     Blood culture [[id number]]     Order  Status: Canceled Specimen: Blood             Other Results:  EKG (my interpretation): NSR, no acute ischemic changes .    Radiology Data Reviewed: yes  Pertinent Findings:  Imaging Results          X-Ray Chest AP Portable (Final result)  Result time 06/17/22 23:11:39    Final result by Timur Christensen MD (06/17/22 23:11:39)                 Impression:      No acute abnormality.      Electronically signed by: Timur Christensen  Date:    06/17/2022  Time:    23:11             Narrative:    EXAMINATION:  XR CHEST AP PORTABLE    CLINICAL HISTORY:  Chest pain, unspecified    TECHNIQUE:  Single frontal view of the chest was performed.    COMPARISON:  Multiple priors.    FINDINGS:  The lungs are clear, with normal appearance of pulmonary vasculature and no pleural effusion or pneumothorax.    The cardiac silhouette is normal in size. The hilar and mediastinal contours are unremarkable.    Bones are intact.                    ED Interpretation by Eh Sanchez MD (06/17/22 22:35:24, [facility] - Emergency Dept, Emergency Medicine)    No acute infiltrate, no pneumothorax, no effusion                                 Current Medications:     Infusions:   heparin (porcine) in D5W 22 Units/kg/hr (06/20/22 2128)        Scheduled:   allopurinoL  100 mg Oral Daily    aspirin  81 mg Oral Daily    atorvastatin  80 mg Oral Daily    buprenorphine-naloxone 8-2  8 mg Sublingual TID    carvediloL  6.25 mg Oral BID WM    clindamycin  450 mg Oral Q8H    clopidogreL  75 mg Oral Daily    EScitalopram oxalate  10 mg Oral Daily    fluticasone furoate-vilanteroL  1 puff Inhalation Daily    insulin detemir U-100  50 Units Subcutaneous Daily    lisinopriL  20 mg Oral Daily    nicotine  1 patch Transdermal Daily    pantoprazole  40 mg Oral Daily    polyethylene glycol  17 g Oral TID    pregabalin  150 mg Oral BID    senna  8.6 mg Oral Daily        PRN:  acetaminophen, albuterol, dextrose 10%, dextrose 10%, glucagon (human  recombinant), glucose, glucose, heparin (PORCINE), heparin (PORCINE), insulin aspart U-100, melatonin, nitroGLYCERIN, phenyleph-min oil-petrolatum, sodium chloride 0.9%, tiZANidine    Antibiotics and Day Number of Therapy:  None    Assessment:   Lele Dias is a 42 y.o. female with a PMHx of CAD, HFrEF with recovered EF (LVEF 55% in 3/2022), HTN, HLD, T2DM on insulin, gout, depression, and chronic tobacco use presenting with two days of left-sided chest pain worsened by exertion.  Initial ED workup significant for elevated troponin of 0.124.  EKG with normal sinus rhythm and without overt signs of acute ischemia.  CXR negative.  She is admitted to [facility] for high-risk chest pain.     Plan:       High-Risk Chest Pain in the setting of established CAD/NSTEMI  - patient endorsed left-sided chest pain radiating into her LUE for two days PTA  - has history of CAD with multi-vessel disease (LAD, LCx); underwent PCI with stent placement to LCx in 1/2021, p-mLAD in 2/2021  - follows with [facility] cardiologist, Dr. Hicks  - troponin elevated at 0.124 on admission  - Trop trend: 0.124 --> 0.163 --> 0.120 --> 0.112 --> 0.126  - EKG non-ischemic, CXR unremarkable  - PERC positive given LE edema L > R  - Wells score 4.5 (LLE edema, history of likely-provoked DVT 11 years ago)  - d-dimer 0.61  - repeat EKG with no acute changes   - LLE DVT ultrasound negative   - CTA chest negative for PE   - increasing home Coreg dose from 3.125 mg BID to 6.25 mg BID  - continuing home ASA 81 mg, Plavix 75 mg daily, high-intensity Lipitor 80mg daily  - [facility], EKG portion of stress test 6/20/22 negative  - Pending read of nuclear stress test   - Will follow up with Dr. iHcks outpatient in a week following discharge     Chronic systolic and diastolic dysfunction with recovered LVEF  - TTE (1/2021) with estimated LVEF of 40%  - TTE (3/2022) with LVEF at 55% and normal diastolic function   - Repeat TTE  (6/18/22) this admission with EF 45-50% and grade I LV diastolic dysfunction  - GDMT includes Coreg 3.125mg BID (which patient takes PRN for elevated pulse rate >100 bpm), lisinopril 20mg daily  - Resuming home Coreg at increased dose of 6.25 mg BID, lisinopril     Hypetension  - on lisinopril 20mg daily at home, will continue      Hyperlipidemia  - lipid panel Chol 116, HDL 30, LDL 46, Trig 199  - continuing home Lipitor 80 mg daily     Type II DM  - last A1c 8.2% in 1/2021  - home regimen includes Lantus (dose unknown), Novolog TID  - repeat A1c 7.2  - starting Levemir 50U daily while in house  - starting moderate-dose SSI     Neuropathy  LUE Phantom Limb Pain  - patient experiences occasional phantom limb pain in LUE after amputation  - takes Lyrica 150mg BID at home  - continuing home Lyrica     LLE Edema  Nonpurulent Cellulitis  - patient has had multiple episodes of cellulitis since last August when she injured her LLE during [place]  - currently on clindamycin 300mg q8h for unknown duration per PCP  - deferring blood cultures for now  - DVT ultrasound negative; of note, patient had a provoked DVT about 11 years ago  - starting clindamycin 450 mg PO TID     Asthma  - home regimen includes albuterol PRN, Symbicort qhs  - uses albuterol inhaler at home infrequently  - continuing home inhalers     Gout  - stable; denies s/s of acute flare  - continuing home allopurinol 100 mg daily     Depression  Anxiety  - patient reports exacerbation after death of fiance last year  - currently on Lexapro 10 mg daily at home  - continuing home Lexapro     Opiate Use Disorder  - stable, no concern for withdrawal  - currently on Suboxone therapy at home  - continuing home Suboxone     Ppx: Hep gtt   Diet: Cardiac  Code: Full Code  Dispo: pending nuclear stress test read    Katerine Barraza MD  [facility] An[facility], PGY-1  [facility] Hospitalist Medicine Service Team A    [facility] Medicine Hospitalist Pager numbers:   [facility]  Hospitalist Medicine Team A (Omar/Angelo): [phone]  [facility] Hospitalist Medicine Team B (Briana/Yolie):  [phone]

## 2022-06-21 NOTE — PLAN OF CARE
AVS and educational attachments shared with patient  via [employer]. Discussed thoroughly. Patient  verbalized clear understanding using teach back method. Notified bedside nurse of completion of education. At present no distress noted. Patient to be discharged via w/c with escort service and family with all of patient's belonings. Will cont to be available to patient and family and intervene prn.

## 2022-06-21 NOTE — ASSESSMENT & PLAN NOTE
- previous bifurcating LCX-OM PCI and LAD PCI in 2021  - presented with chest pain; troponin flat and lower than baseline; chest pain different than presentation for PCI in 2021  - continue medical management  - stress test results pending

## 2022-06-21 NOTE — ASSESSMENT & PLAN NOTE
- echo on 6/18/2022 with EF 45% down from 55-60% previously  - NT pro  upon admission; no complaints of SOB and no ADHF noted on exam  - SBP 90s-120s; continue ACEI and BB

## 2022-06-21 NOTE — PLAN OF CARE
VN note: VN completed AVS and attachments and notified bedside nurseLauren. Will cont to be available and intervene prn.

## 2022-06-21 NOTE — TELEPHONE ENCOUNTER
Called pt back in regards to this message    Pt stated she is about to be DC'ed from hospital   Requested to speak with  in regards to a letter for extending her daughter's court date  Advised pt to reach out to nurse while still in hospital to get the     Reminded pt of appt on Tuesday in [facility] with Dr. Hicks    No further questions    ND

## 2022-06-21 NOTE — DISCHARGE SUMMARY
[facility] Hospital Medicine Discharge Summary    Primary Team: [facility] Hospitalist Team A  Attending Physician: Dr.Kristi Stephens  Resident: MD Candido and MD Walter  Intern: MD Madi and MD Maxim    Date of Admit: 6/17/2022  Date of Discharge: 6/21/2022    Discharge to: Home  Condition: Stable     Discharge Diagnoses     Patient Active Problem List   Diagnosis    Chronic combined systolic and diastolic congestive heart failure    Asthma    Benign essential hypertension    BRIDGET (acute kidney injury)    Nonalcoholic hepatosteatosis    Opioid dependence on agonist therapy    Phantom limb pain    Periumbilical hernia    UTI due to Klebsiella species    NSTEMI (non-ST elevated myocardial infarction)    Tobacco use disorder    Anemia    Sepsis due to urinary tract infection    Morbid obesity with BMI of 70 and over, adult    Opioid abuse    Coronary artery disease involving native coronary artery of native heart without angina pectoris    Hypoglycemia associated with type 2 diabetes mellitus    Benzodiazepine abuse    Grief reaction    Debilitated    Chest pain in adult    Leg edema, left    Type 2 diabetes mellitus with diabetic polyneuropathy, with long-term current use of insulin    Constipation       Consultants and Procedures     Consultants:  Cardiology    Procedures:   Nuclear stress test 6/20/22    Imaging:  Imaging Results          X-Ray Chest AP Portable (Final result)  Result time 06/17/22 23:11:39    Final result by Timur Christensen MD (06/17/22 23:11:39)                 Impression:      No acute abnormality.      Electronically signed by: Timur Christensen  Date:    06/17/2022  Time:    23:11             Narrative:    EXAMINATION:  XR CHEST AP PORTABLE    CLINICAL HISTORY:  Chest pain, unspecified    TECHNIQUE:  Single frontal view of the chest was performed.    COMPARISON:  Multiple priors.    FINDINGS:  The lungs are clear, with normal appearance of pulmonary vasculature and no pleural  "effusion or pneumothorax.    The cardiac silhouette is normal in size. The hilar and mediastinal contours are unremarkable.    Bones are intact.                    ED Interpretation by Eh Sanchez MD (06/17/22 22:35:24, [facility] - Emergency Dept, Emergency Medicine)    No acute infiltrate, no pneumothorax, no effusion                                Brief History of Present Illness      Lele Dias is a 42 y.o. female with a PMHx of CAD, HFrEF with recovered EF (LVEF 55% in 3/2022), HTN, HLD, T2DM on insulin, gout, depression, and chronic tobacco use. The patient presented on 6/17/2022 for evaluation of two days of left-sided chest pain on exertion.     Patient reports that she first noticed a sharp pain in her left chest while moving around at home on Thursday, 6/16.  This pain radiated into her left upper extremity and eventually subsided with rest.  She also took Tylenol for the pain, which improved it.  She activated EMS out of concern for her pain, but was not transported to the emergency department.     She had another episode of pain on Friday, 6/17.  The pain was similar to her previous episode and began with exertion.  It also subsided with rest.  [facility] again became concerned about her pain, so she contacted EMS and was transported to the [facility] ED.     The patient has an extensive history of cardiovascular disease and follows with cardiologist Dr. Hicks.  She most recently underwent LHC in 2/2021 with IVUS-guided PCI of her p-mLAD with placement of BARBARA.  In 1/2021, she was admitted to [facility] for an NSTEMI and underwent PCI of her p-mLAD with BARBARA placement.     Of note, she is currently taking clindamycin 300mg q8h for cellulitis in her LLE.  She is unsure of the treatment duration, but states "I just picked the pills up yesterday and I have around 21 of them."     On evaluation, she reports one episode of sweats.  She also endorses urinary frequency and urgency.  She denies fever, " chills, HA, dizziness, lightheadedness, palpitations, SOB, cough, nausea, vomiting, abdominal pain, or diarrhea.     For the full HPI please refer to the History & Physical from this admission.     Hospital Course By Problem with Pertinent Findings     NSTEMI, improved  - patient endorsed left-sided chest pain radiating into her LUE for two days PTA  - has history of CAD with multi-vessel disease (LAD, LCx); underwent PCI with stent placement to LCx in 1/2021, p-mLAD in 2/2021  - follows with [facility] cardiologist, Dr. Hicks  - troponin elevated at 0.124 on admission  - Trop trend: 0.124 --> 0.163 --> 0.120 --> 0.112 --> 0.126  - EKG non-ischemic, CXR unremarkable  - PERC positive given LE edema L > R  - Wells score 4.5 (LLE edema, history of likely-provoked DVT 11 years ago)  - d-dimer 0.61  - repeat EKG with no acute changes   - LLE DVT ultrasound negative   - CTA chest negative for PE   - increasing home Coreg dose from 3.125 mg BID to 6.25 mg BID  - continuing home ASA 81 mg, Plavix 75 mg daily, high-intensity Lipitor 80mg daily  - [facility] cards following, EKG portion of stress test 6/20/22 negative  - Nuclear portion of the stress  - sensitivity limited due to body habitus  - anterior wall defect on stress images is somewhat concerning for reversible ischemia; however there does not appear to be an associated wall motion abnormality  - LV systolic dysfunction, est EF 34% during stress and 38% during rest  - Per cards, pt is stable for discharge (please refer to POC from 6/21/22)  - Will follow up with Dr. Hicks outpatient in a week following discharge     Chronic systolic and diastolic dysfunction with recovered LVEF  - TTE (1/2021) with estimated LVEF of 40%  - TTE (3/2022) with LVEF at 55% and normal diastolic function   - Repeat TTE (6/18/22) this admission with EF 45-50% and grade I LV diastolic dysfunction  - GDMT includes Coreg 3.125mg BID (which patient takes PRN for elevated pulse rate >100 bpm),  lisinopril 20mg daily  - Resuming home Coreg at increased dose of 6.25 mg BID, lisinopril     Hypetension  - on lisinopril 20mg daily at home, will continue      Hyperlipidemia  - lipid panel Chol 116, HDL 30, LDL 46, Trig 199  - continuing home Lipitor 80 mg daily     Type II DM  - last A1c 8.2% in 1/2021  - home regimen includes Lantus (dose unknown), Novolog TID  - repeat A1c 7.2  - starting Levemir 50U daily while in house  - starting moderate-dose SSI     Neuropathy  LUE Phantom Limb Pain  - patient experiences occasional phantom limb pain in LUE after amputation  - takes Lyrica 150mg BID at home --> will discharge on 300 mg BID     LLE Edema  Nonpurulent Cellulitis  - patient has had multiple episodes of cellulitis since last August when she injured her LLE during [place]  - currently on clindamycin 300mg q8h for unknown duration per PCP  - deferring blood cultures for now  - DVT ultrasound negative; of note, patient had a provoked DVT about 11 years ago  - Started clindamycin 450 mg PO TID      Asthma  - home regimen includes albuterol PRN, Symbicort qhs  - uses albuterol inhaler at home infrequently  - continuing home inhalers     Gout  - stable; denies s/s of acute flare  - continuing home allopurinol 100 mg daily     Depression  Anxiety  - patient reports exacerbation after death of fiance last year  - currently on Lexapro 10 mg daily at home  - continuing home Lexapro     Opiate Use Disorder  - stable, no concern for withdrawal  - currently on Suboxone therapy at home  - continuing home Suboxone    Discharge Medications        Medication List      CHANGE how you take these medications    pregabalin 150 MG capsule  Commonly known as: LYRICA  Take 2 capsules (300 mg total) by mouth 2 (two) times daily.  What changed:   · how much to take  · additional instructions        CONTINUE taking these medications    albuterol 1.25 mg/3 mL Nebu  Commonly known as: ACCUNEB     allopurinoL 100 MG tablet  Commonly  "known as: ZYLOPRIM     aspirin 81 MG Chew  Take 1 tablet (81 mg total) by mouth once daily.     atorvastatin 80 MG tablet  Commonly known as: LIPITOR  Take 1 tablet (80 mg total) by mouth once daily.     BASAGLAR KWIKPEN U-100 INSULIN glargine 100 units/mL (3mL) SubQ pen  Generic drug: insulin  Inject 50 Units into the skin 3 (three) times daily.     BD ULTRA-FINE MINI PEN NEEDLE 31 gauge x 3/16" Ndle  Generic drug: pen needle, diabetic     budesonide-formoterol 160-4.5 mcg 160-4.5 mcg/actuation Hfaa  Commonly known as: SYMBICORT  Inhale 2 puffs into the lungs every 12 (twelve) hours. Controller     buprenorphine-naloxone 8-2 mg 8-2 mg  Commonly known as: SUBOXONE     carvediloL 3.125 MG tablet  Commonly known as: COREG  Take 1 tablet (3.125 mg total) by mouth 2 (two) times daily with meals.     cholecalciferol (vitamin D3) 1,250 mcg (50,000 unit) capsule     clopidogreL 75 mg tablet  Commonly known as: PLAVIX  Take 1 tablet (75 mg total) by mouth once daily.     EScitalopram oxalate 10 MG tablet  Commonly known as: LEXAPRO     FISH OIL ORAL     fish oil-omega-3 fatty acids 300-1,000 mg capsule     fluticasone propionate 50 mcg/actuation nasal spray  Commonly known as: FLONASE  1 spray (50 mcg total) by Each Nostril route 2 (two) times daily as needed for Rhinitis.     furosemide 40 MG tablet  Commonly known as: LASIX     HumaLOG Mix 75-25 KwikPen 100 unit/mL (75-25) Inpn  Generic drug: insulin lispro protamin-lispro  Inject 25 Units into the skin 2 (two) times a day.     icosapent ethyL 1 gram Cap  Commonly known as: VASCEPA     lisinopriL 20 MG tablet  Commonly known as: PRINIVIL,ZESTRIL  Take 1 tablet (20 mg total) by mouth once daily.     medroxyPROGESTERone 150 mg/mL Syrg  Commonly known as: DEPO-PROVERA     multivitamin with minerals tablet     nicotine 21 mg/24 hr  Commonly known as: NICODERM CQ  Place 1 patch onto the skin once daily.     nicotine polacrilex 2 MG Lozg  Take 1 lozenge (2 mg total) by mouth as " needed (prn). 1-2 per hour in the place of cigarette (5-16 daily max)     omeprazole 40 MG capsule  Commonly known as: PRILOSEC     potassium chloride 10 MEQ Cpsr  Commonly known as: MICRO-K     promethazine 6.25 mg/5 mL syrup  Commonly known as: PHENERGAN     TRUE METRIX GLUCOSE METER Misc  Generic drug: blood-glucose meter  use as directed     TRUE METRIX GLUCOSE TEST STRIP Strp  Generic drug: blood sugar diagnostic  TEST BLOOD SUAGR ONCE DAILY        STOP taking these medications    tiZANidine 4 MG tablet  Commonly known as: ZANAFLEX            Discharge Information:   Diet:  Cardiac    Physical Activity:  As tolerated              Instructions:  1. Take all medications as prescribed  2. Keep all follow-up appointments  3. Return to the hospital or call your primary care physicians if any worsening symptoms such as fever, chest pain, shortness of breath, return of symptoms, or any other concerns.    Follow-Up Appointments:     Follow-up Information     Sammi Edge MD Follow up on 6/28/2022.    Specialty: Family Medicine  Why: [facility] 6/28/22 @8am w/ Dr. Pierce, PCP at   Contact information:  [address]  [place] [address] [phone]                          Katerine Barraza MD  [facility] IM/Anesthesia, PGY-1  [facility] [place] Hospitalist Medicine Team A

## 2022-06-21 NOTE — PROGRESS NOTES
[place] - Telemetry  Cardiology  Progress Note    Patient Name: Lele Dias  MRN: [id number]  Admission Date: 6/17/2022  Hospital Length of Stay: 2 days  Code Status: Full Code   Attending Physician: Eli Stephens MD   Primary Care Physician: Sammi Edge MD  Expected Discharge Date: 6/20/2022  Principal Problem:NSTEMI (non-ST elevated myocardial infarction)    Subjective:     Hospital Course:   6/20/2022 Patient admitted over the weekend with complaints of chest pain. Troponin flat at 0.12-0.16 with pain different than previous PCI. Chest pain free overnight. HR and BP stable. NPO for stress test today   6/21/2022Stress test yesterday with pending results. HR and BP stable. CBC and BMP WNL          ROS  Objective:     Vital Signs (Most Recent):  Temp: 97.6 °F (36.4 °C) (06/21/22 1048)  Pulse: (!) 51 (06/21/22 1048)  Resp: 16 (06/21/22 1048)  BP: 139/67 (06/21/22 1048)  SpO2: 96 % (06/21/22 1048)   Vital Signs (24h Range):  Temp:  [96.6 °F (35.9 °C)-99.8 °F (37.7 °C)] 97.6 °F (36.4 °C)  Pulse:  [51-61] 51  Resp:  [16-18] 16  SpO2:  [94 %-97 %] 96 %  BP: ()/(40-67) 139/67     Weight: (!) 154.7 kg (341 lb)  Body mass index is 62.37 kg/m².     SpO2: 96 %  O2 Device (Oxygen Therapy): room air    No intake or output data in the 24 hours ending 06/21/22 1118    Lines/Drains/Airways       Peripheral Intravenous Line  Duration                  Peripheral IV - Single Lumen 06/17/22 2227 20 G Right Wrist 3 days                    Physical Exam    Significant Labs: BMP:   Recent Labs   Lab 06/20/22  0513 06/21/22  0453   * 221*    139   K 4.5 4.6    105   CO2 20* 20*   BUN 32* 34*   CREATININE 1.3 1.4   CALCIUM 8.7 8.6*   MG 1.8 1.8    and CBC   Recent Labs   Lab 06/20/22  0513 06/21/22  0453   WBC 9.62 8.86   HGB 11.3* 11.7*   HCT 35.3* 36.7*   * 138*       Significant Imaging: Echocardiogram: Transthoracic echo (TTE) complete (Cupid Only):   Results for orders placed or performed  "during the hospital encounter of 06/17/22   Echo   Result Value Ref Range    AV mean gradient 4 mmHg    Ao peak milton 1.43 m/s    Ao VTI 34.38 cm    IVRT 76.12 msec    IVS 1.12 (A) 0.6 - 1.1 cm    LA size 4.20 cm    Left Atrium Major Axis 5.58 cm    Left Atrium Minor Axis 4.83 cm    LVIDd 5.74 3.5 - 6.0 cm    LVIDs 4.32 (A) 2.1 - 4.0 cm    LVOT diameter 2.60 cm    LVOT peak VTI 20.69 cm    Posterior Wall 1.35 (A) 0.6 - 1.1 cm    MV Peak A Milton 0.96 m/s    E wave deceleration time 141.17 msec    MV Peak E [place] 1.05 m/s    PV Peak D Milton 0.25 m/s    PV Peak S Milton 0.62 m/s    RA Major Axis 4.17 cm    RA Width 3.95 cm    RVDD 2.03 cm    TR Max Milton 1.92 m/s    LA WIDTH 3.15 cm    MV stenosis pressure 1/2 time 45.18 ms    LV Diastolic Volume 162.82 mL    LV Systolic Volume 83.78 mL    LVOT peak milton 0.82 m/s    TDI LATERAL 0.08 m/s    TDI SEPTAL 0.09 m/s    LA volume (mod) 59.19 cm3    MV "A" wave duration 10.28 msec    MV mean gradient 0 mmHg    MV peak gradient 4 mmHg    MV VTI 28.54 cm    LV LATERAL E/E' RATIO 13.13 m/s    LV SEPTAL E/E' RATIO 11.67 m/s    FS 25 %    LA volume 58.23 cm3    LV mass 303.67 g    Left Ventricle Relative Wall Thickness 0.47 cm    AV valve area 3.19 cm2    AV Velocity Ratio 0.57     AV index (prosthetic) 0.60     MV valve area p 1/2 method 4.87 cm2    MV valve area by continuity eq 3.85 cm2    E/A ratio 1.09     Mean e' 0.09 m/s    Pulm vein S/D ratio 2.48     LVOT area 5.3 cm2    LVOT stroke volume 109.79 cm3    AV peak gradient 8 mmHg    E/E' ratio 12.35 m/s    LV Systolic Volume Index 34.9 mL/m2    LV Diastolic Volume Index 67.84 mL/m2    LA Volume Index 24.3 mL/m2    LV Mass Index 127 g/m2    Triscuspid Valve Regurgitation Peak Gradient 15 mmHg    LA Volume Index (Mod) 24.7 mL/m2    BSA 2.6 m2    EF 45 %    Sinus 4.00 cm    Narrative    · Technically significantly limited echocardiogram.  · The left ventricle is moderately enlarged with concentric hypertrophy   and mildly decreased systolic " function.  · The estimated ejection fraction is 45-50%.  · Grade I left ventricular diastolic dysfunction.  · Normal right ventricular size with normal right ventricular systolic   function.  · Mild mitral regurgitation.  · The sinuses of Valsalva is mildly dilated.        Assessment and Plan:         * NSTEMI (non-ST elevated myocardial infarction)  - history of PCI  - presented with chest pain; troponin flat upon admission at 0.12-0.16 lower than previous baseline  - chest pain different than symptoms prior to previous PCI  - continue DAPT, statin, BB and ACEI as well as IV Heparin drip- if vaginal bleeding persists okay with discontinuation of IV Heparin   - stress test with pending results; if stress test negative then likely suitable for discharge; if stress test positive then will need to re evaluate to determine further workup/evaluation     Chest pain in adult  - denies any chest pain overnight  - plan as detailed under CAD and NSTEMI     Coronary artery disease involving native coronary artery of native heart without angina pectoris  - previous bifurcating LCX-OM PCI and LAD PCI in 2021  - presented with chest pain; troponin flat and lower than baseline; chest pain different than presentation for PCI in 2021  - continue medical management  - stress test results pending     Benign essential hypertension  -SBP 90s-120s overnight  - continue Lisinopril and Coreg     Chronic combined systolic and diastolic congestive heart failure  - echo on 6/18/2022 with EF 45% down from 55-60% previously  - NT pro  upon admission; no complaints of SOB and no ADHF noted on exam  - SBP 90s-120s; continue ACEI and BB           VTE Risk Mitigation (From admission, onward)         Ordered     heparin 25,000 units in dextrose 5% (100 units/ml) IV bolus from bag - ADDITIONAL PRN BOLUS - 60 units/kg (max bolus 4000 units)  As needed (PRN)        Question:  Heparin Infusion Adjustment (DO NOT MODIFY ANSWER)  Answer:   [email or web address]\epic\Images\Pharmacy\HeparinInfusions\heparin LOW INTENSITY nomogram for [facility] [id number].pdf    06/18/22 0758     heparin 25,000 units in dextrose 5% (100 units/ml) IV bolus from bag - ADDITIONAL PRN BOLUS - 30 units/kg (max bolus 4000 units)  As needed (PRN)        Question:  Heparin Infusion Adjustment (DO NOT MODIFY ANSWER)  Answer:  \\[email or web address]\epic\Images\Pharmacy\HeparinInfusions\heparin LOW INTENSITY nomogram for [facility] [id number].pdf    06/18/22 0758     heparin 25,000 units in dextrose 5% 250 mL (100 units/mL) infusion LOW INTENSITY nomogram - [facility]  Continuous        Question Answer Comment   Heparin Infusion Adjustment (DO NOT MODIFY ANSWER) \\[email or web address]\epic\Images\Pharmacy\HeparinInfusions\heparin LOW INTENSITY nomogram for [facility] [id number].pdf    Begin at (in units/kg/hr) 12        06/18/22 0758     IP VTE HIGH RISK PATIENT  Once         06/18/22 0147     Place sequential compression device  Until discontinued         06/18/22 0147                Jessica Jose, APRN, ANP  Cardiology  [place] - Telemetry

## 2022-06-22 ENCOUNTER — TELEPHONE (OUTPATIENT)
Dept: CARDIOLOGY | Facility: CLINIC | Age: 42
End: 2022-06-22
Payer: MEDICAID

## 2022-06-22 NOTE — TELEPHONE ENCOUNTER
Pt called office spectra link     Expressed frustration in regards to recent stress test results  Stated daughter informed her of the results     Pt request to hear from Dr. Hicks in regards to test results performed and would like to know why she was discharged    Pt was upset she was sent home from ED    Provided pt advocate phone number  Informed pt will send a message to Dr. Hciks    Please advise  ND

## 2022-06-22 NOTE — TELEPHONE ENCOUNTER
Discussed results with Ms Dias. Discussed options for next steps including PET stress, angiogram. Offered to discuss with her daughter Jessica to discuss options. She is asleep after working an overnight shift. Clinic number provided number. She will call tomorrow. She vocalized understanding of the test results and options. She will think about it and let us know.

## 2022-06-23 ENCOUNTER — PATIENT MESSAGE (OUTPATIENT)
Dept: ADMINISTRATIVE | Facility: CLINIC | Age: 42
End: 2022-06-23
Payer: MEDICAID

## 2022-06-23 ENCOUNTER — PATIENT OUTREACH (OUTPATIENT)
Dept: ADMINISTRATIVE | Facility: CLINIC | Age: 42
End: 2022-06-23
Payer: MEDICAID

## 2022-06-23 NOTE — PROGRESS NOTES
C3 nurse attempted to contact Lele Dias  for a TCC post hospital discharge follow up call. No answer. Left voicemail with callback information. The patient has a scheduled HOSFU appointment with Dr Pierce  on 06/28/2022 @ 4924

## 2022-06-23 NOTE — TELEPHONE ENCOUNTER
Pt called office spectra link     Pt daughter request to speak with Dr. Hicks  Stated she was returning a call from yesterday     Informed pt daughter will send a message to Dr. Hicks to please advise    ND

## 2022-06-23 NOTE — PROGRESS NOTES
C3 nurse attempted to contact Lele Dias  for a TCC post hospital discharge follow up call. No answer. Left voicemail with callback information. The patient has a scheduled HOSFU appointment with Dr Pierce  on 06/28/2022 @ 0729

## 2022-06-24 NOTE — PROGRESS NOTES
C3 nurse attempted to contact Lele Dias  for a TCC post hospital discharge follow up call. No answer. Left voicemail with callback information. The patient has a scheduled HOSFU appointment with Dr Pierce  on 06/28/2022 @ 8147

## 2022-06-27 NOTE — PHYSICIAN QUERY
PT Name: Lele Dias  MR #: [id number]     DOCUMENTATION CLARIFICATION     CDS/: Cornelia Strong    RN,BSN           Contact information: [email or web address]  This form is a permanent document in the medical record.     Query Date: June 27, 2022    By submitting this query, we are merely seeking further clarification of documentation.  Please utilize your independent clinical judgment when addressing the question(s) below.    The Medical Record contains the following   Indicators Supporting Clinical Findings Location in Medical Record    X Heart Failure documented   HFrEF with recovered EF (LVEF 55% in 3/2022)      Chronic systolic and diastolic dysfunction with  LVEF TTE   (1/2021) with estimated LVEF of 40%           H&P 6/18/22    Discharge Summary 6/21/22    X BNP  790    Lab 6/17/22    X EF/Echo  Technically significantly limited echocardiogram.  The left ventricle is moderately enlarged with concentric hypertrophy and mildly decreased systolic function.  The estimated ejection fraction is 45-50%.  Grade I left ventricular diastolic dysfunction.  Normal right ventricular size with normal right ventricular systolic function.  Mild mitral regurgitation.  The sinuses of Valsalva is mildly dilated.     (1/2021) with estimated LVEF of 40%  - TTE (3/2022) with LVEF at 55% and normal diastolic function      Cardiology Echo/Stress  6/18/22                     Discharge Summary 6/21/22    X Radiology findings  1. Sensitivity of this exam is limited due to patient body habitus.  2. Anterior wall defect on stress images is somewhat concerning for reversible ischemia, however, there does not appear to be an associated wall motion abnormality and breast attenuation could give a similar appearance.  3. Left ventricular systolic dysfunction.  Estimated ejection fraction 34% during stress and 38% during rest.  This report was flagged in Epic as abnormal.    No acute abnormality.    Nuclear Med 6/18/22                    CXR 6/17/22    Subjective/Objective Respiratory Conditions      X Recent/Current MI  NSTEMI (non-ST elevated myocardial infarction)  Progress Note Cardiology  6/20/22    Heart Transplant, LVAD      X Edema, JVD  Right lower leg: Edema present, Left lower leg: Edema  present.  Progress Note Cardiology 6/21/22    Ascites      Diuretics/Meds      X Other Treatment  GDMT includes Coreg 3.125mg BID (which patient takes PRN for elevated pulse rate >100 bpm), lisinopril 20mg daily  - Resuming home Coreg at increased dose of 6.25 mg BID, lisinopril   Discharge Summary 6/21/22     Other           Heart failure is a clinical diagnosis which includes symptomatic fluid retention, elevated intracardiac pressures, and/or the inability of the heart to deliver adequate blood flow.    Heart Failure with reduced Ejection Fraction (HFrEF) or Systolic Heart Failure (loses ability to contract normally, EF is <40%)    Heart Failure with preserved Ejection Fraction (HFpEF) or Diastolic Heart Failure (stiff ventricles, does not relax properly, EF is >50%)     Heart Failure with Combined Systolic and Diastolic Failure (stiff ventricles, does not relax properly and EF is <50%)    Mid-range or mildly reduced ejection fraction (HFmrEF) is classified as systolic heart failure.   Common clues to acute exacerbation:  Rapidly progressive symptoms (w/in 2 weeks of presentation), using IV diuretics, using supplemental O2, pulmonary edema on Xray, new or worsening pleural effusion, +JVD or other signs of volume overload, MI w/in 4 weeks, and/or BNP >500  The clinical guidelines noted are only system guidelines, and do not replace the providers clinical judgment.    Provider, please clarify the heart failure diagnosis associated with the above clinical findings.    [   ]  Chronic Systolic Heart Failure (HFrEF or HFmrEF) - preexisting and stable   [   ]  Chronic Diastolic Heart Failure (HFpEF) - preexisting and stable   [   ]  Chronic Combined  Systolic and Diastolic Heart Failure - pre-existing and stable   [  x ]  Other (please specify): ____HF with recovered EF_______________________________   [  ]  Clinically Undetermined           Please document in your progress notes daily for the duration of treatment until resolved and include in your discharge summary.    References:  American Heart Association editorial staff. (2017, May). Ejection Fraction Heart Failure Measurement. American Heart Association. https://www.heart.org/en/health-topics/heart-failure/diagnosing-heart-failure/ejection-fraction-heart-failure-measurement#:~:text=Ejection%20fraction%20(EF)%20is%20a,pushed%20out%20with%20each%20heartbeat  VILMA Mendoza (2020, December 15). Heart failure with preserved ejection fraction: Clinical manifestations and diagnosis. Re-APPToDate. https://www.Divideddate.com/contents/heart-failure-with-preserved-ejection-fraction-clinical-manifestations-and-diagnosis.  ICD-10-CM/PCS Coding Clinic Third Quarter ICD-10, Effective with discharges: September 8, 2020 [facility] § Heart failure with mid-range or mildly reduced ejection fraction (2020).  Form No. 58157

## 2022-06-27 NOTE — PHYSICIAN QUERY
PT Name: Lele Dias  MR #: [id number]    DOCUMENTATION CLARIFICATION     CDS/: Cornelia Strong  RN,BSN            Contact information: [email or web address]    This form is a permanent document in the medical record.     Query Date: June 27, 2022    By submitting this query, we are merely seeking further clarification of documentation. Please utilize your independent clinical judgment when addressing the question(s) below.    Supporting Clinical Findings Location in Medical Record     Does mention she is now having vaginal bleeding as of this AM, says she is on the depo shot and has not menstruated for many years; attributes this to being on the heparin gtt and was inquiring on when it can be discontinued.   Progress Note 6/21/22    continue DAPT, statin, BB and ACEI as well as IV Heparin drip- if vaginal bleeding persists okay with discontinuation of IV Heparin   Progress Note Cardiology 6/21/22       Provider, please clarify if there is any clinical correlation between the vaginal bleeding and heparin.   Are the conditions:    [  ] Due to or associated with each other   [  ] Unrelated to each other   [  ] Other explanation (Please Specify): ______________   [ x ] Clinically Undetermined                                                                               Please document in your progress notes daily for the duration of treatment until resolved and include in your discharge summary.

## 2022-06-27 NOTE — PHYSICIAN QUERY
PT Name: Lele Dias  MR #: [id number]     DOCUMENTATION CLARIFICATION     CDS/: Cornelia Strnog   RN,BSN            Contact information: [email or web address]  This form is a permanent document in the medical record.    Query Date: June 27, 2022    By submitting this query, we are merely seeking further clarification of documentation.  Please utilize your independent clinical judgment when addressing the question(s) below.    The Medical Record contains the following:   Indicator Supporting Clinical Findings Location in Medical Record    X Kidney (Renal) Insufficiency  CKD (chronic kidney disease) stage 3  GFR 30-59ml/min     ED Provider Note  6/17/22    X Kidney (Renal) Failure/Injury   Patient Active Problem List  BRIDGET    Discharge Summary 6/21/22    Nephrotoxic Agents      X BUN/Creatinine           GFR  Date    BUN    Creatinine    GFR   6/18    12        0.9               >60   6/19    24        1.3                 50   6/20    32        1.3                 50   6/21    34        1.4                 46    3/2/21   40        2.0                30  3/3/21   43        2.0                30  3/4/21   38       1.7                 37  3/29/21 16       0.88             >60   LAB 6/18 - 6/21             Previous Encounter  3/2/21- 3/29/21    Urine: Casts         Eosinophils      Dehydration      Nausea/Vomiting      Dialysis/CRRT      Treatment      X  Other Clindamycin 450 mg po Daily    MAR 6/18/22 - 6/21/22       [facility] approved diagnostic criteria for acute kidney injury is based on KDIGO criteria:    An increase in serum creatinine > 0.3mg/dl within 48 hours  OR  Increase in serum creatinine to > 1.5x baseline, which is known or presumed to have occurred within the prior 7 days  OR  Urine volume <0.5 ml/kg/hr for 6 hours       The clinical guidelines noted above are only a system guideline. It does not replace the providers clinical judgment.     Provider, please specify the renal diagnosis or  diagnoses associated with above clinical findings. [place] all that apply.     [    ] Unspecified Acute Kidney Failure/Injury     [    ] Other Acute Kidney Failure/Injury (please specify): ____________     [  x  ] Acute Renal Insufficiency  - Consider if SCr rise is transient and normalizes quickly with no efforts at real resuscitation of vital signs and perfusion   [    ] Chronic Kidney Disease (CKD) stage 1 - Normal or high eGFR 90+   [    ] Chronic Kidney Disease (CKD) stage 2 - Mildly decreased eGFR 60-89    [    ] Chronic Kidney Disease (CKD) stage 3a - Mildly to moderately decreased eGFR 45-59   [    ] Chronic Kidney Disease (CKD) stage 3b - Moderately to severely decreased eGFR 30-44   [    ] Chronic Kidney Disease (CKD) stage 3 unspecified   [    ] Other (please specify): _______________________________   [  ] Clinically Undetermined           Please document in your progress notes daily for the duration of treatment until resolved and include in your discharge summary.    References:   KD[employer] Clinical Practice Guideline for Acute Kidney Injury. (2012, March). Retrieved October 21, 2020, from https://kdigo.org/wp-content/uploads/2016/10/YDOQH-8360-YZX-Guideline-English.pdf    RAY Julien MD, CALE Sexton MD, & NURA William MD. (1960). Renal medullary necrosis [Abstract]. The American Journal of Medicine, 29(1), 132-156. Doi:https://www.sciencedirect.com/science/article/abs/pii/9023107027872778    NURA Mejia MD, & ANISH Willoughby MD, MS. (2020, June 18). Definition and staging of chronic kidney disease in adults ([id number] CALE Murcia MD, ScD & [id number] LISBETH Feng MD, MSc, Eds.). Retrieved October 21, 2020, from https://www."[facility]"/contents/definition-and-staging-of-chronic-kidney-disease-in-adults?search=ckd%20staging&source=search_result&selectedTitle=1~150&usage_type=default&display_rank=1     JUAN Magdaleno MD, FACP. (2015, Cee 15). Acute kidney injury revisited. Retrieved  October 21, 2020, from https://acphospitalist.org/archives/2015/06/coding-acute-kidney-injury.htm    SARAH Rojas MD. (2019, July). Renal Cortical Necrosis. Retrieved October 21, 2020, from https://www.[employer]/professional/genitourinary-disorders/renovascular-disorders/renal-cortical-necrosis    Form No. [id number]

## 2022-06-28 ENCOUNTER — OFFICE VISIT (OUTPATIENT)
Dept: CARDIOLOGY | Facility: CLINIC | Age: 42
End: 2022-06-28
Payer: MEDICAID

## 2022-06-28 VITALS
SYSTOLIC BLOOD PRESSURE: 130 MMHG | HEART RATE: 89 BPM | OXYGEN SATURATION: 96 % | WEIGHT: 293 LBS | BODY MASS INDEX: 60.91 KG/M2 | DIASTOLIC BLOOD PRESSURE: 60 MMHG

## 2022-06-28 DIAGNOSIS — I15.2 HYPERTENSION ASSOCIATED WITH DIABETES: ICD-10-CM

## 2022-06-28 DIAGNOSIS — Z79.4 TYPE 2 DIABETES MELLITUS WITH DIABETIC POLYNEUROPATHY, WITH LONG-TERM CURRENT USE OF INSULIN: ICD-10-CM

## 2022-06-28 DIAGNOSIS — E11.42 TYPE 2 DIABETES MELLITUS WITH DIABETIC POLYNEUROPATHY, WITH LONG-TERM CURRENT USE OF INSULIN: ICD-10-CM

## 2022-06-28 DIAGNOSIS — E66.01 MORBID OBESITY: ICD-10-CM

## 2022-06-28 DIAGNOSIS — E11.59 HYPERTENSION ASSOCIATED WITH DIABETES: ICD-10-CM

## 2022-06-28 DIAGNOSIS — E11.69 HYPERLIPIDEMIA ASSOCIATED WITH TYPE 2 DIABETES MELLITUS: ICD-10-CM

## 2022-06-28 DIAGNOSIS — E78.5 HYPERLIPIDEMIA ASSOCIATED WITH TYPE 2 DIABETES MELLITUS: ICD-10-CM

## 2022-06-28 DIAGNOSIS — I50.20 HEART FAILURE WITH REDUCED EJECTION FRACTION: ICD-10-CM

## 2022-06-28 DIAGNOSIS — I25.10 CORONARY ARTERY DISEASE INVOLVING NATIVE CORONARY ARTERY OF NATIVE HEART WITHOUT ANGINA PECTORIS: Primary | ICD-10-CM

## 2022-06-28 PROCEDURE — 1111F PR DISCHARGE MEDS RECONCILED W/ CURRENT OUTPATIENT MED LIST: ICD-10-PCS | Mod: CPTII,,, | Performed by: INTERNAL MEDICINE

## 2022-06-28 PROCEDURE — 1160F PR REVIEW ALL MEDS BY PRESCRIBER/CLIN PHARMACIST DOCUMENTED: ICD-10-PCS | Mod: CPTII,,, | Performed by: INTERNAL MEDICINE

## 2022-06-28 PROCEDURE — 99215 OFFICE O/P EST HI 40 MIN: CPT | Mod: S$PBB,,, | Performed by: INTERNAL MEDICINE

## 2022-06-28 PROCEDURE — 1111F DSCHRG MED/CURRENT MED MERGE: CPT | Mod: CPTII,,, | Performed by: INTERNAL MEDICINE

## 2022-06-28 PROCEDURE — 3051F HG A1C>EQUAL 7.0%<8.0%: CPT | Mod: CPTII,,, | Performed by: INTERNAL MEDICINE

## 2022-06-28 PROCEDURE — 3075F SYST BP GE 130 - 139MM HG: CPT | Mod: CPTII,,, | Performed by: INTERNAL MEDICINE

## 2022-06-28 PROCEDURE — 4010F PR ACE/ARB THEARPY RXD/TAKEN: ICD-10-PCS | Mod: CPTII,,, | Performed by: INTERNAL MEDICINE

## 2022-06-28 PROCEDURE — 1159F MED LIST DOCD IN RCRD: CPT | Mod: CPTII,,, | Performed by: INTERNAL MEDICINE

## 2022-06-28 PROCEDURE — 3078F DIAST BP <80 MM HG: CPT | Mod: CPTII,,, | Performed by: INTERNAL MEDICINE

## 2022-06-28 PROCEDURE — 3051F PR MOST RECENT HEMOGLOBIN A1C LEVEL 7.0 - < 8.0%: ICD-10-PCS | Mod: CPTII,,, | Performed by: INTERNAL MEDICINE

## 2022-06-28 PROCEDURE — 99215 PR OFFICE/OUTPT VISIT, EST, LEVL V, 40-54 MIN: ICD-10-PCS | Mod: S$PBB,,, | Performed by: INTERNAL MEDICINE

## 2022-06-28 PROCEDURE — 1159F PR MEDICATION LIST DOCUMENTED IN MEDICAL RECORD: ICD-10-PCS | Mod: CPTII,,, | Performed by: INTERNAL MEDICINE

## 2022-06-28 PROCEDURE — 3078F PR MOST RECENT DIASTOLIC BLOOD PRESSURE < 80 MM HG: ICD-10-PCS | Mod: CPTII,,, | Performed by: INTERNAL MEDICINE

## 2022-06-28 PROCEDURE — 99214 OFFICE O/P EST MOD 30 MIN: CPT | Mod: PBBFAC,PO | Performed by: INTERNAL MEDICINE

## 2022-06-28 PROCEDURE — 1160F RVW MEDS BY RX/DR IN RCRD: CPT | Mod: CPTII,,, | Performed by: INTERNAL MEDICINE

## 2022-06-28 PROCEDURE — 4010F ACE/ARB THERAPY RXD/TAKEN: CPT | Mod: CPTII,,, | Performed by: INTERNAL MEDICINE

## 2022-06-28 PROCEDURE — 99999 PR PBB SHADOW E&M-EST. PATIENT-LVL IV: CPT | Mod: PBBFAC,,, | Performed by: INTERNAL MEDICINE

## 2022-06-28 PROCEDURE — 3075F PR MOST RECENT SYSTOLIC BLOOD PRESS GE 130-139MM HG: ICD-10-PCS | Mod: CPTII,,, | Performed by: INTERNAL MEDICINE

## 2022-06-28 PROCEDURE — 3008F PR BODY MASS INDEX (BMI) DOCUMENTED: ICD-10-PCS | Mod: CPTII,,, | Performed by: INTERNAL MEDICINE

## 2022-06-28 PROCEDURE — 3008F BODY MASS INDEX DOCD: CPT | Mod: CPTII,,, | Performed by: INTERNAL MEDICINE

## 2022-06-28 PROCEDURE — 99999 PR PBB SHADOW E&M-EST. PATIENT-LVL IV: ICD-10-PCS | Mod: PBBFAC,,, | Performed by: INTERNAL MEDICINE

## 2022-06-28 RX ORDER — NITROGLYCERIN 0.4 MG/1
0.4 TABLET SUBLINGUAL EVERY 5 MIN PRN
Status: ON HOLD | COMMUNITY
End: 2023-01-01

## 2022-06-28 RX ORDER — NITROGLYCERIN 0.6 MG/1
0.4 TABLET SUBLINGUAL EVERY 5 MIN PRN
COMMUNITY
End: 2023-01-01 | Stop reason: SDUPTHER

## 2022-06-28 NOTE — PROGRESS NOTES
Cardiology Clinic note    Subjective:   Patient ID:  Lele Dias is a 42 y.o. female who presents for CAD FUP    HPI:   CAD: Denies CP, CAZARES. Not exercising; walks around the house with no symptoms and active with housework. No exertional symptoms.    HFrEF: No orthopnea, PND. LLE swelling -being treated for cellulitis. Salt restriction    HTN: On medications    HLD: Tolerating statin    SH Tobacco active 1-2 ppd. Unfortunately mirela passed away Feb 2021. Cessation referral placed previously  FH Mother had fatal MI at age 40    Patient Active Problem List    Diagnosis Date Noted    Constipation     Chest pain in adult     Leg edema, left     Type 2 diabetes mellitus with diabetic polyneuropathy, with long-term current use of insulin     Hypoglycemia associated with type 2 diabetes mellitus 03/02/2021    Benzodiazepine abuse 03/02/2021    Grief reaction 03/02/2021    Debilitated 03/02/2021    Coronary artery disease involving native coronary artery of native heart without angina pectoris     Morbid obesity with BMI of 70 and over, adult     Opioid abuse     Sepsis due to urinary tract infection 09/12/2018    Anemia 04/22/2018    NSTEMI (non-ST elevated myocardial infarction) 04/20/2018    Tobacco use disorder 04/20/2018    UTI due to Klebsiella species 12/18/2017    Periumbilical hernia     BRIDGET (acute kidney injury) 06/02/2015    Nonalcoholic hepatosteatosis 06/02/2015    Opioid dependence on agonist therapy 06/02/2015    Phantom limb pain 06/02/2015     Left arm      Chronic combined systolic and diastolic congestive heart failure 07/15/2014    Asthma 07/15/2014    Benign essential hypertension 07/15/2014       Patient's Medications   New Prescriptions    No medications on file   Previous Medications    ALBUTEROL (ACCUNEB) 1.25 MG/3 ML NEBU    Take 1.25 mg by nebulization every 4 (four) hours as needed.    ALLOPURINOL (ZYLOPRIM) 100 MG TABLET    Take 100 mg by mouth once daily.     "ASPIRIN 81 MG CHEW    Take 1 tablet (81 mg total) by mouth once daily.    ATORVASTATIN (LIPITOR) 80 MG TABLET    Take 1 tablet (80 mg total) by mouth once daily.    BASAGLAR KWIKPEN U-100 INSULIN GLARGINE 100 UNITS/ML (3ML) SUBQ PEN    Inject 50 Units into the skin 3 (three) times daily.    BD ULTRA-FINE MINI PEN NEEDLE 31 GAUGE X 3/16" NDLE        BLOOD SUGAR DIAGNOSTIC STRP    TEST BLOOD SUAGR ONCE DAILY    BLOOD-GLUCOSE METER MISC    use as directed    BUDESONIDE-FORMOTEROL 160-4.5 MCG (SYMBICORT) 160-4.5 MCG/ACTUATION HFAA    Inhale 2 puffs into the lungs every 12 (twelve) hours. Controller    BUPRENORPHINE-NALOXONE (SUBOXONE) 8-2 MG FILM    Place 3 tablets under the tongue Daily.    CARVEDILOL (COREG) 3.125 MG TABLET    Take 1 tablet (3.125 mg total) by mouth 2 (two) times daily with meals.    CHOLECALCIFEROL, VITAMIN D3, 1,250 MCG (50,000 UNIT) CAPSULE    Take 50,000 Units by mouth every 7 days.    CLOPIDOGREL (PLAVIX) 75 MG TABLET    Take 1 tablet (75 mg total) by mouth once daily.    DOCOSAHEXAENOIC ACID/EPA (FISH OIL ORAL)    Take by mouth.    ESCITALOPRAM OXALATE (LEXAPRO) 10 MG TABLET    Take 10 mg by mouth once daily.    FLUTICASONE PROPIONATE (FLONASE) 50 MCG/ACTUATION NASAL SPRAY    1 spray (50 mcg total) by Each Nostril route 2 (two) times daily as needed for Rhinitis.    FUROSEMIDE (LASIX) 40 MG TABLET    Take 40 mg by mouth 2 (two) times daily as needed. Only takes as needed for swelling in legs    HUMALOG MIX 75-25 KWIKPEN 100 UNIT/ML (75-25) INPN    Inject 25 Units into the skin 2 (two) times a day.    ICOSAPENT ETHYL (VASCEPA) 1 GRAM CAP        LISINOPRIL (PRINIVIL,ZESTRIL) 20 MG TABLET    Take 1 tablet (20 mg total) by mouth once daily.    MEDROXYPROGESTERONE (DEPO-PROVERA) 150 MG/ML SYRG        MULTIVITAMIN WITH MINERALS TABLET    Take 1 tablet by mouth once daily.    NICOTINE (NICODERM CQ) 21 MG/24 HR    Place 1 patch onto the skin once daily.    NICOTINE POLACRILEX 2 MG LOZG    Take 1 lozenge " (2 mg total) by mouth as needed (prn). 1-2 per hour in the place of cigarette (5-16 daily max)    NITROGLYCERIN (NITROSTAT) 0.4 MG SL TABLET    Place 0.4 mg under the tongue every 5 (five) minutes as needed for Chest pain.    NITROGLYCERIN (NITROSTAT) 0.6 MG SUBL    Place 0.4 mg under the tongue every 5 (five) minutes as needed.    OMEGA-3 FATTY ACIDS/FISH OIL (FISH OIL-OMEGA-3 FATTY ACIDS) 300-1,000 MG CAPSULE    Take 1 capsule by mouth 2 (two) times daily.    OMEPRAZOLE (PRILOSEC) 40 MG CAPSULE    Take 40 mg by mouth every morning.     POTASSIUM CHLORIDE (MICRO-K) 10 MEQ CPSR    Take by mouth 2 (two) times daily.    PREGABALIN (LYRICA) 150 MG CAPSULE    Take 2 capsules (300 mg total) by mouth 2 (two) times daily.    PROMETHAZINE (PHENERGAN) 6.25 MG/5 ML SYRUP       Modified Medications    No medications on file   Discontinued Medications    No medications on file        Review of Systems   Constitutional: Negative for fever.   HENT: Negative for nosebleeds.    Cardiovascular: Negative for chest pain, dyspnea on exertion, irregular heartbeat, near-syncope, orthopnea, palpitations, paroxysmal nocturnal dyspnea and syncope. Leg swelling: Left leg swelling- being treated for cellulitis.        As above   Respiratory: Negative for hemoptysis.    Hematologic/Lymphatic: Negative for bleeding problem.   Skin: Negative for poor wound healing.   Gastrointestinal: Negative for hematochezia.   Genitourinary: Negative for hematuria.   Neurological: Negative for light-headedness.   Allergic/Immunologic: Negative for persistent infections.         Objective:   Vitals  Vitals:    06/28/22 1530   BP: 130/60   Pulse: 89   SpO2: 96%   Weight: (!) 151 kg (333 lb)          Physical Exam  Constitutional:       General: She is not in acute distress.     Appearance: She is well-developed. She is not diaphoretic.   HENT:      Head: Normocephalic.   Neck:      Vascular: No JVD.   Cardiovascular:      Rate and Rhythm: Normal rate and  regular rhythm.      Heart sounds: No murmur heard.    No friction rub. No gallop.   Pulmonary:      Effort: Pulmonary effort is normal. No respiratory distress.      Breath sounds: Normal breath sounds.   Abdominal:      Palpations: Abdomen is soft.      Tenderness: There is no abdominal tenderness.   Musculoskeletal:         General: Swelling: LLE swelling as noted above.      Cervical back: Normal range of motion.   Skin:     General: Skin is warm.   Neurological:      Mental Status: She is alert.   Psychiatric:         Mood and Affect: Mood normal.             Assessment:     1. Coronary artery disease involving native coronary artery of native heart without angina pectoris    2. Heart failure with reduced ejection fraction    3. Type 2 diabetes mellitus with diabetic polyneuropathy, with long-term current use of insulin    4. Hypertension associated with diabetes    5. Hyperlipidemia associated with type 2 diabetes mellitus    6. Morbid obesity        Plan:   Lele Dias is a 42 y.o. female h/o CAD, DM, HTN, HLD    - Adm Jan 2021 for NSTEMI. Sp BARBARA mid-LCx-OM2. Plan for OP repeat LAD iFR +/- PCI  - Subsequent OP  IVUS-guided PCI p-mLAD with 3.5 x 30 BARBARA Feb 2021  - Adm June 2022 for CP. Trop positive, but flat. BP in /92. Symptoms resolved with BP control. CTA negative for PE. Stress test was done. Reports of patient being anxious to leave AMA for which recommendation was made to DC and FUP as OP (nuclear portion of the stress test was pending). Ms Dias tells me she never did want to leave AMA and was upset she was discharged prior to stress results. Discussed results as OP over the phone and options going forward including angiogram vs PET stress.    - EKG personally reviewed. My interpretation:  6/20/22: SR 60s, normal axis. Non-sp ST-T abn. QTc 416  - US Vein RLE June 2022: No evidence of deep venous thrombosis in the right lower extremity.  - US Veins LLE June 2022: The left anterior tibial  vein could not be identified.  Otherwise, no evidence of deep venous thrombosis in the left lower extremity.  - CTA Chest June 2022: No evidence for aortic dissection or pulmonary embolus through the proximal segmental level.    CAD  - Regadenoson nuclear stress test June 2022  1. Sensitivity of this exam is limited due to patient body habitus.  2. Anterior wall defect on stress images is somewhat concerning for reversible ischemia, however, there does not appear to be an associated wall motion abnormality and breast attenuation could give a similar appearance.  3. Left ventricular systolic dysfunction.  Estimated ejection fraction 34% during stress and 38% during rest.  - EKG negative for ischemia  - Cath Feb 2021: IVUS-guided PCI p-mLAD with 3.5 x 30 BARBARA, post-dilated with 3.5 and 3.75 NC balloon  - Cath Jan 2021: IVUS guided PCI mid-LCx-OM2. IV furosemide 20 mg x1. iFR LAD 0.76  - Aspirin, clopidogrel, statin    HFrEF, improved  NYHA II  - Echo June 2022  · Technically significantly limited echocardiogram.  · The left ventricle is moderately enlarged with concentric hypertrophy and mildly decreased systolic function.  · The estimated ejection fraction is 45-50%.  · Grade I left ventricular diastolic dysfunction.  · Normal right ventricular size with normal right ventricular systolic function.  · Mild mitral regurgitation.  · The sinuses of Valsalva is mildly dilated.  - Echo March 2022  · The estimated ejection fraction is 55%.  · Normal systolic function.  · Normal left ventricular diastolic function.  · Normal right ventricular size with normal right ventricular systolic function.  · Normal central venous pressure (3 mmHg).  - Echo March 2021: LVEF 55%, DD indeterminate. Normal RVSF. Mild LAE. Mild MR. CVP 8. Technically difficult  - Echo Jan 2021: LVEF 40% (not well visualized), DD2. Normal RVSF. Mild MR. Mild LAE. CVP 8  - Lisinopril, carvedilol  Lab Results   Component Value Date    CREATININE 1.4 06/21/2022  "   K 4.6 06/21/2022   - Furosemide  - Daily weights  - First thing in the morning: Pee, take off clothes, step on the scale.  - Write down the date, and the weight. Maintain this chart everyday  - If weight increases by > 3 lbs in a day, or > 5 lbs in a week, take an extra pill of furosemide. Call the office.  - If worsening symptoms, go to the ED  - Salt restriction    DM  Lab Results   Component Value Date    HGBA1C 7.2 (H) 06/18/2022   Managed by PCP    HTN  BP well controlled  Carvedilol, lisinopril  Reports had stopped the carvedilol prior to last admission (HR concerns). Has not been a problem (reportedly high 50s). Asked to call prior to making any changes to cardiac medications    HLD  Lab Results   Component Value Date    LDLCALC 46.2 (L) 06/18/2022   Statin as above    Obesity  Estimated body mass index is 60.91 kg/m² as calculated from the following:    Height as of 6/20/22: 5' 2" (1.575 m).    Weight as of this encounter: 151 kg (333 lb).  396-->371 lbs-->354lbs --> 341 lbs--> 333 lbs      Continue with current medical plan and lifestyle changes.    Orders Placed This Encounter   Procedures    Cardiac PET Scan Stress     Standing Status:   Future     Standing Expiration Date:   6/28/2023     Order Specific Question:   Which medicaton for the stress procedure?     Answer:   Regadenoson     Order Specific Question:   Release to patient     Answer:   Immediate       Follow up as scheduled  Return sooner for concerns or questions. If symptoms persist go to the ED    She expressed verbal understanding and agreed with the plan      Neelam Hicks MD  Interventional Cardiology  [facility]  Phone: [phone]    "

## 2022-07-02 ENCOUNTER — HOSPITAL ENCOUNTER (EMERGENCY)
Facility: HOSPITAL | Age: 42
Discharge: HOME | End: 2022-07-03
Attending: EMERGENCY MEDICINE
Payer: MEDICAID

## 2022-07-02 VITALS
SYSTOLIC BLOOD PRESSURE: 114 MMHG | WEIGHT: 293 LBS | DIASTOLIC BLOOD PRESSURE: 58 MMHG | RESPIRATION RATE: 20 BRPM | HEIGHT: 62 IN | TEMPERATURE: 99 F | HEART RATE: 94 BPM | BODY MASS INDEX: 53.92 KG/M2 | OXYGEN SATURATION: 99 %

## 2022-07-02 DIAGNOSIS — M10.9 ACUTE GOUT, UNSPECIFIED CAUSE, UNSPECIFIED SITE: Primary | ICD-10-CM

## 2022-07-02 DIAGNOSIS — U07.1 COVID: ICD-10-CM

## 2022-07-02 PROCEDURE — 99283 EMERGENCY DEPT VISIT LOW MDM: CPT | Mod: ER

## 2022-07-02 RX ORDER — DICLOFENAC SODIUM 10 MG/G
2 GEL TOPICAL 4 TIMES DAILY
Qty: 450 G | Refills: 0 | Status: SHIPPED | OUTPATIENT
Start: 2022-07-02 | End: 2023-01-01

## 2022-07-03 PROCEDURE — 63600175 PHARM REV CODE 636 W HCPCS: Mod: ER | Performed by: EMERGENCY MEDICINE

## 2022-07-03 RX ADMIN — DEXAMETHASONE 6 MG: 4 TABLET ORAL at 12:07

## 2022-07-03 NOTE — DISCHARGE INSTRUCTIONS
If you have a COVID Test PENDING:  Please access [facility] to review the results of your test. Until the results of your COVID test return, you should isolate yourself so as not to potentially spread illness to others.   If your COVID test returns positive, you should isolate yourself so as not to spread illness to others. After five full days, if you are feeling better and you have not had fever for 24 hours, you can return to your typical daily activities, but you must wear a mask around others for an additional 5 days.   If your COVID test returns negative and you are either unvaccinated or more than six months out from your two-dose vaccine and are not yet boosted, you should still quarantine for 5 full days followed by strict mask use for an additional 5 full days.   If your COVID test returns negative and you have received your 2-dose initial vaccine as well as a booster, you should continue strict mask use for 10 full days after the exposure.  For all those exposed, best practice includes a test at day 5 after the exposure. This can be a home test or a test through one of the many testing centers throughout our community.   Masking is always advised to limit the spread of COVID. Cdc.gov is an excellent site to obtain the latest up to date recommendations regarding COVID and COVID testing.     After your evaluation today, we ruled out any emergent condition. However, if you develop shortness of breath, chest pain, or ANY OTHER CONCERNS please return to the emergency department for further care.      CDC Testing and Quarantine Guidelines for patients with exposure to a known-positive COVID-19 person:  A close exposure is defined as anyone who has had an exposure (masked or unmasked) to a known COVID -19 positive person within 6 feet of someone for a cumulative total of 15 minutes or more over a 24-hour period.   Vaccinated and/or if you recently had a positive covid test within 90 days do NOT need to  quarantine after contact with someone who had COVID-19 unless you develop symptoms.   Fully vaccinated people who have not had a positive test within 90 days, should get tested 3-5 days after their exposure, even if they don't have symptoms and wear a mask indoors in public for 14 days following exposure or until their test result is negative.      Unvaccinated and/or NOT had a positive test within 90 days and meet close exposure  You are required by CDC guidelines to quarantine for at least 5 days from time of exposure followed by 5 days of strict masking. It is recommended, but not required to test after 5 days, unless you develop symptoms, in which case you should test at that time.  If you get tested after 5 days and your test is positive, your 5 day period of isolation starts the day of the positive test.    If your exposure does not meet the above definition, you can return to your normal daily activities to include social distancing, wearing a mask and frequent handwashing.      Here is a link to guidance from the CDC:  https://www.cdc.gov/media/releases/2021/s1227-isolation-quarantine-guidance.html      [facility] Testing Sites:  https://ldh.la.gov/page/3934      [facility] website with testing locations and guidance:  [email or web address]

## 2022-07-03 NOTE — ED PROVIDER NOTES
"Encounter Date: 7/2/2022       History     Chief Complaint   Patient presents with    COVID-19 Concerns     COVID + with gout pain to left foot. "Can barely walk on my foot." 100 mg of allopurinol taken with mild relief. Last taken at 9pm. No other medications taken tonight.    Gout     Lele Dias is a 42 y.o. female who  has a past medical history of Anemia, Asthma, Cellulitis of abdominal wall (12/18/2017), CKD (chronic kidney disease) stage 3, GFR 30-59 ml/min, Depression, Diabetes mellitus, type II, Diastolic dysfunction, Diverticulosis of intestine with bleeding (9/14/2016), E coli bacteremia (4/21/2018), E. coli pyelonephritis (6/2/2015), E. coli UTI (12/18/2017), Hematuria, Hernia, epigastric, Hypertension, Hypocalcemia, Nonalcoholic hepatosteatosis, Periumbilical hernia, and Proteinuria.    The patient presents to the ED due to COVID concerns.  Patient reports herself and her 2 daughters tested positive for COVID at home today.  Her daughters are also here for evaluation.  She denies any symptoms of COVID, her cough congestion sore throat fever chills shortness of breath or other symptoms related her COVID.  She also was diagnosed with gout recently after having left foot pain for several weeks and is currently taking allopurinol and was concerned about the amount of allopurinol she was taking she is taking 300 mg and her physician told take 100 mg. She denies any recent injuries to her foot.  No exacerbating or relieving factors she was recently admitted for coronary artery disease and she is on Suboxone she has been taking Tylenol for pain without significant relief.        Review of patient's allergies indicates:   Allergen Reactions    Celexa [citalopram] Nausea And Vomiting     Past Medical History:   Diagnosis Date    Anemia     Asthma     Cellulitis of abdominal wall 12/18/2017    CKD (chronic kidney disease) stage 3, GFR 30-59 ml/min     Depression     Diabetes mellitus, type II     " Diastolic dysfunction     Diverticulosis of intestine with bleeding 2016    E coli bacteremia 2018    E. coli pyelonephritis 2015    E. coli UTI 2017    Hematuria     Hernia, epigastric     Hypertension     Hypocalcemia     Nonalcoholic hepatosteatosis     Periumbilical hernia     Proteinuria      Past Surgical History:   Procedure Laterality Date    ARM AMPUTATION AT SHOULDER Left 2000s     SECTION       SECTION, CLASSIC      CHOLECYSTECTOMY  age 15    CORONARY ANGIOGRAPHY N/A 2021    Procedure: ANGIOGRAM, CORONARY ARTERY;  Surgeon: Neelam Hicks MD;  Location: [facility] CATH LAB/EP;  Service: Cardiology;  Laterality: N/A;    INCISION AND DRAINAGE OF ABSCESS N/A 2020    Procedure: INCISION AND DRAINAGE, ABSCESS;  Surgeon: Rich Watson MD;  Location: [facility] OR;  Service: General;  Laterality: N/A;    LEFT HEART CATHETERIZATION Left 2021    Procedure: Left heart cath;  Surgeon: Neelam Hicks MD;  Location: [facility] CATH LAB/EP;  Service: Cardiology;  Laterality: Left;    LEFT HEART CATHETERIZATION N/A 2021    Procedure: Left heart cath;  Surgeon: Neelam Hicks MD;  Location: [facility] CATH LAB/EP;  Service: Cardiology;  Laterality: N/A;    TONSILLECTOMY, ADENOIDECTOMY       Family History   Problem Relation Age of Onset    Cancer Father     Heart attack Mother     Heart disease Mother     Cancer Maternal Aunt         lung (smoker)    Heart attack Maternal Aunt     Breast cancer Paternal Grandmother     Heart attack Maternal Grandmother      Social History     Tobacco Use    Smoking status: Current Every Day Smoker     Packs/day: 2.50     Years: 32.00     Pack years: 80.00     Types: Cigarettes     Start date:     Smokeless tobacco: Never Used    Tobacco comment: Pt enrolled in the [employer] on 18. (SCT Member ID # [id number]).  Ambulatory referral to Smoking Cessation Program   Substance Use Topics    Alcohol use: No     Drug use: No     Review of Systems   Constitutional: Negative for chills and fever.   HENT: Negative for sore throat.    Respiratory: Negative for cough and shortness of breath.    Cardiovascular: Negative for chest pain.   Gastrointestinal: Negative for nausea and vomiting.   Genitourinary: Negative for dysuria, frequency and urgency.   Musculoskeletal: Positive for arthralgias and gait problem. Negative for back pain, neck pain and neck stiffness.   Skin: Negative for rash and wound.   Neurological: Negative for syncope and weakness.   Hematological: Does not bruise/bleed easily.   Psychiatric/Behavioral: Negative for agitation, behavioral problems and confusion.       Physical Exam     Initial Vitals [07/02/22 2218]   BP Pulse Resp Temp SpO2   (!) 114/58 94 20 98.6 °F (37 °C) 99 %      MAP       --         Physical Exam    Nursing note and vitals reviewed.  Constitutional: She appears well-developed and well-nourished. She is not diaphoretic. No distress.   HENT:   Head: Normocephalic and atraumatic.   Mouth/Throat: Oropharynx is clear and moist.   Eyes: EOM are normal. Pupils are equal, round, and reactive to light.   Neck: No tracheal deviation present.   Cardiovascular: Normal rate, regular rhythm, normal heart sounds and intact distal pulses.   Pulmonary/Chest: Breath sounds normal. No stridor. No respiratory distress. She has no wheezes.   Abdominal: Abdomen is soft. Bowel sounds are normal. She exhibits no distension and no mass. There is no abdominal tenderness.   Musculoskeletal:         General: No edema. Normal range of motion.      Comments: Left lower extremity:  Sensation intact to light touch, DP pulse palpable.  No surrounding skin changes. Diffuse tenderness to palpation over for plantar surface full range of motion no warmth crepitus erythema or induration.     Neurological: She is alert and oriented to person, place, and time. No cranial nerve deficit or sensory deficit.   Skin: Skin is warm  and dry. Capillary refill takes less than 2 seconds. No rash noted.   Psychiatric: She has a normal mood and affect.         ED Course   Procedures  Labs Reviewed - No data to display       Imaging Results    None          Medications   dexAMETHasone tablet 6 mg (6 mg Oral Given 7/3/22 0013)     Medical Decision Making:   ED Management:  42-year-old female with multiple comorbidities presenting today with COVID concerns.  She has no symptoms of COVID at this time her lungs are clear I do not suspect pneumonia she is oxygenating well.  She was previously diagnosed with gout.  She has tenderness to palpation over the plantar surface of her foot, her exam is consistent with plantar fasciitis however she reports being diagnosed with gout following an elevated uric acid level.  She is neurovascularly intact I do not appreciate any skin changes to suggest acute infection.  She is currently taking allopurinol.  She cannot take narcotics due to being on Suboxone or NSAIDs due to coronary artery disease.  No emergent process has been identified today.  She is given dexamethasone given her diagnosis of gout and pain to foot and advised to check her blood sugars closely.  Will discharge with Voltaren gel.  Recommended rest ice and close follow-up with patient's PCP.  Return precautions discussed for worsening symptoms especially shortness of breath skin changes fevers weakness or any other concerns.    After taking into careful account the historical factors and physical exam findings of the patient's presentation today, in conjunction with the empirical and objective data obtained on ED workup, no acute emergent medical condition has been identified. The patient appears to be low risk for an emergent medical condition and I feel it is safe and appropriate at this time for the patient to be discharged to follow-up as detailed in their discharge instructions for reevaluation and possible continued outpatient workup and  management. I have discussed the specifics of the workup with the patient and the patient has verbalized understanding of the details of the workup, the diagnosis, the treatment plan, and the need for outpatient follow-up.  Although the patient has no emergent etiology today this does not preclude the development of an emergent condition so in addition, I have advised the patient that they can return to the ED and/or activate EMS at any time with worsening of their symptoms, change of their symptoms, or with any other medical complaint.  The patient remained comfortable and stable during their visit in the ED.  Discharge and follow-up instructions discussed with the patient who expressed understanding and willingness to comply with my recommendations.      .                      Clinical Impression:   Final diagnoses:  [M10.9] Acute gout, unspecified cause, unspecified site (Primary)  [U07.1] COVID          ED Disposition Condition    Discharge Stable        ED Prescriptions     Medication Sig Dispense Start Date End Date Auth. Provider    diclofenac sodium (VOLTAREN) 1 % Gel Apply 2 g topically 4 (four) times daily. 450 g 7/2/2022  Collin Richardson Jr., MD        Follow-up Information     Follow up With Specialties Details Why Contact Info    Sammi Edge MD (C[facility]) Family Medicine Schedule an appointment as soon as possible for a visit in 3 days  [address] [facility] [address] [phone]            Portions of this note were dictated using voice recognition software and may contain dictation related errors in spelling/grammar/syntax not found on text review       Collin Richardson Jr., MD  07/03/22 0327       Collin Richardson Jr., MD  07/03/22 0328

## 2022-07-07 ENCOUNTER — TELEPHONE (OUTPATIENT)
Dept: SMOKING CESSATION | Facility: CLINIC | Age: 42
End: 2022-07-07
Payer: MEDICAID

## 2022-07-13 ENCOUNTER — TELEPHONE (OUTPATIENT)
Dept: CARDIOLOGY | Facility: CLINIC | Age: 42
End: 2022-07-13
Payer: MEDICAID

## 2022-07-13 NOTE — TELEPHONE ENCOUNTER
----- Message from Melissa Kramer RN sent at 7/11/2022  4:58 PM CDT -----  Contact: pt    ----- Message -----  From: Vamshi Reich  Sent: 7/11/2022  11:02 AM CDT  To: Fabiola Richter Staff    Type: Requesting to speak with nurse        Who Called: PT  Regarding: would like a call back. She needs a letter for jury duty to explain her condition that she is not able to participate.  Would the patient rather a call back or a response via [facility]? Call back  Best Call Back Number:[phone]  Additional Information: please call as soon as possible --need faxed before the 18th of July   Fax to : Bautista Cifuentes [phone]  Phone: [phone]

## 2022-07-14 ENCOUNTER — TELEPHONE (OUTPATIENT)
Dept: CARDIOLOGY | Facility: CLINIC | Age: 42
End: 2022-07-14
Payer: MEDICAID

## 2022-07-14 NOTE — TELEPHONE ENCOUNTER
----- Message from Dorinda Bruner sent at 7/14/2022  8:07 AM CDT -----  Contact: @[phone]  Pt is calling in to get a letter for jury duty for her heart problem.pt states she needs the letter faxed over by Monday. Pt states that she didn't receive any voicemail but would like a call back. Pt would like a call when it faxed over.Information: please call as soon as possible --need faxed before the 18th of July   Fax to : Bautista Cifuentes [phone]  Phone: [phone]

## 2022-07-14 NOTE — TELEPHONE ENCOUNTER
Called pt back in regards to this message.   Informed pt to follow up with PCP    Pt expressed understanding    ND

## 2022-07-31 ENCOUNTER — HOSPITAL ENCOUNTER (EMERGENCY)
Facility: HOSPITAL | Age: 42
Discharge: HOME | End: 2022-07-31
Attending: EMERGENCY MEDICINE
Payer: MEDICAID

## 2022-07-31 VITALS
DIASTOLIC BLOOD PRESSURE: 67 MMHG | BODY MASS INDEX: 53.92 KG/M2 | HEART RATE: 104 BPM | HEIGHT: 62 IN | SYSTOLIC BLOOD PRESSURE: 111 MMHG | WEIGHT: 293 LBS | OXYGEN SATURATION: 99 % | TEMPERATURE: 98 F | RESPIRATION RATE: 20 BRPM

## 2022-07-31 DIAGNOSIS — L89.92 PRESSURE INJURY, STAGE 2, UNSPECIFIED LOCATION: Primary | ICD-10-CM

## 2022-07-31 DIAGNOSIS — R58 BLEEDING OF BLOOD VESSEL: ICD-10-CM

## 2022-07-31 PROCEDURE — 12001 RPR S/N/AX/GEN/TRNK 2.5CM/<: CPT | Mod: ER

## 2022-07-31 PROCEDURE — 99283 EMERGENCY DEPT VISIT LOW MDM: CPT | Mod: ER

## 2022-07-31 NOTE — ED PROVIDER NOTES
Encounter Date: 2022       History     Chief Complaint   Patient presents with    Wound Check     Pt to the ER via EMS with reports falling yesterday morning. Pt reports wound on bottom of left lower abdomen has been bleeding since. She states her daughter has been cleaning and dressing wound. Pt is on coumadin. Wound is about 1/2 dollar coin size on part of abdomen that rubs continuously on leg     Patient currently presents with concern regarding regarding bleeding from an ulcer on the left lower quadrant of her abdominal wall.  Patient notes that she has had an ulcer there for quite some time but it began bleeding over the past few days.  Patient notes her situation is complicated by chronic Coumadin use.          Review of patient's allergies indicates:   Allergen Reactions    Celexa [citalopram] Nausea And Vomiting     Past Medical History:   Diagnosis Date    Anemia     Asthma     Cellulitis of abdominal wall 2017    CKD (chronic kidney disease) stage 3, GFR 30-59 ml/min     Depression     Diabetes mellitus, type II     Diastolic dysfunction     Diverticulosis of intestine with bleeding 2016    E coli bacteremia 2018    E. coli pyelonephritis 2015    E. coli UTI 2017    Hematuria     Hernia, epigastric     Hypertension     Hypocalcemia     Nonalcoholic hepatosteatosis     Periumbilical hernia     Proteinuria      Past Surgical History:   Procedure Laterality Date    ARM AMPUTATION AT SHOULDER Left 2000s     SECTION       SECTION, CLASSIC      CHOLECYSTECTOMY  age 15    CORONARY ANGIOGRAPHY N/A 2021    Procedure: ANGIOGRAM, CORONARY ARTERY;  Surgeon: Neelam Hicks MD;  Location: [facility] CATH LAB/EP;  Service: Cardiology;  Laterality: N/A;    INCISION AND DRAINAGE OF ABSCESS N/A 2020    Procedure: INCISION AND DRAINAGE, ABSCESS;  Surgeon: Rich Watson MD;  Location: [facility] OR;  Service: General;  Laterality: N/A;    LEFT  HEART CATHETERIZATION Left 1/24/2021    Procedure: Left heart cath;  Surgeon: Neelam Hicks MD;  Location: [facility] CATH LAB/EP;  Service: Cardiology;  Laterality: Left;    LEFT HEART CATHETERIZATION N/A 2/17/2021    Procedure: Left heart cath;  Surgeon: Neelam Hicks MD;  Location: [facility] CATH LAB/EP;  Service: Cardiology;  Laterality: N/A;    TONSILLECTOMY, ADENOIDECTOMY       Family History   Problem Relation Age of Onset    Cancer Father     Heart attack Mother     Heart disease Mother     Cancer Maternal Aunt         lung (smoker)    Heart attack Maternal Aunt     Breast cancer Paternal Grandmother     Heart attack Maternal Grandmother      Social History     Tobacco Use    Smoking status: Current Every Day Smoker     Packs/day: 2.50     Years: 32.00     Pack years: 80.00     Types: Cigarettes     Start date: 1987    Smokeless tobacco: Never Used    Tobacco comment: Pt enrolled in the [employer] on 9/12/18. ([employer]CT Member ID # [id number]).  Ambulatory referral to Smoking Cessation Program   Substance Use Topics    Alcohol use: No    Drug use: No     Review of Systems   Constitutional: Negative for chills and fever.   HENT: Negative for congestion and rhinorrhea.    Respiratory: Negative for cough and shortness of breath.    Cardiovascular: Negative for chest pain and palpitations.   Gastrointestinal: Negative for abdominal pain, diarrhea and vomiting.   Genitourinary: Negative for dysuria.   Skin: Positive for wound. Negative for color change and rash.   Allergic/Immunologic: Negative for immunocompromised state.   Neurological: Negative for dizziness and light-headedness.   Hematological: Negative for adenopathy. Does not bruise/bleed easily.       Physical Exam     Initial Vitals [07/31/22 0353]   BP Pulse Resp Temp SpO2   111/67 104 20 98.4 °F (36.9 °C) 99 %      MAP       --         Physical Exam    Nursing note and vitals reviewed.  Constitutional: She appears well-developed and well-nourished.  She is not diaphoretic. No distress.   HENT:   Head: Normocephalic and atraumatic.   Nose: Nose normal.   Mouth/Throat: Oropharynx is clear and moist.   Neck: Neck supple. No JVD present.   Cardiovascular: Normal rate, regular rhythm, normal heart sounds and intact distal pulses.   Pulmonary/Chest: Breath sounds normal. No respiratory distress.   Abdominal:       Musculoskeletal:      Cervical back: Neck supple.     Neurological: She is alert and oriented to person, place, and time.   Skin: Skin is warm and dry.         ED Course   Procedures  Labs Reviewed - No data to display       Imaging Results    None          Medications - No data to display  Medical Decision Making:   ED Management:  All findings were reviewed with the patient/family in detail.  I see no indication of an emergent process beyond that addressed during our encounter but have duly counseled the patient/family regarding the need for prompt follow-up as well as the indications that should prompt immediate return to the emergency room should new or worrisome developments occur.  The patient has additionally been provided with printed information regarding diagnosis as well as instructions regarding follow up and any medications intended to manage the patient's aforementioned conditions.  The patient/family communicates understanding of all this information and all remaining questions and concerns were addressed at this time.                            Clinical Impression:   Final diagnoses:  [L89.92] Pressure injury, stage 2, unspecified location - LLQ ABDOMINAL WALL (Primary)  [R58] Bleeding of blood vessel          ED Disposition Condition    Discharge Stable        ED Prescriptions     None        Follow-up Information     Follow up With Specialties Details Why Contact [facility] - Emergency Dept Emergency Medicine Go to  As needed, If symptoms worsen [address] [facility] [address] [phone]    Sammi TAVAREZ  (Kari) MD [facility] Schedule an appointment as soon as possible for a visit  reassessment/consideration for specialty referral to wound care [address] [facility] [address]  [phone]             Gulshan Farnsworth MD  07/31/22 5930

## 2022-07-31 NOTE — ED NOTES
MD at bedside placing suture to bleeding area of wound.    MD dressed wound with gauze and silk tape.

## 2022-08-27 ENCOUNTER — HOSPITAL ENCOUNTER (EMERGENCY)
Facility: HOSPITAL | Age: 42
Discharge: HOME | End: 2022-08-27
Attending: EMERGENCY MEDICINE
Payer: MEDICAID

## 2022-08-27 VITALS
HEART RATE: 105 BPM | BODY MASS INDEX: 62.19 KG/M2 | RESPIRATION RATE: 20 BRPM | OXYGEN SATURATION: 97 % | TEMPERATURE: 98 F | SYSTOLIC BLOOD PRESSURE: 181 MMHG | DIASTOLIC BLOOD PRESSURE: 79 MMHG | WEIGHT: 293 LBS

## 2022-08-27 DIAGNOSIS — Z51.89 VISIT FOR WOUND CHECK: Primary | ICD-10-CM

## 2022-08-27 DIAGNOSIS — Z48.02 VISIT FOR SUTURE REMOVAL: ICD-10-CM

## 2022-08-27 PROCEDURE — 99282 EMERGENCY DEPT VISIT SF MDM: CPT | Mod: ER

## 2022-08-27 NOTE — ED PROVIDER NOTES
Encounter Date: 2022       History     Chief Complaint   Patient presents with    Wound Check     Three week ago I had a ulcer and had surgery and the stitches were suppose to be dissolvable. They are still there and it is draining some too and has a black spot.      42-year-old female presents to the emergency department for evaluation of chronic wound and suture removal.  Patient was here few weeks ago and had a suture placed.  States she was unable to get a follow-up appointment with her wound care doctor until 1 or 2 weeks from now.  States the sutures still present, she was unsure if it was absorbable or not.  Notes she has had some persistent albeit mild discharge from the area.  Denies any active bleeding today.  No other symptoms reported at this time.    Review of patient's allergies indicates:   Allergen Reactions    Celexa [citalopram] Nausea And Vomiting     Past Medical History:   Diagnosis Date    Anemia     Asthma     Cellulitis of abdominal wall 2017    CKD (chronic kidney disease) stage 3, GFR 30-59 ml/min     Depression     Diabetes mellitus, type II     Diastolic dysfunction     Diverticulosis of intestine with bleeding 2016    E coli bacteremia 2018    E. coli pyelonephritis 2015    E. 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     SECTION       SECTION, CLASSIC      CHOLECYSTECTOMY  age 15    CORONARY ANGIOGRAPHY N/A 2021    Procedure: ANGIOGRAM, CORONARY ARTERY;  Surgeon: Neelam Hicks MD;  Location: [facility] CATH LAB/EP;  Service: Cardiology;  Laterality: N/A;    INCISION AND DRAINAGE OF ABSCESS N/A 2020    Procedure: INCISION AND DRAINAGE, ABSCESS;  Surgeon: Rich Watson MD;  Location: [facility] OR;  Service: General;   Laterality: N/A;    LEFT HEART CATHETERIZATION Left 1/24/2021    Procedure: Left heart cath;  Surgeon: Neelam Hicks MD;  Location: [facility] CATH LAB/EP;  Service: Cardiology;  Laterality: Left;    LEFT HEART CATHETERIZATION N/A 2/17/2021    Procedure: Left heart cath;  Surgeon: Neleam Hicks MD;  Location: [facility] CATH LAB/EP;  Service: Cardiology;  Laterality: N/A;    TONSILLECTOMY, ADENOIDECTOMY       Family History   Problem Relation Age of Onset    Cancer Father     Heart attack Mother     Heart disease Mother     Cancer Maternal Aunt         lung (smoker)    Heart attack Maternal Aunt     Breast cancer Paternal Grandmother     Heart attack Maternal Grandmother      Social History     Tobacco Use    Smoking status: Every Day     Packs/day: 2.50     Years: 32.00     Pack years: 80.00     Types: Cigarettes     Start date: 1987    Smokeless tobacco: Never    Tobacco comments:     Pt enrolled in the [employer] on 9/12/18. ([employer] Member ID # [id number]).  Ambulatory referral to Smoking Cessation Program   Substance Use Topics    Alcohol use: No    Drug use: No     Review of Systems   Constitutional:  Negative for chills, fatigue and fever.   HENT:  Negative for congestion and sore throat.    Eyes:  Negative for photophobia and visual disturbance.   Respiratory:  Negative for cough and shortness of breath.    Cardiovascular:  Negative for chest pain and palpitations.   Gastrointestinal:  Negative for abdominal pain, diarrhea and vomiting.   Musculoskeletal:  Negative for back pain, neck pain and neck stiffness.   Neurological:  Negative for light-headedness, numbness and headaches.     Physical Exam     Initial Vitals [08/27/22 0822]   BP Pulse Resp Temp SpO2   (!) 181/79 105 20 98.1 °F (36.7 °C) 97 %      MAP       --         Physical Exam    Nursing note and vitals reviewed.  Constitutional: She appears well-developed and well-nourished. No distress.   HENT:   Head: Normocephalic and atraumatic.   Eyes: Conjunctivae  and EOM are normal. Pupils are equal, round, and reactive to light.   Neck: Neck supple. No tracheal deviation present.   Normal range of motion.  Cardiovascular:  Normal rate and intact distal pulses.           Pulmonary/Chest: No respiratory distress.   Abdominal: Abdomen is soft. She exhibits no distension. There is no abdominal tenderness.       Musculoskeletal:         General: No tenderness. Normal range of motion.      Cervical back: Normal range of motion and neck supple.     Neurological: She is alert and oriented to person, place, and time. She has normal strength. No cranial nerve deficit. GCS score is 15. GCS eye subscore is 4. GCS verbal subscore is 5. GCS motor subscore is 6.   Skin: Skin is warm and dry.       ED Course   Suture Removal    Date/Time: 8/27/2022 9:15 AM  Location procedure was performed: [facility]  Performed by: Eh Sanchez MD  Authorized by: Eh Sanchez MD   Body area: trunk  Location details: abdomen  Sutures Removed: 1  Post-removal: dressing applied  Facility: sutures placed in this facility  Complications: No      Labs Reviewed - No data to display              Medications - No data to display  Medical Decision Making:   Initial Assessment:   42-year-old female presents emergency department for evaluation of wound and suture removal  Differential Diagnosis:   Wound infection, chronic wound, suture removal  ED Management:  Suture removed without difficulty, patient tolerated removal well.  Instructed on home management, follow up with wound care, reasons return to the emergency room.                     Clinical Impression:   Final diagnoses:  [Z51.89] Visit for wound check (Primary)  [Z48.02] Visit for suture removal        ED Disposition Condition    Discharge Stable          ED Prescriptions    None       Follow-up Information       Follow up With Specialties Details Why Contact Nikolay Edge MD (Cindy) Family Medicine Schedule an  appointment as soon as possible for a visit  And with wound care [address] [facility] [facility] [address]  [phone]               Eh Sanchez MD  08/27/22 0995

## 2022-08-27 NOTE — DISCHARGE INSTRUCTIONS
Please follow-up with your primary care physician and with wound care as scheduled.  Please return with any new or worsening symptoms.

## 2022-10-31 ENCOUNTER — HOSPITAL ENCOUNTER (EMERGENCY)
Facility: HOSPITAL | Age: 42
Discharge: LEFT BEFORE DISCHARGE | End: 2022-10-31
Attending: FAMILY MEDICINE
Payer: MEDICAID

## 2022-10-31 VITALS
OXYGEN SATURATION: 98 % | DIASTOLIC BLOOD PRESSURE: 74 MMHG | SYSTOLIC BLOOD PRESSURE: 142 MMHG | HEART RATE: 95 BPM | RESPIRATION RATE: 16 BRPM | TEMPERATURE: 99 F

## 2022-10-31 DIAGNOSIS — M25.569 KNEE PAIN: ICD-10-CM

## 2022-10-31 DIAGNOSIS — S83.92XA SPRAIN OF LEFT KNEE, UNSPECIFIED LIGAMENT, INITIAL ENCOUNTER: ICD-10-CM
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
## 2022-10-31 LAB — POCT GLUCOSE: 204 MG/DL (ref 70–110)

## 2022-10-31 PROCEDURE — 25000003 PHARM REV CODE 250: Mod: ER | Performed by: PHYSICIAN ASSISTANT

## 2022-10-31 PROCEDURE — 99283 EMERGENCY DEPT VISIT LOW MDM: CPT | Mod: ER

## 2022-10-31 PROCEDURE — 82962 GLUCOSE BLOOD TEST: CPT | Mod: ER

## 2022-10-31 RX ORDER — HYDROCODONE BITARTRATE AND ACETAMINOPHEN 10; 325 MG/1; MG/1
1 TABLET ORAL
Status: COMPLETED | OUTPATIENT
Start: 2022-10-31 | End: 2022-10-31

## 2022-10-31 RX ADMIN — HYDROCODONE BITARTRATE AND ACETAMINOPHEN 1 TABLET: 10; 325 TABLET ORAL at 07:10

## 2022-10-31 NOTE — ED PROVIDER NOTES
"Encounter Date: 10/31/2022       History     Chief Complaint   Patient presents with    Fall     Pt reports trip and fall and hit face and left knee. Pt reports pain to affected areas.       42-year-old female presents to ED with concern of left-sided knee pain and left-sided facial pain after fall that occurred 3 days ago.  Patient reports she tripped over electrical cord, causing her to fall forward and contused left side of face against a door way and left knee against ground.  No LOC.  She was quickly assisted back to standing position but has since had significant pain with weight-bearing onto her left lower extremity.  She does report known arthritis and meniscus tear in her left knee, but reports her current pain is "the worst it has ever felt" with pain described as sharp, nonradiating, severity 10/10.  No numbness or focal weakness.  She does admit pain to face has significantly improved.  No headaches, lightheadedness dizziness, visual changes, pain with eye movement or jaw movement, neck pain.  No other reported injuries.  No other acute complaints at this time.    The history is provided by the patient.   Review of patient's allergies indicates:   Allergen Reactions    Celexa [citalopram] Nausea And Vomiting     Past Medical History:   Diagnosis Date    Anemia     Asthma     Cellulitis of abdominal wall 2017    CKD (chronic kidney disease) stage 3, GFR 30-59 ml/min     Depression     Diabetes mellitus, type II     Diastolic dysfunction     Diverticulosis of intestine with bleeding 2016    E coli bacteremia 2018    E. coli pyelonephritis 2015    E. 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     SECTION       SECTION, CLASSIC      CHOLECYSTECTOMY  age 15    " CORONARY ANGIOGRAPHY N/A 1/24/2021    Procedure: ANGIOGRAM, CORONARY ARTERY;  Surgeon: Neelam Hicks MD;  Location: [facility] CATH LAB/EP;  Service: Cardiology;  Laterality: N/A;    INCISION AND DRAINAGE OF ABSCESS N/A 12/11/2020    Procedure: INCISION AND DRAINAGE, ABSCESS;  Surgeon: Rich Watson MD;  Location: [facility] OR;  Service: General;  Laterality: N/A;    LEFT HEART CATHETERIZATION Left 1/24/2021    Procedure: Left heart cath;  Surgeon: Neelam Hicks MD;  Location: [facility] CATH LAB/EP;  Service: Cardiology;  Laterality: Left;    LEFT HEART CATHETERIZATION N/A 2/17/2021    Procedure: Left heart cath;  Surgeon: Neelam Hicks MD;  Location: [facility] CATH LAB/EP;  Service: Cardiology;  Laterality: N/A;    TONSILLECTOMY, ADENOIDECTOMY       Family History   Problem Relation Age of Onset    Cancer Father     Heart attack Mother     Heart disease Mother     Cancer Maternal Aunt         lung (smoker)    Heart attack Maternal Aunt     Breast cancer Paternal Grandmother     Heart attack Maternal Grandmother      Social History     Tobacco Use    Smoking status: Every Day     Packs/day: 2.50     Years: 32.00     Pack years: 80.00     Types: Cigarettes     Start date: 1987    Smokeless tobacco: Never    Tobacco comments:     Pt enrolled in the [employer] on 9/12/18. (SCT Member ID # [id number]).  Ambulatory referral to Smoking Cessation Program   Substance Use Topics    Alcohol use: No    Drug use: No     Review of Systems   Eyes:  Negative for visual disturbance.   Gastrointestinal:  Negative for nausea and vomiting.   Musculoskeletal:  Positive for arthralgias and joint swelling. Negative for back pain, neck pain and neck stiffness.   Skin:  Positive for color change.   Neurological:  Negative for dizziness, weakness, light-headedness, numbness and headaches.     Physical Exam     Initial Vitals [10/31/22 1607]   BP Pulse Resp Temp SpO2   132/68 98 18 98.5 °F (36.9 °C) 98 %      MAP       --         Physical  Exam    Nursing note and vitals reviewed.  Constitutional: She appears well-developed and well-nourished. She is Obese . She is active. She does not have a sickly appearance. She does not appear ill. No distress.   HENT:   Head: Normocephalic and atraumatic.   Neck:   Normal range of motion.  Cardiovascular:  Normal rate.           Pulmonary/Chest: Effort normal.   Musculoskeletal:         General: Tenderness present.      Cervical back: Normal range of motion.      Comments:   Left knee tenderness over lateral aspect.  Slight bruising but no open wounds.  Difficult to palpate for bony abnormalities with body habitus.  ROM is intact but limited due to reported discomfort.  Distal sensation intact.  No posterior calf tenderness.  Radial pulse intact.     Neurological: She is alert. GCS eye subscore is 4. GCS verbal subscore is 5. GCS motor subscore is 6.   Skin: Skin is warm and dry.   Psychiatric: She has a normal mood and affect. Her speech is normal and behavior is normal.       ED Course   Procedures  Labs Reviewed   POCT GLUCOSE - Abnormal; Notable for the following components:       Result Value    POCT Glucose 204 (*)     All other components within normal limits   PREGNANCY TEST, URINE RAPID   POCT GLUCOSE MONITORING CONTINUOUS          Imaging Results              X-Ray Knee 1 or 2 View Left (Final result)  Result time 10/31/22 16:42:19   Procedure changed from X-Ray Knee 3 View Left     Final result by JUAN Frazier Sr., MD (10/31/22 16:42:19)                   Impression:      1. There are mild osteoarthritic changes in the lateral compartment of the knee.  2. There is marked narrowing of the joint space of the lateral compartment of the knee.  3. If additional imaging evaluation is clinically indicated, I recommend consideration of an MRI examination of the left knee without IV contrast.      Electronically signed by: Cory Frazier MD  Date:    10/31/2022  Time:    16:42               Narrative:     EXAMINATION:  XR KNEE 1 OR 2 VIEW LEFT    CLINICAL HISTORY:  Pain in unspecified kneepain;    COMPARISON:  04/29/2019    FINDINGS:  There is no fracture. There is no dislocation.  There is marked narrowing of the joint space of the lateral compartment of the knee.  There are mild osteoarthritic changes in the lateral compartment of the knee.                                       Medications   HYDROcodone-acetaminophen  mg per tablet 1 tablet (1 tablet Oral Given 10/31/22 1906)     Medical Decision Making:   Initial Assessment:     Patient presents after fall resulting in contusion of left-sided face and left knee.  No LOC.  She reports pain to face has significantly improved but continues have limited ability to weight bear onto her left knee.  No numbness or focal weakness.  Vitals WNL.  On exam, tenderness over lateral left knee.  Difficult to examine for bony abnormalities with body habitus.  Neurovascular intact.  Differential Diagnosis:     Strain, sprain, contusion, dislocation, fracture  ED Management:   X-ray of left knee significant for arthritic changes but noting no displaced or visual fractures.  Will plan to proceed with CT for rule out of occult fracture    8:08 PM   Patient states her wishes to leave the ED without her CT scan.  Agree to sign AMA form.  AMA form was discussed and signed.  Patient left ED.                        Clinical Impression:   Final diagnoses:  [M25.569] Knee pain  [W19.XXXA] Fall, initial encounter (Primary)  [S83.92XA] Sprain of left knee, unspecified ligament, initial encounter        ED Disposition Condition    AMA Stable                David Romero PA-C  10/31/22 2009

## 2023-01-01 ENCOUNTER — TELEPHONE (OUTPATIENT)
Dept: CARDIOLOGY | Facility: CLINIC | Age: 43
End: 2023-01-01
Payer: MEDICAID

## 2023-01-01 ENCOUNTER — HOSPITAL ENCOUNTER (EMERGENCY)
Facility: HOSPITAL | Age: 43
Discharge: HOME | End: 2023-01-16
Attending: FAMILY MEDICINE
Payer: MEDICAID

## 2023-01-01 ENCOUNTER — PATIENT MESSAGE (OUTPATIENT)
Dept: CARDIOLOGY | Facility: CLINIC | Age: 43
End: 2023-01-01
Payer: MEDICAID

## 2023-01-01 ENCOUNTER — HOSPITAL ENCOUNTER (OUTPATIENT)
Facility: HOSPITAL | Age: 43
Discharge: LEFT BEFORE DISCHARGE | DRG: 603 | End: 2023-03-06
Attending: EMERGENCY MEDICINE | Admitting: INTERNAL MEDICINE
Payer: MEDICAID

## 2023-01-01 ENCOUNTER — CLINICAL SUPPORT (OUTPATIENT)
Dept: SMOKING CESSATION | Facility: CLINIC | Age: 43
End: 2023-01-01
Payer: COMMERCIAL

## 2023-01-01 ENCOUNTER — CLINICAL SUPPORT (OUTPATIENT)
Dept: SMOKING CESSATION | Facility: CLINIC | Age: 43
End: 2023-01-01

## 2023-01-01 ENCOUNTER — HOSPITAL ENCOUNTER (INPATIENT)
Facility: HOSPITAL | Age: 43
LOS: 7 days | Discharge: HOME | DRG: 871 | End: 2023-12-25
Attending: EMERGENCY MEDICINE | Admitting: STUDENT IN AN ORGANIZED HEALTH CARE EDUCATION/TRAINING PROGRAM
Payer: MEDICAID

## 2023-01-01 ENCOUNTER — PATIENT MESSAGE (OUTPATIENT)
Dept: EMERGENCY MEDICINE | Facility: HOSPITAL | Age: 43
End: 2023-01-01
Payer: MEDICAID

## 2023-01-01 ENCOUNTER — TELEPHONE (OUTPATIENT)
Dept: EMERGENCY MEDICINE | Facility: HOSPITAL | Age: 43
End: 2023-01-01
Payer: COMMERCIAL

## 2023-01-01 ENCOUNTER — HOSPITAL ENCOUNTER (INPATIENT)
Facility: HOSPITAL | Age: 43
LOS: 3 days | Discharge: HOME | DRG: 682 | End: 2023-12-15
Attending: EMERGENCY MEDICINE | Admitting: STUDENT IN AN ORGANIZED HEALTH CARE EDUCATION/TRAINING PROGRAM
Payer: MEDICAID

## 2023-01-01 ENCOUNTER — HOSPITAL ENCOUNTER (OUTPATIENT)
Facility: HOSPITAL | Age: 43
Discharge: LEFT BEFORE DISCHARGE | End: 2023-10-06
Attending: EMERGENCY MEDICINE | Admitting: FAMILY MEDICINE
Payer: COMMERCIAL

## 2023-01-01 VITALS
TEMPERATURE: 97 F | DIASTOLIC BLOOD PRESSURE: 53 MMHG | HEART RATE: 94 BPM | WEIGHT: 293 LBS | RESPIRATION RATE: 18 BRPM | OXYGEN SATURATION: 94 % | SYSTOLIC BLOOD PRESSURE: 114 MMHG | HEIGHT: 62 IN | BODY MASS INDEX: 53.92 KG/M2

## 2023-01-01 VITALS
RESPIRATION RATE: 20 BRPM | WEIGHT: 293 LBS | TEMPERATURE: 99 F | DIASTOLIC BLOOD PRESSURE: 72 MMHG | SYSTOLIC BLOOD PRESSURE: 134 MMHG | BODY MASS INDEX: 64.75 KG/M2 | HEART RATE: 87 BPM | OXYGEN SATURATION: 99 %

## 2023-01-01 VITALS
SYSTOLIC BLOOD PRESSURE: 123 MMHG | DIASTOLIC BLOOD PRESSURE: 57 MMHG | HEART RATE: 69 BPM | TEMPERATURE: 98 F | HEIGHT: 62 IN | WEIGHT: 293 LBS | BODY MASS INDEX: 53.92 KG/M2 | OXYGEN SATURATION: 97 % | RESPIRATION RATE: 18 BRPM

## 2023-01-01 VITALS
RESPIRATION RATE: 20 BRPM | TEMPERATURE: 98 F | WEIGHT: 293 LBS | DIASTOLIC BLOOD PRESSURE: 56 MMHG | SYSTOLIC BLOOD PRESSURE: 101 MMHG | OXYGEN SATURATION: 92 % | HEART RATE: 88 BPM | BODY MASS INDEX: 65.11 KG/M2

## 2023-01-01 VITALS
BODY MASS INDEX: 65.12 KG/M2 | OXYGEN SATURATION: 93 % | TEMPERATURE: 97 F | DIASTOLIC BLOOD PRESSURE: 78 MMHG | HEART RATE: 83 BPM | RESPIRATION RATE: 16 BRPM | WEIGHT: 293 LBS | SYSTOLIC BLOOD PRESSURE: 172 MMHG

## 2023-01-01 DIAGNOSIS — R79.89 ELEVATED TROPONIN: ICD-10-CM

## 2023-01-01 DIAGNOSIS — R53.1 GENERALIZED WEAKNESS: ICD-10-CM

## 2023-01-01 DIAGNOSIS — F17.210 CIGARETTE SMOKER: Primary | ICD-10-CM

## 2023-01-01 DIAGNOSIS — E78.5 HYPERLIPIDEMIA ASSOCIATED WITH TYPE 2 DIABETES MELLITUS: Primary | ICD-10-CM

## 2023-01-01 DIAGNOSIS — Z79.4 TYPE 2 DIABETES MELLITUS WITH DIABETIC POLYNEUROPATHY, WITH LONG-TERM CURRENT USE OF INSULIN: ICD-10-CM

## 2023-01-01 DIAGNOSIS — E11.42 TYPE 2 DIABETES MELLITUS WITH DIABETIC POLYNEUROPATHY, WITH LONG-TERM CURRENT USE OF INSULIN: ICD-10-CM

## 2023-01-01 DIAGNOSIS — I50.42 CHRONIC COMBINED SYSTOLIC AND DIASTOLIC CONGESTIVE HEART FAILURE: ICD-10-CM

## 2023-01-01 DIAGNOSIS — L08.9 WOUND INFECTION: Primary | ICD-10-CM

## 2023-01-01 DIAGNOSIS — R07.9 CHEST PAIN: ICD-10-CM

## 2023-01-01 DIAGNOSIS — E11.69 HYPERLIPIDEMIA ASSOCIATED WITH TYPE 2 DIABETES MELLITUS: Primary | ICD-10-CM

## 2023-01-01 DIAGNOSIS — R60.0 LEG EDEMA, LEFT: ICD-10-CM

## 2023-01-01 DIAGNOSIS — K37 APPENDICITIS: ICD-10-CM

## 2023-01-01 DIAGNOSIS — I25.10 CORONARY ARTERY DISEASE INVOLVING NATIVE CORONARY ARTERY OF NATIVE HEART WITHOUT ANGINA PECTORIS: Primary | ICD-10-CM

## 2023-01-01 DIAGNOSIS — R53.83 FATIGUE: ICD-10-CM

## 2023-01-01 DIAGNOSIS — L03.116 CELLULITIS OF LEFT LEG: ICD-10-CM

## 2023-01-01 DIAGNOSIS — R73.9 HYPERGLYCEMIA: ICD-10-CM

## 2023-01-01 DIAGNOSIS — F17.200 TOBACCO USE DISORDER: Chronic | ICD-10-CM

## 2023-01-01 DIAGNOSIS — E11.10 DM (DIABETES MELLITUS) TYPE 2, UNCONTROLLED, WITH KETOACIDOSIS: ICD-10-CM

## 2023-01-01 DIAGNOSIS — T14.8XXA WOUND INFECTION: Primary | ICD-10-CM

## 2023-01-01 DIAGNOSIS — N30.00 ACUTE CYSTITIS WITHOUT HEMATURIA: Primary | ICD-10-CM

## 2023-01-01 DIAGNOSIS — N30.01 ACUTE CYSTITIS WITH HEMATURIA: ICD-10-CM

## 2023-01-01 DIAGNOSIS — R10.31 RIGHT LOWER QUADRANT ABDOMINAL PAIN: ICD-10-CM

## 2023-01-01 DIAGNOSIS — Z53.29 LEFT AGAINST MEDICAL ADVICE: ICD-10-CM

## 2023-01-01 DIAGNOSIS — R19.7 DIARRHEA, UNSPECIFIED TYPE: ICD-10-CM

## 2023-01-01 DIAGNOSIS — K35.30 ACUTE APPENDICITIS WITH LOCALIZED PERITONITIS, WITHOUT PERFORATION, ABSCESS, OR GANGRENE: ICD-10-CM

## 2023-01-01 DIAGNOSIS — N17.9 AKI (ACUTE KIDNEY INJURY): Primary | ICD-10-CM

## 2023-01-01 DIAGNOSIS — K37 APPENDICITIS, UNSPECIFIED APPENDICITIS TYPE: ICD-10-CM

## 2023-01-01 DIAGNOSIS — K45.8 OTHER SPECIFIED ABDOMINAL HERNIA WITHOUT OBSTRUCTION OR GANGRENE: ICD-10-CM

## 2023-01-01 DIAGNOSIS — A41.9 SEPSIS, DUE TO UNSPECIFIED ORGANISM, UNSPECIFIED WHETHER ACUTE ORGAN DYSFUNCTION PRESENT: ICD-10-CM

## 2023-01-01 DIAGNOSIS — E86.0 DEHYDRATION: ICD-10-CM

## 2023-01-01 DIAGNOSIS — E83.42 HYPOMAGNESEMIA: ICD-10-CM

## 2023-01-01 DIAGNOSIS — L03.116 CELLULITIS OF LEFT LOWER EXTREMITY: Primary | ICD-10-CM

## 2023-01-01 LAB
ALBUMIN SERPL BCP-MCNC: 1.6 G/DL (ref 3.5–5.2)
ALBUMIN SERPL BCP-MCNC: 1.7 G/DL (ref 3.5–5.2)
ALBUMIN SERPL BCP-MCNC: 1.8 G/DL (ref 3.5–5.2)
ALBUMIN SERPL BCP-MCNC: 1.8 G/DL (ref 3.5–5.2)
ALBUMIN SERPL BCP-MCNC: 1.9 G/DL (ref 3.5–5.2)
ALBUMIN SERPL BCP-MCNC: 2.1 G/DL (ref 3.5–5.2)
ALBUMIN SERPL BCP-MCNC: 2.1 G/DL (ref 3.5–5.2)
ALBUMIN SERPL BCP-MCNC: 2.6 G/DL (ref 3.5–5.2)
ALBUMIN SERPL BCP-MCNC: 3.3 G/DL (ref 3.5–5.2)
ALBUMIN SERPL BCP-MCNC: 3.6 G/DL (ref 3.5–5.2)
ALBUMIN SERPL BCP-MCNC: 4.4 G/DL (ref 3.5–5.2)
ALP SERPL-CCNC: 124 U/L (ref 55–135)
ALP SERPL-CCNC: 69 U/L (ref 55–135)
ALP SERPL-CCNC: 76 U/L (ref 55–135)
ALP SERPL-CCNC: 77 U/L (ref 55–135)
ALP SERPL-CCNC: 85 U/L (ref 38–126)
ALP SERPL-CCNC: 89 U/L (ref 55–135)
ALP SERPL-CCNC: 90 U/L (ref 55–135)
ALT SERPL W/O P-5'-P-CCNC: 13 U/L (ref 10–44)
ALT SERPL W/O P-5'-P-CCNC: 14 U/L (ref 10–44)
ALT SERPL W/O P-5'-P-CCNC: 15 U/L (ref 10–44)
ALT SERPL W/O P-5'-P-CCNC: 19 U/L (ref 10–44)
ALT SERPL W/O P-5'-P-CCNC: 21 U/L (ref 10–44)
ALT SERPL W/O P-5'-P-CCNC: 22 U/L (ref 10–44)
ALT SERPL W/O P-5'-P-CCNC: 26 U/L (ref 10–44)
ANION GAP SERPL CALC-SCNC: 10 MMOL/L (ref 8–16)
ANION GAP SERPL CALC-SCNC: 11 MMOL/L (ref 8–16)
ANION GAP SERPL CALC-SCNC: 12 MMOL/L (ref 8–16)
ANION GAP SERPL CALC-SCNC: 13 MMOL/L (ref 8–16)
ANION GAP SERPL CALC-SCNC: 14 MMOL/L (ref 8–16)
ANION GAP SERPL CALC-SCNC: 15 MMOL/L (ref 8–16)
ANION GAP SERPL CALC-SCNC: 8 MMOL/L (ref 8–16)
ANION GAP SERPL CALC-SCNC: 8 MMOL/L (ref 8–16)
ANION GAP SERPL CALC-SCNC: 9 MMOL/L (ref 8–16)
ANISOCYTOSIS BLD QL SMEAR: SLIGHT
AST SERPL-CCNC: 12 U/L (ref 10–40)
AST SERPL-CCNC: 13 U/L (ref 10–40)
AST SERPL-CCNC: 15 U/L (ref 10–40)
AST SERPL-CCNC: 17 U/L (ref 10–40)
AST SERPL-CCNC: 26 U/L (ref 10–40)
AST SERPL-CCNC: 28 U/L (ref 15–46)
AST SERPL-CCNC: 9 U/L (ref 10–40)
B-HCG UR QL: NEGATIVE
B-OH-BUTYR BLD STRIP-SCNC: 0.2 MMOL/L (ref 0–0.5)
BACTERIA #/AREA URNS AUTO: ABNORMAL /HPF
BACTERIA #/AREA URNS HPF: ABNORMAL /HPF
BACTERIA BLD CULT: ABNORMAL
BACTERIA BLD CULT: NORMAL
BACTERIA UR CULT: ABNORMAL
BACTERIA UR CULT: ABNORMAL
BASO STIPL BLD QL SMEAR: ABNORMAL
BASOPHILS # BLD AUTO: 0.02 K/UL (ref 0–0.2)
BASOPHILS # BLD AUTO: 0.03 K/UL (ref 0–0.2)
BASOPHILS # BLD AUTO: 0.04 K/UL (ref 0–0.2)
BASOPHILS # BLD AUTO: 0.04 K/UL (ref 0–0.2)
BASOPHILS # BLD AUTO: 0.05 K/UL (ref 0–0.2)
BASOPHILS # BLD AUTO: 0.07 K/UL (ref 0–0.2)
BASOPHILS # BLD AUTO: 0.1 K/UL (ref 0–0.2)
BASOPHILS # BLD AUTO: ABNORMAL K/UL (ref 0–0.2)
BASOPHILS NFR BLD: 0 % (ref 0–1.9)
BASOPHILS NFR BLD: 0.2 % (ref 0–1.9)
BASOPHILS NFR BLD: 0.3 % (ref 0–1.9)
BASOPHILS NFR BLD: 0.4 % (ref 0–1.9)
BASOPHILS NFR BLD: 0.4 % (ref 0–1.9)
BASOPHILS NFR BLD: 0.5 % (ref 0–1.9)
BASOPHILS NFR BLD: 0.6 % (ref 0–1.9)
BASOPHILS NFR BLD: 0.6 % (ref 0–1.9)
BILIRUB SERPL-MCNC: 0.3 MG/DL (ref 0.1–1)
BILIRUB SERPL-MCNC: 0.4 MG/DL (ref 0.1–1)
BILIRUB SERPL-MCNC: 0.4 MG/DL (ref 0.1–1)
BILIRUB SERPL-MCNC: 0.5 MG/DL (ref 0.1–1)
BILIRUB SERPL-MCNC: 0.9 MG/DL (ref 0.1–1)
BILIRUB SERPL-MCNC: 1.1 MG/DL (ref 0.1–1)
BILIRUB UR QL STRIP: NEGATIVE
BNP SERPL-MCNC: 47 PG/ML (ref 0–99)
BNP SERPL-MCNC: 90 PG/ML (ref 0–99)
BUN SERPL-MCNC: 13 MG/DL (ref 6–20)
BUN SERPL-MCNC: 15 MG/DL (ref 6–20)
BUN SERPL-MCNC: 15 MG/DL (ref 6–20)
BUN SERPL-MCNC: 18 MG/DL (ref 6–20)
BUN SERPL-MCNC: 19 MG/DL (ref 6–20)
BUN SERPL-MCNC: 19 MG/DL (ref 6–20)
BUN SERPL-MCNC: 21 MG/DL (ref 6–20)
BUN SERPL-MCNC: 27 MG/DL (ref 6–20)
BUN SERPL-MCNC: 28 MG/DL (ref 6–20)
BUN SERPL-MCNC: 29 MG/DL (ref 6–20)
BUN SERPL-MCNC: 29 MG/DL (ref 6–20)
BUN SERPL-MCNC: 33 MG/DL (ref 6–20)
BUN SERPL-MCNC: 33 MG/DL (ref 6–20)
BUN SERPL-MCNC: 8 MG/DL (ref 6–20)
BURR CELLS BLD QL SMEAR: ABNORMAL
C3 SERPL-MCNC: 149 MG/DL (ref 50–180)
C4 SERPL-MCNC: 28 MG/DL (ref 11–44)
CALCIUM SERPL-MCNC: 8 MG/DL (ref 8.7–10.5)
CALCIUM SERPL-MCNC: 8 MG/DL (ref 8.7–10.5)
CALCIUM SERPL-MCNC: 8.1 MG/DL (ref 8.7–10.5)
CALCIUM SERPL-MCNC: 8.2 MG/DL (ref 8.7–10.5)
CALCIUM SERPL-MCNC: 8.2 MG/DL (ref 8.7–10.5)
CALCIUM SERPL-MCNC: 8.3 MG/DL (ref 8.7–10.5)
CALCIUM SERPL-MCNC: 8.4 MG/DL (ref 8.7–10.5)
CALCIUM SERPL-MCNC: 8.5 MG/DL (ref 8.7–10.5)
CALCIUM SERPL-MCNC: 8.6 MG/DL (ref 8.7–10.5)
CALCIUM SERPL-MCNC: 8.7 MG/DL (ref 8.7–10.5)
CALCIUM SERPL-MCNC: 8.8 MG/DL (ref 8.7–10.5)
CALCIUM SERPL-MCNC: 8.8 MG/DL (ref 8.7–10.5)
CALCIUM SERPL-MCNC: 9.3 MG/DL (ref 8.7–10.5)
CH50 SERPL-ACNC: >105 U/ML (ref 42–95)
CHLORIDE SERPL-SCNC: 104 MMOL/L (ref 95–110)
CHLORIDE SERPL-SCNC: 104 MMOL/L (ref 95–110)
CHLORIDE SERPL-SCNC: 106 MMOL/L (ref 95–110)
CHLORIDE SERPL-SCNC: 107 MMOL/L (ref 95–110)
CHLORIDE SERPL-SCNC: 108 MMOL/L (ref 95–110)
CHLORIDE SERPL-SCNC: 109 MMOL/L (ref 95–110)
CHLORIDE SERPL-SCNC: 110 MMOL/L (ref 95–110)
CHLORIDE SERPL-SCNC: 110 MMOL/L (ref 95–110)
CHLORIDE SERPL-SCNC: 112 MMOL/L (ref 95–110)
CHLORIDE SERPL-SCNC: 99 MMOL/L (ref 95–110)
CHLORIDE STL-SCNC: NORMAL MMOL/L
CK SERPL-CCNC: 98 U/L (ref 20–180)
CLARITY UR REFRACT.AUTO: CLEAR
CLARITY UR: ABNORMAL
CLARITY UR: ABNORMAL
CLARITY UR: CLEAR
CO2 SERPL-SCNC: 16 MMOL/L (ref 23–29)
CO2 SERPL-SCNC: 17 MMOL/L (ref 23–29)
CO2 SERPL-SCNC: 17 MMOL/L (ref 23–29)
CO2 SERPL-SCNC: 18 MMOL/L (ref 23–29)
CO2 SERPL-SCNC: 18 MMOL/L (ref 23–29)
CO2 SERPL-SCNC: 19 MMOL/L (ref 23–29)
CO2 SERPL-SCNC: 19 MMOL/L (ref 23–29)
CO2 SERPL-SCNC: 20 MMOL/L (ref 23–29)
CO2 SERPL-SCNC: 21 MMOL/L (ref 23–29)
CO2 SERPL-SCNC: 22 MMOL/L (ref 23–29)
CO2 SERPL-SCNC: 23 MMOL/L (ref 23–29)
CO2 SERPL-SCNC: 23 MMOL/L (ref 23–29)
CO2 SERPL-SCNC: 24 MMOL/L (ref 23–29)
CO2 SERPL-SCNC: 24 MMOL/L (ref 23–29)
CO2 SERPL-SCNC: 29 MMOL/L (ref 23–29)
COLOR UR AUTO: YELLOW
COLOR UR: ABNORMAL
COLOR UR: YELLOW
COLOR UR: YELLOW
CREAT SERPL-MCNC: 0.77 MG/DL (ref 0.5–1.4)
CREAT SERPL-MCNC: 0.9 MG/DL (ref 0.5–1.4)
CREAT SERPL-MCNC: 1 MG/DL (ref 0.5–1.4)
CREAT SERPL-MCNC: 1.1 MG/DL (ref 0.5–1.4)
CREAT SERPL-MCNC: 1.2 MG/DL (ref 0.5–1.4)
CREAT SERPL-MCNC: 1.3 MG/DL (ref 0.5–1.4)
CREAT SERPL-MCNC: 1.4 MG/DL (ref 0.5–1.4)
CREAT SERPL-MCNC: 1.5 MG/DL (ref 0.5–1.4)
CREAT SERPL-MCNC: 1.8 MG/DL (ref 0.5–1.4)
CREAT SERPL-MCNC: 2.2 MG/DL (ref 0.5–1.4)
CREAT SERPL-MCNC: 3.4 MG/DL (ref 0.5–1.4)
CREAT SERPL-MCNC: 4.8 MG/DL (ref 0.5–1.4)
CREAT SERPL-MCNC: 4.8 MG/DL (ref 0.5–1.4)
CREAT SERPL-MCNC: 4.9 MG/DL (ref 0.5–1.4)
CREAT SERPL-MCNC: 5.1 MG/DL (ref 0.5–1.4)
CREAT UR-MCNC: 137.5 MG/DL (ref 15–325)
CREAT UR-MCNC: 137.5 MG/DL (ref 15–325)
CRP SERPL-MCNC: 212.3 MG/L (ref 0–8.2)
CTP QC/QA: YES
DIFFERENTIAL METHOD: ABNORMAL
EOSINOPHIL # BLD AUTO: 0.1 K/UL (ref 0–0.5)
EOSINOPHIL # BLD AUTO: 0.2 K/UL (ref 0–0.5)
EOSINOPHIL # BLD AUTO: ABNORMAL K/UL (ref 0–0.5)
EOSINOPHIL NFR BLD: 0 % (ref 0–8)
EOSINOPHIL NFR BLD: 0.6 % (ref 0–8)
EOSINOPHIL NFR BLD: 0.6 % (ref 0–8)
EOSINOPHIL NFR BLD: 0.7 % (ref 0–8)
EOSINOPHIL NFR BLD: 1 % (ref 0–8)
EOSINOPHIL NFR BLD: 1.1 % (ref 0–8)
EOSINOPHIL NFR BLD: 1.1 % (ref 0–8)
EOSINOPHIL URNS QL WRIGHT STN: NORMAL
ERYTHROCYTE [DISTWIDTH] IN BLOOD BY AUTOMATED COUNT: 14.2 % (ref 11.5–14.5)
ERYTHROCYTE [DISTWIDTH] IN BLOOD BY AUTOMATED COUNT: 14.5 % (ref 11.5–14.5)
ERYTHROCYTE [DISTWIDTH] IN BLOOD BY AUTOMATED COUNT: 14.9 % (ref 11.5–14.5)
ERYTHROCYTE [DISTWIDTH] IN BLOOD BY AUTOMATED COUNT: 15 % (ref 11.5–14.5)
ERYTHROCYTE [DISTWIDTH] IN BLOOD BY AUTOMATED COUNT: 15 % (ref 11.5–14.5)
ERYTHROCYTE [DISTWIDTH] IN BLOOD BY AUTOMATED COUNT: 15.2 % (ref 11.5–14.5)
ERYTHROCYTE [DISTWIDTH] IN BLOOD BY AUTOMATED COUNT: 15.3 % (ref 11.5–14.5)
ERYTHROCYTE [DISTWIDTH] IN BLOOD BY AUTOMATED COUNT: 15.4 % (ref 11.5–14.5)
ERYTHROCYTE [DISTWIDTH] IN BLOOD BY AUTOMATED COUNT: 15.5 % (ref 11.5–14.5)
ERYTHROCYTE [DISTWIDTH] IN BLOOD BY AUTOMATED COUNT: 15.6 % (ref 11.5–14.5)
ERYTHROCYTE [DISTWIDTH] IN BLOOD BY AUTOMATED COUNT: 15.7 % (ref 11.5–14.5)
ERYTHROCYTE [DISTWIDTH] IN BLOOD BY AUTOMATED COUNT: 15.8 % (ref 11.5–14.5)
ERYTHROCYTE [DISTWIDTH] IN BLOOD BY AUTOMATED COUNT: 15.8 % (ref 11.5–14.5)
ERYTHROCYTE [DISTWIDTH] IN BLOOD BY AUTOMATED COUNT: 16.1 % (ref 11.5–14.5)
EST. GFR  (NO RACE VARIABLE): 10 ML/MIN/1.73 M^2
EST. GFR  (NO RACE VARIABLE): 11 ML/MIN/1.73 M^2
EST. GFR  (NO RACE VARIABLE): 16 ML/MIN/1.73 M^2
EST. GFR  (NO RACE VARIABLE): 27 ML/MIN/1.73 M^2
EST. GFR  (NO RACE VARIABLE): 35 ML/MIN/1.73 M^2
EST. GFR  (NO RACE VARIABLE): 44 ML/MIN/1.73 M^2
EST. GFR  (NO RACE VARIABLE): 47 ML/MIN/1.73 M^2
EST. GFR  (NO RACE VARIABLE): 52 ML/MIN/1.73 M^2
EST. GFR  (NO RACE VARIABLE): 57 ML/MIN/1.73 M^2
EST. GFR  (NO RACE VARIABLE): >60 ML/MIN/1.73 M^2
ESTIMATED AVG GLUCOSE: 272 MG/DL (ref 68–131)
FERRITIN SERPL-MCNC: 259 NG/ML (ref 20–300)
GLUCOSE SERPL-MCNC: 120 MG/DL (ref 70–110)
GLUCOSE SERPL-MCNC: 148 MG/DL (ref 70–110)
GLUCOSE SERPL-MCNC: 159 MG/DL (ref 70–110)
GLUCOSE SERPL-MCNC: 183 MG/DL (ref 70–110)
GLUCOSE SERPL-MCNC: 204 MG/DL (ref 70–110)
GLUCOSE SERPL-MCNC: 213 MG/DL (ref 70–110)
GLUCOSE SERPL-MCNC: 250 MG/DL (ref 70–110)
GLUCOSE SERPL-MCNC: 253 MG/DL (ref 70–110)
GLUCOSE SERPL-MCNC: 266 MG/DL (ref 70–110)
GLUCOSE SERPL-MCNC: 270 MG/DL (ref 70–110)
GLUCOSE SERPL-MCNC: 339 MG/DL (ref 70–110)
GLUCOSE SERPL-MCNC: 53 MG/DL (ref 70–110)
GLUCOSE SERPL-MCNC: 532 MG/DL (ref 70–110)
GLUCOSE SERPL-MCNC: 61 MG/DL (ref 70–110)
GLUCOSE SERPL-MCNC: 635 MG/DL (ref 70–110)
GLUCOSE SERPL-MCNC: 648 MG/DL (ref 70–110)
GLUCOSE SERPL-MCNC: 81 MG/DL (ref 70–110)
GLUCOSE SERPL-MCNC: 918 MG/DL (ref 70–110)
GLUCOSE UR QL STRIP: ABNORMAL
GLUCOSE UR QL STRIP: NEGATIVE
HAV IGM SERPL QL IA: NORMAL
HBA1C MFR BLD: 11.1 % (ref 4–5.6)
HBV CORE IGM SERPL QL IA: NORMAL
HBV SURFACE AG SERPL QL IA: NORMAL
HCT VFR BLD AUTO: 29.3 % (ref 37–48.5)
HCT VFR BLD AUTO: 31 % (ref 37–48.5)
HCT VFR BLD AUTO: 31 % (ref 37–48.5)
HCT VFR BLD AUTO: 31.8 % (ref 37–48.5)
HCT VFR BLD AUTO: 32.7 % (ref 37–48.5)
HCT VFR BLD AUTO: 33.3 % (ref 37–48.5)
HCT VFR BLD AUTO: 33.4 % (ref 37–48.5)
HCT VFR BLD AUTO: 34.5 % (ref 37–48.5)
HCT VFR BLD AUTO: 34.7 % (ref 37–48.5)
HCT VFR BLD AUTO: 35.3 % (ref 37–48.5)
HCT VFR BLD AUTO: 38.9 % (ref 37–48.5)
HCT VFR BLD AUTO: 39.2 % (ref 37–48.5)
HCT VFR BLD AUTO: 40.8 % (ref 37–48.5)
HCT VFR BLD AUTO: 44.1 % (ref 37–48.5)
HCV AB SERPL QL IA: NORMAL
HGB BLD-MCNC: 10.2 G/DL (ref 12–16)
HGB BLD-MCNC: 10.4 G/DL (ref 12–16)
HGB BLD-MCNC: 10.6 G/DL (ref 12–16)
HGB BLD-MCNC: 10.6 G/DL (ref 12–16)
HGB BLD-MCNC: 10.9 G/DL (ref 12–16)
HGB BLD-MCNC: 11.1 G/DL (ref 12–16)
HGB BLD-MCNC: 11.2 G/DL (ref 12–16)
HGB BLD-MCNC: 11.8 G/DL (ref 12–16)
HGB BLD-MCNC: 12.4 G/DL (ref 12–16)
HGB BLD-MCNC: 12.6 G/DL (ref 12–16)
HGB BLD-MCNC: 13 G/DL (ref 12–16)
HGB BLD-MCNC: 13.8 G/DL (ref 12–16)
HGB BLD-MCNC: 9.5 G/DL (ref 12–16)
HGB BLD-MCNC: 9.8 G/DL (ref 12–16)
HGB UR QL STRIP: ABNORMAL
HGB UR QL STRIP: NEGATIVE
HIV 1+2 AB+HIV1 P24 AG SERPL QL IA: NORMAL
HYALINE CASTS #/AREA URNS LPF: 0 /LPF
HYPOCHROMIA BLD QL SMEAR: ABNORMAL
HYPOCHROMIA BLD QL SMEAR: ABNORMAL
IMM GRANULOCYTES # BLD AUTO: 0.06 K/UL (ref 0–0.04)
IMM GRANULOCYTES # BLD AUTO: 0.11 K/UL (ref 0–0.04)
IMM GRANULOCYTES # BLD AUTO: 0.12 K/UL (ref 0–0.04)
IMM GRANULOCYTES # BLD AUTO: 0.17 K/UL (ref 0–0.04)
IMM GRANULOCYTES # BLD AUTO: 0.25 K/UL (ref 0–0.04)
IMM GRANULOCYTES # BLD AUTO: 0.25 K/UL (ref 0–0.04)
IMM GRANULOCYTES # BLD AUTO: 0.76 K/UL (ref 0–0.04)
IMM GRANULOCYTES # BLD AUTO: ABNORMAL K/UL (ref 0–0.04)
IMM GRANULOCYTES NFR BLD AUTO: 0.5 % (ref 0–0.5)
IMM GRANULOCYTES NFR BLD AUTO: 0.8 % (ref 0–0.5)
IMM GRANULOCYTES NFR BLD AUTO: 1.5 % (ref 0–0.5)
IMM GRANULOCYTES NFR BLD AUTO: 1.5 % (ref 0–0.5)
IMM GRANULOCYTES NFR BLD AUTO: 2 % (ref 0–0.5)
IMM GRANULOCYTES NFR BLD AUTO: 2.3 % (ref 0–0.5)
IMM GRANULOCYTES NFR BLD AUTO: 4.6 % (ref 0–0.5)
IMM GRANULOCYTES NFR BLD AUTO: ABNORMAL % (ref 0–0.5)
IRON SERPL-MCNC: 32 UG/DL (ref 30–160)
KETONES UR QL STRIP: NEGATIVE
LACTATE SERPL-SCNC: 1.1 MMOL/L (ref 0.5–2.2)
LACTATE SERPL-SCNC: 1.7 MMOL/L (ref 0.5–2.2)
LACTATE SERPL-SCNC: 1.7 MMOL/L (ref 0.5–2.2)
LACTATE SERPL-SCNC: 1.8 MMOL/L (ref 0.5–2.2)
LACTATE SERPL-SCNC: 2.6 MMOL/L (ref 0.5–2.2)
LEUKOCYTE ESTERASE UR QL STRIP: ABNORMAL
LEUKOCYTE ESTERASE UR QL STRIP: NEGATIVE
LIPASE SERPL-CCNC: 56 U/L (ref 23–300)
LYMPHOCYTES # BLD AUTO: 1.9 K/UL (ref 1–4.8)
LYMPHOCYTES # BLD AUTO: 2.4 K/UL (ref 1–4.8)
LYMPHOCYTES # BLD AUTO: 2.5 K/UL (ref 1–4.8)
LYMPHOCYTES # BLD AUTO: 2.9 K/UL (ref 1–4.8)
LYMPHOCYTES # BLD AUTO: 3 K/UL (ref 1–4.8)
LYMPHOCYTES # BLD AUTO: 3.6 K/UL (ref 1–4.8)
LYMPHOCYTES # BLD AUTO: 4.9 K/UL (ref 1–4.8)
LYMPHOCYTES # BLD AUTO: ABNORMAL K/UL (ref 1–4.8)
LYMPHOCYTES NFR BLD: 15.5 % (ref 18–48)
LYMPHOCYTES NFR BLD: 18 % (ref 18–48)
LYMPHOCYTES NFR BLD: 19 % (ref 18–48)
LYMPHOCYTES NFR BLD: 21 % (ref 18–48)
LYMPHOCYTES NFR BLD: 21.6 % (ref 18–48)
LYMPHOCYTES NFR BLD: 21.7 % (ref 18–48)
LYMPHOCYTES NFR BLD: 22 % (ref 18–48)
LYMPHOCYTES NFR BLD: 22.9 % (ref 18–48)
LYMPHOCYTES NFR BLD: 24 % (ref 18–48)
LYMPHOCYTES NFR BLD: 31 % (ref 18–48)
LYMPHOCYTES NFR BLD: 33 % (ref 18–48)
LYMPHOCYTES NFR BLD: 34.2 % (ref 18–48)
LYMPHOCYTES NFR BLD: 36.9 % (ref 18–48)
MAGNESIUM FLD-MCNC: NORMAL MG/DL
MAGNESIUM SERPL-MCNC: 1.3 MG/DL (ref 1.6–2.6)
MAGNESIUM SERPL-MCNC: 1.4 MG/DL (ref 1.6–2.6)
MAGNESIUM SERPL-MCNC: 1.5 MG/DL (ref 1.6–2.6)
MAGNESIUM SERPL-MCNC: 1.5 MG/DL (ref 1.6–2.6)
MAGNESIUM SERPL-MCNC: 1.6 MG/DL (ref 1.6–2.6)
MAGNESIUM SERPL-MCNC: 1.7 MG/DL (ref 1.6–2.6)
MAGNESIUM SERPL-MCNC: 1.8 MG/DL (ref 1.6–2.6)
MAGNESIUM SERPL-MCNC: 1.8 MG/DL (ref 1.6–2.6)
MCH RBC QN AUTO: 28.6 PG (ref 27–31)
MCH RBC QN AUTO: 28.8 PG (ref 27–31)
MCH RBC QN AUTO: 28.8 PG (ref 27–31)
MCH RBC QN AUTO: 28.9 PG (ref 27–31)
MCH RBC QN AUTO: 29.2 PG (ref 27–31)
MCH RBC QN AUTO: 29.3 PG (ref 27–31)
MCH RBC QN AUTO: 29.4 PG (ref 27–31)
MCH RBC QN AUTO: 29.5 PG (ref 27–31)
MCH RBC QN AUTO: 29.5 PG (ref 27–31)
MCH RBC QN AUTO: 29.7 PG (ref 27–31)
MCHC RBC AUTO-ENTMCNC: 30.7 G/DL (ref 32–36)
MCHC RBC AUTO-ENTMCNC: 31.3 G/DL (ref 32–36)
MCHC RBC AUTO-ENTMCNC: 31.6 G/DL (ref 32–36)
MCHC RBC AUTO-ENTMCNC: 31.7 G/DL (ref 32–36)
MCHC RBC AUTO-ENTMCNC: 31.8 G/DL (ref 32–36)
MCHC RBC AUTO-ENTMCNC: 31.9 G/DL (ref 32–36)
MCHC RBC AUTO-ENTMCNC: 31.9 G/DL (ref 32–36)
MCHC RBC AUTO-ENTMCNC: 32.1 G/DL (ref 32–36)
MCHC RBC AUTO-ENTMCNC: 32.4 G/DL (ref 32–36)
MCHC RBC AUTO-ENTMCNC: 32.6 G/DL (ref 32–36)
MCHC RBC AUTO-ENTMCNC: 32.9 G/DL (ref 32–36)
MCHC RBC AUTO-ENTMCNC: 33.3 G/DL (ref 32–36)
MCHC RBC AUTO-ENTMCNC: 33.3 G/DL (ref 32–36)
MCHC RBC AUTO-ENTMCNC: 34 G/DL (ref 32–36)
MCV RBC AUTO: 86 FL (ref 82–98)
MCV RBC AUTO: 89 FL (ref 82–98)
MCV RBC AUTO: 90 FL (ref 82–98)
MCV RBC AUTO: 91 FL (ref 82–98)
MCV RBC AUTO: 91 FL (ref 82–98)
MCV RBC AUTO: 92 FL (ref 82–98)
MCV RBC AUTO: 92 FL (ref 82–98)
MCV RBC AUTO: 93 FL (ref 82–98)
MCV RBC AUTO: 95 FL (ref 82–98)
MICROSCOPIC COMMENT: ABNORMAL
MONOCYTES # BLD AUTO: 0.1 K/UL (ref 0.3–1)
MONOCYTES # BLD AUTO: 0.6 K/UL (ref 0.3–1)
MONOCYTES # BLD AUTO: 0.7 K/UL (ref 0.3–1)
MONOCYTES # BLD AUTO: 0.8 K/UL (ref 0.3–1)
MONOCYTES # BLD AUTO: 0.9 K/UL (ref 0.3–1)
MONOCYTES # BLD AUTO: ABNORMAL K/UL (ref 0.3–1)
MONOCYTES NFR BLD: 0 % (ref 4–15)
MONOCYTES NFR BLD: 1.4 % (ref 4–15)
MONOCYTES NFR BLD: 3 % (ref 4–15)
MONOCYTES NFR BLD: 4 % (ref 4–15)
MONOCYTES NFR BLD: 4.4 % (ref 4–15)
MONOCYTES NFR BLD: 4.9 % (ref 4–15)
MONOCYTES NFR BLD: 5.4 % (ref 4–15)
MONOCYTES NFR BLD: 5.5 % (ref 4–15)
MONOCYTES NFR BLD: 6.2 % (ref 4–15)
MONOCYTES NFR BLD: 7.6 % (ref 4–15)
MONOCYTES NFR BLD: 9 % (ref 4–15)
MYELOCYTES NFR BLD MANUAL: 2 %
NEUTROPHILS # BLD AUTO: 11.2 K/UL (ref 1.8–7.7)
NEUTROPHILS # BLD AUTO: 4.8 K/UL (ref 1.8–7.7)
NEUTROPHILS # BLD AUTO: 7.2 K/UL (ref 1.8–7.7)
NEUTROPHILS # BLD AUTO: 8 K/UL (ref 1.8–7.7)
NEUTROPHILS # BLD AUTO: 8.5 K/UL (ref 1.8–7.7)
NEUTROPHILS # BLD AUTO: 9.4 K/UL (ref 1.8–7.7)
NEUTROPHILS # BLD AUTO: 9.6 K/UL (ref 1.8–7.7)
NEUTROPHILS # BLD AUTO: ABNORMAL K/UL (ref 1.8–7.7)
NEUTROPHILS NFR BLD: 58.8 % (ref 38–73)
NEUTROPHILS NFR BLD: 59 % (ref 38–73)
NEUTROPHILS NFR BLD: 59.2 % (ref 38–73)
NEUTROPHILS NFR BLD: 62 % (ref 38–73)
NEUTROPHILS NFR BLD: 63 % (ref 38–73)
NEUTROPHILS NFR BLD: 66 % (ref 38–73)
NEUTROPHILS NFR BLD: 66.1 % (ref 38–73)
NEUTROPHILS NFR BLD: 67.2 % (ref 38–73)
NEUTROPHILS NFR BLD: 70.2 % (ref 38–73)
NEUTROPHILS NFR BLD: 71 % (ref 38–73)
NEUTROPHILS NFR BLD: 71.6 % (ref 38–73)
NEUTROPHILS NFR BLD: 75.4 % (ref 38–73)
NEUTROPHILS NFR BLD: 78 % (ref 38–73)
NEUTS BAND NFR BLD MANUAL: 11 %
NEUTS BAND NFR BLD MANUAL: 3 %
NEUTS BAND NFR BLD MANUAL: 7 %
NEUTS BAND NFR BLD MANUAL: 7 %
NITRITE UR QL STRIP: NEGATIVE
NITRITE UR QL STRIP: POSITIVE
NRBC BLD-RTO: 0 /100 WBC
NRBC BLD-RTO: 1 /100 WBC
OSMOL GAP STL: NORMAL MOSM/KG
OSMOLALITY SERPL: 310 MOSM/KG (ref 275–295)
OSMOLALITY STL: NORMAL MOSM/KG
PH UR STRIP: 5 [PH] (ref 5–8)
PH UR STRIP: 5 [PH] (ref 5–8)
PH UR STRIP: 6 [PH] (ref 5–8)
PH UR STRIP: 6 [PH] (ref 5–8)
PHOSPHATE SERPL-MCNC: 2.8 MG/DL (ref 2.7–4.5)
PHOSPHATE SERPL-MCNC: 3.1 MG/DL (ref 2.7–4.5)
PHOSPHATE SERPL-MCNC: 3.4 MG/DL (ref 2.7–4.5)
PHOSPHATE SERPL-MCNC: 3.7 MG/DL (ref 2.7–4.5)
PHOSPHATE SERPL-MCNC: 4.1 MG/DL (ref 2.7–4.5)
PHOSPHATE SERPL-MCNC: 4.1 MG/DL (ref 2.7–4.5)
PHOSPHATE SERPL-MCNC: 5.3 MG/DL (ref 2.7–4.5)
PHOSPHATE SERPL-MCNC: 5.3 MG/DL (ref 2.7–4.5)
PHOSPHATE SERPL-MCNC: 6.2 MG/DL (ref 2.7–4.5)
PHOSPHORUS, FECES: NORMAL
PLATELET # BLD AUTO: 108 K/UL (ref 150–450)
PLATELET # BLD AUTO: 123 K/UL (ref 150–450)
PLATELET # BLD AUTO: 123 K/UL (ref 150–450)
PLATELET # BLD AUTO: 140 K/UL (ref 150–450)
PLATELET # BLD AUTO: 152 K/UL (ref 150–450)
PLATELET # BLD AUTO: 153 K/UL (ref 150–450)
PLATELET # BLD AUTO: 154 K/UL (ref 150–450)
PLATELET # BLD AUTO: 155 K/UL (ref 150–450)
PLATELET # BLD AUTO: 155 K/UL (ref 150–450)
PLATELET # BLD AUTO: 172 K/UL (ref 150–450)
PLATELET # BLD AUTO: 183 K/UL (ref 150–450)
PLATELET # BLD AUTO: 190 K/UL (ref 150–450)
PLATELET # BLD AUTO: 197 K/UL (ref 150–450)
PLATELET # BLD AUTO: ABNORMAL K/UL (ref 150–450)
PLATELET BLD QL SMEAR: ABNORMAL
PMV BLD AUTO: 10 FL (ref 9.2–12.9)
PMV BLD AUTO: 10.1 FL (ref 9.2–12.9)
PMV BLD AUTO: 10.4 FL (ref 9.2–12.9)
PMV BLD AUTO: 10.5 FL (ref 9.2–12.9)
PMV BLD AUTO: 10.7 FL (ref 9.2–12.9)
PMV BLD AUTO: 10.7 FL (ref 9.2–12.9)
PMV BLD AUTO: 10.8 FL (ref 9.2–12.9)
PMV BLD AUTO: 10.9 FL (ref 9.2–12.9)
PMV BLD AUTO: 11 FL (ref 9.2–12.9)
PMV BLD AUTO: 11.1 FL (ref 9.2–12.9)
PMV BLD AUTO: 11.2 FL (ref 9.2–12.9)
PMV BLD AUTO: 11.4 FL (ref 9.2–12.9)
POCT GLUCOSE: 101 MG/DL (ref 70–110)
POCT GLUCOSE: 153 MG/DL (ref 70–110)
POCT GLUCOSE: 155 MG/DL (ref 70–110)
POCT GLUCOSE: 163 MG/DL (ref 70–110)
POCT GLUCOSE: 170 MG/DL (ref 70–110)
POCT GLUCOSE: 175 MG/DL (ref 70–110)
POCT GLUCOSE: 182 MG/DL (ref 70–110)
POCT GLUCOSE: 196 MG/DL (ref 70–110)
POCT GLUCOSE: 204 MG/DL (ref 70–110)
POCT GLUCOSE: 206 MG/DL (ref 70–110)
POCT GLUCOSE: 207 MG/DL (ref 70–110)
POCT GLUCOSE: 210 MG/DL (ref 70–110)
POCT GLUCOSE: 218 MG/DL (ref 70–110)
POCT GLUCOSE: 224 MG/DL (ref 70–110)
POCT GLUCOSE: 229 MG/DL (ref 70–110)
POCT GLUCOSE: 232 MG/DL (ref 70–110)
POCT GLUCOSE: 234 MG/DL (ref 70–110)
POCT GLUCOSE: 237 MG/DL (ref 70–110)
POCT GLUCOSE: 238 MG/DL (ref 70–110)
POCT GLUCOSE: 240 MG/DL (ref 70–110)
POCT GLUCOSE: 248 MG/DL (ref 70–110)
POCT GLUCOSE: 250 MG/DL (ref 70–110)
POCT GLUCOSE: 251 MG/DL (ref 70–110)
POCT GLUCOSE: 253 MG/DL (ref 70–110)
POCT GLUCOSE: 254 MG/DL (ref 70–110)
POCT GLUCOSE: 254 MG/DL (ref 70–110)
POCT GLUCOSE: 260 MG/DL (ref 70–110)
POCT GLUCOSE: 264 MG/DL (ref 70–110)
POCT GLUCOSE: 268 MG/DL (ref 70–110)
POCT GLUCOSE: 271 MG/DL (ref 70–110)
POCT GLUCOSE: 279 MG/DL (ref 70–110)
POCT GLUCOSE: 279 MG/DL (ref 70–110)
POCT GLUCOSE: 288 MG/DL (ref 70–110)
POCT GLUCOSE: 301 MG/DL (ref 70–110)
POCT GLUCOSE: 306 MG/DL (ref 70–110)
POCT GLUCOSE: 328 MG/DL (ref 70–110)
POCT GLUCOSE: 338 MG/DL (ref 70–110)
POCT GLUCOSE: 350 MG/DL (ref 70–110)
POCT GLUCOSE: 445 MG/DL (ref 70–110)
POCT GLUCOSE: 50 MG/DL (ref 70–110)
POCT GLUCOSE: 80 MG/DL (ref 70–110)
POCT GLUCOSE: 86 MG/DL (ref 70–110)
POCT GLUCOSE: 89 MG/DL (ref 70–110)
POCT GLUCOSE: 91 MG/DL (ref 70–110)
POCT GLUCOSE: >500 MG/DL (ref 70–110)
POIKILOCYTOSIS BLD QL SMEAR: SLIGHT
POIKILOCYTOSIS BLD QL SMEAR: SLIGHT
POLYCHROMASIA BLD QL SMEAR: ABNORMAL
POTASSIUM FLD-SCNC: NORMAL MMOL/L
POTASSIUM SERPL-SCNC: 3.6 MMOL/L (ref 3.5–5.1)
POTASSIUM SERPL-SCNC: 3.8 MMOL/L (ref 3.5–5.1)
POTASSIUM SERPL-SCNC: 3.9 MMOL/L (ref 3.5–5.1)
POTASSIUM SERPL-SCNC: 3.9 MMOL/L (ref 3.5–5.1)
POTASSIUM SERPL-SCNC: 4 MMOL/L (ref 3.5–5.1)
POTASSIUM SERPL-SCNC: 4 MMOL/L (ref 3.5–5.1)
POTASSIUM SERPL-SCNC: 4.1 MMOL/L (ref 3.5–5.1)
POTASSIUM SERPL-SCNC: 4.3 MMOL/L (ref 3.5–5.1)
POTASSIUM SERPL-SCNC: 4.4 MMOL/L (ref 3.5–5.1)
POTASSIUM SERPL-SCNC: 4.4 MMOL/L (ref 3.5–5.1)
POTASSIUM SERPL-SCNC: 4.5 MMOL/L (ref 3.5–5.1)
POTASSIUM SERPL-SCNC: 4.6 MMOL/L (ref 3.5–5.1)
POTASSIUM SERPL-SCNC: 4.6 MMOL/L (ref 3.5–5.1)
PROT SERPL-MCNC: 5.2 G/DL (ref 6–8.4)
PROT SERPL-MCNC: 5.4 G/DL (ref 6–8.4)
PROT SERPL-MCNC: 5.4 G/DL (ref 6–8.4)
PROT SERPL-MCNC: 5.9 G/DL (ref 6–8.4)
PROT SERPL-MCNC: 6.2 G/DL (ref 6–8.4)
PROT SERPL-MCNC: 6.4 G/DL (ref 6–8.4)
PROT SERPL-MCNC: 6.7 G/DL (ref 6–8.4)
PROT SERPL-MCNC: 7 G/DL (ref 6–8.4)
PROT SERPL-MCNC: 7 G/DL (ref 6–8.4)
PROT UR QL STRIP: ABNORMAL
PROT UR QL STRIP: ABNORMAL
PROT UR QL STRIP: NEGATIVE
PROT UR QL STRIP: NEGATIVE
PROT UR-MCNC: 30 MG/DL (ref 0–15)
PROT/CREAT UR: 0.22 MG/G{CREAT} (ref 0–0.2)
RBC # BLD AUTO: 3.29 M/UL (ref 4–5.4)
RBC # BLD AUTO: 3.43 M/UL (ref 4–5.4)
RBC # BLD AUTO: 3.48 M/UL (ref 4–5.4)
RBC # BLD AUTO: 3.57 M/UL (ref 4–5.4)
RBC # BLD AUTO: 3.61 M/UL (ref 4–5.4)
RBC # BLD AUTO: 3.63 M/UL (ref 4–5.4)
RBC # BLD AUTO: 3.7 M/UL (ref 4–5.4)
RBC # BLD AUTO: 3.76 M/UL (ref 4–5.4)
RBC # BLD AUTO: 3.83 M/UL (ref 4–5.4)
RBC # BLD AUTO: 4.04 M/UL (ref 4–5.4)
RBC # BLD AUTO: 4.31 M/UL (ref 4–5.4)
RBC # BLD AUTO: 4.32 M/UL (ref 4–5.4)
RBC # BLD AUTO: 4.42 M/UL (ref 4–5.4)
RBC # BLD AUTO: 4.72 M/UL (ref 4–5.4)
RBC #/AREA URNS AUTO: 1 /HPF (ref 0–4)
RBC #/AREA URNS HPF: 0 /HPF (ref 0–4)
RBC #/AREA URNS HPF: 43 /HPF (ref 0–4)
RBC #/AREA URNS HPF: >100 /HPF (ref 0–4)
SATURATED IRON: 16 % (ref 20–50)
SODIUM SERPL-SCNC: 133 MMOL/L (ref 136–145)
SODIUM SERPL-SCNC: 133 MMOL/L (ref 136–145)
SODIUM SERPL-SCNC: 135 MMOL/L (ref 136–145)
SODIUM SERPL-SCNC: 137 MMOL/L (ref 136–145)
SODIUM SERPL-SCNC: 137 MMOL/L (ref 136–145)
SODIUM SERPL-SCNC: 138 MMOL/L (ref 136–145)
SODIUM SERPL-SCNC: 139 MMOL/L (ref 136–145)
SODIUM SERPL-SCNC: 139 MMOL/L (ref 136–145)
SODIUM SERPL-SCNC: 141 MMOL/L (ref 136–145)
SODIUM SERPL-SCNC: 141 MMOL/L (ref 136–145)
SODIUM SERPL-SCNC: 142 MMOL/L (ref 136–145)
SODIUM SERPL-SCNC: 142 MMOL/L (ref 136–145)
SODIUM SERPL-SCNC: 143 MMOL/L (ref 136–145)
SODIUM SERPL-SCNC: 143 MMOL/L (ref 136–145)
SODIUM STL-SCNC: NORMAL MMOL/L
SODIUM UR-SCNC: 45 MMOL/L (ref 20–250)
SP GR UR STRIP: 1.01 (ref 1–1.03)
SP GR UR STRIP: 1.03 (ref 1–1.03)
SQUAMOUS #/AREA URNS HPF: 1 /HPF
SQUAMOUS #/AREA URNS HPF: 1 /HPF
SQUAMOUS #/AREA URNS HPF: 11 /HPF
T PALLIDUM AB SER QL IF: NORMAL
TOTAL IRON BINDING CAPACITY: 203 UG/DL (ref 250–450)
TRANSFERRIN SERPL-MCNC: 137 MG/DL (ref 200–375)
TROPONIN I SERPL DL<=0.01 NG/ML-MCNC: 0.15 NG/ML (ref 0–0.03)
TROPONIN I SERPL DL<=0.01 NG/ML-MCNC: 0.26 NG/ML (ref 0–0.03)
URATE SERPL-MCNC: 5.3 MG/DL (ref 2.4–5.7)
URN SPEC COLLECT METH UR: ABNORMAL
UROBILINOGEN UR STRIP-ACNC: NEGATIVE EU/DL
UUN UR-MCNC: 122 MG/DL (ref 140–1050)
UUN UR-MCNC: 16 MG/DL (ref 7–17)
VANCOMYCIN TROUGH SERPL-MCNC: 17.8 UG/ML (ref 10–22)
WBC # BLD AUTO: 10.86 K/UL (ref 3.9–12.7)
WBC # BLD AUTO: 11.35 K/UL (ref 3.9–12.7)
WBC # BLD AUTO: 12.01 K/UL (ref 3.9–12.7)
WBC # BLD AUTO: 12.47 K/UL (ref 3.9–12.7)
WBC # BLD AUTO: 13.31 K/UL (ref 3.9–12.7)
WBC # BLD AUTO: 14.01 K/UL (ref 3.9–12.7)
WBC # BLD AUTO: 14.32 K/UL (ref 3.9–12.7)
WBC # BLD AUTO: 15.73 K/UL (ref 3.9–12.7)
WBC # BLD AUTO: 15.76 K/UL (ref 3.9–12.7)
WBC # BLD AUTO: 15.88 K/UL (ref 3.9–12.7)
WBC # BLD AUTO: 16.55 K/UL (ref 3.9–12.7)
WBC # BLD AUTO: 16.63 K/UL (ref 3.9–12.7)
WBC # BLD AUTO: 17.22 K/UL (ref 3.9–12.7)
WBC # BLD AUTO: 8.14 K/UL (ref 3.9–12.7)
WBC #/AREA STL HPF: NORMAL /[HPF]
WBC #/AREA URNS AUTO: 30 /HPF (ref 0–5)
WBC #/AREA URNS HPF: 1 /HPF (ref 0–5)
WBC #/AREA URNS HPF: 35 /HPF (ref 0–5)
WBC #/AREA URNS HPF: >100 /HPF (ref 0–5)
WBC CLUMPS UR QL AUTO: ABNORMAL
YEAST URNS QL MICRO: ABNORMAL

## 2023-01-01 PROCEDURE — 51798 US URINE CAPACITY MEASURE: CPT

## 2023-01-01 PROCEDURE — 82010 KETONE BODYS QUAN: CPT | Performed by: EMERGENCY MEDICINE

## 2023-01-01 PROCEDURE — 25000003 PHARM REV CODE 250: Performed by: EMERGENCY MEDICINE

## 2023-01-01 PROCEDURE — 25000003 PHARM REV CODE 250: Performed by: NURSE PRACTITIONER

## 2023-01-01 PROCEDURE — 99223 1ST HOSP IP/OBS HIGH 75: CPT | Mod: AF,HB,, | Performed by: PSYCHIATRY & NEUROLOGY

## 2023-01-01 PROCEDURE — 87186 SC STD MICRODIL/AGAR DIL: CPT | Performed by: EMERGENCY MEDICINE

## 2023-01-01 PROCEDURE — 36415 COLL VENOUS BLD VENIPUNCTURE: CPT | Performed by: STUDENT IN AN ORGANIZED HEALTH CARE EDUCATION/TRAINING PROGRAM

## 2023-01-01 PROCEDURE — 83735 ASSAY OF MAGNESIUM: CPT | Performed by: STUDENT IN AN ORGANIZED HEALTH CARE EDUCATION/TRAINING PROGRAM

## 2023-01-01 PROCEDURE — 11000001 HC ACUTE MED/SURG PRIVATE ROOM

## 2023-01-01 PROCEDURE — 81000 URINALYSIS NONAUTO W/SCOPE: CPT | Performed by: EMERGENCY MEDICINE

## 2023-01-01 PROCEDURE — 63600175 PHARM REV CODE 636 W HCPCS: Performed by: STUDENT IN AN ORGANIZED HEALTH CARE EDUCATION/TRAINING PROGRAM

## 2023-01-01 PROCEDURE — 82570 ASSAY OF URINE CREATININE: CPT | Performed by: STUDENT IN AN ORGANIZED HEALTH CARE EDUCATION/TRAINING PROGRAM

## 2023-01-01 PROCEDURE — 85027 COMPLETE CBC AUTOMATED: CPT | Performed by: INTERNAL MEDICINE

## 2023-01-01 PROCEDURE — 25000003 PHARM REV CODE 250: Performed by: STUDENT IN AN ORGANIZED HEALTH CARE EDUCATION/TRAINING PROGRAM

## 2023-01-01 PROCEDURE — 84100 ASSAY OF PHOSPHORUS: CPT | Performed by: STUDENT IN AN ORGANIZED HEALTH CARE EDUCATION/TRAINING PROGRAM

## 2023-01-01 PROCEDURE — S4991 NICOTINE PATCH NONLEGEND: HCPCS | Performed by: STUDENT IN AN ORGANIZED HEALTH CARE EDUCATION/TRAINING PROGRAM

## 2023-01-01 PROCEDURE — 63600175 PHARM REV CODE 636 W HCPCS: Mod: JZ | Performed by: STUDENT IN AN ORGANIZED HEALTH CARE EDUCATION/TRAINING PROGRAM

## 2023-01-01 PROCEDURE — 99233 SBSQ HOSP IP/OBS HIGH 50: CPT | Mod: AF,HB,, | Performed by: PSYCHIATRY & NEUROLOGY

## 2023-01-01 PROCEDURE — 87077 CULTURE AEROBIC IDENTIFY: CPT | Mod: ER | Performed by: PHYSICIAN ASSISTANT

## 2023-01-01 PROCEDURE — 99406 PT REFUSED TOBACCO CESSATION: ICD-10-PCS | Mod: S$GLB,,,

## 2023-01-01 PROCEDURE — 96372 THER/PROPH/DIAG INJ SC/IM: CPT | Performed by: STUDENT IN AN ORGANIZED HEALTH CARE EDUCATION/TRAINING PROGRAM

## 2023-01-01 PROCEDURE — 21400001 HC TELEMETRY ROOM

## 2023-01-01 PROCEDURE — 82962 GLUCOSE BLOOD TEST: CPT

## 2023-01-01 PROCEDURE — 80048 BASIC METABOLIC PNL TOTAL CA: CPT | Performed by: STUDENT IN AN ORGANIZED HEALTH CARE EDUCATION/TRAINING PROGRAM

## 2023-01-01 PROCEDURE — 20000000 HC ICU ROOM

## 2023-01-01 PROCEDURE — 99222 1ST HOSP IP/OBS MODERATE 55: CPT | Mod: ,,, | Performed by: STUDENT IN AN ORGANIZED HEALTH CARE EDUCATION/TRAINING PROGRAM

## 2023-01-01 PROCEDURE — 81000 URINALYSIS NONAUTO W/SCOPE: CPT | Mod: ER | Performed by: PHYSICIAN ASSISTANT

## 2023-01-01 PROCEDURE — 83880 ASSAY OF NATRIURETIC PEPTIDE: CPT | Performed by: EMERGENCY MEDICINE

## 2023-01-01 PROCEDURE — 25000003 PHARM REV CODE 250: Performed by: HOSPITALIST

## 2023-01-01 PROCEDURE — 96361 HYDRATE IV INFUSION ADD-ON: CPT

## 2023-01-01 PROCEDURE — 99232 SBSQ HOSP IP/OBS MODERATE 35: CPT | Mod: ,,, | Performed by: STUDENT IN AN ORGANIZED HEALTH CARE EDUCATION/TRAINING PROGRAM

## 2023-01-01 PROCEDURE — 87086 URINE CULTURE/COLONY COUNT: CPT | Mod: ER | Performed by: PHYSICIAN ASSISTANT

## 2023-01-01 PROCEDURE — 25500020 PHARM REV CODE 255: Performed by: INTERNAL MEDICINE

## 2023-01-01 PROCEDURE — 99232 PR SUBSEQUENT HOSPITAL CARE,LEVL II: ICD-10-PCS | Mod: ,,, | Performed by: STUDENT IN AN ORGANIZED HEALTH CARE EDUCATION/TRAINING PROGRAM

## 2023-01-01 PROCEDURE — 87186 SC STD MICRODIL/AGAR DIL: CPT | Mod: ER | Performed by: PHYSICIAN ASSISTANT

## 2023-01-01 PROCEDURE — 87205 SMEAR GRAM STAIN: CPT | Performed by: STUDENT IN AN ORGANIZED HEALTH CARE EDUCATION/TRAINING PROGRAM

## 2023-01-01 PROCEDURE — 25500020 PHARM REV CODE 255: Mod: ER | Performed by: FAMILY MEDICINE

## 2023-01-01 PROCEDURE — 80053 COMPREHEN METABOLIC PANEL: CPT | Performed by: NURSE PRACTITIONER

## 2023-01-01 PROCEDURE — 94761 N-INVAS EAR/PLS OXIMETRY MLT: CPT

## 2023-01-01 PROCEDURE — 99406 BEHAV CHNG SMOKING 3-10 MIN: CPT | Mod: S$GLB,,,

## 2023-01-01 PROCEDURE — G0378 HOSPITAL OBSERVATION PER HR: HCPCS

## 2023-01-01 PROCEDURE — 63600175 PHARM REV CODE 636 W HCPCS: Performed by: EMERGENCY MEDICINE

## 2023-01-01 PROCEDURE — 80048 BASIC METABOLIC PNL TOTAL CA: CPT | Mod: XB | Performed by: STUDENT IN AN ORGANIZED HEALTH CARE EDUCATION/TRAINING PROGRAM

## 2023-01-01 PROCEDURE — 96375 TX/PRO/DX INJ NEW DRUG ADDON: CPT | Mod: ER

## 2023-01-01 PROCEDURE — 83605 ASSAY OF LACTIC ACID: CPT | Performed by: EMERGENCY MEDICINE

## 2023-01-01 PROCEDURE — S4991 NICOTINE PATCH NONLEGEND: HCPCS | Performed by: EMERGENCY MEDICINE

## 2023-01-01 PROCEDURE — 63600175 PHARM REV CODE 636 W HCPCS: Performed by: NURSE PRACTITIONER

## 2023-01-01 PROCEDURE — 96375 TX/PRO/DX INJ NEW DRUG ADDON: CPT

## 2023-01-01 PROCEDURE — 86162 COMPLEMENT TOTAL (CH50): CPT | Performed by: STUDENT IN AN ORGANIZED HEALTH CARE EDUCATION/TRAINING PROGRAM

## 2023-01-01 PROCEDURE — 97535 SELF CARE MNGMENT TRAINING: CPT

## 2023-01-01 PROCEDURE — 83735 ASSAY OF MAGNESIUM: CPT | Performed by: EMERGENCY MEDICINE

## 2023-01-01 PROCEDURE — S5010 5% DEXTROSE AND 0.45% SALINE: HCPCS | Performed by: STUDENT IN AN ORGANIZED HEALTH CARE EDUCATION/TRAINING PROGRAM

## 2023-01-01 PROCEDURE — 99900035 HC TECH TIME PER 15 MIN (STAT)

## 2023-01-01 PROCEDURE — 99222 1ST HOSP IP/OBS MODERATE 55: CPT | Mod: ,,, | Performed by: UROLOGY

## 2023-01-01 PROCEDURE — 86160 COMPLEMENT ANTIGEN: CPT | Mod: 59 | Performed by: STUDENT IN AN ORGANIZED HEALTH CARE EDUCATION/TRAINING PROGRAM

## 2023-01-01 PROCEDURE — 25000003 PHARM REV CODE 250

## 2023-01-01 PROCEDURE — 84484 ASSAY OF TROPONIN QUANT: CPT | Performed by: EMERGENCY MEDICINE

## 2023-01-01 PROCEDURE — 85027 COMPLETE CBC AUTOMATED: CPT | Performed by: STUDENT IN AN ORGANIZED HEALTH CARE EDUCATION/TRAINING PROGRAM

## 2023-01-01 PROCEDURE — S4991 NICOTINE PATCH NONLEGEND: HCPCS | Performed by: NURSE PRACTITIONER

## 2023-01-01 PROCEDURE — 85007 BL SMEAR W/DIFF WBC COUNT: CPT | Performed by: INTERNAL MEDICINE

## 2023-01-01 PROCEDURE — 96374 THER/PROPH/DIAG INJ IV PUSH: CPT

## 2023-01-01 PROCEDURE — 99222 PR INITIAL HOSPITAL CARE,LEVL II: ICD-10-PCS | Mod: ,,, | Performed by: STUDENT IN AN ORGANIZED HEALTH CARE EDUCATION/TRAINING PROGRAM

## 2023-01-01 PROCEDURE — 90833 PSYTX W PT W E/M 30 MIN: CPT | Mod: AF,HB,, | Performed by: PSYCHIATRY & NEUROLOGY

## 2023-01-01 PROCEDURE — 83605 ASSAY OF LACTIC ACID: CPT | Performed by: PHYSICIAN ASSISTANT

## 2023-01-01 PROCEDURE — 99406 BEHAV CHNG SMOKING 3-10 MIN: CPT | Mod: ,,,

## 2023-01-01 PROCEDURE — 80053 COMPREHEN METABOLIC PANEL: CPT | Performed by: PHYSICIAN ASSISTANT

## 2023-01-01 PROCEDURE — 63600175 PHARM REV CODE 636 W HCPCS: Performed by: INTERNAL MEDICINE

## 2023-01-01 PROCEDURE — 96366 THER/PROPH/DIAG IV INF ADDON: CPT

## 2023-01-01 PROCEDURE — 80202 ASSAY OF VANCOMYCIN: CPT | Performed by: STUDENT IN AN ORGANIZED HEALTH CARE EDUCATION/TRAINING PROGRAM

## 2023-01-01 PROCEDURE — 99285 EMERGENCY DEPT VISIT HI MDM: CPT | Mod: 25

## 2023-01-01 PROCEDURE — 97161 PT EVAL LOW COMPLEX 20 MIN: CPT

## 2023-01-01 PROCEDURE — 82550 ASSAY OF CK (CPK): CPT | Performed by: STUDENT IN AN ORGANIZED HEALTH CARE EDUCATION/TRAINING PROGRAM

## 2023-01-01 PROCEDURE — 85007 BL SMEAR W/DIFF WBC COUNT: CPT | Performed by: EMERGENCY MEDICINE

## 2023-01-01 PROCEDURE — 80053 COMPREHEN METABOLIC PANEL: CPT | Performed by: STUDENT IN AN ORGANIZED HEALTH CARE EDUCATION/TRAINING PROGRAM

## 2023-01-01 PROCEDURE — 36410 VNPNXR 3YR/> PHY/QHP DX/THER: CPT

## 2023-01-01 PROCEDURE — 99407 BEHAV CHNG SMOKING > 10 MIN: CPT | Mod: S$GLB,,,

## 2023-01-01 PROCEDURE — 85025 COMPLETE CBC W/AUTO DIFF WBC: CPT | Performed by: EMERGENCY MEDICINE

## 2023-01-01 PROCEDURE — 96360 HYDRATION IV INFUSION INIT: CPT

## 2023-01-01 PROCEDURE — 93005 ELECTROCARDIOGRAM TRACING: CPT

## 2023-01-01 PROCEDURE — 63600175 PHARM REV CODE 636 W HCPCS: Performed by: HOSPITALIST

## 2023-01-01 PROCEDURE — 84100 ASSAY OF PHOSPHORUS: CPT | Performed by: HOSPITALIST

## 2023-01-01 PROCEDURE — 85025 COMPLETE CBC W/AUTO DIFF WBC: CPT | Performed by: STUDENT IN AN ORGANIZED HEALTH CARE EDUCATION/TRAINING PROGRAM

## 2023-01-01 PROCEDURE — 84540 ASSAY OF URINE/UREA-N: CPT | Performed by: STUDENT IN AN ORGANIZED HEALTH CARE EDUCATION/TRAINING PROGRAM

## 2023-01-01 PROCEDURE — 85025 COMPLETE CBC W/AUTO DIFF WBC: CPT | Performed by: PHYSICIAN ASSISTANT

## 2023-01-01 PROCEDURE — 99223 PR INITIAL HOSPITAL CARE,LEVL III: ICD-10-PCS | Mod: AF,HB,, | Performed by: PSYCHIATRY & NEUROLOGY

## 2023-01-01 PROCEDURE — 85025 COMPLETE CBC W/AUTO DIFF WBC: CPT | Mod: ER | Performed by: PHYSICIAN ASSISTANT

## 2023-01-01 PROCEDURE — 84466 ASSAY OF TRANSFERRIN: CPT | Performed by: STUDENT IN AN ORGANIZED HEALTH CARE EDUCATION/TRAINING PROGRAM

## 2023-01-01 PROCEDURE — 63600175 PHARM REV CODE 636 W HCPCS

## 2023-01-01 PROCEDURE — 84550 ASSAY OF BLOOD/URIC ACID: CPT | Performed by: STUDENT IN AN ORGANIZED HEALTH CARE EDUCATION/TRAINING PROGRAM

## 2023-01-01 PROCEDURE — 97165 OT EVAL LOW COMPLEX 30 MIN: CPT

## 2023-01-01 PROCEDURE — 25000003 PHARM REV CODE 250: Performed by: INTERNAL MEDICINE

## 2023-01-01 PROCEDURE — 36415 COLL VENOUS BLD VENIPUNCTURE: CPT | Performed by: NURSE PRACTITIONER

## 2023-01-01 PROCEDURE — 97530 THERAPEUTIC ACTIVITIES: CPT

## 2023-01-01 PROCEDURE — 87389 HIV-1 AG W/HIV-1&-2 AB AG IA: CPT | Performed by: STUDENT IN AN ORGANIZED HEALTH CARE EDUCATION/TRAINING PROGRAM

## 2023-01-01 PROCEDURE — 80053 COMPREHEN METABOLIC PANEL: CPT | Mod: ER | Performed by: PHYSICIAN ASSISTANT

## 2023-01-01 PROCEDURE — 96365 THER/PROPH/DIAG IV INF INIT: CPT

## 2023-01-01 PROCEDURE — 99233 PR SUBSEQUENT HOSPITAL CARE,LEVL III: ICD-10-PCS | Mod: AF,HB,, | Performed by: PSYCHIATRY & NEUROLOGY

## 2023-01-01 PROCEDURE — 99407 PR TOBACCO USE CESSATION INTENSIVE >10 MINUTES: ICD-10-PCS | Mod: S$GLB,,,

## 2023-01-01 PROCEDURE — 96365 THER/PROPH/DIAG IV INF INIT: CPT | Mod: ER

## 2023-01-01 PROCEDURE — C1751 CATH, INF, PER/CENT/MIDLINE: HCPCS

## 2023-01-01 PROCEDURE — 81000 URINALYSIS NONAUTO W/SCOPE: CPT | Performed by: INTERNAL MEDICINE

## 2023-01-01 PROCEDURE — 96372 THER/PROPH/DIAG INJ SC/IM: CPT | Performed by: PHYSICIAN ASSISTANT

## 2023-01-01 PROCEDURE — 85027 COMPLETE CBC AUTOMATED: CPT | Performed by: EMERGENCY MEDICINE

## 2023-01-01 PROCEDURE — 87077 CULTURE AEROBIC IDENTIFY: CPT | Performed by: EMERGENCY MEDICINE

## 2023-01-01 PROCEDURE — 83605 ASSAY OF LACTIC ACID: CPT | Mod: 91 | Performed by: EMERGENCY MEDICINE

## 2023-01-01 PROCEDURE — 99999 PR PBB SHADOW E&M-EST. PATIENT-LVL I: CPT | Mod: PBBFAC,,,

## 2023-01-01 PROCEDURE — 80069 RENAL FUNCTION PANEL: CPT | Performed by: STUDENT IN AN ORGANIZED HEALTH CARE EDUCATION/TRAINING PROGRAM

## 2023-01-01 PROCEDURE — 87040 BLOOD CULTURE FOR BACTERIA: CPT | Performed by: EMERGENCY MEDICINE

## 2023-01-01 PROCEDURE — 87088 URINE BACTERIA CULTURE: CPT | Mod: ER | Performed by: PHYSICIAN ASSISTANT

## 2023-01-01 PROCEDURE — 87040 BLOOD CULTURE FOR BACTERIA: CPT | Performed by: PHYSICIAN ASSISTANT

## 2023-01-01 PROCEDURE — 85007 BL SMEAR W/DIFF WBC COUNT: CPT | Performed by: STUDENT IN AN ORGANIZED HEALTH CARE EDUCATION/TRAINING PROGRAM

## 2023-01-01 PROCEDURE — A9698 NON-RAD CONTRAST MATERIALNOC: HCPCS | Performed by: INTERNAL MEDICINE

## 2023-01-01 PROCEDURE — 83735 ASSAY OF MAGNESIUM: CPT | Mod: 91 | Performed by: HOSPITALIST

## 2023-01-01 PROCEDURE — 99999 PR PBB SHADOW E&M-EST. PATIENT-LVL I: ICD-10-PCS | Mod: PBBFAC,,,

## 2023-01-01 PROCEDURE — 93010 ELECTROCARDIOGRAM REPORT: CPT | Mod: ,,, | Performed by: INTERNAL MEDICINE

## 2023-01-01 PROCEDURE — 87088 URINE BACTERIA CULTURE: CPT | Performed by: EMERGENCY MEDICINE

## 2023-01-01 PROCEDURE — 99285 EMERGENCY DEPT VISIT HI MDM: CPT | Mod: 25,ER

## 2023-01-01 PROCEDURE — 81025 URINE PREGNANCY TEST: CPT

## 2023-01-01 PROCEDURE — 80048 BASIC METABOLIC PNL TOTAL CA: CPT | Mod: 91,XB | Performed by: STUDENT IN AN ORGANIZED HEALTH CARE EDUCATION/TRAINING PROGRAM

## 2023-01-01 PROCEDURE — 83690 ASSAY OF LIPASE: CPT | Mod: ER | Performed by: PHYSICIAN ASSISTANT

## 2023-01-01 PROCEDURE — 84999 UNLISTED CHEMISTRY PROCEDURE: CPT | Mod: 91

## 2023-01-01 PROCEDURE — 80074 ACUTE HEPATITIS PANEL: CPT | Performed by: STUDENT IN AN ORGANIZED HEALTH CARE EDUCATION/TRAINING PROGRAM

## 2023-01-01 PROCEDURE — 84300 ASSAY OF URINE SODIUM: CPT | Performed by: STUDENT IN AN ORGANIZED HEALTH CARE EDUCATION/TRAINING PROGRAM

## 2023-01-01 PROCEDURE — 90833 PR PSYCHOTHERAPY W/PATIENT W/E&M, 30 MIN (ADD ON): ICD-10-PCS | Mod: AF,HB,, | Performed by: PSYCHIATRY & NEUROLOGY

## 2023-01-01 PROCEDURE — 87154 CUL TYP ID BLD PTHGN 6+ TRGT: CPT | Performed by: EMERGENCY MEDICINE

## 2023-01-01 PROCEDURE — 87086 URINE CULTURE/COLONY COUNT: CPT | Performed by: EMERGENCY MEDICINE

## 2023-01-01 PROCEDURE — 96372 THER/PROPH/DIAG INJ SC/IM: CPT | Performed by: NURSE PRACTITIONER

## 2023-01-01 PROCEDURE — 99406 PT REFUSED TOBACCO CESSATION: ICD-10-PCS | Mod: ,,,

## 2023-01-01 PROCEDURE — 83036 HEMOGLOBIN GLYCOSYLATED A1C: CPT | Performed by: NURSE PRACTITIONER

## 2023-01-01 PROCEDURE — 96361 HYDRATE IV INFUSION ADD-ON: CPT | Mod: ER

## 2023-01-01 PROCEDURE — 93010 EKG 12-LEAD: ICD-10-PCS | Mod: ,,, | Performed by: INTERNAL MEDICINE

## 2023-01-01 PROCEDURE — 89055 LEUKOCYTE ASSESSMENT FECAL: CPT

## 2023-01-01 PROCEDURE — 63600175 PHARM REV CODE 636 W HCPCS: Mod: ER | Performed by: PHYSICIAN ASSISTANT

## 2023-01-01 PROCEDURE — 97116 GAIT TRAINING THERAPY: CPT

## 2023-01-01 PROCEDURE — 25000242 PHARM REV CODE 250 ALT 637 W/ HCPCS: Performed by: STUDENT IN AN ORGANIZED HEALTH CARE EDUCATION/TRAINING PROGRAM

## 2023-01-01 PROCEDURE — 99222 PR INITIAL HOSPITAL CARE,LEVL II: ICD-10-PCS | Mod: ,,, | Performed by: UROLOGY

## 2023-01-01 PROCEDURE — 83540 ASSAY OF IRON: CPT | Performed by: STUDENT IN AN ORGANIZED HEALTH CARE EDUCATION/TRAINING PROGRAM

## 2023-01-01 PROCEDURE — 86160 COMPLEMENT ANTIGEN: CPT | Performed by: STUDENT IN AN ORGANIZED HEALTH CARE EDUCATION/TRAINING PROGRAM

## 2023-01-01 PROCEDURE — 12000002 HC ACUTE/MED SURGE SEMI-PRIVATE ROOM

## 2023-01-01 PROCEDURE — 86780 TREPONEMA PALLIDUM: CPT | Performed by: STUDENT IN AN ORGANIZED HEALTH CARE EDUCATION/TRAINING PROGRAM

## 2023-01-01 PROCEDURE — 96372 THER/PROPH/DIAG INJ SC/IM: CPT | Performed by: HOSPITALIST

## 2023-01-01 PROCEDURE — 76937 US GUIDE VASCULAR ACCESS: CPT

## 2023-01-01 PROCEDURE — 80053 COMPREHEN METABOLIC PANEL: CPT | Performed by: EMERGENCY MEDICINE

## 2023-01-01 PROCEDURE — 25000003 PHARM REV CODE 250: Mod: ER | Performed by: PHYSICIAN ASSISTANT

## 2023-01-01 PROCEDURE — 25500020 PHARM REV CODE 255: Performed by: EMERGENCY MEDICINE

## 2023-01-01 PROCEDURE — 83930 ASSAY OF BLOOD OSMOLALITY: CPT | Performed by: STUDENT IN AN ORGANIZED HEALTH CARE EDUCATION/TRAINING PROGRAM

## 2023-01-01 PROCEDURE — 82947 ASSAY GLUCOSE BLOOD QUANT: CPT | Performed by: STUDENT IN AN ORGANIZED HEALTH CARE EDUCATION/TRAINING PROGRAM

## 2023-01-01 PROCEDURE — 82728 ASSAY OF FERRITIN: CPT | Performed by: STUDENT IN AN ORGANIZED HEALTH CARE EDUCATION/TRAINING PROGRAM

## 2023-01-01 PROCEDURE — 83735 ASSAY OF MAGNESIUM: CPT | Mod: 91 | Performed by: STUDENT IN AN ORGANIZED HEALTH CARE EDUCATION/TRAINING PROGRAM

## 2023-01-01 PROCEDURE — S4991 NICOTINE PATCH NONLEGEND: HCPCS

## 2023-01-01 PROCEDURE — 96367 TX/PROPH/DG ADDL SEQ IV INF: CPT

## 2023-01-01 PROCEDURE — 86140 C-REACTIVE PROTEIN: CPT | Performed by: STUDENT IN AN ORGANIZED HEALTH CARE EDUCATION/TRAINING PROGRAM

## 2023-01-01 RX ORDER — IBUPROFEN 200 MG
1 TABLET ORAL DAILY
Status: DISCONTINUED | OUTPATIENT
Start: 2023-01-01 | End: 2023-01-01 | Stop reason: HOSPADM

## 2023-01-01 RX ORDER — FUROSEMIDE 40 MG/1
40 TABLET ORAL DAILY PRN
Start: 2023-01-01

## 2023-01-01 RX ORDER — PREGABALIN 75 MG/1
150 CAPSULE ORAL ONCE
Status: COMPLETED | OUTPATIENT
Start: 2023-01-01 | End: 2023-01-01

## 2023-01-01 RX ORDER — INSULIN GLARGINE 100 [IU]/ML
90 INJECTION, SOLUTION SUBCUTANEOUS EVERY MORNING
Status: ON HOLD | COMMUNITY
Start: 2023-01-01 | End: 2023-01-01

## 2023-01-01 RX ORDER — ALPRAZOLAM 0.25 MG/1
0.5 TABLET ORAL 3 TIMES DAILY PRN
Status: DISCONTINUED | OUTPATIENT
Start: 2023-01-01 | End: 2023-01-01 | Stop reason: HOSPADM

## 2023-01-01 RX ORDER — MEDROXYPROGESTERONE ACETATE 150 MG/ML
150 INJECTION, SUSPENSION INTRAMUSCULAR
COMMUNITY
Start: 2023-01-01

## 2023-01-01 RX ORDER — CALCIUM CARBONATE 200(500)MG
500 TABLET,CHEWABLE ORAL 3 TIMES DAILY PRN
Status: DISCONTINUED | OUTPATIENT
Start: 2023-01-01 | End: 2023-01-01 | Stop reason: HOSPADM

## 2023-01-01 RX ORDER — INSULIN ASPART 100 [IU]/ML
5 INJECTION, SOLUTION INTRAVENOUS; SUBCUTANEOUS
Status: DISCONTINUED | OUTPATIENT
Start: 2023-01-01 | End: 2023-01-01

## 2023-01-01 RX ORDER — ONDANSETRON 2 MG/ML
4 INJECTION INTRAMUSCULAR; INTRAVENOUS
Status: COMPLETED | OUTPATIENT
Start: 2023-01-01 | End: 2023-01-01

## 2023-01-01 RX ORDER — TIZANIDINE 4 MG/1
4 TABLET ORAL EVERY 6 HOURS PRN
COMMUNITY
End: 2023-01-01 | Stop reason: DRUGHIGH

## 2023-01-01 RX ORDER — FUROSEMIDE 40 MG/1
40 TABLET ORAL 2 TIMES DAILY PRN
Status: ON HOLD | COMMUNITY
Start: 2023-01-01 | End: 2023-01-01

## 2023-01-01 RX ORDER — DICLOFENAC SODIUM 10 MG/G
2 GEL TOPICAL
COMMUNITY
Start: 2022-07-02 | End: 2023-01-01

## 2023-01-01 RX ORDER — SULFAMETHOXAZOLE AND TRIMETHOPRIM 800; 160 MG/1; MG/1
1 TABLET ORAL 2 TIMES DAILY
COMMUNITY
Start: 2023-01-01 | End: 2023-01-01

## 2023-01-01 RX ORDER — CETIRIZINE HYDROCHLORIDE 10 MG/1
10 TABLET ORAL DAILY PRN
COMMUNITY
Start: 2023-01-01

## 2023-01-01 RX ORDER — AZELASTINE 1 MG/ML
2 SPRAY, METERED NASAL 2 TIMES DAILY PRN
Status: DISCONTINUED | OUTPATIENT
Start: 2023-01-01 | End: 2023-01-01 | Stop reason: HOSPADM

## 2023-01-01 RX ORDER — TIZANIDINE 4 MG/1
4 TABLET ORAL EVERY 8 HOURS PRN
Start: 2023-01-01

## 2023-01-01 RX ORDER — AMOXICILLIN 875 MG/1
875 TABLET, FILM COATED ORAL 2 TIMES DAILY
COMMUNITY
Start: 2022-12-02 | End: 2023-01-01

## 2023-01-01 RX ORDER — ALPRAZOLAM 0.5 MG/1
0.5 TABLET ORAL 3 TIMES DAILY PRN
Status: ON HOLD | COMMUNITY
Start: 2023-01-01 | End: 2023-01-01 | Stop reason: HOSPADM

## 2023-01-01 RX ORDER — METRONIDAZOLE 500 MG/100ML
500 INJECTION, SOLUTION INTRAVENOUS
Status: DISCONTINUED | OUTPATIENT
Start: 2023-01-01 | End: 2023-01-01

## 2023-01-01 RX ORDER — ACETAMINOPHEN 500 MG
500 TABLET ORAL EVERY 6 HOURS PRN
Status: ON HOLD | COMMUNITY
End: 2023-01-01

## 2023-01-01 RX ORDER — SODIUM CHLORIDE 9 MG/ML
INJECTION, SOLUTION INTRAVENOUS CONTINUOUS
Status: DISCONTINUED | OUTPATIENT
Start: 2023-01-01 | End: 2023-01-01

## 2023-01-01 RX ORDER — MIDODRINE HYDROCHLORIDE 2.5 MG/1
2.5 TABLET ORAL 2 TIMES DAILY WITH MEALS
Status: DISCONTINUED | OUTPATIENT
Start: 2023-01-01 | End: 2023-01-01

## 2023-01-01 RX ORDER — SODIUM CHLORIDE, SODIUM LACTATE, POTASSIUM CHLORIDE, CALCIUM CHLORIDE 600; 310; 30; 20 MG/100ML; MG/100ML; MG/100ML; MG/100ML
INJECTION, SOLUTION INTRAVENOUS CONTINUOUS
Status: ACTIVE | OUTPATIENT
Start: 2023-01-01 | End: 2023-01-01

## 2023-01-01 RX ORDER — IBUPROFEN 200 MG
16 TABLET ORAL
Status: DISCONTINUED | OUTPATIENT
Start: 2023-01-01 | End: 2023-01-01 | Stop reason: HOSPADM

## 2023-01-01 RX ORDER — FLUTICASONE FUROATE AND VILANTEROL 100; 25 UG/1; UG/1
1 POWDER RESPIRATORY (INHALATION) DAILY
Status: DISCONTINUED | OUTPATIENT
Start: 2023-01-01 | End: 2023-01-01 | Stop reason: HOSPADM

## 2023-01-01 RX ORDER — CARVEDILOL 3.12 MG/1
3.12 TABLET ORAL 2 TIMES DAILY WITH MEALS
Status: DISCONTINUED | OUTPATIENT
Start: 2023-01-01 | End: 2023-01-01 | Stop reason: HOSPADM

## 2023-01-01 RX ORDER — AMOXICILLIN AND CLAVULANATE POTASSIUM 875; 125 MG/1; MG/1
1 TABLET, FILM COATED ORAL EVERY 12 HOURS
Qty: 14 TABLET | Refills: 0 | Status: SHIPPED | OUTPATIENT
Start: 2023-01-01 | End: 2024-01-01

## 2023-01-01 RX ORDER — MAGNESIUM SULFATE HEPTAHYDRATE 40 MG/ML
2 INJECTION, SOLUTION INTRAVENOUS ONCE
Status: COMPLETED | OUTPATIENT
Start: 2023-01-01 | End: 2023-01-01

## 2023-01-01 RX ORDER — CLINDAMYCIN HYDROCHLORIDE 300 MG/1
300 CAPSULE ORAL EVERY 8 HOURS
COMMUNITY
Start: 2023-01-01 | End: 2023-01-01

## 2023-01-01 RX ORDER — SODIUM CHLORIDE 0.9 % (FLUSH) 0.9 %
10 SYRINGE (ML) INJECTION EVERY 12 HOURS PRN
Status: DISCONTINUED | OUTPATIENT
Start: 2023-01-01 | End: 2023-01-01 | Stop reason: HOSPADM

## 2023-01-01 RX ORDER — PREGABALIN 75 MG/1
300 CAPSULE ORAL DAILY
Status: DISCONTINUED | OUTPATIENT
Start: 2023-01-01 | End: 2023-01-01

## 2023-01-01 RX ORDER — PREGABALIN 75 MG/1
75 CAPSULE ORAL 2 TIMES DAILY
Status: DISCONTINUED | OUTPATIENT
Start: 2023-01-01 | End: 2023-01-01

## 2023-01-01 RX ORDER — CIPROFLOXACIN 500 MG/1
500 TABLET ORAL 2 TIMES DAILY
COMMUNITY
Start: 2023-01-01 | End: 2023-01-01

## 2023-01-01 RX ORDER — ESCITALOPRAM OXALATE 10 MG/1
10 TABLET ORAL DAILY
Status: DISCONTINUED | OUTPATIENT
Start: 2023-01-01 | End: 2023-01-01 | Stop reason: HOSPADM

## 2023-01-01 RX ORDER — BUPRENORPHINE AND NALOXONE 2; .5 MG/1; MG/1
4 FILM, SOLUBLE BUCCAL; SUBLINGUAL 3 TIMES DAILY
Status: DISCONTINUED | OUTPATIENT
Start: 2023-01-01 | End: 2023-01-01

## 2023-01-01 RX ORDER — ALPRAZOLAM 0.25 MG/1
0.5 TABLET ORAL 3 TIMES DAILY PRN
Status: DISCONTINUED | OUTPATIENT
Start: 2023-01-01 | End: 2023-01-01

## 2023-01-01 RX ORDER — CETIRIZINE HYDROCHLORIDE 10 MG/1
10 TABLET ORAL DAILY PRN
Status: DISCONTINUED | OUTPATIENT
Start: 2023-01-01 | End: 2023-01-01

## 2023-01-01 RX ORDER — CLOPIDOGREL BISULFATE 75 MG/1
75 TABLET ORAL DAILY
Refills: 3 | COMMUNITY
Start: 2023-01-01 | End: 2023-01-01 | Stop reason: SDUPTHER

## 2023-01-01 RX ORDER — GLUCAGON 1 MG
1 KIT INJECTION
Status: DISCONTINUED | OUTPATIENT
Start: 2023-01-01 | End: 2023-01-01

## 2023-01-01 RX ORDER — INSULIN ASPART 100 [IU]/ML
16 INJECTION, SOLUTION INTRAVENOUS; SUBCUTANEOUS
Status: DISCONTINUED | OUTPATIENT
Start: 2023-01-01 | End: 2023-01-01

## 2023-01-01 RX ORDER — METHOCARBAMOL 500 MG/1
500 TABLET, FILM COATED ORAL 3 TIMES DAILY
Status: DISCONTINUED | OUTPATIENT
Start: 2023-01-01 | End: 2023-01-01

## 2023-01-01 RX ORDER — ATORVASTATIN CALCIUM 40 MG/1
80 TABLET, FILM COATED ORAL DAILY
Status: DISCONTINUED | OUTPATIENT
Start: 2023-01-01 | End: 2023-01-01 | Stop reason: HOSPADM

## 2023-01-01 RX ORDER — ALBUTEROL SULFATE 90 UG/1
2 AEROSOL, METERED RESPIRATORY (INHALATION) EVERY 6 HOURS PRN
Status: DISCONTINUED | OUTPATIENT
Start: 2023-01-01 | End: 2023-01-01 | Stop reason: HOSPADM

## 2023-01-01 RX ORDER — ESCITALOPRAM OXALATE 10 MG/1
10 TABLET ORAL DAILY PRN
Status: DISCONTINUED | OUTPATIENT
Start: 2023-01-01 | End: 2023-01-01

## 2023-01-01 RX ORDER — SODIUM CHLORIDE 9 MG/ML
INJECTION, SOLUTION INTRAVENOUS CONTINUOUS
Status: DISCONTINUED | OUTPATIENT
Start: 2023-01-01 | End: 2023-01-01 | Stop reason: HOSPADM

## 2023-01-01 RX ORDER — INSULIN ASPART 100 [IU]/ML
0-15 INJECTION, SOLUTION INTRAVENOUS; SUBCUTANEOUS
Status: DISCONTINUED | OUTPATIENT
Start: 2023-01-01 | End: 2023-01-01

## 2023-01-01 RX ORDER — IBUPROFEN 200 MG
1 TABLET ORAL DAILY PRN
Status: ON HOLD | COMMUNITY
Start: 2023-01-01 | End: 2023-01-01 | Stop reason: HOSPADM

## 2023-01-01 RX ORDER — MICONAZOLE NITRATE 2 %
POWDER (GRAM) TOPICAL 2 TIMES DAILY
Status: DISCONTINUED | OUTPATIENT
Start: 2023-01-01 | End: 2023-01-01 | Stop reason: HOSPADM

## 2023-01-01 RX ORDER — INSULIN ASPART 100 [IU]/ML
14 INJECTION, SOLUTION INTRAVENOUS; SUBCUTANEOUS
Status: DISCONTINUED | OUTPATIENT
Start: 2023-01-01 | End: 2023-01-01 | Stop reason: HOSPADM

## 2023-01-01 RX ORDER — PANTOPRAZOLE SODIUM 40 MG/1
40 TABLET, DELAYED RELEASE ORAL DAILY
Status: DISCONTINUED | OUTPATIENT
Start: 2023-01-01 | End: 2023-01-01 | Stop reason: HOSPADM

## 2023-01-01 RX ORDER — ACETAMINOPHEN 325 MG/1
650 TABLET ORAL EVERY 4 HOURS PRN
Status: DISCONTINUED | OUTPATIENT
Start: 2023-01-01 | End: 2023-01-01 | Stop reason: HOSPADM

## 2023-01-01 RX ORDER — HEPARIN SODIUM 5000 [USP'U]/ML
7500 INJECTION, SOLUTION INTRAVENOUS; SUBCUTANEOUS EVERY 8 HOURS
Status: DISCONTINUED | OUTPATIENT
Start: 2023-01-01 | End: 2023-01-01

## 2023-01-01 RX ORDER — OMEPRAZOLE 20 MG/1
20 CAPSULE, DELAYED RELEASE ORAL DAILY PRN
COMMUNITY
Start: 2023-01-01

## 2023-01-01 RX ORDER — BUPRENORPHINE HYDROCHLORIDE AND NALOXONE HYDROCHLORIDE DIHYDRATE 8; 2 MG/1; MG/1
8 TABLET SUBLINGUAL 3 TIMES DAILY
Status: DISCONTINUED | OUTPATIENT
Start: 2023-01-01 | End: 2023-01-01 | Stop reason: HOSPADM

## 2023-01-01 RX ORDER — SULFAMETHOXAZOLE AND TRIMETHOPRIM 800; 160 MG/1; MG/1
1 TABLET ORAL 2 TIMES DAILY
Status: DISCONTINUED | OUTPATIENT
Start: 2023-01-01 | End: 2023-01-01

## 2023-01-01 RX ORDER — IBUPROFEN 200 MG
1 TABLET ORAL DAILY
Qty: 28 PATCH | Refills: 1 | Status: SHIPPED | OUTPATIENT
Start: 2023-01-01

## 2023-01-01 RX ORDER — CETIRIZINE HYDROCHLORIDE 5 MG/1
5 TABLET ORAL DAILY PRN
Status: DISCONTINUED | OUTPATIENT
Start: 2023-01-01 | End: 2023-01-01 | Stop reason: HOSPADM

## 2023-01-01 RX ORDER — CEPHALEXIN 500 MG/1
500 CAPSULE ORAL EVERY 6 HOURS
Qty: 24 CAPSULE | Refills: 0 | Status: SHIPPED | OUTPATIENT
Start: 2023-01-01 | End: 2023-01-01 | Stop reason: SDUPTHER

## 2023-01-01 RX ORDER — ENOXAPARIN SODIUM 100 MG/ML
60 INJECTION SUBCUTANEOUS EVERY 12 HOURS
Status: DISCONTINUED | OUTPATIENT
Start: 2023-01-01 | End: 2023-01-01 | Stop reason: HOSPADM

## 2023-01-01 RX ORDER — ACETAMINOPHEN 500 MG
500 TABLET ORAL EVERY 6 HOURS PRN
COMMUNITY

## 2023-01-01 RX ORDER — SODIUM CHLORIDE 0.9 % (FLUSH) 0.9 %
10 SYRINGE (ML) INJECTION
Status: DISCONTINUED | OUTPATIENT
Start: 2023-01-01 | End: 2023-01-01 | Stop reason: HOSPADM

## 2023-01-01 RX ORDER — HYDROMORPHONE HYDROCHLORIDE 1 MG/ML
0.5 INJECTION, SOLUTION INTRAMUSCULAR; INTRAVENOUS; SUBCUTANEOUS ONCE
Status: COMPLETED | OUTPATIENT
Start: 2023-01-01 | End: 2023-01-01

## 2023-01-01 RX ORDER — FLUCONAZOLE 150 MG/1
150 TABLET ORAL ONCE
Status: COMPLETED | OUTPATIENT
Start: 2023-01-01 | End: 2023-01-01

## 2023-01-01 RX ORDER — DEXTROSE 4 G
TABLET,CHEWABLE ORAL
Qty: 1 EACH | Refills: 0 | Status: SHIPPED | OUTPATIENT
Start: 2023-01-01

## 2023-01-01 RX ORDER — INSULIN ASPART 100 [IU]/ML
10 INJECTION, SOLUTION INTRAVENOUS; SUBCUTANEOUS
Status: DISCONTINUED | OUTPATIENT
Start: 2023-01-01 | End: 2023-01-01

## 2023-01-01 RX ORDER — METHOCARBAMOL 500 MG/1
500 TABLET, FILM COATED ORAL 4 TIMES DAILY
Status: DISCONTINUED | OUTPATIENT
Start: 2023-01-01 | End: 2023-01-01

## 2023-01-01 RX ORDER — ASPIRIN 81 MG/1
81 TABLET ORAL DAILY
COMMUNITY
End: 2023-01-01 | Stop reason: SDUPTHER

## 2023-01-01 RX ORDER — SODIUM CHLORIDE 9 MG/ML
1000 INJECTION, SOLUTION INTRAVENOUS CONTINUOUS
Status: DISCONTINUED | OUTPATIENT
Start: 2023-01-01 | End: 2023-01-01

## 2023-01-01 RX ORDER — AMOXICILLIN AND CLAVULANATE POTASSIUM 875; 125 MG/1; MG/1
1 TABLET, FILM COATED ORAL EVERY 12 HOURS
COMMUNITY
Start: 2023-01-01 | End: 2023-01-01

## 2023-01-01 RX ORDER — INSULIN ASPART 100 [IU]/ML
0-15 INJECTION, SOLUTION INTRAVENOUS; SUBCUTANEOUS
Status: DISCONTINUED | OUTPATIENT
Start: 2023-01-01 | End: 2023-01-01 | Stop reason: HOSPADM

## 2023-01-01 RX ORDER — PROMETHAZINE HYDROCHLORIDE 6.25 MG/5ML
12.5 SYRUP ORAL EVERY 8 HOURS PRN
Status: DISCONTINUED | OUTPATIENT
Start: 2023-01-01 | End: 2023-01-01 | Stop reason: HOSPADM

## 2023-01-01 RX ORDER — ALBUTEROL SULFATE 90 UG/1
1-2 AEROSOL, METERED RESPIRATORY (INHALATION)
COMMUNITY
Start: 2023-01-01

## 2023-01-01 RX ORDER — GLUCAGON 1 MG
1 KIT INJECTION
Status: DISCONTINUED | OUTPATIENT
Start: 2023-01-01 | End: 2023-01-01 | Stop reason: HOSPADM

## 2023-01-01 RX ORDER — ATORVASTATIN CALCIUM 80 MG/1
80 TABLET, FILM COATED ORAL DAILY
Qty: 90 TABLET | Refills: 1 | Status: SHIPPED | OUTPATIENT
Start: 2023-01-01

## 2023-01-01 RX ORDER — IBUPROFEN 200 MG
24 TABLET ORAL
Status: DISCONTINUED | OUTPATIENT
Start: 2023-01-01 | End: 2023-01-01

## 2023-01-01 RX ORDER — CLOPIDOGREL BISULFATE 75 MG/1
75 TABLET ORAL DAILY
Status: DISCONTINUED | OUTPATIENT
Start: 2023-01-01 | End: 2023-01-01 | Stop reason: HOSPADM

## 2023-01-01 RX ORDER — MEDROXYPROGESTERONE ACETATE 150 MG/ML
150 INJECTION, SUSPENSION INTRAMUSCULAR
COMMUNITY
End: 2023-01-01 | Stop reason: SDUPTHER

## 2023-01-01 RX ORDER — BUPRENORPHINE HYDROCHLORIDE, NALOXONE HYDROCHLORIDE 8; 2 MG/1; MG/1
1 FILM, SOLUBLE BUCCAL; SUBLINGUAL 3 TIMES DAILY
COMMUNITY
Start: 2023-01-01

## 2023-01-01 RX ORDER — INSULIN ASPART 100 [IU]/ML
0-10 INJECTION, SOLUTION INTRAVENOUS; SUBCUTANEOUS
Status: DISCONTINUED | OUTPATIENT
Start: 2023-01-01 | End: 2023-01-01 | Stop reason: HOSPADM

## 2023-01-01 RX ORDER — INSULIN ASPART 100 [IU]/ML
5 INJECTION, SOLUTION INTRAVENOUS; SUBCUTANEOUS ONCE
Status: COMPLETED | OUTPATIENT
Start: 2023-01-01 | End: 2023-01-01

## 2023-01-01 RX ORDER — IPRATROPIUM BROMIDE AND ALBUTEROL SULFATE 2.5; .5 MG/3ML; MG/3ML
3 SOLUTION RESPIRATORY (INHALATION) EVERY 4 HOURS PRN
Status: DISCONTINUED | OUTPATIENT
Start: 2023-01-01 | End: 2023-01-01 | Stop reason: HOSPADM

## 2023-01-01 RX ORDER — TIZANIDINE 4 MG/1
4 TABLET ORAL EVERY 8 HOURS PRN
Status: ON HOLD | COMMUNITY
Start: 2023-01-01 | End: 2023-01-01

## 2023-01-01 RX ORDER — FLUTICASONE PROPIONATE 50 MCG
1 SPRAY, SUSPENSION (ML) NASAL DAILY PRN
Status: DISCONTINUED | OUTPATIENT
Start: 2023-01-01 | End: 2023-01-01 | Stop reason: HOSPADM

## 2023-01-01 RX ORDER — OMEPRAZOLE 20 MG/1
20 CAPSULE, DELAYED RELEASE ORAL DAILY PRN
Status: ON HOLD | COMMUNITY
Start: 2023-01-01 | End: 2023-01-01

## 2023-01-01 RX ORDER — BUPRENORPHINE HYDROCHLORIDE AND NALOXONE HYDROCHLORIDE DIHYDRATE 8; 2 MG/1; MG/1
8 TABLET SUBLINGUAL 3 TIMES DAILY
Status: DISCONTINUED | OUTPATIENT
Start: 2023-01-01 | End: 2023-01-01

## 2023-01-01 RX ORDER — ASPIRIN 325 MG
50000 TABLET, DELAYED RELEASE (ENTERIC COATED) ORAL
COMMUNITY
Start: 2023-01-01

## 2023-01-01 RX ORDER — HALOPERIDOL 5 MG/ML
5 INJECTION INTRAMUSCULAR
Status: COMPLETED | OUTPATIENT
Start: 2023-01-01 | End: 2023-01-01

## 2023-01-01 RX ORDER — DEXTROSE MONOHYDRATE AND SODIUM CHLORIDE 5; .45 G/100ML; G/100ML
INJECTION, SOLUTION INTRAVENOUS CONTINUOUS PRN
Status: DISCONTINUED | OUTPATIENT
Start: 2023-01-01 | End: 2023-01-01

## 2023-01-01 RX ORDER — FLUTICASONE PROPIONATE 50 MCG
1 SPRAY, SUSPENSION (ML) NASAL DAILY PRN
COMMUNITY
Start: 2023-01-01

## 2023-01-01 RX ORDER — IBUPROFEN 200 MG
16 TABLET ORAL
Status: DISCONTINUED | OUTPATIENT
Start: 2023-01-01 | End: 2023-01-01

## 2023-01-01 RX ORDER — OLANZAPINE 5 MG/1
5 TABLET, ORALLY DISINTEGRATING ORAL
Status: DISCONTINUED | OUTPATIENT
Start: 2023-01-01 | End: 2023-01-01 | Stop reason: HOSPADM

## 2023-01-01 RX ORDER — LISINOPRIL 10 MG/1
10 TABLET ORAL DAILY
Status: ON HOLD | COMMUNITY
Start: 2023-01-01 | End: 2023-01-01 | Stop reason: HOSPADM

## 2023-01-01 RX ORDER — POLYETHYLENE GLYCOL 3350 17 G/17G
17 POWDER, FOR SOLUTION ORAL DAILY
Status: DISCONTINUED | OUTPATIENT
Start: 2023-01-01 | End: 2023-01-01 | Stop reason: HOSPADM

## 2023-01-01 RX ORDER — HYDRALAZINE HYDROCHLORIDE 20 MG/ML
10 INJECTION INTRAMUSCULAR; INTRAVENOUS EVERY 8 HOURS PRN
Status: DISCONTINUED | OUTPATIENT
Start: 2023-01-01 | End: 2023-01-01 | Stop reason: HOSPADM

## 2023-01-01 RX ORDER — ACETAMINOPHEN 325 MG/1
650 TABLET ORAL ONCE
Status: DISCONTINUED | OUTPATIENT
Start: 2023-01-01 | End: 2023-01-01 | Stop reason: HOSPADM

## 2023-01-01 RX ORDER — FUROSEMIDE 40 MG/1
40 TABLET ORAL 2 TIMES DAILY PRN
Status: DISCONTINUED | OUTPATIENT
Start: 2023-01-01 | End: 2023-01-01

## 2023-01-01 RX ORDER — ATORVASTATIN CALCIUM 80 MG/1
80 TABLET, FILM COATED ORAL DAILY
Qty: 30 TABLET | Refills: 3 | Status: SHIPPED | OUTPATIENT
Start: 2023-01-01 | End: 2023-01-01 | Stop reason: SDUPTHER

## 2023-01-01 RX ORDER — BUDESONIDE, GLYCOPYRROLATE, AND FORMOTEROL FUMARATE 160; 9; 4.8 UG/1; UG/1; UG/1
2 AEROSOL, METERED RESPIRATORY (INHALATION) 2 TIMES DAILY
COMMUNITY
Start: 2023-01-01

## 2023-01-01 RX ORDER — CEPHALEXIN 500 MG/1
500 CAPSULE ORAL EVERY 6 HOURS
Qty: 24 CAPSULE | Refills: 0 | Status: SHIPPED | OUTPATIENT
Start: 2023-01-01 | End: 2023-01-01

## 2023-01-01 RX ORDER — CEPHALEXIN 500 MG/1
500 CAPSULE ORAL EVERY 12 HOURS
COMMUNITY
Start: 2023-01-01 | End: 2023-01-01

## 2023-01-01 RX ORDER — INSULIN ASPART 100 [IU]/ML
75 INJECTION, SUSPENSION SUBCUTANEOUS 2 TIMES DAILY WITH MEALS
Status: DISCONTINUED | OUTPATIENT
Start: 2023-01-01 | End: 2023-01-01

## 2023-01-01 RX ORDER — SODIUM CHLORIDE AND POTASSIUM CHLORIDE 150; 450 MG/100ML; MG/100ML
INJECTION, SOLUTION INTRAVENOUS CONTINUOUS
Status: DISCONTINUED | OUTPATIENT
Start: 2023-01-01 | End: 2023-01-01

## 2023-01-01 RX ORDER — ALPRAZOLAM 0.25 MG/1
0.25 TABLET ORAL 3 TIMES DAILY PRN
Status: DISCONTINUED | OUTPATIENT
Start: 2023-01-01 | End: 2023-01-01 | Stop reason: HOSPADM

## 2023-01-01 RX ORDER — PREGABALIN 300 MG/1
300 CAPSULE ORAL DAILY
COMMUNITY
Start: 2023-01-01

## 2023-01-01 RX ORDER — ATORVASTATIN CALCIUM 40 MG/1
80 TABLET, FILM COATED ORAL NIGHTLY
Status: DISCONTINUED | OUTPATIENT
Start: 2023-01-01 | End: 2023-01-01 | Stop reason: HOSPADM

## 2023-01-01 RX ORDER — INSULIN GLARGINE 100 [IU]/ML
165 INJECTION, SOLUTION SUBCUTANEOUS 2 TIMES DAILY
COMMUNITY
Start: 2023-01-01 | End: 2023-01-01 | Stop reason: SDUPTHER

## 2023-01-01 RX ORDER — AMOXICILLIN AND CLAVULANATE POTASSIUM 875; 125 MG/1; MG/1
1 TABLET, FILM COATED ORAL 2 TIMES DAILY
Qty: 14 TABLET | Refills: 0 | Status: SHIPPED | OUTPATIENT
Start: 2023-01-01 | End: 2023-01-01

## 2023-01-01 RX ORDER — ENOXAPARIN SODIUM 100 MG/ML
40 INJECTION SUBCUTANEOUS EVERY 24 HOURS
Status: DISCONTINUED | OUTPATIENT
Start: 2023-01-01 | End: 2023-01-01

## 2023-01-01 RX ORDER — MULTIVIT-MIN/IRON FUM/FOLIC AC 18MG-0.4MG
1 TABLET ORAL DAILY
COMMUNITY

## 2023-01-01 RX ORDER — DOXYCYCLINE 100 MG/1
100 CAPSULE ORAL EVERY 12 HOURS
COMMUNITY
Start: 2023-01-01 | End: 2023-01-01

## 2023-01-01 RX ORDER — INSULIN ASPART 100 [IU]/ML
1-10 INJECTION, SOLUTION INTRAVENOUS; SUBCUTANEOUS
Status: DISCONTINUED | OUTPATIENT
Start: 2023-01-01 | End: 2023-01-01 | Stop reason: HOSPADM

## 2023-01-01 RX ORDER — HYDROCORTISONE 25 MG/G
CREAM TOPICAL 2 TIMES DAILY PRN
Status: ON HOLD | COMMUNITY
Start: 2022-12-09 | End: 2023-01-01

## 2023-01-01 RX ORDER — AZELASTINE 1 MG/ML
2 SPRAY, METERED NASAL 2 TIMES DAILY PRN
COMMUNITY
Start: 2023-01-01

## 2023-01-01 RX ORDER — MIRTAZAPINE 7.5 MG/1
15 TABLET, FILM COATED ORAL ONCE
Status: COMPLETED | OUTPATIENT
Start: 2023-01-01 | End: 2023-01-01

## 2023-01-01 RX ORDER — IBUPROFEN 200 MG
24 TABLET ORAL
Status: DISCONTINUED | OUTPATIENT
Start: 2023-01-01 | End: 2023-01-01 | Stop reason: SDUPTHER

## 2023-01-01 RX ORDER — LISINOPRIL 10 MG/1
10 TABLET ORAL DAILY
Status: ON HOLD | COMMUNITY
Start: 2023-01-01 | End: 2023-01-01

## 2023-01-01 RX ORDER — TIRZEPATIDE 5 MG/.5ML
5 INJECTION, SOLUTION SUBCUTANEOUS WEEKLY
COMMUNITY
Start: 2023-01-01 | End: 2023-01-01

## 2023-01-01 RX ORDER — DOXYCYCLINE HYCLATE 100 MG
100 TABLET ORAL EVERY 12 HOURS
Status: DISCONTINUED | OUTPATIENT
Start: 2023-01-01 | End: 2023-01-01 | Stop reason: HOSPADM

## 2023-01-01 RX ORDER — PEN NEEDLE, DIABETIC 31 GX5/16"
NEEDLE, DISPOSABLE MISCELLANEOUS
COMMUNITY
Start: 2023-01-01

## 2023-01-01 RX ORDER — CLOPIDOGREL BISULFATE 75 MG/1
75 TABLET ORAL DAILY
Qty: 30 TABLET | Refills: 3 | Status: SHIPPED | OUTPATIENT
Start: 2023-01-01 | End: 2023-01-01 | Stop reason: SDUPTHER

## 2023-01-01 RX ORDER — AMOXICILLIN AND CLAVULANATE POTASSIUM 875; 125 MG/1; MG/1
1 TABLET, FILM COATED ORAL EVERY 12 HOURS
Qty: 14 TABLET | Refills: 0 | Status: ON HOLD | OUTPATIENT
Start: 2023-01-01 | End: 2023-01-01

## 2023-01-01 RX ORDER — ALPRAZOLAM 0.25 MG/1
0.25 TABLET ORAL 3 TIMES DAILY PRN
Qty: 30 TABLET | Refills: 0 | Status: SHIPPED | OUTPATIENT
Start: 2023-01-01 | End: 2024-01-01

## 2023-01-01 RX ORDER — ONDANSETRON 2 MG/ML
4 INJECTION INTRAMUSCULAR; INTRAVENOUS EVERY 8 HOURS PRN
Status: DISCONTINUED | OUTPATIENT
Start: 2023-01-01 | End: 2023-01-01 | Stop reason: HOSPADM

## 2023-01-01 RX ORDER — TALC
6 POWDER (GRAM) TOPICAL NIGHTLY PRN
Status: DISCONTINUED | OUTPATIENT
Start: 2023-01-01 | End: 2023-01-01 | Stop reason: HOSPADM

## 2023-01-01 RX ORDER — PREGABALIN 75 MG/1
150 CAPSULE ORAL 2 TIMES DAILY
Status: DISCONTINUED | OUTPATIENT
Start: 2023-01-01 | End: 2023-01-01 | Stop reason: HOSPADM

## 2023-01-01 RX ORDER — PROMETHAZINE HYDROCHLORIDE 6.25 MG/5ML
5-10 SYRUP ORAL EVERY 8 HOURS PRN
COMMUNITY
Start: 2023-01-01

## 2023-01-01 RX ORDER — ALLOPURINOL 300 MG/1
300 TABLET ORAL DAILY
COMMUNITY
Start: 2023-01-01

## 2023-01-01 RX ORDER — IBUPROFEN 200 MG
24 TABLET ORAL
Status: DISCONTINUED | OUTPATIENT
Start: 2023-01-01 | End: 2023-01-01 | Stop reason: HOSPADM

## 2023-01-01 RX ORDER — INSULIN LISPRO 100 [IU]/ML
20 INJECTION, SOLUTION INTRAVENOUS; SUBCUTANEOUS
COMMUNITY
Start: 2023-01-01 | End: 2023-01-01 | Stop reason: SDUPTHER

## 2023-01-01 RX ORDER — LISINOPRIL 10 MG/1
10 TABLET ORAL DAILY
Status: DISCONTINUED | OUTPATIENT
Start: 2023-01-01 | End: 2023-01-01

## 2023-01-01 RX ORDER — BUDESONIDE, GLYCOPYRROLATE, AND FORMOTEROL FUMARATE 160; 9; 4.8 UG/1; UG/1; UG/1
2 AEROSOL, METERED RESPIRATORY (INHALATION) 2 TIMES DAILY
Status: ON HOLD | COMMUNITY
Start: 2023-01-01 | End: 2023-01-01

## 2023-01-01 RX ORDER — KETOROLAC TROMETHAMINE 30 MG/ML
15 INJECTION, SOLUTION INTRAMUSCULAR; INTRAVENOUS
Status: COMPLETED | OUTPATIENT
Start: 2023-01-01 | End: 2023-01-01

## 2023-01-01 RX ORDER — INSULIN GLARGINE 100 [IU]/ML
30 INJECTION, SOLUTION SUBCUTANEOUS EVERY MORNING
Qty: 9 ML | Refills: 11 | Status: SHIPPED | OUTPATIENT
Start: 2023-01-01 | End: 2024-12-24

## 2023-01-01 RX ORDER — ASPIRIN 81 MG/1
81 TABLET ORAL DAILY
Qty: 90 TABLET | Refills: 3 | Status: SHIPPED | OUTPATIENT
Start: 2023-01-01

## 2023-01-01 RX ORDER — DOXYCYCLINE 100 MG/1
100 CAPSULE ORAL 2 TIMES DAILY
COMMUNITY
Start: 2023-01-01 | End: 2023-01-01

## 2023-01-01 RX ORDER — ALLOPURINOL 300 MG/1
300 TABLET ORAL DAILY
Status: ON HOLD | COMMUNITY
End: 2023-01-01

## 2023-01-01 RX ORDER — ACETAMINOPHEN 500 MG
1000 TABLET ORAL EVERY 6 HOURS PRN
Status: DISCONTINUED | OUTPATIENT
Start: 2023-01-01 | End: 2023-01-01 | Stop reason: HOSPADM

## 2023-01-01 RX ORDER — LINACLOTIDE 72 UG/1
72 CAPSULE, GELATIN COATED ORAL DAILY PRN
COMMUNITY
Start: 2023-01-01

## 2023-01-01 RX ORDER — IBUPROFEN 200 MG
16 TABLET ORAL
Status: DISCONTINUED | OUTPATIENT
Start: 2023-01-01 | End: 2023-01-01 | Stop reason: SDUPTHER

## 2023-01-01 RX ORDER — NAPROXEN SODIUM 220 MG/1
81 TABLET, FILM COATED ORAL
COMMUNITY
Start: 2023-01-01 | End: 2023-01-01 | Stop reason: SDUPTHER

## 2023-01-01 RX ORDER — CARIPRAZINE 1.5 MG/1
1.5 CAPSULE, GELATIN COATED ORAL DAILY
COMMUNITY
Start: 2023-01-01 | End: 2023-01-01

## 2023-01-01 RX ORDER — ATORVASTATIN CALCIUM 80 MG/1
80 TABLET, FILM COATED ORAL DAILY
Refills: 3 | COMMUNITY
Start: 2023-01-01 | End: 2023-01-01 | Stop reason: SDUPTHER

## 2023-01-01 RX ORDER — SIMETHICONE 125 MG
125 TABLET,CHEWABLE ORAL EVERY 6 HOURS PRN
Status: DISCONTINUED | OUTPATIENT
Start: 2023-01-01 | End: 2023-01-01 | Stop reason: HOSPADM

## 2023-01-01 RX ORDER — PREGABALIN 100 MG/1
150 CAPSULE ORAL 2 TIMES DAILY
COMMUNITY
End: 2023-01-01 | Stop reason: DRUGHIGH

## 2023-01-01 RX ORDER — ALBUTEROL SULFATE 90 UG/1
2 AEROSOL, METERED RESPIRATORY (INHALATION)
Status: ON HOLD | COMMUNITY
Start: 2022-12-29 | End: 2023-01-01

## 2023-01-01 RX ORDER — ALLOPURINOL 100 MG/1
300 TABLET ORAL DAILY
Status: DISCONTINUED | OUTPATIENT
Start: 2023-01-01 | End: 2023-01-01 | Stop reason: HOSPADM

## 2023-01-01 RX ORDER — TIZANIDINE 4 MG/1
4 TABLET ORAL EVERY 8 HOURS PRN
Status: DISCONTINUED | OUTPATIENT
Start: 2023-01-01 | End: 2023-01-01 | Stop reason: HOSPADM

## 2023-01-01 RX ORDER — NAPROXEN SODIUM 220 MG/1
81 TABLET, FILM COATED ORAL DAILY
Status: DISCONTINUED | OUTPATIENT
Start: 2023-01-01 | End: 2023-01-01 | Stop reason: HOSPADM

## 2023-01-01 RX ORDER — ASPIRIN 81 MG/1
81 TABLET ORAL DAILY
Status: DISCONTINUED | OUTPATIENT
Start: 2023-01-01 | End: 2023-01-01 | Stop reason: HOSPADM

## 2023-01-01 RX ORDER — INSULIN LISPRO 100 [IU]/ML
10 INJECTION, SUSPENSION SUBCUTANEOUS 3 TIMES DAILY
Start: 2023-01-01

## 2023-01-01 RX ORDER — CIPROFLOXACIN 500 MG/1
500 TABLET ORAL EVERY 12 HOURS
Qty: 14 TABLET | Refills: 0 | Status: ON HOLD | OUTPATIENT
Start: 2023-01-01 | End: 2023-01-01 | Stop reason: HOSPADM

## 2023-01-01 RX ORDER — CARVEDILOL 3.12 MG/1
3.12 TABLET ORAL 2 TIMES DAILY
Status: DISCONTINUED | OUTPATIENT
Start: 2023-01-01 | End: 2023-01-01 | Stop reason: HOSPADM

## 2023-01-01 RX ORDER — ESCITALOPRAM OXALATE 10 MG/1
10 TABLET ORAL DAILY PRN
COMMUNITY
Start: 2023-01-01

## 2023-01-01 RX ORDER — NALOXONE HCL 0.4 MG/ML
0.02 VIAL (ML) INJECTION
Status: DISCONTINUED | OUTPATIENT
Start: 2023-01-01 | End: 2023-01-01 | Stop reason: HOSPADM

## 2023-01-01 RX ORDER — CALCIUM CITRATE/VITAMIN D3 200MG-6.25
TABLET ORAL 3 TIMES DAILY
COMMUNITY
Start: 2023-01-01

## 2023-01-01 RX ORDER — INSULIN ASPART 100 [IU]/ML
50 INJECTION, SUSPENSION SUBCUTANEOUS 3 TIMES DAILY
Status: DISCONTINUED | OUTPATIENT
Start: 2023-01-01 | End: 2023-01-01

## 2023-01-01 RX ORDER — BUPRENORPHINE AND NALOXONE 2; .5 MG/1; MG/1
4 FILM, SOLUBLE BUCCAL; SUBLINGUAL 3 TIMES DAILY
Status: DISCONTINUED | OUTPATIENT
Start: 2023-01-01 | End: 2023-01-01 | Stop reason: HOSPADM

## 2023-01-01 RX ORDER — INSULIN LISPRO 100 [IU]/ML
80 INJECTION, SUSPENSION SUBCUTANEOUS 3 TIMES DAILY
Status: ON HOLD | COMMUNITY
Start: 2023-01-01 | End: 2023-01-01

## 2023-01-01 RX ORDER — HEPARIN SODIUM 5000 [USP'U]/ML
5000 INJECTION, SOLUTION INTRAVENOUS; SUBCUTANEOUS EVERY 8 HOURS
Status: DISCONTINUED | OUTPATIENT
Start: 2023-01-01 | End: 2023-01-01 | Stop reason: HOSPADM

## 2023-01-01 RX ORDER — GLUCAGON 1 MG
1 KIT INJECTION
Status: DISCONTINUED | OUTPATIENT
Start: 2023-01-01 | End: 2023-01-01 | Stop reason: SDUPTHER

## 2023-01-01 RX ORDER — PREGABALIN 150 MG/1
150 CAPSULE ORAL 2 TIMES DAILY
COMMUNITY
Start: 2023-01-01 | End: 2023-01-01 | Stop reason: DRUGHIGH

## 2023-01-01 RX ORDER — PROMETHAZINE HYDROCHLORIDE AND DEXTROMETHORPHAN HYDROBROMIDE 6.25; 15 MG/5ML; MG/5ML
SYRUP ORAL
Status: ON HOLD | COMMUNITY
Start: 2023-01-01 | End: 2023-01-01 | Stop reason: HOSPADM

## 2023-01-01 RX ORDER — DOXYCYCLINE 100 MG/1
100 CAPSULE ORAL 2 TIMES DAILY
Qty: 14 CAPSULE | Refills: 0 | Status: ON HOLD | OUTPATIENT
Start: 2023-01-01 | End: 2023-01-01 | Stop reason: HOSPADM

## 2023-01-01 RX ORDER — CARVEDILOL 3.12 MG/1
3.12 TABLET ORAL 2 TIMES DAILY
Status: DISCONTINUED | OUTPATIENT
Start: 2023-01-01 | End: 2023-01-01

## 2023-01-01 RX ORDER — ASPIRIN 81 MG/1
81 TABLET ORAL DAILY
Status: ON HOLD | COMMUNITY
End: 2023-01-01

## 2023-01-01 RX ORDER — METRONIDAZOLE 500 MG/100ML
500 INJECTION, SOLUTION INTRAVENOUS
Status: DISCONTINUED | OUTPATIENT
Start: 2023-01-01 | End: 2023-01-01 | Stop reason: HOSPADM

## 2023-01-01 RX ORDER — DEXTROSE 40 %
30 GEL (GRAM) ORAL
Status: DISCONTINUED | OUTPATIENT
Start: 2023-01-01 | End: 2023-01-01 | Stop reason: HOSPADM

## 2023-01-01 RX ORDER — MAGNESIUM SULFATE HEPTAHYDRATE 40 MG/ML
2 INJECTION, SOLUTION INTRAVENOUS
Status: COMPLETED | OUTPATIENT
Start: 2023-01-01 | End: 2023-01-01

## 2023-01-01 RX ORDER — LISINOPRIL 10 MG/1
10 TABLET ORAL
COMMUNITY
End: 2023-01-01 | Stop reason: SDUPTHER

## 2023-01-01 RX ORDER — IBUPROFEN 200 MG
1 TABLET ORAL DAILY
Status: DISCONTINUED | OUTPATIENT
Start: 2023-01-01 | End: 2023-01-01

## 2023-01-01 RX ORDER — POTASSIUM CHLORIDE 750 MG/1
10 TABLET, EXTENDED RELEASE ORAL 2 TIMES DAILY
COMMUNITY
Start: 2023-01-01 | End: 2023-01-01

## 2023-01-01 RX ORDER — HYDROCORTISONE 25 MG/G
CREAM TOPICAL NIGHTLY
COMMUNITY
Start: 2022-12-29 | End: 2023-01-01

## 2023-01-01 RX ORDER — LISINOPRIL 10 MG/1
10 TABLET ORAL DAILY
Status: DISCONTINUED | OUTPATIENT
Start: 2023-01-01 | End: 2023-01-01 | Stop reason: HOSPADM

## 2023-01-01 RX ORDER — CIPROFLOXACIN 500 MG/1
500 TABLET ORAL EVERY 12 HOURS
Status: DISCONTINUED | OUTPATIENT
Start: 2023-01-01 | End: 2023-01-01 | Stop reason: HOSPADM

## 2023-01-01 RX ORDER — SODIUM CHLORIDE 9 MG/ML
500 INJECTION, SOLUTION INTRAVENOUS
Status: COMPLETED | OUTPATIENT
Start: 2023-01-01 | End: 2023-01-01

## 2023-01-01 RX ORDER — TIRZEPATIDE 2.5 MG/.5ML
2.5 INJECTION, SOLUTION SUBCUTANEOUS
Status: ON HOLD | COMMUNITY
Start: 2023-01-01 | End: 2023-01-01

## 2023-01-01 RX ORDER — PREGABALIN 75 MG/1
150 CAPSULE ORAL DAILY
Status: DISCONTINUED | OUTPATIENT
Start: 2023-01-01 | End: 2023-01-01 | Stop reason: HOSPADM

## 2023-01-01 RX ORDER — LISINOPRIL 20 MG/1
20 TABLET ORAL DAILY
Status: DISCONTINUED | OUTPATIENT
Start: 2023-01-01 | End: 2023-01-01 | Stop reason: HOSPADM

## 2023-01-01 RX ORDER — IBUPROFEN 200 MG
1 TABLET ORAL
Status: DISCONTINUED | OUTPATIENT
Start: 2023-01-01 | End: 2023-01-01 | Stop reason: HOSPADM

## 2023-01-01 RX ORDER — INSULIN ASPART 100 [IU]/ML
10 INJECTION, SOLUTION INTRAVENOUS; SUBCUTANEOUS ONCE
Status: DISCONTINUED | OUTPATIENT
Start: 2023-01-01 | End: 2023-01-01

## 2023-01-01 RX ORDER — CARVEDILOL 3.12 MG/1
3.12 TABLET ORAL 2 TIMES DAILY
Status: ON HOLD | COMMUNITY
Start: 2023-01-01 | End: 2023-01-01 | Stop reason: HOSPADM

## 2023-01-01 RX ORDER — METHYLPREDNISOLONE 4 MG/1
TABLET ORAL
COMMUNITY
Start: 2023-01-01 | End: 2023-01-01

## 2023-01-01 RX ORDER — MUPIROCIN 20 MG/G
OINTMENT TOPICAL 2 TIMES DAILY
Status: DISCONTINUED | OUTPATIENT
Start: 2023-01-01 | End: 2023-01-01 | Stop reason: HOSPADM

## 2023-01-01 RX ORDER — ALLOPURINOL 300 MG/1
300 TABLET ORAL
COMMUNITY
Start: 2023-01-01 | End: 2023-01-01

## 2023-01-01 RX ORDER — INSULIN GLARGINE 100 [IU]/ML
165 INJECTION, SOLUTION SUBCUTANEOUS 3 TIMES DAILY
Status: ON HOLD | COMMUNITY
End: 2023-01-01

## 2023-01-01 RX ORDER — FLUCONAZOLE 150 MG/1
TABLET ORAL
COMMUNITY
Start: 2023-01-01 | End: 2023-01-01

## 2023-01-01 RX ORDER — INSULIN ASPART 100 [IU]/ML
13 INJECTION, SOLUTION INTRAVENOUS; SUBCUTANEOUS
Status: DISCONTINUED | OUTPATIENT
Start: 2023-01-01 | End: 2023-01-01

## 2023-01-01 RX ORDER — CLOPIDOGREL BISULFATE 75 MG/1
75 TABLET ORAL DAILY
Qty: 30 TABLET | Refills: 3 | Status: SHIPPED | OUTPATIENT
Start: 2023-01-01

## 2023-01-01 RX ORDER — DEXTROSE 40 %
15 GEL (GRAM) ORAL
Status: DISCONTINUED | OUTPATIENT
Start: 2023-01-01 | End: 2023-01-01 | Stop reason: HOSPADM

## 2023-01-01 RX ADMIN — CEFTRIAXONE 1 G: 1 INJECTION, SOLUTION INTRAVENOUS at 01:01

## 2023-01-01 RX ADMIN — SODIUM CHLORIDE, POTASSIUM CHLORIDE, SODIUM LACTATE AND CALCIUM CHLORIDE 250 ML: 600; 310; 30; 20 INJECTION, SOLUTION INTRAVENOUS at 12:12

## 2023-01-01 RX ADMIN — ONDANSETRON 4 MG: 2 INJECTION INTRAMUSCULAR; INTRAVENOUS at 09:12

## 2023-01-01 RX ADMIN — SODIUM CHLORIDE: 9 INJECTION, SOLUTION INTRAVENOUS at 09:12

## 2023-01-01 RX ADMIN — ERTAPENEM 1 G: 1 INJECTION INTRAMUSCULAR; INTRAVENOUS at 11:12

## 2023-01-01 RX ADMIN — ACETAMINOPHEN 650 MG: 325 TABLET ORAL at 08:12

## 2023-01-01 RX ADMIN — CEFTRIAXONE SODIUM 2 G: 2 INJECTION, POWDER, FOR SOLUTION INTRAMUSCULAR; INTRAVENOUS at 10:12

## 2023-01-01 RX ADMIN — NICOTINE 1 PATCH: 21 PATCH, EXTENDED RELEASE TRANSDERMAL at 08:12

## 2023-01-01 RX ADMIN — ACETAMINOPHEN 650 MG: 325 TABLET ORAL at 10:12

## 2023-01-01 RX ADMIN — SODIUM CHLORIDE 500 ML: 0.9 INJECTION, SOLUTION INTRAVENOUS at 01:01

## 2023-01-01 RX ADMIN — VANCOMYCIN HYDROCHLORIDE 2500 MG: 500 INJECTION, POWDER, LYOPHILIZED, FOR SOLUTION INTRAVENOUS at 03:03

## 2023-01-01 RX ADMIN — ALPRAZOLAM 0.5 MG: 0.25 TABLET ORAL at 11:12

## 2023-01-01 RX ADMIN — MUPIROCIN: 20 OINTMENT TOPICAL at 08:12

## 2023-01-01 RX ADMIN — ATORVASTATIN CALCIUM 80 MG: 40 TABLET, FILM COATED ORAL at 09:12

## 2023-01-01 RX ADMIN — HEPARIN SODIUM 5000 UNITS: 5000 INJECTION INTRAVENOUS; SUBCUTANEOUS at 09:12

## 2023-01-01 RX ADMIN — BUPRENORPHINE HYDROCHLORIDE AND NALOXONE HYDROCHLORIDE DIHYDRATE 8 MG: 8; 2 TABLET SUBLINGUAL at 02:12

## 2023-01-01 RX ADMIN — SODIUM CHLORIDE, POTASSIUM CHLORIDE, SODIUM LACTATE AND CALCIUM CHLORIDE 1000 ML: 600; 310; 30; 20 INJECTION, SOLUTION INTRAVENOUS at 03:12

## 2023-01-01 RX ADMIN — CARVEDILOL 3.12 MG: 3.12 TABLET, FILM COATED ORAL at 04:03

## 2023-01-01 RX ADMIN — INSULIN ASPART 2 UNITS: 100 INJECTION, SOLUTION INTRAVENOUS; SUBCUTANEOUS at 04:12

## 2023-01-01 RX ADMIN — BUPRENORPHINE HYDROCHLORIDE AND NALOXONE HYDROCHLORIDE DIHYDRATE 8 MG: 8; 2 TABLET SUBLINGUAL at 10:12

## 2023-01-01 RX ADMIN — METRONIDAZOLE 500 MG: 5 INJECTION, SOLUTION INTRAVENOUS at 05:12

## 2023-01-01 RX ADMIN — INSULIN ASPART 16 UNITS: 100 INJECTION, SOLUTION INTRAVENOUS; SUBCUTANEOUS at 05:12

## 2023-01-01 RX ADMIN — Medication 6 MG: at 08:12

## 2023-01-01 RX ADMIN — MAGNESIUM SULFATE IN WATER 2 G: 40 INJECTION, SOLUTION INTRAVENOUS at 01:12

## 2023-01-01 RX ADMIN — CARVEDILOL 3.12 MG: 3.12 TABLET, FILM COATED ORAL at 08:12

## 2023-01-01 RX ADMIN — BUPRENORPHINE HYDROCHLORIDE AND NALOXONE HYDROCHLORIDE DIHYDRATE 8 MG: 8; 2 TABLET SUBLINGUAL at 08:12

## 2023-01-01 RX ADMIN — INSULIN ASPART 6 UNITS: 100 INJECTION, SOLUTION INTRAVENOUS; SUBCUTANEOUS at 08:12

## 2023-01-01 RX ADMIN — CLOPIDOGREL BISULFATE 75 MG: 75 TABLET ORAL at 09:12

## 2023-01-01 RX ADMIN — ALPRAZOLAM 0.25 MG: 0.25 TABLET ORAL at 08:12

## 2023-01-01 RX ADMIN — INSULIN ASPART 3 UNITS: 100 INJECTION, SOLUTION INTRAVENOUS; SUBCUTANEOUS at 10:12

## 2023-01-01 RX ADMIN — MAGNESIUM SULFATE HEPTAHYDRATE 2 G: 40 INJECTION, SOLUTION INTRAVENOUS at 01:12

## 2023-01-01 RX ADMIN — PANTOPRAZOLE SODIUM 40 MG: 40 TABLET, DELAYED RELEASE ORAL at 08:12

## 2023-01-01 RX ADMIN — HEPARIN SODIUM 5000 UNITS: 5000 INJECTION INTRAVENOUS; SUBCUTANEOUS at 05:12

## 2023-01-01 RX ADMIN — INSULIN ASPART 6 UNITS: 100 INJECTION, SOLUTION INTRAVENOUS; SUBCUTANEOUS at 12:12

## 2023-01-01 RX ADMIN — CARVEDILOL 3.12 MG: 3.12 TABLET, FILM COATED ORAL at 09:12

## 2023-01-01 RX ADMIN — PIPERACILLIN AND TAZOBACTAM 4.5 G: 4; .5 INJECTION, POWDER, LYOPHILIZED, FOR SOLUTION INTRAVENOUS; PARENTERAL at 03:12

## 2023-01-01 RX ADMIN — POLYETHYLENE GLYCOL 3350 17 G: 17 POWDER, FOR SOLUTION ORAL at 01:12

## 2023-01-01 RX ADMIN — MICONAZOLE NITRATE: 20 OINTMENT TOPICAL at 08:12

## 2023-01-01 RX ADMIN — INSULIN ASPART 6 UNITS: 100 INJECTION, SOLUTION INTRAVENOUS; SUBCUTANEOUS at 04:12

## 2023-01-01 RX ADMIN — MELATONIN TAB 3 MG 6 MG: 3 TAB at 11:12

## 2023-01-01 RX ADMIN — PREGABALIN 150 MG: 75 CAPSULE ORAL at 09:03

## 2023-01-01 RX ADMIN — HEPARIN SODIUM 5000 UNITS: 5000 INJECTION INTRAVENOUS; SUBCUTANEOUS at 07:12

## 2023-01-01 RX ADMIN — ACETAMINOPHEN 650 MG: 325 TABLET ORAL at 12:12

## 2023-01-01 RX ADMIN — NICOTINE 1 PATCH: 21 PATCH, EXTENDED RELEASE TRANSDERMAL at 04:03

## 2023-01-01 RX ADMIN — PIPERACILLIN AND TAZOBACTAM 4.5 G: 4; .5 INJECTION, POWDER, LYOPHILIZED, FOR SOLUTION INTRAVENOUS; PARENTERAL at 06:12

## 2023-01-01 RX ADMIN — SODIUM CHLORIDE: 9 INJECTION, SOLUTION INTRAVENOUS at 10:12

## 2023-01-01 RX ADMIN — VANCOMYCIN HYDROCHLORIDE 1500 MG: 1.5 INJECTION, POWDER, LYOPHILIZED, FOR SOLUTION INTRAVENOUS at 08:12

## 2023-01-01 RX ADMIN — ALPRAZOLAM 0.25 MG: 0.25 TABLET ORAL at 07:12

## 2023-01-01 RX ADMIN — HEPARIN SODIUM 5000 UNITS: 5000 INJECTION INTRAVENOUS; SUBCUTANEOUS at 03:12

## 2023-01-01 RX ADMIN — INSULIN ASPART 15 UNITS: 100 INJECTION, SOLUTION INTRAVENOUS; SUBCUTANEOUS at 11:12

## 2023-01-01 RX ADMIN — INSULIN ASPART 5 UNITS: 100 INJECTION, SOLUTION INTRAVENOUS; SUBCUTANEOUS at 11:12

## 2023-01-01 RX ADMIN — PREGABALIN 150 MG: 75 CAPSULE ORAL at 09:12

## 2023-01-01 RX ADMIN — Medication 6 MG: at 07:12

## 2023-01-01 RX ADMIN — VANCOMYCIN HYDROCHLORIDE 2000 MG: 500 INJECTION, POWDER, LYOPHILIZED, FOR SOLUTION INTRAVENOUS at 03:10

## 2023-01-01 RX ADMIN — INSULIN ASPART 6 UNITS: 100 INJECTION, SOLUTION INTRAVENOUS; SUBCUTANEOUS at 11:12

## 2023-01-01 RX ADMIN — INSULIN DETEMIR 10 UNITS: 100 INJECTION, SOLUTION SUBCUTANEOUS at 10:12

## 2023-01-01 RX ADMIN — ESCITALOPRAM OXALATE 10 MG: 10 TABLET ORAL at 08:12

## 2023-01-01 RX ADMIN — NICOTINE 1 PATCH: 21 PATCH, EXTENDED RELEASE TRANSDERMAL at 09:12

## 2023-01-01 RX ADMIN — ALLOPURINOL 300 MG: 100 TABLET ORAL at 08:12

## 2023-01-01 RX ADMIN — MIDODRINE HYDROCHLORIDE 2.5 MG: 2.5 TABLET ORAL at 05:12

## 2023-01-01 RX ADMIN — METRONIDAZOLE 500 MG: 5 INJECTION, SOLUTION INTRAVENOUS at 06:12

## 2023-01-01 RX ADMIN — ONDANSETRON 4 MG: 2 INJECTION INTRAMUSCULAR; INTRAVENOUS at 08:12

## 2023-01-01 RX ADMIN — MELATONIN TAB 3 MG 6 MG: 3 TAB at 08:12

## 2023-01-01 RX ADMIN — ATORVASTATIN CALCIUM 80 MG: 40 TABLET, FILM COATED ORAL at 08:12

## 2023-01-01 RX ADMIN — LISINOPRIL 10 MG: 10 TABLET ORAL at 09:12

## 2023-01-01 RX ADMIN — INSULIN DETEMIR 45 UNITS: 100 INJECTION, SOLUTION SUBCUTANEOUS at 08:12

## 2023-01-01 RX ADMIN — DOXYCYCLINE HYCLATE 100 MG: 100 TABLET, FILM COATED ORAL at 09:12

## 2023-01-01 RX ADMIN — PANTOPRAZOLE SODIUM 40 MG: 40 TABLET, DELAYED RELEASE ORAL at 09:12

## 2023-01-01 RX ADMIN — HEPARIN SODIUM 5000 UNITS: 5000 INJECTION INTRAVENOUS; SUBCUTANEOUS at 02:12

## 2023-01-01 RX ADMIN — FLUCONAZOLE 150 MG: 150 TABLET ORAL at 05:12

## 2023-01-01 RX ADMIN — PREGABALIN 300 MG: 75 CAPSULE ORAL at 09:12

## 2023-01-01 RX ADMIN — INSULIN DETEMIR 10 UNITS: 100 INJECTION, SOLUTION SUBCUTANEOUS at 08:12

## 2023-01-01 RX ADMIN — MICONAZOLE NITRATE: 20 POWDER TOPICAL at 08:12

## 2023-01-01 RX ADMIN — INSULIN ASPART 4 UNITS: 100 INJECTION, SOLUTION INTRAVENOUS; SUBCUTANEOUS at 12:12

## 2023-01-01 RX ADMIN — MICONAZOLE NITRATE: 20 POWDER TOPICAL at 10:12

## 2023-01-01 RX ADMIN — INSULIN ASPART 14 UNITS: 100 INJECTION, SOLUTION INTRAVENOUS; SUBCUTANEOUS at 04:12

## 2023-01-01 RX ADMIN — IOHEXOL 100 ML: 350 INJECTION, SOLUTION INTRAVENOUS at 01:01

## 2023-01-01 RX ADMIN — ACETAMINOPHEN 650 MG: 325 TABLET ORAL at 11:12

## 2023-01-01 RX ADMIN — INSULIN ASPART 8 UNITS: 100 INJECTION, SOLUTION INTRAVENOUS; SUBCUTANEOUS at 04:12

## 2023-01-01 RX ADMIN — PREGABALIN 150 MG: 75 CAPSULE ORAL at 08:12

## 2023-01-01 RX ADMIN — METRONIDAZOLE 500 MG: 5 INJECTION, SOLUTION INTRAVENOUS at 11:12

## 2023-01-01 RX ADMIN — METRONIDAZOLE 500 MG: 5 INJECTION, SOLUTION INTRAVENOUS at 01:12

## 2023-01-01 RX ADMIN — ESCITALOPRAM OXALATE 10 MG: 10 TABLET ORAL at 09:12

## 2023-01-01 RX ADMIN — INSULIN ASPART 4 UNITS: 100 INJECTION, SOLUTION INTRAVENOUS; SUBCUTANEOUS at 05:12

## 2023-01-01 RX ADMIN — SODIUM CHLORIDE 0.1 UNITS/KG/HR: 9 INJECTION, SOLUTION INTRAVENOUS at 03:12

## 2023-01-01 RX ADMIN — ATORVASTATIN CALCIUM 80 MG: 40 TABLET, FILM COATED ORAL at 07:12

## 2023-01-01 RX ADMIN — Medication 6 MG: at 09:12

## 2023-01-01 RX ADMIN — BUPRENORPHINE HYDROCHLORIDE AND NALOXONE HYDROCHLORIDE DIHYDRATE 8 MG: 8; 2 TABLET SUBLINGUAL at 09:12

## 2023-01-01 RX ADMIN — SODIUM CHLORIDE, POTASSIUM CHLORIDE, SODIUM LACTATE AND CALCIUM CHLORIDE 1000 ML: 600; 310; 30; 20 INJECTION, SOLUTION INTRAVENOUS at 12:12

## 2023-01-01 RX ADMIN — METRONIDAZOLE 500 MG: 5 INJECTION, SOLUTION INTRAVENOUS at 10:12

## 2023-01-01 RX ADMIN — PROMETHAZINE HYDROCHLORIDE 12.5 MG: 6.25 SOLUTION ORAL at 04:12

## 2023-01-01 RX ADMIN — ALLOPURINOL 300 MG: 100 TABLET ORAL at 09:12

## 2023-01-01 RX ADMIN — SODIUM CHLORIDE: 9 INJECTION, SOLUTION INTRAVENOUS at 06:12

## 2023-01-01 RX ADMIN — INSULIN ASPART 6 UNITS: 100 INJECTION, SOLUTION INTRAVENOUS; SUBCUTANEOUS at 06:12

## 2023-01-01 RX ADMIN — INSULIN ASPART 3 UNITS: 100 INJECTION, SOLUTION INTRAVENOUS; SUBCUTANEOUS at 09:12

## 2023-01-01 RX ADMIN — INSULIN ASPART 10 UNITS: 100 INJECTION, SOLUTION INTRAVENOUS; SUBCUTANEOUS at 04:12

## 2023-01-01 RX ADMIN — CLOPIDOGREL BISULFATE 75 MG: 75 TABLET ORAL at 08:12

## 2023-01-01 RX ADMIN — MIDODRINE HYDROCHLORIDE 2.5 MG: 2.5 TABLET ORAL at 12:12

## 2023-01-01 RX ADMIN — ESCITALOPRAM OXALATE 10 MG: 10 TABLET ORAL at 10:12

## 2023-01-01 RX ADMIN — SODIUM CHLORIDE 1000 ML: 9 INJECTION, SOLUTION INTRAVENOUS at 02:12

## 2023-01-01 RX ADMIN — ACETAMINOPHEN 650 MG: 325 TABLET ORAL at 05:12

## 2023-01-01 RX ADMIN — TIZANIDINE 4 MG: 4 TABLET ORAL at 08:12

## 2023-01-01 RX ADMIN — ALPRAZOLAM 0.5 MG: 0.25 TABLET ORAL at 09:12

## 2023-01-01 RX ADMIN — INSULIN ASPART 9 UNITS: 100 INJECTION, SOLUTION INTRAVENOUS; SUBCUTANEOUS at 04:12

## 2023-01-01 RX ADMIN — MAGNESIUM SULFATE HEPTAHYDRATE 2 G: 40 INJECTION, SOLUTION INTRAVENOUS at 07:12

## 2023-01-01 RX ADMIN — MAGNESIUM SULFATE HEPTAHYDRATE 2 G: 40 INJECTION, SOLUTION INTRAVENOUS at 06:12

## 2023-01-01 RX ADMIN — INSULIN ASPART 15 UNITS: 100 INJECTION, SOLUTION INTRAVENOUS; SUBCUTANEOUS at 08:12

## 2023-01-01 RX ADMIN — INSULIN ASPART 4 UNITS: 100 INJECTION, SOLUTION INTRAVENOUS; SUBCUTANEOUS at 11:12

## 2023-01-01 RX ADMIN — TIZANIDINE 4 MG: 4 TABLET ORAL at 11:12

## 2023-01-01 RX ADMIN — METRONIDAZOLE 500 MG: 5 INJECTION, SOLUTION INTRAVENOUS at 02:12

## 2023-01-01 RX ADMIN — MICONAZOLE NITRATE: 20 POWDER TOPICAL at 07:12

## 2023-01-01 RX ADMIN — INSULIN ASPART 5 UNITS: 100 INJECTION, SOLUTION INTRAVENOUS; SUBCUTANEOUS at 06:12

## 2023-01-01 RX ADMIN — ASPIRIN 81 MG: 81 TABLET, COATED ORAL at 08:12

## 2023-01-01 RX ADMIN — HYDROMORPHONE HYDROCHLORIDE 0.5 MG: 1 INJECTION, SOLUTION INTRAMUSCULAR; INTRAVENOUS; SUBCUTANEOUS at 12:12

## 2023-01-01 RX ADMIN — METRONIDAZOLE 500 MG: 5 INJECTION, SOLUTION INTRAVENOUS at 12:12

## 2023-01-01 RX ADMIN — MICONAZOLE NITRATE: 20 OINTMENT TOPICAL at 09:12

## 2023-01-01 RX ADMIN — BUPRENORPHINE AND NALOXONE 4 FILM: 2; .5 FILM BUCCAL; SUBLINGUAL at 09:12

## 2023-01-01 RX ADMIN — HEPARIN SODIUM 5000 UNITS: 5000 INJECTION INTRAVENOUS; SUBCUTANEOUS at 08:12

## 2023-01-01 RX ADMIN — FLUTICASONE PROPIONATE 50 MCG: 50 SPRAY, METERED NASAL at 10:12

## 2023-01-01 RX ADMIN — DEXTROSE MONOHYDRATE 125 ML: 100 INJECTION, SOLUTION INTRAVENOUS at 05:12

## 2023-01-01 RX ADMIN — MAGNESIUM SULFATE IN WATER 2 G: 40 INJECTION, SOLUTION INTRAVENOUS at 10:12

## 2023-01-01 RX ADMIN — CIPROFLOXACIN 500 MG: 500 TABLET, FILM COATED ORAL at 11:12

## 2023-01-01 RX ADMIN — INSULIN ASPART 14 UNITS: 100 INJECTION, SOLUTION INTRAVENOUS; SUBCUTANEOUS at 01:12

## 2023-01-01 RX ADMIN — BUPRENORPHINE AND NALOXONE 4 FILM: 2; .5 FILM BUCCAL; SUBLINGUAL at 03:12

## 2023-01-01 RX ADMIN — ENOXAPARIN SODIUM 60 MG: 60 INJECTION SUBCUTANEOUS at 08:12

## 2023-01-01 RX ADMIN — PANTOPRAZOLE SODIUM 40 MG: 40 TABLET, DELAYED RELEASE ORAL at 10:12

## 2023-01-01 RX ADMIN — TIZANIDINE 4 MG: 4 TABLET ORAL at 04:12

## 2023-01-01 RX ADMIN — SODIUM CHLORIDE, POTASSIUM CHLORIDE, SODIUM LACTATE AND CALCIUM CHLORIDE: 600; 310; 30; 20 INJECTION, SOLUTION INTRAVENOUS at 01:12

## 2023-01-01 RX ADMIN — METHOCARBAMOL TABLETS 500 MG: 500 TABLET, COATED ORAL at 07:12

## 2023-01-01 RX ADMIN — CEFTRIAXONE SODIUM 2 G: 2 INJECTION, POWDER, FOR SOLUTION INTRAMUSCULAR; INTRAVENOUS at 09:12

## 2023-01-01 RX ADMIN — INSULIN ASPART 3 UNITS: 100 INJECTION, SOLUTION INTRAVENOUS; SUBCUTANEOUS at 05:12

## 2023-01-01 RX ADMIN — ACETAMINOPHEN 1000 MG: 500 TABLET ORAL at 11:03

## 2023-01-01 RX ADMIN — POTASSIUM BICARBONATE 40 MEQ: 391 TABLET, EFFERVESCENT ORAL at 03:12

## 2023-01-01 RX ADMIN — CARVEDILOL 3.12 MG: 3.12 TABLET, FILM COATED ORAL at 06:12

## 2023-01-01 RX ADMIN — BUPRENORPHINE HYDROCHLORIDE AND NALOXONE HYDROCHLORIDE DIHYDRATE 8 MG: 8; 2 TABLET SUBLINGUAL at 03:12

## 2023-01-01 RX ADMIN — ONDANSETRON 4 MG: 2 INJECTION INTRAMUSCULAR; INTRAVENOUS at 10:12

## 2023-01-01 RX ADMIN — ALPRAZOLAM 0.25 MG: 0.25 TABLET ORAL at 05:12

## 2023-01-01 RX ADMIN — INSULIN ASPART 9 UNITS: 100 INJECTION, SOLUTION INTRAVENOUS; SUBCUTANEOUS at 11:12

## 2023-01-01 RX ADMIN — TIZANIDINE 4 MG: 4 TABLET ORAL at 10:12

## 2023-01-01 RX ADMIN — ACETAMINOPHEN 650 MG: 325 TABLET ORAL at 07:12

## 2023-01-01 RX ADMIN — POTASSIUM CHLORIDE AND SODIUM CHLORIDE: 450; 150 INJECTION, SOLUTION INTRAVENOUS at 03:12

## 2023-01-01 RX ADMIN — CEFTRIAXONE SODIUM 2 G: 2 INJECTION, POWDER, FOR SOLUTION INTRAMUSCULAR; INTRAVENOUS at 11:12

## 2023-01-01 RX ADMIN — BUPRENORPHINE HYDROCHLORIDE AND NALOXONE HYDROCHLORIDE DIHYDRATE 8 MG: 8; 2 TABLET SUBLINGUAL at 07:12

## 2023-01-01 RX ADMIN — ESCITALOPRAM OXALATE 10 MG: 10 TABLET ORAL at 11:12

## 2023-01-01 RX ADMIN — TIZANIDINE 4 MG: 4 TABLET ORAL at 03:12

## 2023-01-01 RX ADMIN — PIPERACILLIN AND TAZOBACTAM 4.5 G: 4; .5 INJECTION, POWDER, LYOPHILIZED, FOR SOLUTION INTRAVENOUS; PARENTERAL at 02:10

## 2023-01-01 RX ADMIN — ONDANSETRON HYDROCHLORIDE 4 MG: 2 SOLUTION INTRAMUSCULAR; INTRAVENOUS at 12:01

## 2023-01-01 RX ADMIN — TIZANIDINE 4 MG: 4 TABLET ORAL at 09:12

## 2023-01-01 RX ADMIN — INSULIN ASPART 2 UNITS: 100 INJECTION, SOLUTION INTRAVENOUS; SUBCUTANEOUS at 10:12

## 2023-01-01 RX ADMIN — ATORVASTATIN CALCIUM 80 MG: 40 TABLET, FILM COATED ORAL at 10:12

## 2023-01-01 RX ADMIN — NICOTINE 1 PATCH: 21 PATCH, EXTENDED RELEASE TRANSDERMAL at 07:10

## 2023-01-01 RX ADMIN — INSULIN ASPART 4 UNITS: 100 INJECTION, SOLUTION INTRAVENOUS; SUBCUTANEOUS at 08:12

## 2023-01-01 RX ADMIN — IOHEXOL 1000 ML: 12 SOLUTION ORAL at 12:12

## 2023-01-01 RX ADMIN — HEPARIN SODIUM 5000 UNITS: 5000 INJECTION INTRAVENOUS; SUBCUTANEOUS at 06:12

## 2023-01-01 RX ADMIN — HEPARIN SODIUM 5000 UNITS: 5000 INJECTION INTRAVENOUS; SUBCUTANEOUS at 10:12

## 2023-01-01 RX ADMIN — BUPRENORPHINE AND NALOXONE 4 FILM: 2; .5 FILM BUCCAL; SUBLINGUAL at 08:12

## 2023-01-01 RX ADMIN — IOHEXOL 75 ML: 350 INJECTION, SOLUTION INTRAVENOUS at 04:10

## 2023-01-01 RX ADMIN — ALPRAZOLAM 0.25 MG: 0.25 TABLET ORAL at 09:12

## 2023-01-01 RX ADMIN — TIZANIDINE 4 MG: 4 TABLET ORAL at 05:12

## 2023-01-01 RX ADMIN — MIRTAZAPINE 15 MG: 7.5 TABLET, FILM COATED ORAL at 11:03

## 2023-01-01 RX ADMIN — ALPRAZOLAM 0.5 MG: 0.25 TABLET ORAL at 05:12

## 2023-01-01 RX ADMIN — VANCOMYCIN HYDROCHLORIDE 1500 MG: 1.5 INJECTION, POWDER, LYOPHILIZED, FOR SOLUTION INTRAVENOUS at 09:12

## 2023-01-01 RX ADMIN — CALCIUM CARBONATE (ANTACID) CHEW TAB 500 MG 500 MG: 500 CHEW TAB at 11:12

## 2023-01-01 RX ADMIN — ENOXAPARIN SODIUM 40 MG: 40 INJECTION SUBCUTANEOUS at 11:12

## 2023-01-01 RX ADMIN — SODIUM CHLORIDE 1000 ML: 9 INJECTION, SOLUTION INTRAVENOUS at 05:12

## 2023-01-01 RX ADMIN — INSULIN ASPART 10 UNITS: 100 INJECTION, SOLUTION INTRAVENOUS; SUBCUTANEOUS at 08:12

## 2023-01-01 RX ADMIN — TIZANIDINE 4 MG: 4 TABLET ORAL at 12:12

## 2023-01-01 RX ADMIN — ACETAMINOPHEN 650 MG: 325 TABLET ORAL at 04:12

## 2023-01-01 RX ADMIN — MICONAZOLE NITRATE: 20 POWDER TOPICAL at 09:12

## 2023-01-01 RX ADMIN — PREGABALIN 150 MG: 75 CAPSULE ORAL at 10:12

## 2023-01-01 RX ADMIN — BUPRENORPHINE HYDROCHLORIDE AND NALOXONE HYDROCHLORIDE DIHYDRATE 8 MG: 8; 2 TABLET SUBLINGUAL at 09:03

## 2023-01-01 RX ADMIN — SODIUM CHLORIDE, POTASSIUM CHLORIDE, SODIUM LACTATE AND CALCIUM CHLORIDE 1000 ML: 600; 310; 30; 20 INJECTION, SOLUTION INTRAVENOUS at 11:12

## 2023-01-01 RX ADMIN — INSULIN HUMAN 5 UNITS: 100 INJECTION, SOLUTION PARENTERAL at 06:12

## 2023-01-01 RX ADMIN — SODIUM CHLORIDE, SODIUM LACTATE, POTASSIUM CHLORIDE, AND CALCIUM CHLORIDE 1000 ML: .6; .31; .03; .02 INJECTION, SOLUTION INTRAVENOUS at 04:12

## 2023-01-01 RX ADMIN — HEPARIN SODIUM 5000 UNITS: 5000 INJECTION INTRAVENOUS; SUBCUTANEOUS at 01:12

## 2023-01-01 RX ADMIN — MIDODRINE HYDROCHLORIDE 2.5 MG: 2.5 TABLET ORAL at 08:12

## 2023-01-01 RX ADMIN — BUPRENORPHINE AND NALOXONE 4 FILM: 2; .5 FILM BUCCAL; SUBLINGUAL at 04:12

## 2023-01-01 RX ADMIN — CEFTRIAXONE 1 G: 1 INJECTION, POWDER, FOR SOLUTION INTRAMUSCULAR; INTRAVENOUS at 09:03

## 2023-01-01 RX ADMIN — PREGABALIN 150 MG: 75 CAPSULE ORAL at 03:12

## 2023-01-01 RX ADMIN — ASPIRIN 81 MG: 81 TABLET, COATED ORAL at 09:12

## 2023-01-01 RX ADMIN — MENTHOL 10%, METHYL SALICYLATE 15%: 10; 15 CREAM TOPICAL at 09:12

## 2023-01-01 RX ADMIN — DEXTROSE AND SODIUM CHLORIDE: 5; 450 INJECTION, SOLUTION INTRAVENOUS at 07:12

## 2023-01-01 RX ADMIN — SODIUM CHLORIDE, POTASSIUM CHLORIDE, SODIUM LACTATE AND CALCIUM CHLORIDE: 600; 310; 30; 20 INJECTION, SOLUTION INTRAVENOUS at 09:12

## 2023-01-01 RX ADMIN — INSULIN ASPART 16 UNITS: 100 INJECTION, SOLUTION INTRAVENOUS; SUBCUTANEOUS at 11:12

## 2023-01-01 RX ADMIN — ALPRAZOLAM 0.25 MG: 0.25 TABLET ORAL at 06:12

## 2023-01-01 RX ADMIN — INSULIN ASPART 4 UNITS: 100 INJECTION, SOLUTION INTRAVENOUS; SUBCUTANEOUS at 04:12

## 2023-01-01 RX ADMIN — METRONIDAZOLE 500 MG: 5 INJECTION, SOLUTION INTRAVENOUS at 04:12

## 2023-01-01 RX ADMIN — MICONAZOLE NITRATE: 20 POWDER TOPICAL at 01:12

## 2023-01-01 RX ADMIN — TIZANIDINE 4 MG: 4 TABLET ORAL at 07:12

## 2023-01-01 RX ADMIN — SODIUM CHLORIDE, POTASSIUM CHLORIDE, SODIUM LACTATE AND CALCIUM CHLORIDE: 600; 310; 30; 20 INJECTION, SOLUTION INTRAVENOUS at 08:12

## 2023-01-01 RX ADMIN — SODIUM CHLORIDE, POTASSIUM CHLORIDE, SODIUM LACTATE AND CALCIUM CHLORIDE: 600; 310; 30; 20 INJECTION, SOLUTION INTRAVENOUS at 05:12

## 2023-01-01 RX ADMIN — ENOXAPARIN SODIUM 60 MG: 60 INJECTION SUBCUTANEOUS at 09:12

## 2023-01-01 RX ADMIN — METRONIDAZOLE 500 MG: 5 INJECTION, SOLUTION INTRAVENOUS at 03:12

## 2023-01-01 RX ADMIN — PANTOPRAZOLE SODIUM 40 MG: 40 TABLET, DELAYED RELEASE ORAL at 03:12

## 2023-01-01 RX ADMIN — HALOPERIDOL LACTATE 5 MG: 5 INJECTION, SOLUTION INTRAMUSCULAR at 01:01

## 2023-01-01 RX ADMIN — MUPIROCIN: 20 OINTMENT TOPICAL at 09:12

## 2023-01-01 RX ADMIN — VANCOMYCIN HYDROCHLORIDE 2000 MG: 500 INJECTION, POWDER, LYOPHILIZED, FOR SOLUTION INTRAVENOUS at 10:12

## 2023-01-01 RX ADMIN — PROMETHAZINE HYDROCHLORIDE 12.5 MG: 6.25 SOLUTION ORAL at 01:12

## 2023-01-01 RX ADMIN — INSULIN ASPART 2 UNITS: 100 INJECTION, SOLUTION INTRAVENOUS; SUBCUTANEOUS at 12:12

## 2023-01-01 RX ADMIN — KETOROLAC TROMETHAMINE 15 MG: 30 INJECTION, SOLUTION INTRAMUSCULAR; INTRAVENOUS at 01:01

## 2023-01-16 NOTE — ED PROVIDER NOTES
Encounter Date: 2023       History     Chief Complaint   Patient presents with    Abdominal Pain    Leg Pain     Abd. Pain for two days. Left Leg pain from when I fell and need anr mri. Headache and nausea.      Patient presents with constant, severe pain in the bilateral lower quadrants of the abdomen, but worse on the left for 2 days. No exacerbating or relieving factors. She has nausea and vomiting. No diarrhea. Last bowel movement was yesterday after taking a laxative. No fever or chills.     Review of patient's allergies indicates:   Allergen Reactions    Celexa [citalopram] Nausea And Vomiting     Past Medical History:   Diagnosis Date    Anemia     Asthma     Cellulitis of abdominal wall 2017    CKD (chronic kidney disease) stage 3, GFR 30-59 ml/min     Depression     Diabetes mellitus, type II     Diastolic dysfunction     Diverticulosis of intestine with bleeding 2016    E coli bacteremia 2018    E. coli pyelonephritis 2015    E. 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     SECTION       SECTION, CLASSIC      CHOLECYSTECTOMY  age 15    CORONARY ANGIOGRAPHY N/A 2021    Procedure: ANGIOGRAM, CORONARY ARTERY;  Surgeon: Neelam Hicks MD;  Location: [facility] CATH LAB/EP;  Service: Cardiology;  Laterality: N/A;    INCISION AND DRAINAGE OF ABSCESS N/A 2020    Procedure: INCISION AND DRAINAGE, ABSCESS;  Surgeon: Rich Watson MD;  Location: [facility] OR;  Service: General;  Laterality: N/A;    LEFT HEART CATHETERIZATION Left 2021    Procedure: Left heart cath;  Surgeon: Neelam Hicks MD;  Location: [facility] CATH LAB/EP;  Service: Cardiology;  Laterality: Left;    LEFT HEART CATHETERIZATION N/A 2021    Procedure: Left heart cath;  Surgeon:  Neelam Hicks MD;  Location: [facility] CATH LAB/EP;  Service: Cardiology;  Laterality: N/A;    TONSILLECTOMY, ADENOIDECTOMY       Family History   Problem Relation Age of Onset    Cancer Father     Heart attack Mother     Heart disease Mother     Cancer Maternal Aunt         lung (smoker)    Heart attack Maternal Aunt     Breast cancer Paternal Grandmother     Heart attack Maternal Grandmother      Social History     Tobacco Use    Smoking status: Every Day     Packs/day: 2.50     Years: 32.00     Pack years: 80.00     Types: Cigarettes     Start date: 1987    Smokeless tobacco: Never    Tobacco comments:     Pt enrolled in the [employer] on 9/12/18. (SCT Member ID # [id number]).  Ambulatory referral to Smoking Cessation Program   Substance Use Topics    Alcohol use: No    Drug use: No     Review of Systems   Constitutional:  Negative for activity change, appetite change, chills, fatigue and fever.   HENT:  Negative for congestion, ear pain, rhinorrhea, sore throat and voice change.    Respiratory:  Negative for cough, shortness of breath and wheezing.    Cardiovascular:  Negative for chest pain.   Gastrointestinal:  Positive for abdominal pain, nausea and vomiting. Negative for blood in stool, constipation and diarrhea.   Genitourinary:  Negative for dysuria, frequency and hematuria.   Musculoskeletal:  Negative for neck pain and neck stiffness.        + left leg pain (chronic)   Skin:  Negative for rash.   Neurological:  Negative for weakness, numbness and headaches.   All other systems reviewed and are negative.    Physical Exam     Initial Vitals [01/16/23 1121]   BP Pulse Resp Temp SpO2   134/73 110 17 98.7 °F (37.1 °C) 97 %      MAP       --         Physical Exam    Nursing note and vitals reviewed.  Constitutional: She appears well-developed and well-nourished. She appears distressed.   HENT:   Head: Normocephalic and atraumatic.   Mouth/Throat: Oropharynx is clear and moist.   Eyes: Pupils are equal, round,  and reactive to light.   Neck: Neck supple.   Normal range of motion.  Cardiovascular:  Normal rate, regular rhythm and normal heart sounds.           Pulmonary/Chest: Breath sounds normal. No respiratory distress. She has no wheezes. She has no rhonchi. She has no rales.   Abdominal: Abdomen is soft.   Tenderness in the bilateral lower quadrants. No palpable or visible hernia or mass.    Musculoskeletal:      Cervical back: Normal range of motion and neck supple.     Lymphadenopathy:     She has no cervical adenopathy.   Neurological: She is alert and oriented to person, place, and time.   Skin: Skin is warm and dry. No rash noted.   Psychiatric: She has a normal mood and affect. Her behavior is normal. Judgment and thought content normal.       ED Course   Procedures  Labs Reviewed   CBC W/ AUTO DIFFERENTIAL - Abnormal; Notable for the following components:       Result Value    WBC 13.31 (*)     MCHC 31.3 (*)     Gran # (ANC) 9.6 (*)     Immature Grans (Abs) 0.06 (*)     All other components within normal limits   COMPREHENSIVE METABOLIC PANEL - Abnormal; Notable for the following components:    Glucose 120 (*)     Total Bilirubin 1.1 (*)     All other components within normal limits   URINALYSIS, REFLEX TO URINE CULTURE - Abnormal; Notable for the following components:    Nitrite, UA Positive (*)     All other components within normal limits    Narrative:     Preferred Collection Type->Urine, Clean Catch  Specimen Source->Urine   URINALYSIS MICROSCOPIC - Abnormal; Notable for the following components:    WBC, UA 30 (*)     WBC Clumps, UA Few (*)     Bacteria Many (*)     All other components within normal limits    Narrative:     Preferred Collection Type->Urine, Clean Catch  Specimen Source->Urine   CULTURE, URINE   LIPASE          Imaging Results               CT Abdomen Pelvis With Contrast (Final result)  Result time 01/16/23 13:42:29      Final result by Cory Higginbotham MD (01/16/23 13:42:29)                    Impression:      Mild nonspecific fat stranding surrounding the left ovary.  Consider further evaluation with pelvic ultrasound.    Diastasis of the rectus musculature with associated small-medium sized fat containing hernia is and moderate associated fat stranding, as above.  Possible fat necrosis or superficial infection/inflammatory changes.  No focal fluid collection.    Additional findings as above.    This report was flagged in Epic as abnormal.      Electronically signed by: Cory Higginbotham  Date:    01/16/2023  Time:    13:42               Narrative:    EXAMINATION:  CT ABDOMEN PELVIS WITH CONTRAST    CLINICAL HISTORY:  Abdominal pain, acute, nonlocalized;    TECHNIQUE:  Low dose axial images, sagittal and coronal reformations were obtained from the lung bases to the pubic symphysis following the IV administration of 100 mL of Omnipaque 350 .  Oral contrast was not given.  All CT scans at this location are performed using dose modulation techniques as appropriate to a performed exam including the following: Automated exposure control; adjustment of the mA and/or kV according to patient size (this includes techniques or standardized protocols for targeted exams where dose is matched to indication/reason for exam; i. e. extremities or head); use of iterative reconstruction technique.    COMPARISON:  09/11/2018 CT abdomen pelvis.    FINDINGS:  ABDOMEN/LOWER THORAX:    - Lower chest: Coronary artery atherosclerotic calcification.    - Liver: Enlarged liver without a suspicious focal lesion.    - Gallbladder: Cholecystectomy.    - Bile Ducts: No evidence of intra or extra hepatic biliary ductal dilation.    - Spleen: Normal in size without focal lesion.    - Kidneys/urinary: No solid mass or nephrolithiasis.  No hydronephrosis.  Unremarkable ureters and decompressed bladder.    - Adrenals: Normal.    - Pancreas: No mass or peripancreatic fat stranding.    - Retroperitoneum:  No significant adenopathy.    -  Vascular: No abdominal aortic aneurysm.  Mild scattered atherosclerotic calcification.    - Abdominal wall: Considerable diastasis of the rectus musculature in lower abdomen/pelvis with associated small-medium sized fat containing hernias and moderate associated fat necrosis.  Small areas of scattered fat stranding in the lower subcutaneous soft tissues.  No focal fluid collection or soft tissue mass.    PELVIS:    Mild fat stranding surrounding left ovary.  Otherwise unremarkable.    GI/MESENTERY/PERITONEUM:    Unremarkable appendix.  No bowel obstruction or inflammation. No free air or ascites.    BONES: No acute fracture or aggressive osseous lesions.                                       Medications   ondansetron injection 4 mg (4 mg Intravenous Given 1/16/23 1257)   0.9%  NaCl infusion (0 mLs Intravenous Stopped 1/16/23 1541)   haloperidol lactate injection 5 mg (5 mg Intramuscular Given 1/16/23 1300)   iohexoL (OMNIPAQUE 350) injection 100 mL (100 mLs Intravenous Given 1/16/23 1310)   cefTRIAXone (ROCEPHIN) 1 g/50 mL D5W IVPB (0 g Intravenous Stopped 1/16/23 1415)   ketorolac injection 15 mg (15 mg Intravenous Given 1/16/23 1343)     Medical Decision Making:   Differential Diagnosis:   Including but not limited to appendicitis, hernia, obstruction  Clinical Tests:   Lab Tests: Ordered and Reviewed  The following lab test(s) were unremarkable: CBC, CMP, Lipase and Urinalysis  Radiological Study: Ordered and Reviewed  ED Management:  14:00 Left voicemail for Dr. Quintero to return call to discuss CT findings of necrotic fat-containing hernia.   14:47 Left voicemail for Dr. Quintero.   Discussed case with Dr. Quintero and after reviewing the CT scan he recommends follow up in clinic as this appears to be more chronic in nature. Patient was comfortable at time of discharge and wanted to go home. She will follow up with PCP and Dr. Quintero. Dr. Yeager was also involved in the management of this patient in the ED.                          Clinical Impression:   Final diagnoses:  [N30.00] Acute cystitis without hematuria (Primary)  [K45.8] Other specified abdominal hernia without obstruction or gangrene        ED Disposition Condition    Discharge Stable          ED Prescriptions       Medication Sig Dispense Start Date End Date Auth. Provider    amoxicillin-clavulanate 875-125mg (AUGMENTIN) 875-125 mg per tablet Take 1 tablet by mouth 2 (two) times daily. for 10 days 14 tablet 1/16/2023 1/26/2023 DELANEY Benjamin          Follow-up Information       Follow up With Specialties Details Why Contact Info    Sammi Buck) MD [facility]   [address] [facility] [facility]  [place] [id number] [phone]      Amilcar Quintero MD General Surgery   [address]  [address]  [place] [id number] [phone]               DELANEY Benjamin  01/16/23 2211

## 2023-01-16 NOTE — DISCHARGE INSTRUCTIONS
Follow up with your PCP about the UTI and Dr. Quintero for the hernia. If you are worse in any way return to the ED.

## 2023-03-05 PROBLEM — L03.90 CELLULITIS: Chronic | Status: ACTIVE | Noted: 2023-01-01

## 2023-03-05 NOTE — FIRST PROVIDER EVALUATION
Emergency Department TeleTriage Encounter Note      CHIEF COMPLAINT    Chief Complaint   Patient presents with    Leg Pain     Pt has a current infection in the left leg that is currently being treated by antibiotics. Pt reporting pain to affected area. +swelling +redness. tearful at this time. Pt also reporting a fall today due to weakness in the extremity. EMS states patient ambulated to stretcher at home.        VITAL SIGNS   Initial Vitals [03/05/23 1259]   BP Pulse Resp Temp SpO2   120/65 94 -- 98.8 °F (37.1 °C) 95 %      MAP       --            ALLERGIES    Review of patient's allergies indicates:   Allergen Reactions    Celexa [citalopram] Nausea And Vomiting       PROVIDER TRIAGE NOTE  Patient with chronic wound infection to the left lower leg that has been worsening. She has been on clindamycin twice but still worsening.       ORDERS  Labs Reviewed - No data to display    ED Orders (720h ago, onward)      None              Virtual Visit Note: The provider triage portion of this emergency department evaluation and documentation was performed via [employer], a HIPAA-compliant telemedicine application, in concert with a tele-presenter in the room. A face to face patient evaluation with one of my colleagues will occur once the patient is placed in an emergency department room.      DISCLAIMER: This note was prepared with [employer] voice recognition transcription software. Garbled syntax, mangled pronouns, and other bizarre constructions may be attributed to that software system.

## 2023-03-05 NOTE — ED NOTES
Commode and warm wipes placed at bs; more warm blankets provided along with food tray; pt refuses to wear hospital gown.

## 2023-03-05 NOTE — ED NOTES
Pt updated on plan of care, resting with blankets provided, in no acute distress, is not in want/need of anything at this time, VSS.

## 2023-03-05 NOTE — ED NOTES
Review of patient's allergies indicates:   Allergen Reactions    Celexa [citalopram] Nausea And Vomiting       APPEARANCE: Alert, oriented x 4, and in no acute distress.  NEURO: 5/5 strength major flexors/extensors bilaterally. Sensory intact to light touch bilaterally. Chisholm coma scale: eyes open spontaneously-4, oriented & converses-5, obeys commands-6. No neurological abnormalities.   CARDIAC: Normal rate and rhythm, S1 and S2 noted, denies CP.   RESPIRATORY:Normal rate and effort, breath sounds clear bilaterally throughout chest anterior and posterior. Respirations are equal and unlabored, no obvious signs of distress. No SOB reported.  PERIPHERAL VASCULAR: +swelling, redness, pain, weakness to LLE calf +skin abrasion noted LLE posterior distal calf near ankle without active bleeding or additional swelling to site; LUE amputation noted; BLE flaky/dry, BUE/BLE nails brown/yellow; +2 peripheral pulses present. Normal cap refill <3sec. Warm to touch.   GASTRO: Abdomen soft, rounded, bowel sounds normal and active in all 4 quadrants, no tenderness, no abdominal distention. Denies NVD.  GENITOURINARY: Voids spontaneously, denies itching, burning, hematuria.

## 2023-03-05 NOTE — ED NOTES
Pt presents to ED for LLE is burning and weakness in BLE, hx of cellulitis, is currently on antibx.

## 2023-03-05 NOTE — ED PROVIDER NOTES
"Encounter Date: 3/5/2023    SCRIBE #1 NOTE: I, Arnav Bonilla, am scribing for, and in the presence of,  Lidia Skinner MD. I have scribed the following portions of the note - Other sections scribed: HPI, ROS, Physical Exam, MDM.     History     Chief Complaint   Patient presents with    Leg Pain     Pt has a current infection in the left leg that is currently being treated by antibiotics. Pt reporting pain to affected area. +swelling +redness. tearful at this time. Pt also reporting a fall today due to weakness in the extremity. EMS states patient ambulated to stretcher at home.      Patient is a 42 year old female who has a past medical history of anemia, asthma, cellulitis, chronic kidney disease stage 3, IDDM type II, hypertension, and hypocalcemia presents to the ED due to leg pain. Patient reports she has chronic left ligament knee problems due to a previous injury. She states her leg "gives out as times" with the most recent fall being today.No LOC or head injury reported. Patient is currently being treated by antibiotics due to cellulitis in the left leg that started two weeks ago. She states she has been compliment and took her last dose of clindamycin yesterday. Patients reports her doctor is Kari Edge. She reports symptoms of swelling, erythema, and weakness to the extremity. No fever, chills, and other associated symptoms reported.           The history is provided by the patient.   Review of patient's allergies indicates:   Allergen Reactions    Celexa [citalopram] Nausea And Vomiting     Past Medical History:   Diagnosis Date    Anemia     Asthma     Cellulitis of abdominal wall 12/18/2017    CKD (chronic kidney disease) stage 3, GFR 30-59 ml/min     Depression     Diabetes mellitus, type II     Diastolic dysfunction     Diverticulosis of intestine with bleeding 9/14/2016    E coli bacteremia 4/21/2018    E. coli pyelonephritis 6/2/2015    E. coli UTI 12/18/2017    Hematuria     Hernia, " epigastric     Hypertension     Hypocalcemia     Nonalcoholic hepatosteatosis     Periumbilical hernia     Proteinuria      Past Surgical History:   Procedure Laterality Date    ARM AMPUTATION AT SHOULDER Left      SECTION       SECTION, CLASSIC      CHOLECYSTECTOMY  age 15    CORONARY ANGIOGRAPHY N/A 2021    Procedure: ANGIOGRAM, CORONARY ARTERY;  Surgeon: Neelam Hicks MD;  Location: [facility] CATH LAB/EP;  Service: Cardiology;  Laterality: N/A;    INCISION AND DRAINAGE OF ABSCESS N/A 2020    Procedure: INCISION AND DRAINAGE, ABSCESS;  Surgeon: Rich Watson MD;  Location: [facility] OR;  Service: General;  Laterality: N/A;    LEFT HEART CATHETERIZATION Left 2021    Procedure: Left heart cath;  Surgeon: Neelam Hicks MD;  Location: [facility] CATH LAB/EP;  Service: Cardiology;  Laterality: Left;    LEFT HEART CATHETERIZATION N/A 2021    Procedure: Left heart cath;  Surgeon: Neelam Hicks MD;  Location: [facility] CATH LAB/EP;  Service: Cardiology;  Laterality: N/A;    TONSILLECTOMY, ADENOIDECTOMY       Family History   Problem Relation Age of Onset    Cancer Father     Heart attack Mother     Heart disease Mother     Cancer Maternal Aunt         lung (smoker)    Heart attack Maternal Aunt     Breast cancer Paternal Grandmother     Heart attack Maternal Grandmother      Social History     Tobacco Use    Smoking status: Every Day     Packs/day: 2.50     Years: 32.00     Pack years: 80.00     Types: Cigarettes     Start date:     Smokeless tobacco: Never    Tobacco comments:     Pt enrolled in the [employer] on 18. ([employer] Member ID # [id number]).  Ambulatory referral to Smoking Cessation Program   Substance Use Topics    Alcohol use: No    Drug use: No     Review of Systems   Constitutional:  Negative for fever.   HENT:  Negative for sore throat.    Respiratory:  Negative for shortness of breath.    Cardiovascular:  Negative for chest pain.   Gastrointestinal:  Negative for  nausea.   Genitourinary:  Negative for dysuria.   Musculoskeletal:  Negative for back pain.        Leg pain and swelling to left calf    Skin:  Positive for color change (erythema to left calf). Negative for rash.   Neurological:  Negative for weakness.   Hematological:  Does not bruise/bleed easily.     Physical Exam     Initial Vitals   BP Pulse Resp Temp SpO2   03/05/23 1259 03/05/23 1259 03/05/23 1421 03/05/23 1259 03/05/23 1259   120/65 94 18 98.8 °F (37.1 °C) 95 %      MAP       --                Physical Exam    Nursing note and vitals reviewed.  Constitutional: No distress.   HENT:   Head: Normocephalic and atraumatic.   Nose: Nose normal.   Eyes: EOM are normal. Pupils are equal, round, and reactive to light.   Neck: Neck supple. No JVD present.   Normal range of motion.  Cardiovascular:  Normal rate, regular rhythm and normal heart sounds.           Pulmonary/Chest: No stridor. No respiratory distress. She has no wheezes. She has no rales.   Abdominal: Abdomen is soft. Bowel sounds are normal.   Musculoskeletal:         General: No tenderness or edema. Normal range of motion.      Cervical back: Normal range of motion and neck supple.      Left lower leg: Swelling present.      Comments: Profuse swelling to the left calf with circumferential erythema and warmth. No area of fluctuance     Neurological: She is alert and oriented to person, place, and time.   Skin: Skin is warm and dry. Capillary refill takes less than 2 seconds. No rash noted. No erythema.       ED Course   Procedures  Labs Reviewed   CBC W/ AUTO DIFFERENTIAL - Abnormal; Notable for the following components:       Result Value    WBC 14.32 (*)     MCHC 31.9 (*)     Immature Granulocytes 0.8 (*)     Gran # (ANC) 8.5 (*)     Immature Grans (Abs) 0.11 (*)     Lymph # 4.9 (*)     All other components within normal limits   COMPREHENSIVE METABOLIC PANEL - Abnormal; Notable for the following components:    Glucose 61 (*)     All other components  within normal limits   CULTURE, BLOOD   CULTURE, BLOOD   LACTIC ACID, PLASMA          Imaging Results    None          Medications   vancomycin (VANCOCIN) 2,500 mg in dextrose 5 % (D5W) 500 mL IVPB (2,500 mg Intravenous New Bag 3/5/23 1505)     Medical Decision Making:   Initial Assessment:   Patient is a 42 year old female who has a past medical history of anemia, asthma, cellulitis, chronic kidney disease stage 3, IDDM type II, hypertension, and hypocalcemia presents to the ED due to leg pain.   Differential Diagnosis:   Differential Diagnosis includes, but is not limited to:  Cellulitis, abscess, necrotizing fasciitis, osteomyelitis, septic joint, MRSA, DVT, drug eruption, allergic/contact dermatitis, EM/SJS, viral exanthem, local trauma/contusion    Clinical Tests:   Lab Tests: Ordered and Reviewed       <> Summary of Lab: CBC shows a white blood cell count of 14.32.  CMP with a glucose of 61.  Lactic acid is normal.  ED Management:  After blood cultures drawn, patient was given vancomycin intravenously here in the emergency department.  I feel she will require admission for further treatment of left lower leg cellulitis after failing oral antibiotic treatment.  She will be admitted by [facility].        Scribe Attestation:   Scribe #1: I performed the above scribed service and the documentation accurately describes the services I performed. I attest to the accuracy of the note.                   Clinical Impression:   Final diagnoses:  [L03.116] Cellulitis of left lower extremity (Primary)        ED Disposition Condition    Observation Stable           I, Dr. Phil Gutierrez, personally performed the services described in this documentation. All medical record entries made by the scribe were at my direction and in my presence. I have reviewed the chart and agree that the record reflects my personal performance and is accurate and complete. Phil Gutierrez MD.  2:39 PM 03/05/2023       Phil Redmond  MD [place]  03/05/23 1529

## 2023-03-05 NOTE — ED NOTES
"Upon walking into patients room pt yelling at this RN. When asking patient what she needed patient continued to yell and state, "get your fucking white ass out of here I don't even like white people anyway." When turning around to leave room a loud thud was heard from an object hitting the floor behind RN. Security called to bedside. Pt continues to yell at this time. Pt then states, "lower this side rail and see what happens to you."  "

## 2023-03-05 NOTE — H&P
"[facility] Medicine H&P Note     Admitting Team: [facility] Hospitalist Team B  Attending Physician: Phil Velázquez, *  Resident: Cornelia Barger MD  Intern: Oseas Johnson MD (Nak)    Date of Admit: 3/5/2023    Chief Complaint     Leg Pain (Pt has a current infection in the left leg that is currently being treated by antibiotics. Pt reporting pain to affected area. +swelling +redness. tearful at this time. Pt also reporting a fall today due to weakness in the extremity. EMS states patient ambulated to stretcher at home. )   for ~2wks    Subjective:      History of Present Illness:  Lele Dias is a 42 y.o. female with a PMHx of Morbid Obesity, Cellulitis, HFrEF (LVEF <40%; June 2022); June 2022) & CAD followed up by Dr Hicks, CKD stage 3, HTN,T2DM, HLD, Cellulitis, Tobacco use, Asthma, Opioid use disorder on MAT, Depression, phantom limb pain in L arm, Gout. The patient presented on 3/5/2023 for evaluation of L leg pain for ~2wks w/ associated swelling, redness and fall today d/t reported weakness in lower extremities w/o LOC or head injury.    In the ED, the patient has been afebrile w/ stable vitals on room air. Labs were notable for elevated WBC and ANC. Pt was started on Vanc while pending B-Cxs' for suspected cellulitis. [facility] IM was consulted for the evaluation and management of L leg pain for ~2wks.    Pt was a poor historian and provided inconsistent, limited history while attempting to collect HPI. Pt stated that she had worsening of leg pain for about ~2wks w/ associated worsening of LLE swelling, redness, and difficulty w/ changes in position while using the restroom involving bending of her L knee. Pt stated that she has been dealing w/ knee pains after a past meniscal w/ chronic LLE leg swelling and skin changes since [place] along w/ periodic falls, last fall today due to feeling like her "legs giving out" w/o LOC or head injury. Pt stated that she has been treated for cellulitis for her " leg in the past before. Pt added that she attempted to scrap off the skin changes at one point prior to presentation. Pt denied SOB, chest pain/pressure, headache, headache, acute focal numbness/weakness, or burning/paresthesia in bilateral lower extremities. Pt also denied dysuria, urgency, frequency, or lower abdominal pains.    Past Medical History:  Past Medical History:   Diagnosis Date    Anemia     Asthma     Cellulitis of abdominal wall 2017    CKD (chronic kidney disease) stage 3, GFR 30-59 ml/min     Depression     Diabetes mellitus, type II     Diastolic dysfunction     Diverticulosis of intestine with bleeding 2016    E coli bacteremia 2018    E. coli pyelonephritis 2015    E. coli UTI 2017    Hematuria     Hernia, epigastric     Hypertension     Hypocalcemia     Nonalcoholic hepatosteatosis     Periumbilical hernia     Proteinuria      Past Surgical History:  Past Surgical History:   Procedure Laterality Date    ARM AMPUTATION AT SHOULDER Left 2000s     SECTION       SECTION, CLASSIC      CHOLECYSTECTOMY  age 15    CORONARY ANGIOGRAPHY N/A 2021    Procedure: ANGIOGRAM, CORONARY ARTERY;  Surgeon: Neelam Hicks MD;  Location: [facility] CATH LAB/EP;  Service: Cardiology;  Laterality: N/A;    INCISION AND DRAINAGE OF ABSCESS N/A 2020    Procedure: INCISION AND DRAINAGE, ABSCESS;  Surgeon: Rich Watson MD;  Location: [facility] OR;  Service: General;  Laterality: N/A;    LEFT HEART CATHETERIZATION Left 2021    Procedure: Left heart cath;  Surgeon: Neelam Hicks MD;  Location: [facility] CATH LAB/EP;  Service: Cardiology;  Laterality: Left;    LEFT HEART CATHETERIZATION N/A 2021    Procedure: Left heart cath;  Surgeon: Neelam Hicks MD;  Location: [facility] CATH LAB/EP;  Service: Cardiology;  Laterality: N/A;    TONSILLECTOMY, ADENOIDECTOMY       Allergies:  Review of patient's allergies indicates:   Allergen Reactions    Celexa [citalopram] Nausea And  "Vomiting     Home Medications:  Prior to Admission medications    Medication Sig Start Date End Date Taking? Authorizing Provider   albuterol (ACCUNEB) 1.25 mg/3 mL Nebu Take 1.25 mg by nebulization every 4 (four) hours as needed.    Historical Provider   allopurinoL (ZYLOPRIM) 100 MG tablet Take 100 mg by mouth once daily.    Historical Provider   aspirin 81 MG Chew Take 1 tablet (81 mg total) by mouth once daily. 3/28/22 3/28/23  Neelam Hicks MD   atorvastatin (LIPITOR) 80 MG tablet Take 1 tablet (80 mg total) by mouth once daily. 3/28/22 3/28/23  Neelam Hicks MD   BASAGLAR KWIKPEN U-100 INSULIN glargine 100 units/mL (3mL) SubQ pen Inject 50 Units into the skin 3 (three) times daily.  Patient not taking: Reported on 6/28/2022 3/10/21   Halima Aldrich MD   BD ULTRA-FINE MINI PEN NEEDLE 31 gauge x 3/16" Ndle  2/23/21   Historical Provider   blood sugar diagnostic Strp TEST BLOOD SUAGR ONCE DAILY 7/17/18   Niharika Cummings PA-C   blood-glucose meter Misc use as directed  Patient taking differently: use as directed 7/17/18   Niharika Cummings PA-C   budesonide-formoterol 160-4.5 mcg (SYMBICORT) 160-4.5 mcg/actuation HFAA Inhale 2 puffs into the lungs every 12 (twelve) hours. Controller  Patient not taking: Reported on 6/28/2022 3/10/21   Halima Aldrich MD   buprenorphine-naloxone (SUBOXONE) 8-2 mg Film Place 3 tablets under the tongue Daily.    Sammi Buck) MD Kely   carvediloL (COREG) 3.125 MG tablet Take 1 tablet (3.125 mg total) by mouth 2 (two) times daily with meals. 3/31/21   Sherri Damian MD   cholecalciferol, vitamin D3, 1,250 mcg (50,000 unit) capsule Take 50,000 Units by mouth every 7 days. 1/18/21   Historical Provider   clopidogreL (PLAVIX) 75 mg tablet Take 1 tablet (75 mg total) by mouth once daily. 3/28/22 3/28/23  Neelam Hicks MD   diclofenac sodium (VOLTAREN) 1 % Gel Apply 2 g topically 4 (four) times daily. 7/2/22   Collin Richardson Jr., MD   docosahexaenoic acid/epa " (FISH OIL ORAL) Take by mouth.    Historical Provider   EScitalopram oxalate (LEXAPRO) 10 MG tablet Take 10 mg by mouth once daily.    Historical Provider   fluticasone propionate (FLONASE) 50 mcg/actuation nasal spray 1 spray (50 mcg total) by Each Nostril route 2 (two) times daily as needed for Rhinitis. 11/17/19   DELANEY Maddox   furosemide (LASIX) 40 MG tablet Take 40 mg by mouth 2 (two) times daily as needed. Only takes as needed for swelling in legs    Historical Provider   HUMALOG MIX 75-25 KWIKPEN 100 unit/mL (75-25) InPn Inject 25 Units into the skin 2 (two) times a day. 3/10/21   Halima Aldrich MD   icosapent ethyL (VASCEPA) 1 gram Cap  3/7/22   Historical Provider   lisinopriL (PRINIVIL,ZESTRIL) 20 MG tablet Take 1 tablet (20 mg total) by mouth once daily. 3/31/21 3/31/22  Sherri Damian MD   medroxyPROGESTERone (DEPO-PROVERA) 150 mg/mL Syrg  1/14/21   Historical Provider   multivitamin with minerals tablet Take 1 tablet by mouth once daily.    Historical Provider   nicotine (NICODERM CQ) 21 mg/24 hr Place 1 patch onto the skin once daily. 2/24/21   Thierno Arteaga MD   nicotine polacrilex 2 MG Lozg Take 1 lozenge (2 mg total) by mouth as needed (prn). 1-2 per hour in the place of cigarette (5-16 daily max) 2/24/21   Thierno Arteaga MD   nitroGLYCERIN (NITROSTAT) 0.4 MG SL tablet Place 0.4 mg under the tongue every 5 (five) minutes as needed for Chest pain.    Historical Provider   nitroglycerin (NITROSTAT) 0.6 MG Subl Place 0.4 mg under the tongue every 5 (five) minutes as needed.    Historical Provider   omega-3 fatty acids/fish oil (FISH OIL-OMEGA-3 FATTY ACIDS) 300-1,000 mg capsule Take 1 capsule by mouth 2 (two) times daily.    Historical Provider   omeprazole (PRILOSEC) 40 MG capsule Take 40 mg by mouth every morning.  5/7/13   Historical Provider   potassium chloride (MICRO-K) 10 MEQ CpSR Take by mouth 2 (two) times daily. 3/7/21   Historical Provider   pregabalin (LYRICA) 150 MG  capsule Take 2 capsules (300 mg total) by mouth 2 (two) times daily.  Patient taking differently: Take 150 mg by mouth 2 (two) times daily. 75MG twice daily 3/4/21   Remington Junior MD   promethazine (PHENERGAN) 6.25 mg/5 mL syrup  3/5/21   Historical Provider     Family History:  Family History   Problem Relation Age of Onset    Cancer Father     Heart attack Mother     Heart disease Mother     Cancer Maternal Aunt         lung (smoker)    Heart attack Maternal Aunt     Breast cancer Paternal Grandmother     Heart attack Maternal Grandmother      Social History:  Social History     Tobacco Use    Smoking status: Every Day     Packs/day: 2.50     Years: 32.00     Pack years: 80.00     Types: Cigarettes     Start date: 1987    Smokeless tobacco: Never    Tobacco comments:     Pt enrolled in the [employer] on 9/12/18. ([employer] Member ID # [id number]).  Ambulatory referral to Smoking Cessation Program   Substance Use Topics    Alcohol use: No    Drug use: No     Review of Systems:  Review of Systems   Constitutional:  Negative for chills and fever.   HENT:  Negative for congestion and sore throat.    Eyes:  Negative for blurred vision.   Respiratory:  Negative for cough and shortness of breath.    Cardiovascular:  Positive for leg swelling. Negative for chest pain, palpitations and orthopnea.   Gastrointestinal:  Negative for heartburn, nausea and vomiting.   Genitourinary:  Negative for dysuria, frequency and urgency.   Musculoskeletal:  Negative for myalgias.   Skin:  Negative for itching and rash.        Pain in skin on LLE   Neurological:  Positive for weakness. Negative for tingling, sensory change and focal weakness.   Psychiatric/Behavioral:  The patient is nervous/anxious.      Health Care Maintenance :   Primary Care Physician: Sammi Edge MD   Immunizations:   Immunization History   Administered Date(s) Administered    Influenza - Quadrivalent - PF *Preferred* (6 months and older) 03/04/2020     Pneumococcal Conjugate - 13 Valent 2014    Pneumococcal Polysaccharide - 23 Valent 2021      Cancer Screening:  PAP: not current  MMG: not current  Colonoscopy: not current    Objective:   Last 24 Hour Vital Signs:  BP  Min: 120/65  Max: 120/65  Temp  Av.8 °F (37.1 °C)  Min: 98.8 °F (37.1 °C)  Max: 98.8 °F (37.1 °C)  Pulse  Av  Min: 94  Max: 94  Resp  Av  Min: 18  Max: 18  SpO2  Av %  Min: 95 %  Max: 95 %  Weight  Av.5 kg (356 lb)  Min: 161.5 kg (356 lb)  Max: 161.5 kg (356 lb)  Body mass index is 65.11 kg/m².  No intake/output data recorded.    Physical Examination:  Physical Exam   Constitutional: She is oriented to person, place, and time. No distress.   HENT:   Head: Normocephalic.   Mouth/Throat: Mucous membranes are moist. Oropharynx is clear.   Eyes: Pupils are equal, round, and reactive to light.   Cardiovascular: Normal rate, regular rhythm and normal pulses. Pulmonary:      Effort: Pulmonary effort is normal.      Breath sounds: Normal breath sounds.     Abdominal: Soft.   Musculoskeletal:         General: Normal range of motion.   Neurological: She is alert and oriented to person, place, and time.   Skin: Capillary refill takes less than 2 seconds.   BLE swelling (L>R), warm/hot, w/ skin changes                Laboratory:  Most Recent Data:  CBC:   Lab Results   Component Value Date    WBC 14.32 (H) 2023    HGB 13.0 2023    HCT 40.8 2023     2023    MCV 92 2023    RDW 14.5 2023     WBC Differential: 8.5 % N (H), 4.9 % L (H), 0.6 % M, 0.2 % Eo, 0.04 % Baso    BMP:   Lab Results   Component Value Date     2023    K 3.6 2023     2023    CO2 24 2023    BUN 18 2023    CREATININE 1.0 2023    GLU 61 (L) 2023    CALCIUM 8.8 2023    MG 1.8 2022    PHOS 3.8 2021     LFTs:   Lab Results   Component Value Date    PROT 7.0 2023    ALBUMIN 3.6 2023     BILITOT 0.4 03/05/2023    AST 13 03/05/2023    ALKPHOS 77 03/05/2023    ALT 14 03/05/2023     Coags:   Lab Results   Component Value Date    INR 1.1 06/18/2022     FLP:   Lab Results   Component Value Date    CHOL 116 (L) 06/18/2022    HDL 30 (L) 06/18/2022    LDLCALC 46.2 (L) 06/18/2022    TRIG 199 (H) 06/18/2022    CHOLHDL 25.9 06/18/2022     DM:   Lab Results   Component Value Date    HGBA1C 7.2 (H) 06/18/2022    HGBA1C 8.2 (H) 01/24/2021    HGBA1C 9.4 (H) 12/11/2020    LDLCALC 46.2 (L) 06/18/2022    CREATININE 1.0 03/05/2023     Thyroid:   Lab Results   Component Value Date    TSH 1.406 01/24/2021    FREET4 1.03 11/15/2010    O1XZJAP 7.5 11/15/2010     Anemia:   Lab Results   Component Value Date    IRON <10 (L) 04/22/2018    TIBC 189 (L) 04/22/2018    FERRITIN 51 08/09/2018    CTSTZBZL17 226 08/09/2018    FOLATE 7.2 11/15/2010     Cardiac:   Lab Results   Component Value Date    TROPONINI 0.126 (H) 06/18/2022    BNP 99 03/02/2021     Urinalysis:   Lab Results   Component Value Date    LABURIN ESCHERICHIA COLI ESBL  >100,000 cfu/ml   (A) 01/16/2023    COLORU Yellow 01/16/2023    SPECGRAV 1.015 01/16/2023    NITRITE Positive (A) 01/16/2023    KETONESU Negative 01/16/2023    UROBILINOGEN Negative 01/16/2023    WBCUA 30 (H) 01/16/2023     Trended Lab Data:  Recent Labs   Lab 03/05/23  1348   WBC 14.32*   HGB 13.0   HCT 40.8      MCV 92   RDW 14.5      K 3.6      CO2 24   BUN 18   CREATININE 1.0   GLU 61*   PROT 7.0   ALBUMIN 3.6   BILITOT 0.4   AST 13   ALKPHOS 77   ALT 14     Trended Cardiac Data:  No results for input(s): TROPONINI, CKTOTAL, CKMB, BNP in the last 168 hours.  Microbiology Data:  Microbiology Results (last 7 days)       Procedure Component Value Units Date/Time    Blood culture #2 **CANNOT BE ORDERED STAT** [[id number]] Collected: 03/05/23 1425    Order Status: Sent Specimen: Blood Updated: 03/05/23 1425    Blood culture #1 **CANNOT BE ORDERED STAT** [[id number]] Collected:  03/05/23 1348    Order Status: Sent Specimen: Blood from Peripheral, Antecubital, Right Updated: 03/05/23 1348          Other Results:  EKG (my interpretation): none for this presentation    Radiology:  No results found.     Imaging:  US LE Veins on L (pending)    Assessment:     Lele Dias is a 42 y.o. female with a PMHx of Morbid Obesity, Cellulitis, HFrEF (LVEF <40%; June 2022) & CAD followed up by Dr Hicks, CKD stage 3, HTN,T2DM, HLD, GERD, Gout, Tobacco use, Asthma, Depression, Opioid use disorder on MAT, phantom limb pain in L arm. The patient presented on 3/5/2023 for evaluation of L leg pain for ~2wks w/ associated swelling, redness and fall today d/t reported weakness in lower extremities w/o LOC or head injury. Limited inconsistent hx provided on initial interview. DDx considered are Cellulitis v DVT.      Plan:     Non-purulent Cellulitis LLE Edema  Initial work-up in ED notable for elevated WBC 14.32 and ANC 8.5; afebrile w/ stable vitals on room air  Pt has past tx hx of Clindamycin  B-Cxs pending while on Vanc and Ceftriaxone for skin infxn coverage at this time  Though pt denies any symptoms concerning for UTI, given limited hx: pending U/A  Pending ID Recs; appreciate recs  Wound Care order placed; appreciate help/recs    DVT  Continue home ASA and Plavix  US LE Veins on L (pending)    Hx of HFrEF and CAD  NM stress test in June 2022 showed LVEF <40%  BLE swelling (L>R) noted on physical exam  Pt has been clinically stable on room air as well as not complaining of chest pains or SOB at this time  Continue home Carvedilol  TTE pending    Asthma  Continue home Symbicort qhs and Albuterol PRN    HTN  Continue home Lisinopril    HLD  Continue home Atorvastatin    T2DM  June 2022 A1c (7.2); glucose 61  Held unclear DM med regiment;   Started Humalog and Lantus w/ SSI and glucose checks  Pending A1c    Depression   Continue home Lexapro    GERD  Continue home Pantoprazole    Phantom limb pain in L  "arm  Continue home Pregabalin    Hx of Opioid use disorder  Continue home Suboxone  Consulted Psych; appreciate recs    Tobacco use  Pt stated smoking at least 1PPD since 10 yo  Nicotine patches PRN and  about smoking cessation    Ppx: DAPT  Diet: Diabetic  Code: Full    Disposition: pending resolution/improvement of LLE pain    Oseas "Christal" MD Alex  [facility] Internal Medicine, PGY-1    [facility] Medicine Hospitalist Pager numbers:   [facility] Hospitalist Medicine Team A (Omar/Angelo): [phone]  [facility] Hospitalist Medicine Team B (Briana/Yolie):  [phone]    "

## 2023-03-06 PROBLEM — Z51.5 COMFORT MEASURES ONLY STATUS: Status: ACTIVE | Noted: 2023-01-01

## 2023-03-06 NOTE — ED NOTES
Dear Lyudmila,    Your recent test results are attached.      Degenerative changes noted, no fractures on x-ray.    If you have any questions please feel free to contact [phone] or myself via [employer].    Sincerely,  Mariana Martinez, CNP   Pt. Calling on light and screaming out/cursing. She is complaining that she is still in ED. She is verbally abusive to staff and threatening to leave to go smoke. She is demanding a second Nicotine patch to be applied. Explained to patient that an inpatient bed has just been delivered to place patient in. She continues to be aggressive and threatening toward staff. Admit team updated by STEVEN Patton RN.

## 2023-03-06 NOTE — NURSING
"At bedside hand off pt refused to look staff in the eye, sitting on chair near door, stating "I'm getting close to leaving if I don't get a cigarette." Pt was informed that she can leave ama if she would like but that she would not be allowed to leave to smoke at this time.   "

## 2023-03-06 NOTE — PHARMACY MED REC
"Admission Medication History     The home medication history was taken by Ade Villegas CPhT.    Medication history obtained from, Patient Verified    You may go to "Admission" then "Reconcile Home Medications" tabs to review and/or act upon these items.     The home medication list has been updated by the Pharmacy department.   Please read ALL comments highlighted in yellow.   Please address this information as you see fit.    Feel free to contact us if you have any questions or require assistance.      The medications listed below were removed from the home medication list.  Please reorder if appropriate:  Patient reports no longer taking the following medication(s):  Amoxicillin 875 mg  Vitamin D3 50,000 units  Clindamycin 300 mg  Diclofenac 1% gel  Omega 3 Fish Oil 1,000 mg  Fluconazole 150 mg  Flonase 50 mcg   Vascepa 1 gram  Multivitamin   Nicotine 2 mg Fernando  Nicoderm 21 mg/24H patch  Bactrim -160 mg      Ade Villegas CPhT.  Ext [phone]               .        "

## 2023-03-06 NOTE — DISCHARGE SUMMARY
"[facility] Hospital Medicine Discharge Summary    Primary Team: [facility] Hospitalist Team B  Attending Physician: Davie Warren MD  Resident: Rupinder Cameron MD  Intern: Oseas Johnson MD (Nak)    Date of Admit: 3/5/2023  Date of Discharge: 3/6/2023    Discharge to: Home and left AMA  Condition: Pt had capacity to refuse medical treatment    Discharge Diagnoses     Patient Active Problem List   Diagnosis    Chronic combined systolic and diastolic congestive heart failure    Asthma    Benign essential hypertension    BRIDGET (acute kidney injury)    Nonalcoholic hepatosteatosis    Opioid dependence on agonist therapy    Phantom limb pain    Periumbilical hernia    UTI due to Klebsiella species    NSTEMI (non-ST elevated myocardial infarction)    Tobacco use disorder    Anemia    Sepsis due to urinary tract infection    Morbid obesity with BMI of 70 and over, adult    Opioid abuse    Coronary artery disease involving native coronary artery of native heart without angina pectoris    Hypoglycemia associated with type 2 diabetes mellitus    Benzodiazepine abuse    Grief reaction    Debilitated    Chest pain in adult    Leg edema, left    Type 2 diabetes mellitus with diabetic polyneuropathy, with long-term current use of insulin    Constipation    Cellulitis       Consultants and Procedures     Consultants:  ID, Wound Care, Psych    Procedures:   None    Imaging:  DVT U/S unremarkable    Brief History of Present Illness & Summary of Hospital Course      Lele Dias is a 42 y.o. female with a PMHx of Morbid Obesity, Cellulitis, HFrEF (LVEF <40%; June 2022); June 2022) & CAD followed up by Dr Hicks, CKD stage 3, HTN,T2DM, HLD, Cellulitis, Tobacco use, Asthma, Opioid use disorder on MAT, Depression, phantom limb pain in L arm, Gout. The patient presented on 3/5/2023 for evaluation of L leg pain for ~2wks w/ associated swelling, redness and fall today d/t reported weakness in lower extremities w/o LOC or head injury.     On the " "following morning, pt left AMA and was evaluated by NF resident which plan of care note below:    [facility] Internal Medicine AMA Summary (3/6/23 ~0400hrs):  "Called to bedside due to patient yelling at nursing staff. While being repositioned in bed, a small green pill with the marking TEVA 833 (later to be identified in clonazepam) was found in her bed. Police were notified and staffed informed the patient that we would need to search her belongs to confirm she did not have any more non hospital prescribed medications in her belongings. This made her very upset, and she refused to let the nursing staff search her purse. After calming down the patient, her pursue and bags were inspected, and no other narcotic medications were found. Despite being told that she could keep her belongings and we no longer needed to search them, she became very defensive and began cursing at the nursing staff and police. She then requested to leave the hospital and go home. I discussed the risk of leaving without receiving further IV antibiotic treatments for her skin infection. I informed her that leaving would be against medical advice and that her infection would worsen, spread to her blood/ other organs, and could potentially result in death. She was aware of the risk, able to repeat them to me, and she adamantly continued to request to leave. When presented with the leaving against medical advice paper work, she tore it in half and threw it on the ground. Patient was escorted off campus by security while she continued to threaten and yell at staff. Informed patient to please present to any emergency room for continued treatment, and I prescribed a course of antibiotics to her pharmacy should she not present to another ED."     Lei Tong MD  [facility] Internal Medicine HO-2    For the full HPI please refer to the History & Physical from this admission.    Discharge Vitals and Physical Exam:   Last 24 Hour Vital Signs:  BP  Min: 101/56  " Max: 132/66  Temp  Av.2 °F (36.8 °C)  Min: 97.6 °F (36.4 °C)  Max: 98.8 °F (37.1 °C)  Pulse  Av.8  Min: 85  Max: 94  Resp  Av.5  Min: 16  Max: 20  SpO2  Av.3 %  Min: 92 %  Max: 99 %  Weight  Av.5 kg (356 lb)  Min: 161.5 kg (356 lb)  Max: 161.5 kg (356 lb)  Body mass index is 65.11 kg/m².  I/O last 3 completed shifts:  In: 50 [IV Piggyback:50]  Out: -     Physical Examination:  Physical Exam   Constitutional: She is oriented to person, place, and time. No distress.   HENT:   Head: Normocephalic.   Mouth/Throat: Mucous membranes are moist. Oropharynx is clear.   Eyes: Pupils are equal, round, and reactive to light.   Cardiovascular: Normal rate, regular rhythm and normal pulses. Pulmonary:      Effort: Pulmonary effort is normal.      Breath sounds: Normal breath sounds.  Abdominal: Soft.   Musculoskeletal:         General: Normal range of motion.   Neurological: She is alert and oriented to person, place, and time.   Skin: Capillary refill takes less than 2 seconds.   BLE swelling (L>R), warm/hot, w/ skin changes                Hospital Course By Problem with Pertinent Findings     Non-purulent Cellulitis LLE Edema  Initial work-up in ED notable for elevated WBC 14.32 and ANC 8.5; afebrile w/ stable vitals on room air  Pt has past tx hx of Clindamycin  B-Cxs pending while on Vanc and Ceftriaxone for skin infxn coverage at this time  Though pt denies any symptoms concerning for UTI, given limited hx: pending U/A  Wound Care and ID consult orders were placed  Pt was advised for reasons to return to ER and rx'd course of abx to pt's pharmacy should she not present to another ED.      DVT  Continue home ASA and Plavix  US LE Veins on L was unremarkable for DVT     Hx of HFrEF and CAD  NM stress test in 2022 showed LVEF <40%  BLE swelling (L>R) noted on physical exam  Pt has been clinically stable on room air as well as not complaining of chest pains or SOB at this time  Continue home  "Carvedilol  TTE was ordered but not completed prior to pt leaving AMA  Recommend to follow-up w/ PCP     Asthma  Continue home Symbicort qhs and Albuterol PRN  Recommend to follow-up w/ PCP     HTN  Continue home Lisinopril  Recommend to follow-up w/ PCP     HLD  Continue home Atorvastatin  Recommend to follow-up w/ PCP     T2DM  June 2022 A1c (7.2); glucose 61  Held unclear DM med regiment;   Recommend to follow-up w/ PCP     Depression   Continue home Lexapro  Recommend to follow-up w/ PCP     GERD  Continue home Pantoprazole  Recommend to follow-up w/ PCP     Phantom limb pain in L arm  Continue home Pregabalin  Recommend to follow-up w/ PCP     Hx of Opioid use disorder  Continue home Suboxone  Follow-up w/ PCP     Tobacco use  Pt stated smoking at least 1PPD since 12 yo  Nicotine patches PRN and  about smoking cessation    Discharge Medications        Medication List        START taking these medications      cephALEXin 500 MG capsule  Commonly known as: KEFLEX  Take 1 capsule (500 mg total) by mouth every 6 (six) hours. for 6 days            ASK your doctor about these medications      acetaminophen 500 MG tablet  Commonly known as: TYLENOL     * albuterol 1.25 mg/3 mL Nebu  Commonly known as: ACCUNEB     * albuterol 90 mcg/actuation inhaler  Commonly known as: PROVENTIL/VENTOLIN HFA     allopurinoL 300 MG tablet  Commonly known as: ZYLOPRIM  Ask about: Which instructions should I use?     aspirin 81 MG EC tablet  Commonly known as: ECOTRIN  Ask about: Which instructions should I use?     atorvastatin 80 MG tablet  Commonly known as: LIPITOR  Take 1 tablet (80 mg total) by mouth once daily.     BD ULTRA-FINE MINI PEN NEEDLE 31 gauge x 3/16" Ndle  Generic drug: pen needle, diabetic     BREZTRI AEROSPHERE 160-9-4.8 mcg/actuation Hfaa  Generic drug: budesonide-glycopyr-formoterol     budesonide-formoterol 160-4.5 mcg 160-4.5 mcg/actuation Hfaa  Commonly known as: SYMBICORT  Inhale 2 puffs into the lungs " every 12 (twelve) hours. Controller     buprenorphine-naloxone 8-2 mg 8-2 mg  Commonly known as: SUBOXONE     carvediloL 3.125 MG tablet  Commonly known as: COREG  Take 1 tablet (3.125 mg total) by mouth 2 (two) times daily with meals.     clopidogreL 75 mg tablet  Commonly known as: PLAVIX  Take 1 tablet (75 mg total) by mouth once daily.     EScitalopram oxalate 10 MG tablet  Commonly known as: LEXAPRO     furosemide 40 MG tablet  Commonly known as: LASIX     HumaLOG Mix 75-25 KwikPen 100 unit/mL (75-25) Inpn  Generic drug: insulin lispro protamin-lispro  Inject 25 Units into the skin 2 (two) times a day.     hydrocortisone 2.5 % cream  Ask about: Which instructions should I use?     LANTUS SOLOSTAR U-100 INSULIN glargine 100 units/mL SubQ pen  Generic drug: insulin  Ask about: Which instructions should I use?     LINZESS ORAL     lisinopriL 10 MG tablet  Ask about: Which instructions should I use?     medroxyPROGESTERone 150 mg/mL Syrg  Commonly known as: DEPO-PROVERA     MOUNJARO 2.5 mg/0.5 mL Pnij  Generic drug: tirzepatide     nitroGLYCERIN 0.4 MG SL tablet  Commonly known as: NITROSTAT  Ask about: Which instructions should I use?     omeprazole 20 MG capsule  Commonly known as: PRILOSEC  Ask about: Which instructions should I use?     potassium chloride 10 MEQ Cpsr  Commonly known as: MICRO-K     pregabalin 150 MG capsule  Commonly known as: LYRICA  Take 2 capsules (300 mg total) by mouth 2 (two) times daily.     promethazine 6.25 mg/5 mL syrup  Commonly known as: PHENERGAN     tiZANidine 4 MG tablet  Commonly known as: ZANAFLEX     TRUE METRIX GLUCOSE METER Misc  Generic drug: blood-glucose meter  use as directed     TRUE METRIX GLUCOSE TEST STRIP Strp  Generic drug: blood sugar diagnostic  TEST BLOOD SUAGR ONCE DAILY           * This list has 2 medication(s) that are the same as other medications prescribed for you. Read the directions carefully, and ask your doctor or other care provider to review them  "with you.                   Where to Get Your Medications        These medications were sent to [id number] - [place] - [address] [place] [address]      Phone: [phone]   cephALEXin 500 MG capsule          Discharge Information:   Diet:  Diabetic    Physical Activity:  As tolerated             Instructions:  1. Take all medications as prescribed  2. Keep all follow-up appointments  3. Return to the hospital or call your primary care physicians if any worsening symptoms such as fever, chest pain, shortness of breath, return of symptoms, or any other concerns.    Follow-Up Appointments:  Recommend to follow-up w/ PCP    Oseas Johnson MD (Nak)  [facility] Internal Medicine, PGY-1    "

## 2023-03-06 NOTE — ED NOTES
"Pt upset security would not allow daughter and daughter's bf to visit due to not having identification; pt made racist comments towards Bozena RN and other staff for answering call light, pt states "I don't like white people, my children and  are black, they just don't want to let my daughter back because she's black." Pt states she will leave if does not get a bed soon because doesn't want to be in ER anymore.     This RN attempted to de-escalate situation by explaining why security is needed and that I will check on her personally if in need of anything else during shift. Pt is also on the phone with friend who is seeming to calm the pt.  "

## 2023-03-06 NOTE — PLAN OF CARE
[facility] Internal Medicine AMA Summary    Called to bedside due to patient yelling at nursing staff. While being repositioned in bed, a small green pill with the marking TEVA 833 (later to be identified in clonazepam) was found in her bed. Police were notified and staffed informed the patient that we would need to search her belongs to confirm she did not have any more non hospital prescribed medications in her belongings. This made her very upset, and she refused to let the nursing staff search her purse. After calming down the patient, her pursue and bags were inspected, and no other narcotic medications were found. Despite being told that she could keep her belongings and we no longer needed to search them, she became very defensive and began cursing at the nursing staff and police. She then requested to leave the hospital and go home. I discussed the risk of leaving without receiving further IV antibiotic treatments for her skin infection. I informed her that leaving would be against medical advice and that her infection would worsen, spread to her blood/ other organs, and could potentially result in death. She was aware of the risk, able to repeat them to me, and she adamantly continued to request to leave. When presented with the leaving against medical advice paper work, she tore it in half and threw it on the ground. Patient was escorted off campus by security while she continued to threaten and yell at staff. Informed patient to please present to any emergency room for continued treatment, and I prescribed a course of antibiotics to her pharmacy should she not present to another ED.    Lei Tong MD  [facility] Internal Medicine HO-2

## 2023-03-06 NOTE — PLAN OF CARE
Per notes, pt has left AMA.        03/06/23 0914   Final Note   Assessment Type Final Discharge Note   Anticipated Discharge Disposition   (AMA)   Post-Acute Status   Discharge Delays None known at this time

## 2023-03-06 NOTE — CONSULTS
"PSYCHIATRY INPATIENT CONSULT NOTE      3/6/2023 7:00 AM   Lele Dias   1980   [id number]           DATE OF ADMISSION: 3/5/2023  1:05 PM    SITE: [facility]      I attempted but was unable to see this patient for a psychiatric consultation this morning due to the patient leaving Against Medical Advice (AMA) overnight.      Per Primary Team overnight at 4:03 AM:    "[facility] Internal Medicine AMA Summary  Called to bedside due to patient yelling at nursing staff. While being repositioned in bed, a small green pill with the marking TEVA 833 (later to be identified in clonazepam) was found in her bed. Police were notified and staffed informed the patient that we would need to search her belongs to confirm she did not have any more non hospital prescribed medications in her belongings. This made her very upset, and she refused to let the nursing staff search her purse. After calming down the patient, her pursue and bags were inspected, and no other narcotic medications were found. Despite being told that she could keep her belongings and we no longer needed to search them, she became very defensive and began cursing at the nursing staff and police. She then requested to leave the hospital and go home. I discussed the risk of leaving without receiving further IV antibiotic treatments for her skin infection. I informed her that leaving would be against medical advice and that her infection would worsen, spread to her blood/ other organs, and could potentially result in death. She was aware of the risk, able to repeat them to me, and she adamantly continued to request to leave. When presented with the leaving against medical advice paper work, she tore it in half and threw it on the ground. Patient was escorted off campus by security while she continued to threaten and yell at staff. Informed patient to please present to any emergency room for continued treatment, and I prescribed a course of antibiotics to her " "pharmacy should she not present to another ED.  Lei Tong MD  [facility] Internal Medicine HO-2"      Wilver Perez MD  [facility]   03/06/2023    "

## 2023-03-06 NOTE — ED NOTES
Requested inpatient bed to be brought to ED for patient boarding. Pt. Is uncomfortable. She is hostile and verbally aggressive/cursing at ED staff periodically.

## 2023-03-06 NOTE — ED NOTES
"Pt transferred to inpatient bed. Pt states "This isn't going to shut me up. I'm not staying down here." Pt given education on bed assignment logistics.   "

## 2023-03-06 NOTE — MEDICAL/APP STUDENT
PSYCHIATRY INPATIENT CONSULT NOTE      3/6/2023 8:38 AM   Lele Dias   1980   [id number]           DATE OF ADMISSION: 3/5/2023  1:05 PM    SITE: [facility]    CURRENT LEGAL STATUS: Patient does not currently meet PEC/CEC criteria due to not currently being an imminent threat to self/others and not being gravely disabled 2/2 mental illness at this time.       HISTORY    Chief Complaint / Reason for Psychiatry Consult: ***      HPI   Lele Dias is a 42 y.o. female with a past medical history of *** and a past psychiatric history of ***, currently being treated by their inpatient primary treatment team for a principal problem of ***.  Psychiatry was originally consulted as noted above.  The patient was seen and examined.  The chart was reviewed.  On examination today, the patient ***.  *** denies any current/recent passive/active SI/HI.  *** denies any adverse effects to their current medication regimen.  Regarding current medical/physical complaints, *** endorses ***.  *** denies any other medical complaints at this time.  NAD was observed during the examination.  After discussing the risks, benefits, alt vs no treatment, *** is agreeable and desiring a trial of *** in an effort to improve ***.     ***    Collateral:  ***      Psychiatric Review Of Systems - Currently, the patient is endorsing and/or denying the following:    Symptoms of Depression: diminished mood or loss of interest/anhedonia; irritability, diminished energy, change in sleep, change in appetite, diminished concentration or cognition or indecisiveness, PMA/R, excessive guilt or hopelessness or worthlessness, suicidal ideations    Sleep: initiation, maintenance, early morning awakening with inability to return to sleep    Suicidal/Homicidal ideations: active/passive ideations, organized plans, future intentions    Symptoms of psychosis: hallucinations, delusions, disorganized thinking, disorganized behavior or abnormal motor  "behavior, or negative symptoms (diminshed emotional expression, avolition, anhedonia, alogia, asociality     Symptoms of dom or hypomania: elevated, expansive, or irritable mood with increased energy or activity; with inflated self-esteem or grandiosity, decreased need for sleep, increased rate of speech, FOI or racing thoughts, distractibility, increased goal directed activity or PMA, risky/disinhibited behavior    Symptoms of LIEN: excessive anxiety/worry/fear, more days than not, about numerous issues, difficult to control, with restlessness, fatigue, poor concentration, irritability, muscle tension, sleep disturbance; causes functionally impairing distress     Symptoms of Panic Disorder: recurrent panic attacks, precipitated or un-precipitated, source of worry and/or behavioral changes secondary; with or without agoraphobia    Symptoms of PTSD: h/o trauma; re-experiencing/intrusive symptoms, avoidant behavior, negative alterations in cognition or mood, or hyperarousal symptoms; with or without dissociative symptoms     Symptoms of OCD: obsessions or compulsions     Symptoms of Eating Disorders: anorexia, bulimia or binging    Substance Use: intoxication, withdrawal, tolerance, used in larger amounts or duration than intended, unsuccessful attempts to limit or quit, increased time engaging in or seeking out, cravings or strong desire to use, failure to fulfill obligations, negative consequences in social/interpersonal/occupational,/recreational areas, use in dangerous situations, medical or psychological consequences       Psychiatric Review Of Systems - Is patient experiencing or having changes in:  sleep: {YES/NO:92220::"no"}  appetite: {YES/NO:48643::"no"}  weight: {YES/NO:41147::"no"}  energy/anergy: {YES/NO:61510::"no"}  interest/pleasure/anhedonia: {YES/NO:90667::"no"}  somatic symptoms: {YES/NO:32853::"no"}  libido: {YES/NO:47199::"no"}  guilty/hopelessness: {YES/NO:55344::"no"}  concentration: " "{YES/NO:18581::"no"}  S.I.B.s/risky behavior: {YES/NO:01124::"no"}  SI/SA:  {YES/NO:18581::"no"}    anxiety/panic: {YES/NO:87554::"no"}  Agoraphobia:  {YES/NO:93088::"no"}  Social phobia:  {YES/NO:18581::"no"}  Recurrent nightmares:  {YES/NO:93649::"no"}  hyper startle response:  {YES/NO:48268::"no"}  Avoidance: {YES/NO:18581::"no"}  Recurrent thoughts:  {YES/NO:18581::"no"}  Recurrent behaviors:  {YES/NO:18581::"no"}    Irritability: {YES/NO:18581::"no"}  Racing thoughts: {YES/NO:18581::"no"}  Impulsive behaviors: {YES/NO:18581::"no"}  Pressured speech:  {YES/NO:18581::"no"}    Paranoia:{YES/NO:18581::"no"}  Delusions: {YES/NO:18581::"no"}  AVH:{YES/NO:18581::"no"}      PSYCHOTHERAPY ADD-ON +64726   30 (16-37*) minutes    Time: *** minutes  Participants: Met with patient    Therapeutic Intervention Type: behavior modifying psychotherapy, supportive psychotherapy  Why chosen therapy is appropriate versus another modality: relevant to diagnosis, patient responds to this modality, evidence based practice    Target symptoms: {PSY TARGET SYMPTOMS:34876}  Primary focus: ***  Psychotherapeutic techniques: supportive and psychodynamic techniques; psycho-education; deep breathing exercises; CBT; problem solving techniques and managing life stressors    Outcome monitoring methods: self-report, observation    Patient's response to intervention:  The patient's response to intervention is {response:32539}.    Progress toward goals:  The patient's progress toward goals is {progress:29282}.      ROS  General ROS: negative for - chills, fatigue, fever or night sweats  Ophthalmic ROS: negative for - blurry vision, double vision or eye pain  ENT ROS: negative for - sinus pain, headaches, sore throat or visual changes  Allergy and Immunology ROS: negative for - hives, itchy/watery eyes or nasal congestion  Hematological and Lymphatic ROS: negative for - bleeding problems, bruising, jaundice or pallor  Endocrine ROS: negative for - " galactorrhea, hot flashes, mood swings, palpitations or temperature intolerance  Respiratory ROS: negative for - cough, hemoptysis, shortness of breath, tachypnea or wheezing  Cardiovascular ROS: negative for - chest pain, dyspnea on exertion, loss of consciousness, palpitations, rapid heart rate or shortness of breath  Gastrointestinal ROS: negative for - appetite loss, nausea, abdominal pain, blood in stools, change in bowel habits, constipation or diarrhea  Genito-Urinary ROS: negative for - incontinence, nocturia or pelvic pain  Musculoskeletal ROS: negative for - joint stiffness, joint swelling, joint pain or muscle pain   Neurological ROS: negative for - behavioral changes, confusion, dizziness, memory loss, numbness/tingling or seizures  Dermatological ROS: negative for dry skin, hair changes, pruritus or rash  Psychiatric ROS: see detailed psychiatric ROS above in history section       Past Psychiatric History:  Previous Medication Trials: {Responses; yes/no/unknown:74}   Previous Psychiatric Hospitalizations: {Responses; yes/no/unknown:74}   Previous Suicide Attempts: {Responses; yes/no/unknown:74}   History of Violence: {Responses; yes/no/unknown:74}  Outpatient Psychiatrist: {Responses; yes/no/unknown:74}    Social History:  Marital Status: {GEN; MARITAL STATUS:44413}  Children: {NUMBERS 0-12:51236}   Employment Status/Info: {DESC; EMPLOYMENT STATUS:44625}  Education: {misc; education:81142}  Special Ed: {Responses; yes/no/unknown:74}  : {Responses; yes/no/unknown:74}  Quaker: {Responses; yes/no/unknown:74}  Housing Status: {Responses; yes/no/unknown:74}  Hobbies/Leisure time: {Responses; yes/no/unknown:74}  History of phys/sexual abuse: {Responses; yes/no/unknown:74}  Access to gun: {Responses; yes/no/unknown:74}    Family Psychiatric History: {Responses; yes/no/unknown:74}    Substance Abuse History:  Recreational Drugs: {recreationaldrugs:76782}  Use of Alcohol: {etoh use:90472}  Rehab  "History:{Responses; yes/no/unknown:74}   Tobacco Use:{Responses; yes/no/unknown:74}  Use of Caffeine: { :79242}  Use of OTC: ***  Legal consequences of chemical use: {no/yes:557819::"no"}    Legal History:  Past Charges/Incarcerations:{Responses; yes/no/unknown:74}, ***   Pending charges:{Responses; yes/no/unknown:74}     Psychosocial Stressors: { :29631}.   Functioning Relationships: {Psychfxinrelationships:64466}  Strengths AND Liabilities  {PSY STRENGTHS/LIABILITIES:68160}      PAST MEDICAL & SURGICAL HISTORY   Past Medical History:   Diagnosis Date    Anemia     Asthma     Cellulitis of abdominal wall 2017    CKD (chronic kidney disease) stage 3, GFR 30-59 ml/min     Depression     Diabetes mellitus, type II     Diastolic dysfunction     Diverticulosis of intestine with bleeding 2016    E coli bacteremia 2018    E. coli pyelonephritis 2015    E. 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     SECTION       SECTION, CLASSIC      CHOLECYSTECTOMY  age 15    CORONARY ANGIOGRAPHY N/A 2021    Procedure: ANGIOGRAM, CORONARY ARTERY;  Surgeon: Neelam Hicks MD;  Location: [facility] CATH LAB/EP;  Service: Cardiology;  Laterality: N/A;    INCISION AND DRAINAGE OF ABSCESS N/A 2020    Procedure: INCISION AND DRAINAGE, ABSCESS;  Surgeon: Rich Watson MD;  Location: [facility] OR;  Service: General;  Laterality: N/A;    LEFT HEART CATHETERIZATION Left 2021    Procedure: Left heart cath;  Surgeon: Neelam Hicks MD;  Location: [facility] CATH LAB/EP;  Service: Cardiology;  Laterality: Left;    LEFT HEART CATHETERIZATION N/A 2021    Procedure: Left heart cath;  Surgeon: Neelam Hicks MD;  Location: [facility] CATH LAB/EP;  Service: Cardiology;  Laterality: N/A;    " TONSILLECTOMY, ADENOIDECTOMY         NEUROLOGIC HISTORY  Seizures: ***   Head trauma: ***     FAMILY HISTORY   Family History   Problem Relation Age of Onset    Cancer Father     Heart attack Mother     Heart disease Mother     Cancer Maternal Aunt         lung (smoker)    Heart attack Maternal Aunt     Breast cancer Paternal Grandmother     Heart attack Maternal Grandmother        ALLERGIES   Review of patient's allergies indicates:   Allergen Reactions    Celexa [citalopram] Nausea And Vomiting       CURRENT MEDICATION REGIMEN   Home Meds:   Prior to Admission medications    Medication Sig Start Date End Date Taking? Authorizing Provider   acetaminophen (TYLENOL) 500 MG tablet Take 500 mg by mouth every 6 (six) hours as needed for Pain.   Yes Historical Provider   allopurinoL (ZYLOPRIM) 300 MG tablet Take 300 mg by mouth once daily.   Yes Historical Provider   aspirin (ECOTRIN) 81 MG EC tablet Take 81 mg by mouth once daily.   Yes Historical Provider   atorvastatin (LIPITOR) 80 MG tablet Take 1 tablet (80 mg total) by mouth once daily. 3/28/22 3/28/23 Yes Neelam Hicks MD   BREZTRI AEROSPHERE 160-9-4.8 mcg/actuation HFAA Take 2 puffs by mouth 2 (two) times daily. 2/20/23  Yes Historical Provider   budesonide-formoterol 160-4.5 mcg (SYMBICORT) 160-4.5 mcg/actuation HFAA Inhale 2 puffs into the lungs every 12 (twelve) hours. Controller 3/10/21  Yes Halima Aldrich MD   buprenorphine-naloxone (SUBOXONE) 8-2 mg Film Place 1 each under the tongue 3 (three) times daily.   Yes Sammi Buck) MD [place]   carvediloL (COREG) 3.125 MG tablet Take 1 tablet (3.125 mg total) by mouth 2 (two) times daily with meals. 3/31/21  Yes Sherri Damian MD   clopidogreL (PLAVIX) 75 mg tablet Take 1 tablet (75 mg total) by mouth once daily. 3/28/22 3/28/23 Yes Neelam Hicks MD   EScitalopram oxalate (LEXAPRO) 10 MG tablet Take 10 mg by mouth once daily.   Yes Historical Provider   HUMALOG MIX 75-25 KWIKPEN 100 unit/mL  "(75-25) InPn Inject 25 Units into the skin 2 (two) times a day.  Patient taking differently: Inject 125 Units into the skin 3 (three) times daily. 3/10/21  Yes Halima Aldrich MD   hydrocortisone 2.5 % cream Apply topically 2 (two) times daily as needed. 12/9/22  Yes Historical Provider   insulin (LANTUS SOLOSTAR U-100 INSULIN) glargine 100 units/mL SubQ pen Inject 165 Units into the skin 3 (three) times daily.   Yes Historical Provider   linaclotide (LINZESS ORAL) Take 1 tablet by mouth daily as needed.   Yes Historical Provider   lisinopriL 10 MG tablet Take 10 mg by mouth once daily. 2/20/23  Yes Historical Provider   MOUNJARO 2.5 mg/0.5 mL PnIj Inject 2.5 mg into the skin every Tuesday. 2/16/23  Yes Historical Provider   nitroGLYCERIN (NITROSTAT) 0.4 MG SL tablet Place 0.4 mg under the tongue every 5 (five) minutes as needed for Chest pain.   Yes Historical Provider   pregabalin (LYRICA) 150 MG capsule Take 2 capsules (300 mg total) by mouth 2 (two) times daily.  Patient taking differently: Take 150 mg by mouth 2 (two) times daily. 3/4/21  Yes Remington Junior MD   promethazine (PHENERGAN) 6.25 mg/5 mL syrup Take 12.5 mg by mouth every 6 (six) hours as needed. 3/5/21  Yes Historical Provider   tiZANidine (ZANAFLEX) 4 MG tablet Take 4 mg by mouth every 8 (eight) hours as needed. 2/24/23  Yes Historical Provider   albuterol (ACCUNEB) 1.25 mg/3 mL Nebu Take 1.25 mg by nebulization every 4 (four) hours as needed.    Historical Provider   albuterol (PROVENTIL/VENTOLIN HFA) 90 mcg/actuation inhaler Inhale 2 puffs into the lungs every 4 to 6 hours as needed. 12/29/22   Historical Provider   BD ULTRA-FINE MINI PEN NEEDLE 31 gauge x 3/16" Ndle  2/23/21   Historical Provider   blood sugar diagnostic Strp TEST BLOOD SUAGR ONCE DAILY 7/17/18   Niharika Cummings PA-C   blood-glucose meter Misc use as directed  Patient taking differently: use as directed 7/17/18   Niharika Cummings PA-C   cephALEXin (KEFLEX) 500 MG " capsule Take 1 capsule (500 mg total) by mouth every 6 (six) hours. for 6 days 3/6/23 3/12/23  Lei Tong MD   furosemide (LASIX) 40 MG tablet Take 40 mg by mouth 2 (two) times daily as needed. Only takes as needed for swelling in legs    Historical Provider   medroxyPROGESTERone (DEPO-PROVERA) 150 mg/mL Syrg Inject 150 mg into the muscle every 3 (three) months. 1/14/21   Historical Provider   omeprazole (PRILOSEC) 20 MG capsule Take 20 mg by mouth daily as needed. 2/20/23   Historical Provider   potassium chloride (MICRO-K) 10 MEQ CpSR Take 10 mEq by mouth 2 (two) times daily as needed. 3/7/21   Historical Provider       OTC Meds: ***    Scheduled Meds:    aspirin  81 mg Oral Daily    atorvastatin  80 mg Oral Daily    buprenorphine-naloxone 8-2  8 mg Sublingual TID    carvediloL  3.125 mg Oral BID WM    cefTRIAXone (ROCEPHIN) IVPB  1 g Intravenous Q24H    clopidogreL  75 mg Oral Daily    EScitalopram oxalate  10 mg Oral Daily    fluticasone furoate-vilanteroL  1 puff Inhalation Daily    insulin detemir U-100  50 Units Subcutaneous Daily    lisinopriL  20 mg Oral Daily    nicotine  1 patch Transdermal Daily    pantoprazole  40 mg Oral Daily    pregabalin  150 mg Oral BID      PRN Meds: acetaminophen, albuterol, dextrose 10%, dextrose 10%, dextrose, dextrose, glucagon (human recombinant), insulin aspart U-100, OLANZapine zydis, sodium chloride 0.9%   Psychotherapeutics (From admission, onward)      Start     Stop Route Frequency Ordered    03/06/23 0900  EScitalopram oxalate tablet 10 mg         -- Oral Daily 03/05/23 1556    03/05/23 2315  OLANZapine zydis disintegrating tablet 5 mg         -- Oral Every 24 hours as needed 03/05/23 2315            LABORATORY DATA   Recent Results (from the past 72 hour(s))   CBC auto differential    Collection Time: 03/05/23  1:48 PM   Result Value Ref Range    WBC 14.32 (H) 3.90 - 12.70 K/uL    RBC 4.42 4.00 - 5.40 M/uL    Hemoglobin 13.0 12.0 - 16.0 g/dL    Hematocrit 40.8  37.0 - 48.5 %    MCV 92 82 - 98 fL    MCH 29.4 27.0 - 31.0 pg    MCHC 31.9 (L) 32.0 - 36.0 g/dL    RDW 14.5 11.5 - 14.5 %    Platelets 153 150 - 450 K/uL    MPV 10.1 9.2 - 12.9 fL    Immature Granulocytes 0.8 (H) 0.0 - 0.5 %    Gran # (ANC) 8.5 (H) 1.8 - 7.7 K/uL    Immature Grans (Abs) 0.11 (H) 0.00 - 0.04 K/uL    Lymph # 4.9 (H) 1.0 - 4.8 K/uL    Mono # 0.6 0.3 - 1.0 K/uL    Eos # 0.2 0.0 - 0.5 K/uL    Baso # 0.04 0.00 - 0.20 K/uL    nRBC 0 0 /100 WBC    Gran % 59.2 38.0 - 73.0 %    Lymph % 34.2 18.0 - 48.0 %    Mono % 4.4 4.0 - 15.0 %    Eosinophil % 1.1 0.0 - 8.0 %    Basophil % 0.3 0.0 - 1.9 %    Differential Method Automated    Comprehensive metabolic panel    Collection Time: 03/05/23  1:48 PM   Result Value Ref Range    Sodium 142 136 - 145 mmol/L    Potassium 3.6 3.5 - 5.1 mmol/L    Chloride 108 95 - 110 mmol/L    CO2 24 23 - 29 mmol/L    Glucose 61 (L) 70 - 110 mg/dL    BUN 18 6 - 20 mg/dL    Creatinine 1.0 0.5 - 1.4 mg/dL    Calcium 8.8 8.7 - 10.5 mg/dL    Total Protein 7.0 6.0 - 8.4 g/dL    Albumin 3.6 3.5 - 5.2 g/dL    Total Bilirubin 0.4 0.1 - 1.0 mg/dL    Alkaline Phosphatase 77 55 - 135 U/L    AST 13 10 - 40 U/L    ALT 14 10 - 44 U/L    Anion Gap 10 8 - 16 mmol/L    eGFR >60 >60 mL/min/1.73 m^2   Blood culture #1 **CANNOT BE ORDERED STAT**    Collection Time: 03/05/23  1:48 PM    Specimen: Peripheral, Antecubital, Right; Blood   Result Value Ref Range    Blood Culture, Routine No Growth to date    Lactic acid, plasma    Collection Time: 03/05/23  1:48 PM   Result Value Ref Range    Lactate (Lactic Acid) 1.1 0.5 - 2.2 mmol/L   Blood culture #2 **CANNOT BE ORDERED STAT**    Collection Time: 03/05/23  2:25 PM    Specimen: Blood   Result Value Ref Range    Blood Culture, Routine No Growth to date    POCT urine pregnancy    Collection Time: 03/05/23  4:47 PM   Result Value Ref Range    POC Preg Test, Ur Negative Negative     Acceptable Yes       No results found for: PHENYTOIN, PHENOBARB,  "VALPROATE, CBMZ      EXAMINATION    VITALS   Vitals:    03/05/23 1500 03/05/23 1614 03/05/23 2345 03/06/23 0154   BP: (!) 125/58 132/66 (!) 120/56 (!) 101/56   BP Location: Right arm   Right arm   Patient Position: Lying   Lying   Pulse: 85 92 85 88   Resp: 16  16 20   Temp:    97.6 °F (36.4 °C)   TempSrc:    Oral   SpO2: 95% 99%  (!) 92%   Weight:            CONSTITUTIONAL  General Appearance: NAD, unremarkable, age appropriate, {appearance:13554::"unremarkable","age appropriate"}    MUSCULOSKELETAL  Muscle Strength and Tone: WNL ***   Abnormal Involuntary Movements: none observed ***  Gait and Station: WNL; non-ataxic ***    PSYCHIATRIC   Behavior/Cooperation:  {behavior:92544::"normal","cooperative"}  Speech:  {findings; speech:34993::"normal tone","normal rate","normal pitch","normal volume"}  Language: {EXAM; AMB PSYCH LANGUAGE:20234::"grossly intact"} with spontaneous speech  Mood: {mood:48850}  Affect:  congruent with mood and appropriate to situation/content {DESC; AFFECT:11795}  Associations: *** intact; no MAO  Thought Process: {thought process:30075::"normal and logical"}  Thought Content: {normal:93454::"normal, no suicidality, no homicidality, delusions, or paranoia"}  Sensorium:  Awake/Delirium/Somnolence  Alert and Oriented: to {orientation:60120::"grossly intact"}  Memory: 3/3 immediate, ***/3 at 5 minutes    Recent:  Impaired / Limited / Intact; able to report recent events   Remote:  Impaired / Limited / Intact; Named ***/4 past presidents   Attention/concentration: appropriate for age/education. Able to spell w-[id number]-r-l-[id number] & d-l-r-o-w   Similarities:  Impaired / Limited / Intact; (difference between apple and orange?)  Abstract reasoning:  Impaired /  Limited / Intact  Insight:  Impaired /  Limited / Intact  Judgment: Impaired /  Limited / Intact    CAM ICU Delirium Assessment - POSITIVE / NEGATIVE      Is the patient aware of the biomedical complications associated with substance abuse and mental " illness? yes    Does the patient have an Advance Directive for Mental Health treatment? no  (If yes, inform patient to bring copy.)      MEDICAL DECISION MAKING    ASSESSMENT      ***     RECOMMENDATIONS       - patient does not currently meet PEC/CEC criteria due to not being an imminent threat to self/others and not being gravely disabled 2/2 mental illness.      - Begin *** (discussed risks/benefits/alt vs no treatment with patient)     - Psychotherapy was performed with patient as noted above      - Please have CM/SW assist patient with outpatient mental health f/u for s/p discharge from this facility      - Patient was instructed to call 911 and return to the nearest ED if he/she begins feeling suicidal, homicidal, or gravely disabled      - Thank you for this consult ; will continue to follow patient         Time spent with patient and/or on unit managing/coordinating patient's care (excluding the time spent on psychotherapy): 70 minutes      More than 50% of the time was spent counseling/coordinating care      STAFF:  Chepe Verduzco MD  [facility]  3/6/2023

## 2023-04-17 NOTE — TELEPHONE ENCOUNTER
----- Message from Deepika Vega sent at 4/17/2023 11:07 AM CDT -----  Type:  Needs Medical Advice    Who Called: pt  Symptoms (please be specific): pt is requesting to get a return call pt has some questions about a refill request      Would the patient rather a call back or a response via [facility]? call  Best Call Back Number: [phone]  Additional Information:

## 2023-04-20 NOTE — TELEPHONE ENCOUNTER
----- Message from Pranay Flaherty sent at 4/20/2023  9:19 AM CDT -----  Contact: pt  .Type:  Needs Medical Advice    Who Called: pt    Would the patient rather a call back or a response via [facility]?  Call back  Best Call Back Number: [phone]   Additional Information:  Jessica is returning a call back to the office. Regarding her medications.
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
## 2023-10-06 NOTE — Clinical Note
Diagnosis: Cellulitis of left leg [596789]   Future Attending Provider: FAMILIA KEYS [[id number]]   Admitting Provider:: FAMILIA KEYS [[id number]]

## 2023-10-07 NOTE — ED NOTES
"Pt crying out. Upon entering room, pt tearful, stating she does not want to be admitted. RN advised pt she can sign a form to leave AMA. Pt stated, "I'm not signing anything." Continues to state she wants to leave. Will notify RN and admit physician.  "

## 2023-10-07 NOTE — ED NOTES
"Pt refusing to speak w/ physician prior to leaving facility. Refusing review of risks of leaving AMA, repeating, "I'm not signing any form." Pt transported via wheelchair to family member's vehicle for transport home. Dr. Juarez aware.  "

## 2023-10-07 NOTE — ED NOTES
"Per primary nurses request, in to speak with patient at this time as patient requesting transport home and AMA.  Patient reports she would like to leave the hospital at this time.  Education provided, without evidence of learning.  Patient informed medical team was just called and plan to come discuss her request for discharge/AMA.  Patient started to yell at nursing staff stating "They are taking too damn long.  I am not waiting for the medical team."  Reeducation provided without evidence of learning.  Patient demanding to have IV discontinued and blood pressure cuff removed.  Verified patient did not want to wait for the team for further discussion, options, and education.  Patient again refused to wait for the team, despite encouragement to do the same.  Patient assisted with sock placement on bilateral feet.  Patient informed we could help with transportation if/when a discharge order was placed, but that we needed that order.  Patient yelled at nurse again stating "Ya right, you are just going to say what everyone else says.  My sister in law is coming to get me."  This nurse exited room and had alternate staff assist her to get dressed.  Patient continues to yell at this nurse after I exit the room, heard at nursing station.  Admit team updated on patient status and refusal to sign the AMA form.  No further orders.  Patient assisted via wheelchair to lobby upon her request to wait for family.  "

## 2023-10-07 NOTE — PHARMACY MED REC
"[facility]           Pharmacy  Admission Medication History     The home medication history was taken by Mckenzie Carpenter.      Medication history obtained from Medications listed below were obtained from: [employer] software- [employer]    Based on information gathered for medication list, you may go to "Admission" then "Reconcile Home Medications" tabs to review and/or act upon those items.     The home medication list has been updated by the Pharmacy department.   Please read ALL comments highlighted in yellow.   Please address this information as you see fit.    Feel free to contact us if you have any questions or require assistance.      No current facility-administered medications on file prior to encounter.     Current Outpatient Medications on File Prior to Encounter   Medication Sig Dispense Refill    allopurinoL (ZYLOPRIM) 300 MG tablet Take 300 mg by mouth once daily.      ALPRAZolam (XANAX) 0.5 MG tablet Take 0.5 mg by mouth 3 (three) times daily as needed.      aspirin (ECOTRIN) 81 MG EC tablet Take 1 tablet (81 mg total) by mouth once daily. 90 tablet 3    atorvastatin (LIPITOR) 80 MG tablet Take 1 tablet (80 mg total) by mouth once daily. 90 tablet 1    carvediloL (COREG) 3.125 MG tablet Take 3.125 mg by mouth 2 (two) times daily.      cephALEXin (KEFLEX) 500 MG capsule Take 500 mg by mouth every 12 (twelve) hours.      cholecalciferol, vitamin D3, 1,250 mcg (50,000 unit) capsule Take 50,000 Units by mouth every 7 days.      clopidogreL (PLAVIX) 75 mg tablet Take 1 tablet (75 mg total) by mouth once daily. 30 tablet 3    EScitalopram oxalate (LEXAPRO) 10 MG tablet Take 10 mg by mouth once daily.      fluticasone propionate (FLONASE) 50 mcg/actuation nasal spray 1 spray by Each Nostril route daily as needed.      HUMALOG MIX 75-25 KWIKPEN 100 unit/mL (75-25) InPn Inject 125 Units into the skin 3 (three) times daily.      LINZESS 72 mcg Cap capsule Take 72 mcg by mouth once daily.      " "lisinopriL 10 MG tablet Take 10 mg by mouth once daily.      medroxyPROGESTERone (DEPO-PROVERA) 150 mg/mL injection Inject 150 mg into the muscle every 3 (three) months.      MOUNJARO 5 mg/0.5 mL PnIj Inject 5 mg into the skin once a week.      omeprazole (PRILOSEC) 20 MG capsule Take 20 mg by mouth daily as needed.      pregabalin (LYRICA) 150 MG capsule Take 150 mg by mouth 2 (two) times daily.      promethazine (PHENERGAN) 6.25 mg/5 mL syrup Take 5-10 mLs by mouth every 8 (eight) hours as needed.      SUBOXONE 8-2 mg Place 3 Film under the tongue Daily.      tiZANidine (ZANAFLEX) 4 MG tablet Take 4 mg by mouth every 8 (eight) hours as needed.      VRAYLAR 1.5 mg Cap Take 1.5 mg by mouth once daily.      BD ULTRA-FINE MINI PEN NEEDLE 31 gauge x 3/16" Ndle Inject into the skin 5 (five) times daily.      LANTUS SOLOSTAR U-100 INSULIN glargine 100 units/mL SubQ pen Inject 165 Units into the skin 2 (two) times a day.      TRUE METRIX GLUCOSE TEST STRIP Strp 3 (three) times daily.         Please address this information as you see fit.  Feel free to contact us if you have any questions or require assistance.    Mckenzie Carpenter  [phone]                .          "

## 2023-10-07 NOTE — SIGNIFICANT EVENT
Pt was to be admitted to [facility] on 10/6/2023 for cellulitis with failed outpatient po ABX. Prior to assessing her in the ED I received a call from ED nurse at 7:40 pm who stated pt requesting to leave AMA but refusing to wait for me to come talk to her and refusing to sign AMA paperwork. Due to multiple other admissions with acute issues and attending to decompensating patients on the floor, I was unable to go speak to the patient right away. ED RN notified me that pt eloped at 8:08 pm from the ED before I was able to speak to her in the ED. Per report pt rude to ED staff, yelling at nurses when they tried to calm her down and ask her to stay until I could come bedside to talk to the pt. No signs of sepsis on my review or on report by ED physician. Unable to provide Rx for ABX or assess the pt due to pt eloping at 8:08 pm without signing AMA. paperwork.     Casandra Juarez MD

## 2023-10-07 NOTE — ED NOTES
"In room to speak with pt.  Pt states she does not want to stay.  Asked pt if someone is coming to get her and she said "Nobody will come get me". I said "Are you staying then?" She said " No I don't want to" I said how are you going to get home. She said that we had to get her transportation. I informed pt that we normally do not provide transportation for ambulatory pts". Pt continued to states that she was going to leave.  I asked her to sign an AMA for and she stated "I'm not signing a F*&%ing thing!" Charge Nurse informed to go speak with pt.   "

## 2023-10-10 NOTE — TELEPHONE ENCOUNTER
Preliminary BC result. 1/2 bottles positive for gram negative rods. Patient called and notified. Recommended return to ED for IV abx/workup. Patient voiced understanding. Patient without questions.

## 2023-12-12 PROBLEM — K21.9 GERD (GASTROESOPHAGEAL REFLUX DISEASE): Status: ACTIVE | Noted: 2023-01-01

## 2023-12-12 PROBLEM — E78.5 HYPERLIPIDEMIA: Status: ACTIVE | Noted: 2023-01-01

## 2023-12-12 PROBLEM — F32.A DEPRESSION: Status: ACTIVE | Noted: 2023-01-01
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
## 2023-12-12 NOTE — ED PROVIDER NOTES
"Emergency Department Encounter  Provider Note    Lele Dias  [id number]  12/12/2023    Evaluation:    History Acquisition:     Chief Complaint   Patient presents with    Weakness     Pt arrived via EMS from home with diarrhea that began on fri with rectal bleeding reported, daughter reports rectum area red and " inflamed" + possible Hemorid        History of Present Illness:  Lele Dias is a 43 y.o. female who has a past medical history of Anemia, Asthma, Cellulitis of abdominal wall (12/18/2017), CKD (chronic kidney disease) stage 3, GFR 30-59 ml/min, Depression, Diabetes mellitus, type II, Diastolic dysfunction, Diverticulosis of intestine with bleeding (9/14/2016), E coli bacteremia (4/21/2018), E. coli pyelonephritis (6/2/2015), E. coli UTI (12/18/2017), Hematuria, Hernia, epigastric, Hypertension, Hypocalcemia, Nonalcoholic hepatosteatosis, Periumbilical hernia, and Proteinuria.    The patient presents to the ED due to diarrhea and weakness.   Patient reports symptoms started about a week ago.  She was seen by her doctor and prescribed Keflex for leg cellulitis. She states she has had watery diarrhea and now her rectum is inflamed. She noticed blood in the toilet today and feels as though she has a hemorrhoid.   She also feels diffusely weak.  She denies any fever, vomiting, CP, SOB, or abdominal pain.  She has a wound care nurse and home health that assist her at home. .    Additional historians utilized:  EMS report: vitals stable en route. Unable to obtain fingerstick glucose due to multiple errors on the machine.     Prior medical records were reviewed:   ED visit 10/6 for L leg wound. Admission requested, but patient left AMA.   Admitted 3/5-3/6 for cellulitis, left AMA prior to completion of treatment.   Gen surg visit 2/8 for f/u abdominal wall hernia.     The patient's list of active medical problems, social history, medications, and allergies as documented has been reviewed.     Past Medical " History:   Diagnosis Date    Anemia     Asthma     Cellulitis of abdominal wall 2017    CKD (chronic kidney disease) stage 3, GFR 30-59 ml/min     Depression     Diabetes mellitus, type II     Diastolic dysfunction     Diverticulosis of intestine with bleeding 2016    E coli bacteremia 2018    E. coli pyelonephritis 2015    E. 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     SECTION       SECTION, CLASSIC      CHOLECYSTECTOMY  age 15    CORONARY ANGIOGRAPHY N/A 2021    Procedure: ANGIOGRAM, CORONARY ARTERY;  Surgeon: Neelam Hicks MD;  Location: [facility] CATH LAB/EP;  Service: Cardiology;  Laterality: N/A;    INCISION AND DRAINAGE OF ABSCESS N/A 2020    Procedure: INCISION AND DRAINAGE, ABSCESS;  Surgeon: Rich Watson MD;  Location: [facility] OR;  Service: General;  Laterality: N/A;    LEFT HEART CATHETERIZATION Left 2021    Procedure: Left heart cath;  Surgeon: Neelam Hicks MD;  Location: [facility] CATH LAB/EP;  Service: Cardiology;  Laterality: Left;    LEFT HEART CATHETERIZATION N/A 2021    Procedure: Left heart cath;  Surgeon: Neelam Hicks MD;  Location: [facility] CATH LAB/EP;  Service: Cardiology;  Laterality: N/A;    TONSILLECTOMY, ADENOIDECTOMY       Family History   Problem Relation Age of Onset    Cancer Father     Heart attack Mother     Heart disease Mother     Cancer Maternal Aunt         lung (smoker)    Heart attack Maternal Aunt     Breast cancer Paternal Grandmother     Heart attack Maternal Grandmother      Social History     Socioeconomic History    Marital status: Single   Tobacco Use    Smoking status: Every Day     Current packs/day: 2.50     Average packs/day: 2.5 packs/day for 36.9 years (92.4 ttl pk-yrs)     Types: Cigarettes      Start date: 1987    Smokeless tobacco: Never    Tobacco comments:     Pt enrolled in the "[employer] on 9/12/18. (SCT Member ID # [id number]).  Ambulatory referral to Smoking Cessation Program   Substance and Sexual Activity    Alcohol use: No    Drug use: No    Sexual activity: Yes     Partners: Male     Birth control/protection: Injection     Review of patient's allergies indicates:   Allergen Reactions    Celexa [citalopram] Nausea And Vomiting       Review of Systems   Gastrointestinal:  Positive for blood in stool, diarrhea and rectal pain.   Neurological:  Positive for weakness.         Physical Exam:     Initial Vitals   BP Pulse Resp Temp SpO2   12/12/23 1553 12/12/23 1553 12/12/23 1732 12/12/23 1553 12/12/23 1553   (!) 105/57 98 (!) 22 98 °F (36.7 °C) 96 %      MAP       --                Physical Exam    Nursing note and vitals reviewed.  Constitutional: She appears well-developed and well-nourished. She is not diaphoretic. No distress.   HENT:   Head: Normocephalic and atraumatic.   Mouth/Throat: Oropharynx is clear and moist.   Eyes: EOM are normal. Pupils are equal, round, and reactive to light.   Neck: No tracheal deviation present.   Cardiovascular:  Normal rate, regular rhythm, normal heart sounds and intact distal pulses.           Pulmonary/Chest: Breath sounds normal. No stridor. No respiratory distress. She has no wheezes.   Abdominal: Abdomen is soft and protuberant. Bowel sounds are normal. She exhibits no distension and no mass. There is no abdominal tenderness. There is no rebound and no guarding.   Genitourinary:    Genitourinary Comments: Erythematous ulcer with central opening to R buttock. No purulence.   No melena on rectal exam.          Musculoskeletal:         General: No edema. Normal range of motion.      Comments: LUE amputation, well-healed.      Neurological: She is alert and oriented to person, place, and time. She has normal strength. No cranial nerve deficit or sensory  deficit.   Skin: Skin is warm and dry. Capillary refill takes less than 2 seconds. No pallor.   Psychiatric: She has a normal mood and affect. Her behavior is normal. Thought content normal.         Differential Diagnoses:   Based on available information and initial assessment, Differential Diagnosis includes, but is not limited to:  AAA, aortic dissection, mesenteric ischemia, perforated viscous, MI/ACS, SBO/volvulus, incarcerated/strangulated hernia, intussusception, ileus, appendicitis, cholecystitis, cholangitis, diverticulitis, esophagitis, hepatitis, nephrolithiasis, pancreatitis, gastroenteritis, colitis, IBD/IBS, biliary colic, GERD, PUD, constipation, UTI/pyelonephritis,  disorder.      ED Management:   Procedures    Orders Placed This Encounter    Blood culture x two cultures. Draw prior to antibiotics.    Urine culture    X-Ray Chest AP Portable    CT Abdomen Pelvis  Without Contrast    CBC auto differential    Comprehensive metabolic panel    Lactic acid, plasma #1    Lactic acid, plasma #2    Urinalysis, Reflex to Urine Culture Urine, Clean Catch    Beta-Hydroxybutyrate, Serum    Magnesium    Troponin I    Brain natriuretic peptide    Urinalysis Microscopic    ED Preference List Used to Initiate Sepsis Orders    Vital signs    Cardiac Monitoring - Adult    Strict intake and output    EKG 12-lead    Saline lock IV    Possible Hospitalization    POCT glucose    POCT glucose    sodium chloride 0.9% bolus 1,000 mL 1,000 mL    insulin regular injection 5 Units 0.05 mL    magnesium sulfate 2g in water 50mL IVPB (premix)    piperacillin-tazobactam (ZOSYN) 4.5 g in dextrose 5 % in water (D5W) 100 mL IVPB (MB+)    Bed rest          EKG:   EKG interpretation by ED attending physician:  NSR, rate 98, incomplete LBBB, T-wave inversions lateral leads, no ST elevations or acute ischemia, normal intervals.  Compared with prior EKG dated 06/2022, T-wave changes are new.       Labs:     Labs Reviewed   CBC W/ AUTO  DIFFERENTIAL - Abnormal; Notable for the following components:       Result Value    RDW 15.5 (*)     Immature Granulocytes 2.3 (*)     Immature Grans (Abs) 0.25 (*)     All other components within normal limits   COMPREHENSIVE METABOLIC PANEL - Abnormal; Notable for the following components:    Sodium 133 (*)     CO2 21 (*)     Glucose 918 (*)     BUN 21 (*)     Creatinine 1.8 (*)     Calcium 8.6 (*)     Albumin 2.6 (*)     eGFR 35 (*)     All other components within normal limits    Narrative:       Glu critical result(s) called and verbal readback obtained from   Merry Ross RN. by KM12 12/12/2023 17:47   LACTIC ACID, PLASMA - Abnormal; Notable for the following components:    Lactate (Lactic Acid) 2.6 (*)     All other components within normal limits   URINALYSIS, REFLEX TO URINE CULTURE - Abnormal; Notable for the following components:    Glucose, UA 4+ (*)     Occult Blood UA 3+ (*)     Leukocytes, UA Trace (*)     All other components within normal limits    Narrative:     Specimen Source->Urine   MAGNESIUM - Abnormal; Notable for the following components:    Magnesium 1.4 (*)     All other components within normal limits   TROPONIN I - Abnormal; Notable for the following components:    Troponin I 0.259 (*)     All other components within normal limits   URINALYSIS MICROSCOPIC - Abnormal; Notable for the following components:    RBC, UA >100 (*)     WBC, UA 35 (*)     Bacteria Few (*)     Yeast, UA Moderate (*)     All other components within normal limits    Narrative:     Specimen Source->Urine   POCT GLUCOSE - Abnormal; Notable for the following components:    POCT Glucose >500 (*)     All other components within normal limits   CULTURE, BLOOD   CULTURE, BLOOD    Narrative:     Collection has been rescheduled by JANY at 12/12/2023 18:16 Reason:   Unable to collect/got a little of the labs/no left arm/no veins/adv   nurse need ultrasound/got one set cultures and asmall green   Collection has been  rescheduled by L[address] at 12/12/2023 18:16 Reason:   Unable to collect/got a little of the labs/no left arm/no veins/adv   nurse need ultrasound/got one set cultures and asmall green    CULTURE, URINE   BETA - HYDROXYBUTYRATE, SERUM   LACTIC ACID, PLASMA   B-TYPE NATRIURETIC PEPTIDE   POCT GLUCOSE MONITORING CONTINUOUS     Independent review of the labs ordered include:   See ED course    Imaging:     Imaging Results              CT Abdomen Pelvis  Without Contrast (Final result)  Result time 12/12/23 18:58:17      Final result by Stewart Stewart MD (12/12/23 18:58:17)                   Impression:      Resolution of inflammatory stranding in the pelvis on the left around the left ovary.    Persistent fat containing abdominal wall hernia with stranding in the herniated fat suggesting panniculitis.    No acute findings evident within the abdomen or pelvis elsewhere by CT without contrast.      Electronically signed by: Stewart Stewart  Date:    12/12/2023  Time:    18:58               Narrative:    EXAMINATION:  CT ABDOMEN PELVIS WITHOUT CONTRAST    CLINICAL HISTORY:  Abdominal pain, post-op;Abdominal abscess/infection suspected;Sepsis;    TECHNIQUE:  Low dose axial images, sagittal and coronal reformations were obtained from the lung bases to the pubic symphysis, Oral contrast was not administered.    COMPARISON:  None    FINDINGS:  Heart: Stable in size. No pericardial effusion.    Lung Bases: Well aerated, without consolidation or pleural fluid.    Liver: Stable in size and attenuation, with no focal hepatic lesions.    Gallbladder: Resected    Bile Ducts: No evidence of dilated ducts.    Pancreas: No mass or peripancreatic fat stranding.    Spleen: Unremarkable.    Adrenals: Unremarkable.    Kidneys/ Ureters: Unremarkable.    Bladder: No evidence of wall thickening.    Reproductive organs: Previous stranding around the left ovary is no longer visualized..    GI Tract/Mesentery: No evidence of bowel obstruction  or inflammation.    Peritoneal Space: No ascites. No free air.    Retroperitoneum: No significant adenopathy.    Abdominal wall: Anterior abdominal wall hernia with fat into the lower abdominal pannus with mild inflammatory stranding suggest panniculitis but no bowel involvement.  This appears relatively stable allowing for changes in imaging technique.    Vasculature: No significant atherosclerosis or aneurysm.    Bones: No acute fracture.                                       X-Ray Chest AP Portable (Final result)  Result time 12/12/23 17:27:50      Final result by Nick Mancini MD (12/12/23 17:27:50)                   Impression:      No detrimental change or radiographic acute intrathoracic process seen on this limited single view.      Electronically signed by: Nick Mancini MD  Date:    12/12/2023  Time:    17:27               Narrative:    EXAMINATION:  XR CHEST AP PORTABLE    CLINICAL HISTORY:  Sepsis;    TECHNIQUE:  Single frontal view of the chest was performed.    COMPARISON:  Chest radiograph 06/17/2022 and CT thorax 06/18/2022    FINDINGS:  Patient is somewhat rotated.  Resolution is limited by body habitus with underpenetration.  Monitoring leads overlie the chest.    The lungs are well expanded and clear, with normal appearance of pulmonary vasculature and no pleural effusion or pneumothorax.    The cardiac silhouette is normal in size. The hilar and mediastinal contours are within normal limits.    No acute osseous process seen.  PA and lateral views can be obtained.                                         Medications Given:     Medications   magnesium sulfate 2g in water 50mL IVPB (premix) (has no administration in time range)   sodium chloride 0.9% bolus 1,000 mL 1,000 mL (1,000 mLs Intravenous New Bag 12/12/23 1721)   insulin regular injection 5 Units 0.05 mL (5 Units Intravenous Given 12/12/23 1841)   piperacillin-tazobactam (ZOSYN) 4.5 g in dextrose 5 % in water (D5W) 100 mL IVPB (MB+) (4.5 g  Intravenous New Bag 12/12/23 1844)        Medical Decision Making:    Additional Consideration:   Additional testing considered during clinical course: none    Social determinants of health considered during development of treatment plan include: poor access to care    Current co-morbidities considered which impacted clinical decision making: asthma, HTN, obesity, CHF, CAD, DM     Case discussed with additional provider:   [facility] service contacted for admission for further management of UTI, BRIDGET, hyperglycemia.     ED Course as of 12/12/23 1918   Tue Dec 12, 2023   1656 SpO2: 96 % [SS]   1656 Pulse: 98 [SS]   1656 Temp Source: Oral [SS]   1656 Temp: 98 °F (36.7 °C) [SS]   1656 BP(!): 105/57  BP borderline on arrival, vitals otherwise reassuring [SS]   1656 X-Ray Chest AP Portable  CXR independently interpreted: no focal infiltrate, effusion, edema, free air, or other acute process  Agree with radiologist interpretation.  [SS]   1746 Lactic acid, plasma #1(!)  Lactate elevated [SS]   1747 POCT glucose(!!)  Glucose elevated  [SS]   1749 Beta-Hydroxybutyrate, Serum  WNL [SS]   1749 Comprehensive metabolic panel(!!)  Glucose elevated, BRIDGET, Cr 1.8 from 0.9  [SS]   1859 Urinalysis Microscopic(!)  Significant UTI, IV ABX given.  [SS]   1859 Troponin I(!)  Elevated, likely secondary to demand ischemia from hyperglycemia and UTI.  [SS]   1903 CT Abdomen Pelvis  Without Contrast  CT A/P independently interpreted: no high-grade bowel obstruction or free air.  Agree with radiologist interpretation.    [SS]      ED Course User Index  [SS] Marco Antonio Cramer MD            Medical Decision Making  44 yo F with asthma, HTN, obesity, CHF, CAD, DM presents with weakness and diarrhea.   Vitals stable in ED,  Labs with elevated glucose to 900s, BRIDGET wtih Cr 1.8 from 0.9.    UA with UTI, IV ABX given.   CT A/P without acute process.   [facility] contacted for admission and further management.     Problems Addressed:  Acute cystitis  with hematuria: complicated acute illness or injury with systemic symptoms  BRIDGET (acute kidney injury): complicated acute illness or injury with systemic symptoms  Dehydration: complicated acute illness or injury  Diarrhea, unspecified type: acute illness or injury  Elevated troponin: complicated acute illness or injury  Generalized weakness: complicated acute illness or injury  Hyperglycemia: complicated acute illness or injury  Hypomagnesemia: complicated acute illness or injury    Amount and/or Complexity of Data Reviewed  Independent Historian: EMS  External Data Reviewed: notes.  Labs: ordered. Decision-making details documented in ED Course.  Radiology: ordered and independent interpretation performed. Decision-making details documented in ED Course.  ECG/medicine tests: ordered and independent interpretation performed. Decision-making details documented in ED Course.    Risk  Decision regarding hospitalization.        Clinical Impression:       ICD-10-CM ICD-9-CM   1. BRIDGET (acute kidney injury)  N17.9 584.9   2. Generalized weakness  R53.1 780.79   3. Hyperglycemia  R73.9 790.29   4. Diarrhea, unspecified type  R19.7 787.91   5. Dehydration  E86.0 276.51   6. Hypomagnesemia  E83.42 275.2   7. Acute cystitis with hematuria  N30.01 595.0   8. Elevated troponin  R79.89 790.6         Follow-up Information    None          ED Disposition Condition    Admit Stable              On re-evaluation, the patient's status has improved.  At this time, I believe the patient should be admitted to the hospital for further evaluation and management.  The consulting physician/team agrees with plan and will admit under their service.   The patient and family were updated with test results, overall impression, and further plan of care. All questions were answered.       Marco Antonio Cramer MD  12/12/23 [id number]

## 2023-12-13 PROBLEM — R31.0 GROSS HEMATURIA: Status: ACTIVE | Noted: 2023-01-01

## 2023-12-13 NOTE — NURSING
BG now 207. Unable to obtain secondary PIV. Per [facility] nomogram in need of dextrose fluids. Spoke with SCOTTY Villarreal. Asked to consult eICU.     Spoke with SCOTTY Hilton. Ok to feed patient with anticipation of turning off insulin in an hour.

## 2023-12-13 NOTE — CONSULTS
[facility] - Intensive Care  Wound Care    Patient Name:  Lele Dias   MRN:  [id number]  Date: 12/13/2023  Diagnosis: BRIDGET (acute kidney injury)    History:     Past Medical History:   Diagnosis Date    Anemia     Asthma     Cellulitis of abdominal wall 12/18/2017    CKD (chronic kidney disease) stage 3, GFR 30-59 ml/min     Depression     Diabetes mellitus, type II     Diastolic dysfunction     Diverticulosis of intestine with bleeding 9/14/2016    E coli bacteremia 4/21/2018    E. coli pyelonephritis 6/2/2015    E. coli UTI 12/18/2017    Hematuria     Hernia, epigastric     Hypertension     Hypocalcemia     Nonalcoholic hepatosteatosis     Periumbilical hernia     Proteinuria        Social History     Socioeconomic History    Marital status: Single   Tobacco Use    Smoking status: Every Day     Current packs/day: 2.50     Average packs/day: 2.5 packs/day for 36.9 years (92.4 ttl pk-yrs)     Types: Cigarettes     Start date: 1987    Smokeless tobacco: Never    Tobacco comments:     Pt enrolled in the [employer] on 9/12/18. (SCT Member ID # [id number]).  Ambulatory referral to Smoking Cessation Program   Substance and Sexual Activity    Alcohol use: No    Drug use: No    Sexual activity: Yes     Partners: Male     Birth control/protection: Injection     Social Determinants of Health     Financial Resource Strain: High Risk (12/13/2023)    Overall Financial Resource Strain (CARDIA)     Difficulty of Paying Living Expenses: Very hard   Food Insecurity: Food Insecurity Present (12/13/2023)    Hunger Vital Sign     Worried About Running Out of Food in the Last Year: Sometimes true     Ran Out of Food in the Last Year: Sometimes true   Transportation Needs: Unmet Transportation Needs (12/13/2023)    PRAPARE - Transportation     Lack of Transportation (Medical): Yes     Lack of Transportation (Non-Medical): Yes   Physical Activity: Inactive (12/13/2023)    Exercise Vital Sign     Days of Exercise per Week: 0 days      Minutes of Exercise per Session: 0 min   Stress: Stress Concern Present (12/13/2023)    [employer] - Occupational Stress Questionnaire     Feeling of Stress : Very much   Social Connections: Socially Isolated (12/13/2023)    Social Connection and Isolation Panel [NHANES]     Frequency of Communication with Friends and Family: More than three times a week     Frequency of Social Gatherings with Friends and Family: More than three times a week     Attends [employer] Services: Never     Active Member of Clubs or Organizations: No     Attends Club or Organization Meetings: Never     Marital Status: Never    Housing Stability: Unknown (12/13/2023)    Housing Stability Vital Sign     Number of Places Lived in the Last Year: 1     Unstable Housing in the Last Year: No       Precautions:     Allergies as of 12/12/2023 - Reviewed 12/12/2023   Allergen Reaction Noted    Celexa [citalopram] Nausea And Vomiting 06/02/2015       WOC Assessment Details/Treatment     R heel- intact with no redness        L heel- intact with no redness        R lower inner buttock- full thickness ulcer- present on admit- 5x3x0.1cm- slough/eschar- scant tan drainage- no odor  L buttock- scattered redness and moisture      L pannus- scattered full thickness ulcers- present on admit- related to moisture and friction- slough/eschar- scant tan drainage- fungal-like odor- media not available    R pannus- red rash with fungal appearance and odor- scattered partial thickness skin breakdown- media not available    L calf- partial thickness ulceration with excoriated appearance- appears to be related to friction and edema- ~ 6x4x0.1cm- media not available    Recommendations discussed with pt, nurse and Dr. Salcedo:  - Nurse to continue pressure injury prevention interventions - off load heels  - Triad ointment to bilateral buttocks BID  - Antifungal moisture barrier to pannus BID- applied Interdry today until ointment  obtained from pharmacy  - Presidiox border to L calf 2x/week    12/13/2023

## 2023-12-13 NOTE — ASSESSMENT & PLAN NOTE
Patient's anemia is currently controlled. Has not received any PRBCs to date. Etiology likely d/t Iron deficiency  Current CBC reviewed-   Lab Results   Component Value Date    HGB 12.6 12/12/2023    HCT 39.2 12/12/2023     Monitor serial CBC and transfuse if patient becomes hemodynamically unstable, symptomatic or H/H drops below 7/21.

## 2023-12-13 NOTE — PLAN OF CARE
Received from emergency room via stretcher on stable conditions. AAOx4, on room air, orientation to room provided, care plan reviewed with pt, voices understanding. NSR per cardiac monitor, no red alarm noted. Denies any pain of discomfort, education provided on medication effect and side effect, voices understanding.  Open abscess to abdominal area noted, redness to abdominal, vaginal and sacrum area present. Wound to left buttock and LLE present. Per patient, home health wound care nurse caring for LLE wound. Patient states, she has being urinating and having loose stools are home which might cause the redness and irritation to private areas.    Glucose above 500, provider on call Marianne Renee NP notified, new orders received and followed.    Call light within her reach, no apparent distress noted, bed in low position, bed alarm on, educated on the importance of calling as needed, voices understanding, stable at this time.      Problem: Adult Inpatient Plan of Care  Goal: Plan of Care Review  Outcome: Ongoing, Progressing     Problem: Adult Inpatient Plan of Care  Goal: Absence of Hospital-Acquired Illness or Injury  Outcome: Ongoing, Progressing     Problem: Diabetes Comorbidity  Goal: Blood Glucose Level Within Targeted Range  Outcome: Ongoing, Progressing     Problem: Bleeding (Sepsis/Septic Shock)  Goal: Absence of Bleeding  Outcome: Ongoing, Progressing

## 2023-12-13 NOTE — CARE UPDATE
Brief update note    Chart reviewed.  Patient seen and examined.  43-year-old female with PMH of poorly controlled type 2 diabetes mellitus, morbid obesity, hypertension, asthma, CKD stage 3, moderately reduced LVEF 45-50% in 06/2022 who presented to the ER per report for diarrhea, generalized weakness and urinary incontinence.  History from the patient is limited this morning.  Collateral information obtained from patient's daughter Jessica via telephone who states patient has been having diarrhea, poor appetite, frequent falls and urinary incontinence for the past 4-5 days.  She stopped taking her insulin since she was not eating well.  Patient was concerned that she has 'anal fissures' since she noted a 'vaginal ulcer'.  Per daughter patient was reluctant to come to the hospital as in the past she has required surgery for a 'fissure.'    On chart review patient has had history of right peroneal region abscess requiring I&D in 12/2020.    On exam, sleepy but awakens to verbal commands and attempts to answer questions appropriately.  Extremely dry oral cavity.  Left arm amputation.  Diffuse excoriations and superficial abrasions noted in the panniculus.  Difficult to note if patient has had purulent discharge.  Vaginal exam unable to perform due to lack of patient cooperation.  Afebrile, stable VS.     CT A/P without contrast - Persistent fat containing abdominal wall hernia with stranding in the herniated fat suggesting panniculitis.   Transition patient off IV insulin to basal/ bolus insulin. 2 L IV fluid bolus. Continue vancomycin for now until blood cultures finalize. Prior h/o ESBL Ecoli UTI, zosyn switched to ertapenem. Will hold suboxone & lyrica until somnolence improves. Blood, urine cultures pending. May need further imaging to rule out abnormal fistula pending s/s while in the hospital.     Shira Salcedo MD   Internal Medicine Hospitalist

## 2023-12-13 NOTE — ASSESSMENT & PLAN NOTE
Patient is identified as having Combined Systolic and Diastolic heart failure that is Chronic. CHF is currently controlled. Latest ECHO performed and demonstrates- Results for orders placed during the hospital encounter of 06/17/22    Echo    Interpretation Summary  · Technically significantly limited echocardiogram.  · The left ventricle is moderately enlarged with concentric hypertrophy and mildly decreased systolic function.  · The estimated ejection fraction is 45-50%.  · Grade I left ventricular diastolic dysfunction.  · Normal right ventricular size with normal right ventricular systolic function.  · Mild mitral regurgitation.  · The sinuses of Valsalva is mildly dilated.  . Continue Beta Blocker and monitor clinical status closely. Monitor on telemetry. Patient is on CHF pathway.  Monitor strict Is&Os and daily weights.  Place on fluid restriction of 2 L. Cardiology has not been consulted. Continue to stress to patient importance of self efficacy and  on diet for CHF. Last BNP reviewed- and noted below   Recent Labs   Lab 12/12/23 2034   BNP 90

## 2023-12-13 NOTE — PHARMACY MED REC
"Admission Medication History     The home medication history was taken by Ade Villegas CPhT.    Medication history obtained from, Patient's Daughter Verified    You may go to "Admission" then "Reconcile Home Medications" tabs to review and/or act upon these items.     The home medication list has been updated by the Pharmacy department.   Please read ALL comments highlighted in yellow.   Please address this information as you see fit.    Feel free to contact us if you have any questions or require assistance.      The medications listed below were removed from the home medication list.  Please reorder if appropriate:  Patient reports no longer taking the following medication(s):  Augmentin 875-125 mg  Bactrim -160 mg  Cephalexin 500 mg  Cipro 500 mg  Doxycycline 100 mg  Medrol Dosepack 4 mg  Mounjaro 5 mg/0.5ml pen  Potassium Chloride 10 meq  Vraylar 1.5 mg          Ade Villegas CPhT.  Ext [phone]               .          "

## 2023-12-13 NOTE — ASSESSMENT & PLAN NOTE
Patient's FSGs are uncontrolled due to hyperglycemia on current medication regimen.  Last A1c reviewed-   Lab Results   Component Value Date    HGBA1C 11.1 (H) 12/12/2023     Most recent fingerstick glucose reviewed-   Recent Labs   Lab 12/12/23  1744 12/12/23 1952   POCTGLUCOSE >500* >500*     Current correctional scale  High  Increase anti-hyperglycemic dose as follows-   Antihyperglycemics (From admission, onward)      Start     Stop Route Frequency Ordered    12/13/23 0900  insulin aspart protamine-insulin aspart (NovoLOG 70/30) injection         -- SubQ 3 times daily 12/12/23 2328 12/13/23 0900  insulin detemir U-100 (Levemir) pen 90 Units         -- SubQ Daily 12/12/23 2328 12/13/23 0000  insulin aspart U-100 pen 0-15 Units         -- SubQ Before meals & nightly PRN 12/12/23 2300          Hold Oral hypoglycemics while patient is in the hospital.

## 2023-12-13 NOTE — PROGRESS NOTES
Pharmacokinetic Initial Assessment: IV Vancomycin    Assessment/Plan:    Initiate intravenous vancomycin with loading dose of 2000 mg once followed by a maintenance dose of vancomycin 1500mg IV every 12 hours  Desired empiric serum trough concentration is 10 to 15 mcg/mL  Draw vancomycin trough level 60 min prior to fourth dose on 12/14 at approximately 1900  Pharmacy will continue to follow and monitor vancomycin.      Please contact pharmacy at extension [phone] with any questions regarding this assessment.     Thank you for the consult,   Lexie Park       Patient brief summary:  Lele Dias is a 43 y.o. female initiated on antimicrobial therapy with IV Vancomycin for treatment of suspected skin & soft tissue infection    Drug Allergies:   Review of patient's allergies indicates:   Allergen Reactions    Celexa [citalopram] Nausea And Vomiting       Actual Body Weight:   157kg    Renal Function:   Estimated Creatinine Clearance: 74.6 mL/min (based on SCr of 1.4 mg/dL).,     Dialysis Method (if applicable):  N/A    CBC (last 72 hours):  Recent Labs   Lab Result Units 12/12/23  1659 12/12/23  2238 12/13/23  0408   WBC K/uL 10.86  --  8.14   Hemoglobin g/dL 12.6  --  10.2*   Hemoglobin A1C %  --  11.1*  --    Hematocrit % 39.2  --  31.0*   Platelets K/uL 197  --  152   Gran % % 66.1  --  58.8   Lymph % % 22.9  --  36.9   Mono % % 7.6  --  1.4*   Eosinophil % % 0.6  --  1.0   Basophil % % 0.5  --  0.4   Differential Method  Automated  --  Automated       Metabolic Panel (last 72 hours):  Recent Labs   Lab Result Units 12/12/23  1659 12/12/23  1728 12/13/23  0053 12/13/23  0111 12/13/23  0408   Sodium mmol/L 133*  --   --  138 135*   Potassium mmol/L 4.3  --   --  3.8 4.0   Chloride mmol/L 99  --   --  106 104   CO2 mmol/L 21*  --   --  21* 23   Glucose mg/dL 918*  --  648* 635* 532*   Glucose, UA   --  4+*  --   --   --    BUN mg/dL 21*  --   --  19 18   Creatinine mg/dL 1.8*  --   --  1.5* 1.4   Albumin g/dL  "2.6*  --   --  2.1*  --    Total Bilirubin mg/dL 0.9  --   --  0.5  --    Alkaline Phosphatase U/L 90  --   --  76  --    AST U/L 15  --   --  9*  --    ALT U/L 26  --   --  19  --    Magnesium mg/dL 1.4*  --   --   --  1.8       Drug levels (last 3 results):  No results for input(s): "VANCOMYCINRA", "VANCORANDOM", "VANCOMYCINPE", "VANCOPEAK", "VANCOMYCINTR", "VANCOTROUGH" in the last 72 hours.    Microbiologic Results:  Microbiology Results (last 7 days)       Procedure Component Value Units Date/Time    Clostridium difficile EIA [[id number]]     Order Status: No result Specimen: Stool     Blood culture x two cultures. Draw prior to antibiotics. [[id number]] Collected: 12/12/23 1811    Order Status: Sent Specimen: Blood from Peripheral, Right Wrist Updated: 12/13/23 0016    Blood culture x two cultures. Draw prior to antibiotics. [[id number]] Collected: 12/12/23 1640    Order Status: Sent Specimen: Blood from Antecubital, Left Updated: 12/13/23 0016    Narrative:      Collection has been rescheduled by [email or web address] at 12/12/2023 18:16 Reason:   Unable to collect/got a little of the labs/no left arm/no veins/adv   nurse need ultrasound/got one set cultures and asmall green   Collection has been rescheduled by [email or web address] at 12/12/2023 18:16 Reason:   Unable to collect/got a little of the labs/no left arm/no veins/adv   nurse need ultrasound/got one set cultures and asmall green     Urine culture [[id number]] Collected: 12/12/23 1728    Order Status: No result Specimen: Urine Updated: 12/12/23 1759            "

## 2023-12-13 NOTE — EICU
Intervention Initiated From:  Bedside    Oscar intervened regarding:  Documentation    Comments: Called to bedside for midline catheter placement.  Consent confirmed with bedside team.  Practitioner verification completed.  Time out done using proper pt identifiers.  Midline cath placed to (R) upper arm by Alycia using u/s guidance.  Good blood return noted.  Pt tolerated procedure well.

## 2023-12-13 NOTE — PROGRESS NOTES
NURSE PROACTIVE ROUNDING NOTE       Time of Visit:     Admit Date: 2023  LOS: 0  Code Status: Full Code   Date of Visit: 2023  : 1980  Age: 43 y.o.  Sex: female  Race: White  Bed: K432/K432 A:   MRN: [id number]  Was the patient discharged from an ICU this admission? No   Was the patient discharged from a PACU within last 24 hours? No   Did the patient receive conscious sedation/general anesthesia in last 24 hours? No   Was the patient in the ED within the past 24 hours? Yes   Was the patient on NIPPV within the past 24 hours? No   Attending Physician: Shira Salcedo MD  Primary Service: Hospitalist,Internal Medicine   Time spent at the bedside: < 15 min    SITUATION    Notified by charge RN via phone call  Reason for alert: Hyperglycemic    Diagnosis: <principal problem not specified>   has a past medical history of Anemia, Asthma, Cellulitis of abdominal wall, CKD (chronic kidney disease) stage 3, GFR 30-59 ml/min, Depression, Diabetes mellitus, type II, Diastolic dysfunction, Diverticulosis of intestine with bleeding, E coli bacteremia, E. coli pyelonephritis, E. coli UTI, Hematuria, Hernia, epigastric, Hypertension, Hypocalcemia, Nonalcoholic hepatosteatosis, Periumbilical hernia, and Proteinuria.    Last Vitals:  Temp: 98.5 °F (36.9 °C) (2222)  Pulse: 95 (2222)  Resp: 18 (2222)  BP: 118/66 (2222)  SpO2: 94 % (2222)    24 Hour Vitals Range:  Temp:  [98 °F (36.7 °C)-98.9 °F (37.2 °C)]   Pulse:  []   Resp:  [18-25]   BP: (105-141)/(57-81)   SpO2:  [93 %-97 %]     Clinical Issues:  BRIDGET    ASSESSMENT/INTERVENTIONS    Most recent bmp gap closed 13 and . BS >500 x 2. Notified Chairs, NP regarding insulin orders and ordered prn insulin. Spoke with anurag Fonseca RN.    RECOMMENDATIONS  Obtain additional insulin orders. If pt does not respond to insulin possible bmp to rule out dka/hhs.    Discussed plan of care with bedside Marian AMOS    PROVIDER  ESCALATION    Physician escalation: Yes    Orders received and case discussed with  Chairs, NP.    Disposition:Remain in room 432    FOLLOW UP    Call back the Rapid Response NurseHan at [phone] for additional questions or concerns.

## 2023-12-13 NOTE — ASSESSMENT & PLAN NOTE
Patient has persistent depression which is moderate and is currently controlled. Will Continue anti-depressant medications. We will not consult psychiatry at this time. Patient does not display psychosis at this time. Continue to monitor closely and adjust plan of care as needed.  Continue Lexapro per home regimen     Staged Advancement Flap Text: The defect edges were debeveled with a #15 scalpel blade. Given the location of the defect, shape of the defect and the proximity to free margins a staged advancement flap was deemed most appropriate. Using a sterile surgical marker, an appropriate advancement flap was drawn incorporating the defect and placing the expected incisions within the relaxed skin tension lines where possible. The area thus outlined was incised deep to adipose tissue with a #15 scalpel blade. The skin margins were undermined to an appropriate distance in all directions utilizing iris scissors. Following this, the designed flap was carried over into the primary defect and sutured into place.

## 2023-12-13 NOTE — SUBJECTIVE & OBJECTIVE
Past Medical History:   Diagnosis Date    Anemia     Asthma     Cellulitis of abdominal wall 2017    CKD (chronic kidney disease) stage 3, GFR 30-59 ml/min     Depression     Diabetes mellitus, type II     Diastolic dysfunction     Diverticulosis of intestine with bleeding 2016    E coli bacteremia 2018    E. coli pyelonephritis 2015    E. 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     SECTION       SECTION, CLASSIC      CHOLECYSTECTOMY  age 15    CORONARY ANGIOGRAPHY N/A 2021    Procedure: ANGIOGRAM, CORONARY ARTERY;  Surgeon: Neelam Hicks MD;  Location: [facility] CATH LAB/EP;  Service: Cardiology;  Laterality: N/A;    INCISION AND DRAINAGE OF ABSCESS N/A 2020    Procedure: INCISION AND DRAINAGE, ABSCESS;  Surgeon: Rich Watson MD;  Location: [facility] OR;  Service: General;  Laterality: N/A;    LEFT HEART CATHETERIZATION Left 2021    Procedure: Left heart cath;  Surgeon: Neelam Hicks MD;  Location: [facility] CATH LAB/EP;  Service: Cardiology;  Laterality: Left;    LEFT HEART CATHETERIZATION N/A 2021    Procedure: Left heart cath;  Surgeon: Neelam Hicks MD;  Location: [facility] CATH LAB/EP;  Service: Cardiology;  Laterality: N/A;    TONSILLECTOMY, ADENOIDECTOMY         Review of patient's allergies indicates:   Allergen Reactions    Celexa [citalopram] Nausea And Vomiting       Family History       Problem Relation (Age of Onset)    Breast cancer Paternal Grandmother    Cancer Father, Maternal Aunt    Heart attack Mother, Maternal Aunt, Maternal Grandmother    Heart disease Mother            Tobacco Use    Smoking status: Every Day     Current packs/day: 2.50     Average packs/day: 2.5 packs/day for 36.9 years (92.4 ttl pk-yrs)     Types:  Cigarettes     Start date: 1987    Smokeless tobacco: Never    Tobacco comments:     Pt enrolled in the [employer] on 9/12/18. (S[employer] Member ID # [id number]).  Ambulatory referral to Smoking Cessation Program   Substance and Sexual Activity    Alcohol use: No    Drug use: No    Sexual activity: Yes     Partners: Male     Birth control/protection: Injection       Review of Systems    Objective:     Temp:  [97.5 °F (36.4 °C)-98.9 °F (37.2 °C)] 98 °F (36.7 °C)  Pulse:  [] 96  Resp:  [18-34] 21  SpO2:  [93 %-99 %] 96 %  BP: (105-147)/(55-88) 118/55  Weight: (!) 157.5 kg (347 lb 3.6 oz)  Body mass index is 63.51 kg/m².    Date 12/13/23 0700 - 12/14/23 0659   Shift 8335-0376 9959-4447 4222-9903 24 Hour Total   INTAKE   I.V.(mL/kg) 1179.9(7.5)   1179.9(7.5)   IV Piggyback 55.7   55.7   Shift Total(mL/kg) 1235.6(7.8)   1235.6(7.8)   OUTPUT   Urine(mL/kg/hr) 300   300   Shift Total(mL/kg) 300(1.9)   300(1.9)   Weight (kg) 157.5 157.5 157.5 157.5     Bladder Scan Volume (mL): 116 mL (12/13/23 0945)    Drains       Drain  Duration             Female External Urinary Catheter 12/13/23 0300 <1 day                     Physical Exam  Constitutional:       Comments: Morbidly obese   HENT:      Head: Normocephalic.   Cardiovascular:      Rate and Rhythm: Normal rate.   Pulmonary:      Effort: Pulmonary effort is normal.   Abdominal:      Tenderness: There is no right CVA tenderness or left CVA tenderness.   Genitourinary:     Comments: Attempted to visualize lesion of vagina, but unable to due to body habitus  Neurological:      Mental Status: She is alert.          Significant Labs:    BMP:  Recent Labs   Lab 12/13/23  0111 12/13/23  0408 12/13/23  0824    135* 141   K 3.8 4.0 4.0    104 107   CO2 21* 23 21*   BUN 19 18 19   CREATININE 1.5* 1.4 1.2   CALCIUM 8.1* 8.0* 8.3*       CBC:  Recent Labs   Lab 12/12/23  1659 12/13/23  0408   WBC 10.86 8.14   HGB 12.6 10.2*   HCT 39.2 31.0*    152       All pertinent  labs results from the past 24 hours have been reviewed.    Significant Imaging:  All pertinent imaging results/findings from the past 24 hours have been reviewed.

## 2023-12-13 NOTE — PROGRESS NOTES
NURSE PROACTIVE ROUNDING NOTE       Received 15 units of aspart and recheck BS >500. Chairs, NP notified and ordered for STAT cmp.    FOLLOW UP    Call back the Rapid Response NurseHan at [phone] for additional questions or concerns.

## 2023-12-13 NOTE — PLAN OF CARE
Attempted to que into room to complete admission questions;  Monitor offline, Marian Resendiz RN notified.  Phone placed by patient per BETTE Fonseca;  Per Marian, patient refusing to answer questions at this time.

## 2023-12-13 NOTE — CONSULTS
Called to bedside to assist with better IV access.  However, patient refusing any care until she can speak to her daughter and the physician of her primary team about her on going care.  Several attempts made to answer her questions and explain the urgency of improved IV access to assist her medical care.  She adamantly refuses at this time.

## 2023-12-13 NOTE — ASSESSMENT & PLAN NOTE
Patient with acute kidney injury/acute renal failure likely due to pre-renal azotemia due to dehydration BRIDGET is currently worsening. Baseline creatinine  0.9  - Labs reviewed- Renal function/electrolytes with Estimated Creatinine Clearance: 57.9 mL/min (A) (based on SCr of 1.8 mg/dL (H)). according to latest data. Monitor urine output and serial BMP and adjust therapy as needed. Avoid nephrotoxins and renally dose meds for GFR listed above.

## 2023-12-13 NOTE — NURSING
Dr. Herman Villarreal notified of pt's elevated glucose level per lab, new orders received to transfer pt to ICU.

## 2023-12-13 NOTE — HPI
Lele Dias is a 42yoF with significant pmh admitted for diarrhea and weakness that started a week ago. Found to be extremely hyperglycemic. Urology consulted for gross hematuria.    Patient states that she only sees blood in her urine when she wipes after voiding. Reports lesion around the vagina, but unable to assess due to body habitus. No hematuria or passage of clots. Denies dysuria. CT shows no hydronephrosis and no bladder wall thickening.  Cr 1.2, baseline 0.9  UA showed few bacteria and moderate yeast. Ucx pending

## 2023-12-13 NOTE — CARE UPDATE
Notified of elevated blood sugars. Transferred to the ICU to start treatment for HHS w/ insulin drip.

## 2023-12-13 NOTE — CONSULTS
"                                          [facility] Pul[facility]/Critical Care Consult Note      Patient:Lele Dias  Age:43 y.o.  MRN:[id number]  Admit date:12/12/2023  LOS:0 day(s)     Primary Team: Hospital medicine  Primary Attending: Shira Salcedo MD  Consult Attending: James Briggs MD  Consult Fellow: America Matta MD   Reason for Consult: Hyperglycemia     HPI:       44 yo female with a PMHx of HTN, Asthma, CHF, T2DM, Cellulitis, and CKD 3 who presented to the hospital for 5 days of diarrhea and weakness. During this period patient has had decreased PO intake and has had increase urination. She states that blood was in her stool and urine, although she has hemorrhoids that she feels as though that is the source. No melena or clots in stool. She denied chest pain, abdominal pain, or nausea/vomiting.     Upon presentation, she was afebrile and without a leukocytosis. She was found to have a glucose >900, Cr of 1.8 (baseline 1-1.2), and a UTI based on her UA. Troponin was elevated, EKG was with TWI in lateral leads but no ischemic changes. LA was 2.6. CXR and CT AP were unremarkable. Since, she has been given 1.5L fluids, 20u insulin, and bicarbonate. Also started on vancomycin and zosyn due to her UTI. She was subsequently stepped up to the ICU for need of insulin drip.    Interval:  She has been afebrile and with a leukocytosis. Patient with pressure ulcer on left buttock and intertrigo present in abdominal folds bilaterally R>L. Vancomycin was held and Zosyn was continued. Glucose has down trended to 163, and she was transitioned to daily long acting insulin and TID meal regimen. Reporting that she is feeling better and no longer "weak." C. Diff labwork pending.     MEDICAL HISTORY:      Past Medical History:  Past Medical History:   Diagnosis Date    Anemia     Asthma     Cellulitis of abdominal wall 12/18/2017    CKD (chronic kidney disease) stage 3, GFR 30-59 ml/min     Depression     Diabetes " mellitus, type II     Diastolic dysfunction     Diverticulosis of intestine with bleeding 2016    E coli bacteremia 2018    E. coli pyelonephritis 2015    E. coli UTI 2017    Hematuria     Hernia, epigastric     Hypertension     Hypocalcemia     Nonalcoholic hepatosteatosis     Periumbilical hernia     Proteinuria         Past Surgical History:  Past Surgical History:   Procedure Laterality Date    ARM AMPUTATION AT SHOULDER Left 2000s     SECTION       SECTION, CLASSIC      CHOLECYSTECTOMY  age 15    CORONARY ANGIOGRAPHY N/A 2021    Procedure: ANGIOGRAM, CORONARY ARTERY;  Surgeon: Neelam Hicks MD;  Location: [facility] CATH LAB/EP;  Service: Cardiology;  Laterality: N/A;    INCISION AND DRAINAGE OF ABSCESS N/A 2020    Procedure: INCISION AND DRAINAGE, ABSCESS;  Surgeon: Rich Watson MD;  Location: [facility] OR;  Service: General;  Laterality: N/A;    LEFT HEART CATHETERIZATION Left 2021    Procedure: Left heart cath;  Surgeon: Neelam Hicks MD;  Location: [facility] CATH LAB/EP;  Service: Cardiology;  Laterality: Left;    LEFT HEART CATHETERIZATION N/A 2021    Procedure: Left heart cath;  Surgeon: Neelam Hicks MD;  Location: [facility] CATH LAB/EP;  Service: Cardiology;  Laterality: N/A;    TONSILLECTOMY, ADENOIDECTOMY           Family History:   Family History   Problem Relation Age of Onset    Cancer Father     Heart attack Mother     Heart disease Mother     Cancer Maternal Aunt         lung (smoker)    Heart attack Maternal Aunt     Breast cancer Paternal Grandmother     Heart attack Maternal Grandmother          Social History:  - Tobacco: current smoker, 92 pack year history  - Alcohol: Denies  - Drugs: Denies     Allergies:  Review of patient's allergies indicates:   Allergen Reactions    Celexa [citalopram] Nausea And Vomiting       Home Medications:  Current Outpatient Medications   Medication Instructions    acetaminophen (TYLENOL) 500 mg, Oral, Every 6  "hours PRN    albuterol (PROVENTIL/VENTOLIN HFA) 90 mcg/actuation inhaler 1-2 puffs, Inhalation, Every 4-6 hours PRN    allopurinoL (ZYLOPRIM) 300 mg, Oral, Daily    ALPRAZolam (XANAX) 0.5 mg, Oral, 3 times daily PRN    aspirin (ECOTRIN) 81 mg, Oral, Daily    atorvastatin (LIPITOR) 80 mg, Oral, Daily    azelastine (ASTELIN) 137 mcg (0.1 %) nasal spray 2 sprays, Nasal, 2 times daily PRN    BD ULTRA-FINE MINI PEN NEEDLE 31 gauge x 3/16" Ndle Subcutaneous, 5 times daily    BREZTRI AEROSPHERE 160-9-4.8 mcg/actuation HFAA 2 puffs, Oral, 2 times daily    carvediloL (COREG) 3.125 mg, Oral, 2 times daily    cetirizine (ZYRTEC) 10 mg, Oral, Daily PRN    cholecalciferol (vitamin D3) 50,000 Units, Oral, Every Wednesday    clopidogreL (PLAVIX) 75 mg, Oral, Daily    EScitalopram oxalate (LEXAPRO) 10 mg, Oral, Daily PRN    fluticasone propionate (FLONASE) 50 mcg/actuation nasal spray 1 spray, Each Nostril, Daily PRN    furosemide (LASIX) 40 mg, Oral, 2 times daily PRN    HUMALOG MIX 75-25 KWIKPEN 100 unit/mL (75-25) InPn 80 Units, Subcutaneous, 3 times daily    insulin glargine (LANTUS) 90 Units, Subcutaneous, Every morning    LINZESS 72 mcg, Oral, Daily PRN    lisinopriL 5 mg, Oral, Daily    medroxyPROGESTERone (DEPO-PROVERA) 150 mg, Intramuscular, Every 3 months    medroxyPROGESTERone (DEPO-PROVERA) 150 mg, Intramuscular, Every 3 months    multivit-min-iron fum-folic ac (ONE-A-DAY WOMEN'S COMPLETE) 18 mg iron- 400 mcg Tab 1 tablet, Oral, Daily    nicotine (NICODERM CQ) 21 mg/24 hr 1 patch, Transdermal, Daily PRN    omeprazole (PRILOSEC) 20 mg, Oral, Daily PRN    pregabalin (LYRICA) 300 mg, Oral, Daily    promethazine (PHENERGAN) 6.25 mg/5 mL syrup 5-10 mLs, Oral, Every 8 hours PRN    promethazine-dextromethorphan (PROMETHAZINE-DM) 6.25-15 mg/5 mL Syrp SMARTSI Teaspoon By Mouth 3 Times Daily PRN    SUBOXONE 8-2 mg 1 Film, Sublingual, 3 times daily    tiZANidine (ZANAFLEX) 4 mg, Oral, Every 8 hours PRN    TRUE METRIX GLUCOSE " TEST STRIP Strp 3 times daily        OBJECTIVE DATA:      Vital Signs:  Vitals:    12/13/23 1000   BP: (!) 106/51   Pulse: 94   Resp: (!) 23   Temp:        Intake/Output:    Intake/Output Summary (Last 24 hours) at 12/13/2023 1114  Last data filed at 12/13/2023 1015  Gross per 24 hour   Intake 1495.63 ml   Output 1551 ml   Net -55.37 ml        Physical Exam:  Constitutional: cooperative, calm, appears stated age.  HEENT: NCAT, PERRL, mucus membranes dry  Resp: Symmetrical, clear to auscultation bilaterally, No wheezes, rhonchi, or crackles.   Cardio: RRR, S1 and S2 normal, no murmurs or added sounds.  GI: Soft, non-tender, bowel sounds active. Area of intertrigo present spanning bilaterally in intertriginous zones R>L, excoriation present on right side  Extremities: Chronic venous stasis edema present in bilateral lower extremities. LUE above elbow amputation. Extremities warm pulses 1+ in right radial and DP, difficult to appreciate DP on left   Skin: Stage 2 pressure ulcer to right buttock with surrounding erythema, extremal hemorrhoids present  Neurologic: Alert and oriented x3, follows commands, moves extremities spontaneously. Cranial nerves II-XII intact     Laboratory/Imaging:  Recent Labs   Lab 12/12/23  1659 12/13/23  0111 12/13/23  0408 12/13/23  0824   WBC 10.86  --  8.14  --    HGB 12.6  --  10.2*  --    HCT 39.2  --  31.0*  --      --  152  --    * 138 135* 141   K 4.3 3.8 4.0 4.0   CL 99 106 104 107   CREATININE 1.8* 1.5* 1.4 1.2   BUN 21* 19 18 19   CO2 21* 21* 23 21*   ALT 26 19  --   --    AST 15 9*  --   --        Microbiology:  Blood cultures pending  Urine cultures pending     Imaging:  CXR and CT AP were not notable for any acute pathologies other than previous fat-containing hernia on CT    Medications:   allopurinoL  300 mg Oral Daily    aspirin  81 mg Oral Daily    atorvastatin  80 mg Oral Daily    buprenorphine-naloxone 2-0.5 mg  4 Film Sublingual TID    carvediloL  3.125 mg  Oral BID    clopidogreL  75 mg Oral Daily    enoxparin  60 mg Subcutaneous Q12H (prophylaxis, 0900/2100)    ertapenem (INVANZ) IVPB  1 g Intravenous Q24H    insulin aspart U-100  5 Units Subcutaneous TIDWM    insulin detemir U-100  45 Units Subcutaneous Daily    lactated ringers  1,000 mL Intravenous Once    magnesium sulfate IVPB  2 g Intravenous Once    pregabalin  300 mg Oral Daily    vancomycin (VANCOCIN) IV (PEDS and ADULTS)  1,500 mg Intravenous Q12H    vancomycin (VANCOCIN) IV (PEDS and ADULTS)  2,000 mg Intravenous Once             acetaminophen, ALPRAZolam, dextrose 10%, dextrose 10%, furosemide, glucagon (human recombinant), glucose, glucose, insulin aspart U-100, melatonin, naloxone, ondansetron, sodium chloride 0.9%, sodium chloride 0.9%, Pharmacy to dose Vancomycin consult **AND** vancomycin - pharmacy to dose     Assessment/Plan:     42 yo female with a PMHx of HTN, Asthma, CHF, T2DM, Cellulitis, and CKD 3 who presented to the hospital for 5 days of diarrhea and weakness. Found to have HHS based on glucose, lack of ketones, lack of anion gap, and no major symptoms. Additionally, pressure ulcers and intertrigo examined in abdominal intertriginous areas. Admitted to ICU for insulin drip.      Neurologic  - Tylenol PRN for pain  - Home Alprazolam for depression, Home Pregabalin for phantom limb syndrome  - No sedation at this time  - Mobilization and OOB activity as tolerated. PT/OT evals as indicated    Cardiovascular  History of CAD s/p PCI in 2021  - Continue home ASA and Plavix  - Elevated troponin on presentation to 0.259  - EKG with TWI inlateral leads  - Will trend troponins    Combined Systolic/Diastolic Heart Failure  - Last Echo in June 2022 showing LVEF 45%, GIDD  - Can consider new echo while inpatient  - No signs of worsening heart failure on exam       Respiratory  - Saturating >96% on RA  - No shortness of breath or chest pain  - No ventilatory needs    Gastrointestinal  - Diabetic diet   -  Diarrhea for 5 days, will continue to watch, plan in ID below  - Stool studies sent for WBC   - Consider stool culture    Renal/Genitourinary  BRIDGET on CKD 3  - Creatinine on presentation was 1.8 with baseline ~1-1.2  - Improved following fluid administration   - Continue to monitor for improvement of Cr and improvement in fluid status  - Fluid repletion due to likely dehydrated state in setting of HHS  - Strict I/Os    UTI/Hematuria  - Patient with UTI based on UA  - Gross blood in urine per patient report; occult blood on UA  - Patient states unable to tell if it is from urine or ulcer/hemorrhoids, which is likely source  - On zosyn day 2 for UTI coverage  - Urology consulted for recommendation    Hematologic  - Hgb stable at 10.2; baseline 11-13. Transfuse for Hgb <7 or <8 in setting of ACS.   - Continue to monitor for signs of bleeding    Endocrine  [facility]   - Glucose 918 on presentation without an anion gap/ketones and no other signs/symptoms of DKA  - History of T2DM, on Lantus and Humalog at home  - Started on 45u Levemir AM daily, with Novolog TID, and SSI  - Glucose control with blood glucose target of 140 to 180 mg/dL    Infectious Disease  Diarrhea  - Presents with fatigue and diarrhea without other signs of infection including normal VS, afebrile, no leukocytosis, no other symptoms/pain  - C. Diff EIA pending  - Stool studies ordered  - Patient on vancomycin and zosyn for coverage out of concern for necrotizing fasciitis   - Can consider discontinuation of vancomycin once suspicion is low    Sacral Pressure Ulcer/Intertrigo   - Stage 2 Pressure ulcer on right buttock   - Dressing changes and deloading precautions  - Wound care consulted for recommendations and assistance   - Barrier protection  - Topical     Cellulitis  - Previously history of LLE cellulitis that she has been treated for since June   - Treatment has consisted of alternations between clindamycin and keflex  - She was recently on Keflex for two  weeks prior to this hospitalization  - Leg appears warm and mildly erythematous with chronic venous stasis-like changes     Critical Care Daily Checklist:    A: Awake: RASS Goal/Actual Goal:    Actual: [place] Agitation Sedation Scale (RASS): 0   B: Spontaneous Breathing Trial Performed?  N/A   C: SAT & SBT Coordinated?  N/A                      D: Delirium: CAM-ICU No signs of delirium   E: Early Mobility Performed? Yes   F: Feeding Goal: Goals: Pt to recieve nutrition by RD follow up  Status: Nutrition Goal Status: new   Diabetic Diet     AS: Analgesia/Sedation None   T: Thromboembolic Prophylaxis Lovenox   H: HOB > 300 Yes   U: Stress Ulcer Prophylaxis (if needed) None   G: Glucose Control LA Insulin, SSI, With meals   B: Bowel Function Stool Occurrence: 1   I: Indwelling Catheter (Lines & Gerard) Necessity External Urinary Catheter   D: De-escalation of Antimicrobials/Pharmacotherapies Not at this time    Plan for the day/ETD Step down    Code Status:  Family/Goals of Care: Full Code         Mushtaq Flood MD  [facility], [facility]    Pt seen and examined with Pulmonary/Critical Care team and this note was reviewed and validated with the following additional comments: ill appearing woman with diarrhea, hyperglycemia and hyperosmolar state. Also concerns for sepsis.  Several skin wounds without surrounding cellulitis. Has dirty urine.  IV insulin, work-up diarrhea, clean wounds.    Critical Care time was spent validating the history and physical exam, reviewing the lab and imaging results, and discussing the care of the patient with the bedside nurse and the patient and/or surrogates. This critical care time did not overlap with that of any other provider or involve time for any procedures.  This patient has a high probability of sudden clinically significant deterioration which requires the highest level of physician preparedness to intervene urgently. I managed/supervised life or organ supporting  interventions that required frequent physician assessments. I devoted my full attention in the ICU to the direct care of this patient for this period of time. Organ systems which are failing and require intensive, critical care support are: metabolic, GI, respiratory  Critical Care time: 40 minutes    James Gomez MD  Phone [phone]

## 2023-12-13 NOTE — CONSULTS
Today`s Date: 2023     Admit Date: 2023    Admitting Physician: Shira Salcedo MD    Patient`s Name: Lele Dias , 43 y.o. female    Reason for consultation  Wound care rule out cellulitis and nec fascitis   Patient Active Problem List:     Chronic combined systolic and diastolic congestive heart failure     Asthma     Benign essential hypertension     BRIDGET (acute kidney injury)     Nonalcoholic hepatosteatosis     Opioid dependence on agonist therapy     Phantom limb pain     Periumbilical hernia     UTI due to Klebsiella species     NSTEMI (non-ST elevated myocardial infarction)     Tobacco use disorder     Anemia     Sepsis due to urinary tract infection     Morbid obesity with BMI of 70 and over, adult     Opioid abuse     Coronary artery disease involving native coronary artery of native heart without angina pectoris     Hypoglycemia associated with type 2 diabetes mellitus     Benzodiazepine abuse     Grief reaction     Debilitated     Chest pain in adult     Leg edema, left     DM (diabetes mellitus) type 2, uncontrolled, with ketoacidosis     Constipation     Cellulitis     Comfort measures only status     GERD (gastroesophageal reflux disease)     Depression     Hyperlipidemia     Gross hematuria      Past Medical History:  No date: Anemia  No date: Asthma  2017: Cellulitis of abdominal wall  No date: CKD (chronic kidney disease) stage 3, GFR 30-59 ml/min  No date: Depression  No date: Diabetes mellitus, type II  No date: Diastolic dysfunction  2016: Diverticulosis of intestine with bleeding  2018: E coli bacteremia  2015: E. coli pyelonephritis  2017: E. coli UTI  No date: Hematuria  No date: Hernia, epigastric  No date: Hypertension  No date: Hypocalcemia  No date: Nonalcoholic hepatosteatosis  No date: Periumbilical hernia  No date: Proteinuria    Past Surgical History:  : ARM AMPUTATION AT SHOULDER; Left  No date:  SECTION  No date:   SECTION, CLASSIC  age 15: CHOLECYSTECTOMY  1/24/2021: CORONARY ANGIOGRAPHY; N/A      Comment:  Procedure: ANGIOGRAM, CORONARY ARTERY;  Surgeon: Neelam Hicks MD;  Location: [facility] CATH LAB/EP;  Service:                Cardiology;  Laterality: N/A;  12/11/2020: INCISION AND DRAINAGE OF ABSCESS; N/A      Comment:  Procedure: INCISION AND DRAINAGE, ABSCESS;  Surgeon:                Rich Watson MD;  Location: [facility] OR;  Service:                General;  Laterality: N/A;  1/24/2021: LEFT HEART CATHETERIZATION; Left      Comment:  Procedure: Left heart cath;  Surgeon: Neelam Hicks MD;  Location: [facility] CATH LAB/EP;  Service: Cardiology;                 Laterality: Left;  2/17/2021: LEFT HEART CATHETERIZATION; N/A      Comment:  Procedure: Left heart cath;  Surgeon: Neelam Hicks MD;  Location: [facility] CATH LAB/EP;  Service: Cardiology;                 Laterality: N/A;  No date: TONSILLECTOMY, ADENOIDECTOMY    Prior to Admission medications :  Medication acetaminophen (TYLENOL) 500 MG tablet, Sig Take 500 mg by mouth every 6 (six) hours as needed for Pain., Start Date , End Date , Taking? Yes, Authorizing Provider Provider, Historical    Medication albuterol (PROVENTIL/VENTOLIN HFA) 90 mcg/actuation inhaler, Sig Inhale 1-2 puffs into the lungs every 4 to 6 hours as needed., Start Date 12/6/23, End Date , Taking? Yes, Authorizing Provider Provider, Historical    Medication allopurinoL (ZYLOPRIM) 300 MG tablet, Sig Take 300 mg by mouth once daily., Start Date 9/25/23, End Date , Taking? Yes, Authorizing Provider Provider, Historical    Medication ALPRAZolam (XANAX) 0.5 MG tablet, Sig Take 0.5 mg by mouth 3 (three) times daily as needed., Start Date 9/29/23, End Date , Taking? Yes, Authorizing Provider Provider, Historical    Medication aspirin (ECOTRIN) 81 MG EC tablet, Sig Take 1 tablet (81 mg total) by mouth once daily., Start Date 5/5/23, End Date , Taking? Yes,  Authorizing Provider Neelam Hicks MD    Medication azelastine (ASTELIN) 137 mcg (0.1 %) nasal spray, Sig 2 sprays by Nasal route 2 (two) times daily as needed., Start Date 12/6/23, End Date , Taking? Yes, Authorizing Provider Provider, Historical    Medication BREZTRI AEROSPHERE 160-9-4.8 mcg/actuation HFAA, Sig Take 2 puffs by mouth 2 (two) times daily., Start Date 11/21/23, End Date , Taking? Yes, Authorizing Provider Provider, Historical    Medication carvediloL (COREG) 3.125 MG tablet, Sig Take 3.125 mg by mouth 2 (two) times daily., Start Date 9/11/23, End Date , Taking? Yes, Authorizing Provider Provider, Historical    Medication cetirizine (ZYRTEC) 10 MG tablet, Sig Take 10 mg by mouth daily as needed., Start Date 11/15/23, End Date , Taking? Yes, Authorizing Provider Provider, Historical    Medication cholecalciferol, vitamin D3, 1,250 mcg (50,000 unit) capsule, Sig Take 50,000 Units by mouth every Wednesday., Start Date 9/11/23, End Date , Taking? Yes, Authorizing Provider Provider, Historical    Medication clopidogreL (PLAVIX) 75 mg tablet, Sig Take 1 tablet (75 mg total) by mouth once daily., Start Date 7/31/23, End Date , Taking? Yes, Authorizing Provider Neelam Hicks MD    Medication EScitalopram oxalate (LEXAPRO) 10 MG tablet, Sig Take 10 mg by mouth daily as needed., Start Date 9/11/23, End Date , Taking? Yes, Authorizing Provider Provider, Historical    Medication fluticasone propionate (FLONASE) 50 mcg/actuation nasal spray, Sig 1 spray by Each Nostril route daily as needed., Start Date 8/17/23, End Date , Taking? Yes, Authorizing Provider Provider, Historical    Medication furosemide (LASIX) 40 MG tablet, Sig Take 40 mg by mouth 2 (two) times daily as needed., Start Date 8/28/23, End Date , Taking? Yes, Authorizing Provider Provider, Historical    Medication HUMALOG MIX 75-25 KWIKPEN 100 unit/mL (75-25) InPn, Sig Inject 80 Units into the skin 3 (three) times daily., Start Date 10/5/23,  End Date , Taking? Yes, Authorizing Provider Provider, Historical    Medication insulin glargine (LANTUS) 100 unit/mL injection, Sig Inject 90 Units into the skin every morning., Start Date 23, End Date 24, Taking? Yes, Authorizing Provider Provider, Historical    Medication lisinopriL 10 MG tablet, Sig Take 5 mg by mouth once daily., Start Date 23, End Date , Taking? Yes, Authorizing Provider Provider, Historical    Medication multivit-min-iron fum-folic ac (ONE-A-DAY WOMEN'S COMPLETE) 18 mg iron- 400 mcg Tab, Sig Take 1 tablet by mouth once daily., Start Date , End Date , Taking? Yes, Authorizing Provider Provider, Historical    Medication omeprazole (PRILOSEC) 20 MG capsule, Sig Take 20 mg by mouth daily as needed., Start Date 10/2/23, End Date , Taking? Yes, Authorizing Provider Provider, Historical    Medication pregabalin (LYRICA) 300 MG Cap, Sig Take 300 mg by mouth once daily., Start Date 23, End Date , Taking? Yes, Authorizing Provider Provider, Historical    Medication promethazine (PHENERGAN) 6.25 mg/5 mL syrup, Sig Take 5-10 mLs by mouth every 8 (eight) hours as needed., Start Date 10/5/23, End Date , Taking? Yes, Authorizing Provider Provider, Historical    Medication promethazine-dextromethorphan (PROMETHAZINE-DM) 6.25-15 mg/5 mL Syrp, Sig SMARTSI Teaspoon By Mouth 3 Times Daily PRN, Start Date 23, End Date , Taking? Yes, Authorizing Provider Provider, Historical    Medication SUBOXONE 8-2 mg, Sig Place 1 Film under the tongue 3 (three) times daily., Start Date 23, End Date , Taking? Yes, Authorizing Provider Provider, Historical    Medication tiZANidine (ZANAFLEX) 4 MG tablet, Sig Take 4 mg by mouth every 8 (eight) hours as needed., Start Date 23, End Date , Taking? Yes, Authorizing Provider Provider, Historical    Medication atorvastatin (LIPITOR) 80 MG tablet, Sig Take 1 tablet (80 mg total) by mouth once daily., Start Date 23, End Date , Taking? , Authorizing  "Provider Neelam Hicks MD    Medication BD ULTRA-FINE MINI PEN NEEDLE 31 gauge x 3/16" Ndle, Sig Inject into the skin 5 (five) times daily., Start Date 5/15/23, End Date , Taking? , Authorizing Provider Provider, Historical    Medication LINZESS 72 mcg Cap capsule, Sig Take 72 mcg by mouth daily as needed., Start Date 9/11/23, End Date , Taking? , Authorizing Provider Provider, Historical    Medication medroxyPROGESTERone (DEPO-PROVERA) 150 mg/mL injection, Sig Inject 150 mg into the muscle every 3 (three) months., Start Date 10/5/23, End Date , Taking? , Authorizing Provider Provider, Historical    Medication medroxyPROGESTERone (DEPO-PROVERA) 150 mg/mL injection, Sig Inject 150 mg into the muscle every 3 (three) months., Start Date , End Date , Taking? , Authorizing Provider Provider, Historical    Medication nicotine (NICODERM CQ) 21 mg/24 hr, Sig Place 1 patch onto the skin daily as needed., Start Date 7/7/23, End Date , Taking? , Authorizing Provider Provider, Historical    Medication TRUE METRIX GLUCOSE TEST STRIP Strp, Sig 3 (three) times daily., Start Date 10/5/23, End Date , Taking? , Authorizing Provider Provider, Historical      No current facility-administered medications on file prior to encounter.  Current Outpatient Medications on File Prior to Encounter:  acetaminophen (TYLENOL) 500 MG tablet, Take 500 mg by mouth every 6 (six) hours as needed for Pain., Disp: , Rfl:   albuterol (PROVENTIL/VENTOLIN HFA) 90 mcg/actuation inhaler, Inhale 1-2 puffs into the lungs every 4 to 6 hours as needed., Disp: , Rfl:   allopurinoL (ZYLOPRIM) 300 MG tablet, Take 300 mg by mouth once daily., Disp: , Rfl:   ALPRAZolam (XANAX) 0.5 MG tablet, Take 0.5 mg by mouth 3 (three) times daily as needed., Disp: , Rfl:   aspirin (ECOTRIN) 81 MG EC tablet, Take 1 tablet (81 mg total) by mouth once daily., Disp: 90 tablet, Rfl: 3  azelastine (ASTELIN) 137 mcg (0.1 %) nasal spray, 2 sprays by Nasal route 2 (two) times daily as " needed., Disp: , Rfl:   BREZTRI AEROSPHERE 160-9-4.8 mcg/actuation HFAA, Take 2 puffs by mouth 2 (two) times daily., Disp: , Rfl:   carvediloL (COREG) 3.125 MG tablet, Take 3.125 mg by mouth 2 (two) times daily., Disp: , Rfl:   cetirizine (ZYRTEC) 10 MG tablet, Take 10 mg by mouth daily as needed., Disp: , Rfl:   cholecalciferol, vitamin D3, 1,250 mcg (50,000 unit) capsule, Take 50,000 Units by mouth every Wednesday., Disp: , Rfl:   clopidogreL (PLAVIX) 75 mg tablet, Take 1 tablet (75 mg total) by mouth once daily., Disp: 30 tablet, Rfl: 3  EScitalopram oxalate (LEXAPRO) 10 MG tablet, Take 10 mg by mouth daily as needed., Disp: , Rfl:   fluticasone propionate (FLONASE) 50 mcg/actuation nasal spray, 1 spray by Each Nostril route daily as needed., Disp: , Rfl:   furosemide (LASIX) 40 MG tablet, Take 40 mg by mouth 2 (two) times daily as needed., Disp: , Rfl:   HUMALOG MIX 75-25 KWIKPEN 100 unit/mL (75-25) InPn, Inject 80 Units into the skin 3 (three) times daily., Disp: , Rfl:   insulin glargine (LANTUS) 100 unit/mL injection, Inject 90 Units into the skin every morning., Disp: , Rfl:   lisinopriL 10 MG tablet, Take 5 mg by mouth once daily., Disp: , Rfl:   multivit-min-iron fum-folic ac (ONE-A-DAY WOMEN'S COMPLETE) 18 mg iron- 400 mcg Tab, Take 1 tablet by mouth once daily., Disp: , Rfl:   omeprazole (PRILOSEC) 20 MG capsule, Take 20 mg by mouth daily as needed., Disp: , Rfl:   pregabalin (LYRICA) 300 MG Cap, Take 300 mg by mouth once daily., Disp: , Rfl:   promethazine (PHENERGAN) 6.25 mg/5 mL syrup, Take 5-10 mLs by mouth every 8 (eight) hours as needed., Disp: , Rfl:   promethazine-dextromethorphan (PROMETHAZINE-DM) 6.25-15 mg/5 mL Syrp, SMARTSI Teaspoon By Mouth 3 Times Daily PRN, Disp: , Rfl:   SUBOXONE 8-2 mg, Place 1 Film under the tongue 3 (three) times daily., Disp: , Rfl:   tiZANidine (ZANAFLEX) 4 MG tablet, Take 4 mg by mouth every 8 (eight) hours as needed., Disp: , Rfl:   atorvastatin (LIPITOR) 80 MG  "tablet, Take 1 tablet (80 mg total) by mouth once daily., Disp: 90 tablet, Rfl: 1  BD ULTRA-FINE MINI PEN NEEDLE 31 gauge x 3/16" Ndle, Inject into the skin 5 (five) times daily., Disp: , Rfl:   LINZESS 72 mcg Cap capsule, Take 72 mcg by mouth daily as needed., Disp: , Rfl:   medroxyPROGESTERone (DEPO-PROVERA) 150 mg/mL injection, Inject 150 mg into the muscle every 3 (three) months., Disp: , Rfl:   medroxyPROGESTERone (DEPO-PROVERA) 150 mg/mL injection, Inject 150 mg into the muscle every 3 (three) months., Disp: , Rfl:   nicotine (NICODERM CQ) 21 mg/24 hr, Place 1 patch onto the skin daily as needed., Disp: , Rfl:   TRUE METRIX GLUCOSE TEST STRIP Strp, 3 (three) times daily., Disp: , Rfl:          Review of patient's allergies indicates:   -- Celexa [citalopram] -- Nausea And Vomiting    Social History:   reports that she has been smoking cigarettes. She started smoking about 36 years ago. She has a 92.4 pack-year smoking history. She has never used smokeless tobacco. She reports that she does not drink alcohol and does not use drugs.     Review of patient's family history indicates:  Problem: Cancer      Relation: Father          Age of Onset: (Not Specified)  Problem: Heart attack      Relation: Mother          Age of Onset: (Not Specified)  Problem: Heart disease      Relation: Mother          Age of Onset: (Not Specified)  Problem: Cancer      Relation: Maternal Aunt          Age of Onset: (Not Specified)          Comment: lung (smoker)  Problem: Heart attack      Relation: Maternal Aunt          Age of Onset: (Not Specified)  Problem: Breast cancer      Relation: Paternal Grandmother          Age of Onset: (Not Specified)  Problem: Heart attack      Relation: Maternal Grandmother          Age of Onset: (Not Specified)      PHYSICAL EXAMINATION  Temp:  [97.5 °F (36.4 °C)-98.9 °F (37.2 °C)] 98.3 °F (36.8 °C)  Pulse:  [] 92  Resp:  [18-34] 29  SpO2:  [90 %-99 %] 90 %  BP: (104-147)/(49-88) " 130/56    General Condition:   Awake alert x 3     Head & Neck  Anemia: None  Jaundice: None  Neck vein: Not distended  Carotid Bruits: none  Lymph nodes: none palpable  Thyroid: normal    Chest: normal    Heart: normal    Rt. Breast: not examined  Lt. Breast: not examined  Axillary lymph nodes: none    Abdomen: Soft,  None tender with no palpable mass or organ  Hernia: none    Rectal: Defered    Extremities: normal    Vascular: normal    Specific focus Examination     Imp: hyperglycemia, severe obesity, non healing early bed sore buttocks, non healing left calf ulcer    Plan: mild cellulitis left leg ,   Agree with wound care as written.

## 2023-12-13 NOTE — SUBJECTIVE & OBJECTIVE
Past Medical History:   Diagnosis Date    Anemia     Asthma     Cellulitis of abdominal wall 2017    CKD (chronic kidney disease) stage 3, GFR 30-59 ml/min     Depression     Diabetes mellitus, type II     Diastolic dysfunction     Diverticulosis of intestine with bleeding 2016    E coli bacteremia 2018    E. coli pyelonephritis 2015    E. 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     SECTION       SECTION, CLASSIC      CHOLECYSTECTOMY  age 15    CORONARY ANGIOGRAPHY N/A 2021    Procedure: ANGIOGRAM, CORONARY ARTERY;  Surgeon: Neelam Hicks MD;  Location: [facility] CATH LAB/EP;  Service: Cardiology;  Laterality: N/A;    INCISION AND DRAINAGE OF ABSCESS N/A 2020    Procedure: INCISION AND DRAINAGE, ABSCESS;  Surgeon: Rich Watson MD;  Location: [facility] OR;  Service: General;  Laterality: N/A;    LEFT HEART CATHETERIZATION Left 2021    Procedure: Left heart cath;  Surgeon: Neelam Hicks MD;  Location: [facility] CATH LAB/EP;  Service: Cardiology;  Laterality: Left;    LEFT HEART CATHETERIZATION N/A 2021    Procedure: Left heart cath;  Surgeon: Neelam Hicks MD;  Location: [facility] CATH LAB/EP;  Service: Cardiology;  Laterality: N/A;    TONSILLECTOMY, ADENOIDECTOMY         Review of patient's allergies indicates:   Allergen Reactions    Celexa [citalopram] Nausea And Vomiting       No current facility-administered medications on file prior to encounter.     Current Outpatient Medications on File Prior to Encounter   Medication Sig    acetaminophen (TYLENOL) 500 MG tablet Take 500 mg by mouth every 6 (six) hours as needed for Pain.    albuterol (PROVENTIL/VENTOLIN HFA) 90 mcg/actuation inhaler Inhale 1-2 puffs into the lungs every 4 to 6 hours as  needed.    allopurinoL (ZYLOPRIM) 300 MG tablet Take 300 mg by mouth once daily.    ALPRAZolam (XANAX) 0.5 MG tablet Take 0.5 mg by mouth 3 (three) times daily as needed.    aspirin (ECOTRIN) 81 MG EC tablet Take 1 tablet (81 mg total) by mouth once daily.    azelastine (ASTELIN) 137 mcg (0.1 %) nasal spray 2 sprays by Nasal route 2 (two) times daily as needed.    BREZTRI AEROSPHERE 160-9-4.8 mcg/actuation HFAA Take 2 puffs by mouth 2 (two) times daily.    carvediloL (COREG) 3.125 MG tablet Take 3.125 mg by mouth 2 (two) times daily.    cetirizine (ZYRTEC) 10 MG tablet Take 10 mg by mouth daily as needed.    cholecalciferol, vitamin D3, 1,250 mcg (50,000 unit) capsule Take 50,000 Units by mouth every Wednesday.    clopidogreL (PLAVIX) 75 mg tablet Take 1 tablet (75 mg total) by mouth once daily.    EScitalopram oxalate (LEXAPRO) 10 MG tablet Take 10 mg by mouth daily as needed.    fluticasone propionate (FLONASE) 50 mcg/actuation nasal spray 1 spray by Each Nostril route daily as needed.    furosemide (LASIX) 40 MG tablet Take 40 mg by mouth 2 (two) times daily as needed.    HUMALOG MIX 75-25 KWIKPEN 100 unit/mL (75-25) InPn Inject 80 Units into the skin 3 (three) times daily.    insulin glargine (LANTUS) 100 unit/mL injection Inject 90 Units into the skin every morning.    lisinopriL 10 MG tablet Take 5 mg by mouth once daily.    multivit-min-iron fum-folic ac (ONE-A-DAY WOMEN'S COMPLETE) 18 mg iron- 400 mcg Tab Take 1 tablet by mouth once daily.    omeprazole (PRILOSEC) 20 MG capsule Take 20 mg by mouth daily as needed.    pregabalin (LYRICA) 300 MG Cap Take 300 mg by mouth once daily.    promethazine (PHENERGAN) 6.25 mg/5 mL syrup Take 5-10 mLs by mouth every 8 (eight) hours as needed.    promethazine-dextromethorphan (PROMETHAZINE-DM) 6.25-15 mg/5 mL Syrp SMARTSI Teaspoon By Mouth 3 Times Daily PRN    SUBOXONE 8-2 mg Place 1 Film under the tongue 3 (three) times daily.    tiZANidine (ZANAFLEX) 4 MG tablet  "Take 4 mg by mouth every 8 (eight) hours as needed.    [DISCONTINUED] amoxicillin-clavulanate 875-125mg (AUGMENTIN) 875-125 mg per tablet Take 1 tablet by mouth every 12 (twelve) hours.    [DISCONTINUED] aspirin 81 MG Chew Take 81 mg by mouth.    [DISCONTINUED] ciprofloxacin HCl (CIPRO) 500 MG tablet Take 500 mg by mouth 2 (two) times daily.    [DISCONTINUED] doxycycline (MONODOX) 100 MG capsule Take 100 mg by mouth every 12 (twelve) hours.    [DISCONTINUED] doxycycline (VIBRAMYCIN) 100 MG Cap Take 100 mg by mouth 2 (two) times daily.    [DISCONTINUED] insulin lispro 100 unit/mL injection Inject 20 Units into the skin.    [DISCONTINUED] methylPREDNISolone (MEDROL DOSEPACK) 4 mg tablet Take by mouth.    [DISCONTINUED] potassium chloride SA (K-DUR,KLOR-CON M) 10 MEQ tablet Take 10 mEq by mouth 2 (two) times daily.    [DISCONTINUED] sulfamethoxazole-trimethoprim 800-160mg (BACTRIM DS) 800-160 mg Tab Take 1 tablet by mouth 2 (two) times daily.    atorvastatin (LIPITOR) 80 MG tablet Take 1 tablet (80 mg total) by mouth once daily.    BD ULTRA-FINE MINI PEN NEEDLE 31 gauge x 3/16" Ndle Inject into the skin 5 (five) times daily.    LINZESS 72 mcg Cap capsule Take 72 mcg by mouth daily as needed.    medroxyPROGESTERone (DEPO-PROVERA) 150 mg/mL injection Inject 150 mg into the muscle every 3 (three) months.    medroxyPROGESTERone (DEPO-PROVERA) 150 mg/mL injection Inject 150 mg into the muscle every 3 (three) months.    nicotine (NICODERM CQ) 21 mg/24 hr Place 1 patch onto the skin daily as needed.    TRUE METRIX GLUCOSE TEST STRIP Strp 3 (three) times daily.    [DISCONTINUED] cephALEXin (KEFLEX) 500 MG capsule Take 500 mg by mouth every 12 (twelve) hours.    [DISCONTINUED] LANTUS SOLOSTAR U-100 INSULIN glargine 100 units/mL SubQ pen Inject 165 Units into the skin 2 (two) times a day.    [DISCONTINUED] lisinopriL 10 MG tablet Take 10 mg by mouth.    [DISCONTINUED] MOUNJARO 5 mg/0.5 mL PnIj Inject 5 mg into the skin once a " week.    [DISCONTINUED] pregabalin (LYRICA) 100 MG capsule Take 150 mg by mouth 2 (two) times daily.    [DISCONTINUED] pregabalin (LYRICA) 150 MG capsule Take 150 mg by mouth 2 (two) times daily.    [DISCONTINUED] tiZANidine (ZANAFLEX) 4 MG tablet Take 4 mg by mouth every 6 (six) hours as needed.    [DISCONTINUED] VRAYLAR 1.5 mg Cap Take 1.5 mg by mouth once daily.     Family History       Problem Relation (Age of Onset)    Breast cancer Paternal Grandmother    Cancer Father, Maternal Aunt    Heart attack Mother, Maternal Aunt, Maternal Grandmother    Heart disease Mother          Tobacco Use    Smoking status: Every Day     Current packs/day: 2.50     Average packs/day: 2.5 packs/day for 36.9 years (92.4 ttl pk-yrs)     Types: Cigarettes     Start date: 1987    Smokeless tobacco: Never    Tobacco comments:     Pt enrolled in the [employer] on 9/12/18. ([employer] Member ID # [id number]).  Ambulatory referral to Smoking Cessation Program   Substance and Sexual Activity    Alcohol use: No    Drug use: No    Sexual activity: Yes     Partners: Male     Birth control/protection: Injection     Review of Systems   Constitutional:  Positive for activity change and fatigue. Negative for fever.   HENT:  Negative for congestion.    Respiratory:  Negative for cough and shortness of breath.    Cardiovascular:  Positive for leg swelling. Negative for chest pain and palpitations.   Gastrointestinal:  Positive for blood in stool, diarrhea and rectal pain. Negative for abdominal distention, nausea and vomiting.   Endocrine: Negative for polydipsia and polyuria.   Genitourinary:  Negative for difficulty urinating.   Musculoskeletal:  Positive for gait problem and joint swelling. Negative for arthralgias.   Skin:  Negative for color change and rash.   Neurological:  Positive for weakness. Negative for dizziness.   Psychiatric/Behavioral:  Negative for agitation.      Objective:     Vital Signs (Most Recent):  Temp: 98.5 °F (36.9 °C)  (12/12/23 2222)  Pulse: 95 (12/12/23 2222)  Resp: 18 (12/12/23 2222)  BP: 118/66 (12/12/23 2222)  SpO2: (!) 94 % (12/12/23 2222) Vital Signs (24h Range):  Temp:  [98 °F (36.7 °C)-98.9 °F (37.2 °C)] 98.5 °F (36.9 °C)  Pulse:  [] 95  Resp:  [18-25] 18  SpO2:  [93 %-97 %] 94 %  BP: (105-141)/(57-81) 118/66     Weight: (!) 157.4 kg (347 lb)  Body mass index is 63.47 kg/m².     Physical Exam  Constitutional:       Appearance: She is obese.   HENT:      Head: Atraumatic.      Nose: Nose normal.      Mouth/Throat:      Mouth: Mucous membranes are moist.   Cardiovascular:      Rate and Rhythm: Normal rate.      Pulses: Normal pulses.   Pulmonary:      Effort: Pulmonary effort is normal.   Abdominal:      General: Bowel sounds are normal.   Musculoskeletal:         General: Swelling present.      Right lower leg: Edema present.      Left lower leg: Edema present.   Skin:     General: Skin is warm and dry.      Capillary Refill: Capillary refill takes less than 2 seconds.   Neurological:      Mental Status: She is alert. Mental status is at baseline.   Psychiatric:         Mood and Affect: Mood normal.                Significant Labs: All pertinent labs within the past 24 hours have been reviewed.    Significant Imaging: I have reviewed all pertinent imaging results/findings within the past 24 hours.

## 2023-12-13 NOTE — NURSING
Patient transferred to room 555 via bed on stable conditions with all personal belongings. Pt called daughter Jessica over the phone to notify of new room number.  Telemetry monitor discontinued and returned to monitor tech.

## 2023-12-13 NOTE — ASSESSMENT & PLAN NOTE
-Patient states no gross hematuria or passage of clots  -She sees blood when she wipes due to a lesion around the vagina. Unable to visualize  -F/u bladder scan  -CT non-concerning  -F/u ucx. Abx per primary  -If undergoing a urologic intervention in the future she needs to be appropriately treated for funguria  -No acute urologic intervention  -Urology signing off

## 2023-12-13 NOTE — PROGRESS NOTES
NURSE PROACTIVE ROUNDING NOTE       BS 600s. Per Dr Villarreal admit to the ICU for insulin gtt.    FOLLOW UP    Call back the [facility] at [phone] for additional questions or concerns.

## 2023-12-13 NOTE — ASSESSMENT & PLAN NOTE
Body mass index is 63.47 kg/m². Morbid obesity complicates all aspects of disease management from diagnostic modalities to treatment. Weight loss encouraged and health benefits explained to patient.

## 2023-12-13 NOTE — CONSULTS
[facility] - Intensive Care  Urology  Consult Note    Patient Name: Lele Dias  MRN: [id number]  Admission Date: 2023  Hospital Length of Stay: 0   Code Status: Full Code   Attending Provider: Shira Salcedo MD   Consulting Provider: Mushtaq Gomez MD  Primary Care Physician: Sammi Edge MD (Cindy)  Principal Problem:BRIDGET (acute kidney injury)    Inpatient consult to Urology  Consult performed by: Mushtaq Gomez MD  Consult ordered by: Herman Villarreal MD  Reason for consult: gross hematuria concerns          Subjective:     HPI:  Lele Dias is a 42yoF with significant pmh admitted for diarrhea and weakness that started a week ago. Found to be extremely hyperglycemic. Urology consulted for gross hematuria.    Patient states that she only sees blood in her urine when she wipes after voiding. Reports lesion around the vagina, but unable to assess due to body habitus. No hematuria or passage of clots. Denies dysuria. CT shows no hydronephrosis and no bladder wall thickening.  Cr 1.2, baseline 0.9  UA showed few bacteria and moderate yeast. Ucx pending    Past Medical History:   Diagnosis Date    Anemia     Asthma     Cellulitis of abdominal wall 2017    CKD (chronic kidney disease) stage 3, GFR 30-59 ml/min     Depression     Diabetes mellitus, type II     Diastolic dysfunction     Diverticulosis of intestine with bleeding 2016    E coli bacteremia 2018    E. coli pyelonephritis 2015    E. 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     SECTION       SECTION, CLASSIC      CHOLECYSTECTOMY  age 15    CORONARY ANGIOGRAPHY N/A 2021    Procedure: ANGIOGRAM, CORONARY ARTERY;  Surgeon: Neelam Hicks MD;  Location: [facility] CATH LAB/EP;  Service:  Cardiology;  Laterality: N/A;    INCISION AND DRAINAGE OF ABSCESS N/A 12/11/2020    Procedure: INCISION AND DRAINAGE, ABSCESS;  Surgeon: Rich Watson MD;  Location: [facility] OR;  Service: General;  Laterality: N/A;    LEFT HEART CATHETERIZATION Left 1/24/2021    Procedure: Left heart cath;  Surgeon: Neelam Hicks MD;  Location: [facility] CATH LAB/EP;  Service: Cardiology;  Laterality: Left;    LEFT HEART CATHETERIZATION N/A 2/17/2021    Procedure: Left heart cath;  Surgeon: Neelam Hicks MD;  Location: [facility] CATH LAB/EP;  Service: Cardiology;  Laterality: N/A;    TONSILLECTOMY, ADENOIDECTOMY         Review of patient's allergies indicates:   Allergen Reactions    Celexa [citalopram] Nausea And Vomiting       Family History       Problem Relation (Age of Onset)    Breast cancer Paternal Grandmother    Cancer Father, Maternal Aunt    Heart attack Mother, Maternal Aunt, Maternal Grandmother    Heart disease Mother            Tobacco Use    Smoking status: Every Day     Current packs/day: 2.50     Average packs/day: 2.5 packs/day for 36.9 years (92.4 ttl pk-yrs)     Types: Cigarettes     Start date: 1987    Smokeless tobacco: Never    Tobacco comments:     Pt enrolled in the [email or web address] on 9/12/18. ([employer] Member ID # [id number]).  Ambulatory referral to Smoking Cessation Program   Substance and Sexual Activity    Alcohol use: No    Drug use: No    Sexual activity: Yes     Partners: Male     Birth control/protection: Injection       Review of Systems    Objective:     Temp:  [97.5 °F (36.4 °C)-98.9 °F (37.2 °C)] 98 °F (36.7 °C)  Pulse:  [] 96  Resp:  [18-34] 21  SpO2:  [93 %-99 %] 96 %  BP: (105-147)/(55-88) 118/55  Weight: (!) 157.5 kg (347 lb 3.6 oz)  Body mass index is 63.51 kg/m².    Date 12/13/23 0700 - 12/14/23 0659   Shift 9555-8138 3096-3736 4767-5953 24 Hour Total   INTAKE   I.V.(mL/kg) 1179.9(7.5)   1179.9(7.5)   IV Piggyback 55.7   55.7   Shift Total(mL/kg) 1235.6(7.8)   1235.6(7.8)   OUTPUT    Urine(mL/kg/hr) 300   300   Shift Total(mL/kg) 300(1.9)   300(1.9)   Weight (kg) 157.5 157.5 157.5 157.5     Bladder Scan Volume (mL): 116 mL (12/13/23 0945)    Drains       Drain  Duration             Female External Urinary Catheter 12/13/23 0300 <1 day                     Physical Exam  Constitutional:       Comments: Morbidly obese   HENT:      Head: Normocephalic.   Cardiovascular:      Rate and Rhythm: Normal rate.   Pulmonary:      Effort: Pulmonary effort is normal.   Abdominal:      Tenderness: There is no right CVA tenderness or left CVA tenderness.   Genitourinary:     Comments: Attempted to visualize lesion of vagina, but unable to due to body habitus  Neurological:      Mental Status: She is alert.          Significant Labs:    BMP:  Recent Labs   Lab 12/13/23  0111 12/13/23  0408 12/13/23  0824    135* 141   K 3.8 4.0 4.0    104 107   CO2 21* 23 21*   BUN 19 18 19   CREATININE 1.5* 1.4 1.2   CALCIUM 8.1* 8.0* 8.3*       CBC:  Recent Labs   Lab 12/12/23  1659 12/13/23  0408   WBC 10.86 8.14   HGB 12.6 10.2*   HCT 39.2 31.0*    152       All pertinent labs results from the past 24 hours have been reviewed.    Significant Imaging:  All pertinent imaging results/findings from the past 24 hours have been reviewed.                    Assessment and Plan:     Gross hematuria  -Patient states no gross hematuria or passage of clots  -She sees blood when she wipes due to a lesion around the vagina. Unable to visualize  -F/u bladder scan  -CT non-concerning  -F/u ucx. Abx per primary  -If undergoing a urologic intervention in the future she needs to be appropriately treated for funguria  -No acute urologic intervention  -Urology signing off        VTE Risk Mitigation (From admission, onward)           Ordered     enoxaparin injection 60 mg  Every 12 hours         12/13/23 0949     IP VTE HIGH RISK PATIENT  Once         12/12/23 1927     Place sequential compression device  Until discontinued          12/12/23 1927                    Thank you for your consult. I will sign off. Please contact us if you have any additional questions.    Mushtaq Gomez MD  Urology  [facility] - Intensive Care

## 2023-12-13 NOTE — PLAN OF CARE
Pt arrived to ICU ~ 0330 for HHS management. Insulin drip initiated with K+ fluids.   Plan of care reviewed with patient, needs redirection often. Pt frequently agitated, refused second PIV and pericare. NSR/ST on telemetry. BP WDL. On RA. 0520 Pt urinated dark red. SCOTTY Villarreal notified. Urology consult placed.

## 2023-12-13 NOTE — H&P
"  [facility] Medicine  History & Physical    Patient Name: Lele Dias  MRN: [id number]  Patient Class: OP- Observation  Admission Date: 12/12/2023  Attending Physician: Shira Salcedo MD   Primary Care Provider: Sammi Edge MD (Cindy)         Patient information was obtained from patient, past medical records, and ER records.     Subjective:     Principal Problem:BRIDGET (acute kidney injury)    Chief Complaint:   Chief Complaint   Patient presents with    Weakness     Pt arrived via EMS from home with diarrhea that began on fri with rectal bleeding reported, daughter reports rectum area red and " inflamed" + possible Hemorid         HPI: Lele Dias is a 42yoF with Anemia, Asthma, Cellulitis of abdominal wall (12/18/2017), CKD (chronic kidney disease) stage 3, GFR 30-59 ml/min, Depression, Diabetes mellitus, type II, Diastolic dysfunction, Diverticulosis of intestine with bleeding (9/14/2016), E coli bacteremia (4/21/2018), E. coli pyelonephritis (6/2/2015), E. coli UTI (12/18/2017), Hematuria, Hernia, epigastric, Hypertension, Hypocalcemia, Nonalcoholic hepatosteatosis, Periumbilical hernia, and Proteinuria. She presented to the ED at [facility] via EMS with cc diarrhea and weakness that started a week ago. She is also on Keflex for leg cellulitis. She denies any fever, vomiting, CP, SOB, or abdominal pain.    ED workup revealed Na 133, BUN 21, glucose 918, Cr 1.8, Ca 8.6, Alb 2.6, eGFR 35, Lactic Acid 2.6, Mg 1.4, Trop I 0.259, UA WBC 35, CT abd/pelvis unremarkable. CXR with no detrimental change, acute intrathoracic process seen. Admitting to hospital medicine for management and treatment of BRIDGET, hyperglycemia and UTI.     Past Medical History:   Diagnosis Date    Anemia     Asthma     Cellulitis of abdominal wall 12/18/2017    CKD (chronic kidney disease) stage 3, GFR 30-59 ml/min     Depression     Diabetes mellitus, type II     Diastolic dysfunction     Diverticulosis of intestine " with bleeding 2016    E coli bacteremia 2018    E. coli pyelonephritis 2015    E. 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     SECTION       SECTION, CLASSIC      CHOLECYSTECTOMY  age 15    CORONARY ANGIOGRAPHY N/A 2021    Procedure: ANGIOGRAM, CORONARY ARTERY;  Surgeon: Neelam Hicks MD;  Location: [facility] CATH LAB/EP;  Service: Cardiology;  Laterality: N/A;    INCISION AND DRAINAGE OF ABSCESS N/A 2020    Procedure: INCISION AND DRAINAGE, ABSCESS;  Surgeon: Rich Watson MD;  Location: [facility] OR;  Service: General;  Laterality: N/A;    LEFT HEART CATHETERIZATION Left 2021    Procedure: Left heart cath;  Surgeon: Neelam Hicks MD;  Location: [facility] CATH LAB/EP;  Service: Cardiology;  Laterality: Left;    LEFT HEART CATHETERIZATION N/A 2021    Procedure: Left heart cath;  Surgeon: Neelam Hicks MD;  Location: [facility] CATH LAB/EP;  Service: Cardiology;  Laterality: N/A;    TONSILLECTOMY, ADENOIDECTOMY         Review of patient's allergies indicates:   Allergen Reactions    Celexa [citalopram] Nausea And Vomiting       No current facility-administered medications on file prior to encounter.     Current Outpatient Medications on File Prior to Encounter   Medication Sig    acetaminophen (TYLENOL) 500 MG tablet Take 500 mg by mouth every 6 (six) hours as needed for Pain.    albuterol (PROVENTIL/VENTOLIN HFA) 90 mcg/actuation inhaler Inhale 1-2 puffs into the lungs every 4 to 6 hours as needed.    allopurinoL (ZYLOPRIM) 300 MG tablet Take 300 mg by mouth once daily.    ALPRAZolam (XANAX) 0.5 MG tablet Take 0.5 mg by mouth 3 (three) times daily as needed.    aspirin (ECOTRIN) 81 MG EC tablet Take 1 tablet (81 mg total) by mouth once daily.     azelastine (ASTELIN) 137 mcg (0.1 %) nasal spray 2 sprays by Nasal route 2 (two) times daily as needed.    BREZTRI AEROSPHERE 160-9-4.8 mcg/actuation HFAA Take 2 puffs by mouth 2 (two) times daily.    carvediloL (COREG) 3.125 MG tablet Take 3.125 mg by mouth 2 (two) times daily.    cetirizine (ZYRTEC) 10 MG tablet Take 10 mg by mouth daily as needed.    cholecalciferol, vitamin D3, 1,250 mcg (50,000 unit) capsule Take 50,000 Units by mouth every Wednesday.    clopidogreL (PLAVIX) 75 mg tablet Take 1 tablet (75 mg total) by mouth once daily.    EScitalopram oxalate (LEXAPRO) 10 MG tablet Take 10 mg by mouth daily as needed.    fluticasone propionate (FLONASE) 50 mcg/actuation nasal spray 1 spray by Each Nostril route daily as needed.    furosemide (LASIX) 40 MG tablet Take 40 mg by mouth 2 (two) times daily as needed.    HUMALOG MIX 75-25 KWIKPEN 100 unit/mL (75-25) InPn Inject 80 Units into the skin 3 (three) times daily.    insulin glargine (LANTUS) 100 unit/mL injection Inject 90 Units into the skin every morning.    lisinopriL 10 MG tablet Take 5 mg by mouth once daily.    multivit-min-iron fum-folic ac (ONE-A-DAY WOMEN'S COMPLETE) 18 mg iron- 400 mcg Tab Take 1 tablet by mouth once daily.    omeprazole (PRILOSEC) 20 MG capsule Take 20 mg by mouth daily as needed.    pregabalin (LYRICA) 300 MG Cap Take 300 mg by mouth once daily.    promethazine (PHENERGAN) 6.25 mg/5 mL syrup Take 5-10 mLs by mouth every 8 (eight) hours as needed.    promethazine-dextromethorphan (PROMETHAZINE-DM) 6.25-15 mg/5 mL Syrp SMARTSI Teaspoon By Mouth 3 Times Daily PRN    SUBOXONE 8-2 mg Place 1 Film under the tongue 3 (three) times daily.    tiZANidine (ZANAFLEX) 4 MG tablet Take 4 mg by mouth every 8 (eight) hours as needed.    [DISCONTINUED] amoxicillin-clavulanate 875-125mg (AUGMENTIN) 875-125 mg per tablet Take 1 tablet by mouth every 12 (twelve) hours.    [DISCONTINUED] aspirin 81 MG Chew Take 81 mg by mouth.    [DISCONTINUED]  "ciprofloxacin HCl (CIPRO) 500 MG tablet Take 500 mg by mouth 2 (two) times daily.    [DISCONTINUED] doxycycline (MONODOX) 100 MG capsule Take 100 mg by mouth every 12 (twelve) hours.    [DISCONTINUED] doxycycline (VIBRAMYCIN) 100 MG Cap Take 100 mg by mouth 2 (two) times daily.    [DISCONTINUED] insulin lispro 100 unit/mL injection Inject 20 Units into the skin.    [DISCONTINUED] methylPREDNISolone (MEDROL DOSEPACK) 4 mg tablet Take by mouth.    [DISCONTINUED] potassium chloride SA (K-DUR,KLOR-CON M) 10 MEQ tablet Take 10 mEq by mouth 2 (two) times daily.    [DISCONTINUED] sulfamethoxazole-trimethoprim 800-160mg (BACTRIM DS) 800-160 mg Tab Take 1 tablet by mouth 2 (two) times daily.    atorvastatin (LIPITOR) 80 MG tablet Take 1 tablet (80 mg total) by mouth once daily.    BD ULTRA-FINE MINI PEN NEEDLE 31 gauge x 3/16" Ndle Inject into the skin 5 (five) times daily.    LINZESS 72 mcg Cap capsule Take 72 mcg by mouth daily as needed.    medroxyPROGESTERone (DEPO-PROVERA) 150 mg/mL injection Inject 150 mg into the muscle every 3 (three) months.    medroxyPROGESTERone (DEPO-PROVERA) 150 mg/mL injection Inject 150 mg into the muscle every 3 (three) months.    nicotine (NICODERM CQ) 21 mg/24 hr Place 1 patch onto the skin daily as needed.    TRUE METRIX GLUCOSE TEST STRIP Strp 3 (three) times daily.    [DISCONTINUED] cephALEXin (KEFLEX) 500 MG capsule Take 500 mg by mouth every 12 (twelve) hours.    [DISCONTINUED] LANTUS SOLOSTAR U-100 INSULIN glargine 100 units/mL SubQ pen Inject 165 Units into the skin 2 (two) times a day.    [DISCONTINUED] lisinopriL 10 MG tablet Take 10 mg by mouth.    [DISCONTINUED] MOUNJARO 5 mg/0.5 mL PnIj Inject 5 mg into the skin once a week.    [DISCONTINUED] pregabalin (LYRICA) 100 MG capsule Take 150 mg by mouth 2 (two) times daily.    [DISCONTINUED] pregabalin (LYRICA) 150 MG capsule Take 150 mg by mouth 2 (two) times daily.    [DISCONTINUED] tiZANidine (ZANAFLEX) 4 MG tablet Take 4 mg by " mouth every 6 (six) hours as needed.    [DISCONTINUED] VRAYLAR 1.5 mg Cap Take 1.5 mg by mouth once daily.     Family History       Problem Relation (Age of Onset)    Breast cancer Paternal Grandmother    Cancer Father, Maternal Aunt    Heart attack Mother, Maternal Aunt, Maternal Grandmother    Heart disease Mother          Tobacco Use    Smoking status: Every Day     Current packs/day: 2.50     Average packs/day: 2.5 packs/day for 36.9 years (92.4 ttl pk-yrs)     Types: Cigarettes     Start date: 1987    Smokeless tobacco: Never    Tobacco comments:     Pt enrolled in the Riskified on 9/12/18. (SCT Member ID # [id number]).  Ambulatory referral to Smoking Cessation Program   Substance and Sexual Activity    Alcohol use: No    Drug use: No    Sexual activity: Yes     Partners: Male     Birth control/protection: Injection     Review of Systems   Constitutional:  Positive for activity change and fatigue. Negative for fever.   HENT:  Negative for congestion.    Respiratory:  Negative for cough and shortness of breath.    Cardiovascular:  Positive for leg swelling. Negative for chest pain and palpitations.   Gastrointestinal:  Positive for blood in stool, diarrhea and rectal pain. Negative for abdominal distention, nausea and vomiting.   Endocrine: Negative for polydipsia and polyuria.   Genitourinary:  Negative for difficulty urinating.   Musculoskeletal:  Positive for gait problem and joint swelling. Negative for arthralgias.   Skin:  Negative for color change and rash.   Neurological:  Positive for weakness. Negative for dizziness.   Psychiatric/Behavioral:  Negative for agitation.      Objective:     Vital Signs (Most Recent):  Temp: 98.5 °F (36.9 °C) (12/12/23 2222)  Pulse: 95 (12/12/23 2222)  Resp: 18 (12/12/23 2222)  BP: 118/66 (12/12/23 2222)  SpO2: (!) 94 % (12/12/23 2222) Vital Signs (24h Range):  Temp:  [98 °F (36.7 °C)-98.9 °F (37.2 °C)] 98.5 °F (36.9 °C)  Pulse:  [] 95  Resp:  [18-25] 18  SpO2:  [93  %-97 %] 94 %  BP: (105-141)/(57-81) 118/66     Weight: (!) 157.4 kg (347 lb)  Body mass index is 63.47 kg/m².     Physical Exam  Constitutional:       Appearance: She is obese.   HENT:      Head: Atraumatic.      Nose: Nose normal.      Mouth/Throat:      Mouth: Mucous membranes are moist.   Cardiovascular:      Rate and Rhythm: Normal rate.      Pulses: Normal pulses.   Pulmonary:      Effort: Pulmonary effort is normal.   Abdominal:      General: Bowel sounds are normal.   Musculoskeletal:         General: Swelling present.      Right lower leg: Edema present.      Left lower leg: Edema present.   Skin:     General: Skin is warm and dry.      Capillary Refill: Capillary refill takes less than 2 seconds.   Neurological:      Mental Status: She is alert. Mental status is at baseline.   Psychiatric:         Mood and Affect: Mood normal.                Significant Labs: All pertinent labs within the past 24 hours have been reviewed.    Significant Imaging: I have reviewed all pertinent imaging results/findings within the past 24 hours.  Assessment/Plan:     * BRIDGET (acute kidney injury)  Patient with acute kidney injury/acute renal failure likely due to pre-renal azotemia due to dehydration BRIDGET is currently worsening. Baseline creatinine  0.9  - Labs reviewed- Renal function/electrolytes with Estimated Creatinine Clearance: 57.9 mL/min (A) (based on SCr of 1.8 mg/dL (H)). according to latest data. Monitor urine output and serial BMP and adjust therapy as needed. Avoid nephrotoxins and renally dose meds for GFR listed above.    Hyperlipidemia  Continue Atorvastatin daily  F/u with PCP at discharge      Depression  Patient has persistent depression which is moderate and is currently controlled. Will Continue anti-depressant medications. We will not consult psychiatry at this time. Patient does not display psychosis at this time. Continue to monitor closely and adjust plan of care as needed.  Continue Lexapro per home  regimen      GERD (gastroesophageal reflux disease)  Continue home Pantoprazole  Recommend to follow-up w/ PCP at discharge      Cellulitis  WBC 10.86 on admit  IV Zosyn  Continue HH at discharge      DM (diabetes mellitus) type 2, uncontrolled, with ketoacidosis  Patient's FSGs are uncontrolled due to hyperglycemia on current medication regimen.  Last A1c reviewed-   Lab Results   Component Value Date    HGBA1C 11.1 (H) 12/12/2023     Most recent fingerstick glucose reviewed-   Recent Labs   Lab 12/12/23  1744 12/12/23 1952   POCTGLUCOSE >500* >500*     Current correctional scale  High  Increase anti-hyperglycemic dose as follows-   Antihyperglycemics (From admission, onward)      Start     Stop Route Frequency Ordered    12/13/23 0900  insulin aspart protamine-insulin aspart (NovoLOG 70/30) injection         -- SubQ 3 times daily 12/12/23 2328 12/13/23 0900  insulin detemir U-100 (Levemir) pen 90 Units         -- SubQ Daily 12/12/23 2328 12/13/23 0000  insulin aspart U-100 pen 0-15 Units         -- SubQ Before meals & nightly PRN 12/12/23 2300          Hold Oral hypoglycemics while patient is in the hospital.    Morbid obesity with BMI of 70 and over, adult  Body mass index is 63.47 kg/m². Morbid obesity complicates all aspects of disease management from diagnostic modalities to treatment. Weight loss encouraged and health benefits explained to patient.         Anemia  Patient's anemia is currently controlled. Has not received any PRBCs to date. Etiology likely d/t Iron deficiency  Current CBC reviewed-   Lab Results   Component Value Date    HGB 12.6 12/12/2023    HCT 39.2 12/12/2023     Monitor serial CBC and transfuse if patient becomes hemodynamically unstable, symptomatic or H/H drops below 7/21.    Tobacco use disorder  Assistance with smoking cessation was offered, including:  []  Medications  [x]  Counseling  []  Printed Information on Smoking Cessation  []  Referral to a Smoking Cessation  Program    Patient was counseled regarding smoking for 3-10 minutes.        UTI due to Klebsiella species  Zosyn IV      Benign essential hypertension  Chronic, controlled. Latest blood pressure and vitals reviewed-     Temp:  [98 °F (36.7 °C)-98.9 °F (37.2 °C)]   Pulse:  []   Resp:  [18-25]   BP: (105-141)/(57-81)   SpO2:  [93 %-97 %] .   Home meds for hypertension were reviewed and noted below.   Hypertension Medications               carvediloL (COREG) 3.125 MG tablet Take 3.125 mg by mouth 2 (two) times daily.    furosemide (LASIX) 40 MG tablet Take 40 mg by mouth 2 (two) times daily as needed.    lisinopriL 10 MG tablet Take 5 mg by mouth once daily.            While in the hospital, will manage blood pressure as follows; Continue home antihypertensive regimen    Will utilize p.r.n. blood pressure medication only if patient's blood pressure greater than 180/110 and she develops symptoms such as worsening chest pain or shortness of breath.    Asthma  Continue home Symbicort qhs and Albuterol PRN  Recommend to follow-up w/ PCP at discharge    Chronic combined systolic and diastolic congestive heart failure  Patient is identified as having Combined Systolic and Diastolic heart failure that is Chronic. CHF is currently controlled. Latest ECHO performed and demonstrates- Results for orders placed during the hospital encounter of 06/17/22    Echo    Interpretation Summary  · Technically significantly limited echocardiogram.  · The left ventricle is moderately enlarged with concentric hypertrophy and mildly decreased systolic function.  · The estimated ejection fraction is 45-50%.  · Grade I left ventricular diastolic dysfunction.  · Normal right ventricular size with normal right ventricular systolic function.  · Mild mitral regurgitation.  · The sinuses of Valsalva is mildly dilated.  . Continue Beta Blocker and monitor clinical status closely. Monitor on telemetry. Patient is on CHF pathway.  Monitor strict  Is&Os and daily weights.  Place on fluid restriction of 2 L. Cardiology has not been consulted. Continue to stress to patient importance of self efficacy and  on diet for CHF. Last BNP reviewed- and noted below   Recent Labs   Lab 23   BNP 90         VTE Risk Mitigation (From admission, onward)           Ordered     enoxaparin injection 40 mg  Daily         23     IP VTE HIGH RISK PATIENT  Once         23     Place sequential compression device  Until discontinued         23                         On 2023, patient should be placed in hospital observation services under my care in collaboration with Dr. Salcedo.      NURSE PROACTIVE ROUNDING NOTE       Time of Visit:     Admit Date: 2023  LOS: 0  Code Status: Full Code   Date of Visit: 2023  : 1980  Age: 43 y.o.  Sex: female  Race: White  Bed: K432/K432 A:   MRN: [id number]  Was the patient discharged from an ICU this admission? No   Was the patient discharged from a PACU within last 24 hours? No   Did the patient receive conscious sedation/general anesthesia in last 24 hours? No   Was the patient in the ED within the past 24 hours? Yes   Was the patient on NIPPV within the past 24 hours? No   Attending Physician: Shira Salcedo MD  Primary Service: Hospitalist,Internal Medicine   Time spent at the bedside: < 15 min    SITUATION    Notified by charge RN via phone call  Reason for alert: Hyperglycemic    Diagnosis: <principal problem not specified>   has a past medical history of Anemia, Asthma, Cellulitis of abdominal wall, CKD (chronic kidney disease) stage 3, GFR 30-59 ml/min, Depression, Diabetes mellitus, type II, Diastolic dysfunction, Diverticulosis of intestine with bleeding, E coli bacteremia, E. coli pyelonephritis, E. coli UTI, Hematuria, Hernia, epigastric, Hypertension, Hypocalcemia, Nonalcoholic hepatosteatosis, Periumbilical hernia, and Proteinuria.    Last Vitals:  Temp:  98.5 °F (36.9 °C) (12/12 2222)  Pulse: 95 (12/12 2222)  Resp: 18 (12/12 2222)  BP: 118/66 (12/12 2222)  SpO2: 94 % (12/12 2222)    24 Hour Vitals Range:  Temp:  [98 °F (36.7 °C)-98.9 °F (37.2 °C)]   Pulse:  []   Resp:  [18-25]   BP: (105-141)/(57-81)   SpO2:  [93 %-97 %]     Clinical Issues:  BRIDGET    ASSESSMENT/INTERVENTIONS    Most recent bmp gap closed 13 and . BS >500 x 2. Notified Chairs, NP regarding insulin orders and ordered prn insulin. Spoke with Marian bedside RN.    RECOMMENDATIONS  Obtain additional insulin orders. If pt does not respond to insulin possible bmp to rule out dka/hhs.    Discussed plan of care with bedside RNMarian    PROVIDER ESCALATION    Physician escalation: Yes    Orders received and case discussed with  Chairs, NP.    Disposition:Remain in room 432    FOLLOW UP    Call back the Rapid Response NurseHan at [phone] for additional questions or concerns.             Marianne Renee, NP  Department of Hospital Medicine  [facility]

## 2023-12-13 NOTE — PROGRESS NOTES
eICU Brief Admission Note       Briefly, 42yoF with Anemia, Asthma, Cellulitis of abdominal wall (12/18/2017), CKD (chronic kidney disease) stage 3, GFR 30-59 ml/min, Depression, Diabetes mellitus, type II, Diastolic dysfunction, Diverticulosis of intestine with bleeding (9/14/2016), E coli bacteremia (4/21/2018), E. coli pyelonephritis (6/2/2015), E. coli UTI (12/18/2017), Hematuria, Hernia, epigastric, Hypertension, Hypocalcemia, Nonalcoholic hepatosteatosis, Periumbilical hernia, and Proteinuria. She presented to the ED at [facility] via EMS with cc diarrhea and weakness that started a week ago. She is also on Keflex for leg cellulitis. She denies any fever, vomiting, CP, SOB, or abdominal pain.       Video assessment :  Not seen, will review later     Vitals reviewed   Afebrile, stable vitals     LABs reviewed       Radiology reviewed   CT A/P  Resolution of inflammatory stranding in the pelvis on the left around the left ovary.   Persistent fat containing abdominal wall hernia with stranding in the herniated fat suggesting panniculitis.   No acute findings evident within the abdomen or pelvis elsewhere by CT without contrast.    Assessment / Plan :  Possible sepsis -- cellulitis , possible UTI , r/o C diff   Electrolyte imbalance   Elevated Glucose -- no DKA   Lactic acidosis   BRIDGET/CKD   Morbid obesity   H/o Dyslipidemia, GERD , HTN   - on Insulin drip & labs as per protocol   - on Zosyn, add IV Vancomycin; f/u cultures   - send C diff test   - send CRP; will call surgery consult to r/o necrotising facitis; f/u Xray   - home meds as tolerated     DVT Px : Lovenox SQ   GI Px : N/A        6:48 AM   FSBS in 200s   - start feeding, switch to long acting, premeal & SSI high scale Insulin as ordered   - turn off Insulin drip after 1 hour

## 2023-12-13 NOTE — PLAN OF CARE
The sw met with the pt to complete the assessment. The pt lives in [place] with her 2 dtr's Jessica Dias [phone] and Samantha Dias(dtr). The pt's independent with her ADL's and has the dme listed below. The pt doesn't drive so she uses Medicaid transportation to get to her dr appt's and her dtr Jessica transports her on errands. The pt will need transportation home at d/c. The sw completed the white board in the pt's room with her name and contact info. The sw encouraged the pt to call if she has any further questions or concerns. The sw will continue to follow the pt throughout her transitions of care and will assist with any d/c needs. The pt receives wound care in her home from two agencies. The pt's current with [facility]) [phone] for a wound care nurse on Wednesdays and and NP ([address])In home wound care clinic through [facility] on Mondays and Thursdays.     [place] - Intensive Care  Initial Discharge Assessment       Primary Care Provider: Sammi Edge MD ([facility])    Admission Diagnosis: Dehydration [E86.0]  Hypomagnesemia [E83.42]  Hyperglycemia [R73.9]  Elevated troponin [R79.89]  Acute cystitis with hematuria [N30.01]  BRIDGET (acute kidney injury) [N17.9]  Generalized weakness [R53.1]  Chest pain [R07.9]  Diarrhea, unspecified type [R19.7]    Admission Date: 12/12/2023  Expected Discharge Date:     Transition of Care Barriers: (P) Mental illness    Payor: MEDICAID / Plan: HEALTHY BLUE [place] / Product Type: Managed Medicaid /     Extended Emergency Contact Information  Primary Emergency Contact: Jessica Dias  Address: [address] [place] [address] [facility] [place]  Home Phone: [phone]  Relation: Daughter  Secondary Emergency Contact: Lisbeth Newby  Address: [address] [place] [address] [place]  Mobile Phone: [phone]  Relation: Relative    Discharge Plan A: (P) Home Health  Discharge Plan B:  (P) Other (TBD)      [id number] - [place], [place] - [address]  [address]  [place] [address]  Phone: [phone] Fax: [phone]    [facility] - [place] - [address]  [address] [place] [address]  Phone: [phone] Fax: [phone]      Initial Assessment (most recent)       Adult Discharge Assessment - 12/13/23 1217          Discharge Assessment    Assessment Type Discharge Planning Assessment (P)      Confirmed/corrected address, phone number and insurance Yes (P)      Confirmed Demographics Correct on Facesheet (P)      Source of Information patient (P)      When was your last doctors appointment? 12/01/23 (P)      Communicated DEMARIO with patient/caregiver Date not available/Unable to determine (P)      Reason For Admission BRIDGET (P)      People in Home child(marge), adult (P)      Do you expect to return to your current living situation? Yes (P)      Do you have help at home or someone to help you manage your care at home? Yes (P)      Who are your caregiver(s) and their phone number(s)? Jessica Dias(dtr)[phone] (P)      Prior to hospitilization cognitive status: Alert/Oriented (P)      Current cognitive status: Alert/Oriented (P)      Walking or Climbing Stairs Difficulty yes (P)      Walking or Climbing Stairs ambulation difficulty, requires equipment;stair climbing difficulty, requires equipment;transferring difficulty, requires equipment (P)      Dressing/Bathing Difficulty no (P)      Home Accessibility wheelchair accessible (P)      Home Layout Able to live on 1st floor (P)      Equipment Currently Used at Home bedside commode;hospital bed;glucometer;cane, straight (P)      Readmission within 30 days? No (P)      Patient currently being followed by outpatient case management? No (P)      Do you currently have service(s) that help you manage your care at home? -- (P)    the pt's current with a hh agency but doesn't know the name of the agency     Do you take prescription medications? Yes (P)      Do you have prescription coverage? Yes (P)      Coverage Medicaid Healthy Blue (P)      Do you have any problems affording any of your prescribed medications? No (P)      Is the patient taking medications as prescribed? yes (P)      Who is going to help you get home at discharge? the pt states she will need transportation home at d/c (P)      How do you get to doctors appointments? health plan transportation (P)      Are you on dialysis? No (P)      Do you take coumadin? No (P)      Discharge Plan A Home Health (P)      Discharge Plan B Other (P)    TBD    DME Needed Upon Discharge  other (see comments) (P)    TBD    Discharge Plan discussed with: Patient (P)      Transition of Care Barriers Mental illness (P)         Physical Activity    On average, how many days per week do you engage in moderate to strenuous exercise (like a brisk walk)? 0 days (P)      On average, how many minutes do you engage in exercise at this level? 0 min (P)         Financial Resource Strain    How hard is it for you to pay for the very basics like food, housing, medical care, and heating? Very hard (P)         Housing Stability    In the last 12 months, how many places have you lived? 1 (P)      In the last 12 months, was there a time when you did not have a steady place to sleep or slept in a shelter (including now)? No (P)         Transportation Needs    In the past 12 months, has lack of transportation kept you from medical appointments or from getting medications? Yes (P)      In the past 12 months, has lack of transportation kept you from meetings, work, or from getting things needed for daily living? Yes (P)         Food Insecurity    Within the past 12 months, you worried that your food would run out before you got the money to buy more. Sometimes true (P)      Within the past 12 months, the food you bought just didn't last and you didn't have money to get more. Sometimes true (P)          Stress    Do you feel stress - tense, restless, nervous, or anxious, or unable to sleep at night because your mind is troubled all the time - these days? Very much (P)         Social Connections    In a typical week, how many times do you talk on the phone with family, friends, or neighbors? More than three times a week (P)      How often do you get together with friends or relatives? More than three times a week (P)      How often do you attend Buddhist or Sabianist services? Never (P)      Do you belong to any clubs or organizations such as Buddhist groups, unions, fraEnforta or athletic groups, or school groups? No (P)      How often do you attend meetings of the clubs or organizations you belong to? Never (P)      Are you , , , , never , or living with a partner? Never  (P)         Alcohol Use    Q1: How often do you have a drink containing alcohol? Never (P)      Q2: How many drinks containing alcohol do you have on a typical day when you are drinking? Patient does not drink (P)      Q3: How often do you have six or more drinks on one occasion? Never (P)         OTHER    Name(s) of People in Home Jessica Dias(dtr)[phone]/Samantha(dtr) (P)

## 2023-12-13 NOTE — ED NOTES
Report called to BETTE Fonseca. All questions answered. Pt to be transported to room with all belongings by ED tech.

## 2023-12-13 NOTE — NURSING
Charge RN attempt to get PIV without success. Anaesthesia called to bedside for PIV.   Pt agitated and cursed at M.D. refusing IV. SCOTTY Villarreal notified. Pt's current , ok to continue to run fluids with insulin.

## 2023-12-13 NOTE — HPI
Lele Dias is a 42yoF with Anemia, Asthma, Cellulitis of abdominal wall (12/18/2017), CKD (chronic kidney disease) stage 3, GFR 30-59 ml/min, Depression, Diabetes mellitus, type II, Diastolic dysfunction, Diverticulosis of intestine with bleeding (9/14/2016), E coli bacteremia (4/21/2018), E. coli pyelonephritis (6/2/2015), E. coli UTI (12/18/2017), Hematuria, Hernia, epigastric, Hypertension, Hypocalcemia, Nonalcoholic hepatosteatosis, Periumbilical hernia, and Proteinuria. She presented to the ED at [facility] via EMS with cc diarrhea and weakness that started a week ago. She is also on Keflex for leg cellulitis. She denies any fever, vomiting, CP, SOB, or abdominal pain.    ED workup revealed Na 133, BUN 21, glucose 918, Cr 1.8, Ca 8.6, Alb 2.6, eGFR 35, Lactic Acid 2.6, Mg 1.4, Trop I 0.259, UA WBC 35, CT abd/pelvis unremarkable. CXR with no detrimental change, acute intrathoracic process seen. Admitting to hospital medicine for management and treatment of BRIDGET, hyperglycemia and UTI.

## 2023-12-13 NOTE — ASSESSMENT & PLAN NOTE
PROGRESS NOTE      Mariana Pizano Patient Status:  Inpatient    9/3/1934 MRN [id number]   Location [address] SURGICAL UNIT Attending Franky Erickson MD   Hosp Day # Total duration of encounter: 7 days  PCP Edwige Mcgill, PAGIACOMO           SUBJECTIVE   Sudanese Creole  used.  Pleasantly demented. No new questions or concerns at this time. No chest pain or SOB or other aches or pains.     Called niece and discussed updates on placement, niece plans to visit facility Sat, Sun, or Mon.      OBJECTIVE       Intake/Output Summary (Last 24 hours) at 2023  Last data filed at 2023 1800  Gross per 24 hour   Intake 540 ml   Output --   Net 540 ml      Vitals: Minimum/Maximum (last 24 hours):  Temperature Blood Pressure Heart Rate   Temp  Av °F (36.7 °C)  Min: 97.5 °F (36.4 °C)  Max: 98.8 °F (37.1 °C) BP  Min: 102/67  Max: 143/77 Pulse  Av.8  Min: 57  Max: 69     Resp Rate SpO2   Resp  Av  Min: 16  Max: 16 SpO2  Av %  Min: 98 %  Max: 100 %     Physical Exam  Vitals and nursing note reviewed.   Constitutional:       General: She is not in acute distress.     Appearance: Normal appearance. She is not ill-appearing, toxic-appearing or diaphoretic.   HENT:      Head: Normocephalic and atraumatic.      Right Ear: External ear normal.      Left Ear: External ear normal.      Nose: Nose normal. No congestion or rhinorrhea.      Mouth/Throat:      Mouth: Mucous membranes are moist.      Pharynx: Oropharynx is clear. No posterior oropharyngeal erythema.   Eyes:      General: Lids are normal. No scleral icterus.  Cardiovascular:      Rate and Rhythm: Normal rate and regular rhythm.   Pulmonary:      Effort: Pulmonary effort is normal. No tachypnea, bradypnea, accessory muscle usage, prolonged expiration, respiratory distress or retractions.      Breath sounds: No stridor.   Abdominal:      General: Abdomen is flat. There is no distension.   Musculoskeletal:         Chronic, controlled. Latest blood pressure and vitals reviewed-     Temp:  [98 °F (36.7 °C)-98.9 °F (37.2 °C)]   Pulse:  []   Resp:  [18-25]   BP: (105-141)/(57-81)   SpO2:  [93 %-97 %] .   Home meds for hypertension were reviewed and noted below.   Hypertension Medications               carvediloL (COREG) 3.125 MG tablet Take 3.125 mg by mouth 2 (two) times daily.    furosemide (LASIX) 40 MG tablet Take 40 mg by mouth 2 (two) times daily as needed.    lisinopriL 10 MG tablet Take 5 mg by mouth once daily.            While in the hospital, will manage blood pressure as follows; Continue home antihypertensive regimen    Will utilize p.r.n. blood pressure medication only if patient's blood pressure greater than 180/110 and she develops symptoms such as worsening chest pain or shortness of breath.    General: No deformity or signs of injury.      Cervical back: Normal range of motion. No rigidity.   Skin:     General: Skin is warm and dry.      Coloration: Skin is not jaundiced.      Findings: No erythema.   Neurological:      Mental Status: She is alert and easily aroused. She is disoriented and confused.      Cranial Nerves: No cranial nerve deficit (motor components of CN II-VII & IX-XII grossly intact/symmetric bilaterally).   Psychiatric:         Attention and Perception: She is attentive.         Speech: She is communicative. Speech is not delayed or slurred.         Behavior: Behavior is not agitated, slowed, aggressive, withdrawn or hyperactive. Behavior is cooperative.         Cognition and Memory: Cognition is impaired.         Judgment: Judgment is not inappropriate.         Current Facility-Administered Medications   Medication   • hydrALAZINE (APRESOLINE) injection 10 mg   • amLODIPine (NORVASC) tablet 5 mg   • atorvastatin (LIPITOR) tablet 10 mg   • [Held by provider] enalapril (VASOTEC) tablet 20 mg   • [Held by provider] hydroCHLOROthiazide (HYDRODIURIL) tablet 25 mg   • methIMAzole (TAPAZOLE) tablet 5 mg   • aluminum-magnesium hydroxide-simethicone (MAALOX) 200-200-20 MG/5ML suspension 30 mL   • calcium carbonate (TUMS) chewable tablet 1,000 mg   • ondansetron (ZOFRAN) injection 4 mg   • acetaminophen (TYLENOL) tablet 650 mg   • potassium CHLORIDE (KLOR-CON M) yvrose ER tablet 40 mEq   • melatonin tablet 3 mg   • hydrALAZINE (APRESOLINE) tablet 25 mg   • sodium chloride 0.9 % flush bag 25 mL   • sodium chloride (PF) 0.9 % injection 2 mL   • enoxaparin (LOVENOX) injection 30 mg               LABORATORY DATA:    138 104 28 104   4.4 29 1.03      4.6 12.4 223    39.5      Recent Results (from the past 24 hour(s))   Phosphorus    Collection Time: 08/18/23  4:37 AM   Result Value Ref Range    Phosphorus 3.4 2.4 - 4.7 mg/dL   Magnesium    Collection Time: 08/18/23  4:37 AM   Result Value Ref Range     Magnesium 2.1 1.7 - 2.4 mg/dL   Basic Metabolic Panel    Collection Time: 08/18/23  4:37 AM   Result Value Ref Range    Fasting Status      Sodium 138 135 - 145 mmol/L    Potassium 4.4 3.4 - 5.1 mmol/L    Chloride 104 97 - 110 mmol/L    Carbon Dioxide 29 21 - 32 mmol/L    Anion Gap 9 7 - 19 mmol/L    Glucose 104 (H) 70 - 99 mg/dL    BUN 28 (H) 6 - 20 mg/dL    Creatinine 1.03 (H) 0.51 - 0.95 mg/dL    Glomerular Filtration Rate 52 (L) >=60    BUN/Cr 27 (H) 7 - 25    Calcium 8.9 8.4 - 10.2 mg/dL   CBC with Automated Differential (performable only)    Collection Time: 08/18/23  4:37 AM   Result Value Ref Range    WBC 4.6 4.2 - 11.0 K/mcL    RBC 3.94 (L) 4.00 - 5.20 mil/mcL    HGB 12.4 12.0 - 15.5 g/dL    HCT 39.5 36.0 - 46.5 %    .3 (H) 78.0 - 100.0 fl    MCH 31.5 26.0 - 34.0 pg    MCHC 31.4 (L) 32.0 - 36.5 g/dL    RDW-CV 14.3 11.0 - 15.0 %    RDW-SD 53.1 (H) 39.0 - 50.0 fL     140 - 450 K/mcL    NRBC 0 <=0 /100 WBC    Neutrophil, Percent 39 %    Lymphocytes, Percent 43 %    Mono, Percent 11 %    Eosinophils, Percent 7 %    Basophils, Percent 0 %    Immature Granulocytes 0 %    Absolute Neutrophils 1.8 1.8 - 7.7 K/mcL    Absolute Lymphocytes 2.0 1.0 - 4.0 K/mcL    Absolute Monocytes 0.5 0.3 - 0.9 K/mcL    Absolute Eosinophils  0.3 0.0 - 0.5 K/mcL    Absolute Basophils 0.0 0.0 - 0.3 K/mcL    Absolute Immature Granulocytes 0.0 0.0 - 0.2 K/mcL        Recent Labs   Lab 08/18/23  0437 08/17/23  0441 08/16/23  0644 08/13/23  0448   WBC 4.6 4.0* 3.4* 4.0*   HGB 12.4 12.1 11.7* 11.8*   HCT 39.5 37.3 36.1 35.8*   .3* 100.5* 100.6* 98.4    216 197 204     Recent Labs   Lab 08/18/23  0437 08/17/23  0441 08/16/23  0644 08/13/23  0448 08/12/23  0502 08/11/23  2149   SODIUM 138 141 142 140   < >  --    POTASSIUM 4.4 4.5 4.4 4.4   < >  --    CHLORIDE 104 106 108 107   < >  --    CO2 29 27 28 29   < >  --    BUN 28* 26* 19 27*   < >  --    CREATININE 1.03* 0.93 0.84 1.07*   < >  --    GLUCOSE 104* 98 97 98    < >  --    PHOS 3.4 3.5 3.6  --   --   --    TSH  --   --   --   --   --  1.577    < > = values in this interval not displayed.       Imaging        XR CHEST AP OR PA   Final Result by Mitchell Romero MD (08/11 2048)   FINDINGS with IMPRESSION:    Continued left diaphragm elevation. Stranding at right base may be   atelectasis or fibrosis. No definite consolidation.       Right rotated expiratory view. Uncertain heart size, possibly enlarged.   Tortuous aorta.        Moderate thoracolumbar dextroscoliosis. Degenerative spurring of spine.   Lower thoracic and lumbar disc narrowing. Left glenohumeral osteoarthritis   seen as marginal spurring.            Electronically Signed by: MITCHELL ROMERO M.D.    Signed on: 8/11/2023 8:48 PM    Workstation ID: [id number]      CT CERVICAL SPINE WO CONTRAST   Final Result by Phil Olson MD (08/11 2058)      1.   No acute fracture or traumatic malalignment of the cervical spine.   Please note MRI cervical spine is more sensitive for evaluation of spinal   cord or ligamentous injury.   2.   Degenerative changes in the cervical spine.   3.   Chronic superior endplate height loss is seen from T1 through T4   vertebral bodies.   4.   Findings may be compatible with multinodular goiter      FOR PHYSICIAN USE ONLY - Please note that this report was generated using   voice recognition software.  If you require clarification or feel that   there has been an error in this report please contact me through [employer].  Thank you very much for allowing me to participate in the care of   your patient.      Electronically Signed by: PHIL OLSON    Signed on: 8/11/2023 8:58 PM    Workstation ID: [id number]      CT HEAD WO CONTRAST   Final Result by Mitchell Romero MD (08/11 2045)   Findings and Impression:    No sign of intracranial hemorrhage, mass, acute ischemia, major vessel   thrombus.       Unchanged left frontal encephalomalacia and ex vacuo dilatation of left   lateral  ventricle. Mild overall cerebral atrophy. Physiologic right basal   ganglia calcification. Small calcification right occipital lobe likely from   old inflammation.        Senile scleral plaques of no significance in the orbits. Paranasal sinuses   and mastoids clear to extent seen. Unremarkable calvarium and scalp.      Electronically Signed by: MITCHELL ROMERO M.D.    Signed on: 8/11/2023 8:45 PM    Workstation ID: [id number]                     ASSESSMENT & PLAN       88 year old Creole-French speaking female w hx of dementia, HTN, HLD, hyperthyroidism who presented to the ED from nursing home for unwitnessed fall at nursing home.        #Unwitnessed fall  #Dementia with behavioral disturbance  #Toxic metabolic encephalopathy/2 above versus progressive dementia  Unwitnessed fall, syncope versus ?mechanical fall  No focal deficits on exam  CT head nonacute  TSH normal, folate B12 nl  -orthostatics neg  -ST/PT/OT eval> CHRISTIAN - daughter has been unable to tour any facilities for several days. Will call and discuss need for urgent Rehab to prevent further deconditioning.      #UTI on urinalysis  #Leukopenia  -Follow-up urine culture with no specific organism  -Continue ceftriaxone     #Essential hypertension  #Hyperlipidemia  -Continue atorvastatin  -c/w amlodipine, hold enalapril,hydrochlorothiazide while normotensive   -Recommend liberalized blood pressure goal in geriatric patient     #Hyperthyroidism  #Multinodular goiter noted on CT C-spine  Currently euthyroid  Continue home methimazole     #Severe protein calorie malnutrition  #BMI 17  RD consult     #Elevated troponin  100, downtrended to 91  EKG nonischemic  No further repeats        Continuous infusions:  Current Facility-Administered Medications   Medication Dose Route Frequency Provider Last Rate Last Admin      All consultant notes within the last 24 hours reviewed and/or case & plan of care discussed with on-call consultant     Current Active Medications  for DVT Prophylaxis (From admission, onward)         Stop     enoxaparin (LOVENOX) injection 30 mg  30 mg,   Subcutaneous,   DAILY         --              enoxaparin - 30 MG/0.3ML   Dietary Orders (From admission, onward)     Start     Ordered    08/12/23 1421  3 Times/Day w Meals; Ensure Plus HP/Standard Oral Supplement Oral Nutrition Supplement  As Directed        Question Answer Comment   Frequency 3 Times/Day w Meals    Oral Supplement Ensure Plus HP/Standard Oral Supplement        08/12/23 1420 08/12/23 1421  Regular Diet  DIET EFFECTIVE NOW        Question:  Diet Modifiers  Answer:  Regular    08/12/23 1420               LDAs:   Peripheral IV 08/12/23 Left Forearm 22 (Active)        Code Status: Full Resuscitation   Dispo: Pending placement.     Franky Erickson MD  8/18/2023      Patient Privacy Notice      The 21st Century Cures Act makes medical notes like these available to patients in the interest of transparency. Please be advised that this is a medical document. Medical documents are intended to carry relevant information and the clinical opinion of the practitioner. The medical note is intended as medical provider to provider communication, and may appear blunt or direct. It is written in medical language, and may contain abbreviations or verbiage that are unfamiliar.  If you require clarification or feel there has been an error in documentation, please contact me via [employer] secure chat or [employer]. Thank you.     MORE than 58 MINS WERE SPENT ON THIS PATIENTS CARE TODAY, more than 50% of which was spent coordinating patient care. This includes endering the following: Reviewed all vitals, medications, new orders, I/O, labs, micro, radiology, nurses notes, pertinent consultant notes which are reflected in assessment and plan.This does not include time spent on other items of care such as smoking cessation counseling, prolonged care time, and or advanced care planning if applicable.

## 2023-12-14 PROBLEM — G89.29 CHRONIC PAIN: Status: ACTIVE | Noted: 2023-01-01

## 2023-12-14 NOTE — ASSESSMENT & PLAN NOTE
Assistance with smoking cessation was offered, including:  []  Medications  [x]  Counseling  []  Printed Information on Smoking Cessation  []  Referral to a Smoking Cessation Program    Patient was counseled regarding smoking for 3-10 minutes. Nicotine patch prescribed.

## 2023-12-14 NOTE — NURSING
"0135 Pt resting comfortably then woke up agitated, stating she was uncomfortable and in pain all over and started cursing at staff. Pt called daughter. Offered tylenol which she initially refused then accepted. Unhappy with the bed and railings up. Repositioned in bed multiple times and assisted in turning. Pt seemingly more settled.    0200 Pt resting comfortably.     0415 Pt agitated with phlebotomy, cursing at staff. Phone low on battery while talking on phone, requested to have it plugged in. Once plugged in pt received multiple calls but yelled at staff "don't touch my f* phone" when trying to hand it back to her.     0430 Pt screaming she's uncomfortable despite frequent repositioning and having pain all over. Inquired about medications. Told pt she could have her Xanax. Pt replied "I dont want that, ya'll are just trying to sedate me so you dont have to deal with me".  Then pt changed her mind asking for the Xanax as she "just wants to sleep" and is "promises she actually wants it".  When returning with the medication pt found to be sleeping. Will hold for now.   "

## 2023-12-14 NOTE — PROGRESS NOTES
"[facility]           Pharmacy  Admission Medication History     The home medication history was taken by Ish Edge PharmD.      Medication history obtained from Medications listed below were obtained from: Patient/family and Analytic software- [employer]    Based on information gathered for medication list, you may go to "Admission" then "Reconcile Home Medications" tabs to review and/or act upon those items.     The home medication list has been updated by the Pharmacy department.   Please read ALL comments highlighted in yellow.   Please address this information as you see fit.    Feel free to contact us if you have any questions or require assistance.    The medications listed below were removed from the home medication list.  Please reorder if appropriate:  No discrepancy noted  Potential issues to be addressed PRIOR TO DISCHARGE      No current facility-administered medications on file prior to encounter.     Current Outpatient Medications on File Prior to Encounter   Medication Sig Dispense Refill    acetaminophen (TYLENOL) 500 MG tablet Take 500 mg by mouth every 6 (six) hours as needed for Pain.      albuterol (PROVENTIL/VENTOLIN HFA) 90 mcg/actuation inhaler Inhale 1-2 puffs into the lungs every 4 to 6 hours as needed.      allopurinoL (ZYLOPRIM) 300 MG tablet Take 300 mg by mouth once daily.      ALPRAZolam (XANAX) 0.5 MG tablet Take 0.5 mg by mouth 3 (three) times daily as needed.      aspirin (ECOTRIN) 81 MG EC tablet Take 1 tablet (81 mg total) by mouth once daily. 90 tablet 3    azelastine (ASTELIN) 137 mcg (0.1 %) nasal spray 2 sprays by Nasal route 2 (two) times daily as needed.      BREZTRI AEROSPHERE 160-9-4.8 mcg/actuation HFAA Take 2 puffs by mouth 2 (two) times daily.      carvediloL (COREG) 3.125 MG tablet Take 3.125 mg by mouth 2 (two) times daily.      cetirizine (ZYRTEC) 10 MG tablet Take 10 mg by mouth daily as needed.      cholecalciferol, vitamin D3, 1,250 mcg " "(50,000 unit) capsule Take 50,000 Units by mouth every Wednesday.      clopidogreL (PLAVIX) 75 mg tablet Take 1 tablet (75 mg total) by mouth once daily. 30 tablet 3    EScitalopram oxalate (LEXAPRO) 10 MG tablet Take 10 mg by mouth daily as needed.      fluticasone propionate (FLONASE) 50 mcg/actuation nasal spray 1 spray by Each Nostril route daily as needed.      furosemide (LASIX) 40 MG tablet Take 40 mg by mouth 2 (two) times daily as needed.      HUMALOG MIX 75-25 KWIKPEN 100 unit/mL (75-25) InPn Inject 80 Units into the skin 3 (three) times daily.      insulin glargine (LANTUS) 100 unit/mL injection Inject 90 Units into the skin every morning.      lisinopriL 10 MG tablet Take 5 mg by mouth once daily.      multivit-min-iron fum-folic ac (ONE-A-DAY WOMEN'S COMPLETE) 18 mg iron- 400 mcg Tab Take 1 tablet by mouth once daily.      omeprazole (PRILOSEC) 20 MG capsule Take 20 mg by mouth daily as needed.      pregabalin (LYRICA) 300 MG Cap Take 300 mg by mouth once daily.      promethazine (PHENERGAN) 6.25 mg/5 mL syrup Take 5-10 mLs by mouth every 8 (eight) hours as needed.      promethazine-dextromethorphan (PROMETHAZINE-DM) 6.25-15 mg/5 mL Syrp SMARTSI Teaspoon By Mouth 3 Times Daily PRN      SUBOXONE 8-2 mg Place 1 Film under the tongue 3 (three) times daily.      tiZANidine (ZANAFLEX) 4 MG tablet Take 4 mg by mouth every 8 (eight) hours as needed.      atorvastatin (LIPITOR) 80 MG tablet Take 1 tablet (80 mg total) by mouth once daily. 90 tablet 1    BD ULTRA-FINE MINI PEN NEEDLE 31 gauge x 3/16" Ndle Inject into the skin 5 (five) times daily.      LINZESS 72 mcg Cap capsule Take 72 mcg by mouth daily as needed.      medroxyPROGESTERone (DEPO-PROVERA) 150 mg/mL injection Inject 150 mg into the muscle every 3 (three) months.      medroxyPROGESTERone (DEPO-PROVERA) 150 mg/mL injection Inject 150 mg into the muscle every 3 (three) months.      nicotine (NICODERM CQ) 21 mg/24 hr Place 1 patch onto the skin " daily as needed.      TRUE METRIX GLUCOSE TEST STRIP Strp 3 (three) times daily.         Please address this information as you see fit.  Feel free to contact us if you have any questions or require assistance.    Ish Edge, PharmD  [phone]

## 2023-12-14 NOTE — ASSESSMENT & PLAN NOTE
Prior h/o ESBL Ecoli  Urine culture growing gram negative rods, final results pending  Antibiotics switched to ertapenem

## 2023-12-14 NOTE — ASSESSMENT & PLAN NOTE
Patient's FSGs are uncontrolled due to hyperglycemia on current medication regimen.  Last A1c reviewed-   Lab Results   Component Value Date    HGBA1C 11.1 (H) 12/12/2023     Most recent fingerstick glucose reviewed-   Recent Labs   Lab 12/14/23  0608 12/14/23  0827 12/14/23  1119 12/14/23  1503   POCTGLUCOSE 338* 350* 288* 175*       Current correctional scale  High  Increase anti-hyperglycemic dose as follows-   Antihyperglycemics (From admission, onward)    Start     Stop Route Frequency Ordered    12/14/23 2100  insulin detemir U-100 (Levemir) pen 48 Units         -- SubQ 2 times daily 12/14/23 0856    12/14/23 1130  insulin aspart U-100 pen 16 Units         -- SubQ 3 times daily with meals 12/14/23 0856    12/14/23 0855  insulin detemir U-100 (Levemir) pen 3 Units         -- SubQ Once 12/14/23 0856    12/13/23 0744  insulin aspart U-100 pen 0-15 Units         -- SubQ Before meals & nightly PRN 12/13/23 0647        Hold Oral hypoglycemics while patient is in the hospital.

## 2023-12-14 NOTE — ASSESSMENT & PLAN NOTE
Patient with acute kidney injury/acute renal failure likely due to pre-renal azotemia due to dehydration BRIDGET is currently improving. Baseline creatinine  0.9  - Labs reviewed- Renal function/electrolytes with Estimated Creatinine Clearance: 94.9 mL/min (based on SCr of 1.1 mg/dL). according to latest data. Monitor urine output and serial BMP and adjust therapy as needed. Avoid nephrotoxins and renally dose meds for GFR listed above.

## 2023-12-14 NOTE — NURSING
Pt's daughter mentioned frequent falls prior to admission during phone call. Passed on to SCOTTY Villarreal.   Updated on HTN and .   Instructed to give coreg early and additional 5u of aspart.

## 2023-12-14 NOTE — SUBJECTIVE & OBJECTIVE
Interval History: more awake, feeling better. Talking to her daughter on the phone during the encounter.     Review of Systems  Objective:     Vital Signs (Most Recent):  Temp: 98.4 °F (36.9 °C) (12/14/23 1509)  Pulse: 96 (12/14/23 1400)  Resp: 20 (12/14/23 1400)  BP: (!) 145/55 (12/14/23 1400)  SpO2: (!) 92 % (12/14/23 1400) Vital Signs (24h Range):  Temp:  [97.2 °F (36.2 °C)-98.4 °F (36.9 °C)] 98.4 °F (36.9 °C)  Pulse:  [] 96  Resp:  [16-33] 20  SpO2:  [91 %-97 %] 92 %  BP: (113-192)/() 145/55     Weight: (!) 157.5 kg (347 lb 3.6 oz)  Body mass index is 63.51 kg/m².    Intake/Output Summary (Last 24 hours) at 12/14/2023 1600  Last data filed at 12/14/2023 1030  Gross per 24 hour   Intake 1468.19 ml   Output 1975 ml   Net -506.81 ml         Physical Exam  Vitals and nursing note reviewed.   Constitutional:       General: She is not in acute distress.     Appearance: She is obese.   Cardiovascular:      Rate and Rhythm: Normal rate and regular rhythm.      Heart sounds: Normal heart sounds. No murmur heard.  Pulmonary:      Effort: Pulmonary effort is normal. No respiratory distress.   Abdominal:      Palpations: Abdomen is soft.      Tenderness: There is no abdominal tenderness.   Musculoskeletal:      Right lower leg: No edema.      Left lower leg: No edema.   Skin:     General: Skin is warm and dry.      Comments: Panniculus with wounds exposing the dermis with copious discharge    Neurological:      General: No focal deficit present.      Mental Status: She is alert and oriented to person, place, and time.   Psychiatric:         Mood and Affect: Mood normal.             Significant Labs: All pertinent labs within the past 24 hours have been reviewed.    Significant Imaging: I have reviewed all pertinent imaging results/findings within the past 24 hours.

## 2023-12-14 NOTE — ASSESSMENT & PLAN NOTE
Patient's anemia is currently controlled. Has not received any PRBCs to date. Etiology likely d/t Iron deficiency  Current CBC reviewed-   Lab Results   Component Value Date    HGB 11.8 (L) 12/14/2023    HCT 34.7 (L) 12/14/2023     Monitor serial CBC and transfuse if patient becomes hemodynamically unstable, symptomatic or H/H drops below 7/21.

## 2023-12-14 NOTE — PROGRESS NOTES
[facility] Medicine  Progress Note    Patient Name: Lele Dias  MRN: [id number]  Patient Class: IP- Inpatient   Admission Date: 12/12/2023  Length of Stay: 1 days  Attending Physician: Shira Salcedo MD  Primary Care Provider: Sammi Edge MD (Cindy)        Subjective:     Principal Problem:BRIDGET (acute kidney injury)        HPI:  Lele Dias is a 42yoF with Anemia, Asthma, Cellulitis of abdominal wall (12/18/2017), CKD (chronic kidney disease) stage 3, GFR 30-59 ml/min, Depression, Diabetes mellitus, type II, Diastolic dysfunction, Diverticulosis of intestine with bleeding (9/14/2016), E coli bacteremia (4/21/2018), E. coli pyelonephritis (6/2/2015), E. coli UTI (12/18/2017), Hematuria, Hernia, epigastric, Hypertension, Hypocalcemia, Nonalcoholic hepatosteatosis, Periumbilical hernia, and Proteinuria. She presented to the ED at [facility] via EMS with cc diarrhea and weakness that started a week ago. She is also on Keflex for leg cellulitis. She denies any fever, vomiting, CP, SOB, or abdominal pain.    ED workup revealed Na 133, BUN 21, glucose 918, Cr 1.8, Ca 8.6, Alb 2.6, eGFR 35, Lactic Acid 2.6, Mg 1.4, Trop I 0.259, UA WBC 35, CT abd/pelvis unremarkable. CXR with no detrimental change, acute intrathoracic process seen. Admitting to hospital medicine for management and treatment of BRIDGET, hyperglycemia and UTI.     Overview/Hospital Course:  No notes on file    Interval History: more awake, feeling better. Talking to her daughter on the phone during the encounter.     Review of Systems  Objective:     Vital Signs (Most Recent):  Temp: 98.4 °F (36.9 °C) (12/14/23 1509)  Pulse: 96 (12/14/23 1400)  Resp: 20 (12/14/23 1400)  BP: (!) 145/55 (12/14/23 1400)  SpO2: (!) 92 % (12/14/23 1400) Vital Signs (24h Range):  Temp:  [97.2 °F (36.2 °C)-98.4 °F (36.9 °C)] 98.4 °F (36.9 °C)  Pulse:  [] 96  Resp:  [16-33] 20  SpO2:  [91 %-97 %] 92 %  BP: (113-192)/() 145/55     Weight:  (!) 157.5 kg (347 lb 3.6 oz)  Body mass index is 63.51 kg/m².    Intake/Output Summary (Last 24 hours) at 12/14/2023 1600  Last data filed at 12/14/2023 1030  Gross per 24 hour   Intake 1468.19 ml   Output 1975 ml   Net -506.81 ml         Physical Exam  Vitals and nursing note reviewed.   Constitutional:       General: She is not in acute distress.     Appearance: She is obese.   Cardiovascular:      Rate and Rhythm: Normal rate and regular rhythm.      Heart sounds: Normal heart sounds. No murmur heard.  Pulmonary:      Effort: Pulmonary effort is normal. No respiratory distress.   Abdominal:      Palpations: Abdomen is soft.      Tenderness: There is no abdominal tenderness.   Musculoskeletal:      Right lower leg: No edema.      Left lower leg: No edema.   Skin:     General: Skin is warm and dry.      Comments: Panniculus with wounds exposing the dermis with copious discharge    Neurological:      General: No focal deficit present.      Mental Status: She is alert and oriented to person, place, and time.   Psychiatric:         Mood and Affect: Mood normal.             Significant Labs: All pertinent labs within the past 24 hours have been reviewed.    Significant Imaging: I have reviewed all pertinent imaging results/findings within the past 24 hours.    Assessment/Plan:      * BRIDGET (acute kidney injury)  Patient with acute kidney injury/acute renal failure likely due to pre-renal azotemia due to dehydration BRIDGET is currently improving. Baseline creatinine  0.9  - Labs reviewed- Renal function/electrolytes with Estimated Creatinine Clearance: 94.9 mL/min (based on SCr of 1.1 mg/dL). according to latest data. Monitor urine output and serial BMP and adjust therapy as needed. Avoid nephrotoxins and renally dose meds for GFR listed above.    Chronic pain  Continue home suboxone  Hold gabapentin for now given waxing and waning mentation      Gross hematuria  Thought to be secondary to lesions around vagina  Urology  signed off      Hyperlipidemia  Continue Atorvastatin daily        Depression  Patient has persistent depression which is moderate and is currently controlled. Will Continue anti-depressant medications. We will not consult psychiatry at this time. Patient does not display psychosis at this time. Continue to monitor closely and adjust plan of care as needed.  Continue Lexapro per home regimen      GERD (gastroesophageal reflux disease)  Continue home Pantoprazole        Cellulitis  Suspect panniculitis, purulent DC noted on exam  Blood cultures negative so far, will continue vancomycin for today, switch to po tomorrow        DM (diabetes mellitus) type 2, uncontrolled, with ketoacidosis  Patient's FSGs are uncontrolled due to hyperglycemia on current medication regimen.  Last A1c reviewed-   Lab Results   Component Value Date    HGBA1C 11.1 (H) 12/12/2023     Most recent fingerstick glucose reviewed-   Recent Labs   Lab 12/14/23  0608 12/14/23  0827 12/14/23  1119 12/14/23  1503   POCTGLUCOSE 338* 350* 288* 175*       Current correctional scale  High  Increase anti-hyperglycemic dose as follows-   Antihyperglycemics (From admission, onward)      Start     Stop Route Frequency Ordered    12/14/23 2100  insulin detemir U-100 (Levemir) pen 48 Units         -- SubQ 2 times daily 12/14/23 0856    12/14/23 1130  insulin aspart U-100 pen 16 Units         -- SubQ 3 times daily with meals 12/14/23 0856    12/14/23 0855  insulin detemir U-100 (Levemir) pen 3 Units         -- SubQ Once 12/14/23 0856    12/13/23 0744  insulin aspart U-100 pen 0-15 Units         -- SubQ Before meals & nightly PRN 12/13/23 0647          Hold Oral hypoglycemics while patient is in the hospital.    Morbid obesity with BMI of 70 and over, adult  Body mass index is 63.51 kg/m². Morbid obesity complicates all aspects of disease management from diagnostic modalities to treatment. Weight loss encouraged and health benefits explained to  patient.         Anemia  Patient's anemia is currently controlled. Has not received any PRBCs to date. Etiology likely d/t Iron deficiency  Current CBC reviewed-   Lab Results   Component Value Date    HGB 11.8 (L) 12/14/2023    HCT 34.7 (L) 12/14/2023     Monitor serial CBC and transfuse if patient becomes hemodynamically unstable, symptomatic or H/H drops below 7/21.    Tobacco use disorder  Assistance with smoking cessation was offered, including:  []  Medications  [x]  Counseling  []  Printed Information on Smoking Cessation  []  Referral to a Smoking Cessation Program    Patient was counseled regarding smoking for 3-10 minutes. Nicotine patch prescribed.         UTI (urinary tract infection)  Prior h/o ESBL Ecoli  Urine culture growing gram negative rods, final results pending  Antibiotics switched to ertapenem      Benign essential hypertension  Chronic, controlled. Latest blood pressure and vitals reviewed-     Temp:  [97.2 °F (36.2 °C)-98.4 °F (36.9 °C)]   Pulse:  []   Resp:  [16-33]   BP: (113-192)/()   SpO2:  [91 %-97 %] .   Home meds for hypertension were reviewed and noted below.   Hypertension Medications               carvediloL (COREG) 3.125 MG tablet Take 3.125 mg by mouth 2 (two) times daily.    furosemide (LASIX) 40 MG tablet Take 40 mg by mouth 2 (two) times daily as needed.    lisinopriL 10 MG tablet Take 5 mg by mouth once daily.            While in the hospital, will manage blood pressure as follows; Continue home antihypertensive regimen    Will utilize p.r.n. blood pressure medication only if patient's blood pressure greater than 180/110 and she develops symptoms such as worsening chest pain or shortness of breath.    Asthma  Controlled   Continue home Symbicort qhs and Albuterol PRN      Chronic combined systolic and diastolic congestive heart failure  Patient is identified as having Combined Systolic and Diastolic heart failure that is Chronic. CHF is currently controlled.  Latest ECHO performed and demonstrates- Results for orders placed during the hospital encounter of 06/17/22    Echo    Interpretation Summary  · Technically significantly limited echocardiogram.  · The left ventricle is moderately enlarged with concentric hypertrophy and mildly decreased systolic function.  · The estimated ejection fraction is 45-50%.  · Grade I left ventricular diastolic dysfunction.  · Normal right ventricular size with normal right ventricular systolic function.  · Mild mitral regurgitation.  · The sinuses of Valsalva is mildly dilated.  . Continue Beta Blocker and monitor clinical status closely. Monitor on telemetry. Patient is on CHF pathway.  Monitor strict Is&Os and daily weights.  Place on fluid restriction of 2 L. Cardiology has not been consulted. Continue to stress to patient importance of self efficacy and  on diet for CHF. Last BNP reviewed- and noted below   Recent Labs   Lab 12/12/23 2034   BNP 90           VTE Risk Mitigation (From admission, onward)           Ordered     enoxaparin injection 60 mg  Every 12 hours         12/13/23 0949     IP VTE HIGH RISK PATIENT  Once         12/12/23 1927     Place sequential compression device  Until discontinued         12/12/23 1927                    Discharge Planning   DEMARIO:      Code Status: Full Code   Is the patient medically ready for discharge?:     Reason for patient still in hospital (select all that apply): Patient new problem and Treatment  Discharge Plan A: Home Health        Discussed with ICU attending. Stable for step down to the floor. Transfer orders placed.  PT/OT consult      Shira Salcedo MD  Department of Hospital Medicine   [facility] Intensive [facility]

## 2023-12-14 NOTE — PLAN OF CARE
Pt remained in bed for the shift, bed in lowest position wheels locked. Plan of care reviewed with patient, verbalized understanding.     Problem: Adult Inpatient Plan of Care  Goal: Plan of Care Review  Outcome: Ongoing, Progressing  Reviewed plan  Goal: Patient-Specific Goal (Individualized)  Outcome: Ongoing, Progressing  Goal: Absence of Hospital-Acquired Illness or Injury  Outcome: Ongoing, Progressing  Goal: Optimal Comfort and Wellbeing  Outcome: Ongoing, Progressing  Goal: Readiness for Transition of Care  Outcome: Ongoing, Progressing     Problem: Diabetes Comorbidity  Goal: Blood Glucose Level Within Targeted Range  Outcome: Ongoing, Progressing     Problem: Adjustment to Illness (Sepsis/Septic Shock)  Goal: Optimal Coping  Outcome: Ongoing, Progressing  Pt with emotional outburts     Problem: Bleeding (Sepsis/Septic Shock)  Goal: Absence of Bleeding  Outcome: Ongoing, Progressing     Problem: Glycemic Control Impaired (Sepsis/Septic Shock)  Goal: Blood Glucose Level Within Desired Range  Outcome: Ongoing, Progressing  BG AC/HS     Problem: Infection Progression (Sepsis/Septic Shock)  Goal: Absence of Infection Signs and Symptoms  Outcome: Ongoing, Progressing     Problem: Nutrition Impaired (Sepsis/Septic Shock)  Goal: Optimal Nutrition Intake  Outcome: Ongoing, Progressing     Problem: Fluid and Electrolyte Imbalance (Acute Kidney Injury/Impairment)  Goal: Fluid and Electrolyte Balance  Outcome: Ongoing, Progressing     Problem: Oral Intake Inadequate (Acute Kidney Injury/Impairment)  Goal: Optimal Nutrition Intake  Outcome: Ongoing, Progressing  Pt ate small amount of snacks      Problem: Renal Function Impairment (Acute Kidney Injury/Impairment)  Goal: Effective Renal Function  Outcome: Ongoing, Progressing     Problem: Bariatric Environmental Safety  Goal: Safety Maintained with Care  Outcome: Ongoing, Progressing     Problem: Infection  Goal: Absence of Infection Signs and Symptoms  Outcome: Ongoing,  Progressing     Problem: Impaired Wound Healing  Goal: Optimal Wound Healing  Outcome: Ongoing, Progressing     Problem: Fall Injury Risk  Goal: Absence of Fall and Fall-Related Injury  Outcome: Ongoing, Progressing     Problem: Diarrhea  Goal: Fluid and Electrolyte Balance  Outcome: Ongoing, Progressing     Problem: Skin Injury Risk Increased  Goal: Skin Health and Integrity  Outcome: Ongoing, Progressing

## 2023-12-14 NOTE — PROGRESS NOTES
Individual Follow-Up Form    12/14/2023    Quit Date: To be determined    Clinical Status of Patient: Inpatient    Length of Service: 30 minutes    Comments: Smoking cessation education note; Pt is a 2.5 pk/day cigarette smoker x 37 yrs. She states that she has recently cut down to 1 pk/day. Order requested for 21 mg nicotine patch Q day. Will follow up for additional smoking cessation education when pt condition improves.     Diagnosis: F17.210

## 2023-12-14 NOTE — ASSESSMENT & PLAN NOTE
Suspect panniculitis, purulent DC noted on exam  Blood cultures negative so far, will continue vancomycin for today, switch to po tomorrow

## 2023-12-14 NOTE — ASSESSMENT & PLAN NOTE
Patient has persistent depression which is moderate and is currently controlled. Will Continue anti-depressant medications. We will not consult psychiatry at this time. Patient does not display psychosis at this time. Continue to monitor closely and adjust plan of care as needed.  Continue Lexapro per home regimen

## 2023-12-14 NOTE — NURSING
Spoke with SCOTTY Dasilva about vancomycin due at 2000. Loading dose held today, per pharmacy would need to administer loading dose tonight and retime scheduled doses. Told to proceed with vancomycin.   Spoke with pharmacy will readjust times and instructed to restart loading dose from today.

## 2023-12-14 NOTE — ASSESSMENT & PLAN NOTE
Body mass index is 63.51 kg/m². Morbid obesity complicates all aspects of disease management from diagnostic modalities to treatment. Weight loss encouraged and health benefits explained to patient.

## 2023-12-15 NOTE — PLAN OF CARE
Problem: Occupational Therapy  Goal: Occupational Therapy Goal  Description: Goals to be met by: 1/15/24     Patient will increase functional independence with ADLs by performing:    LE Dressing with Minimal Assistance.  Grooming while standing with Stand-by Assistance.  Toileting from toilet with Stand-by Assistance for hygiene and clothing management.   Supine to sit with Stand-by Assistance.  Step transfer with Stand-by Assistance  Toilet transfer to toilet with Stand-by Assistance.  Upper extremity exercise program x10 reps per handout, with independence.    Outcome: Ongoing, Progressing       Pt would benefit from cont OT services in order to maximize functional independence. Recommending low intensity therapy at d/c with [facility].

## 2023-12-15 NOTE — PT/OT/SLP EVAL
Occupational Therapy   Evaluation/Treatment     Name: Lele Dias  MRN: [id number]  Admitting Diagnosis: BRIDGET (acute kidney injury)  Recent Surgery: * No surgery found *      Recommendations:     Discharge Recommendations: Low Intensity Therapy  Discharge Equipment Recommendations:  cane, straight  Barriers to discharge:  None    Assessment:     Lele Dias is a 43 y.o. female with a medical diagnosis of BRIDGET (acute kidney injury).  She presents with The primary encounter diagnosis was BRIDGET (acute kidney injury). Diagnoses of Generalized weakness, Hyperglycemia, Diarrhea, unspecified type, Dehydration, Hypomagnesemia, Acute cystitis with hematuria, Elevated troponin, and Chest pain were also pertinent to this visit.  . Performance deficits affecting function: weakness, impaired endurance, impaired cognition, decreased ROM, impaired self care skills, impaired functional mobility, gait instability, impaired balance, pain, decreased upper extremity function, decreased lower extremity function, impaired skin, edema, decreased safety awareness.        Pt would benefit from cont OT services in order to maximize functional independence. Recommending low intensity therapy at d/c with SPC.     Rehab Prognosis: Good; patient would benefit from acute skilled OT services to address these deficits and reach maximum level of function.       Plan:     Patient to be seen 5 x/week to address the above listed problems via self-care/home management, therapeutic activities, therapeutic exercises  Plan of Care Expires: 01/15/24  Plan of Care Reviewed with: patient    Subjective     Chief Complaint: chronic pain; tearful during session; reports of reflux and nausea ; inability to control bowels. Diarrhea   Patient/Family Comments/goals: return home today     Occupational Profile:  Living Environment: with 2 dghtrs in , ramp to enter, garden tub   Previous level of function: pt reports sponge bathing with assist; assist socks/shoes;  mod I toileting and UBD; use of BSC in shower; no longer sleeps in hospital bed ; receiving HH services 3x.wk   Equipment Used at Home: bedside commode, hospital bed, cane, straight  Assistance upon Discharge: from [facility]     Pain/Comfort:  Pain Rating 1:  (reporting back pain)    Patients cultural, spiritual, Restorationist conflicts given the current situation:      Objective:     Communicated with: nsg prior to session.  Patient found left sidelying with   upon OT entry to room.    General Precautions: Standard, fall  Orthopedic Precautions: N/A  Braces: N/A  Respiratory Status: Room air    Occupational Performance:    Bed Mobility:    Patient completed Rolling/Turning to Right with stand by assistance  Patient completed Scooting/Bridging with minimum assistance  Patient completed Supine to Sit with moderate assistance and 2 persons  Patient completed Sit to Supine with moderate assistance    Functional Mobility/Transfers:  Patient completed Sit <> Stand Transfer with minimum assistance  with  straight cane   Functional Mobility: CGa with SPC x 2 trials     Activities of Daily Living:  Lower Body Dressing: total assistance    Toileting: maximal assistance and total assistance purewick in place; incontinent of bowel, but currently also unable to adequately perform hygiene at this time     Cognitive/Visual Perceptual:  Oriented  Impaired safety awareness and insight   Impaired recall/LTM   Emotionally Labile     Physical Exam:  Balance:    -       fair seated; fair -/poor standing   Postural examination/scapula alignment:    -       Rounded shoulders  -       Forward head  Skin integrity: Wound posterior L leg   Edema:  Moderate BLEs ; appears with lymphedema   Dominant hand:    -       right  Upper Extremity Range of Motion:   RUE WFL; LUE amputated at midshaft humerus   Upper Extremity Strength:  RUE WFL; residual limb WFL    Strength:  WFL   Fine Motor Coordination:  intact     AMPA 6 Click ADL:  [facility] Total  Score: 15    Treatment & Education:  Pt found supine   Requires increased time for axs; education   Pt rambles and tangential in speech/thoughts at times   Increased assist with verbal and tactile cues required for bed mobility   Increased time EOB 2/2 reports of nausea   Emotional support required during session as pt labile   Stood with SPC x 2 trials; mobility x 2 trials with SPC; forward flexed posture, limping noted   Total A for ADLs as above   Pt with multiple trials of scooting along EOB SBA  Educated on home safety and d/c recs    Patient left right sidelying with all lines intact, call button in reach, bed alarm on, and nsg notified    GOALS:   Multidisciplinary Problems       Occupational Therapy Goals          Problem: Occupational Therapy    Goal Priority Disciplines Outcome Interventions   Occupational Therapy Goal     OT, PT/OT Ongoing, Progressing    Description: Goals to be met by: 1/15/24     Patient will increase functional independence with ADLs by performing:    LE Dressing with Minimal Assistance.  Grooming while standing with Stand-by Assistance.  Toileting from toilet with Stand-by Assistance for hygiene and clothing management.   Supine to sit with Stand-by Assistance.  Step transfer with Stand-by Assistance  Toilet transfer to toilet with Stand-by Assistance.  Upper extremity exercise program x10 reps per handout, with independence.                         History:     Past Medical History:   Diagnosis Date    Anemia     Asthma     Cellulitis of abdominal wall 12/18/2017    CKD (chronic kidney disease) stage 3, GFR 30-59 ml/min     Depression     Diabetes mellitus, type II     Diastolic dysfunction     Diverticulosis of intestine with bleeding 9/14/2016    E coli bacteremia 4/21/2018    E. coli pyelonephritis 6/2/2015    E. 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     SECTION       SECTION, CLASSIC      CHOLECYSTECTOMY  age 15    CORONARY ANGIOGRAPHY N/A 2021    Procedure: ANGIOGRAM, CORONARY ARTERY;  Surgeon: Neelam Hicks MD;  Location: [facility] CATH LAB/EP;  Service: Cardiology;  Laterality: N/A;    INCISION AND DRAINAGE OF ABSCESS N/A 2020    Procedure: INCISION AND DRAINAGE, ABSCESS;  Surgeon: Rich Watson MD;  Location: [facility] OR;  Service: General;  Laterality: N/A;    LEFT HEART CATHETERIZATION Left 2021    Procedure: Left heart cath;  Surgeon: Neelam Hicks MD;  Location: [facility] CATH LAB/EP;  Service: Cardiology;  Laterality: Left;    LEFT HEART CATHETERIZATION N/A 2021    Procedure: Left heart cath;  Surgeon: Neelam Hicks MD;  Location: [facility] CATH LAB/EP;  Service: Cardiology;  Laterality: N/A;    TONSILLECTOMY, ADENOIDECTOMY         Time Tracking:     OT Date of Treatment: 12/15/23  OT Start Time: 924  OT Stop Time:   OT Total Time (min): 39 min    Billable Minutes:Evaluation 8  Self Care/Home Management 8  Therapeutic Activity 23    12/15/2023

## 2023-12-15 NOTE — PLAN OF CARE
SW met with pt at bedside during rounding with MD. Pt will dc today home and resume HH services. SW will setup transport upon dc. SW will continue to follow pt throughout her transitions of care and assist with any dc needs.      12/15/23 1054   Rounds   Attendance Provider;;Assigned nurse;Pharmacist   Discharge Plan A Home with family;Home Health   Why the patient remains in the hospital Requires continued medical care   Transition of Care Barriers None

## 2023-12-15 NOTE — PROGRESS NOTES
Smoking cessation education follow-up: Pt denies nicotine withdrawal symptoms with 21 mg nicotine patch Q day in use.

## 2023-12-15 NOTE — NURSING
Pt called this RN to bedside. Pt's Midline is leaking, and dressing is not intact. Central line dressing changed performed. Vanc infusing. Pt with incontinent BM. Pt cleaned up, and purwick changed. Bed in lowest position, locked and bed alarms on. Call bell within pt's reach.

## 2023-12-15 NOTE — PT/OT/SLP EVAL
Physical Therapy Evaluation    Patient Name:  Lele Dias   MRN:  [id number]    Recommendations:     Discharge Recommendations: Low Intensity Therapy   Discharge Equipment Recommendations: cane, straight   Barriers to discharge: None    Assessment:     Lele Dias is a 43 y.o. female admitted with a medical diagnosis of BRIDGET (acute kidney injury).  She presents with the following impairments/functional limitations: weakness, impaired endurance, impaired self care skills, impaired functional mobility, gait instability, impaired balance, impaired cognition, decreased upper extremity function, decreased lower extremity function, decreased safety awareness, edema, impaired skin Patient evaluated with OT; will benefit from cont PT services to maximize functional independence; recommend low intensity therapy and single point cane upon d/c. .    Rehab Prognosis: Good; patient would benefit from acute skilled PT services to address these deficits and reach maximum level of function.    Recent Surgery: * No surgery found *      Plan:     During this hospitalization, patient to be seen 5 x/week to address the identified rehab impairments via gait training, therapeutic activities, therapeutic exercises, neuromuscular re-education and progress toward the following goals:    Plan of Care Expires:  01/15/24    Subjective     Chief Complaint: pt reports having diarrhea and chronic pain; reported nausea and reflux; tearful at times during session  Patient/Family Comments/goals: pt wants to go home today  Pain/Comfort:  Pain Rating 1:  (reports back pain- did not rate)    Patients cultural, spiritual, Congregation conflicts given the current situation: no    Living Environment:   Pt lives with 2 dghtrs in , [facility] to enter, garden tub   Previous level of function: pt reports sponge bathing with assist; assist socks/shoes; mod I toileting and UBD; use of BSC in shower; no longer sleeps in hospital bed ; receiving  services  3x/wk   Equipment Used at Home: bedside commode, hospital bed, cane, straight  Assistance upon Discharge: from [facility]     Objective:     Communicated with nurse prior to session.  Patient found HOB elevated with peripheral IV, PureWick  upon PT entry to room.    General Precautions: Standard, fall  Orthopedic Precautions:n/a    Braces: N/A  Respiratory Status: Room air    Exams:  Cognitive Exam:  Patient oriented but with limited long term memory and recall, emotionally labile; impaired safety awareness/insight  Gross Motor Coordination:  WFL  Postural Exam:  Patient presented with the following abnormalities:    -       Rounded shoulders  -       Forward head  Sensation: denies paresthesias  Skin Integrity/Edema:      -       Skin integrity: Wound visible post L calf  -       Edema: Moderate BLEs, L>R  RLE ROM: WFL for bed mobility, transfers sit<>stand and amb  RLE Strength: WFL  LLE ROM: WFL for bed mobility, transfers sit<>stand and amb except df~0*  LLE Strength: WFL    Functional Mobility:  Bed Mobility:     Rolling Right: stand by assistance  Scooting: stand by assistance and to EOB, and along EOB  Supine to Sit: moderate assistance and of 2 persons  Sit to Supine: moderate assistance  Transfers:     Sit to Stand:  minimum assistance with straight cane  Gait: Patient amb 5ft alongside the bed then around the bed~12ft with CGA and use of SPC with minimal increased forward trunk flexion and L lateral lean, minimal increased knee fl, decreased stance time L, quick step R (limping) and increased lateral sway, slow kosta, short step length, LLE ER  Balance: sit~fair, stand/amb~fair-/poor+      AM-PAC 6 CLICK MOBILITY  Total Score:15       Treatment & Education:  Patient seen with OT in anticipation of need for 2 skilled therapists for safety with mobility and outcomes; pt educated on role of PT /POC; pt with tangential speech/rambling at times; increased VCs/TCs for bed mobility; increased time sitting at EOB  2/2 experiencing nausea; provided emotional support as pt was labilt at times during session; pt performed bed mobility as above with increased time; pt sit to stand with SPC x 2 trials as above with slow ascent and slow descent; adjusted cane to pt's height; pt amb using SPC in R hand alongside bed and around the bed with CGA; assisted with purewick placement; scooted along EOB multiple trials with SBA; pt educated on home safety and d/c recs.    Patient left right sidelying with all lines intact, call button in reach, bed alarm on, and nurse notified.    GOALS:   Multidisciplinary Problems       Physical Therapy Goals          Problem: Physical Therapy    Goal Priority Disciplines Outcome Goal Variances Interventions   Physical Therapy Goal     PT, PT/OT Ongoing, Progressing     Description: Goals to be met by: 1/15/2024     Patient will increase functional independence with mobility by performin. Supine to sit with Modified [place]  2. Sit to supine with Modified [place]  3. Rolling with Modified [place].  4. Sit to stand transfer with Modified [place]  5. Bed to chair transfer with Modified [place] using Single-point Cane   6. Gait  x 50 feet with Modified [place] using Single-point Cane .   7. Lower extremity exercise program x10 reps with independence                         History:     Past Medical History:   Diagnosis Date    Anemia     Asthma     Cellulitis of abdominal wall 2017    CKD (chronic kidney disease) stage 3, GFR 30-59 ml/min     Depression     Diabetes mellitus, type II     Diastolic dysfunction     Diverticulosis of intestine with bleeding 2016    E coli bacteremia 2018    E. coli pyelonephritis 2015    E. coli UTI 2017    Hematuria     Hernia, epigastric     Hypertension     Hypocalcemia     Nonalcoholic hepatosteatosis     Periumbilical hernia     Proteinuria        Past Surgical History:   Procedure Laterality Date    ARM  AMPUTATION AT SHOULDER Left 2000s     SECTION       SECTION, CLASSIC      CHOLECYSTECTOMY  age 15    CORONARY ANGIOGRAPHY N/A 2021    Procedure: ANGIOGRAM, CORONARY ARTERY;  Surgeon: Neelam Hicks MD;  Location: [facility] CATH LAB/EP;  Service: Cardiology;  Laterality: N/A;    INCISION AND DRAINAGE OF ABSCESS N/A 2020    Procedure: INCISION AND DRAINAGE, ABSCESS;  Surgeon: Rich Watson MD;  Location: [facility] OR;  Service: General;  Laterality: N/A;    LEFT HEART CATHETERIZATION Left 2021    Procedure: Left heart cath;  Surgeon: Neelam Hicks MD;  Location: [facility] CATH LAB/EP;  Service: Cardiology;  Laterality: Left;    LEFT HEART CATHETERIZATION N/A 2021    Procedure: Left heart cath;  Surgeon: Neelam Hicks MD;  Location: [facility] CATH LAB/EP;  Service: Cardiology;  Laterality: N/A;    TONSILLECTOMY, ADENOIDECTOMY         Time Tracking:     PT Received On: 12/15/23  PT Start Time: 924     PT Stop Time: 1003  PT Total Time (min): 39 min     Billable Minutes: Evaluation 8, Gait Training 8, Therapeutic Activity 23      12/15/2023

## 2023-12-15 NOTE — NURSING
Pt calling nurse station multiple times. Yelling out that she is in pain and that she needs something for gas pain.   MD notified of pt's request for medication for gas/heartburn.

## 2023-12-15 NOTE — NURSING
Pt requesting Xanax for anxiety. Medication administered per MAR. Midline still leaking. Vanc infusing to pt's PIV. Pt's , held levemir.  MD notified. Order received to dc midline.

## 2023-12-15 NOTE — PLAN OF CARE
VN reviewed discharge instructions with pt. Using teach back method.  AVS printed and handed to pt by bedside nurse.  Reviewed follow-up appointments, medications, diet, and importance of medication compliance.  Reviewed home care instructions, treatment plan, self-management, and when to seek medical attention.  Allowed time for questions.  All questions answered.  Patient verbalized complete understanding of discharge instructions and voices no concerns.     Discharge instructions complete.  Bedside delivery done.  Pt waiting on scheduled transport home. .  Bedside nurse notified.

## 2023-12-15 NOTE — PLAN OF CARE
Problem: Physical Therapy  Goal: Physical Therapy Goal  Description: Goals to be met by: 1/15/2024     Patient will increase functional independence with mobility by performin. Supine to sit with Modified [place]  2. Sit to supine with Modified [place]  3. Rolling with Modified [place].  4. Sit to stand transfer with Modified [place]  5. Bed to chair transfer with Modified [place] using Single-point Cane   6. Gait  x 50 feet with Modified [place] using Single-point Cane .   7. Lower extremity exercise program x10 reps with independence    Outcome: Ongoing, Progressing   Patient evaluated with OT; will benefit from cont PT services to maximize functional independence; recommend low intensity therapy and single point cane upon d/c.

## 2023-12-15 NOTE — PROGRESS NOTES
[facility]                   Pharmacy  Pharmacy Vancomycin/AG Sign-off    Therapy with vancomycin completed and/or consult discontinued by provider.  Pharmacy will sign off, please re-consult as needed. Thank you for allowing us to participate in this patient's care.     David Edge, PharmD    [phone]

## 2023-12-15 NOTE — PLAN OF CARE
[facility] sent HH orders via [facility]. Pt will be contacted by  agency to resume services. Pt confirmed home address. [facility] setup wheelchair transport for 3:00 pm. ETA pending. ETA 5:00 PM     XAVI faxed hh orders also to  [facility] [phone]. XAVI communicated with Allison with agency and made her aware pt will dc today.     SW made aware that we do not have any heavy duty canes in dep[place]. Pt made aware of stated above. Pt reports she will go and get her own cane.     Lizzy CHAVEZ(Dilip Lee) [phone] for a wound care nurse on Wednesdays and and NP (Melody [address])In home wound care clinic through [facility] on Mondays and Thursdays.      [facility] will continue to follow pt throughout her transitions of care and assist with any dc needs.     [facility]I requested PCP follow up.     Cleared from CM . Bedside Nurse and VN notified.     12/15/23 3981   Final Note   Assessment Type Final Discharge Note   Anticipated Discharge Disposition [facility] Resources/Appts/Education Provided Appointments scheduled and added to AVS   Post-Acute Status   Discharge Delays None known at this time

## 2023-12-15 NOTE — PLAN OF CARE
VIRTUAL NURSE:  Labs, notes, orders, and careplan reviewed. VN to be available as needed.    Problem: Adult Inpatient Plan of Care  Goal: Plan of Care Review  Outcome: Ongoing, Progressing     Problem: Adult Inpatient Plan of Care  Goal: Patient-Specific Goal (Individualized)  Outcome: Ongoing, Progressing     Problem: Adult Inpatient Plan of Care  Goal: Absence of Hospital-Acquired Illness or Injury  Outcome: Ongoing, Progressing     Problem: Adult Inpatient Plan of Care  Goal: Optimal Comfort and Wellbeing  Outcome: Ongoing, Progressing

## 2023-12-15 NOTE — CONSULTS
Patient states she was diagnosed with Diabetes in 2005.  States she is on Lantus and Lispro at home.  She does have a glucometer but needs a new one as this one she has had since 2005.  She states she checks her sugars three times per day.  States she does not follow a diabetic diet.  Verbal/written education (Diabetes Management Guide) done with patient.  Education included; what is diabetes, what an A1C level is, A1C goal of <7.0%, current A1C is 11.1%, glucose monitoring carbohydrate counting diet which includes how to read a nutrition label, phone apps to see how many carbs are in something, etc., portion size, counting 45-60 grams of carbs per meal and 15-30 grams of carbs for snacks (1-3 per day if wanted) including physical activity 5 days/week, long-term complications of uncontrolled diabetes, follow up with doctor for diabetes management.  Recommend follow up with PCP and medications delivered to bedside prior to discharge.  Phone number provided to patient if he has any questions.

## 2023-12-15 NOTE — NURSING
"Pt yelling out that she needs to be cleaned up because she had another BM and that she needs to be repositioned because she is in pain. This RN and PCT at beside, cleaning up pt, while pt on cell phone yelling, "I dont know why its takin them so long to come clean me up, I keep calling. I don't know why they are sitting on their asses." Pt continuously rambling.   While being cleaned up, pt is ornery and called this RN "you little bitch." Pt instructed not to use that sort of language.   Pt cleansed with cleansing wipes and barrier cream applied, purwick replaced.  Bed in lowest position, locked, and bed alarms on. Call bell within pt's reach.   "

## 2023-12-15 NOTE — PLAN OF CARE
Recommendation:  1. Encourage intake at meals as tolerated.   2. Monitor need for supplements.   3. Add Cardiac restrictions to diet.   4. Monitor weight/labs.   5. RD to follow to monitor po intake    Goals:  Pt will tolerate diet with at least 50-75% intake at meals by RD follow up  Nutrition Goal Status: new

## 2023-12-15 NOTE — PLAN OF CARE
[facility]      HOME HEALTH ORDERS  FACE TO FACE ENCOUNTER    Patient Name: Lele Dias  YOB: 1980    PCP: Sammi Edge MD (Cindy)   PCP Address: [address] [place] [address] / [place] [address]  PCP Phone Number: [phone]  PCP Fax: [phone]    Encounter Date: 12/12/23    Admit to Home Health    Diagnoses:  Active Hospital Problems    Diagnosis  POA    Chronic pain [G89.29]  Yes    Gross hematuria [R31.0]  Yes    GERD (gastroesophageal reflux disease) [K21.9]  Yes    Depression [F32.A]  Yes    Hyperlipidemia [E78.5]  Yes    Cellulitis [L03.90]  Yes     Chronic    DM (diabetes mellitus) type 2, uncontrolled, with ketoacidosis [E11.10]  Yes    Morbid obesity with BMI of 70 and over, adult [E66.01, Z68.45]  Not Applicable    Anemia [D64.9]  Yes    Tobacco use disorder [F17.200]  Yes     Chronic    Asthma [J45.909]  Yes     Chronic    Benign essential hypertension [I10]  Yes     Chronic    Chronic combined systolic and diastolic congestive heart failure [I50.42]  Yes    UTI (urinary tract infection) [N39.0]  Yes     Dx updated per 2019 IMO Load        Resolved Hospital Problems    Diagnosis Date Resolved POA    *BRIDGET (acute kidney injury) [N17.9] 12/15/2023 Yes     BUN 21, Cr 1.8         Follow Up Appointments:  No future appointments.    Allergies:  Review of patient's allergies indicates:   Allergen Reactions    Celexa [citalopram] Nausea And Vomiting       Medications: Review discharge medications with patient and family and provide education.    Current Facility-Administered Medications   Medication Dose Route Frequency Provider Last Rate Last Admin    0.9%  NaCl infusion   Intravenous Continuous Shira Salcedo MD 2 mL/hr at 12/14/23 2130 New Bag at 12/14/23 2130    acetaminophen tablet 650 mg  650 mg Oral Q4H PRN Marianne Renee, NP   650 mg at 12/14/23 1705    allopurinoL tablet 300 mg  300 mg Oral Daily Marianne Renee, NP   300 mg at 12/15/23 0904    ALPRAZolam tablet  0.5 mg  0.5 mg Oral TID PRN Marianne Renee, NP   0.5 mg at 12/14/23 2306    aspirin EC tablet 81 mg  81 mg Oral Daily Marianne Renee NP   81 mg at 12/15/23 0904    atorvastatin tablet 80 mg  80 mg Oral Daily Marianne Renee, NP   80 mg at 12/15/23 0904    buprenorphine-naloxone 2-0.5 mg SL film 4 Film  4 Film Sublingual TID Shira Salcedo MD   4 Film at 12/15/23 0905    calcium carbonate 200 mg calcium (500 mg) chewable tablet 500 mg  500 mg Oral TID PRN Jeff De Oliveira MD   500 mg at 12/15/23 1155    carvediloL tablet 3.125 mg  3.125 mg Oral BID Marianne Renee, KAMALJIT   3.125 mg at 12/15/23 0917    ciprofloxacin HCl tablet 500 mg  500 mg Oral Q12H Shira Salcedo MD   500 mg at 12/15/23 1155    clopidogreL tablet 75 mg  75 mg Oral Daily Marianne Renee, KAMALJIT   75 mg at 12/15/23 0904    dextrose 10% bolus 125 mL 125 mL  12.5 g Intravenous PRN Dwight Mansfield MD        dextrose 10% bolus 250 mL 250 mL  25 g Intravenous PRN Dwight Mansfield MD        doxycycline tablet 100 mg  100 mg Oral Q12H Shira Salcedo MD   100 mg at 12/15/23 0917    enoxaparin injection 60 mg  60 mg Subcutaneous Q12H (prophylaxis, 0900/2100) Shira Salcedo MD   60 mg at 12/15/23 0904    EScitalopram oxalate tablet 10 mg  10 mg Oral Daily Shira Salcedo MD   10 mg at 12/15/23 0904    glucagon (human recombinant) injection 1 mg  1 mg Intramuscular PRN Dwight Mansfield MD        glucose chewable tablet 16 g  16 g Oral PRN Dwight Mansfield MD        glucose chewable tablet 24 g  24 g Oral ROSELIAN Dwight Mansfield MD        hydrALAZINE injection 10 mg  10 mg Intravenous Q8H PRN Herman Villarreal MD        insulin aspart U-100 pen 0-15 Units  0-15 Units Subcutaneous QID (AC + HS) PRN Dwight Mansfield MD   3 Units at 12/14/23 1705    insulin aspart U-100 pen 14 Units  14 Units Subcutaneous TIDWM Shira Salcedo MD   14 Units at 12/15/23 1305    insulin detemir U-100 (Levemir) pen 48 Units  48 Units Subcutaneous BID Shira Salcedo,  MD   48 Units at 12/15/23 0904    lisinopriL tablet 10 mg  10 mg Oral Daily Shira Salcedo MD   10 mg at 12/15/23 0904    melatonin tablet 6 mg  6 mg Oral Nightly PRN Marianne Renee NP   6 mg at 12/14/23 2035    methyl salicylate-menthol 15-10% cream   Topical (Top) Q8H PRN Shira Salcedo MD   Given at 12/15/23 0904    miconazole nitrate 2% ointment   Topical (Top) BID Shira Salcedo MD   Given at 12/15/23 0919    mupirocin 2 % ointment   Nasal BID Shira Salcedo MD   Given at 12/15/23 0904    naloxone 0.4 mg/mL injection 0.02 mg  0.02 mg Intravenous PRN Marianne Renee NP        nicotine 21 mg/24 hr 1 patch  1 patch Transdermal Daily Shira Salcedo MD   1 patch at 12/15/23 0904    ondansetron injection 4 mg  4 mg Intravenous Q8H PRN Marianne Renee, KAMALJIT   4 mg at 12/14/23 2130    pantoprazole EC tablet 40 mg  40 mg Oral Daily Jeff De Oliveira MD   40 mg at 12/15/23 0905    simethicone chewable tablet 125 mg  125 mg Oral Q6H PRN Jeff De Oliveira MD        sodium chloride 0.9% flush 10 mL  10 mL Intravenous Q12H PRN Marianne Renee, KAMALJIT        sodium chloride 0.9% flush 10 mL  10 mL Intravenous PRN Herman Villarreal MD         Current Discharge Medication List        START taking these medications    Details   amoxicillin-clavulanate 875-125mg (AUGMENTIN) 875-125 mg per tablet Take 1 tablet by mouth every 12 (twelve) hours. for 7 days  Qty: 14 tablet, Refills: 0      blood-glucose meter kit To check BG 3 times daily, to use with insurance preferred meter  Qty: 1 each, Refills: 0      ciprofloxacin HCl (CIPRO) 500 MG tablet Take 1 tablet (500 mg total) by mouth every 12 (twelve) hours. for 7 days  Qty: 14 tablet, Refills: 0      doxycycline (VIBRAMYCIN) 100 MG Cap Take 1 capsule (100 mg total) by mouth 2 (two) times daily. for 7 days  Qty: 14 capsule, Refills: 0           CONTINUE these medications which have CHANGED    Details   nicotine (NICODERM CQ) 21 mg/24 hr Place 1 patch onto the skin once  daily.  Qty: 30 patch, Refills: 1      tiZANidine (ZANAFLEX) 4 MG tablet Take 1 tablet (4 mg total) by mouth every 8 (eight) hours as needed. Do not take while on cirpofloxacin antibiotics. Okay to take after completing course           CONTINUE these medications which have NOT CHANGED    Details   acetaminophen (TYLENOL) 500 MG tablet Take 500 mg by mouth every 6 (six) hours as needed for Pain.      albuterol (PROVENTIL/VENTOLIN HFA) 90 mcg/actuation inhaler Inhale 1-2 puffs into the lungs every 4 to 6 hours as needed.      allopurinoL (ZYLOPRIM) 300 MG tablet Take 300 mg by mouth once daily.      ALPRAZolam (XANAX) 0.5 MG tablet Take 0.5 mg by mouth 3 (three) times daily as needed.      aspirin (ECOTRIN) 81 MG EC tablet Take 1 tablet (81 mg total) by mouth once daily.  Qty: 90 tablet, Refills: 3    Associated Diagnoses: Coronary artery disease involving native coronary artery of native heart without angina pectoris      azelastine (ASTELIN) 137 mcg (0.1 %) nasal spray 2 sprays by Nasal route 2 (two) times daily as needed.      BREZTRI AEROSPHERE 160-9-4.8 mcg/actuation HFAA Take 2 puffs by mouth 2 (two) times daily.      carvediloL (COREG) 3.125 MG tablet Take 3.125 mg by mouth 2 (two) times daily.      cetirizine (ZYRTEC) 10 MG tablet Take 10 mg by mouth daily as needed.      cholecalciferol, vitamin D3, 1,250 mcg (50,000 unit) capsule Take 50,000 Units by mouth every Wednesday.      clopidogreL (PLAVIX) 75 mg tablet Take 1 tablet (75 mg total) by mouth once daily.  Qty: 30 tablet, Refills: 3    Associated Diagnoses: Coronary artery disease involving native coronary artery of native heart without angina pectoris      EScitalopram oxalate (LEXAPRO) 10 MG tablet Take 10 mg by mouth daily as needed.      fluticasone propionate (FLONASE) 50 mcg/actuation nasal spray 1 spray by Each Nostril route daily as needed.      furosemide (LASIX) 40 MG tablet Take 40 mg by mouth 2 (two) times daily as needed.      HUMALOG MIX  "75-25 KWIKPEN 100 unit/mL (75-25) InPn Inject 80 Units into the skin 3 (three) times daily.      insulin glargine (LANTUS) 100 unit/mL injection Inject 90 Units into the skin every morning.      lisinopriL 10 MG tablet Take 5 mg by mouth once daily.      multivit-min-iron fum-folic ac (ONE-A-DAY WOMEN'S COMPLETE) 18 mg iron- 400 mcg Tab Take 1 tablet by mouth once daily.      omeprazole (PRILOSEC) 20 MG capsule Take 20 mg by mouth daily as needed.      pregabalin (LYRICA) 300 MG Cap Take 300 mg by mouth once daily.      promethazine (PHENERGAN) 6.25 mg/5 mL syrup Take 5-10 mLs by mouth every 8 (eight) hours as needed.      promethazine-dextromethorphan (PROMETHAZINE-DM) 6.25-15 mg/5 mL Syrp SMARTSI Teaspoon By Mouth 3 Times Daily PRN      SUBOXONE 8-2 mg Place 1 Film under the tongue 3 (three) times daily.      atorvastatin (LIPITOR) 80 MG tablet Take 1 tablet (80 mg total) by mouth once daily.  Qty: 90 tablet, Refills: 1    Associated Diagnoses: Hyperlipidemia associated with type 2 diabetes mellitus      BD ULTRA-FINE MINI PEN NEEDLE 31 gauge x 3/16" Ndle Inject into the skin 5 (five) times daily.      LINZESS 72 mcg Cap capsule Take 72 mcg by mouth daily as needed.      !! medroxyPROGESTERone (DEPO-PROVERA) 150 mg/mL injection Inject 150 mg into the muscle every 3 (three) months.      !! medroxyPROGESTERone (DEPO-PROVERA) 150 mg/mL injection Inject 150 mg into the muscle every 3 (three) months.      TRUE METRIX GLUCOSE TEST STRIP Strp 3 (three) times daily.       !! - Potential duplicate medications found. Please discuss with provider.            I have seen and examined this patient within the last 30 days. My clinical findings that support the need for the home health skilled services and home bound status are the following:no   Weakness/numbness causing balance and gait disturbance due to Infection and Weakness/Debility making it taxing to leave home.  Requiring assistive device to leave home due to " unsteady gait caused by  Infection and Weakness/Debility.     Diet:   cardiac diet and diabetic diet 2000 calorie    Referrals/ Consults  Physical Therapy to evaluate and treat. Evaluate for home safety and equipment needs; Establish/upgrade home exercise program. Perform / instruct on therapeutic exercises, gait training, transfer training, and Range of Motion.  Occupational Therapy to evaluate and treat. Evaluate home environment for safety and equipment needs. Perform/Instruct on transfers, ADL training, ROM, and therapeutic exercises.    Activities:   activity as tolerated    Nursing:   Agency to admit patient within 24 hours of hospital discharge unless specified on physician order or at patient request    SN to complete comprehensive assessment including routine vital signs. Instruct on disease process and s/s of complications to report to MD. Review/verify medication list sent home with the patient at time of discharge  and instruct patient/caregiver as needed. Frequency may be adjusted depending on start of care date.     Skilled nurse to perform up to 3 visits PRN for symptoms related to diagnosis    Notify MD if SBP > 160 or < 90; DBP > 90 or < 50; HR > 120 or < 50; Temp > 101; O2 < 88%    Ok to schedule additional visits based on staff availability and patient request on consecutive days within the home health episode.    When multiple disciplines ordered:    Start of Care occurs on Sunday - Wednesday schedule remaining discipline evaluations as ordered on separate consecutive days following the start of care.    Thursday SOC -schedule subsequent evaluations Friday and Monday the following week.     Friday - Saturday SOC - schedule subsequent discipline evaluations on consecutive days starting Monday of the following week.    For all post-discharge communication and subsequent orders please contact patient's primary care physician.     Miscellaneous   Diabetic Care:   SN to perform and educate Diabetic  management with blood glucose monitoring:, Fingerstick blood sugar AC and HS, and Report CBG < 60 or > 350 to physician.    Home Health Aide:  Nursing Three times weekly, Physical Therapy Twice weekly, and Occupational Therapy Twice weekly    Wound Care Orders  COLOPLAST CLEAR ANTIFUNGAL (green top)- Nurse to cleanse pannus with cleansing cloths and dry thoroughly- apply ointment to pannus and wounds BID.     Nursing to cleanse bilateral buttocks with cleansing cloths and apply thin layer of Triad ointment BID and prn cleansing of incontinent episode. Do not apply cover dressing.       I certify that this patient is confined to her home and needs intermittent skilled nursing care, physical therapy, and occupational therapy.

## 2023-12-15 NOTE — CONSULTS
[facility] - Med Surg  Adult Nutrition  Consult Note    SUMMARY     Recommendations    Recommendation:  1. Encourage intake at meals as tolerated.   2. Monitor need for supplements.   3. Add Cardiac restrictions to diet.   4. Monitor weight/labs.   5. RD to follow to monitor po intake    Goals:  Pt will tolerate diet with at least 50-75% intake at meals by RD follow up  Nutrition Goal Status: new  Communication of RD Recs: reviewed with RN    Assessment and Plan  Nutrition Problem  Obesity    Related to (etiology):   Lack of physical activity, increased energy intake    Signs and Symptoms (as evidenced by):   BMI 57    Interventions:  Collaboration with other providers    Nutrition Diagnosis Status:   New      Malnutrition Assessment  Weight Loss (Malnutrition):  (11% x 2 months)   Orbital Region (Subcutaneous Fat Loss): well nourished  Upper Arm Region (Subcutaneous Fat Loss): well nourished  Thoracic and Lumbar Region: well nourished   [place] Region (Muscle Loss): well nourished  Clavicle Bone Region (Muscle Loss): well nourished  Clavicle and Acromion Bone Region (Muscle Loss): well nourished  Scapular Bone Region (Muscle Loss): well nourished  Dorsal Hand (Muscle Loss): well nourished  Patellar Region (Muscle Loss): well nourished  Anterior Thigh Region (Muscle Loss): well nourished  Posterior Calf Region (Muscle Loss): well nourished       Reason for Assessment  Reason For Assessment: consult (wounds)  Diagnosis:  (BRIDGET)  Relevant Medical History: HTN, DM, hernia, asthma, CKD, diverticulosis, cholecystectomy, L heart cath  General Information Comments: pt on 2000 ADA diet with poor-fair intake at meals. Pt was asleep at visit-unable to assess NFPE but appears nourished per BMI. Alan 15-abdomen, R buttocks wounds. Noted 41lb weight loss x 2 months?  Nutrition Discharge Planning: pt to d/c on ADA Cardiac diet    Nutrition Risk Screen  Nutrition Risk Screen: large or nonhealing wound, burn or pressure  "injury    Nutrition/Diet History  Food Preferences: no Advent or cultural food prefs identified  Factors Affecting Nutritional Intake: None identified at this time    Anthropometrics  Temp: 98.3 °F (36.8 °C)  Height Method: Stated  Height: 5' 2" (157.5 cm)  Height (inches): 62 in  Weight Method: Bed Scale  Weight: (!) 143.3 kg (315 lb 14.7 oz)  Weight (lb): (!) 315.92 lb  Ideal Body Weight (IBW), Female: 110 lb  % Ideal Body Weight, Female (lb): 287.2 %  BMI (Calculated): 57.8  BMI Grade: greater than 40 - morbid obesity  Usual Body Weight (UBW), kg: (!) 161.5 kg (10/6)  % Usual Body Weight: 88.92  % Weight Change From Usual Weight: -11.27 %    Lab/Procedures/Meds  Pertinent Labs Reviewed: reviewed  Pertinent Labs Comments: Glu 339H, Ca 8.5L, Alb 2.1L  Pertinent Medications Reviewed: reviewed  Pertinent Medications Comments: aspirin, insulin, vancomycin    Estimated/Assessed Needs  Weight Used For Calorie Calculations: 50 kg (110 lb 3.7 oz) (IBW)  Energy Calorie Requirements (kcal): 1750 (35 kcal/kg IBW)  Energy Need Method: Kcal/kg  Protein Requirements: 65g (1.3g/kg IBW)  Weight Used For Protein Calculations: 50 kg (110 lb 3.7 oz) (IBW)  Estimated Fluid Requirement Method: RDA Method  RDA Method (mL): 1750  CHO Requirement: 200g    Nutrition Prescription Ordered  Current Diet Order: 2000 ADA    Evaluation of Received Nutrient/Fluid Intake  I/O: 4383/1375  Energy Calories Required: not meeting needs  Protein Required: not meeting needs  Fluid Required: not meeting needs  Comments: LBM 12/12  % Intake of Estimated Energy Needs: 25 - 50 %  % Meal Intake: 25 - 50 %    Nutrition Risk  Level of Risk/Frequency of Follow-up:  (2xweekly)     Monitor and Evaluation  Food and Nutrient Intake: food and beverage intake  Food and Nutrient Adminstration: diet order  Physical Activity and Function: nutrition-related ADLs and IADLs  Anthropometric Measurements: weight  Biochemical Data, Medical Tests and Procedures: electrolyte " and renal panel  Nutrition-Focused Physical Findings: overall appearance       Nutrition Follow-Up  RD Follow-up?: Yes

## 2023-12-15 NOTE — PLAN OF CARE
Pt transferred from ICU. No c/o pain. Tele monitor in place.   /73 T98.1 P 85 O2 96 RA. R 18. Safety maintained. Call light within reach.

## 2023-12-16 NOTE — NURSING
Received sitting in chair at the bedside no acute distress.  Discharged off the unit at 2015 to personal ride, patient escorted off unit with staff to personal ride.

## 2023-12-17 NOTE — HOSPITAL COURSE
"43-year-old female with PMH of morbid obesity, CKD stage 3, type 2 diabetes mellitus, chronic diastolic dysfunction, asthma presented to the ER for fatigue, diarrhea and excessive somnolence.  Found to have abdominal wall cellulitis (panniculitis with purulent discharge) and Klebsiella aerogenous UTI.  Blood cultures remain negative.  Acute kidney injury and acute encephalopathy on admission improved with antibiotics and supportive care.  Patient's diarrhea resolved without any intervention.  C diff was ordered on admission but given no further diarrhea, sample was not sent.  Patient was seen by Physical therapy and recommended home health.  Vital signs remained stable.  Discharged on Augmentin/doxy for cellulitis and ciprofloxacin for UTI to complete 10 days of therapy.    BP (!) 114/53   Pulse 94   Temp 97.2 °F (36.2 °C) (Oral)   Resp 18   Ht 5' 2" (1.575 m)   Wt (!) 143.3 kg (315 lb 14.7 oz)   SpO2 (!) 94%   Breastfeeding No   BMI 57.78 kg/m²     Physical Exam  Vitals and nursing note reviewed.   Constitutional:       General: She is not in acute distress.     Appearance: She is obese.   Cardiovascular:      Rate and Rhythm: Normal rate and regular rhythm.      Heart sounds: Normal heart sounds. No murmur heard.  Pulmonary:      Effort: Pulmonary effort is normal. No respiratory distress.   Abdominal:      Palpations: Abdomen is soft.      Tenderness: There is no abdominal tenderness.   Musculoskeletal:      Right lower leg: No edema.      Left lower leg: No edema.   Skin:     General: Skin is warm and dry.      Comments: Panniculus with wounds exposing the dermis with copious discharge    Neurological:      General: No focal deficit present.      Mental Status: She is alert and oriented to person, place, and time.   Psychiatric:         Mood and Affect: Mood normal.  "

## 2023-12-17 NOTE — DISCHARGE SUMMARY
[facility] Medicine  Discharge Summary      Patient Name: Lele Dias  MRN: [id number]  GELACIO: [id number]  Patient Class: IP- Inpatient  Admission Date: 12/12/2023  Hospital Length of Stay: 2 days  Discharge Date and Time: 12/15/2023  8:17 PM  Attending Physician: No att. providers found   Discharging Provider: Shira Salcedo MD  Primary Care Provider: Sammi Edge MD (Cindy)    Primary Care Team: Networked reference to record PCT     HPI:   Lele Dias is a 42yoF with Anemia, Asthma, Cellulitis of abdominal wall (12/18/2017), CKD (chronic kidney disease) stage 3, GFR 30-59 ml/min, Depression, Diabetes mellitus, type II, Diastolic dysfunction, Diverticulosis of intestine with bleeding (9/14/2016), E coli bacteremia (4/21/2018), E. coli pyelonephritis (6/2/2015), E. coli UTI (12/18/2017), Hematuria, Hernia, epigastric, Hypertension, Hypocalcemia, Nonalcoholic hepatosteatosis, Periumbilical hernia, and Proteinuria. She presented to the ED at [facility] via EMS with cc diarrhea and weakness that started a week ago. She is also on Keflex for leg cellulitis. She denies any fever, vomiting, CP, SOB, or abdominal pain.    ED workup revealed Na 133, BUN 21, glucose 918, Cr 1.8, Ca 8.6, Alb 2.6, eGFR 35, Lactic Acid 2.6, Mg 1.4, Trop I 0.259, UA WBC 35, CT abd/pelvis unremarkable. CXR with no detrimental change, acute intrathoracic process seen. Admitting to hospital medicine for management and treatment of BRIDGET, hyperglycemia and UTI.     * No surgery found *      Hospital Course:   43-year-old female with PMH of morbid obesity, CKD stage 3, type 2 diabetes mellitus, chronic diastolic dysfunction, asthma presented to the ER for fatigue, diarrhea and excessive somnolence.  Found to have abdominal wall cellulitis (panniculitis with purulent discharge) and Klebsiella aerogenous UTI.  Blood cultures remain negative.  Acute kidney injury and acute encephalopathy on admission improved with antibiotics  "and supportive care.  Patient's diarrhea resolved without any intervention.  C diff was ordered on admission but given no further diarrhea, sample was not sent.  Patient was seen by Physical therapy and recommended home health.  Vital signs remained stable.  Discharged on Augmentin/doxy for cellulitis and ciprofloxacin for UTI to complete 10 days of therapy.    BP (!) 114/53   Pulse 94   Temp 97.2 °F (36.2 °C) (Oral)   Resp 18   Ht 5' 2" (1.575 m)   Wt (!) 143.3 kg (315 lb 14.7 oz)   SpO2 (!) 94%   Breastfeeding No   BMI 57.78 kg/m²     Physical Exam  Vitals and nursing note reviewed.   Constitutional:       General: She is not in acute distress.     Appearance: She is obese.   Cardiovascular:      Rate and Rhythm: Normal rate and regular rhythm.      Heart sounds: Normal heart sounds. No murmur heard.  Pulmonary:      Effort: Pulmonary effort is normal. No respiratory distress.   Abdominal:      Palpations: Abdomen is soft.      Tenderness: There is no abdominal tenderness.   Musculoskeletal:      Right lower leg: No edema.      Left lower leg: No edema.   Skin:     General: Skin is warm and dry.      Comments: Panniculus with wounds exposing the dermis with copious discharge    Neurological:      General: No focal deficit present.      Mental Status: She is alert and oriented to person, place, and time.   Psychiatric:         Mood and Affect: Mood normal.     Goals of Care Treatment Preferences:  Code Status: Full Code      Consults:   Consults (From admission, onward)          Status Ordering Provider     Inpatient consult to Diabetes educator  Once        Provider:  (Not yet assigned)    Completed DANIEL FERGUSON     IP consult to case management  Once        Provider:  (Not yet assigned)    Completed DANIEL FERGUSON     Inpatient consult to Registered Dietitian/Nutritionist  Once        Provider:  (Not yet assigned)    Completed DANIEL FERGUSON     Inpatient consult to Urology  Once        Provider:  " "(Not yet assigned)    Completed CHENG DURHAM     Inpatient consult to Anesthesiology  Once        Provider:  (Not yet assigned)    Completed DANIEL FERGUSON              Final Active Diagnoses:    Diagnosis Date Noted POA    Chronic pain [G89.29] 12/14/2023 Yes    Gross hematuria [R31.0] 12/13/2023 Yes    GERD (gastroesophageal reflux disease) [K21.9] 12/12/2023 Yes    Depression [F32.A] 12/12/2023 Yes    Hyperlipidemia [E78.5] 12/12/2023 Yes    Cellulitis [L03.90] 03/05/2023 Yes     Chronic    DM (diabetes mellitus) type 2, uncontrolled, with ketoacidosis [E11.10]  Yes    Morbid obesity with BMI of 70 and over, adult [E66.01, Z68.45]  Not Applicable    Anemia [D64.9] 04/22/2018 Yes    Tobacco use disorder [F17.200] 04/20/2018 Yes     Chronic    Asthma [J45.909] 07/15/2014 Yes     Chronic    Benign essential hypertension [I10] 07/15/2014 Yes     Chronic    Chronic combined systolic and diastolic congestive heart failure [I50.42] 07/15/2014 Yes    UTI (urinary tract infection) [N39.0] 07/15/2014 Yes      Problems Resolved During this Admission:    Diagnosis Date Noted Date Resolved POA    PRINCIPAL PROBLEM:  BRIDGET (acute kidney injury) [N17.9] 06/02/2015 12/15/2023 Yes       Discharged Condition: stable    Disposition: Home or Self Care    Follow Up:   Follow-up Information       Home Health Agency Follow up.    Why: Home Health Agency will contact patient to resume services.             [facility] Follow up.    Why: Please review [facility] for future follow up appointments.             [facility] Follow up.    Why: [facility] will contact patient of future follow up appointments.                         Patient Instructions:      CANE FOR HOME USE     Order Specific Question Answer Comments   Type of Cane: Straight    Height: 5' 2" (1.575 m)    Weight: 143.3 kg (315 lb 14.7 oz)    Does patient have medical equipment at home? bedside commode    Does patient have medical equipment at home? hospital " "bed    Does patient have medical equipment at home? cane, straight    Length of need (1-99 months): 99    Please check all that apply: Patient's condition impairs ambulation.      Ambulatory referral/consult to Smoking Cessation Program   Standing Status: Future   Referral Priority: Routine Referral Type: Consultation   Referral Reason: Specialty Services Required   Requested Specialty: CTTS   Number of Visits Requested: 1     Ambulatory referral/consult to Family Practice   Standing Status: Future   Referral Priority: Routine Referral Type: Consultation   Referral Reason: Specialty Services Required   Requested Specialty: Family Medicine   Number of Visits Requested: 1       Significant Diagnostic Studies: Labs: BMP: No results for input(s): "GLU", "NA", "K", "CL", "CO2", "BUN", "CREATININE", "CALCIUM", "MG" in the last 48 hours. and CBC No results for input(s): "WBC", "HGB", "HCT", "PLT" in the last 48 hours.    Pending Diagnostic Studies:       None           Medications:  Reconciled Home Medications:      Medication List        START taking these medications      amoxicillin-clavulanate 875-125mg 875-125 mg per tablet  Commonly known as: AUGMENTIN  Take 1 tablet by mouth every 12 (twelve) hours. for 7 days     ciprofloxacin HCl 500 MG tablet  Commonly known as: CIPRO  Take 1 tablet (500 mg total) by mouth every 12 (twelve) hours. for 7 days     doxycycline 100 MG Cap  Commonly known as: VIBRAMYCIN  Take 1 capsule (100 mg total) by mouth 2 (two) times daily. for 7 days     TRUE METRIX GLUCOSE METER Misc  Generic drug: blood-glucose meter  use as directed            CHANGE how you take these medications      nicotine 21 mg/24 hr  Commonly known as: NICODERM CQ  Place 1 patch onto the skin once daily.  What changed:   when to take this  reasons to take this     tiZANidine 4 MG tablet  Commonly known as: ZANAFLEX  Take 1 tablet (4 mg total) by mouth every 8 (eight) hours as needed. Do not take while on cirpofloxacin " "antibiotics. Okay to take after completing course  What changed: additional instructions            CONTINUE taking these medications      acetaminophen 500 MG tablet  Commonly known as: TYLENOL  Take 500 mg by mouth every 6 (six) hours as needed for Pain.     albuterol 90 mcg/actuation inhaler  Commonly known as: PROVENTIL/VENTOLIN HFA  Inhale 1-2 puffs into the lungs every 4 to 6 hours as needed.     allopurinoL 300 MG tablet  Commonly known as: ZYLOPRIM  Take 300 mg by mouth once daily.     ALPRAZolam 0.5 MG tablet  Commonly known as: XANAX  Take 0.5 mg by mouth 3 (three) times daily as needed.     aspirin 81 MG EC tablet  Commonly known as: ECOTRIN  Take 1 tablet (81 mg total) by mouth once daily.     atorvastatin 80 MG tablet  Commonly known as: LIPITOR  Take 1 tablet (80 mg total) by mouth once daily.     azelastine 137 mcg (0.1 %) nasal spray  Commonly known as: ASTELIN  2 sprays by Nasal route 2 (two) times daily as needed.     BD ULTRA-FINE MINI PEN NEEDLE 31 gauge x 3/16" Ndle  Generic drug: pen needle, diabetic  Inject into the skin 5 (five) times daily.     BREZTRI AEROSPHERE 160-9-4.8 mcg/actuation Hfaa  Generic drug: budesonide-glycopyr-formoterol  Take 2 puffs by mouth 2 (two) times daily.     carvediloL 3.125 MG tablet  Commonly known as: COREG  Take 3.125 mg by mouth 2 (two) times daily.     cetirizine 10 MG tablet  Commonly known as: ZYRTEC  Take 10 mg by mouth daily as needed.     cholecalciferol (vitamin D3) 1,250 mcg (50,000 unit) capsule  Take 50,000 Units by mouth every Wednesday.     clopidogreL 75 mg tablet  Commonly known as: PLAVIX  Take 1 tablet (75 mg total) by mouth once daily.     EScitalopram oxalate 10 MG tablet  Commonly known as: LEXAPRO  Take 10 mg by mouth daily as needed.     fluticasone propionate 50 mcg/actuation nasal spray  Commonly known as: FLONASE  1 spray by Each Nostril route daily as needed.     furosemide 40 MG tablet  Commonly known as: LASIX  Take 40 mg by mouth 2 " (two) times daily as needed.     HumaLOG Mix 75-25 KwikPen 100 unit/mL (75-25) Inpn  Generic drug: insulin lispro protamin-lispro  Inject 80 Units into the skin 3 (three) times daily.     insulin glargine 100 unit/mL injection  Commonly known as: Lantus  Inject 90 Units into the skin every morning.     LINZESS 72 mcg Cap capsule  Generic drug: linaCLOtide  Take 72 mcg by mouth daily as needed.     lisinopriL 10 MG tablet  Take 5 mg by mouth once daily.     * medroxyPROGESTERone 150 mg/mL injection  Commonly known as: DEPO-PROVERA  Inject 150 mg into the muscle every 3 (three) months.     * medroxyPROGESTERone 150 mg/mL injection  Commonly known as: DEPO-PROVERA  Inject 150 mg into the muscle every 3 (three) months.     omeprazole 20 MG capsule  Commonly known as: PRILOSEC  Take 20 mg by mouth daily as needed.     ONE-A-DAY WOMEN'S COMPLETE 18 mg iron- 400 mcg Tab  Generic drug: multivit-min-iron fum-folic ac  Take 1 tablet by mouth once daily.     pregabalin 300 MG Cap  Commonly known as: LYRICA  Take 300 mg by mouth once daily.     promethazine 6.25 mg/5 mL syrup  Commonly known as: PHENERGAN  Take 5-10 mLs by mouth every 8 (eight) hours as needed.     promethazine-dextromethorphan 6.25-15 mg/5 mL Syrp  Commonly known as: PROMETHAZINE-DM  SMARTSI Teaspoon By Mouth 3 Times Daily PRN     SUBOXONE 8-2 mg  Generic drug: buprenorphine-naloxone 8-2 mg  Place 1 Film under the tongue 3 (three) times daily.           * This list has 2 medication(s) that are the same as other medications prescribed for you. Read the directions carefully, and ask your doctor or other care provider to review them with you.                ASK your doctor about these medications      * TRUE METRIX GLUCOSE TEST STRIP Strp  Generic drug: blood sugar diagnostic  3 (three) times daily.  Ask about: Which instructions should I use?     * TRUE METRIX GLUCOSE TEST STRIP Strp  Generic drug: blood sugar diagnostic  use as directed to check blood  glucose up to three times daily  Ask about: Which instructions should I use?     TRUEPLUS LANCETS 30 gauge Misc  Generic drug: lancets  use as directed to test blood glucose up to three times daily           * This list has 2 medication(s) that are the same as other medications prescribed for you. Read the directions carefully, and ask your doctor or other care provider to review them with you.                  Indwelling Lines/Drains at time of discharge:   Lines/Drains/Airways       None                   Time spent on the discharge of patient: 38 minutes         Shira Salcedo MD  Department of Hospital Medicine  [facility]

## 2023-12-18 PROBLEM — K35.30 ACUTE APPENDICITIS WITH LOCALIZED PERITONITIS, WITHOUT PERFORATION, ABSCESS, OR GANGRENE: Status: ACTIVE | Noted: 2023-01-01

## 2023-12-18 NOTE — ED NOTES
Lab at bedside attempting to get cultures and patient is remaining on her phone face time with family.

## 2023-12-18 NOTE — ED NOTES
Ivon charge nurse started IV access with US guidance. Patient is a difficult stick with one arm available

## 2023-12-18 NOTE — Clinical Note
Diagnosis: BRIDGET (acute kidney injury) [[id number]]   Future Attending Provider: DANIEL FERGUSON [[id number]]   Admitting Provider:: DANIEL FERGUSON [[id number]]   Reason for IP Medical Treatment  (Clinical interventions that can only be accomplished in the IP setting? ) :: Appendicitis, BRIDGET   I certify that Inpatient services for greater than or equal to 2 midnights are medically necessary:: Yes   Plans for Post-Acute care--if anticipated (pick the single best option):: A. No post acute care anticipated at this time   Special Needs:: Fall Risk [15]

## 2023-12-18 NOTE — ED NOTES
Patient returned from CT, reconnected to monitor. Lab called to attempt to get second culture. Patient returned with IV access removed. MD at bedside. Anesthesia paged and called for access after multiple failed attempts, CT tech attempted several times to re-establish iv access. All attempts unsuccessful. Initial IV access was established with guided US after several attempts. MD and charge nurse aware of no IV access.

## 2023-12-18 NOTE — ED NOTES
Patient reported that nurses documentation was incorrect of dosage of suboxone and that she took half this am. Note made in chart to correct patient statement.

## 2023-12-18 NOTE — ED NOTES
Patient continues to be argumentive with staff about her phone, patient stays on her phone while vitals are being assessed and while tech attempted to get EKG and instructed her to keep arm still while it is taking.

## 2023-12-18 NOTE — ED NOTES
"Assisted BETTE Farmer with transfer of pt from EMS stretcher to ER stretcher. Upon placing pt on stretcher. Pt demanding to have phone given to her. BETTE Farmer and this RN attempted to instruct pt that once completion of having pt hooked to monitors pt could have phone. Pt states to this RN and BETTE Farmer "I want my phone now." And has began to increase volume of voice. After instruction pt allowed BETTE Farmer and this RN along with MACI Felton to apply Monitoring.   "

## 2023-12-18 NOTE — ED NOTES
Upon assisting BETTE Farmer with rectal temp. Pt continuing to refuse to get off phone at this time. Allowed pt to remain on phone at this time. Pt is argumentative with staff. BETTE Farmer and this RN attempted to deescalate pt behavior without success at this time.

## 2023-12-18 NOTE — ED NOTES
Patient arrived via ems from home with complaints of right lower quad pain that is also tender to touch on right thigh area. Patient is crying sad and requesting pain medication. BP low. MD at bedside for assessment. Patient reported that she has not voided since Friday, has been eating and drinking. No vomiting but nausea. Patient HR 75, recently treated for UTI. Patient reported that she took all her medicaitons this am including her suboxone. Bladder scan revealed 8ml. [place] applied to patient

## 2023-12-18 NOTE — ED PROVIDER NOTES
Encounter Date: 2023       History     Chief Complaint   Patient presents with    Fatigue     Recently admitted to this facility for BRIDGET/UTI. Presents with c/o generalized weakness with diarrhea. EMS reports hypotension. Presents awake, alert, tearful.      44 y/o F recently discharged from this facility returns via EMS c/o weakness, fatigue, abdominal pain, diarrhea. Onset yesterday, worse today. Notes several loose bowel movements, nonbloody. Notes nausea without emesis. Reports right sided abdominal pain that has worsened since onset. Denies any fever. No other symptoms reported at this time.       Review of patient's allergies indicates:   Allergen Reactions    Celexa [citalopram] Nausea And Vomiting     Past Medical History:   Diagnosis Date    Anemia     Asthma     Cellulitis of abdominal wall 2017    CKD (chronic kidney disease) stage 3, GFR 30-59 ml/min     Depression     Diabetes mellitus, type II     Diastolic dysfunction     Diverticulosis of intestine with bleeding 2016    E coli bacteremia 2018    E. coli pyelonephritis 2015    E. 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     SECTION       SECTION, CLASSIC      CHOLECYSTECTOMY  age 15    CORONARY ANGIOGRAPHY N/A 2021    Procedure: ANGIOGRAM, CORONARY ARTERY;  Surgeon: Neelam Hicks MD;  Location: [facility] CATH LAB/EP;  Service: Cardiology;  Laterality: N/A;    INCISION AND DRAINAGE OF ABSCESS N/A 2020    Procedure: INCISION AND DRAINAGE, ABSCESS;  Surgeon: Rich Watson MD;  Location: [facility] OR;  Service: General;  Laterality: N/A;    LEFT HEART CATHETERIZATION Left 2021    Procedure: Left heart cath;  Surgeon: Neelam Hicks MD;  Location: [facility] CATH LAB/EP;  Service:  Cardiology;  Laterality: Left;    LEFT HEART CATHETERIZATION N/A 2/17/2021    Procedure: Left heart cath;  Surgeon: Neelam Hicks MD;  Location: [facility] CATH LAB/EP;  Service: Cardiology;  Laterality: N/A;    TONSILLECTOMY, ADENOIDECTOMY       Family History   Problem Relation Age of Onset    Cancer Father     Heart attack Mother     Heart disease Mother     Cancer Maternal Aunt         lung (smoker)    Heart attack Maternal Aunt     Breast cancer Paternal Grandmother     Heart attack Maternal Grandmother      Social History     Tobacco Use    Smoking status: Every Day     Current packs/day: 2.50     Average packs/day: 2.5 packs/day for 37.0 years (92.4 ttl pk-yrs)     Types: Cigarettes     Start date: 1987    Smokeless tobacco: Never    Tobacco comments:     Pt enrolled in the [employer] on 9/12/18. (SCT Member ID # [id number]).     Substance Use Topics    Alcohol use: No    Drug use: No     Review of Systems   Constitutional:  Positive for fatigue. Negative for chills.   HENT:  Negative for congestion.    Respiratory:  Negative for cough and shortness of breath.    Cardiovascular:  Negative for chest pain.   Gastrointestinal:  Positive for abdominal pain.   Musculoskeletal:  Positive for back pain.   Neurological:  Negative for headaches.       Physical Exam     Initial Vitals   BP Pulse Resp Temp SpO2   12/18/23 1339 12/18/23 1339 12/18/23 1339 12/18/23 1600 12/18/23 1339   (!) 79/37 70 18 (S) (!) 95.6 °F (35.3 °C) 99 %      MAP       --                Physical Exam    Nursing note and vitals reviewed.  Constitutional: She appears well-developed and well-nourished. No distress.   HENT:   Head: Normocephalic and atraumatic.   Eyes: Conjunctivae and EOM are normal. Pupils are equal, round, and reactive to light.   Neck: Neck supple. No tracheal deviation present.   Normal range of motion.  Cardiovascular:  Normal rate and intact distal pulses.           Pulmonary/Chest: No respiratory distress.   Abdominal:  Abdomen is soft. She exhibits no distension. There is abdominal tenderness (RLQ). There is no rebound and no guarding.   Musculoskeletal:         General: No tenderness. Normal range of motion.      Cervical back: Normal range of motion and neck supple.     Neurological: She is alert and oriented to person, place, and time. She has normal strength. No cranial nerve deficit. GCS score is 15. GCS eye subscore is 4. GCS verbal subscore is 5. GCS motor subscore is 6.   Skin: Skin is warm and dry.         ED Course   Critical Care    Date/Time: 12/18/2023 6:21 PM    Performed by: Eh Sanchez MD  Authorized by: Eh Sanchez MD  Direct patient critical care time: 15 minutes  Additional history critical care time: 15 minutes  Ordering / reviewing critical care time: 15 minutes  Documentation critical care time: 15 minutes  Consulting other physicians critical care time: 15 minutes  Consult with family critical care time: 10 minutes  Total critical care time (exclusive of procedural time) : 85 minutes  Critical care time was exclusive of separately billable procedures and treating other patients.  Critical care was necessary to treat or prevent imminent or life-threatening deterioration of the following conditions: renal failure and sepsis.  Critical care was time spent personally by me on the following activities: development of treatment plan with patient or surrogate, discussions with consultants, interpretation of cardiac output measurements, evaluation of patient's response to treatment, examination of patient, obtaining history from patient or surrogate, ordering and performing treatments and interventions, ordering and review of laboratory studies, ordering and review of radiographic studies, pulse oximetry, re-evaluation of patient's condition and review of old charts.        Labs Reviewed   CBC W/ AUTO DIFFERENTIAL - Abnormal; Notable for the following components:       Result Value    WBC 16.55 (*)      RBC 3.57 (*)     Hemoglobin 10.6 (*)     Hematocrit 31.8 (*)     RDW 15.2 (*)     Platelet Estimate Clumped (*)     All other components within normal limits   COMPREHENSIVE METABOLIC PANEL - Abnormal; Notable for the following components:    Sodium 133 (*)     CO2 17 (*)     Glucose 148 (*)     BUN 29 (*)     Creatinine 5.1 (*)     Calcium 8.3 (*)     Total Protein 5.9 (*)     Albumin 2.1 (*)     eGFR 10 (*)     All other components within normal limits   TROPONIN I - Abnormal; Notable for the following components:    Troponin I 0.146 (*)     All other components within normal limits   MAGNESIUM - Abnormal; Notable for the following components:    Magnesium 1.3 (*)     All other components within normal limits   POCT GLUCOSE - Abnormal; Notable for the following components:    POCT Glucose 153 (*)     All other components within normal limits   CULTURE, BLOOD   CULTURE, BLOOD    Narrative:     Collection has been rescheduled by [email or web address] at 12/18/2023 16:26 Reason:   Unable to collect both sets of blood cultures. Limited access/ left   arm amputated.  Collection has been rescheduled by [email or web address] at 12/18/2023 16:26 Reason:   Unable to collect both sets of blood cultures. Limited access/ left   arm amputated.   B-TYPE NATRIURETIC PEPTIDE   LACTIC ACID, PLASMA   PROCALCITONIN   URINALYSIS   POCT GLUCOSE MONITORING CONTINUOUS     EKG Readings: (Independently Interpreted)   Initial Reading: No STEMI. Previous EKG: Compared with most recent EKG Previous EKG Date: 12/12/2023 (Minimal change). Rhythm: Normal Sinus Rhythm. Heart Rate: 78. Ectopy: No Ectopy. ST Segments: Normal ST Segments. Axis: Normal.   EKG independently interpreted by me pending cardiology review:            X-Rays:   Independently Interpreted Readings:   Other Readings:  CXR independently interpreted by me pending radiology review: No acute infiltrate, no pneumothorax, no effusion     Imaging Results              CT Abdomen Pelvis  Without Contrast (Final  result)  Result time 12/18/23 17:42:20      Final result by Laz Skinner DO (12/18/23 17:42:20)                   Impression:      1. Mildly prominent appendiceal tip with mild adjacent periappendiceal fat stranding.  Findings are concerning for acute tip appendicitis.  2. Stable fat stranding of the pannus suspicious for cellulitis.  3. Hepatosplenomegaly.      Electronically signed by: Laz Skinner  Date:    12/18/2023  Time:    17:42               Narrative:    EXAMINATION:  CT ABDOMEN PELVIS WITHOUT CONTRAST    CLINICAL HISTORY:  Flank pain, kidney stone suspected;RLQ abdominal pain (Age >= 14y);    TECHNIQUE:  Multiplanar images were obtained of the abdomen and pelvis from the hemidiaphragms through the symphysis pubis without intravenous contrast.    COMPARISON:  CT of the abdomen and pelvis from 12/12/2023.    FINDINGS:  Lung Bases: Clear.    Heart: Heart size is enlarged.  No pericardial effusion.  There are calcifications of the coronary arteries.    Liver: The liver is enlarged.  There are no focal hepatic lesions.  Normal parenchymal attenuation.    Biliary tract: No intrahepatic or extrahepatic biliary ductal dilatation.    Gallbladder: Surgically absent.    Pancreas: Normal. No pancreatic ductal dilatation.    Spleen: The spleen is enlarged, measuring up to 14.5 cm.    Adrenals: Normal.    Kidneys and urinary collecting systems: Normal.  No hydronephrosis or urolithiasis.    Lymph nodes: None enlarged.    Stomach and bowel: The stomach is normal.  Loops of small and large bowel are normal in caliber without evidence for inflammation or obstruction.  The appendiceal tip is mildly dilated, measuring up to 1 cm in diameter, with suspected minimal periappendiceal fat stranding, concerning for early acute tip appendicitis.    Peritoneum and mesentery: No ascites or free intraperitoneal air. No abdominal fluid collection.    Vasculature: Normal.    Urinary bladder: Normal.    Reproductive organs: The  uterus is absent.    Body wall: There is a multilocular fat containing lower ventral abdominal wall hernia, stable from prior.  There is continued fat stranding of the lower pannus, also similar to prior, correlate for cellulitis.    Musculoskeletal: No aggressive osseous lesion.                                       X-Ray Chest AP Portable (Final result)  Result time 12/18/23 15:59:51      Final result by Antione Bradley III, MD (12/18/23 15:59:51)                   Impression:      No acute process seen.      Electronically signed by: Antione Bradley MD  Date:    12/18/2023  Time:    15:59               Narrative:    EXAMINATION:  XR CHEST AP PORTABLE    CLINICAL HISTORY:  Fatigue;    FINDINGS:  Chest one view:    Heart size is normal.  Lungs are clear.  The bones showed DJD.                                      Medications   sodium chloride 0.9% flush 10 mL (has no administration in time range)   naloxone 0.4 mg/mL injection 0.02 mg (has no administration in time range)   glucose chewable tablet 16 g (has no administration in time range)   glucose chewable tablet 24 g (has no administration in time range)   glucagon (human recombinant) injection 1 mg (has no administration in time range)   heparin (porcine) injection 5,000 Units (has no administration in time range)   acetaminophen tablet 650 mg (has no administration in time range)   ondansetron injection 4 mg (has no administration in time range)   melatonin tablet 6 mg (has no administration in time range)   dextrose 10% bolus 125 mL 125 mL (has no administration in time range)   dextrose 10% bolus 250 mL 250 mL (has no administration in time range)   sodium chloride 0.9% bolus 1,000 mL 1,000 mL (0 mLs Intravenous Stopped 12/18/23 1500)   lactated ringers bolus 1,000 mL (0 mLs Intravenous Stopped 12/18/23 1755)   lactated ringers bolus 1,000 mL (0 mLs Intravenous Stopped 12/18/23 1755)     Medical Decision Making  42 y/o F presents to the ER c/o abdominal  pain, diarrhea, fatigue      Differential: Diverticulitis, cholecystitis, pancreatitis, appendicitis, obstruction, UTI, pyelonephritis, kidney stone, gastroenteritis, dehydration, BRIDGET       Patient did not meet SIRS criteria until 16:00 when (likely secondary to the IVF and cold weather) her temperature dropped under 96. I had already ordered blood cultures and a lactic, she was given zosyn and 3L of IVF. CT concerning for appendicitis, d/w general surgery who have seen patient in the ER. They cannot do surgery tonight because patient is on plavix but will follow patient as consultant. D/W [facility] Hospitalist who will see and admit the patient.     Problems Addressed:  BRIDGET (acute kidney injury): acute illness or injury with systemic symptoms that poses a threat to life or bodily functions  Appendicitis: acute illness or injury with systemic symptoms that poses a threat to life or bodily functions  Sepsis, due to unspecified organism, unspecified whether acute organ dysfunction present: acute illness or injury with systemic symptoms that poses a threat to life or bodily functions    Amount and/or Complexity of Data Reviewed  Independent Historian: EMS     Details: EMS provides details on VS and medications given en route   External Data Reviewed: ECG and notes.     Details: Reviewed most recent EKG for comparison     Reviewed recent discharge summary documenting baseline medications and PMH   Labs: ordered.     Details: CBC concerning for leukocytosis, mild anemia similar to baseline; CMP concerning for BRIDGET/ARF, normal LFTs and electrolytes; troponin elevated; Lactic acid WNL;   Radiology: ordered and independent interpretation performed. Decision-making details documented in ED Course.  ECG/medicine tests: ordered and independent interpretation performed. Decision-making details documented in ED Course.  Discussion of management or test interpretation with external provider(s): D/W Dr. Watson with surgery team,  reviewed labs, presentation, PMH, CT results, disposition    D/W Hospitalist, reviewed recent discharge, presentation, lab/imaging results, interventions, discussion with surgery team, plan to admit    Risk  Prescription drug management.  Decision regarding hospitalization.    Critical Care  Total time providing critical care: 85 minutes                                      Clinical Impression:  Final diagnoses:  [R53.83] Fatigue  [N17.9] BRIDGET (acute kidney injury) (Primary)  [K37] Appendicitis, unspecified appendicitis type  [A41.9] Sepsis, due to unspecified organism, unspecified whether acute organ dysfunction present  [K37] Appendicitis          ED Disposition Condition    Admit                 Eh Sanchez MD  12/18/23 2005

## 2023-12-19 PROBLEM — R45.1 AGITATION: Status: ACTIVE | Noted: 2023-01-01

## 2023-12-19 NOTE — PLAN OF CARE
Patient arrived to unit from ED. Per bedside nurse, patient is currently resting. Will complete admission questions per medical record.       12/19/23 0041   Admission   Initial VN Admission Questions Complete   Shift   Virtual Nurse - Rounding Complete   Virtual Nurse - Patient Verbalized Approval Of Camera Use;VN Rounding

## 2023-12-19 NOTE — PROGRESS NOTES
[facility] Medicine  Progress Note    Patient Name: Lele Dias  MRN: [id number]  Patient Class: IP- Inpatient   Admission Date: 12/18/2023  Length of Stay: 1 days  Attending Physician: Courtney Prieto MD  Primary Care Provider: Sammi Edge MD (Cindy)        Subjective:     Principal Problem:Acute renal failure        HPI:  Lele Dias is a 43 yr old female with PMH of CKD, depression, diabetes, hypertension who presents with lower abdominal pain and fatigue.    Patient reports right-sided lower abdominal pain, weakness, fatigue over the past day.  She states that she had multiple nonbloody nonbilious loose bowel movements, with associated nausea.  Of note, she was recently discharged for UTI/BRIDGET was sent home with antibiotics.    Triage vitals were remarkable for hypotension and hypothermia.  Physical exam significant for abdominal tenderness.    CBC was significant for leukocytosis, normocytic anemia.  CMP was significant for hyponatremia, and acute renal failure.  Troponin was slightly elevated at 0.146.  UA significant for WBCs and bacteria.  Blood cultures pending.    CT imaging was concerning for acute tip appendicitis, possible cellulitis    Surgery was consulted.  No acute surgery due to Plavix use    Patient admitted for acute renal failure and appendicitis management.    Overview/Hospital Course:  12/19/23 Refusing labs, VS checks    Interval History: Very irritable, refusing VS checks, labs. BP check showed low reading but patient is asymptomatic and feel it might be inaccurate    Review of Systems   Constitutional:  Positive for fatigue. Negative for appetite change and chills.   HENT:  Negative for ear discharge and ear pain.    Respiratory:  Negative for cough and choking.    Cardiovascular:  Negative for chest pain and leg swelling.   Gastrointestinal:  Positive for abdominal pain, diarrhea and nausea. Negative for anal bleeding and blood in stool.   Endocrine: Negative  for polydipsia and polyphagia.   Genitourinary:  Negative for flank pain and hematuria.   Musculoskeletal:  Negative for back pain and gait problem.   Skin:  Negative for pallor and rash.   Allergic/Immunologic: Negative for food allergies and immunocompromised state.   Neurological:  Negative for seizures and speech difficulty.   Hematological:  Negative for adenopathy. Does not bruise/bleed easily.   Psychiatric/Behavioral:  Positive for agitation and behavioral problems. Negative for decreased concentration and dysphoric mood.      Objective:     Vital Signs (Most Recent):  Temp: 98 °F (36.7 °C) (12/19/23 1513)  Pulse: 80 (12/19/23 1611)  Resp: 18 (12/19/23 1513)  BP: 134/84 (12/19/23 1613)  SpO2: 97 % (12/19/23 1513) Vital Signs (24h Range):  Temp:  [97 °F (36.1 °C)-98.9 °F (37.2 °C)] 98 °F (36.7 °C)  Pulse:  [] 80  Resp:  [16-20] 18  SpO2:  [93 %-100 %] 97 %  BP: ()/(48-87) 134/84     Weight: (!) 162 kg (357 lb 2.3 oz)  Body mass index is 65.32 kg/m².    Intake/Output Summary (Last 24 hours) at 12/19/2023 1739  Last data filed at 12/19/2023 1125  Gross per 24 hour   Intake 200 ml   Output 351 ml   Net -151 ml         Physical Exam  Vitals reviewed.   Constitutional:       General: She is not in acute distress.     Appearance: She is obese. She is not toxic-appearing.   HENT:      Right Ear: There is no impacted cerumen.      Left Ear: There is no impacted cerumen.      Nose: No congestion or rhinorrhea.      Mouth/Throat:      Pharynx: No oropharyngeal exudate or posterior oropharyngeal erythema.   Eyes:      General:         Right eye: No discharge.         Left eye: No discharge.   Cardiovascular:      Rate and Rhythm: Normal rate.      Heart sounds: No murmur heard.     No gallop.   Pulmonary:      Breath sounds: No wheezing or rales.   Abdominal:      General: There is distension.      Tenderness: There is abdominal tenderness.   Musculoskeletal:         General: No swelling or deformity.       Cervical back: No rigidity.   Lymphadenopathy:      Cervical: No cervical adenopathy.   Skin:     Coloration: Skin is not jaundiced.      Findings: No bruising.   Neurological:      General: No focal deficit present.      Cranial Nerves: No cranial nerve deficit.      Motor: No weakness.   Psychiatric:         Mood and Affect: Mood normal.             Significant Labs: All pertinent labs within the past 24 hours have been reviewed.  CBC:   Recent Labs   Lab 12/18/23  1416 12/19/23  0507   WBC 16.55* 16.63*   HGB 10.6* 10.9*   HCT 31.8* 33.4*   PLT SEE COMMENT 154     CMP:   Recent Labs   Lab 12/18/23  1416 12/19/23  0507   * 137   K 4.1 3.6    107   CO2 17* 20*   * 53*   BUN 29* 28*   CREATININE 5.1* 4.8*   CALCIUM 8.3* 8.4*   PROT 5.9* 5.4*   ALBUMIN 2.1* 1.8*   BILITOT 0.3 0.3   ALKPHOS 89 89   AST 26 17   ALT 21 15   ANIONGAP 12 10       Significant Imaging: I have reviewed all pertinent imaging results/findings within the past 24 hours.    Assessment/Plan:      * Acute renal failure  Patient with acute kidney injury/acute renal failure likely due to pre-renal azotemia due to dehydration BRIDGET is currently stable. Baseline creatinine  1.0  - Labs reviewed- Renal function/electrolytes with Estimated Creatinine Clearance: 22.2 mL/min (A) (based on SCr of 4.8 mg/dL (H)). according to latest data. Monitor urine output and serial BMP and adjust therapy as needed. Avoid nephrotoxins and renally dose meds for GFR listed above.    Agitation  Will consult Psychiatry due to concern about medical decision making capacity  Will not PEC at this time as patient seems A&Ox4 but making rude and derogatory comments      Acute appendicitis with localized peritonitis, without perforation, abscess, or gangrene  Abdominal imaging concerning for appendicitis  Due to Plavix use, no urgent surgery is necessary  General surgery saw the patient in the ED  Plan to medically treat appendicitis with IV abx  Tentative plan  for appendectomy in 6-8 weeks  Repeat KUB today did not show a perforation        UTI (urinary tract infection)  Antibiotics use for appendicitis can also be used for UTI        VTE Risk Mitigation (From admission, onward)           Ordered     heparin (porcine) injection 5,000 Units  Every 8 hours         12/18/23 1807     IP VTE HIGH RISK PATIENT  Once         12/18/23 1807     Place sequential compression device  Until discontinued         12/18/23 1807                    Discharge Planning   DEMARIO: 12/22/2023     Code Status: Full Code   Is the patient medically ready for discharge?:     Reason for patient still in hospital (select all that apply): Patient trending condition, Laboratory test, and Treatment  Discharge Plan A: Home Health (current with [facility] ([facility]) & [facility])                  Courtney Prieto MD  Department of Hospital Medicine   [facility]

## 2023-12-19 NOTE — SUBJECTIVE & OBJECTIVE
Interval History: No acute events overnight. HDS on RA. Still with some abdominal pain.     Medications:  Continuous Infusions:   sodium chloride 0.9% 250 mL/hr at 12/19/23 0638     Scheduled Meds:   acetaminophen  650 mg Oral Once    atorvastatin  80 mg Oral QHS    buprenorphine-naloxone 8-2  8 mg Sublingual TID    cefTRIAXone (ROCEPHIN) IVPB  2 g Intravenous Q24H    EScitalopram oxalate  10 mg Oral Daily    heparin (porcine)  5,000 Units Subcutaneous Q8H    metronidazole  500 mg Intravenous Q8H    nicotine  1 patch Transdermal Daily    pantoprazole  40 mg Oral Daily    pregabalin  150 mg Oral Daily     PRN Meds:acetaminophen, albuterol-ipratropium, ALPRAZolam, azelastine, cetirizine, dextrose 10%, dextrose 10%, fluticasone propionate, glucagon (human recombinant), glucose, glucose, insulin aspart U-100, melatonin, naloxone, ondansetron, promethazine, sodium chloride 0.9%, tiZANidine     Review of patient's allergies indicates:   Allergen Reactions    Celexa [citalopram] Nausea And Vomiting     Objective:     Vital Signs (Most Recent):  Temp: 98.9 °F (37.2 °C) (12/19/23 0340)  Pulse: 96 (12/19/23 0342)  Resp: 16 (12/19/23 0340)  BP: 125/68 (12/19/23 0340)  SpO2: (!) 93 % (12/19/23 0340) Vital Signs (24h Range):  Temp:  [95.6 °F (35.3 °C)-98.9 °F (37.2 °C)] 98.9 °F (37.2 °C)  Pulse:  [] 96  Resp:  [14-20] 16  SpO2:  [93 %-100 %] 93 %  BP: ()/(37-78) 125/68     Weight: (!) 162 kg (357 lb 2.3 oz)  Body mass index is 65.32 kg/m².    Intake/Output - Last 3 Shifts         12/17 0700  12/18 0659 12/18 0700 12/19 0659    IV Piggyback  100    Total Intake(mL/kg)  100 (0.6)    Net  +100                   Physical Exam  Vitals and nursing note reviewed.   Constitutional:       General: She is not in acute distress.  HENT:      Head: Normocephalic and atraumatic.      Mouth/Throat:      Mouth: Mucous membranes are moist.   Cardiovascular:      Rate and Rhythm: Normal rate.   Pulmonary:      Effort: Pulmonary  effort is normal. No respiratory distress.   Abdominal:      General: There is no distension.      Palpations: Abdomen is soft.      Tenderness: There is abdominal tenderness (lower abdomen).      Comments: Has area of panniculitis, wounds. No abscess noted.   Skin:     General: Skin is warm and dry.   Neurological:      General: No focal deficit present.      Mental Status: She is alert and oriented to person, place, and time.          Significant Labs:  I have reviewed all pertinent lab results within the past 24 hours.  CBC:   Recent Labs   Lab 12/19/23  0507   WBC 16.63*   RBC 3.70*   HGB 10.9*   HCT 33.4*      MCV 90   MCH 29.5   MCHC 32.6     CMP:   Recent Labs   Lab 12/19/23  0507   GLU 53*   CALCIUM 8.4*   ALBUMIN 1.8*   PROT 5.4*      K 3.6   CO2 20*      BUN 28*   CREATININE 4.8*   ALKPHOS 89   ALT 15   AST 17   BILITOT 0.3       Significant Diagnostics:  I have reviewed all pertinent imaging results/findings within the past 24 hours.

## 2023-12-19 NOTE — ASSESSMENT & PLAN NOTE
Abdominal imaging concerning for appendicitis  Due to Plavix use, no urgent surgery is necessary  General surgery saw the patient in the ED  Plan to medically treat appendicitis with IV abx  Tentative plan for appendectomy in 6-8 weeks  Repeat KUB today did not show a perforation

## 2023-12-19 NOTE — CONSULTS
[facility] Surg  Wound Care    Patient Name:  Lele Dias   MRN:  [id number]  Date: 12/19/2023  Diagnosis: Acute renal failure    History:     Past Medical History:   Diagnosis Date    Anemia     Asthma     Cellulitis of abdominal wall 12/18/2017    CKD (chronic kidney disease) stage 3, GFR 30-59 ml/min     Depression     Diabetes mellitus, type II     Diastolic dysfunction     Diverticulosis of intestine with bleeding 9/14/2016    E coli bacteremia 4/21/2018    E. coli pyelonephritis 6/2/2015    E. coli UTI 12/18/2017    Hematuria     Hernia, epigastric     Hypertension     Hypocalcemia     Nonalcoholic hepatosteatosis     Periumbilical hernia     Proteinuria        Social History     Socioeconomic History    Marital status: Single   Tobacco Use    Smoking status: Every Day     Current packs/day: 2.50     Average packs/day: 2.5 packs/day for 37.0 years (92.4 ttl pk-yrs)     Types: Cigarettes     Start date: 1987    Smokeless tobacco: Never    Tobacco comments:     Pt enrolled in the [employer] on 9/12/18. (SCT Member ID # [id number]).     Substance and Sexual Activity    Alcohol use: No    Drug use: No    Sexual activity: Yes     Partners: Male     Birth control/protection: Injection     Social Determinants of Health     Financial Resource Strain: High Risk (12/13/2023)    Overall Financial Resource Strain (CARDIA)     Difficulty of Paying Living Expenses: Very hard   Food Insecurity: Food Insecurity Present (12/13/2023)    Hunger Vital Sign     Worried About Running Out of Food in the Last Year: Sometimes true     Ran Out of Food in the Last Year: Sometimes true   Transportation Needs: Unmet Transportation Needs (12/13/2023)    PRAPARE - Transportation     Lack of Transportation (Medical): Yes     Lack of Transportation (Non-Medical): Yes   Physical Activity: Inactive (12/13/2023)    Exercise Vital Sign     Days of Exercise per Week: 0 days     Minutes of Exercise per Session: 0 min   Stress: Stress Concern  Present (12/13/2023)    [employer] [place] [employer] - Occupational Stress Questionnaire     Feeling of Stress : Very much   Social Connections: Socially Isolated (12/13/2023)    Social Connection and Isolation Panel [NHANES]     Frequency of Communication with Friends and Family: More than three times a week     Frequency of Social Gatherings with Friends and Family: More than three times a week     Attends Voodoo Services: Never     Active Member of Clubs or Organizations: No     Attends Club or Organization Meetings: Never     Marital Status: Never    Housing Stability: Unknown (12/13/2023)    Housing Stability Vital Sign     Number of Places Lived in the Last Year: 1     Unstable Housing in the Last Year: No       Precautions:     Allergies as of 12/18/2023 - Reviewed 12/18/2023   Allergen Reaction Noted    Celexa [citalopram] Nausea And Vomiting 06/02/2015       [facility] Assessment Details/Treatment       L pannus- laterally- full thickness wound- slough/granular- 1x0.5x0.2cm-  small amount of serosanguinous drainage- no odor- no erythema        L pannus-  full thickness wound- slough/granular- 4x5x0.1cm-  small amount of serosanguinous drainage- no odor- no erythema        R pannus- partial thickness skin breakdown with fungal appearance      R lower inner buttock- full thickness ulceration 2x3x0.1cm- slough/granulation- no odor- no erythema  L buttock- scattered partial thickness skin breakdown  Perineum- moist with fungal odor        LLE- dry skin   Bilateral heels intact with no redness  Long thick toenails        L calf- excoriated skin- partial thickness to calf- no odor- mild redness      Recommendations discussed with pt, nurse and Dr. Prieto:  - Nurse to continue pressure injury prevention interventions   - Aquacel AG to L pannus and LLE- 2x/week  - Triad ointment to R buttock BID and prn incontinent episode  - Miconazole powder to pannus and perineum    12/19/2023

## 2023-12-19 NOTE — H&P
[facility] Medicine  History & Physical    Patient Name: Lele Dias  MRN: [id number]  Patient Class: IP- Inpatient  Admission Date: 12/18/2023  Attending Physician: Shira Salcedo MD   Primary Care Provider: Sammi Edge MD (Cindy)         Patient information was obtained from patient and ER records.     Subjective:     Principal Problem:Acute renal failure    Chief Complaint:   Chief Complaint   Patient presents with    Fatigue     Recently admitted to this facility for BRIDGET/UTI. Presents with c/o generalized weakness with diarrhea. EMS reports hypotension. Presents awake, alert, tearful.         HPI: Lele Dias is a 43 yr old female with PMH of CKD, depression, diabetes, hypertension who presents with lower abdominal pain and fatigue.    Patient reports right-sided lower abdominal pain, weakness, fatigue over the past day.  She states that she had multiple nonbloody nonbilious loose bowel movements, with associated nausea.  Of note, she was recently discharged for UTI/BRIDGET was sent home with antibiotics.    Triage vitals were remarkable for hypotension and hypothermia.  Physical exam significant for abdominal tenderness.    CBC was significant for leukocytosis, normocytic anemia.  CMP was significant for hyponatremia, and acute renal failure.  Troponin was slightly elevated at 0.146.  UA significant for WBCs and bacteria.  Blood cultures pending.    CT imaging was concerning for acute tip appendicitis, possible cellulitis    Surgery was consulted.  No acute surgery due to Plavix use    Patient admitted for acute renal failure and appendicitis management.    Past Medical History:   Diagnosis Date    Anemia     Asthma     Cellulitis of abdominal wall 12/18/2017    CKD (chronic kidney disease) stage 3, GFR 30-59 ml/min     Depression     Diabetes mellitus, type II     Diastolic dysfunction     Diverticulosis of intestine with bleeding 9/14/2016    E coli bacteremia 4/21/2018    E. coli  pyelonephritis 2015    E. 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     SECTION       SECTION, CLASSIC      CHOLECYSTECTOMY  age 15    CORONARY ANGIOGRAPHY N/A 2021    Procedure: ANGIOGRAM, CORONARY ARTERY;  Surgeon: Neelam Hicks MD;  Location: [facility] CATH LAB/EP;  Service: Cardiology;  Laterality: N/A;    INCISION AND DRAINAGE OF ABSCESS N/A 2020    Procedure: INCISION AND DRAINAGE, ABSCESS;  Surgeon: Rich Watson MD;  Location: [facility] OR;  Service: General;  Laterality: N/A;    LEFT HEART CATHETERIZATION Left 2021    Procedure: Left heart cath;  Surgeon: Neelam Hicks MD;  Location: [facility] CATH LAB/EP;  Service: Cardiology;  Laterality: Left;    LEFT HEART CATHETERIZATION N/A 2021    Procedure: Left heart cath;  Surgeon: Neelam Hicks MD;  Location: [facility] CATH LAB/EP;  Service: Cardiology;  Laterality: N/A;    TONSILLECTOMY, ADENOIDECTOMY         Review of patient's allergies indicates:   Allergen Reactions    Celexa [citalopram] Nausea And Vomiting       No current facility-administered medications on file prior to encounter.     Current Outpatient Medications on File Prior to Encounter   Medication Sig    acetaminophen (TYLENOL) 500 MG tablet Take 500 mg by mouth every 6 (six) hours as needed for Pain.    albuterol (PROVENTIL/VENTOLIN HFA) 90 mcg/actuation inhaler Inhale 1-2 puffs into the lungs every 4 to 6 hours as needed.    allopurinoL (ZYLOPRIM) 300 MG tablet Take 300 mg by mouth once daily.    ALPRAZolam (XANAX) 0.5 MG tablet Take 0.5 mg by mouth 3 (three) times daily as needed.    amoxicillin-clavulanate 875-125mg (AUGMENTIN) 875-125 mg per tablet Take 1 tablet by mouth every 12 (twelve) hours. for 7 days    aspirin (ECOTRIN) 81 MG EC  tablet Take 1 tablet (81 mg total) by mouth once daily.    atorvastatin (LIPITOR) 80 MG tablet Take 1 tablet (80 mg total) by mouth once daily.    azelastine (ASTELIN) 137 mcg (0.1 %) nasal spray 2 sprays by Nasal route 2 (two) times daily as needed.    BREZTRI AEROSPHERE 160-9-4.8 mcg/actuation HFAA Take 2 puffs by mouth 2 (two) times daily.    carvediloL (COREG) 3.125 MG tablet Take 3.125 mg by mouth 2 (two) times daily.    cetirizine (ZYRTEC) 10 MG tablet Take 10 mg by mouth daily as needed.    cholecalciferol, vitamin D3, 1,250 mcg (50,000 unit) capsule Take 50,000 Units by mouth every Wednesday.    ciprofloxacin HCl (CIPRO) 500 MG tablet Take 1 tablet (500 mg total) by mouth every 12 (twelve) hours. for 7 days    clopidogreL (PLAVIX) 75 mg tablet Take 1 tablet (75 mg total) by mouth once daily.    doxycycline (VIBRAMYCIN) 100 MG Cap Take 1 capsule (100 mg total) by mouth 2 (two) times daily. for 7 days    EScitalopram oxalate (LEXAPRO) 10 MG tablet Take 10 mg by mouth daily as needed.    fluticasone propionate (FLONASE) 50 mcg/actuation nasal spray 1 spray by Each Nostril route daily as needed.    furosemide (LASIX) 40 MG tablet Take 40 mg by mouth 2 (two) times daily as needed.    HUMALOG MIX 75-25 KWIKPEN 100 unit/mL (75-25) InPn Inject 80 Units into the skin 3 (three) times daily.    insulin glargine (LANTUS) 100 unit/mL injection Inject 90 Units into the skin every morning.    LINZESS 72 mcg Cap capsule Take 72 mcg by mouth daily as needed.    lisinopriL 10 MG tablet Take 10 mg by mouth once daily.    medroxyPROGESTERone (DEPO-PROVERA) 150 mg/mL injection Inject 150 mg into the muscle every 3 (three) months.    multivit-min-iron fum-folic ac (ONE-A-DAY WOMEN'S COMPLETE) 18 mg iron- 400 mcg Tab Take 1 tablet by mouth once daily.    omeprazole (PRILOSEC) 20 MG capsule Take 20 mg by mouth daily as needed.    pregabalin (LYRICA) 300 MG Cap Take 300 mg by mouth once daily.    promethazine (PHENERGAN) 6.25 mg/5  "mL syrup Take 5-10 mLs by mouth every 8 (eight) hours as needed.    SUBOXONE 8-2 mg Place 1 Film under the tongue 3 (three) times daily.    tiZANidine (ZANAFLEX) 4 MG tablet Take 1 tablet (4 mg total) by mouth every 8 (eight) hours as needed. Do not take while on cirpofloxacin antibiotics. Okay to take after completing course    BD ULTRA-FINE MINI PEN NEEDLE 31 gauge x 3/16" Ndle Inject into the skin 5 (five) times daily.    blood sugar diagnostic (TRUE METRIX GLUCOSE TEST STRIP) Strp use as directed to check blood glucose up to three times daily    blood-glucose meter Misc use as directed    lancets (EASY TOUCH TWIST LANCETS) 30 gauge Misc use as directed to test blood glucose up to three times daily    nicotine (NICODERM CQ) 21 mg/24 hr Place 1 patch onto the skin once daily.    promethazine-dextromethorphan (PROMETHAZINE-DM) 6.25-15 mg/5 mL Syrp SMARTSI Teaspoon By Mouth 3 Times Daily PRN    TRUE METRIX GLUCOSE TEST STRIP Strp 3 (three) times daily.     Family History       Problem Relation (Age of Onset)    Breast cancer Paternal Grandmother    Cancer Father, Maternal Aunt    Heart attack Mother, Maternal Aunt, Maternal Grandmother    Heart disease Mother          Tobacco Use    Smoking status: Every Day     Current packs/day: 2.50     Average packs/day: 2.5 packs/day for 37.0 years (92.4 ttl pk-yrs)     Types: Cigarettes     Start date:     Smokeless tobacco: Never    Tobacco comments:     Pt enrolled in the [employer] on 18. (SCT Member ID # [id number]).     Substance and Sexual Activity    Alcohol use: No    Drug use: No    Sexual activity: Yes     Partners: Male     Birth control/protection: Injection     Review of Systems   Constitutional:  Positive for fatigue. Negative for appetite change and chills.   HENT:  Negative for ear discharge and ear pain.    Respiratory:  Negative for cough and choking.    Cardiovascular:  Negative for chest pain and leg swelling.   Gastrointestinal:  Positive for " abdominal pain, diarrhea and nausea. Negative for anal bleeding and blood in stool.   Endocrine: Negative for polydipsia and polyphagia.   Genitourinary:  Negative for flank pain and hematuria.   Musculoskeletal:  Negative for back pain and gait problem.   Skin:  Negative for pallor and rash.   Allergic/Immunologic: Negative for food allergies and immunocompromised state.   Neurological:  Negative for seizures and speech difficulty.   Hematological:  Negative for adenopathy. Does not bruise/bleed easily.   Psychiatric/Behavioral:  Negative for decreased concentration and dysphoric mood.      Objective:     Vital Signs (Most Recent):  Temp: 98.9 °F (37.2 °C) (12/19/23 0340)  Pulse: 96 (12/19/23 0342)  Resp: 16 (12/19/23 0340)  BP: 125/68 (12/19/23 0340)  SpO2: (!) 93 % (12/19/23 0340) Vital Signs (24h Range):  Temp:  [95.6 °F (35.3 °C)-98.9 °F (37.2 °C)] 98.9 °F (37.2 °C)  Pulse:  [] 96  Resp:  [14-20] 16  SpO2:  [93 %-100 %] 93 %  BP: ()/(37-78) 125/68     Weight: (!) 162 kg (357 lb 2.3 oz)  Body mass index is 65.32 kg/m².     Physical Exam  Vitals reviewed.   Constitutional:       General: She is not in acute distress.     Appearance: She is obese. She is not toxic-appearing.   HENT:      Right Ear: There is no impacted cerumen.      Left Ear: There is no impacted cerumen.      Nose: No congestion or rhinorrhea.      Mouth/Throat:      Pharynx: No oropharyngeal exudate or posterior oropharyngeal erythema.   Eyes:      General:         Right eye: No discharge.         Left eye: No discharge.   Cardiovascular:      Rate and Rhythm: Normal rate.      Heart sounds: No murmur heard.     No gallop.   Pulmonary:      Breath sounds: No wheezing or rales.   Abdominal:      General: There is distension.      Tenderness: There is abdominal tenderness.   Musculoskeletal:         General: No swelling or deformity.      Cervical back: No rigidity.   Lymphadenopathy:      Cervical: No cervical adenopathy.   Skin:      "Coloration: Skin is not jaundiced.      Findings: No bruising.   Neurological:      General: No focal deficit present.      Cranial Nerves: No cranial nerve deficit.      Motor: No weakness.   Psychiatric:         Mood and Affect: Mood normal.                Significant Labs: All pertinent labs within the past 24 hours have been reviewed.  Blood Culture:   Recent Labs   Lab 12/18/23  1625 12/18/23  1808   LABBLOO No Growth to date No Growth to date     BMP:   Recent Labs   Lab 12/18/23  1416 12/19/23  0507   * 53*   * 137   K 4.1 3.6    107   CO2 17* 20*   BUN 29* 28*   CREATININE 5.1* 4.8*   CALCIUM 8.3* 8.4*   MG 1.3*  --      CBC:   Recent Labs   Lab 12/18/23  1416 12/19/23  0507   WBC 16.55* 16.63*   HGB 10.6* 10.9*   HCT 31.8* 33.4*   PLT SEE COMMENT 154     CMP:   Recent Labs   Lab 12/18/23  1416 12/19/23  0507   * 137   K 4.1 3.6    107   CO2 17* 20*   * 53*   BUN 29* 28*   CREATININE 5.1* 4.8*   CALCIUM 8.3* 8.4*   PROT 5.9* 5.4*   ALBUMIN 2.1* 1.8*   BILITOT 0.3 0.3   ALKPHOS 89 89   AST 26 17   ALT 21 15   ANIONGAP 12 10     TSH:   Recent Labs   Lab 07/05/23  1504   TSH 1.18     Urine Culture: No results for input(s): "LABURIN" in the last 48 hours.  Urine Studies:   Recent Labs   Lab 12/18/23  2229   COLORU Yellow   APPEARANCEUA Hazy*   PHUR 5.0   SPECGRAV 1.010   PROTEINUA Trace*   GLUCUA Negative   KETONESU Negative   BILIRUBINUA Negative   OCCULTUA 2+*   NITRITE Negative   UROBILINOGEN Negative   LEUKOCYTESUR 3+*   RBCUA 43*   WBCUA >100*   BACTERIA Few*   SQUAMEPITHEL 11       Significant Imaging: I have reviewed all pertinent imaging results/findings within the past 24 hours.  I have reviewed and interpreted all pertinent imaging results/findings within the past 24 hours.  Assessment/Plan:     * Acute renal failure  Patient with acute kidney injury/acute renal failure likely due to pre-renal azotemia due to dehydration BRIDGET is currently stable. Baseline creatinine "  1.0  - Labs reviewed- Renal function/electrolytes with Estimated Creatinine Clearance: 22.2 mL/min (A) (based on SCr of 4.8 mg/dL (H)). according to latest data. Monitor urine output and serial BMP and adjust therapy as needed. Avoid nephrotoxins and renally dose meds for GFR listed above.    Acute appendicitis with localized peritonitis, without perforation, abscess, or gangrene  Abdominal imaging concerning for appendicitis  Due to Plavix use, no urgent surgery is necessary  General surgery saw the patient in the ED    Plan:  Continue with vancomycin, cefepime, metronidazole  Follow-up on General surgery recommendations  Blood cultures pending  May consider additional scans for worsening pain and discomfort        UTI (urinary tract infection)  Antibiotics use for appendicitis can also be used for UTI        VTE Risk Mitigation (From admission, onward)           Ordered     heparin (porcine) injection 5,000 Units  Every 8 hours         12/18/23 1807     IP VTE HIGH RISK PATIENT  Once         12/18/23 1807     Place sequential compression device  Until discontinued         12/18/23 1807                                    Jeff De Oliveira MD  Department of Hospital Medicine  [facility]

## 2023-12-19 NOTE — NURSING
"Upon ambulating from bathroom, the patient advised staff she wanted to sit down. This RN and PCT assisted with placement of bed so she could comfortably sit. Pt then sat on floor, she cursed out the staff repeatedly and asked the staff to leave the room. She was able to get up off the floor with some assistance. No injury noted or reported. She states "she wants to leave this hospital" MD notified.   "

## 2023-12-19 NOTE — PROGRESS NOTES
"Floor nurse gave patient morning medications and helped pour water into patient's cup. Patient lifted cup of water off of table and spilled onto herself and on her phone laying on her chest. Patient states " my fucking phone, its broken now, you got water on my phone." Floor nurse helped patient dry off her gown and her phone. Patient then continued to drink water and spilled water onto herself again after taking medications. Patient continued to curse and yell about her phone. Patients phone is working and she was able to face time her daughter after. Will continue to monitor.   "

## 2023-12-19 NOTE — ED NOTES
Spoke with patient daughter regarding pt status and admission.  Daughter verbalized an understanding.  Pt daughter encouraged pt to not cuss at staff and pt escalated.  Pt is preoccupied with going to the 5th floor.

## 2023-12-19 NOTE — NURSING
Patient hypotensive, ICU proactive round nurse Maximiliano and MD Prieto at bedside. Pt refusing repeat of VS. Pt educated on importance of lab work and VS, pt still non-compliant and refusing.

## 2023-12-19 NOTE — NURSING
Pt continues to scream out in 10/10 RLQ pain. PRN tylenol administered, heat packs applied with no relief noted. MD Prieto notified. STAT KUB ordered.

## 2023-12-19 NOTE — PLAN OF CARE
Recommendation:  1. Add beneprotein TID to promote wound healing.   2. Encourage intake at meals.   3. Monitor weight/labs.   4. RD to follow to monitor po intake    Goals:  Pt will tolerate diet with at least 50-75% intake at meals by RD follow up  Nutrition Goal Status: new

## 2023-12-19 NOTE — PLAN OF CARE
Patient AAOx3, noncompliant and delaying plan of care by refusing vitals, lab, and blood sugar check. MD Prieto notified of patient refusing care. Patient c/o pain, PRN tylenol and zanaflex given per standing order. Wounds cleansed and new dressing applied by Floor nurse and wound care nurse. Miconazole applied to abdominal folds. Patient in NAD. Call bell in reach. Safety maintained. Will continue to monitor.   Problem: Adult Inpatient Plan of Care  Goal: Plan of Care Review  Outcome: Ongoing, Progressing  Goal: Patient-Specific Goal (Individualized)  Outcome: Ongoing, Progressing  Goal: Absence of Hospital-Acquired Illness or Injury  Outcome: Ongoing, Progressing  Goal: Optimal Comfort and Wellbeing  Outcome: Ongoing, Progressing  Goal: Readiness for Transition of Care  Outcome: Ongoing, Progressing     Problem: Bariatric Environmental Safety  Goal: Safety Maintained with Care  Outcome: Ongoing, Progressing     Problem: Violence Risk or Actual  Goal: Anger and Impulse Control  Outcome: Ongoing, Progressing     Problem: Diabetes Comorbidity  Goal: Blood Glucose Level Within Targeted Range  Outcome: Ongoing, Progressing     Problem: Adjustment to Illness (Sepsis/Septic Shock)  Goal: Optimal Coping  Outcome: Ongoing, Progressing     Problem: Bleeding (Sepsis/Septic Shock)  Goal: Absence of Bleeding  Outcome: Ongoing, Progressing     Problem: Glycemic Control Impaired (Sepsis/Septic Shock)  Goal: Blood Glucose Level Within Desired Range  Outcome: Ongoing, Progressing     Problem: Infection Progression (Sepsis/Septic Shock)  Goal: Absence of Infection Signs and Symptoms  Outcome: Ongoing, Progressing     Problem: Nutrition Impaired (Sepsis/Septic Shock)  Goal: Optimal Nutrition Intake  Outcome: Ongoing, Progressing     Problem: Fluid and Electrolyte Imbalance (Acute Kidney Injury/Impairment)  Goal: Fluid and Electrolyte Balance  Outcome: Ongoing, Progressing     Problem: Oral Intake Inadequate (Acute Kidney  Injury/Impairment)  Goal: Optimal Nutrition Intake  Outcome: Ongoing, Progressing     Problem: Renal Function Impairment (Acute Kidney Injury/Impairment)  Goal: Effective Renal Function  Outcome: Ongoing, Progressing     Problem: Impaired Wound Healing  Goal: Optimal Wound Healing  Outcome: Ongoing, Progressing     Problem: Skin Injury Risk Increased  Goal: Skin Health and Integrity  Outcome: Ongoing, Progressing     Problem: Infection  Goal: Absence of Infection Signs and Symptoms  Outcome: Ongoing, Progressing     Problem: UTI (Urinary Tract Infection)  Goal: Improved Infection Symptoms  Outcome: Ongoing, Progressing

## 2023-12-19 NOTE — SUBJECTIVE & OBJECTIVE
Past Medical History:   Diagnosis Date    Anemia     Asthma     Cellulitis of abdominal wall 2017    CKD (chronic kidney disease) stage 3, GFR 30-59 ml/min     Depression     Diabetes mellitus, type II     Diastolic dysfunction     Diverticulosis of intestine with bleeding 2016    E coli bacteremia 2018    E. coli pyelonephritis 2015    E. 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     SECTION       SECTION, CLASSIC      CHOLECYSTECTOMY  age 15    CORONARY ANGIOGRAPHY N/A 2021    Procedure: ANGIOGRAM, CORONARY ARTERY;  Surgeon: Neelam Hicks MD;  Location: [facility] CATH LAB/EP;  Service: Cardiology;  Laterality: N/A;    INCISION AND DRAINAGE OF ABSCESS N/A 2020    Procedure: INCISION AND DRAINAGE, ABSCESS;  Surgeon: Rich Watson MD;  Location: [facility] OR;  Service: General;  Laterality: N/A;    LEFT HEART CATHETERIZATION Left 2021    Procedure: Left heart cath;  Surgeon: eNelam Hicks MD;  Location: [facility] CATH LAB/EP;  Service: Cardiology;  Laterality: Left;    LEFT HEART CATHETERIZATION N/A 2021    Procedure: Left heart cath;  Surgeon: Neelam Hicks MD;  Location: [facility] CATH LAB/EP;  Service: Cardiology;  Laterality: N/A;    TONSILLECTOMY, ADENOIDECTOMY         Review of patient's allergies indicates:   Allergen Reactions    Celexa [citalopram] Nausea And Vomiting       No current facility-administered medications on file prior to encounter.     Current Outpatient Medications on File Prior to Encounter   Medication Sig    acetaminophen (TYLENOL) 500 MG tablet Take 500 mg by mouth every 6 (six) hours as needed for Pain.    albuterol (PROVENTIL/VENTOLIN HFA) 90 mcg/actuation inhaler Inhale 1-2 puffs into the lungs every 4 to 6 hours as  needed.    allopurinoL (ZYLOPRIM) 300 MG tablet Take 300 mg by mouth once daily.    ALPRAZolam (XANAX) 0.5 MG tablet Take 0.5 mg by mouth 3 (three) times daily as needed.    amoxicillin-clavulanate 875-125mg (AUGMENTIN) 875-125 mg per tablet Take 1 tablet by mouth every 12 (twelve) hours. for 7 days    aspirin (ECOTRIN) 81 MG EC tablet Take 1 tablet (81 mg total) by mouth once daily.    atorvastatin (LIPITOR) 80 MG tablet Take 1 tablet (80 mg total) by mouth once daily.    azelastine (ASTELIN) 137 mcg (0.1 %) nasal spray 2 sprays by Nasal route 2 (two) times daily as needed.    BREZTRI AEROSPHERE 160-9-4.8 mcg/actuation HFAA Take 2 puffs by mouth 2 (two) times daily.    carvediloL (COREG) 3.125 MG tablet Take 3.125 mg by mouth 2 (two) times daily.    cetirizine (ZYRTEC) 10 MG tablet Take 10 mg by mouth daily as needed.    cholecalciferol, vitamin D3, 1,250 mcg (50,000 unit) capsule Take 50,000 Units by mouth every Wednesday.    ciprofloxacin HCl (CIPRO) 500 MG tablet Take 1 tablet (500 mg total) by mouth every 12 (twelve) hours. for 7 days    clopidogreL (PLAVIX) 75 mg tablet Take 1 tablet (75 mg total) by mouth once daily.    doxycycline (VIBRAMYCIN) 100 MG Cap Take 1 capsule (100 mg total) by mouth 2 (two) times daily. for 7 days    EScitalopram oxalate (LEXAPRO) 10 MG tablet Take 10 mg by mouth daily as needed.    fluticasone propionate (FLONASE) 50 mcg/actuation nasal spray 1 spray by Each Nostril route daily as needed.    furosemide (LASIX) 40 MG tablet Take 40 mg by mouth 2 (two) times daily as needed.    HUMALOG MIX 75-25 KWIKPEN 100 unit/mL (75-25) InPn Inject 80 Units into the skin 3 (three) times daily.    insulin glargine (LANTUS) 100 unit/mL injection Inject 90 Units into the skin every morning.    LINZESS 72 mcg Cap capsule Take 72 mcg by mouth daily as needed.    lisinopriL 10 MG tablet Take 10 mg by mouth once daily.    medroxyPROGESTERone (DEPO-PROVERA) 150 mg/mL injection Inject 150 mg into the  "muscle every 3 (three) months.    multivit-min-iron fum-folic ac (ONE-A-DAY WOMEN'S COMPLETE) 18 mg iron- 400 mcg Tab Take 1 tablet by mouth once daily.    omeprazole (PRILOSEC) 20 MG capsule Take 20 mg by mouth daily as needed.    pregabalin (LYRICA) 300 MG Cap Take 300 mg by mouth once daily.    promethazine (PHENERGAN) 6.25 mg/5 mL syrup Take 5-10 mLs by mouth every 8 (eight) hours as needed.    SUBOXONE 8-2 mg Place 1 Film under the tongue 3 (three) times daily.    tiZANidine (ZANAFLEX) 4 MG tablet Take 1 tablet (4 mg total) by mouth every 8 (eight) hours as needed. Do not take while on cirpofloxacin antibiotics. Okay to take after completing course    BD ULTRA-FINE MINI PEN NEEDLE 31 gauge x 3/16" Ndle Inject into the skin 5 (five) times daily.    blood sugar diagnostic (TRUE METRIX GLUCOSE TEST STRIP) Strp use as directed to check blood glucose up to three times daily    blood-glucose meter Misc use as directed    lancets (EASY TOUCH TWIST LANCETS) 30 gauge Misc use as directed to test blood glucose up to three times daily    nicotine (NICODERM CQ) 21 mg/24 hr Place 1 patch onto the skin once daily.    promethazine-dextromethorphan (PROMETHAZINE-DM) 6.25-15 mg/5 mL Syrp SMARTSI Teaspoon By Mouth 3 Times Daily PRN    TRUE METRIX GLUCOSE TEST STRIP Strp 3 (three) times daily.     Family History       Problem Relation (Age of Onset)    Breast cancer Paternal Grandmother    Cancer Father, Maternal Aunt    Heart attack Mother, Maternal Aunt, Maternal Grandmother    Heart disease Mother          Tobacco Use    Smoking status: Every Day     Current packs/day: 2.50     Average packs/day: 2.5 packs/day for 37.0 years (92.4 ttl pk-yrs)     Types: Cigarettes     Start date:     Smokeless tobacco: Never    Tobacco comments:     Pt enrolled in the [employer] on [employer]. (SCT Member ID # [id number]).     Substance and Sexual Activity    Alcohol use: No    Drug use: No    Sexual activity: Yes     Partners: Male     " Birth control/protection: Injection     Review of Systems   Constitutional:  Positive for fatigue. Negative for appetite change and chills.   HENT:  Negative for ear discharge and ear pain.    Respiratory:  Negative for cough and choking.    Cardiovascular:  Negative for chest pain and leg swelling.   Gastrointestinal:  Positive for abdominal pain, diarrhea and nausea. Negative for anal bleeding and blood in stool.   Endocrine: Negative for polydipsia and polyphagia.   Genitourinary:  Negative for flank pain and hematuria.   Musculoskeletal:  Negative for back pain and gait problem.   Skin:  Negative for pallor and rash.   Allergic/Immunologic: Negative for food allergies and immunocompromised state.   Neurological:  Negative for seizures and speech difficulty.   Hematological:  Negative for adenopathy. Does not bruise/bleed easily.   Psychiatric/Behavioral:  Negative for decreased concentration and dysphoric mood.      Objective:     Vital Signs (Most Recent):  Temp: 98.9 °F (37.2 °C) (12/19/23 0340)  Pulse: 96 (12/19/23 0342)  Resp: 16 (12/19/23 0340)  BP: 125/68 (12/19/23 0340)  SpO2: (!) 93 % (12/19/23 0340) Vital Signs (24h Range):  Temp:  [95.6 °F (35.3 °C)-98.9 °F (37.2 °C)] 98.9 °F (37.2 °C)  Pulse:  [] 96  Resp:  [14-20] 16  SpO2:  [93 %-100 %] 93 %  BP: ()/(37-78) 125/68     Weight: (!) 162 kg (357 lb 2.3 oz)  Body mass index is 65.32 kg/m².     Physical Exam  Vitals reviewed.   Constitutional:       General: She is not in acute distress.     Appearance: She is obese. She is not toxic-appearing.   HENT:      Right Ear: There is no impacted cerumen.      Left Ear: There is no impacted cerumen.      Nose: No congestion or rhinorrhea.      Mouth/Throat:      Pharynx: No oropharyngeal exudate or posterior oropharyngeal erythema.   Eyes:      General:         Right eye: No discharge.         Left eye: No discharge.   Cardiovascular:      Rate and Rhythm: Normal rate.      Heart sounds: No murmur  "heard.     No gallop.   Pulmonary:      Breath sounds: No wheezing or rales.   Abdominal:      General: There is distension.      Tenderness: There is abdominal tenderness.   Musculoskeletal:         General: No swelling or deformity.      Cervical back: No rigidity.   Lymphadenopathy:      Cervical: No cervical adenopathy.   Skin:     Coloration: Skin is not jaundiced.      Findings: No bruising.   Neurological:      General: No focal deficit present.      Cranial Nerves: No cranial nerve deficit.      Motor: No weakness.   Psychiatric:         Mood and Affect: Mood normal.                Significant Labs: All pertinent labs within the past 24 hours have been reviewed.  Blood Culture:   Recent Labs   Lab 12/18/23  1625 12/18/23  1808   LABBLOO No Growth to date No Growth to date     BMP:   Recent Labs   Lab 12/18/23  1416 12/19/23  0507   * 53*   * 137   K 4.1 3.6    107   CO2 17* 20*   BUN 29* 28*   CREATININE 5.1* 4.8*   CALCIUM 8.3* 8.4*   MG 1.3*  --      CBC:   Recent Labs   Lab 12/18/23  1416 12/19/23  0507   WBC 16.55* 16.63*   HGB 10.6* 10.9*   HCT 31.8* 33.4*   PLT SEE COMMENT 154     CMP:   Recent Labs   Lab 12/18/23  1416 12/19/23  0507   * 137   K 4.1 3.6    107   CO2 17* 20*   * 53*   BUN 29* 28*   CREATININE 5.1* 4.8*   CALCIUM 8.3* 8.4*   PROT 5.9* 5.4*   ALBUMIN 2.1* 1.8*   BILITOT 0.3 0.3   ALKPHOS 89 89   AST 26 17   ALT 21 15   ANIONGAP 12 10     TSH:   Recent Labs   Lab 07/05/23  1504   TSH 1.18     Urine Culture: No results for input(s): "LABURIN" in the last 48 hours.  Urine Studies:   Recent Labs   Lab 12/18/23 2229   COLORU Yellow   APPEARANCEUA Hazy*   PHUR 5.0   SPECGRAV 1.010   PROTEINUA Trace*   GLUCUA Negative   KETONESU Negative   BILIRUBINUA Negative   OCCULTUA 2+*   NITRITE Negative   UROBILINOGEN Negative   LEUKOCYTESUR 3+*   RBCUA 43*   WBCUA >100*   BACTERIA Few*   SQUAMEPITHEL 11       Significant Imaging: I have reviewed all pertinent " imaging results/findings within the past 24 hours.  I have reviewed and interpreted all pertinent imaging results/findings within the past 24 hours.

## 2023-12-19 NOTE — PLAN OF CARE
[facility] - Med Surg  Initial Discharge Assessment       Primary Care Provider: Sammi Edge MD ([facility])    Admission Diagnosis: Appendicitis [K37]  Fatigue [R53.83]  BRIDGET (acute kidney injury) [N17.9]  Chest pain [R07.9]  Appendicitis, unspecified appendicitis type [K37]  Acute appendicitis with localized peritonitis, without perforation, abscess, or gangrene [K35.30]  Sepsis, due to unspecified organism, unspecified whether acute organ dysfunction present [A41.9]    Admission Date: 12/18/2023  Expected Discharge Date: 12/22/2023    Consult: gen surg, neph, wound care, & dty    Payor: MEDICAID / Plan: HEALTHY BLUE ([employer] [place]) / Product Type: Managed Medicaid /     Extended Emergency Contact Information  Primary Emergency Contact: Vanna Diasara  Address: [address] [place] [address] [facility]  Home Phone: [phone]  Relation: Daughter  Secondary Emergency Contact: Samantha Dias  Relation: Daughter    Discharge Plan A: Home Health (current with [facility] ([facility]) & [facility])  Discharge Plan B: (P) Skilled Nursing Facility      [id number], [place] [address] [address] [address]  Phone: [phone] Fax: [phone]    [facility] [address] [facility]  [address] [facility] [address]  Phone: [phone] Fax: [phone]      Initial Assessment (most recent)       Adult Discharge Assessment - 12/19/23 0940          Discharge Assessment    Assessment Type Discharge Planning Assessment     Confirmed/corrected address, phone number and insurance Yes     Source of Information patient;health record     Communicated DEMARIO with patient/caregiver Date not available/Unable to determine     People in Home child(marge), adult   daughterJessica ([phone])    Do you expect to return to your current living situation? Yes     Do you have help at home or someone to help you manage your care at home?  "Yes     Prior to hospitilization cognitive status: Alert/Oriented     Current cognitive status: Alert/Oriented     Equipment Currently Used at Home bedside commode;cane, straight;hospital bed     Readmission within 30 days? Yes     Patient currently being followed by outpatient case management? No     Do you currently have service(s) that help you manage your care at home? No     Do you take prescription medications? Yes     Do you have prescription coverage? Yes     Do you have any problems affording any of your prescribed medications? No     Is the patient taking medications as prescribed? yes     How do you get to doctors appointments? health plan transportation     Are you on dialysis? No     Do you take coumadin? No     Discharge Plan A Home Health   current with [facility] ([facility]) & [facility]    Discharge Plan B Skilled Nursing Facility (P)      DME Needed Upon Discharge  other (see comments) (P)    tbd    Discharge Plan discussed with: Patient (P)                       0940  Patient resting quietly in bed with Dr Mcmullen present when CM rounded. No family present. Patient was admitted with acute renal failure & is being followed by gen surg, neph, wound care & dty. WBC 16.63, BUN 28, & cr 4.8 this AM.     Per gen surg note, "Acute appendicitis with localized peritonitis, without perforation, abscess, or gangrene  43F with tip appendicitis,tTentatively plan appendectomy in 6-8 weeks"    Patient lives with her daughter, Jessica Dias, has equipment to assist with ADLs, is currently receiving services from [facility] and [facility], & will need assistance with transportation at time of discharge. CM informed [facility] ([phone]) of the pt's hospitalization.     CM updated patient's whiteboard with CM name & contact information.     1400  Voicemail message left for Dr Sammi Edge's (PCP)  requesting a hospfu appt. Awaiting response.     Message sent to the  requesting a " gen surg hospfu appt in 6-8 weeks. & neph hospfu appt. Awaiting response.     1415  CM informed Jessica ([phone]) w/[facility] of the pt's hospitalization.     Referral sent to [facility] via [facility]. Awaiting response.    1545  CM was informed by  Sheela of hospfu appts scheduled for the patient with Dr Watson (gen surg) on 1/5/2024 at 0940 & Dr Damian (neph) on 2/21/2024 at 1145. Information added to the patient's discharge paperwork.     Patient accepted by [facility] following discharge.     1600  Pt's face sheet, H&P, and wound care note manually faxed to Jessica (f [phone]) w/[facility].       Will continue to follow.

## 2023-12-19 NOTE — ASSESSMENT & PLAN NOTE
Will consult Psychiatry due to concern about medical decision making capacity  Will not PEC at this time as patient seems A&Ox4 but making rude and derogatory comments

## 2023-12-19 NOTE — HPI
Lele Dias is a 43 yr old female with PMH of CKD, depression, diabetes, hypertension who presents with lower abdominal pain and fatigue.    Patient reports right-sided lower abdominal pain, weakness, fatigue over the past day.  She states that she had multiple nonbloody nonbilious loose bowel movements, with associated nausea.  Of note, she was recently discharged for UTI/BRIDGET was sent home with antibiotics.    Triage vitals were remarkable for hypotension and hypothermia.  Physical exam significant for abdominal tenderness.    CBC was significant for leukocytosis, normocytic anemia.  CMP was significant for hyponatremia, and acute renal failure.  Troponin was slightly elevated at 0.146.  UA significant for WBCs and bacteria.  Blood cultures pending.    CT imaging was concerning for acute tip appendicitis, possible cellulitis    Surgery was consulted.  No acute surgery due to Plavix use    Patient admitted for acute renal failure and appendicitis management.

## 2023-12-19 NOTE — ASSESSMENT & PLAN NOTE
Patient with acute kidney injury/acute renal failure likely due to pre-renal azotemia due to dehydration BRIDGET is currently stable. Baseline creatinine  1.0  - Labs reviewed- Renal function/electrolytes with Estimated Creatinine Clearance: 22.2 mL/min (A) (based on SCr of 4.8 mg/dL (H)). according to latest data. Monitor urine output and serial BMP and adjust therapy as needed. Avoid nephrotoxins and renally dose meds for GFR listed above.

## 2023-12-19 NOTE — ASSESSMENT & PLAN NOTE
43F with tip appendicitis    Continue abx. No abscess. Nothing to drain  WBC stable  On plavix  Will treat medically for appendicitis.  OK for diet   Tentatively plan appendectomy in 6-8 weeks  Rest of care per primary team

## 2023-12-19 NOTE — SUBJECTIVE & OBJECTIVE
Interval History: Very irritable, refusing VS checks, labs. BP check showed low reading but patient is asymptomatic and feel it might be inaccurate    Review of Systems   Constitutional:  Positive for fatigue. Negative for appetite change and chills.   HENT:  Negative for ear discharge and ear pain.    Respiratory:  Negative for cough and choking.    Cardiovascular:  Negative for chest pain and leg swelling.   Gastrointestinal:  Positive for abdominal pain, diarrhea and nausea. Negative for anal bleeding and blood in stool.   Endocrine: Negative for polydipsia and polyphagia.   Genitourinary:  Negative for flank pain and hematuria.   Musculoskeletal:  Negative for back pain and gait problem.   Skin:  Negative for pallor and rash.   Allergic/Immunologic: Negative for food allergies and immunocompromised state.   Neurological:  Negative for seizures and speech difficulty.   Hematological:  Negative for adenopathy. Does not bruise/bleed easily.   Psychiatric/Behavioral:  Positive for agitation and behavioral problems. Negative for decreased concentration and dysphoric mood.      Objective:     Vital Signs (Most Recent):  Temp: 98 °F (36.7 °C) (12/19/23 1513)  Pulse: 80 (12/19/23 1611)  Resp: 18 (12/19/23 1513)  BP: 134/84 (12/19/23 1613)  SpO2: 97 % (12/19/23 1513) Vital Signs (24h Range):  Temp:  [97 °F (36.1 °C)-98.9 °F (37.2 °C)] 98 °F (36.7 °C)  Pulse:  [] 80  Resp:  [16-20] 18  SpO2:  [93 %-100 %] 97 %  BP: ()/(48-87) 134/84     Weight: (!) 162 kg (357 lb 2.3 oz)  Body mass index is 65.32 kg/m².    Intake/Output Summary (Last 24 hours) at 12/19/2023 1739  Last data filed at 12/19/2023 1125  Gross per 24 hour   Intake 200 ml   Output 351 ml   Net -151 ml         Physical Exam  Vitals reviewed.   Constitutional:       General: She is not in acute distress.     Appearance: She is obese. She is not toxic-appearing.   HENT:      Right Ear: There is no impacted cerumen.      Left Ear: There is no impacted  cerumen.      Nose: No congestion or rhinorrhea.      Mouth/Throat:      Pharynx: No oropharyngeal exudate or posterior oropharyngeal erythema.   Eyes:      General:         Right eye: No discharge.         Left eye: No discharge.   Cardiovascular:      Rate and Rhythm: Normal rate.      Heart sounds: No murmur heard.     No gallop.   Pulmonary:      Breath sounds: No wheezing or rales.   Abdominal:      General: There is distension.      Tenderness: There is abdominal tenderness.   Musculoskeletal:         General: No swelling or deformity.      Cervical back: No rigidity.   Lymphadenopathy:      Cervical: No cervical adenopathy.   Skin:     Coloration: Skin is not jaundiced.      Findings: No bruising.   Neurological:      General: No focal deficit present.      Cranial Nerves: No cranial nerve deficit.      Motor: No weakness.   Psychiatric:         Mood and Affect: Mood normal.             Significant Labs: All pertinent labs within the past 24 hours have been reviewed.  CBC:   Recent Labs   Lab 12/18/23  1416 12/19/23  0507   WBC 16.55* 16.63*   HGB 10.6* 10.9*   HCT 31.8* 33.4*   PLT SEE COMMENT 154     CMP:   Recent Labs   Lab 12/18/23  1416 12/19/23  0507   * 137   K 4.1 3.6    107   CO2 17* 20*   * 53*   BUN 29* 28*   CREATININE 5.1* 4.8*   CALCIUM 8.3* 8.4*   PROT 5.9* 5.4*   ALBUMIN 2.1* 1.8*   BILITOT 0.3 0.3   ALKPHOS 89 89   AST 26 17   ALT 21 15   ANIONGAP 12 10       Significant Imaging: I have reviewed all pertinent imaging results/findings within the past 24 hours.

## 2023-12-19 NOTE — CODE/ RAPID DOCUMENTATION
RAPID RESPONSE NURSE PROACTIVE ROUNDING NOTE       Time of Visit: 1600    Admit Date: 2023  LOS: 1  Code Status: Full Code   Date of Visit: 2023  : 1980  Age: 43 y.o.  Sex: female  Race: White  Bed: K524/K524 A:   MRN: [id number]  Was the patient discharged from an ICU this admission? No   Was the patient discharged from a PACU within last 24 hours? No   Did the patient receive conscious sedation/general anesthesia in last 24 hours? No   Was the patient in the ED within the past 24 hours? Yes   Was the patient on NIPPV within the past 24 hours? No   Attending Physician: Courtney Prieto MD  Primary Service: Internal Medicine,Hospitalist   Time spent at the bedside: 45 - 60 min    SITUATION    Notified by charge RN via phone call  Reason for alert: Hypotension    Diagnosis: Acute renal failure   has a past medical history of Anemia, Asthma, Cellulitis of abdominal wall, CKD (chronic kidney disease) stage 3, GFR 30-59 ml/min, Depression, Diabetes mellitus, type II, Diastolic dysfunction, Diverticulosis of intestine with bleeding, E coli bacteremia, E. coli pyelonephritis, E. coli UTI, Hematuria, Hernia, epigastric, Hypertension, Hypocalcemia, Nonalcoholic hepatosteatosis, Periumbilical hernia, and Proteinuria.    Last Vitals:  Temp: 98 °F (36.7 °C) (1513)  Pulse: 80 (1611)  Resp: 18 (1513)  BP: 134/84 (1613)  SpO2: 97 % (1513)    24 Hour Vitals Range:  Temp:  [97 °F (36.1 °C)-98.9 °F (37.2 °C)]   Pulse:  []   Resp:  [16-20]   BP: ()/(48-87)   SpO2:  [93 %-100 %]     Clinical Issues: Circulatory    ASSESSMENT/INTERVENTIONS    Pt appeared agitated and restless in bed on arrival. Pt BP sys in the 70's. Pt uncomplying with instructions and has had a history of refusing care. Pt refusing vitals at this time. MD notified. MD at bedside. Interdisciplinary team utilized therapeutic communication and began bartering with patient to allow proper care, vitals, labs,  and to stop being aggressive to staff. Interventions included placing a warm blanket on the pt, giving the patient a Sprite soft drink, calling the patient's family, and positioning the patient. Pt BP increased to 134/84 (103). Labs were drawn. All safety measures in place. Pt and family asked questions and all concerns addressed.     RECOMMENDATIONS  Maintain pt compliance     Discussed plan of care with charge RNwagner    PROVIDER ESCALATION    Physician escalation: Yes    Orders received and case discussed with Dr. acevedo .    Disposition:Remain in room 524    FOLLOW UP    Call back the Rapid Response NurseMaximiliano at [phone] for additional questions or concerns.

## 2023-12-19 NOTE — PROGRESS NOTES
[facility]  [facility]  Progress Note    Subjective:     History of Present Illness:  No notes on file    Post-Op Info:  * No surgery found *         Interval History: No acute events overnight. HDS on RA. Still with some abdominal pain.     Medications:  Continuous Infusions:   sodium chloride 0.9% 250 mL/hr at 12/19/23 0638     Scheduled Meds:   acetaminophen  650 mg Oral Once    atorvastatin  80 mg Oral QHS    buprenorphine-naloxone 8-2  8 mg Sublingual TID    cefTRIAXone (ROCEPHIN) IVPB  2 g Intravenous Q24H    EScitalopram oxalate  10 mg Oral Daily    heparin (porcine)  5,000 Units Subcutaneous Q8H    metronidazole  500 mg Intravenous Q8H    nicotine  1 patch Transdermal Daily    pantoprazole  40 mg Oral Daily    pregabalin  150 mg Oral Daily     PRN Meds:acetaminophen, albuterol-ipratropium, ALPRAZolam, azelastine, cetirizine, dextrose 10%, dextrose 10%, fluticasone propionate, glucagon (human recombinant), glucose, glucose, insulin aspart U-100, melatonin, naloxone, ondansetron, promethazine, sodium chloride 0.9%, tiZANidine     Review of patient's allergies indicates:   Allergen Reactions    Celexa [citalopram] Nausea And Vomiting     Objective:     Vital Signs (Most Recent):  Temp: 98.9 °F (37.2 °C) (12/19/23 0340)  Pulse: 96 (12/19/23 0342)  Resp: 16 (12/19/23 0340)  BP: 125/68 (12/19/23 0340)  SpO2: (!) 93 % (12/19/23 0340) Vital Signs (24h Range):  Temp:  [95.6 °F (35.3 °C)-98.9 °F (37.2 °C)] 98.9 °F (37.2 °C)  Pulse:  [] 96  Resp:  [14-20] 16  SpO2:  [93 %-100 %] 93 %  BP: ()/(37-78) 125/68     Weight: (!) 162 kg (357 lb 2.3 oz)  Body mass index is 65.32 kg/m².    Intake/Output - Last 3 Shifts         12/17 0700 12/18 0659 12/18 0700 12/19 0659    IV Piggyback  100    Total Intake(mL/kg)  100 (0.6)    Net  +100                   Physical Exam  Vitals and nursing note reviewed.   Constitutional:       General: She is not in acute distress.  HENT:      Head: Normocephalic and  atraumatic.      Mouth/Throat:      Mouth: Mucous membranes are moist.   Cardiovascular:      Rate and Rhythm: Normal rate.   Pulmonary:      Effort: Pulmonary effort is normal. No respiratory distress.   Abdominal:      General: There is no distension.      Palpations: Abdomen is soft.      Tenderness: There is abdominal tenderness (lower abdomen).      Comments: Has area of panniculitis, wounds. No abscess noted.   Skin:     General: Skin is warm and dry.   Neurological:      General: No focal deficit present.      Mental Status: She is alert and oriented to person, place, and time.          Significant Labs:  I have reviewed all pertinent lab results within the past 24 hours.  CBC:   Recent Labs   Lab 12/19/23  0507   WBC 16.63*   RBC 3.70*   HGB 10.9*   HCT 33.4*      MCV 90   MCH 29.5   MCHC 32.6     CMP:   Recent Labs   Lab 12/19/23  0507   GLU 53*   CALCIUM 8.4*   ALBUMIN 1.8*   PROT 5.4*      K 3.6   CO2 20*      BUN 28*   CREATININE 4.8*   ALKPHOS 89   ALT 15   AST 17   BILITOT 0.3       Significant Diagnostics:  I have reviewed all pertinent imaging results/findings within the past 24 hours.  Assessment/Plan:     Acute appendicitis with localized peritonitis, without perforation, abscess, or gangrene  43F with tip appendicitis    Continue abx. No abscess. Nothing to drain  WBC stable  On plavix  Will treat medically for appendicitis.  OK for diet   Tentatively plan appendectomy in 6-8 weeks  Rest of care per primary team          Benita Vasquez MD  General Surgery  [facility]
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
## 2023-12-19 NOTE — ASSESSMENT & PLAN NOTE
Abdominal imaging concerning for appendicitis  Due to Plavix use, no urgent surgery is necessary  General surgery saw the patient in the ED    Plan:  Continue with vancomycin, cefepime, metronidazole  Follow-up on General surgery recommendations  Blood cultures pending  May consider additional scans for worsening pain and discomfort

## 2023-12-19 NOTE — PHARMACY MED REC
"[facility]           Pharmacy  Admission Medication History     The home medication history was taken by Mckenzie Carpenter.      Medication history obtained from Medications listed below were obtained from: Patient/family    Based on information gathered for medication list, you may go to "Admission" then "Reconcile Home Medications" tabs to review and/or act upon those items.     The home medication list has been updated by the Pharmacy department.   Please read ALL comments highlighted in yellow.   Please address this information as you see fit.    Feel free to contact us if you have any questions or require assistance.        No current facility-administered medications on file prior to encounter.     Current Outpatient Medications on File Prior to Encounter   Medication Sig Dispense Refill    acetaminophen (TYLENOL) 500 MG tablet Take 500 mg by mouth every 6 (six) hours as needed for Pain.      albuterol (PROVENTIL/VENTOLIN HFA) 90 mcg/actuation inhaler Inhale 1-2 puffs into the lungs every 4 to 6 hours as needed.      allopurinoL (ZYLOPRIM) 300 MG tablet Take 300 mg by mouth once daily.      ALPRAZolam (XANAX) 0.5 MG tablet Take 0.5 mg by mouth 3 (three) times daily as needed.      amoxicillin-clavulanate 875-125mg (AUGMENTIN) 875-125 mg per tablet Take 1 tablet by mouth every 12 (twelve) hours. for 7 days 14 tablet 0    aspirin (ECOTRIN) 81 MG EC tablet Take 1 tablet (81 mg total) by mouth once daily. 90 tablet 3    atorvastatin (LIPITOR) 80 MG tablet Take 1 tablet (80 mg total) by mouth once daily. 90 tablet 1    azelastine (ASTELIN) 137 mcg (0.1 %) nasal spray 2 sprays by Nasal route 2 (two) times daily as needed.      BREZTRI AEROSPHERE 160-9-4.8 mcg/actuation HFAA Take 2 puffs by mouth 2 (two) times daily.      carvediloL (COREG) 3.125 MG tablet Take 3.125 mg by mouth 2 (two) times daily.      cetirizine (ZYRTEC) 10 MG tablet Take 10 mg by mouth daily as needed.      cholecalciferol, " "vitamin D3, 1,250 mcg (50,000 unit) capsule Take 50,000 Units by mouth every Wednesday.      ciprofloxacin HCl (CIPRO) 500 MG tablet Take 1 tablet (500 mg total) by mouth every 12 (twelve) hours. for 7 days 14 tablet 0    clopidogreL (PLAVIX) 75 mg tablet Take 1 tablet (75 mg total) by mouth once daily. 30 tablet 3    doxycycline (VIBRAMYCIN) 100 MG Cap Take 1 capsule (100 mg total) by mouth 2 (two) times daily. for 7 days 14 capsule 0    EScitalopram oxalate (LEXAPRO) 10 MG tablet Take 10 mg by mouth daily as needed.      fluticasone propionate (FLONASE) 50 mcg/actuation nasal spray 1 spray by Each Nostril route daily as needed.      furosemide (LASIX) 40 MG tablet Take 40 mg by mouth 2 (two) times daily as needed.      HUMALOG MIX 75-25 KWIKPEN 100 unit/mL (75-25) InPn Inject 80 Units into the skin 3 (three) times daily.      insulin glargine (LANTUS) 100 unit/mL injection Inject 90 Units into the skin every morning.      LINZESS 72 mcg Cap capsule Take 72 mcg by mouth daily as needed.      lisinopriL 10 MG tablet Take 10 mg by mouth once daily.      medroxyPROGESTERone (DEPO-PROVERA) 150 mg/mL injection Inject 150 mg into the muscle every 3 (three) months.      multivit-min-iron fum-folic ac (ONE-A-DAY WOMEN'S COMPLETE) 18 mg iron- 400 mcg Tab Take 1 tablet by mouth once daily.      omeprazole (PRILOSEC) 20 MG capsule Take 20 mg by mouth daily as needed.      pregabalin (LYRICA) 300 MG Cap Take 300 mg by mouth once daily.      promethazine (PHENERGAN) 6.25 mg/5 mL syrup Take 5-10 mLs by mouth every 8 (eight) hours as needed.      SUBOXONE 8-2 mg Place 1 Film under the tongue 3 (three) times daily.      tiZANidine (ZANAFLEX) 4 MG tablet Take 1 tablet (4 mg total) by mouth every 8 (eight) hours as needed. Do not take while on cirpofloxacin antibiotics. Okay to take after completing course      BD ULTRA-FINE MINI PEN NEEDLE 31 gauge x 3/16" Ndle Inject into the skin 5 (five) times daily.      blood sugar diagnostic " (TRUE METRIX GLUCOSE TEST STRIP) Strp use as directed to check blood glucose up to three times daily 100 each 0    blood-glucose meter Misc use as directed 1 each 0    lancets (EASY TOUCH TWIST LANCETS) 30 gauge Misc use as directed to test blood glucose up to three times daily 100 each 3    nicotine (NICODERM CQ) 21 mg/24 hr Place 1 patch onto the skin once daily. 28 patch 1    promethazine-dextromethorphan (PROMETHAZINE-DM) 6.25-15 mg/5 mL Syrp SMARTSI Teaspoon By Mouth 3 Times Daily PRN      TRUE METRIX GLUCOSE TEST STRIP Strp 3 (three) times daily.         Please address this information as you see fit.  Feel free to contact us if you have any questions or require assistance.    Mckenzie Carpenter  [phone]                  .

## 2023-12-19 NOTE — PROGRESS NOTES
Individual Follow-Up Form    12/19/2023    Quit Date: To be determined    Clinical Status of Patient: Inpatient    Length of Service: 15 minutes    Continuing Medication: yes  Patches    Comments: Smoking cessation education note; Pt is a former 3 pk/day cigarette smoker x 37 yrs. She states that she has cut down to 1 pk/day, and denies nicotine withdrawal symptoms with 21 mg nicotine patch in use Q day.     Diagnosis: F17.210

## 2023-12-19 NOTE — CONSULTS
[place] [facility] S[facility]  Adult Nutrition  Consult Note    SUMMARY     Recommendations    Recommendation:  1. Add beneprotein TID to promote wound healing.   2. Encourage intake at meals.   3. Monitor weight/labs.   4. RD to follow to monitor po intake    Goals:  Pt will tolerate diet with at least 50-75% intake at meals by RD follow up  Nutrition Goal Status: new  Communication of RD Recs: reviewed with RN (Roxi)    Assessment and Plan  Nutrition Problem  Increased protein needs    Related to (etiology):   Wound healing    Signs and Symptoms (as evidenced by):   Abdomen, R buttocks, & L calf wounds     Interventions:  Collaboration with other providers  Increased protein diet    Nutrition Diagnosis Status:   New      Malnutrition Assessment  Orbital Region (Subcutaneous Fat Loss): well nourished  Upper Arm Region (Subcutaneous Fat Loss): well nourished  Thoracic and Lumbar Region: well nourished   Orthodox Region (Muscle Loss): well nourished  Clavicle Bone Region (Muscle Loss): well nourished  Clavicle and Acromion Bone Region (Muscle Loss): well nourished  Scapular Bone Region (Muscle Loss): well nourished  Dorsal Hand (Muscle Loss): well nourished  Patellar Region (Muscle Loss): well nourished  Anterior Thigh Region (Muscle Loss): well nourished  Posterior Calf Region (Muscle Loss): well nourished       Reason for Assessment  Reason For Assessment: consult (wounds)  Diagnosis:  (acute renal failure/appendicitis)  Relevant Medical History: HTN, DM, hernua, depression, diverticulosis, CKD  General Information Comments: Pt on 2000 calorie ADA diet. Intake not recorded. Unable to apseak with pt. She was screaming and yelling at nursing upon visit. Unable to assess NFPE but appears nourished per BMI. Wound pics reviewed. Alan 16- abdomen, R buttocks, & L calf wounds. Weight stable.  Nutrition Discharge Planning: pt to d/c on ADA diet    Nutrition Risk Screen    Nutrition Risk Screen: large or nonhealing wound, burn  "or pressure injury    Nutrition/Diet History  Food Preferences: no Yazdanism or cultural food prefs identified  Factors Affecting Nutritional Intake: abdominal pain    Anthropometrics  Temp: 97 °F (36.1 °C)  Height Method: Stated  Height: 5' 2" (157.5 cm)  Height (inches): 62 in  Weight Method: Bed Scale  Weight: (!) 162 kg (357 lb 2.3 oz)  Weight (lb): (!) 357.15 lb  Ideal Body Weight (IBW), Female: 110 lb  % Ideal Body Weight, Female (lb): 324.68 %  BMI (Calculated): 65.3  BMI Grade: greater than 40 - morbid obesity  Usual Body Weight (UBW), kg: (!) 161.5 kg (10/6)  % Usual Body Weight: 100.52  % Weight Change From Usual Weight: 0.31 %    Lab/Procedures/Meds  Pertinent Labs Reviewed: reviewed  Pertinent Labs Comments: BUN 28H, Crea 4.8H, Glu 53L, Ca 8.4L, Mg 1.3L, Alb 1.8L  Pertinent Medications Reviewed: reviewed  Pertinent Medications Comments: heparin, pantoprazole, lasix    Estimated/Assessed Needs  Weight Used For Calorie Calculations: 50 kg (110 lb 3.7 oz) (IBW)  Energy Calorie Requirements (kcal): 1750 (35 kcal/kg)  Energy Need Method: Kcal/kg  Protein Requirements: 70g (1.4g/kg)  Weight Used For Protein Calculations: 50 kg (110 lb 3.7 oz) (IBW)  Estimated Fluid Requirement Method: RDA Method  RDA Method (mL): 1750  CHO Requirement: 200g    Nutrition Prescription Ordered  Current Diet Order: 2000 calorie ADA    Evaluation of Received Nutrient/Fluid Intake  I/O: 100/0  Energy Calories Required: not meeting needs  Protein Required: not meeting needs  Fluid Required: not meeting needs  Comments: LBM 12/15  % Intake of Estimated Energy Needs: Other: intake not recorded  % Meal Intake: Other: intake not recorded    Nutrition Risk  Level of Risk/Frequency of Follow-up:  (2xweekly)     Monitor and Evaluation  Food and Nutrient Intake: food and beverage intake  Food and Nutrient Adminstration: diet order  Physical Activity and Function: nutrition-related ADLs and IADLs  Anthropometric Measurements: " weight  Biochemical Data, Medical Tests and Procedures: electrolyte and renal panel  Nutrition-Focused Physical Findings: overall appearance     Nutrition Follow-Up  RD Follow-up?: Yes

## 2023-12-19 NOTE — HOSPITAL COURSE
12/19/23 Refusing labs, VS checks  12/20/23 Abd pain is improved. Borderline BPs but patient is asymptomatic  12/21/23 is reporting worsening abdominal pain  12/22 Hypotension worsening with increased UOP and Cre down to 3.4  12/23 Patient requested repeat CT A/P due to abdominal pain, appendicitis is improved shows possible endometrial thickening  12/24 Renal function improved, still with abdominal tenderness, CT A/P shows resolving appendicitis, endometrial thickening, pelvic US performed

## 2023-12-19 NOTE — CONSULTS
Nephrology Consult  H&P      Consult Requested By: Courtney Prieto MD  Reason for Consult: BRIDGET      SUBJECTIVE:     History of Present Illness:  Lele Dias is a 43 y.o.   female who  has a past medical history of Anemia, Asthma, Cellulitis of abdominal wall (12/18/2017), CKD (chronic kidney disease) stage 3, GFR 30-59 ml/min, Depression, Diabetes mellitus, type II, Diastolic dysfunction, Diverticulosis of intestine with bleeding (9/14/2016), E coli bacteremia (4/21/2018), E. coli pyelonephritis (6/2/2015), E. coli UTI (12/18/2017), Hematuria, Hernia, epigastric, Hypertension, Hypocalcemia, Nonalcoholic hepatosteatosis, Periumbilical hernia, and Proteinuria.. The patient presented to the \[facility]\"" on 12/18/2023 with a primary complaint of weakness abdominal pain on 12/13 she was admitted for n/diarrhea found to have BRIDGET UTI.    ?    Review of Systems   Constitutional:  Positive for malaise/fatigue. Negative for chills and fever.   HENT:  Negative for congestion and sore throat.    Eyes:  Negative for blurred vision, double vision and photophobia.   Respiratory:  Negative for cough and shortness of breath.    Cardiovascular:  Negative for chest pain, palpitations and leg swelling.   Gastrointestinal:  Positive for abdominal pain. Negative for diarrhea, nausea and vomiting.   Genitourinary:  Negative for dysuria and urgency.   Musculoskeletal:  Negative for joint pain and myalgias.   Skin:  Negative for itching and rash.   Neurological:  Positive for weakness. Negative for dizziness, sensory change and headaches.   Endo/Heme/Allergies:  Negative for polydipsia. Does not bruise/bleed easily.   Psychiatric/Behavioral:  Negative for depression.        Past Medical History:   Diagnosis Date    Anemia     Asthma     Cellulitis of abdominal wall 12/18/2017    CKD (chronic kidney disease) stage 3, GFR 30-59 ml/min     Depression     Diabetes mellitus, type II     Diastolic dysfunction     Diverticulosis of  intestine with bleeding 2016    E coli bacteremia 2018    E. coli pyelonephritis 2015    E. 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     SECTION       SECTION, CLASSIC      CHOLECYSTECTOMY  age 15    CORONARY ANGIOGRAPHY N/A 2021    Procedure: ANGIOGRAM, CORONARY ARTERY;  Surgeon: Neelam Hicks MD;  Location: [facility] CATH LAB/EP;  Service: Cardiology;  Laterality: N/A;    INCISION AND DRAINAGE OF ABSCESS N/A 2020    Procedure: INCISION AND DRAINAGE, ABSCESS;  Surgeon: Rich Watson MD;  Location: [facility] OR;  Service: General;  Laterality: N/A;    LEFT HEART CATHETERIZATION Left 2021    Procedure: Left heart cath;  Surgeon: Neelam Hicks MD;  Location: [facility] CATH LAB/EP;  Service: Cardiology;  Laterality: Left;    LEFT HEART CATHETERIZATION N/A 2021    Procedure: Left heart cath;  Surgeon: Neelam Hicks MD;  Location: [facility] CATH LAB/EP;  Service: Cardiology;  Laterality: N/A;    TONSILLECTOMY, ADENOIDECTOMY       Family History   Problem Relation Age of Onset    Cancer Father     Heart attack Mother     Heart disease Mother     Cancer Maternal Aunt         lung (smoker)    Heart attack Maternal Aunt     Breast cancer Paternal Grandmother     Heart attack Maternal Grandmother      Social History     Tobacco Use    Smoking status: Every Day     Current packs/day: 2.50     Average packs/day: 2.5 packs/day for 37.0 years (92.4 ttl pk-yrs)     Types: Cigarettes     Start date:     Smokeless tobacco: Never    Tobacco comments:     Pt enrolled in the [employer] on 18. (SCT Member ID # [id number]).     Substance Use Topics    Alcohol use: No    Drug use: No       Review of patient's allergies indicates:   Allergen Reactions    Celexa  [citalopram] Nausea And Vomiting            OBJECTIVE:     Vital Signs (Most Recent)  Vitals:    12/19/23 0047 12/19/23 0340 12/19/23 0342 12/19/23 0753   BP:  125/68  (!) 149/87   BP Location:  Right arm     Patient Position:  Lying  Lying   Pulse:  96 96 102   Resp: 20 16  18   Temp:  98.9 °F (37.2 °C)  97 °F (36.1 °C)   TempSrc:  Oral  Oral   SpO2:  (!) 93%  98%   Weight:       Height:                     Medications:   acetaminophen  650 mg Oral Once    atorvastatin  80 mg Oral QHS    buprenorphine-naloxone 8-2  8 mg Sublingual TID    cefTRIAXone (ROCEPHIN) IVPB  2 g Intravenous Q24H    EScitalopram oxalate  10 mg Oral Daily    heparin (porcine)  5,000 Units Subcutaneous Q8H    methocarbamoL  500 mg Oral QID    metronidazole  500 mg Intravenous Q8H    nicotine  1 patch Transdermal Daily    pantoprazole  40 mg Oral Daily    pregabalin  150 mg Oral Daily           Physical Exam  Vitals and nursing note reviewed.   Constitutional:       General: She is not in acute distress.     Appearance: She is obese. She is not diaphoretic.   HENT:      Head: Normocephalic and atraumatic.      Mouth/Throat:      Pharynx: No oropharyngeal exudate.   Eyes:      General: No scleral icterus.     Conjunctiva/sclera: Conjunctivae normal.      Pupils: Pupils are equal, round, and reactive to light.   Cardiovascular:      Rate and Rhythm: Normal rate and regular rhythm.      Heart sounds: Normal heart sounds. No murmur heard.  Pulmonary:      Effort: Pulmonary effort is normal. No respiratory distress.      Breath sounds: Normal breath sounds.   Abdominal:      General: Bowel sounds are normal. There is no distension.      Palpations: Abdomen is soft.      Tenderness: There is abdominal tenderness.   Musculoskeletal:         General: Normal range of motion.      Cervical back: Normal range of motion and neck supple.   Skin:     General: Skin is warm and dry.      Findings: No erythema.   Neurological:      Mental Status: She is alert  and oriented to person, place, and time.      Cranial Nerves: No cranial nerve deficit.   Psychiatric:         Mood and Affect: Affect normal.         Cognition and Memory: Memory normal.         Judgment: Judgment normal.         Laboratory:  Recent Labs   Lab 12/15/23  0647 12/18/23  1416 12/19/23  0507   WBC 11.35 16.55* 16.63*   HGB 11.1* 10.6* 10.9*   HCT 33.3* 31.8* 33.4*   * SEE COMMENT 154   MONO 6.2  0.7 9.0  CANCELED 5.4  0.9   EOSINOPHIL 0.7 0.0 0.6     Recent Labs   Lab 12/13/23  0824 12/13/23  1854 12/14/23  0418 12/18/23  1416 12/19/23  0507      < > 138 133* 137   K 4.0   < > 4.6 4.1 3.6      < > 106 104 107   CO2 21*   < > 19* 17* 20*   BUN 19   < > 15 29* 28*   CREATININE 1.2   < > 1.1 5.1* 4.8*   CALCIUM 8.3*   < > 8.5* 8.3* 8.4*   PHOS 2.8  --  3.1  --   --     < > = values in this interval not displayed.       Diagnostic Results:  X-Ray: Reviewed  US: Reviewed  Echo: Reviewed  ASSESSMENT/PLAN:     1. BRIDGET - on CKD3 ? No baseline labs out of hospital all Cr BRIDGET admission   Hx of HTN and DM, MI s/p PCI 2021  -- Now with abdominal pain panniculitis na appendicitis Gen Surgery following on IV abx    -- UA + for UTI but was treated since 12/13  Not clear etiology at this point  ddx remains  ATN, Sepsis,   glomerulonephritis, AIN.    Cr up to 5.1=>4.8 from 1.1 on 12/14/23  Get work up for now IVF IV abx supportive therapy    -- No Indication for RRT at this time, K+ acceptable, No Uremia symptoms.  -- Daily Renal Function Panel  -- Avoid Hypotension.  -- Renally dose all meds  -- Please avoid nephrotoxins, including NSAIDs, aminoglycosides, IV contrast (unless absolutely necessary), gadolinium, fleets and other phosphorous-based laxatives. Caution with antibiotics.    2. HTN (I10) - controlled   3. Anemia of chronic kidney disease  vs acute illness vs MARGARETTE   Recent Labs   Lab 12/15/23  0647 12/18/23  1416 12/19/23  0507   HGB 11.1* 10.6* 10.9*   HCT 33.3* 31.8* 33.4*   * SEE  "COMMENT 154       Iron - check levels   Lab Results   Component Value Date    IRON <10 (L) 04/22/2018    TIBC 189 (L) 04/22/2018    FERRITIN 51 08/09/2018       4. 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     Recent Labs   Lab 12/13/23  1854 12/14/23  0418 12/18/23  1416   MG 1.8 1.7 1.3*   Check Mg replace as needed     Lab Results   Component Value Date    CALCIUM 8.4 (L) 12/19/2023    PHOS 3.1 12/14/2023     No results found for: "[id number]"    Lab Results   Component Value Date    CO2 20 (L) 12/19/2023       5. Nutrition/Hypoalbuminemia   Recent Labs   Lab 12/18/23  1416 12/19/23  0507   ALBUMIN 2.1* 1.8*     Nepro with meals TID.   Check UPCR       Thank you for allowing me to participate in care of your patient  With any question please call   Rob Mcmullen MD     [facility]  RONA Frank MD,   MD NATA Cruz MD E. V. Harmon, NP  [id number]   [place], [address] [phone]  After hours answering service: [phone]   "

## 2023-12-19 NOTE — ED NOTES
Bear hugger removed, temp normal at 97.6, patient stated that she was still cold from being in CT, placed two additional warm blankets. VSS. Will continue to monitor. MD informed patient that surgery will not happen tonight because of her daily dose of plavix.

## 2023-12-19 NOTE — ASSESSMENT & PLAN NOTE
Patient with acute kidney injury/acute renal failure likely due to pre-renal azotemia due to dehydration BRIDGET is currently stable. Baseline creatinine  1.1  - Labs reviewed- Renal function/electrolytes with Estimated Creatinine Clearance: 22.2 mL/min (A) (based on SCr of 4.8 mg/dL (H)). according to latest data. Monitor urine output and serial BMP and adjust therapy as needed. Avoid nephrotoxins and renally dose meds for GFR listed above.
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
## 2023-12-20 PROBLEM — I95.9 HYPOTENSION: Status: ACTIVE | Noted: 2023-01-01

## 2023-12-20 NOTE — ASSESSMENT & PLAN NOTE
43F with tip appendicitis. Pain improving    Continue abx. No abscess. Nothing to drain  WBC stable  On plavix  Will treat medically for appendicitis.  OK for diet   Tentatively plan appendectomy in 6-8 weeks  Rest of care per primary team

## 2023-12-20 NOTE — PROGRESS NOTES
Nephrology Consult  H&P      Consult Requested By: Courtney Prieto MD  Reason for Consult: BRIDGET      SUBJECTIVE:     History of Present Illness:  Lele Dias is a 43 y.o.   female who  has a past medical history of Anemia, Asthma, Cellulitis of abdominal wall (12/18/2017), CKD (chronic kidney disease) stage 3, GFR 30-59 ml/min, Depression, Diabetes mellitus, type II, Diastolic dysfunction, Diverticulosis of intestine with bleeding (9/14/2016), E coli bacteremia (4/21/2018), E. coli pyelonephritis (6/2/2015), E. coli UTI (12/18/2017), Hematuria, Hernia, epigastric, Hypertension, Hypocalcemia, Nonalcoholic hepatosteatosis, Periumbilical hernia, and Proteinuria.. The patient presented to the [facility] on 12/18/2023 with a primary complaint of weakness abdominal pain on 12/13 she was admitted for n/diarrhea found to have BRIDGET UTI.    ?    Review of Systems   Constitutional:  Positive for malaise/fatigue. Negative for chills and fever.   HENT:  Negative for congestion and sore throat.    Eyes:  Negative for blurred vision, double vision and photophobia.   Respiratory:  Negative for cough and shortness of breath.    Cardiovascular:  Negative for chest pain, palpitations and leg swelling.   Gastrointestinal:  Negative for abdominal pain, diarrhea, nausea and vomiting.   Genitourinary:  Negative for dysuria and urgency.   Musculoskeletal:  Negative for joint pain and myalgias.   Skin:  Negative for itching and rash.   Neurological:  Positive for weakness. Negative for dizziness, sensory change and headaches.   Endo/Heme/Allergies:  Negative for polydipsia. Does not bruise/bleed easily.   Psychiatric/Behavioral:  Negative for depression.        Past Medical History:   Diagnosis Date    Anemia     Asthma     Cellulitis of abdominal wall 12/18/2017    CKD (chronic kidney disease) stage 3, GFR 30-59 ml/min     Depression     Diabetes mellitus, type II     Diastolic dysfunction     Diverticulosis of intestine with  bleeding 9/14/2016    E coli bacteremia 4/21/2018    E. coli pyelonephritis 6/2/2015    E. coli UTI 12/18/2017    Hematuria     Hernia, epigastric     Hypertension     Hypocalcemia     Nonalcoholic hepatosteatosis     Periumbilical hernia     Proteinuria             OBJECTIVE:     Vital Signs (Most Recent)  Vitals:    12/20/23 0700 12/20/23 0701 12/20/23 0938 12/20/23 1101   BP: 98/78 98/78  (!) 94/50   BP Location: Right arm Right arm  Right arm   Patient Position: Lying Lying  Lying   Pulse: 91 91  71   Resp: 18 18 20   Temp: 97.8 °F (36.6 °C) 97.8 °F (36.6 °C)  97.9 °F (36.6 °C)   TempSrc: Oral Oral  Oral   SpO2: (!) 90% (!) 90% (!) 91% (!) 94%   Weight:       Height:                     Medications:   acetaminophen  650 mg Oral Once    atorvastatin  80 mg Oral QHS    buprenorphine-naloxone 8-2  8 mg Sublingual TID    cefTRIAXone (ROCEPHIN) IVPB  2 g Intravenous Q24H    EScitalopram oxalate  10 mg Oral Daily    heparin (porcine)  5,000 Units Subcutaneous Q8H    methocarbamoL  500 mg Oral TID    metronidazole  500 mg Intravenous Q8H    miconazole NITRATE 2 %   Topical (Top) BID    nicotine  1 patch Transdermal Daily    pantoprazole  40 mg Oral Daily    pregabalin  150 mg Oral Daily           Physical Exam  Vitals and nursing note reviewed.   Constitutional:       General: She is not in acute distress.     Appearance: She is obese. She is not diaphoretic.   HENT:      Head: Normocephalic and atraumatic.      Mouth/Throat:      Pharynx: No oropharyngeal exudate.   Eyes:      General: No scleral icterus.     Conjunctiva/sclera: Conjunctivae normal.      Pupils: Pupils are equal, round, and reactive to light.   Cardiovascular:      Rate and Rhythm: Normal rate and regular rhythm.      Heart sounds: Normal heart sounds. No murmur heard.  Pulmonary:      Effort: Pulmonary effort is normal. No respiratory distress.      Breath sounds: Normal breath sounds.   Abdominal:      General: Bowel sounds are normal. There is no  distension.      Palpations: Abdomen is soft.      Tenderness: There is no abdominal tenderness.   Musculoskeletal:         General: Normal range of motion.      Cervical back: Normal range of motion and neck supple.   Skin:     General: Skin is warm and dry.      Findings: No erythema.   Neurological:      Mental Status: She is alert and oriented to person, place, and time.      Cranial Nerves: No cranial nerve deficit.   Psychiatric:         Mood and Affect: Affect normal.         Cognition and Memory: Memory normal.         Judgment: Judgment normal.         Laboratory:  Recent Labs   Lab 12/18/23  1416 12/19/23  0507 12/20/23  0523   WBC 16.55* 16.63* 17.22*   HGB 10.6* 10.9* 9.5*   HCT 31.8* 33.4* 29.3*   PLT SEE COMMENT 154 155   MONO 9.0  CANCELED 5.4  0.9 3.0*   EOSINOPHIL 0.0 0.6 0.0       Recent Labs   Lab 12/14/23  0418 12/18/23  1416 12/19/23  0507 12/20/23  0523    133* 137 138   K 4.6 4.1 3.6 3.8    104 107 110   CO2 19* 17* 20* 18*   BUN 15 29* 28* 29*   CREATININE 1.1 5.1* 4.8* 4.9*   CALCIUM 8.5* 8.3* 8.4* 8.2*   PHOS 3.1  --   --   --          Diagnostic Results:  X-Ray: Reviewed  US: Reviewed  Echo: Reviewed  ASSESSMENT/PLAN:     1. BRIDGET - on CKD3 ? No baseline labs out of hospital all Cr BRIDGET admission   Hx of HTN and DM, MI s/p PCI 2021  -- Now admitted for  abdominal pain panniculitis and  appendicitis Gen Surgery following on IV abx - improving abdominal pain    -- UA + for UTI but was treated since 12/13  Not clear etiology at this point  ddx remains  ATN, Sepsis,   glomerulonephritis, AIN.    Cr up to 5.1=>4.8=>4.9  from 1.1 on 12/14/23   S/p IVF and IV abx supportive therapy  - for now   FEUrea 15.5% goes with pre renal - keep IVF for more day now with good PO intake   -- No Indication for RRT at this time, K+ acceptable, No Uremia symptoms.  -- Daily Renal Function Panel  -- Avoid Hypotension.  -- Renally dose all meds  -- Please avoid nephrotoxins, including NSAIDs,  "aminoglycosides, IV contrast (unless absolutely necessary), gadolinium, fleets and other phosphorous-based laxatives. Caution with antibiotics.    2. HTN (I10) - controlled   3. Anemia of chronic kidney disease  vs acute illness vs MARGARETTE   Recent Labs   Lab 12/18/23  1416 12/19/23  0507 12/20/23  0523   HGB 10.6* 10.9* 9.5*   HCT 31.8* 33.4* 29.3*   PLT SEE COMMENT 154 155         Iron - levels low   Lab Results   Component Value Date    IRON <10 (L) 04/22/2018    TIBC 189 (L) 04/22/2018    FERRITIN 51 08/09/2018       4. 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       Recent Labs   Lab 12/18/23  1416 12/19/23  1618 12/20/23  0523   MG 1.3* 1.7 1.6     Check Mg replace as needed     Lab Results   Component Value Date    CALCIUM 8.2 (L) 12/20/2023    PHOS 3.1 12/14/2023     No results found for: "[id number]"    Lab Results   Component Value Date    CO2 18 (L) 12/20/2023       5. Nutrition/Hypoalbuminemia   Recent Labs   Lab 12/19/23  0507 12/20/23  0523   ALBUMIN 1.8* 1.6*       Nepro with meals TID.     UPCR 0.2g       Thank you for allowing me to participate in care of your patient  With any question please call   Rob Mcmullen MD     [facility]  RONA Frank MD,   MD NATA Cruz MD E. V. Harmon, NP  [id number]   [place], [address] [phone]  After hours answering service: [phone]   "

## 2023-12-20 NOTE — NURSING
Dr. Prieto notified via secure chat that patient's BP dropped to 85/50 HR 63 MAP 63. LR ordered continuously at 125 mL/hr. Will continue to monitor.

## 2023-12-20 NOTE — PROGRESS NOTES
[facility] Surg  General Surgery  Progress Note    Subjective:     History of Present Illness:  No notes on file    Post-Op Info:  * No surgery found *         Interval History: No acute events overnight. HDS on RA. Denies pain this morning.    Medications:  Continuous Infusions:  Scheduled Meds:   acetaminophen  650 mg Oral Once    atorvastatin  80 mg Oral QHS    buprenorphine-naloxone 8-2  8 mg Sublingual TID    cefTRIAXone (ROCEPHIN) IVPB  2 g Intravenous Q24H    EScitalopram oxalate  10 mg Oral Daily    heparin (porcine)  5,000 Units Subcutaneous Q8H    methocarbamoL  500 mg Oral QID    metronidazole  500 mg Intravenous Q8H    miconazole NITRATE 2 %   Topical (Top) BID    nicotine  1 patch Transdermal Daily    pantoprazole  40 mg Oral Daily    pregabalin  150 mg Oral Daily     PRN Meds:acetaminophen, albuterol-ipratropium, ALPRAZolam, azelastine, cetirizine, dextrose 10%, dextrose 10%, fluticasone propionate, glucagon (human recombinant), glucose, glucose, insulin aspart U-100, melatonin, naloxone, ondansetron, promethazine, sodium chloride 0.9%, tiZANidine     Review of patient's allergies indicates:   Allergen Reactions    Celexa [citalopram] Nausea And Vomiting     Objective:     Vital Signs (Most Recent):  Temp: 97.8 °F (36.6 °C) (12/20/23 0701)  Pulse: 91 (12/20/23 0701)  Resp: 18 (12/20/23 0701)  BP: 98/78 (12/20/23 0701)  SpO2: (!) 91 % (12/20/23 0938) Vital Signs (24h Range):  Temp:  [97.8 °F (36.6 °C)-98.5 °F (36.9 °C)] 97.8 °F (36.6 °C)  Pulse:  [73-91] 91  Resp:  [18] 18  SpO2:  [90 %-97 %] 91 %  BP: ()/(42-84) 98/78     Weight: (!) 166.9 kg (367 lb 15.2 oz)  Body mass index is 67.3 kg/m².    Intake/Output - Last 3 Shifts         12/18 0700  12/19 0659 12/19 0700  12/20 0659 12/20 0700  12/21 0659    P.O.  340     IV Piggyback 100      Total Intake(mL/kg) 100 (0.6) 340 (2)     Urine (mL/kg/hr)  850 (0.2)     Other  0     Stool  1     Total Output  851     Net +100 -511            Urine  Occurrence  2 x     Stool Occurrence  1 x              Physical Exam  Vitals and nursing note reviewed.   Constitutional:       General: She is not in acute distress.  HENT:      Head: Normocephalic and atraumatic.      Mouth/Throat:      Mouth: Mucous membranes are moist.   Cardiovascular:      Rate and Rhythm: Normal rate.   Pulmonary:      Effort: Pulmonary effort is normal. No respiratory distress.   Abdominal:      General: There is no distension.      Palpations: Abdomen is soft.      Tenderness: There is no abdominal tenderness.      Comments: Has area of panniculitis, wounds. No abscess noted.   Skin:     General: Skin is warm and dry.   Neurological:      General: No focal deficit present.      Mental Status: She is alert and oriented to person, place, and time.          Significant Labs:  I have reviewed all pertinent lab results within the past 24 hours.  CBC:   Recent Labs   Lab 12/20/23  0523   WBC 17.22*   RBC 3.29*   HGB 9.5*   HCT 29.3*      MCV 89   MCH 28.9   MCHC 32.4     CMP:   Recent Labs   Lab 12/20/23  0523   GLU 81   CALCIUM 8.2*   ALBUMIN 1.6*   PROT 5.2*      K 3.8   CO2 18*      BUN 29*   CREATININE 4.9*   ALKPHOS 89   ALT 13   AST 13   BILITOT 0.3       Significant Diagnostics:  I have reviewed all pertinent imaging results/findings within the past 24 hours.  Assessment/Plan:     Acute appendicitis with localized peritonitis, without perforation, abscess, or gangrene  43F with tip appendicitis. Pain improving    Continue abx. No abscess. Nothing to drain  WBC stable  On plavix  Will treat medically for appendicitis.  OK for diet   Tentatively plan appendectomy in 6-8 weeks  Rest of care per primary team          Benita Vasquez MD  General Surgery  [facility] Surg

## 2023-12-20 NOTE — PLAN OF CARE
1040  Patient resting quietly in bed with daughter, Jessica Dias ([phone]), present by phone when CM rounded. Patient was admitted with acute renal failure & continues to be followed by neph, gen surg, wound care, dty, & psych. WBC 17.22, BUN 29, & cr 4.9 this AM.     Pt stated that she gets to/from MD appts with medicaid transportation.        12/20/23 1100   Rounds   Attendance Provider;Nurse    Discharge Plan A Home Health  ([facility]  & [facility])   Why the patient remains in the hospital Requires continued medical care     CM was informed by Dr Prieto that the pt is not medically stable to discharge today.       Will continue to follow.

## 2023-12-20 NOTE — CONSULTS
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 Time: 12/19/23  5:34 AM   Result Value Ref Range    POCT Glucose 91 70 - 110 mg/dL   CK    Collection Time: 12/19/23  9:33 AM   Result Value Ref Range    CPK 98 20 - 180 U/L   FTA antibodies, IgG and IgM    Collection Time: 12/19/23  1:14 PM   Result Value Ref Range    FTA-ABS Non-reactive Non-reactive   POCT glucose    Collection Time: 12/19/23  1:55 PM   Result Value Ref Range    POCT Glucose 89 70 - 110 mg/dL   Magnesium    Collection Time: 12/19/23  4:18 PM   Result Value Ref Range    Magnesium 1.7 1.6 - 2.6 mg/dL   C3 Complement    Collection Time: 12/19/23  4:18 PM   Result Value Ref Range    Complement (C-3) 149 50 - 180 mg/dL   C4 Complement    Collection Time: 12/19/23  4:18 PM   Result Value Ref Range    Complement (C-4) 28 11 - 44 mg/dL   Uric acid    Collection Time: 12/19/23  4:18 PM   Result Value Ref Range    Uric Acid 5.3 2.4 - 5.7 mg/dL   HIV 1/2 Ag/Ab (4th Gen)    Collection Time: 12/19/23  4:18 PM   Result Value Ref Range    HIV 1/2 Ag/Ab Non-reactive Non-reactive   Hepatitis Panel, Acute    Collection Time: 12/19/23  4:18 PM   Result Value Ref Range    Hepatitis B Surface Ag Non-reactive Non-reactive    Hep B C IgM Non-reactive Non-reactive    Hep A IgM Non-reactive  Non-reactive    Hepatitis C Ab Non-reactive Non-reactive   Lantigua's Stain, Urine Random    Collection Time: 12/19/23  6:34 PM   Result Value Ref Range    Lantigua's Stain, Ur No eosinophils seen No eosinophils seen   Protein/Creatinine Ratio, Urine    Collection Time: 12/19/23  6:34 PM   Result Value Ref Range    Protein, Urine Random 30 (H) 0 - 15 mg/dL    Creatinine, Urine 137.5 15.0 - 325.0 mg/dL    Prot/Creat Ratio, Urine 0.22 (H) 0.00 - 0.20   Sodium, Random Urine    Collection Time: 12/19/23  6:34 PM   Result Value Ref Range    Sodium, Urine 45 20 - 250 mmol/L   Urea Nitrogen, Random Urine    Collection Time: 12/19/23  6:34 PM   Result Value Ref Range    Urine Urea Nitrogen 122 (L) 140 - 1050 mg/dL   Creatinine, Random Urine    Collection Time: 12/19/23  6:34 PM   Result Value Ref Range    Creatinine, Urine 137.5 15.0 - 325.0 mg/dL   POCT glucose    Collection Time: 12/19/23  8:54 PM   Result Value Ref Range    POCT Glucose 86 70 - 110 mg/dL   Comprehensive Metabolic Panel (CMP)    Collection Time: 12/20/23  5:23 AM   Result Value Ref Range    Sodium 138 136 - 145 mmol/L    Potassium 3.8 3.5 - 5.1 mmol/L    Chloride 110 95 - 110 mmol/L    CO2 18 (L) 23 - 29 mmol/L    Glucose 81 70 - 110 mg/dL    BUN 29 (H) 6 - 20 mg/dL    Creatinine 4.9 (H) 0.5 - 1.4 mg/dL    Calcium 8.2 (L) 8.7 - 10.5 mg/dL    Total Protein 5.2 (L) 6.0 - 8.4 g/dL    Albumin 1.6 (L) 3.5 - 5.2 g/dL    Total Bilirubin 0.3 0.1 - 1.0 mg/dL    Alkaline Phosphatase 89 55 - 135 U/L    AST 13 10 - 40 U/L    ALT 13 10 - 44 U/L    eGFR 11 (A) >60 mL/min/1.73 m^2    Anion Gap 10 8 - 16 mmol/L   CBC with Automated Differential    Collection Time: 12/20/23  5:23 AM   Result Value Ref Range    WBC 17.22 (H) 3.90 - 12.70 K/uL    RBC 3.29 (L) 4.00 - 5.40 M/uL    Hemoglobin 9.5 (L) 12.0 - 16.0 g/dL    Hematocrit 29.3 (L) 37.0 - 48.5 %    MCV 89 82 - 98 fL    MCH 28.9 27.0 - 31.0 pg    MCHC 32.4 32.0 - 36.0 g/dL    RDW 15.4 (H) 11.5 - 14.5 %    Platelets 155 150  "- 450 K/uL    MPV 11.0 9.2 - 12.9 fL    Immature Granulocytes CANCELED 0.0 - 0.5 %    Immature Grans (Abs) CANCELED 0.00 - 0.04 K/uL    nRBC 0 0 /100 WBC    Gran % 78.0 (H) 38.0 - 73.0 %    Lymph % 19.0 18.0 - 48.0 %    Mono % 3.0 (L) 4.0 - 15.0 %    Eosinophil % 0.0 0.0 - 8.0 %    Basophil % 0.0 0.0 - 1.9 %    Platelet Estimate Appears normal     Poly Occasional     Basophilic Stippling Occasional     Differential Method Manual    Magnesium    Collection Time: 12/20/23  5:23 AM   Result Value Ref Range    Magnesium 1.6 1.6 - 2.6 mg/dL   POCT glucose    Collection Time: 12/20/23  5:40 AM   Result Value Ref Range    POCT Glucose 80 70 - 110 mg/dL   POCT glucose    Collection Time: 12/20/23 11:02 AM   Result Value Ref Range    POCT Glucose 155 (H) 70 - 110 mg/dL      No results found for: "PHENYTOIN", "PHENOBARB", "VALPROATE", "CBMZ"      EXAMINATION    VITALS   Vitals:    12/20/23 0701 12/20/23 0938 12/20/23 1101 12/20/23 1231   BP: 98/78  (!) 94/50    BP Location: Right arm  Right arm    Patient Position: Lying  Lying    Pulse: 91  71 69   Resp: 18  20    Temp: 97.8 °F (36.6 °C)  97.9 °F (36.6 °C)    TempSrc: Oral  Oral    SpO2: (!) 90% (!) 91% (!) 94%    Weight:       Height:            CONSTITUTIONAL  General Appearance: NAD, unremarkable, age appropriate, obese, lying in bed, calm      MUSCULOSKELETAL  Muscle Strength and Tone: WNL   Abnormal Involuntary Movements: none observed   Gait and Station: Attempted but unable to assess due to medical acuity      PSYCHIATRIC   Behavior/Cooperation:  cooperative, intermittent participation, eye contact intermittent, calm   Speech: normal tone, normal rate, normal pitch, normal volume  Language: grossly intact, able to name with spontaneous speech  Mood: "fine"  Affect:  mildly constricted / irritable   Associations: intact; no MAO  Thought Process: Linear and Future-Oriented   Thought Content: no suicidality, no homicidality, no delusions, no paranoia, no AH/VH/TH, no RIS " observed   Sensorium: Somnolent   Alert and Oriented: to person, place, city, state, month, year, and situation   Memory: Patient unwilling to participate in formal memory testing  Attention/concentration: Intact. Appropriate for age/education. Able to spell w-o-r-l-d but NOT d-l-r-o-w.   Similarities:  Intact; (difference between apple and orange?)  Abstract reasoning:  Intact  Fund of Knowledge: Named 3/4 past [place] Presidents   Insight: Intact  Judgment: Intact     CAM ICU Delirium Assessment - NEGATIVE     Is the patient aware of the biomedical complications associated with substance abuse and mental illness? yes           MEDICAL DECISION MAKING     ASSESSMENT        Adjustment Disorder with Mixed Disturbance of Emotions and Conduct   Opioid Use Disorder, Severe, Dependence, (on MAT - Suboxone with endorsed sobriety for 10 years)  Benzodiazepine Abuse hx  Cluster B Personality Traits   (Rule out Delirium)        RECOMMENDATIONS       - Patient does not currently meet PEC/CEC criteria due to not being an imminent threat to self/others and not being gravely disabled 2/2 mental illness at this time.     - With reasonable medical certainty, based on a present-state examination, the patient currently appears to have medical decision-making capacity as made evident by her ability to cognitively utilize information provided to her to express a choice that is stable over time, to understand the relevant information pertaining her diagnoses and recommended treatments, to appreciate the consequences of the decision (risks vs benefits) regarding accepting vs refusing treatment, and to manipulate all of the data in a logical fashion.     - Continue patient's outpatient regimen of Suboxone 8 mg - 2 mg SL TID for OUD given her endorsed sobriety / stability on this regimen (discussed risks/benefits/alt vs no treatment with patient) (regimen verified with [place] ).    - Continue Lexapro 10 mg PO daily for depression / anxiety  (discussed risks/benefits/alt vs no treatment with patient).    - Can use Vistaril 25 mg PO TID PRN for anxiety / agitation (discussed risks/benefits/alt vs no treatment with patient).    Implement the below DELIRIUM BEHAVIOR MANAGEMENT:  - Minimize use of restraints; if physical restraints necessary, please utilize medical/chemical prns for agitation (can use Zyprexa 2.5 mg PO/IM q8 hours PRN).  - Keep shades open and room lit during day and room dim at night in order to promote healthy circadian rhythms.  - Encourage family at bedside.  - Keep whiteboard in patient's room current with the date and name of the members of patient's team for easy patient self re-orientation.  - Avoid benzodiazepines, antihistamines, anticholinergics, hypnotics, and minimize opiates while controlling for pain as these medications may exacerbate delirium.      - Psychotherapy was performed with patient as noted above with a focus on improving adjustment-related depression / anxiety / conduct and continued sobriety.    - Patient's most recent resulted labs, imaging, and EKG were reviewed today.      - Informed pt of the risks of continuous Benzodiazepine use including tolerance, dependence, and withdrawals that may be life threatening upon abrupt cessation. Also advised pt not to take Benzodiazepines with Suboxone, Opiates, or other sedatives and also not to drive or operate heavy machinery while using Benzodiazepines.     - Patient was instructed to call 911 and/or [phone] and return to the nearest ED if she begins feeling suicidal, homicidal, or gravely disabled (for s/p this hospitalization).     - Thank you for this consult ; will continue to follow patient while at [facility]         Total time spent with patient and/or managing/coordinating patient's care today (excluding the time spent on psychotherapy): 78 minutes   Time spent on psychotherapy today (as noted above): 20 minutes   Total time for encounter today including  psychotherapy: 98 minutes      More than 50% of the time was spent counseling/coordinating care.     Consulting clinician was informed of the encounter and consult note.       STAFF:  Wilver Perez MD  [facility]   12/20/2023

## 2023-12-20 NOTE — PLAN OF CARE
Patient is AAOx3. Pt given medications as ordered per MAR. IV Fluids ordered continuously for low blood pressure. IV antibiotics given as scheduled. Blood glucose monitoring maintained. Cardiac monitoring in place. Safety maintained. Bed alarm set. Instructed to use call light for assistance. Will continue to monitor.        Problem: Diabetes Comorbidity  Goal: Blood Glucose Level Within Targeted Range  Outcome: Ongoing, Progressing     Problem: Impaired Wound Healing  Goal: Optimal Wound Healing  Outcome: Ongoing, Progressing     Problem: Infection  Goal: Absence of Infection Signs and Symptoms  Outcome: Ongoing, Progressing

## 2023-12-20 NOTE — SUBJECTIVE & OBJECTIVE
Interval History: No acute events overnight. HDS on RA. Denies pain this morning.    Medications:  Continuous Infusions:  Scheduled Meds:   acetaminophen  650 mg Oral Once    atorvastatin  80 mg Oral QHS    buprenorphine-naloxone 8-2  8 mg Sublingual TID    cefTRIAXone (ROCEPHIN) IVPB  2 g Intravenous Q24H    EScitalopram oxalate  10 mg Oral Daily    heparin (porcine)  5,000 Units Subcutaneous Q8H    methocarbamoL  500 mg Oral QID    metronidazole  500 mg Intravenous Q8H    miconazole NITRATE 2 %   Topical (Top) BID    nicotine  1 patch Transdermal Daily    pantoprazole  40 mg Oral Daily    pregabalin  150 mg Oral Daily     PRN Meds:acetaminophen, albuterol-ipratropium, ALPRAZolam, azelastine, cetirizine, dextrose 10%, dextrose 10%, fluticasone propionate, glucagon (human recombinant), glucose, glucose, insulin aspart U-100, melatonin, naloxone, ondansetron, promethazine, sodium chloride 0.9%, tiZANidine     Review of patient's allergies indicates:   Allergen Reactions    Celexa [citalopram] Nausea And Vomiting     Objective:     Vital Signs (Most Recent):  Temp: 97.8 °F (36.6 °C) (12/20/23 0701)  Pulse: 91 (12/20/23 0701)  Resp: 18 (12/20/23 0701)  BP: 98/78 (12/20/23 0701)  SpO2: (!) 91 % (12/20/23 0938) Vital Signs (24h Range):  Temp:  [97.8 °F (36.6 °C)-98.5 °F (36.9 °C)] 97.8 °F (36.6 °C)  Pulse:  [73-91] 91  Resp:  [18] 18  SpO2:  [90 %-97 %] 91 %  BP: ()/(42-84) 98/78     Weight: (!) 166.9 kg (367 lb 15.2 oz)  Body mass index is 67.3 kg/m².    Intake/Output - Last 3 Shifts         12/18 0700 12/19 0659 12/19 0700 12/20 0659 12/20 0700 12/21 0659    P.O.  340     IV Piggyback 100      Total Intake(mL/kg) 100 (0.6) 340 (2)     Urine (mL/kg/hr)  850 (0.2)     Other  0     Stool  1     Total Output  851     Net +100 -511            Urine Occurrence  2 x     Stool Occurrence  1 x              Physical Exam  Vitals and nursing note reviewed.   Constitutional:       General: She is not in acute  distress.  HENT:      Head: Normocephalic and atraumatic.      Mouth/Throat:      Mouth: Mucous membranes are moist.   Cardiovascular:      Rate and Rhythm: Normal rate.   Pulmonary:      Effort: Pulmonary effort is normal. No respiratory distress.   Abdominal:      General: There is no distension.      Palpations: Abdomen is soft.      Tenderness: There is no abdominal tenderness.      Comments: Has area of panniculitis, wounds. No abscess noted.   Skin:     General: Skin is warm and dry.   Neurological:      General: No focal deficit present.      Mental Status: She is alert and oriented to person, place, and time.          Significant Labs:  I have reviewed all pertinent lab results within the past 24 hours.  CBC:   Recent Labs   Lab 12/20/23  0523   WBC 17.22*   RBC 3.29*   HGB 9.5*   HCT 29.3*      MCV 89   MCH 28.9   MCHC 32.4     CMP:   Recent Labs   Lab 12/20/23 0523   GLU 81   CALCIUM 8.2*   ALBUMIN 1.6*   PROT 5.2*      K 3.8   CO2 18*      BUN 29*   CREATININE 4.9*   ALKPHOS 89   ALT 13   AST 13   BILITOT 0.3       Significant Diagnostics:  I have reviewed all pertinent imaging results/findings within the past 24 hours.

## 2023-12-20 NOTE — NURSING
Dr. Prieto notified that patient's BP is 94/50 MAP 67 HR 71. MD stated ok to give pain meds as long as SBP stays above 90. Will continue to monitor.

## 2023-12-21 NOTE — PLAN OF CARE
Patient is AAOx3. Pt given medications as ordered per MAR. IV antibiotics given as scheduled. Blood glucose monitoring maintained. Cardiac monitoring in place. Purewick in place. Dressing to L abdomen and L leg CDI. Triad applied to L pannus. Miconazole applied to perineum. Safety maintained. Bed alarm set. Instructed to use call light for assistance. Will continue to monitor.       Problem: Diabetes Comorbidity  Goal: Blood Glucose Level Within Targeted Range  Outcome: Ongoing, Progressing     Problem: Fluid and Electrolyte Imbalance (Acute Kidney Injury/Impairment)  Goal: Fluid and Electrolyte Balance  Outcome: Ongoing, Progressing     Problem: Impaired Wound Healing  Goal: Optimal Wound Healing  Outcome: Ongoing, Progressing

## 2023-12-21 NOTE — PT/OT/SLP EVAL
Occupational Therapy   Evaluation and Discharge Note    Name: Lele Dias  MRN: [id number]  Admitting Diagnosis: BRIDGET (acute kidney injury)  Recent Surgery: * No surgery found *      Recommendations:     Discharge Recommendations: No Therapy Indicated  Discharge Equipment Recommendations: none  Barriers to discharge:  None    Assessment:   Per OT eval, pt w/o change in fxnl status from baseline & no further acute OT svcs indicated at this time. DC OT.    Lele Dias is a 43 y.o. female with a medical diagnosis of BRIDGET (acute kidney injury). At this time, patient is functioning at their prior level of function and does not require further acute OT services.     Plan:     During this hospitalization, patient does not require further acute OT services.  Please re-consult if situation changes.    Plan of Care Reviewed with: patient    Subjective     Chief Complaint: BRIDGET  Patient/Family Comments/goals: return home    Occupational Profile:  Living Environment: w/ 2 dgtrs in MH w/ ramp entrance; garden tub w/ BSC   Previous level of function: MI via SPC w/ toileting, G/H & UBD; Asst w/ sponge baths & LBD  Roles and Routines: light IADLs  Equipment Used at home: bedside commode, cane, straight, hospital bed  Assistance upon Discharge: fly    Pain/Comfort:  Pain Rating 1: 10/10  Location - Side 1: Right  Location - Orientation 1: lower  Location 1: abdomen  Pain Addressed 1: Pre-medicate for activity, Reposition, Distraction, Cessation of Activity, Nurse notified  Pain Rating Post-Intervention 1: 0/10    Patients cultural, spiritual, Muslim conflicts given the current situation:      Objective:     Communicated with: nsg prior to session.  Patient found HOB elevated with bed alarm, telemetry, peripheral IV upon OT entry to room.    General Precautions: Standard, fall  Orthopedic Precautions: N/A  Braces: N/A  Respiratory Status: Room air     Occupational Performance:    Bed Mobility:    Patient completed Supine to  Sit with minimum assistance  Patient completed Sit to Supine with minimum assistance and with leg lift    Functional Mobility/Transfers:  Patient completed Sit <> Stand Transfer with supervision  with  straight cane   Patient completed Toilet Transfer Step Transfer technique with supervision with  straight cane  Functional Mobility: via SPC w/in room for ADLs w/ Sup    Activities of Daily Living:  Toileting: supervision on toilet    Cognitive/Visual Perceptual:  AO4    Hyperverbal, perseverative & tangential  Speech characteristic of paranoia I.e. 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  Pt c/o 10/10 RLQ pain & perf the following:  -sup-->EOB w/ Min HHA 2/2 body habitus  -standing via SPC w/ Sup for fxnl mobility w/in room w/ Sup  -toilet t/f via SPC w/ Sup  -perineal hygiene w/ Sup  -returned to supine w/ Min A for edematous LLE  **pt w/o c/o pain & no demo of facial grimacing or muscle guarding throughout session  Edu/tx re: general safety techs & HEP. Pt verbalized understanding.      Patient left supine with all lines intact, call button in reach, nsg notified, and PCT present    GOALS:   Multidisciplinary Problems       Occupational Therapy Goals       Not on file              Multidisciplinary Problems (Resolved)          Problem: Occupational Therapy    Goal Priority Disciplines Outcome Interventions   Occupational Therapy Goal   (Resolved)     OT, PT/OT Met                        History:     Past Medical History:   Diagnosis Date    Anemia     Asthma     Cellulitis of abdominal  wall 2017    CKD (chronic kidney disease) stage 3, GFR 30-59 ml/min     Depression     Diabetes mellitus, type II     Diastolic dysfunction     Diverticulosis of intestine with bleeding 2016    E coli bacteremia 2018    E. coli pyelonephritis 2015    E. 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     SECTION       SECTION, CLASSIC      CHOLECYSTECTOMY  age 15    CORONARY ANGIOGRAPHY N/A 2021    Procedure: ANGIOGRAM, CORONARY ARTERY;  Surgeon: Neelam Hicks MD;  Location: [facility] CATH LAB/EP;  Service: Cardiology;  Laterality: N/A;    INCISION AND DRAINAGE OF ABSCESS N/A 2020    Procedure: INCISION AND DRAINAGE, ABSCESS;  Surgeon: Rich Watson MD;  Location: [facility] OR;  Service: General;  Laterality: N/A;    LEFT HEART CATHETERIZATION Left 2021    Procedure: Left heart cath;  Surgeon: Neelam Hicks MD;  Location: [facility] CATH LAB/EP;  Service: Cardiology;  Laterality: Left;    LEFT HEART CATHETERIZATION N/A 2021    Procedure: Left heart cath;  Surgeon: Neelam Hicks MD;  Location: [facility] CATH LAB/EP;  Service: Cardiology;  Laterality: N/A;    TONSILLECTOMY, ADENOIDECTOMY         Time Tracking:     OT Date of Treatment: 23  OT Start Time: 1235  OT Stop Time: 1313  OT Total Time (min): 38 min    Billable Minutes:Evaluation 10  Self Care/Home Management 23  Therapeutic Activity 15  Total Time 38    2023

## 2023-12-21 NOTE — PROGRESS NOTES
Nephrology Progress Note       Consult Requested By: Courtney Prieto MD  Reason for Consult: BRIDGET      SUBJECTIVE:        ?    Review of Systems   Constitutional:  Positive for malaise/fatigue. Negative for chills and fever.   HENT:  Negative for congestion and sore throat.    Eyes:  Negative for blurred vision, double vision and photophobia.   Respiratory:  Negative for cough and shortness of breath.    Cardiovascular:  Negative for chest pain, palpitations and leg swelling.   Gastrointestinal:  Negative for abdominal pain, diarrhea, nausea and vomiting.   Genitourinary:  Negative for dysuria and urgency.   Musculoskeletal:  Negative for joint pain and myalgias.   Skin:  Negative for itching and rash.   Neurological:  Positive for weakness. Negative for dizziness, sensory change and headaches.   Endo/Heme/Allergies:  Negative for polydipsia. Does not bruise/bleed easily.   Psychiatric/Behavioral:  Negative for depression.        Past Medical History:   Diagnosis Date    Anemia     Asthma     Cellulitis of abdominal wall 12/18/2017    CKD (chronic kidney disease) stage 3, GFR 30-59 ml/min     Depression     Diabetes mellitus, type II     Diastolic dysfunction     Diverticulosis of intestine with bleeding 9/14/2016    E coli bacteremia 4/21/2018    E. coli pyelonephritis 6/2/2015    E. coli UTI 12/18/2017    Hematuria     Hernia, epigastric     Hypertension     Hypocalcemia     Nonalcoholic hepatosteatosis     Periumbilical hernia     Proteinuria             OBJECTIVE:     Vital Signs (Most Recent)  Vitals:    12/21/23 0314 12/21/23 0448 12/21/23 0730 12/21/23 0840   BP: (!) 99/44  (!) 115/56    BP Location: Right arm      Patient Position: Lying      Pulse: 68 70 77    Resp: 18  20    Temp: 98 °F (36.7 °C)  98.1 °F (36.7 °C)    TempSrc: Oral      SpO2: 96%  (!) 93% (!) 94%   Weight:       Height:             Date 12/21/23 0700 - 12/22/23 0659   Shift [id number] [id number] 24 Hour Total   INTAKE   P.O. 240    240   Shift Total(mL/kg) 240(1.4)   240(1.4)   OUTPUT   Shift Total(mL/kg)       Weight (kg) 169.8 169.8 169.8 169.8           Medications:   acetaminophen  650 mg Oral Once    atorvastatin  80 mg Oral QHS    buprenorphine-naloxone 8-2  8 mg Sublingual TID    cefTRIAXone (ROCEPHIN) IVPB  2 g Intravenous Q24H    EScitalopram oxalate  10 mg Oral Daily    heparin (porcine)  5,000 Units Subcutaneous Q8H    magnesium sulfate IVPB  2 g Intravenous Once    metronidazole  500 mg Intravenous Q8H    miconazole NITRATE 2 %   Topical (Top) BID    nicotine  1 patch Transdermal Daily    pantoprazole  40 mg Oral Daily    pregabalin  150 mg Oral Daily           Physical Exam  Vitals and nursing note reviewed.   Constitutional:       General: She is not in acute distress.     Appearance: She is obese. She is not diaphoretic.   HENT:      Head: Normocephalic and atraumatic.      Mouth/Throat:      Pharynx: No oropharyngeal exudate.   Eyes:      General: No scleral icterus.     Conjunctiva/sclera: Conjunctivae normal.      Pupils: Pupils are equal, round, and reactive to light.   Cardiovascular:      Rate and Rhythm: Normal rate and regular rhythm.      Heart sounds: Normal heart sounds. No murmur heard.  Pulmonary:      Effort: Pulmonary effort is normal. No respiratory distress.      Breath sounds: Normal breath sounds.   Abdominal:      General: Bowel sounds are normal. There is no distension.      Palpations: Abdomen is soft.      Tenderness: There is no abdominal tenderness.   Musculoskeletal:         General: Normal range of motion.      Cervical back: Normal range of motion and neck supple.   Skin:     General: Skin is warm and dry.      Findings: No erythema.   Neurological:      Mental Status: She is alert and oriented to person, place, and time.      Cranial Nerves: No cranial nerve deficit.   Psychiatric:         Mood and Affect: Affect normal.         Cognition and Memory: Memory normal.         Judgment: Judgment  normal.         Laboratory:  Recent Labs   Lab 12/19/23  0507 12/20/23 0523 12/21/23  0533   WBC 16.63* 17.22* 15.88*   HGB 10.9* 9.5* 11.2*   HCT 33.4* 29.3* 35.3*    155 183   MONO 5.4  0.9 3.0* 3.0*   EOSINOPHIL 0.6 0.0 1.0       Recent Labs   Lab 12/19/23  0507 12/20/23 0523 12/21/23  0533    138 137   K 3.6 3.8 4.4    110 106   CO2 20* 18* 16*   BUN 28* 29* 33*   CREATININE 4.8* 4.9* 4.8*   CALCIUM 8.4* 8.2* 8.6*   PHOS  --   --  6.2*         Diagnostic Results:  X-Ray: Reviewed  US: Reviewed  Echo: Reviewed  ASSESSMENT/PLAN:     1. BRIDGET - on CKD3 ? No baseline labs out of hospital all Cr BRIDGET admission   Hx of HTN and DM, MI s/p PCI 2021  -- Now admitted for  abdominal pain has panniculitis and  appendicitis Gen Surgery following on IV abx - improving abdominal pain    -- UA + for UTI but was treated since 12/13  Not clear etiology at this point  ddx remains  ATN, Sepsis,   glomerulonephritis, AIN.    Cr up to 5.1=>4.8=>4.9=>4.8  from 1.1 on 12/14/23   S/p IVF and IV abx supportive therapy  - for now   FEUrea 15.5% goes with pre renal - s/p  IVF   now with good PO intake   Making urine - but difficult to collect so UOP unknown   -- No Indication for RRT at this time, K+ acceptable, No Uremia symptoms.  -- Daily Renal Function Panel so far Cr seems platoued at 4.8   -- Avoid Hypotension.  -- Renally dose all meds  -- Please avoid nephrotoxins, including NSAIDs, aminoglycosides, IV contrast (unless absolutely necessary), gadolinium, fleets and other phosphorous-based laxatives. Caution with antibiotics.    2. HTN (I10) - controlled   3. Anemia of chronic kidney disease  vs acute illness vs MARGARETTE   Recent Labs   Lab 12/19/23  0507 12/20/23 0523 12/21/23  0533   HGB 10.9* 9.5* 11.2*   HCT 33.4* 29.3* 35.3*    155 183         Iron - levels low   Lab Results   Component Value Date    IRON <10 (L) 04/22/2018    TIBC 189 (L) 04/22/2018    FERRITIN 51 08/09/2018       4. MBD (E88.9 M90.80) -   "PTH vit D   Recent Labs   Lab 12/21/23  0533   CALCIUM 8.6*   PHOS 6.2*       Recent Labs   Lab 12/19/23  1618 12/20/23  0523 12/21/23  0533   MG 1.7 1.6 1.5*     Check Mg replace as needed     Lab Results   Component Value Date    CALCIUM 8.6 (L) 12/21/2023    PHOS 6.2 (H) 12/21/2023     No results found for: "[id number]"  Acidosis   BRIDGET - monitor for now   Lab Results   Component Value Date    CO2 16 (L) 12/21/2023       5. Nutrition/Hypoalbuminemia   Recent Labs   Lab 12/20/23  0523 12/21/23  0533   ALBUMIN 1.6* 1.9*       Nepro with meals TID.     UPCR 0.2g       Thank you for allowing me to participate in care of your patient  With any question please call   Rob Mcmullen MD     [facility]  RONA Frank MD,   O. MD NATA Damian MD E. V. Harmon, NP  [id number]   [place], [address] [phone]  After hours answering service: [phone]   "

## 2023-12-21 NOTE — PROGRESS NOTES
[facility] Medicine  Progress Note    Patient Name: Lele Dias  MRN: [id number]  Patient Class: IP- Inpatient   Admission Date: 12/18/2023  Length of Stay: 3 days  Attending Physician: Courtney Prieto MD  Primary Care Provider: Sammi Edge MD (Cindy)        Subjective:     Principal Problem:BRIDGET (acute kidney injury)        HPI:  Lele Dias is a 43 yr old female with PMH of CKD, depression, diabetes, hypertension who presents with lower abdominal pain and fatigue.    Patient reports right-sided lower abdominal pain, weakness, fatigue over the past day.  She states that she had multiple nonbloody nonbilious loose bowel movements, with associated nausea.  Of note, she was recently discharged for UTI/BRIDGET was sent home with antibiotics.    Triage vitals were remarkable for hypotension and hypothermia.  Physical exam significant for abdominal tenderness.    CBC was significant for leukocytosis, normocytic anemia.  CMP was significant for hyponatremia, and acute renal failure.  Troponin was slightly elevated at 0.146.  UA significant for WBCs and bacteria.  Blood cultures pending.    CT imaging was concerning for acute tip appendicitis, possible cellulitis    Surgery was consulted.  No acute surgery due to Plavix use    Patient admitted for acute renal failure and appendicitis management.    Overview/Hospital Course:  12/19/23 Refusing labs, VS checks  12/20/23 Abd pain is improved. Borderline BPs but patient is asymptomatic  12/21/23 is reporting worsening abdominal pain    Interval History: Today reports severe abdominal pain, BP readings are low and explained why we can escalate pain medications at this time    Decline to work with PT due to fatigue and pain    Review of Systems   Constitutional:  Positive for fatigue. Negative for appetite change and chills.   HENT:  Negative for ear discharge and ear pain.    Respiratory:  Negative for cough and choking.    Cardiovascular:  Negative  for chest pain and leg swelling.   Gastrointestinal:  Positive for abdominal pain. Negative for anal bleeding, blood in stool, diarrhea and nausea.   Endocrine: Negative for polydipsia and polyphagia.   Genitourinary:  Negative for flank pain and hematuria.   Musculoskeletal:  Negative for back pain and gait problem.   Skin:  Negative for pallor and rash.   Allergic/Immunologic: Negative for food allergies and immunocompromised state.   Neurological:  Negative for seizures and speech difficulty.   Hematological:  Negative for adenopathy. Does not bruise/bleed easily.   Psychiatric/Behavioral:  Negative for agitation, behavioral problems, decreased concentration and dysphoric mood.      Objective:     Vital Signs (Most Recent):  Temp: 98 °F (36.7 °C) (12/21/23 1620)  Pulse: 64 (12/21/23 1620)  Resp: 16 (12/21/23 1620)  BP: (!) 91/50 (12/21/23 1620)  SpO2: 95 % (12/21/23 1620) Vital Signs (24h Range):  Temp:  [98 °F (36.7 °C)-98.3 °F (36.8 °C)] 98 °F (36.7 °C)  Pulse:  [64-80] 64  Resp:  [16-20] 16  SpO2:  [93 %-96 %] 95 %  BP: ()/(39-56) 91/50     Weight: (!) 169.8 kg (374 lb 5.5 oz)  Body mass index is 68.47 kg/m².    Intake/Output Summary (Last 24 hours) at 12/21/2023 1640  Last data filed at 12/21/2023 0800  Gross per 24 hour   Intake 600 ml   Output 850 ml   Net -250 ml         Physical Exam  Vitals and nursing note reviewed.   Constitutional:       General: She is not in acute distress.  HENT:      Head: Normocephalic and atraumatic.      Mouth/Throat:      Mouth: Mucous membranes are moist.   Cardiovascular:      Rate and Rhythm: Normal rate.   Pulmonary:      Effort: Pulmonary effort is normal. No respiratory distress.   Abdominal:      General: There is no distension.      Palpations: Abdomen is soft.      Tenderness: There is abdominal tenderness (RLQ).      Comments: Has area of panniculitis, wounds. No abscess noted.   Skin:     General: Skin is warm and dry.   Neurological:      General: No focal  deficit present.      Mental Status: She is alert and oriented to person, place, and time.             Significant Labs: All pertinent labs within the past 24 hours have been reviewed.  CBC:   Recent Labs   Lab 12/20/23 0523 12/21/23  0533   WBC 17.22* 15.88*   HGB 9.5* 11.2*   HCT 29.3* 35.3*    183     CMP:   Recent Labs   Lab 12/20/23 0523 12/21/23  0533    137   K 3.8 4.4    106   CO2 18* 16*   GLU 81 204*   BUN 29* 33*   CREATININE 4.9* 4.8*   CALCIUM 8.2* 8.6*   PROT 5.2* 6.2   ALBUMIN 1.6* 1.9*   BILITOT 0.3 0.3   ALKPHOS 89 124   AST 13 12   ALT 13 14   ANIONGAP 10 15       Significant Imaging: I have reviewed all pertinent imaging results/findings within the past 24 hours.    Assessment/Plan:      * BRIDGET (acute kidney injury)  Patient with acute kidney injury/acute renal failure likely due to pre-renal azotemia due to dehydration BRIDGET is currently stable. Baseline creatinine  1.0  - Labs reviewed- Renal function/electrolytes with Estimated Creatinine Clearance: 22.9 mL/min (A) (based on SCr of 4.8 mg/dL (H)). according to latest data. Monitor urine output and serial BMP and adjust therapy as needed. Avoid nephrotoxins and renally dose meds for GFR listed above.    Continue urine output, Nephrology following      Agitation  Will consult Psychiatry due to concern about medical decision making capacity  Will not PEC at this time as patient seems A&Ox4 but making rude and derogatory comments  Seen by Psychiatry, appreciate recommendations      Acute appendicitis with localized peritonitis, without perforation, abscess, or gangrene  Abdominal imaging concerning for appendicitis  Due to Plavix use, no urgent surgery is necessary  General surgery saw the patient in the ED  Plan to medically treat appendicitis with IV abx  Tentative plan for appendectomy in 6-8 weeks  Repeat KUB today did not show a perforation        UTI (urinary tract infection)  Antibiotics use for appendicitis can also be  used for UTI        VTE Risk Mitigation (From admission, onward)           Ordered     heparin (porcine) injection 5,000 Units  Every 8 hours         12/18/23 1807     IP VTE HIGH RISK PATIENT  Once         12/18/23 1807     Place sequential compression device  Until discontinued         12/18/23 1807                    Discharge Planning   DEMARIO: 12/22/2023     Code Status: Full Code   Is the patient medically ready for discharge?:     Reason for patient still in hospital (select all that apply): Patient new problem and Patient trending condition  Discharge Plan A: Home Health ([facility])                  Courtney Prieto MD  Department of Hospital Medicine   [facility]

## 2023-12-21 NOTE — ASSESSMENT & PLAN NOTE
43F with tip appendicitis. Pain improving    Continue abx. No abscess. Nothing to drain  WBC improving  On plavix  Will treat medically for appendicitis.  OK for diet   Tentatively plan appendectomy in 6-8 weeks  Rest of care per primary team

## 2023-12-21 NOTE — ASSESSMENT & PLAN NOTE
Will consult Psychiatry due to concern about medical decision making capacity  Will not PEC at this time as patient seems A&Ox4 but making rude and derogatory comments  Seen by Psychiatry, appreciate recommendations

## 2023-12-21 NOTE — PLAN OF CARE
Problem: Occupational Therapy  Goal: Occupational Therapy Goal  Outcome: Met   Pt found in supine & agreeable to OT eval/tx this date.  Pt c/o 10/10 RLQ pain & perf the following:  -sup-->EOB w/ Min HHA 2/2 body habitus  -standing via SPC w/ Sup for fxnl mobility w/in room w/ Sup  -toilet t/f via SPC w/ Sup  -perineal hygiene w/ Sup  -returned to supine w/ Min A for edematous LLE  **pt w/o c/o pain & no demo of facial grimacing or muscle guarding throughout session  Edu/tx re: general safety techs & HEP. Pt verbalized understanding.    Per OT eval, pt w/o change in fxnl status from baseline & no further acute OT svcs indicated at this time. DC OT.

## 2023-12-21 NOTE — SUBJECTIVE & OBJECTIVE
Interval History: No acute events overnight. Started on fluids for soft pressures. Continues to have RLQ pain. Tolerating diet.    Medications:  Continuous Infusions:   lactated ringers 125 mL/hr at 12/21/23 0842     Scheduled Meds:   acetaminophen  650 mg Oral Once    atorvastatin  80 mg Oral QHS    buprenorphine-naloxone 8-2  8 mg Sublingual TID    cefTRIAXone (ROCEPHIN) IVPB  2 g Intravenous Q24H    EScitalopram oxalate  10 mg Oral Daily    heparin (porcine)  5,000 Units Subcutaneous Q8H    magnesium sulfate IVPB  2 g Intravenous Once    metronidazole  500 mg Intravenous Q8H    miconazole NITRATE 2 %   Topical (Top) BID    nicotine  1 patch Transdermal Daily    pantoprazole  40 mg Oral Daily    pregabalin  150 mg Oral Daily     PRN Meds:acetaminophen, albuterol-ipratropium, ALPRAZolam, azelastine, cetirizine, dextrose 10%, dextrose 10%, fluticasone propionate, glucagon (human recombinant), glucose, glucose, insulin aspart U-100, melatonin, naloxone, ondansetron, promethazine, sodium chloride 0.9%, tiZANidine     Review of patient's allergies indicates:   Allergen Reactions    Celexa [citalopram] Nausea And Vomiting     Objective:     Vital Signs (Most Recent):  Temp: 98.1 °F (36.7 °C) (12/21/23 0730)  Pulse: 77 (12/21/23 0730)  Resp: 20 (12/21/23 0730)  BP: (!) 115/56 (12/21/23 0730)  SpO2: (!) 94 % (12/21/23 0840) Vital Signs (24h Range):  Temp:  [97.9 °F (36.6 °C)-98.4 °F (36.9 °C)] 98.1 °F (36.7 °C)  Pulse:  [61-77] 77  Resp:  [16-20] 20  SpO2:  [93 %-96 %] 94 %  BP: ()/(39-56) 115/56     Weight: (!) 169.8 kg (374 lb 5.5 oz)  Body mass index is 68.47 kg/m².    Intake/Output - Last 3 Shifts         12/19 0700  12/20 0659 12/20 0700  12/21 0659 12/21 0700  12/22 0659    P.O. 340 600 240    IV Piggyback       Total Intake(mL/kg) 340 (2) 600 (3.5) 240 (1.4)    Urine (mL/kg/hr) 850 (0.2) 850 (0.2)     Other 0      Stool 1      Total Output 851 850     Net -511 -250 +240           Urine Occurrence 2 x 0 x      Stool Occurrence 1 x               Physical Exam  Vitals and nursing note reviewed.   Constitutional:       General: She is not in acute distress.  HENT:      Head: Normocephalic and atraumatic.      Mouth/Throat:      Mouth: Mucous membranes are moist.   Cardiovascular:      Rate and Rhythm: Normal rate.   Pulmonary:      Effort: Pulmonary effort is normal. No respiratory distress.   Abdominal:      General: There is no distension.      Palpations: Abdomen is soft.      Tenderness: There is abdominal tenderness (RLQ).      Comments: Has area of panniculitis, wounds. No abscess noted.   Skin:     General: Skin is warm and dry.   Neurological:      General: No focal deficit present.      Mental Status: She is alert and oriented to person, place, and time.          Significant Labs:  I have reviewed all pertinent lab results within the past 24 hours.  CBC:   Recent Labs   Lab 12/21/23  0533   WBC 15.88*   RBC 3.83*   HGB 11.2*   HCT 35.3*      MCV 92   MCH 29.2   MCHC 31.7*     CMP:   Recent Labs   Lab 12/21/23  0533   *   CALCIUM 8.6*   ALBUMIN 1.9*   PROT 6.2      K 4.4   CO2 16*      BUN 33*   CREATININE 4.8*   ALKPHOS 124   ALT 14   AST 12   BILITOT 0.3       Significant Diagnostics:  I have reviewed all pertinent imaging results/findings within the past 24 hours.

## 2023-12-21 NOTE — PROGRESS NOTES
"[facility]           Pharmacy  Admission Medication Reconciliation     Based on information gathered for medication list, you may go to "Admission" then "Reconcile Home Medications" tabs to review and/or act upon those items.     The home medication list has been updated by the Pharmacy department.   Please read ALL comments highlighted in red.   Please address this information as you see fit.    Feel free to contact us if you have any questions or require assistance.    Home medication list has been compared to current inpatient medications. Please review the following discrepancies noted below:      Patient reports STILL TAKING the following medication(s) which was not ordered upon admit  Aspirin  Allopurinol  Carvedilol  Clopidogrel  Furosemide  Humalog Mix 75/25  Insulin glargine  Lisinopril    Patient reports taking a DIFFERENT dose,route, or frequency is listed  than how ordered upon admit  Pregabalin    Feel free to contact us if you have any questions or require assistance.    Casandra Majano, PharmD  [phone]        "

## 2023-12-21 NOTE — PROGRESS NOTES
[facility]  General Surgery  Progress Note    Subjective:     History of Present Illness:  No notes on file    Post-Op Info:  * No surgery found *         Interval History: No acute events overnight. Started on fluids for soft pressures. Continues to have RLQ pain. Tolerating diet.    Medications:  Continuous Infusions:   lactated ringers 125 mL/hr at 12/21/23 0842     Scheduled Meds:   acetaminophen  650 mg Oral Once    atorvastatin  80 mg Oral QHS    buprenorphine-naloxone 8-2  8 mg Sublingual TID    cefTRIAXone (ROCEPHIN) IVPB  2 g Intravenous Q24H    EScitalopram oxalate  10 mg Oral Daily    heparin (porcine)  5,000 Units Subcutaneous Q8H    magnesium sulfate IVPB  2 g Intravenous Once    metronidazole  500 mg Intravenous Q8H    miconazole NITRATE 2 %   Topical (Top) BID    nicotine  1 patch Transdermal Daily    pantoprazole  40 mg Oral Daily    pregabalin  150 mg Oral Daily     PRN Meds:acetaminophen, albuterol-ipratropium, ALPRAZolam, azelastine, cetirizine, dextrose 10%, dextrose 10%, fluticasone propionate, glucagon (human recombinant), glucose, glucose, insulin aspart U-100, melatonin, naloxone, ondansetron, promethazine, sodium chloride 0.9%, tiZANidine     Review of patient's allergies indicates:   Allergen Reactions    Celexa [citalopram] Nausea And Vomiting     Objective:     Vital Signs (Most Recent):  Temp: 98.1 °F (36.7 °C) (12/21/23 0730)  Pulse: 77 (12/21/23 0730)  Resp: 20 (12/21/23 0730)  BP: (!) 115/56 (12/21/23 0730)  SpO2: (!) 94 % (12/21/23 0840) Vital Signs (24h Range):  Temp:  [97.9 °F (36.6 °C)-98.4 °F (36.9 °C)] 98.1 °F (36.7 °C)  Pulse:  [61-77] 77  Resp:  [16-20] 20  SpO2:  [93 %-96 %] 94 %  BP: ()/(39-56) 115/56     Weight: (!) 169.8 kg (374 lb 5.5 oz)  Body mass index is 68.47 kg/m².    Intake/Output - Last 3 Shifts         12/19 0700 12/20 0659 12/20 0700 12/21 0659 12/21 0700 12/22 0659    P.O. 340 600 240    IV Piggyback       Total Intake(mL/kg) 340 (2) 600 (3.5)  240 (1.4)    Urine (mL/kg/hr) 850 (0.2) 850 (0.2)     Other 0      Stool 1      Total Output 851 850     Net -511 -250 +240           Urine Occurrence 2 x 0 x     Stool Occurrence 1 x               Physical Exam  Vitals and nursing note reviewed.   Constitutional:       General: She is not in acute distress.  HENT:      Head: Normocephalic and atraumatic.      Mouth/Throat:      Mouth: Mucous membranes are moist.   Cardiovascular:      Rate and Rhythm: Normal rate.   Pulmonary:      Effort: Pulmonary effort is normal. No respiratory distress.   Abdominal:      General: There is no distension.      Palpations: Abdomen is soft.      Tenderness: There is abdominal tenderness (RLQ).      Comments: Has area of panniculitis, wounds. No abscess noted.   Skin:     General: Skin is warm and dry.   Neurological:      General: No focal deficit present.      Mental Status: She is alert and oriented to person, place, and time.          Significant Labs:  I have reviewed all pertinent lab results within the past 24 hours.  CBC:   Recent Labs   Lab 12/21/23  0533   WBC 15.88*   RBC 3.83*   HGB 11.2*   HCT 35.3*      MCV 92   MCH 29.2   MCHC 31.7*     CMP:   Recent Labs   Lab 12/21/23  0533   *   CALCIUM 8.6*   ALBUMIN 1.9*   PROT 6.2      K 4.4   CO2 16*      BUN 33*   CREATININE 4.8*   ALKPHOS 124   ALT 14   AST 12   BILITOT 0.3       Significant Diagnostics:  I have reviewed all pertinent imaging results/findings within the past 24 hours.  Assessment/Plan:     Acute appendicitis with localized peritonitis, without perforation, abscess, or gangrene  43F with tip appendicitis. Pain improving    Continue abx. No abscess. Nothing to drain  WBC improving  On plavix  Will treat medically for appendicitis.  OK for diet   Tentatively plan appendectomy in 6-8 weeks  Rest of care per primary team          Benita Vasquez MD  General Surgery  [facility] Surg

## 2023-12-21 NOTE — PT/OT/SLP PROGRESS
Physical Therapy Visit Attempt      Patient Name:  Lele Dias   MRN:  [id number]    Patient not seen today secondary to Patient unwilling to participate, Patient fatigue, Other (Comment) (Pt reports being too fatigued to participate and ignoring therapist). Will follow-up as able.    12/21/2023

## 2023-12-21 NOTE — SUBJECTIVE & OBJECTIVE
Interval History: Alert and appears comfortable, spoke to her PCP who said that it is okay to stop subutex if treatment of acute pain is necessary    Review of Systems   Constitutional:  Positive for fatigue. Negative for appetite change and chills.   HENT:  Negative for ear discharge and ear pain.    Respiratory:  Negative for cough and choking.    Cardiovascular:  Negative for chest pain and leg swelling.   Gastrointestinal:  Positive for abdominal pain. Negative for anal bleeding, blood in stool, diarrhea and nausea.   Endocrine: Negative for polydipsia and polyphagia.   Genitourinary:  Negative for flank pain and hematuria.   Musculoskeletal:  Negative for back pain and gait problem.   Skin:  Negative for pallor and rash.   Allergic/Immunologic: Negative for food allergies and immunocompromised state.   Neurological:  Negative for seizures and speech difficulty.   Hematological:  Negative for adenopathy. Does not bruise/bleed easily.   Psychiatric/Behavioral:  Negative for agitation, behavioral problems, decreased concentration and dysphoric mood.      Objective:     Vital Signs (Most Recent):  Temp: 98.1 °F (36.7 °C) (12/20/23 1936)  Pulse: 67 (12/20/23 1936)  Resp: 20 (12/20/23 1936)  BP: (!) 88/39 (12/20/23 1936)  SpO2: 96 % (12/20/23 1936) Vital Signs (24h Range):  Temp:  [97.8 °F (36.6 °C)-98.4 °F (36.9 °C)] 98.1 °F (36.7 °C)  Pulse:  [61-91] 67  Resp:  [18-20] 20  SpO2:  [90 %-96 %] 96 %  BP: ()/(39-78) 88/39     Weight: (!) 169.8 kg (374 lb 5.5 oz)  Body mass index is 68.47 kg/m².    Intake/Output Summary (Last 24 hours) at 12/20/2023 2140  Last data filed at 12/20/2023 2049  Gross per 24 hour   Intake 480 ml   Output 700 ml   Net -220 ml         Physical Exam  Vitals and nursing note reviewed.   Constitutional:       General: She is not in acute distress.  HENT:      Head: Normocephalic and atraumatic.      Mouth/Throat:      Mouth: Mucous membranes are moist.   Cardiovascular:      Rate and  Rhythm: Normal rate.   Pulmonary:      Effort: Pulmonary effort is normal. No respiratory distress.   Abdominal:      General: There is no distension.      Palpations: Abdomen is soft.      Tenderness: There is no abdominal tenderness.      Comments: Has area of panniculitis, wounds. No abscess noted.   Skin:     General: Skin is warm and dry.   Neurological:      General: No focal deficit present.      Mental Status: She is alert and oriented to person, place, and time.             Significant Labs: All pertinent labs within the past 24 hours have been reviewed.  CBC:   Recent Labs   Lab 12/19/23 0507 12/20/23  0523   WBC 16.63* 17.22*   HGB 10.9* 9.5*   HCT 33.4* 29.3*    155     CMP:   Recent Labs   Lab 12/19/23 0507 12/20/23  0523    138   K 3.6 3.8    110   CO2 20* 18*   GLU 53* 81   BUN 28* 29*   CREATININE 4.8* 4.9*   CALCIUM 8.4* 8.2*   PROT 5.4* 5.2*   ALBUMIN 1.8* 1.6*   BILITOT 0.3 0.3   ALKPHOS 89 89   AST 17 13   ALT 15 13   ANIONGAP 10 10       Significant Imaging: I have reviewed all pertinent imaging results/findings within the past 24 hours.

## 2023-12-21 NOTE — SUBJECTIVE & OBJECTIVE
Interval History: Today reports severe abdominal pain, BP readings are low and explained why we can escalate pain medications at this time    Decline to work with PT due to fatigue and pain    Review of Systems   Constitutional:  Positive for fatigue. Negative for appetite change and chills.   HENT:  Negative for ear discharge and ear pain.    Respiratory:  Negative for cough and choking.    Cardiovascular:  Negative for chest pain and leg swelling.   Gastrointestinal:  Positive for abdominal pain. Negative for anal bleeding, blood in stool, diarrhea and nausea.   Endocrine: Negative for polydipsia and polyphagia.   Genitourinary:  Negative for flank pain and hematuria.   Musculoskeletal:  Negative for back pain and gait problem.   Skin:  Negative for pallor and rash.   Allergic/Immunologic: Negative for food allergies and immunocompromised state.   Neurological:  Negative for seizures and speech difficulty.   Hematological:  Negative for adenopathy. Does not bruise/bleed easily.   Psychiatric/Behavioral:  Negative for agitation, behavioral problems, decreased concentration and dysphoric mood.      Objective:     Vital Signs (Most Recent):  Temp: 98 °F (36.7 °C) (12/21/23 1620)  Pulse: 64 (12/21/23 1620)  Resp: 16 (12/21/23 1620)  BP: (!) 91/50 (12/21/23 1620)  SpO2: 95 % (12/21/23 1620) Vital Signs (24h Range):  Temp:  [98 °F (36.7 °C)-98.3 °F (36.8 °C)] 98 °F (36.7 °C)  Pulse:  [64-80] 64  Resp:  [16-20] 16  SpO2:  [93 %-96 %] 95 %  BP: ()/(39-56) 91/50     Weight: (!) 169.8 kg (374 lb 5.5 oz)  Body mass index is 68.47 kg/m².    Intake/Output Summary (Last 24 hours) at 12/21/2023 1640  Last data filed at 12/21/2023 0800  Gross per 24 hour   Intake 600 ml   Output 850 ml   Net -250 ml         Physical Exam  Vitals and nursing note reviewed.   Constitutional:       General: She is not in acute distress.  HENT:      Head: Normocephalic and atraumatic.      Mouth/Throat:      Mouth: Mucous membranes are moist.    Cardiovascular:      Rate and Rhythm: Normal rate.   Pulmonary:      Effort: Pulmonary effort is normal. No respiratory distress.   Abdominal:      General: There is no distension.      Palpations: Abdomen is soft.      Tenderness: There is abdominal tenderness (RLQ).      Comments: Has area of panniculitis, wounds. No abscess noted.   Skin:     General: Skin is warm and dry.   Neurological:      General: No focal deficit present.      Mental Status: She is alert and oriented to person, place, and time.             Significant Labs: All pertinent labs within the past 24 hours have been reviewed.  CBC:   Recent Labs   Lab 12/20/23 0523 12/21/23  0533   WBC 17.22* 15.88*   HGB 9.5* 11.2*   HCT 29.3* 35.3*    183     CMP:   Recent Labs   Lab 12/20/23 0523 12/21/23  0533    137   K 3.8 4.4    106   CO2 18* 16*   GLU 81 204*   BUN 29* 33*   CREATININE 4.9* 4.8*   CALCIUM 8.2* 8.6*   PROT 5.2* 6.2   ALBUMIN 1.6* 1.9*   BILITOT 0.3 0.3   ALKPHOS 89 124   AST 13 12   ALT 13 14   ANIONGAP 10 15       Significant Imaging: I have reviewed all pertinent imaging results/findings within the past 24 hours.

## 2023-12-21 NOTE — PROGRESS NOTES
[facility] Medicine  Progress Note    Patient Name: Lele Dias  MRN: [id number]  Patient Class: IP- Inpatient   Admission Date: 12/18/2023  Length of Stay: 2 days  Attending Physician: Courtney Prieto MD  Primary Care Provider: Sammi Edge MD (Cindy)        Subjective:     Principal Problem:BRIDGET (acute kidney injury)        HPI:  Lele Dias is a 43 yr old female with PMH of CKD, depression, diabetes, hypertension who presents with lower abdominal pain and fatigue.    Patient reports right-sided lower abdominal pain, weakness, fatigue over the past day.  She states that she had multiple nonbloody nonbilious loose bowel movements, with associated nausea.  Of note, she was recently discharged for UTI/BRIDGET was sent home with antibiotics.    Triage vitals were remarkable for hypotension and hypothermia.  Physical exam significant for abdominal tenderness.    CBC was significant for leukocytosis, normocytic anemia.  CMP was significant for hyponatremia, and acute renal failure.  Troponin was slightly elevated at 0.146.  UA significant for WBCs and bacteria.  Blood cultures pending.    CT imaging was concerning for acute tip appendicitis, possible cellulitis    Surgery was consulted.  No acute surgery due to Plavix use    Patient admitted for acute renal failure and appendicitis management.    Overview/Hospital Course:  12/19/23 Refusing labs, VS checks  12/20/20 Abd pain is improved. Borderline BPs but patient is asymptomatic    Interval History: Alert and appears comfortable, spoke to her PCP who said that it is okay to stop subutex if treatment of acute pain is necessary    Review of Systems   Constitutional:  Positive for fatigue. Negative for appetite change and chills.   HENT:  Negative for ear discharge and ear pain.    Respiratory:  Negative for cough and choking.    Cardiovascular:  Negative for chest pain and leg swelling.   Gastrointestinal:  Positive for abdominal pain. Negative  for anal bleeding, blood in stool, diarrhea and nausea.   Endocrine: Negative for polydipsia and polyphagia.   Genitourinary:  Negative for flank pain and hematuria.   Musculoskeletal:  Negative for back pain and gait problem.   Skin:  Negative for pallor and rash.   Allergic/Immunologic: Negative for food allergies and immunocompromised state.   Neurological:  Negative for seizures and speech difficulty.   Hematological:  Negative for adenopathy. Does not bruise/bleed easily.   Psychiatric/Behavioral:  Negative for agitation, behavioral problems, decreased concentration and dysphoric mood.      Objective:     Vital Signs (Most Recent):  Temp: 98.1 °F (36.7 °C) (12/20/23 1936)  Pulse: 67 (12/20/23 1936)  Resp: 20 (12/20/23 1936)  BP: (!) 88/39 (12/20/23 1936)  SpO2: 96 % (12/20/23 1936) Vital Signs (24h Range):  Temp:  [97.8 °F (36.6 °C)-98.4 °F (36.9 °C)] 98.1 °F (36.7 °C)  Pulse:  [61-91] 67  Resp:  [18-20] 20  SpO2:  [90 %-96 %] 96 %  BP: ()/(39-78) 88/39     Weight: (!) 169.8 kg (374 lb 5.5 oz)  Body mass index is 68.47 kg/m².    Intake/Output Summary (Last 24 hours) at 12/20/2023 2140  Last data filed at 12/20/2023 2049  Gross per 24 hour   Intake 480 ml   Output 700 ml   Net -220 ml         Physical Exam  Vitals and nursing note reviewed.   Constitutional:       General: She is not in acute distress.  HENT:      Head: Normocephalic and atraumatic.      Mouth/Throat:      Mouth: Mucous membranes are moist.   Cardiovascular:      Rate and Rhythm: Normal rate.   Pulmonary:      Effort: Pulmonary effort is normal. No respiratory distress.   Abdominal:      General: There is no distension.      Palpations: Abdomen is soft.      Tenderness: There is no abdominal tenderness.      Comments: Has area of panniculitis, wounds. No abscess noted.   Skin:     General: Skin is warm and dry.   Neurological:      General: No focal deficit present.      Mental Status: She is alert and oriented to person, place, and time.              Significant Labs: All pertinent labs within the past 24 hours have been reviewed.  CBC:   Recent Labs   Lab 12/19/23  0507 12/20/23  0523   WBC 16.63* 17.22*   HGB 10.9* 9.5*   HCT 33.4* 29.3*    155     CMP:   Recent Labs   Lab 12/19/23  0507 12/20/23  0523    138   K 3.6 3.8    110   CO2 20* 18*   GLU 53* 81   BUN 28* 29*   CREATININE 4.8* 4.9*   CALCIUM 8.4* 8.2*   PROT 5.4* 5.2*   ALBUMIN 1.8* 1.6*   BILITOT 0.3 0.3   ALKPHOS 89 89   AST 17 13   ALT 15 13   ANIONGAP 10 10       Significant Imaging: I have reviewed all pertinent imaging results/findings within the past 24 hours.    Assessment/Plan:      * BRIDGET (acute kidney injury)  Patient with acute kidney injury/acute renal failure likely due to pre-renal azotemia due to dehydration BRIDGET is currently stable. Baseline creatinine  1.0  - Labs reviewed- Renal function/electrolytes with Estimated Creatinine Clearance: 22.2 mL/min (A) (based on SCr of 4.8 mg/dL (H)). according to latest data. Monitor urine output and serial BMP and adjust therapy as needed. Avoid nephrotoxins and renally dose meds for GFR listed above.      Agitation  Will consult Psychiatry due to concern about medical decision making capacity  Will not PEC at this time as patient seems A&Ox4 but making rude and derogatory comments      Acute appendicitis with localized peritonitis, without perforation, abscess, or gangrene  Abdominal imaging concerning for appendicitis  Due to Plavix use, no urgent surgery is necessary  General surgery saw the patient in the ED  Plan to medically treat appendicitis with IV abx  Tentative plan for appendectomy in 6-8 weeks  Repeat KUB today did not show a perforation        UTI (urinary tract infection)  Antibiotics use for appendicitis can also be used for UTI        VTE Risk Mitigation (From admission, onward)           Ordered     heparin (porcine) injection 5,000 Units  Every 8 hours         12/18/23 1807     IP VTE HIGH RISK  PATIENT  Once         12/18/23 1807     Place sequential compression device  Until discontinued         12/18/23 1807                    Discharge Planning   DEMARIO: 12/22/2023     Code Status: Full Code   Is the patient medically ready for discharge?:     Reason for patient still in hospital (select all that apply): Patient trending condition, Laboratory test, Treatment, and Consult recommendations  Discharge Plan A: Home Health ([facility])                  Courtney Prieto MD  Department of Hospital Medicine   [facility]

## 2023-12-21 NOTE — ASSESSMENT & PLAN NOTE
Patient with acute kidney injury/acute renal failure likely due to pre-renal azotemia due to dehydration BRIDGET is currently stable. Baseline creatinine  1.0  - Labs reviewed- Renal function/electrolytes with Estimated Creatinine Clearance: 22.9 mL/min (A) (based on SCr of 4.8 mg/dL (H)). according to latest data. Monitor urine output and serial BMP and adjust therapy as needed. Avoid nephrotoxins and renally dose meds for GFR listed above.    Continue urine output, Nephrology following

## 2023-12-22 NOTE — AI DETERIORATION ALERT
Artificial Intelligence Notification  Ramesh      Admit Date: 2023  LOS: 4  Code Status: Full Code   Date of Consult: 2023  : 1980  Age: 43 y.o.  Weight:   Wt Readings from Last 1 Encounters:   23 (!) 167.7 kg (369 lb 11.4 oz)     Sex: female  Bed: [facility]:   MRN: [id number]  Attending Physician: Courtney Prieto MD  Primary Service: Networked reference to record PCT   Time AI Alert Received: ***  Time at Bedside: ***           ***      Vital Signs (Most Recent):  Temp: 98 °F (36.7 °C) (23 0354)  Pulse: 61 (23 1102)  Resp: 18 (23 0809)  BP: (!) 71/36 (23 1102)  SpO2: (!) 93 % (23 0809) Vital Signs (24h Range):  Temp:  [98 °F (36.7 °C)-98.3 °F (36.8 °C)] 98 °F (36.7 °C)  Pulse:  [61-80] 61  Resp:  [16-20] 18  SpO2:  [93 %-98 %] 93 %  BP: ()/(36-72) 71/36         This encounter was triggered by an Artificial Intelligence Notification.     Artificial Intelligence alert discussed with Primary team:  Name ***      Evaluation: ***    Disposition: ***

## 2023-12-22 NOTE — PROGRESS NOTES
Nephrology Progress Note       Consult Requested By: Courtney Prieto MD  Reason for Consult: BRIDGET      SUBJECTIVE:        ?    Review of Systems   Constitutional:  Negative for chills, fever and malaise/fatigue.   HENT:  Negative for congestion and sore throat.    Eyes:  Negative for blurred vision, double vision and photophobia.   Respiratory:  Negative for cough and shortness of breath.    Cardiovascular:  Negative for chest pain, palpitations and leg swelling.   Gastrointestinal:  Negative for abdominal pain, diarrhea, nausea and vomiting.   Genitourinary:  Negative for dysuria and urgency.   Musculoskeletal:  Negative for joint pain and myalgias.   Skin:  Negative for itching and rash.   Neurological:  Positive for weakness. Negative for dizziness, sensory change and headaches.   Endo/Heme/Allergies:  Negative for polydipsia. Does not bruise/bleed easily.   Psychiatric/Behavioral:  Negative for depression.        Past Medical History:   Diagnosis Date    Anemia     Asthma     Cellulitis of abdominal wall 12/18/2017    CKD (chronic kidney disease) stage 3, GFR 30-59 ml/min     Depression     Diabetes mellitus, type II     Diastolic dysfunction     Diverticulosis of intestine with bleeding 9/14/2016    E coli bacteremia 4/21/2018    E. coli pyelonephritis 6/2/2015    E. coli UTI 12/18/2017    Hematuria     Hernia, epigastric     Hypertension     Hypocalcemia     Nonalcoholic hepatosteatosis     Periumbilical hernia     Proteinuria             OBJECTIVE:     Vital Signs (Most Recent)  Vitals:    12/22/23 0351 12/22/23 0354 12/22/23 0742 12/22/23 0809   BP:  (!) 93/50  (!) 96/53   BP Location:       Patient Position:  Lying     Pulse: 64 65  63   Resp:  19  18   Temp:  98 °F (36.7 °C)     TempSrc:  Oral     SpO2:  95% 96% (!) 93%   Weight:       Height:                       Medications:   acetaminophen  650 mg Oral Once    atorvastatin  80 mg Oral QHS    buprenorphine-naloxone 8-2  8 mg Sublingual TID     cefTRIAXone (ROCEPHIN) IVPB  2 g Intravenous Q24H    EScitalopram oxalate  10 mg Oral Daily    heparin (porcine)  5,000 Units Subcutaneous Q8H    magnesium sulfate IVPB  2 g Intravenous Once    metronidazole  500 mg Intravenous Q8H    miconazole NITRATE 2 %   Topical (Top) BID    nicotine  1 patch Transdermal Daily    pantoprazole  40 mg Oral Daily    polyethylene glycol  17 g Oral Daily    pregabalin  150 mg Oral Daily           Physical Exam  Vitals and nursing note reviewed.   Constitutional:       General: She is not in acute distress.     Appearance: She is obese. She is not diaphoretic.   HENT:      Head: Normocephalic and atraumatic.      Mouth/Throat:      Pharynx: No oropharyngeal exudate.   Eyes:      General: No scleral icterus.     Conjunctiva/sclera: Conjunctivae normal.      Pupils: Pupils are equal, round, and reactive to light.   Cardiovascular:      Rate and Rhythm: Normal rate and regular rhythm.      Heart sounds: Normal heart sounds. No murmur heard.  Pulmonary:      Effort: Pulmonary effort is normal. No respiratory distress.      Breath sounds: Normal breath sounds.   Abdominal:      General: Bowel sounds are normal. There is no distension.      Palpations: Abdomen is soft.      Tenderness: There is no abdominal tenderness.   Musculoskeletal:         General: Normal range of motion.      Cervical back: Normal range of motion and neck supple.   Skin:     General: Skin is warm and dry.      Findings: No erythema.   Neurological:      Mental Status: She is alert and oriented to person, place, and time.      Cranial Nerves: No cranial nerve deficit.   Psychiatric:         Mood and Affect: Affect normal.         Cognition and Memory: Memory normal.         Judgment: Judgment normal.         Laboratory:  Recent Labs   Lab 12/19/23  0507 12/20/23  0523 12/21/23  0533   WBC 16.63* 17.22* 15.88*   HGB 10.9* 9.5* 11.2*   HCT 33.4* 29.3* 35.3*    155 183   MONO 5.4  0.9 3.0* 3.0*   EOSINOPHIL 0.6  0.0 1.0       Recent Labs   Lab 12/20/23  0523 12/21/23  0533 12/22/23  0646 12/22/23  0820    137  --  139   K 3.8 4.4  --  4.4    106  --  107   CO2 18* 16*  --  18*   BUN 29* 33*  --  33*   CREATININE 4.9* 4.8*  --  3.4*   CALCIUM 8.2* 8.6*  --  8.3*   PHOS  --  6.2* 5.3* 5.3*         Diagnostic Results:  X-Ray: Reviewed  US: Reviewed  Echo: Reviewed  ASSESSMENT/PLAN:     1. BRIDGET - on CKD3 ? No baseline labs out of hospital all Cr BRIDGET admission   Hx of HTN and DM, MI s/p PCI 2021  -- Now admitted for  abdominal pain has panniculitis and  appendicitis Gen Surgery following on IV abx - improving abdominal pain    -- UA + for UTI but was treated since 12/13  Not clear etiology at this point  ddx remains  ATN, Sepsis,   glomerulonephritis, AIN.    Cr up to 5.1=>4.8=>4.9=>4.8  from 1.1 on 12/14/23   S/p IVF and IV abx supportive therapy  - for now   FEUrea 15.5% goes with pre renal - s/p  IVF   now with good PO intake off IVF   Today Cr improved 3.4 recovering ATN   Making urine  - 1.8 L good UOP   -- After Discharge needs f/u with [facility]    -- Avoid Hypotension.  -- Renally dose all meds  -- Please avoid nephrotoxins, including NSAIDs, aminoglycosides, IV contrast (unless absolutely necessary), gadolinium, fleets and other phosphorous-based laxatives. Caution with antibiotics.    2. HTN (I10) - controlled   3. Anemia of chronic kidney disease  vs acute illness vs MARGARETTE   Recent Labs   Lab 12/19/23  0507 12/20/23  0523 12/21/23  0533   HGB 10.9* 9.5* 11.2*   HCT 33.4* 29.3* 35.3*    155 183         Iron - levels low in the past check new   Lab Results   Component Value Date    IRON <10 (L) 04/22/2018    TIBC 189 (L) 04/22/2018    FERRITIN 51 08/09/2018       4. MBD (E88.9 M90.80) -  PTH vit D   Recent Labs   Lab 12/22/23  0820   CALCIUM 8.3*   PHOS 5.3*       Recent Labs   Lab 12/20/23  0523 12/21/23  0533 12/22/23  0646   MG 1.6 1.5* 1.5*      Mg low replace as needed     Lab Results  "  Component Value Date    CALCIUM 8.3 (L) 12/22/2023    PHOS 5.3 (H) 12/22/2023     No results found for: "[id number]"  Acidosis   BRIDGET - monitor for now   Lab Results   Component Value Date    CO2 18 (L) 12/22/2023       5. Nutrition/Hypoalbuminemia   Recent Labs   Lab 12/21/23  0533 12/22/23  0820   ALBUMIN 1.9* 1.6*       Nepro with meals TID.     UPCR 0.2g       Thank you for allowing me to participate in care of your patient  With any question please call   Rob Mcmullen MD     [facility]  RONA Frank MD,   OMD NATA Newman MD E. V. Harmon, NP  [id number]   [place], [id number] [phone]  After hours answering service: [phone]   "

## 2023-12-22 NOTE — PROGRESS NOTES
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 Value Ref Range    Magnesium 1.5 (L) 1.6 - 2.6 mg/dL   Phosphorus    Collection Time: 12/22/23  6:46 AM   Result Value Ref Range    Phosphorus 5.3 (H) 2.7 - 4.5 mg/dL   Renal Function Panel    Collection Time: 12/22/23  8:20 AM   Result Value Ref Range    Glucose 250 (H) 70 - 110 mg/dL    Sodium 139 136 - 145 mmol/L    Potassium 4.4 3.5 - 5.1 mmol/L    Chloride 107 95 - 110 mmol/L    CO2 18 (L) 23 - 29 mmol/L    BUN 33 (H) 6 - 20 mg/dL    Calcium 8.3 (L) 8.7 - 10.5 mg/dL    Creatinine 3.4 (H) 0.5 - 1.4 mg/dL    Albumin 1.6 (L) 3.5 - 5.2 g/dL    Phosphorus 5.3 (H) 2.7 - 4.5 mg/dL    eGFR 16 (A) >60 mL/min/1.73 m^2    Anion Gap 14 8 - 16 mmol/L      No results found for: "PHENYTOIN", "PHENOBARB", "VALPROATE", "CBMZ"      EXAMINATION    VITALS   Vitals:    12/22/23 0351 12/22/23 0354 12/22/23 0742 12/22/23 0809   BP:  (!) 93/50  (!) 96/53   BP Location:       Patient Position:  Lying     Pulse: 64 65  63   Resp:  19  18   Temp:  98 °F (36.7 °C)     TempSrc:  Oral     SpO2:  95% 96% (!) 93%   Weight:       Height:          CONSTITUTIONAL  General Appearance: NAD, unremarkable, age appropriate, obese, lying in bed, calm, intermittently irritable       MUSCULOSKELETAL  Muscle Strength and Tone: WNL   Abnormal " "Involuntary Movements: none observed   Gait and Station: Attempted but unable to assess due to medical acuity      PSYCHIATRIC   Behavior/Cooperation:  intermittently cooperative, intermittent participation, eye contact intermittent, calm   Speech: irritable tone, normal rate, normal pitch, normal volume  Language: grossly intact, able to name with spontaneous speech  Mood: "I'm OK"  Affect:  mildly constricted / irritable   Associations: intact; no MAO  Thought Process: Linear and Future-Oriented   Thought Content: no suicidality, no homicidality, no delusions, no paranoia, no AH/VH/TH, no RIS observed   Sensorium: Alert  Alert and Oriented: to person, place, city, state, month, year, and situation   Memory: Patient again unwilling to participate in formal memory testing  Attention/concentration: Intact. Appropriate for age/education. Able to spell w-o-r-l-d but NOT d-l-r-o-w.   Similarities:  Intact; (difference between apple and orange?)  Abstract reasoning:  Intact  Fund of Knowledge: Named 3/4 past [place] Presidents   Insight: Intact  Judgment: Intact     CAM ICU Delirium Assessment - NEGATIVE     Is the patient aware of the biomedical complications associated with substance abuse and mental illness? yes           MEDICAL DECISION MAKING     ASSESSMENT         Adjustment Disorder with Mixed Disturbance of Emotions and Conduct   Opioid Use Disorder, Severe, Dependence, (on MAT - Suboxone with endorsed sobriety for 10 years)  Benzodiazepine Abuse hx  Cluster B Personality Traits (this is likely a notable contributor to her intermittent compliance with care)  (Rule out Delirium)         RECOMMENDATIONS       - Patient does not currently meet PEC criteria due to not being an imminent threat to self/others and not being gravely disabled 2/2 mental illness at this time.     - With reasonable medical certainty, based on a present-state examination, the patient currently continues to appear to have medical decision-making " capacity as made evident by her ability to cognitively utilize information provided to her to express a choice that is stable over time, to understand the relevant information pertaining her diagnoses and recommended treatments, to appreciate the consequences of the decision (risks vs benefits) regarding accepting vs refusing treatment, and to manipulate all of the data in a logical fashion.      - Continue patient's outpatient regimen of Suboxone 8 mg - 2 mg SL TID for OUD given her endorsed sobriety / stability on this regimen (discussed risks/benefits/alt vs no treatment with patient) (regimen verified with [place] ).     - Continue Lexapro 10 mg PO daily for depression / anxiety (discussed risks/benefits/alt vs no treatment with patient).     - Can use Vistaril 25 mg PO TID PRN for anxiety / agitation (discussed risks/benefits/alt vs no treatment with patient).     Implement / Continue the below DELIRIUM BEHAVIOR MANAGEMENT:  - Minimize use of restraints; if physical restraints necessary, please utilize medical/chemical prns for agitation (can use Zyprexa 2.5 mg PO/IM q8 hours PRN).  - Keep shades open and room lit during day and room dim at night in order to promote healthy circadian rhythms.  - Encourage family at bedside.  - Keep whiteboard in patient's room current with the date and name of the members of patient's team for easy patient self re-orientation.  - Avoid benzodiazepines, antihistamines, anticholinergics, hypnotics, and minimize opiates while controlling for pain as these medications may exacerbate delirium.      - Psychotherapy was again performed with patient as noted above with a focus on improving adjustment-related depression / anxiety / conduct and continued sobriety.     - Patient's most recent resulted labs, imaging, and EKG were reviewed again today.      - Informed pt of the risks of continuous Benzodiazepine use including tolerance, dependence, and withdrawals that may be life threatening  upon abrupt cessation. Also advised pt not to take Benzodiazepines with Suboxone, Opiates, or other sedatives and also not to drive or operate heavy machinery while using Benzodiazepines.     - Patient was instructed to call 911 and/or [phone] and return to the nearest ED if she begins feeling suicidal, homicidal, or gravely disabled (for s/p this hospitalization).     - Thank you for this consult ; will continue to follow patient while at [facility]         Total time spent with patient and/or managing/coordinating patient's care today (excluding the time spent on psychotherapy): 57 minutes   Time spent on psychotherapy today (as noted above): 21 minutes   Total time for encounter today including psychotherapy: 78 minutes      More than 50% of the time was spent counseling/coordinating care.     Consulting clinician was informed of the encounter and consult note.      STAFF:  Wilver Perez MD  [facility]  12/22/2023

## 2023-12-22 NOTE — PT/OT/SLP EVAL
"Physical Therapy Evaluation and Treatment    Patient Name:  Lele Dias   MRN:  [id number]    Recommendations:     Discharge Recommendations: No Therapy Indicated   Discharge Equipment Recommendations: none   Barriers to discharge: None    Assessment:     Lele Dias is a 43 y.o. female admitted with a medical diagnosis of BRIDGET (acute kidney injury).  She presents with the following impairments/functional limitations: pain, impaired skin, impaired functional mobility, impaired balance, impaired endurance, decreased safety awareness, impaired self care skills, gait instability, impaired cardiopulmonary response to activity .Pt reports she is at her baseline level of function and does not report any concerns or deficits with her strength or balance. Pt requesting that therapist step away and to not provide assistance for bed <> BSC t/f. Pt performed transfer with SBA and no AD. Deferred ambulating further this date due to low BP taken X 3. Nurse notified and in room to assess / take manual BP at end of session. Recommending d/c home pending medical stability with no therapy or DME needs.     Rehab Prognosis: Fair; patient would benefit from acute skilled PT services to address these deficits and reach maximum level of function.    Recent Surgery: * No surgery found *      Plan:     During this hospitalization, patient to be seen 2 x/week to address the identified rehab impairments via gait training, therapeutic activities, therapeutic exercises, neuromuscular re-education and progress toward the following goals:    Plan of Care Expires:  01/22/24    Subjective     Chief Complaint: "they didn't wipe me good"  Patient/Family Comments/goals: pt reports she feels like her normal self and does not have any concerns regarding her strength or balance; pt requesting therapy continue working with her to assess her balance ambulating outside of her room  Pain/Comfort:  Pain Rating 1:  (none reported, reports receiving " "pain medication prior to therapy session)  Pain Rating Post-Intervention 1: 0/10    Patients cultural, spiritual, Orthodoxy conflicts given the current situation: no    Living Environment:  Pt lives with her 2 daughters in a  with ramp entrance and garden tub with BSC  Prior to admission, patients level of function was Mod I with SPC for ADL's except pt requires assist with sponge baths and LBD.  Equipment used at home: bedside commode, cane, straight, hospital bed.  DME owned (not currently used): none.  Upon discharge, patient will have assistance from family.    Objective:     Communicated with nsg prior to session.  Patient found HOB elevated with bed alarm, telemetry, peripheral IV, PureWick  upon PT entry to room.    General Precautions: Standard, fall  Orthopedic Precautions:N/A   Braces: N/A  Respiratory Status: Room air    Exams:  Cognitive Exam:  Patient is oriented to Person, Place, Time, and Situation  Postural Exam:  Patient presented with the following abnormalities:    -       Rounded shoulders  Skin Integrity/Edema:      -       Skin integrity:  abdomen, Buttocks, and L calf wounds, see chart for details  B LE ROM/MMT WFL for pt's needs upon functional assessment     Functional Mobility:  Bed Mobility:     Scooting: stand by assistance  Supine to Sit: minimum assistance for trunk   Sit to Supine: minimum assistance for BLE  Transfers:     Sit to Stand:  stand by assistance with no AD  Bed to BSC: stand by assistance and contact guard assistance with  no AD  using  Step Transfer  Gait: Pt ambulated ~3-4 steps to/from BSC and ~3 lateral steps to [facility] with therapist initially providing CGA for transfer to BSC then pt becoming agitated and stating "y'all try to treat me like a baby" and requesting therapist step away and take hands off; SBA provided for remainder of gait      AM-PAC 6 CLICK MOBILITY  Total Score:17       Treatment & Education:  Pt educated on the role of PT/POC and pt agreeable to " mobilize with therapy  Pt transferred to/from [facility] and was able to perform hygiene but required assist for full wiping of buttocks due to pt noting a BM smear  See above for details on gait  BP taken X 3 and pt asymptomatic  Pt returned supine     23 1101 23 1102   Vital Signs   BP (!) 76/44 (!) 71/36   MAP (mmHg) 54 49   BP Location Right arm Right arm   Patient Position Sitting Lying   Nurse took 3rd BP and systolic BP in the 80s  Deferred further ambulation due to low BP / safety concerns  Pt is able to mobilize with nursing/staff pending medical stability and BP  Nurse in room to assess pt and obtain manual BP    Patient left HOB elevated with all lines intact, call button in reach, bed alarm on, nsg notified, and nsg present.    GOALS:   Multidisciplinary Problems       Physical Therapy Goals          Problem: Physical Therapy    Goal Priority Disciplines Outcome Goal Variances Interventions   Physical Therapy Goal     PT, PT/OT Ongoing, Progressing     Description: Goals to be met by: 24    Patient will increase functional independence with mobility by performin. Supine to sit with Modified [place]  2. Sit to supine with Modified [place]  3. Sit to stand transfer with Modified [place]  4. Bed to chair transfer with Modified [place] using Single-point Cane   5. Gait  x 80 feet with Modified [place] using Single-point Cane .                          History:     Past Medical History:   Diagnosis Date    Anemia     Asthma     Cellulitis of abdominal wall 2017    CKD (chronic kidney disease) stage 3, GFR 30-59 ml/min     Depression     Diabetes mellitus, type II     Diastolic dysfunction     Diverticulosis of intestine with bleeding 2016    E coli bacteremia 2018    E. coli pyelonephritis 2015    E. 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 2000s     SECTION       SECTION, CLASSIC      CHOLECYSTECTOMY  age 15    CORONARY ANGIOGRAPHY N/A 2021    Procedure: ANGIOGRAM, CORONARY ARTERY;  Surgeon: Neelam Hicks MD;  Location: [facility] CATH LAB/EP;  Service: Cardiology;  Laterality: N/A;    INCISION AND DRAINAGE OF ABSCESS N/A 2020    Procedure: INCISION AND DRAINAGE, ABSCESS;  Surgeon: Rich Watson MD;  Location: [facility] OR;  Service: General;  Laterality: N/A;    LEFT HEART CATHETERIZATION Left 2021    Procedure: Left heart cath;  Surgeon: Neelam Hicks MD;  Location: [facility] CATH LAB/EP;  Service: Cardiology;  Laterality: Left;    LEFT HEART CATHETERIZATION N/A 2021    Procedure: Left heart cath;  Surgeon: Neelam Hicks MD;  Location: [facility] CATH LAB/EP;  Service: Cardiology;  Laterality: N/A;    TONSILLECTOMY, ADENOIDECTOMY         Time Tracking:     PT Received On: 23  PT Start Time: 1025     PT Stop Time: 1106  PT Total Time (min): 41 min     Billable Minutes: Evaluation 10 and Therapeutic Activity 23  Self care 8      2023

## 2023-12-22 NOTE — PLAN OF CARE
12/22/23 1000   Rounds   Attendance Nurse ;Provider   Discharge Plan A Home Health  ([facility])   Why the patient remains in the hospital Requires continued medical care   Transition of Care Barriers Transportation       1000  CM was informed by Dr Prieto that the pt is not medically stable to discharge today. Patient was admitted with acute renal failure & continues to be followed by neph, gen surg, psych, & PT/OT. BP 76/44, WBC 15.88, BUN 33, cr 3.4, phos 5.3, & alb 1.6 this AM.       Will continue to follow.

## 2023-12-23 NOTE — PROGRESS NOTES
[facility] Medicine  Progress Note    Patient Name: Lele Dias  MRN: [id number]  Patient Class: IP- Inpatient   Admission Date: 12/18/2023  Length of Stay: 4 days  Attending Physician: Courtney Prieto MD  Primary Care Provider: Sammi Edge MD (Cindy)        Subjective:     Principal Problem:BRIDGET (acute kidney injury)        HPI:  Lele Dias is a 43 yr old female with PMH of CKD, depression, diabetes, hypertension who presents with lower abdominal pain and fatigue.    Patient reports right-sided lower abdominal pain, weakness, fatigue over the past day.  She states that she had multiple nonbloody nonbilious loose bowel movements, with associated nausea.  Of note, she was recently discharged for UTI/BRIDGET was sent home with antibiotics.    Triage vitals were remarkable for hypotension and hypothermia.  Physical exam significant for abdominal tenderness.    CBC was significant for leukocytosis, normocytic anemia.  CMP was significant for hyponatremia, and acute renal failure.  Troponin was slightly elevated at 0.146.  UA significant for WBCs and bacteria.  Blood cultures pending.    CT imaging was concerning for acute tip appendicitis, possible cellulitis    Surgery was consulted.  No acute surgery due to Plavix use    Patient admitted for acute renal failure and appendicitis management.    Overview/Hospital Course:  12/19/23 Refusing labs, VS checks  12/20/23 Abd pain is improved. Borderline BPs but patient is asymptomatic  12/21/23 is reporting worsening abdominal pain  12/22 Hypotension worsening with increased UOP and Cre down to 3.4    Interval History: Upset ion worsening with increased UOP and Cre down to 3.4    Review of Systems   Constitutional:  Positive for fatigue. Negative for appetite change and chills.   HENT:  Negative for ear discharge and ear pain.    Respiratory:  Negative for cough and choking.    Cardiovascular:  Negative for chest pain and leg swelling.    Gastrointestinal:  Positive for abdominal pain. Negative for anal bleeding, blood in stool, diarrhea and nausea.   Endocrine: Negative for polydipsia and polyphagia.   Genitourinary:  Negative for flank pain and hematuria.   Musculoskeletal:  Negative for back pain and gait problem.   Skin:  Negative for pallor and rash.   Allergic/Immunologic: Negative for food allergies and immunocompromised state.   Neurological:  Negative for seizures and speech difficulty.   Hematological:  Negative for adenopathy. Does not bruise/bleed easily.   Psychiatric/Behavioral:  Negative for agitation, behavioral problems, decreased concentration and dysphoric mood.      Objective:     Vital Signs (Most Recent):  Temp: 98.1 °F (36.7 °C) (12/22/23 1544)  Pulse: 70 (12/22/23 1544)  Resp: 20 (12/22/23 1544)  BP: (!) 100/50 (12/22/23 1544)  SpO2: 96 % (12/22/23 1544) Vital Signs (24h Range):  Temp:  [97.5 °F (36.4 °C)-98.3 °F (36.8 °C)] 98.1 °F (36.7 °C)  Pulse:  [61-70] 70  Resp:  [18-20] 20  SpO2:  [93 %-98 %] 96 %  BP: ()/(36-72) 100/50     Weight: (!) 167.7 kg (369 lb 11.4 oz)  Body mass index is 67.62 kg/m².    Intake/Output Summary (Last 24 hours) at 12/22/2023 1832  Last data filed at 12/22/2023 1800  Gross per 24 hour   Intake 681.15 ml   Output 700 ml   Net -18.85 ml         Physical Exam  Vitals and nursing note reviewed.   Constitutional:       General: She is not in acute distress.  HENT:      Head: Normocephalic and atraumatic.      Mouth/Throat:      Mouth: Mucous membranes are moist.   Cardiovascular:      Rate and Rhythm: Normal rate.   Pulmonary:      Effort: Pulmonary effort is normal. No respiratory distress.   Abdominal:      General: There is no distension.      Palpations: Abdomen is soft.      Tenderness: There is abdominal tenderness (RLQ).      Comments: Has area of panniculitis, wounds. No abscess noted.   Skin:     General: Skin is warm and dry.   Neurological:      General: No focal deficit present.       Mental Status: She is alert and oriented to person, place, and time.             Significant Labs: All pertinent labs within the past 24 hours have been reviewed.  CBC:   Recent Labs   Lab 12/21/23  0533   WBC 15.88*   HGB 11.2*   HCT 35.3*        CMP:   Recent Labs   Lab 12/21/23  0533 12/22/23  0820    139   K 4.4 4.4    107   CO2 16* 18*   * 250*   BUN 33* 33*   CREATININE 4.8* 3.4*   CALCIUM 8.6* 8.3*   PROT 6.2  --    ALBUMIN 1.9* 1.6*   BILITOT 0.3  --    ALKPHOS 124  --    AST 12  --    ALT 14  --    ANIONGAP 15 14       Significant Imaging: I have reviewed all pertinent imaging results/findings within the past 24 hours.    Assessment/Plan:      * BRIDGET (acute kidney injury)  Patient with acute kidney injury/acute renal failure likely due to pre-renal azotemia due to dehydration BRIDGET is currently stable. Baseline creatinine  1.0  - Labs reviewed- Renal function/electrolytes with Estimated Creatinine Clearance: 32 mL/min (A) (based on SCr of 3.4 mg/dL (H)). according to latest data. Monitor urine output and serial BMP and adjust therapy as needed. Avoid nephrotoxins and renally dose meds for GFR listed above.    Continued improving urine output, may be causing hypotension  Down to 33/3.4  Nephrology following      Agitation  Will consult Psychiatry due to concern about medical decision making capacity  Will not PEC at this time as patient seems A&Ox4 but making rude and derogatory comments  Seen by Psychiatry, appreciate recommendations      Acute appendicitis with localized peritonitis, without perforation, abscess, or gangrene  Abdominal imaging concerning for appendicitis  Due to Plavix use, no urgent surgery is necessary  General surgery saw the patient in the ED  Plan to medically treat appendicitis with IV abx  Tentative plan for appendectomy in 6-8 weeks  Repeat KUB today did not show a perforation        UTI (urinary tract infection)  Antibiotics use for appendicitis can also be  used for UTI        VTE Risk Mitigation (From admission, onward)           Ordered     heparin (porcine) injection 5,000 Units  Every 8 hours         12/18/23 1807     IP VTE HIGH RISK PATIENT  Once         12/18/23 1807     Place sequential compression device  Until discontinued         12/18/23 1807                    Discharge Planning   DEMARIO: 12/23/2023     Code Status: Full Code   Is the patient medically ready for discharge?:     Reason for patient still in hospital (select all that apply): Patient trending condition, Laboratory test, Treatment, and Consult recommendations  Discharge Plan A: Home Health ([facility])                  Courtney Prieto MD  Department of Hospital Medicine   [facility]

## 2023-12-23 NOTE — PROGRESS NOTES
Nephrology Progress Note       Consult Requested By: Courtney Prieto MD  Reason for Consult: BRIDGET      SUBJECTIVE:        ?    Review of Systems   Constitutional:  Negative for chills, fever and malaise/fatigue.   HENT:  Negative for congestion and sore throat.    Eyes:  Negative for blurred vision, double vision and photophobia.   Respiratory:  Negative for cough and shortness of breath.    Cardiovascular:  Negative for chest pain, palpitations and leg swelling.   Gastrointestinal:  Negative for abdominal pain, diarrhea, nausea and vomiting.   Genitourinary:  Negative for dysuria and urgency.   Musculoskeletal:  Negative for joint pain and myalgias.   Skin:  Negative for itching and rash.   Neurological:  Positive for weakness. Negative for dizziness, sensory change and headaches.   Endo/Heme/Allergies:  Negative for polydipsia. Does not bruise/bleed easily.   Psychiatric/Behavioral:  Negative for depression.        Past Medical History:   Diagnosis Date    Anemia     Asthma     Cellulitis of abdominal wall 12/18/2017    CKD (chronic kidney disease) stage 3, GFR 30-59 ml/min     Depression     Diabetes mellitus, type II     Diastolic dysfunction     Diverticulosis of intestine with bleeding 9/14/2016    E coli bacteremia 4/21/2018    E. coli pyelonephritis 6/2/2015    E. coli UTI 12/18/2017    Hematuria     Hernia, epigastric     Hypertension     Hypocalcemia     Nonalcoholic hepatosteatosis     Periumbilical hernia     Proteinuria             OBJECTIVE:     Vital Signs (Most Recent)  Vitals:    12/23/23 0719 12/23/23 0833 12/23/23 1128 12/23/23 1156   BP: (!) 111/53  137/63    BP Location: Right arm  Right arm    Patient Position: Lying  Sitting    Pulse: 69  75 79   Resp: 18  20    Temp: 98.2 °F (36.8 °C)  98.1 °F (36.7 °C)    TempSrc: Oral  Oral    SpO2: 97% 97% (!) 94%    Weight:       Height:             Date 12/23/23 0700 - 12/24/23 0659   Shift 4696-5385 8176-7319 7767-0759 24 Hour Total   INTAKE   Shift  Total(mL/kg)       OUTPUT   Urine(mL/kg/hr) 500   500   Shift Total(mL/kg) 500(3)   500(3)   Weight (kg) 166 166 166 166             Medications:   acetaminophen  650 mg Oral Once    atorvastatin  80 mg Oral QHS    buprenorphine-naloxone 8-2  8 mg Sublingual TID    cefTRIAXone (ROCEPHIN) IVPB  2 g Intravenous Q24H    EScitalopram oxalate  10 mg Oral Daily    heparin (porcine)  5,000 Units Subcutaneous Q8H    insulin detemir U-100  10 Units Subcutaneous Daily    metronidazole  500 mg Intravenous Q8H    miconazole NITRATE 2 %   Topical (Top) BID    midodrine  2.5 mg Oral BID WM    nicotine  1 patch Transdermal Daily    pantoprazole  40 mg Oral Daily    polyethylene glycol  17 g Oral Daily    pregabalin  150 mg Oral Daily           Physical Exam  Vitals and nursing note reviewed.   Constitutional:       General: She is not in acute distress.     Appearance: She is obese. She is not diaphoretic.   HENT:      Head: Normocephalic and atraumatic.      Mouth/Throat:      Pharynx: No oropharyngeal exudate.   Eyes:      General: No scleral icterus.     Conjunctiva/sclera: Conjunctivae normal.      Pupils: Pupils are equal, round, and reactive to light.   Cardiovascular:      Rate and Rhythm: Normal rate and regular rhythm.      Heart sounds: Normal heart sounds. No murmur heard.  Pulmonary:      Effort: Pulmonary effort is normal. No respiratory distress.      Breath sounds: Normal breath sounds.   Abdominal:      General: Bowel sounds are normal. There is no distension.      Palpations: Abdomen is soft.      Tenderness: There is no abdominal tenderness.   Musculoskeletal:         General: Normal range of motion.      Cervical back: Normal range of motion and neck supple.   Skin:     General: Skin is warm and dry.      Findings: No erythema.   Neurological:      Mental Status: She is alert and oriented to person, place, and time.      Cranial Nerves: No cranial nerve deficit.   Psychiatric:         Mood and Affect: Affect  normal.         Cognition and Memory: Memory normal.         Judgment: Judgment normal.         Laboratory:  Recent Labs   Lab 12/20/23  0523 12/21/23  0533 12/23/23  0629   WBC 17.22* 15.88* 15.76*   HGB 9.5* 11.2* 10.4*   HCT 29.3* 35.3* 32.7*    183 190   MONO 3.0* 3.0* 3.0*  Test Not Performed   EOSINOPHIL 0.0 1.0 0.0       Recent Labs   Lab 12/21/23  0533 12/22/23  0646 12/22/23  0820 12/23/23  0629     --  139 139   K 4.4  --  4.4 4.5     --  107 110   CO2 16*  --  18* 19*   BUN 33*  --  33* 27*   CREATININE 4.8*  --  3.4* 2.2*   CALCIUM 8.6*  --  8.3* 8.7   PHOS 6.2* 5.3* 5.3* 4.1  4.1         Diagnostic Results:  X-Ray: Reviewed  US: Reviewed  Echo: Reviewed  ASSESSMENT/PLAN:     1. BRIDGET - on CKD3 ? No baseline labs out of hospital all Cr BRIDGET admission   Hx of HTN and DM, MI s/p PCI 2021  -- Now admitted for  abdominal pain has panniculitis and  appendicitis Gen Surgery following on IV abx - improving abdominal pain    -- UA + for UTI but was treated since 12/13  Not clear etiology at this point  ddx remains  ATN, Sepsis,   glomerulonephritis, AIN.    Cr up to 5.1=>4.8=>4.9=>4.8  from 1.1 on 12/14/23   S/p IVF and IV abx supportive therapy  - for now   FEUrea 15.5% goes with pre renal - s/p  IVF   now with good PO intake off IVF   Today Cr improved 2.2 recovering ATN   Making urine  - 1.7 L good UOP   -- After Discharge [facility]    -- Avoid Hypotension.  -- Renally dose all meds  -- Please avoid nephrotoxins, including NSAIDs, aminoglycosides, IV contrast (unless absolutely necessary), gadolinium, fleets and other phosphorous-based laxatives. Caution with antibiotics.    2. HTN (I10) - controlled   3. Anemia of chronic kidney disease  vs acute illness vs MARGARETTE   Recent Labs   Lab 12/20/23  0523 12/21/23  0533 12/23/23  0629   HGB 9.5* 11.2* 10.4*   HCT 29.3* 35.3* 32.7*    183 190         Iron - levels low in the past check new   Lab Results   Component Value  "Date    IRON <10 (L) 04/22/2018    TIBC 189 (L) 04/22/2018    FERRITIN 259 12/23/2023       4. MBD (E88.9 M90.80) - check PTH and vitamin-D.  Replace magnesium.  Daily magnesium level.  Recent Labs   Lab 12/23/23  0629   CALCIUM 8.7   PHOS 4.1  4.1       Recent Labs   Lab 12/20/23  0523 12/21/23  0533 12/22/23  0646   MG 1.6 1.5* 1.5*      Mg low replace as needed     Lab Results   Component Value Date    CALCIUM 8.7 12/23/2023    PHOS 4.1 12/23/2023    PHOS 4.1 12/23/2023     No results found for: "[id number]"  Acidosis   BRIDGET - monitor for now   Lab Results   Component Value Date    CO2 19 (L) 12/23/2023       5. Nutrition/Hypoalbuminemia   Recent Labs   Lab 12/22/23  0820 12/23/23  0629   ALBUMIN 1.6* 1.7*       Nepro with meals TID.     UPCR 0.2g       Thank you for allowing me to participate in care of your patient  With any question please call   Sherri Damian MD     [facility]  RONA Frank MD,   OMD NATA Newman MD E. V. Harmon, NP  [id number]   [place], [address] [phone]  After hours answering service: [phone]   "

## 2023-12-23 NOTE — PLAN OF CARE
Pt is AAOx4. Pt worked with PT today on side of bed and up to toilet. Pt has had soft BP 90's/50's. Midodrine administered and a bolus of LR. Antibx administered as ordered.

## 2023-12-23 NOTE — ASSESSMENT & PLAN NOTE
Patient with acute kidney injury/acute renal failure likely due to ATN due to sepsis, BRIDGET is currently stable. Baseline creatinine  1.0  - Labs reviewed- Renal function/electrolytes with Estimated Creatinine Clearance: 49.2 mL/min (A) (based on SCr of 2.2 mg/dL (H)). according to latest data. Monitor urine output and serial BMP and adjust therapy as needed. Avoid nephrotoxins and renally dose meds for GFR listed above.    Continued improving urine output, may be causing hypotension  Down to 33/3.4-->27/2.2  Nephrology following

## 2023-12-23 NOTE — NURSING
RAPID RESPONSE NURSE PROACTIVE ROUNDING NOTE         Admit Date: 2023  LOS: 5  Code Status: Full Code   Date of Visit: 2023  : 1980  Age: 43 y.o.  Sex: female  Race: White  Bed: K528/K528 A:   MRN: [id number]  Was the patient discharged from an ICU this admission? No   Was the patient discharged from a PACU within last 24 hours? No   Did the patient receive conscious sedation/general anesthesia in last 24 hours? No   Was the patient in the ED within the past 24 hours? No   Was the patient on NIPPV within the past 24 hours? No   Attending Physician: Courtney Prieto MD  Primary Service: Internal Medicine,Hospitalist       SITUATION      Diagnosis: BRIDGET (acute kidney injury)   has a past medical history of Anemia, Asthma, Cellulitis of abdominal wall, CKD (chronic kidney disease) stage 3, GFR 30-59 ml/min, Depression, Diabetes mellitus, type II, Diastolic dysfunction, Diverticulosis of intestine with bleeding, E coli bacteremia, E. coli pyelonephritis, E. coli UTI, Hematuria, Hernia, epigastric, Hypertension, Hypocalcemia, Nonalcoholic hepatosteatosis, Periumbilical hernia, and Proteinuria.    Last Vitals:  Temp: 97.8 °F (36.6 °C) (2351)  Pulse: 69 (2351)  Resp: 19 (2351)  BP: 99/49 (2351)  SpO2: 95 % (2351)    24 Hour Vitals Range:  Temp:  [97.5 °F (36.4 °C)-98.1 °F (36.7 °C)]   Pulse:  [61-70]   Resp:  [18-20]   BP: ()/(36-56)   SpO2:  [93 %-98 %]     Pt noted on AI generated list. Chart reviewed, no interventions necessary at this time.    FOLLOW UP    Call back the Rapid Response NurseRene at [phone] for additional questions or concerns.

## 2023-12-23 NOTE — SUBJECTIVE & OBJECTIVE
Interval History: Reports that her RLQ pain is not better and she feels that she would cut it out herself if she could, requesting repeat imaging    Review of Systems   Constitutional:  Negative for appetite change, chills and fatigue.   HENT:  Negative for ear discharge and ear pain.    Respiratory:  Negative for cough and choking.    Cardiovascular:  Negative for chest pain and leg swelling.   Gastrointestinal:  Positive for abdominal pain. Negative for anal bleeding, blood in stool, diarrhea and nausea.   Endocrine: Negative for polydipsia and polyphagia.   Genitourinary:  Negative for flank pain and hematuria.   Musculoskeletal:  Negative for back pain and gait problem.   Skin:  Negative for pallor and rash.   Allergic/Immunologic: Negative for food allergies and immunocompromised state.   Neurological:  Negative for seizures and speech difficulty.   Hematological:  Negative for adenopathy. Does not bruise/bleed easily.   Psychiatric/Behavioral:  Negative for agitation, behavioral problems, decreased concentration and dysphoric mood.      Objective:     Vital Signs (Most Recent):  Temp: 98.3 °F (36.8 °C) (12/23/23 1600)  Pulse: 87 (12/23/23 1600)  Resp: 20 (12/23/23 1600)  BP: (!) 128/58 (12/23/23 1600)  SpO2: 97 % (12/23/23 1600) Vital Signs (24h Range):  Temp:  [97.8 °F (36.6 °C)-98.3 °F (36.8 °C)] 98.3 °F (36.8 °C)  Pulse:  [64-87] 87  Resp:  [18-20] 20  SpO2:  [93 %-98 %] 97 %  BP: ()/(47-63) 128/58     Weight: (!) 166 kg (365 lb 15.4 oz)  Body mass index is 66.94 kg/m².    Intake/Output Summary (Last 24 hours) at 12/23/2023 1715  Last data filed at 12/23/2023 1410  Gross per 24 hour   Intake 681.15 ml   Output 2045 ml   Net -1363.85 ml         Physical Exam        Significant Labs: All pertinent labs within the past 24 hours have been reviewed.  CBC:   Recent Labs   Lab 12/23/23  0629   WBC 15.76*   HGB 10.4*   HCT 32.7*        CMP:   Recent Labs   Lab 12/22/23  0820 12/23/23  0629    139    K 4.4 4.5    110   CO2 18* 19*   * 266*   BUN 33* 27*   CREATININE 3.4* 2.2*   CALCIUM 8.3* 8.7   ALBUMIN 1.6* 1.7*   ANIONGAP 14 10       Significant Imaging: I have reviewed all pertinent imaging results/findings within the past 24 hours.

## 2023-12-23 NOTE — SUBJECTIVE & OBJECTIVE
Interval History: Upset ion worsening with increased UOP and Cre down to 3.4    Review of Systems   Constitutional:  Positive for fatigue. Negative for appetite change and chills.   HENT:  Negative for ear discharge and ear pain.    Respiratory:  Negative for cough and choking.    Cardiovascular:  Negative for chest pain and leg swelling.   Gastrointestinal:  Positive for abdominal pain. Negative for anal bleeding, blood in stool, diarrhea and nausea.   Endocrine: Negative for polydipsia and polyphagia.   Genitourinary:  Negative for flank pain and hematuria.   Musculoskeletal:  Negative for back pain and gait problem.   Skin:  Negative for pallor and rash.   Allergic/Immunologic: Negative for food allergies and immunocompromised state.   Neurological:  Negative for seizures and speech difficulty.   Hematological:  Negative for adenopathy. Does not bruise/bleed easily.   Psychiatric/Behavioral:  Negative for agitation, behavioral problems, decreased concentration and dysphoric mood.      Objective:     Vital Signs (Most Recent):  Temp: 98.1 °F (36.7 °C) (12/22/23 1544)  Pulse: 70 (12/22/23 1544)  Resp: 20 (12/22/23 1544)  BP: (!) 100/50 (12/22/23 1544)  SpO2: 96 % (12/22/23 1544) Vital Signs (24h Range):  Temp:  [97.5 °F (36.4 °C)-98.3 °F (36.8 °C)] 98.1 °F (36.7 °C)  Pulse:  [61-70] 70  Resp:  [18-20] 20  SpO2:  [93 %-98 %] 96 %  BP: ()/(36-72) 100/50     Weight: (!) 167.7 kg (369 lb 11.4 oz)  Body mass index is 67.62 kg/m².    Intake/Output Summary (Last 24 hours) at 12/22/2023 1832  Last data filed at 12/22/2023 1800  Gross per 24 hour   Intake 681.15 ml   Output 700 ml   Net -18.85 ml         Physical Exam  Vitals and nursing note reviewed.   Constitutional:       General: She is not in acute distress.  HENT:      Head: Normocephalic and atraumatic.      Mouth/Throat:      Mouth: Mucous membranes are moist.   Cardiovascular:      Rate and Rhythm: Normal rate.   Pulmonary:      Effort: Pulmonary effort is  normal. No respiratory distress.   Abdominal:      General: There is no distension.      Palpations: Abdomen is soft.      Tenderness: There is abdominal tenderness (RLQ).      Comments: Has area of panniculitis, wounds. No abscess noted.   Skin:     General: Skin is warm and dry.   Neurological:      General: No focal deficit present.      Mental Status: She is alert and oriented to person, place, and time.             Significant Labs: All pertinent labs within the past 24 hours have been reviewed.  CBC:   Recent Labs   Lab 12/21/23  0533   WBC 15.88*   HGB 11.2*   HCT 35.3*        CMP:   Recent Labs   Lab 12/21/23  0533 12/22/23  0820    139   K 4.4 4.4    107   CO2 16* 18*   * 250*   BUN 33* 33*   CREATININE 4.8* 3.4*   CALCIUM 8.6* 8.3*   PROT 6.2  --    ALBUMIN 1.9* 1.6*   BILITOT 0.3  --    ALKPHOS 124  --    AST 12  --    ALT 14  --    ANIONGAP 15 14       Significant Imaging: I have reviewed all pertinent imaging results/findings within the past 24 hours.

## 2023-12-23 NOTE — PLAN OF CARE
Nightly and prn medications administered per MAR. Multiple times wound dressing and pad was changed. Ayesha care was rendered. Safety camera at the bedside. BGwas monitored. Will continue to monitor.  Problem: Adult Inpatient Plan of Care  Goal: Plan of Care Review  Outcome: Ongoing, Progressing     Problem: Adult Inpatient Plan of Care  Goal: Optimal Comfort and Wellbeing  Outcome: Ongoing, Progressing     Problem: Adult Inpatient Plan of Care  Goal: Readiness for Transition of Care  Outcome: Ongoing, Progressing     Problem: Bariatric Environmental Safety  Goal: Safety Maintained with Care  Outcome: Ongoing, Progressing     Problem: Adjustment to Illness (Sepsis/Septic Shock)  Goal: Optimal Coping  Outcome: Ongoing, Progressing     Problem: Bleeding (Sepsis/Septic Shock)  Goal: Absence of Bleeding  Outcome: Ongoing, Progressing

## 2023-12-23 NOTE — ASSESSMENT & PLAN NOTE
Patient with acute kidney injury/acute renal failure likely due to pre-renal azotemia due to dehydration BRIDGET is currently stable. Baseline creatinine  1.0  - Labs reviewed- Renal function/electrolytes with Estimated Creatinine Clearance: 32 mL/min (A) (based on SCr of 3.4 mg/dL (H)). according to latest data. Monitor urine output and serial BMP and adjust therapy as needed. Avoid nephrotoxins and renally dose meds for GFR listed above.    Continued improving urine output, may be causing hypotension  Down to 33/3.4  Nephrology following

## 2023-12-23 NOTE — PROGRESS NOTES
[facility] Medicine  Progress Note    Patient Name: Lele Dias  MRN: [id number]  Patient Class: IP- Inpatient   Admission Date: 12/18/2023  Length of Stay: 5 days  Attending Physician: Courtney Prieto MD  Primary Care Provider: Sammi Edge MD (Cindy)        Subjective:     Principal Problem:BRIDGET (acute kidney injury)        HPI:  Lele Dias is a 43 yr old female with PMH of CKD, depression, diabetes, hypertension who presents with lower abdominal pain and fatigue.    Patient reports right-sided lower abdominal pain, weakness, fatigue over the past day.  She states that she had multiple nonbloody nonbilious loose bowel movements, with associated nausea.  Of note, she was recently discharged for UTI/BRIDGET was sent home with antibiotics.    Triage vitals were remarkable for hypotension and hypothermia.  Physical exam significant for abdominal tenderness.    CBC was significant for leukocytosis, normocytic anemia.  CMP was significant for hyponatremia, and acute renal failure.  Troponin was slightly elevated at 0.146.  UA significant for WBCs and bacteria.  Blood cultures pending.    CT imaging was concerning for acute tip appendicitis, possible cellulitis    Surgery was consulted.  No acute surgery due to Plavix use    Patient admitted for acute renal failure and appendicitis management.    Overview/Hospital Course:  12/19/23 Refusing labs, VS checks  12/20/23 Abd pain is improved. Borderline BPs but patient is asymptomatic  12/21/23 is reporting worsening abdominal pain  12/22 Hypotension worsening with increased UOP and Cre down to 3.4  12/23 Patient requested repeat CT A/P due to abdominal pain, appendicitis is improved shows possible endometrial thickening    Interval History: Reports that her RLQ pain is not better and she feels that she would cut it out herself if she could, requesting repeat imaging    Review of Systems   Constitutional:  Negative for appetite change, chills and  fatigue.   HENT:  Negative for ear discharge and ear pain.    Respiratory:  Negative for cough and choking.    Cardiovascular:  Negative for chest pain and leg swelling.   Gastrointestinal:  Positive for abdominal pain. Negative for anal bleeding, blood in stool, diarrhea and nausea.   Endocrine: Negative for polydipsia and polyphagia.   Genitourinary:  Negative for flank pain and hematuria.   Musculoskeletal:  Negative for back pain and gait problem.   Skin:  Negative for pallor and rash.   Allergic/Immunologic: Negative for food allergies and immunocompromised state.   Neurological:  Negative for seizures and speech difficulty.   Hematological:  Negative for adenopathy. Does not bruise/bleed easily.   Psychiatric/Behavioral:  Negative for agitation, behavioral problems, decreased concentration and dysphoric mood.      Objective:     Vital Signs (Most Recent):  Temp: 98.3 °F (36.8 °C) (12/23/23 1600)  Pulse: 87 (12/23/23 1600)  Resp: 20 (12/23/23 1600)  BP: (!) 128/58 (12/23/23 1600)  SpO2: 97 % (12/23/23 1600) Vital Signs (24h Range):  Temp:  [97.8 °F (36.6 °C)-98.3 °F (36.8 °C)] 98.3 °F (36.8 °C)  Pulse:  [64-87] 87  Resp:  [18-20] 20  SpO2:  [93 %-98 %] 97 %  BP: ()/(47-63) 128/58     Weight: (!) 166 kg (365 lb 15.4 oz)  Body mass index is 66.94 kg/m².    Intake/Output Summary (Last 24 hours) at 12/23/2023 1715  Last data filed at 12/23/2023 1410  Gross per 24 hour   Intake 681.15 ml   Output 2045 ml   Net -1363.85 ml         Physical Exam        Significant Labs: All pertinent labs within the past 24 hours have been reviewed.  CBC:   Recent Labs   Lab 12/23/23  0629   WBC 15.76*   HGB 10.4*   HCT 32.7*        CMP:   Recent Labs   Lab 12/22/23  0820 12/23/23  0629    139   K 4.4 4.5    110   CO2 18* 19*   * 266*   BUN 33* 27*   CREATININE 3.4* 2.2*   CALCIUM 8.3* 8.7   ALBUMIN 1.6* 1.7*   ANIONGAP 14 10       Significant Imaging: I have reviewed all pertinent imaging  results/findings within the past 24 hours.    Assessment/Plan:      * BRIDGET (acute kidney injury)  Patient with acute kidney injury/acute renal failure likely due to ATN due to sepsis, BRIDGET is currently stable. Baseline creatinine  1.0  - Labs reviewed- Renal function/electrolytes with Estimated Creatinine Clearance: 49.2 mL/min (A) (based on SCr of 2.2 mg/dL (H)). according to latest data. Monitor urine output and serial BMP and adjust therapy as needed. Avoid nephrotoxins and renally dose meds for GFR listed above.    Continued improving urine output, may be causing hypotension  Down to 33/3.4-->27/2.2  Nephrology following      Hypotension  Improving  On midodrine      Agitation  Will consult Psychiatry due to concern about medical decision making capacity  Will not PEC at this time as patient seems A&Ox4 but making rude and derogatory comments  Seen by Psychiatry, appreciate recommendations      Acute appendicitis with localized peritonitis, without perforation, abscess, or gangrene  Abdominal imaging concerning for appendicitis  Due to Plavix use, no urgent surgery is necessary  General surgery saw the patient in the ED  Plan to medically treat appendicitis with IV abx  Tentative plan for appendectomy in 6-8 weeks  Repeat KUB today did not show a perforation        UTI (urinary tract infection)  Antibiotics use for appendicitis can also be used for UTI        VTE Risk Mitigation (From admission, onward)           Ordered     heparin (porcine) injection 5,000 Units  Every 8 hours         12/18/23 1807     IP VTE HIGH RISK PATIENT  Once         12/18/23 1807     Place sequential compression device  Until discontinued         12/18/23 1807                    Discharge Planning   DEMARIO: 12/23/2023     Code Status: Full Code   Is the patient medically ready for discharge?:     Reason for patient still in hospital (select all that apply): Patient trending condition, Laboratory test, and Treatment  Discharge Plan A: Home  [facility] ([facility])                  Courtney Prieto MD  Department of Hospital Medicine   [facility]

## 2023-12-24 PROBLEM — R10.9 ABDOMINAL PAIN: Status: ACTIVE | Noted: 2023-01-01

## 2023-12-24 NOTE — PLAN OF CARE
Pt is AAOx4. Pt was agitated throughout shift. Pt reported nausea and received PRN zofran and phenergan. Pt is on diabetic diet. Pt had CT performed today. Wound care performed on abdomen. Purewick in place. Care administered as ordered.

## 2023-12-24 NOTE — NURSING
Primary RN need to put new IV, patient refused the new IV to be administered and she went yelling.  Charge nurse BETTE Alcaraz was made aware.Patient does not want to be bothered. She curse a lot of MF'rs. Midnight vital signs is skipped because patient cried hard when she was waken up before the shift change. Nightly and prn medications administered per MAR.A, safety precautions secured.  Will continue to monitor.

## 2023-12-24 NOTE — PROGRESS NOTES
Nephrology Progress Note       Consult Requested By: Courtney Prieto MD  Reason for Consult: BRIDGET      SUBJECTIVE:        ?    Review of Systems   Constitutional:  Negative for chills, fever and malaise/fatigue.   HENT:  Negative for congestion and sore throat.    Eyes:  Negative for blurred vision, double vision and photophobia.   Respiratory:  Negative for cough and shortness of breath.    Cardiovascular:  Negative for chest pain, palpitations and leg swelling.   Gastrointestinal:  Negative for abdominal pain, diarrhea, nausea and vomiting.   Genitourinary:  Negative for dysuria and urgency.   Musculoskeletal:  Negative for joint pain and myalgias.   Skin:  Negative for itching and rash.   Neurological:  Positive for weakness. Negative for dizziness, sensory change and headaches.   Endo/Heme/Allergies:  Negative for polydipsia. Does not bruise/bleed easily.   Psychiatric/Behavioral:  Negative for depression.        Past Medical History:   Diagnosis Date    Anemia     Asthma     Cellulitis of abdominal wall 12/18/2017    CKD (chronic kidney disease) stage 3, GFR 30-59 ml/min     Depression     Diabetes mellitus, type II     Diastolic dysfunction     Diverticulosis of intestine with bleeding 9/14/2016    E coli bacteremia 4/21/2018    E. coli pyelonephritis 6/2/2015    E. coli UTI 12/18/2017    Hematuria     Hernia, epigastric     Hypertension     Hypocalcemia     Nonalcoholic hepatosteatosis     Periumbilical hernia     Proteinuria             OBJECTIVE:     Vital Signs (Most Recent)  Vitals:    12/24/23 0350 12/24/23 0549 12/24/23 0800 12/24/23 1108   BP:  130/62 (!) 114/56 (!) 111/53   BP Location:  Right leg Right arm Right arm   Patient Position:  Lying Lying Lying   Pulse: 74 71 75 69   Resp:  18 18 18   Temp:  97.6 °F (36.4 °C) 97.8 °F (36.6 °C) 97.6 °F (36.4 °C)   TempSrc:  Oral Oral Oral   SpO2:  95% (!) 90% (!) 93%   Weight:       Height:             Date 12/24/23 0700 - 12/25/23 0659   Shift 4856-8723  7632-8788 1128-2780 24 Hour Total   INTAKE   Shift Total(mL/kg)       OUTPUT   Urine(mL/kg/hr) 800   800   Shift Total(mL/kg) 800(4.8)   800(4.8)   Weight (kg) 166 166 166 166             Medications:   acetaminophen  650 mg Oral Once    atorvastatin  80 mg Oral QHS    buprenorphine-naloxone 8-2  8 mg Sublingual TID    cefTRIAXone (ROCEPHIN) IVPB  2 g Intravenous Q24H    EScitalopram oxalate  10 mg Oral Daily    heparin (porcine)  5,000 Units Subcutaneous Q8H    insulin detemir U-100  10 Units Subcutaneous Daily    metronidazole  500 mg Intravenous Q8H    miconazole NITRATE 2 %   Topical (Top) BID    nicotine  1 patch Transdermal Daily    pantoprazole  40 mg Oral Daily    polyethylene glycol  17 g Oral Daily    pregabalin  150 mg Oral Daily           Physical Exam  Vitals and nursing note reviewed.   Constitutional:       General: She is not in acute distress.     Appearance: She is obese. She is not diaphoretic.   HENT:      Head: Normocephalic and atraumatic.      Mouth/Throat:      Pharynx: No oropharyngeal exudate.   Eyes:      General: No scleral icterus.     Conjunctiva/sclera: Conjunctivae normal.      Pupils: Pupils are equal, round, and reactive to light.   Cardiovascular:      Rate and Rhythm: Normal rate and regular rhythm.      Heart sounds: Normal heart sounds. No murmur heard.  Pulmonary:      Effort: Pulmonary effort is normal. No respiratory distress.      Breath sounds: Normal breath sounds.   Abdominal:      General: Bowel sounds are normal. There is no distension.      Palpations: Abdomen is soft.      Tenderness: There is no abdominal tenderness.   Musculoskeletal:         General: Normal range of motion.      Cervical back: Normal range of motion and neck supple.   Skin:     General: Skin is warm and dry.      Findings: No erythema.   Neurological:      Mental Status: She is alert and oriented to person, place, and time.      Cranial Nerves: No cranial nerve deficit.   Psychiatric:         Mood  and Affect: Affect normal.         Cognition and Memory: Memory normal.         Judgment: Judgment normal.         Laboratory:  Recent Labs   Lab 12/21/23  0533 12/23/23  0629 12/24/23  0636   WBC 15.88* 15.76* 15.73*   HGB 11.2* 10.4* 10.6*   HCT 35.3* 32.7* 34.5*    190 108*   MONO 3.0* 3.0*  Test Not Performed 0.0*  CANCELED   EOSINOPHIL 1.0 0.0 0.0       Recent Labs   Lab 12/22/23  0820 12/23/23  0629 12/24/23  0636    139 142   K 4.4 4.5 4.6    110 112*   CO2 18* 19* 17*   BUN 33* 27* 15   CREATININE 3.4* 2.2* 1.3   CALCIUM 8.3* 8.7 8.6*   PHOS 5.3* 4.1  4.1 3.7         Diagnostic Results:  X-Ray: Reviewed  US: Reviewed  Echo: Reviewed  ASSESSMENT/PLAN:     1. BRIDGET - on CKD3 ? No baseline labs out of hospital all Cr BRIDGET admission   Hx of HTN and DM, MI s/p PCI 2021  -- Now admitted for  abdominal pain has panniculitis and  appendicitis Gen Surgery following on IV abx - improving abdominal pain    -- UA + for UTI but was treated since 12/13  Not clear etiology at this point  ddx remains  ATN, Sepsis,   glomerulonephritis, AIN.    Cr up to 5.1=>4.8=>4.9=>4.8  from 1.1 on 12/14/23   S/p IVF and IV abx supportive therapy  - for now   FEUrea 15.5% goes with pre renal - s/p  IVF   now with good PO intake off IVF   Today Cr improved 1.3  --> recovering ATN --> we will sign off for now.  Please call if any other issues  Making urine  - 1.7 L good UOP   -- After Discharge needs f[facility]u with [facility]    -- Avoid Hypotension.  -- Renally dose all meds  -- Please avoid nephrotoxins, including NSAIDs, aminoglycosides, IV contrast (unless absolutely necessary), gadolinium, fleets and other phosphorous-based laxatives. Caution with antibiotics.    2. HTN (I10) - controlled   3. Anemia of chronic kidney disease  vs acute illness vs MARGARETTE   Recent Labs   Lab 12/21/23  0533 12/23/23  0629 12/24/23  0636   HGB 11.2* 10.4* 10.6*   HCT 35.3* 32.7* 34.5*    190 108*         Iron - levels low in the past  "check new   Lab Results   Component Value Date    IRON 32 12/23/2023    TIBC 203 (L) 12/23/2023    FERRITIN 259 12/23/2023       4. MBD (E88.9 M90.80) - check PTH and vitamin-D.  Replace magnesium.  Daily magnesium level.  Recent Labs   Lab 12/24/23  0636   CALCIUM 8.6*   PHOS 3.7       Recent Labs   Lab 12/20/23  0523 12/21/23  0533 12/22/23  0646   MG 1.6 1.5* 1.5*      Mg low replace as needed     Lab Results   Component Value Date    CALCIUM 8.6 (L) 12/24/2023    PHOS 3.7 12/24/2023     No results found for: "KYVEFPBT54YR"  Acidosis     Lab Results   Component Value Date    CO2 17 (L) 12/24/2023       5. Nutrition/Hypoalbuminemia   Recent Labs   Lab 12/23/23  0629 12/24/23  0636   ALBUMIN 1.7* 1.8*       Nepro with meals TID.     UPCR 0.2g       Thank you for allowing me to participate in care of your patient  With any question please call   Sherri Damian MD     [facility]  RONA Frank MD,   MD NATA Cruz MD E. V. Harmon, NP  [address]   [place], [address] [phone]  After hours answering service: [phone]   "

## 2023-12-25 NOTE — PROGRESS NOTES
[place] [facility] Surg  Adult Nutrition  Progress Note    SUMMARY       Recommendations    Recommendation:  1. Encourage intake at meals as tolerated. 2. Monitor weight/labs.   3. RD to continue to follow to monitor po intake    Goals:  Pt will tolerate diet with at least 50-75% intake at meals by RD follow up  Nutrition Goal Status: progressing towards goal  Communication of RD Recs: reviewed with RN (Roxi)    Assessment and Plan   Nutrition Problem  Increased protein needs     Related to (etiology):   Wound healing     Signs and Symptoms (as evidenced by):   Abdomen, R buttocks, & L calf wounds      Interventions:  Collaboration with other providers  Increased protein diet     Nutrition Diagnosis Status:   Continues    Malnutrition Assessment  Orbital Region (Subcutaneous Fat Loss): well nourished  Upper Arm Region (Subcutaneous Fat Loss): well nourished  Thoracic and Lumbar Region: well nourished   Caodaism Region (Muscle Loss): well nourished  Clavicle Bone Region (Muscle Loss): well nourished  Clavicle and Acromion Bone Region (Muscle Loss): well nourished  Scapular Bone Region (Muscle Loss): well nourished  Dorsal Hand (Muscle Loss): well nourished  Patellar Region (Muscle Loss): well nourished  Anterior Thigh Region (Muscle Loss): well nourished  Posterior Calf Region (Muscle Loss): well nourished       Reason for Assessment  Reason For Assessment: RD follow-up  Diagnosis:  (acute renal failure/appendicitis)  Relevant Medical History: HTN, DM, hernua, depression, diverticulosis, CKD  General Information Comments: Pt on 2000 calorie ADA diet with beneprotein. Intake not recorded. Alan 15- abd, buttocks, L calf wounds. Weight stable. Unable to assess NFPE at this time but appears nourished per BMI  Nutrition Discharge Planning: pt to d/c on ADA diet    Nutrition Risk Screen  Nutrition Risk Screen: no indicators present    Nutrition/Diet History  Food Preferences: no Latter day or cultural food prefs  "identified  Spiritual, Cultural Beliefs, Advent Practices, Values that Affect Care: no  Factors Affecting Nutritional Intake: abdominal pain    Anthropometrics  Temp: 97.7 °F (36.5 °C)  Height Method: Stated  Height: 5' 2" (157.5 cm)  Height (inches): 62 in  Weight Method: Bed Scale  Weight: (!) 161.6 kg (356 lb 4.2 oz)  Weight (lb): (!) 356.27 lb  Ideal Body Weight (IBW), Female: 110 lb  % Ideal Body Weight, Female (lb): 323.88 %  BMI (Calculated): 65.1  BMI Grade: greater than 40 - morbid obesity  Usual Body Weight (UBW), kg: (!) 161.5 kg (10/6)  % Usual Body Weight: 100.27  % Weight Change From Usual Weight: 0.06 %     Lab/Procedures/Meds  Pertinent Labs Reviewed: reviewed  Pertinent Labs Comments: Glu 253H, Ca 8.0L, Mg 1.5L, Alb 1.6L  Pertinent Medications Reviewed: reviewed  Pertinent Medications Comments: tylenol, rocephin, heparin, insulin, pantoprazole    Estimated/Assessed Needs  Weight Used For Calorie Calculations: 50 kg (110 lb 3.7 oz) (IBW)  Energy Calorie Requirements (kcal): 1750 (35 kcal/kg)  Energy Need Method: Kcal/kg  Protein Requirements: 70g (1.4g/kg)  Weight Used For Protein Calculations: 50 kg (110 lb 3.7 oz) (IBW)  Estimated Fluid Requirement Method: RDA Method  RDA Method (mL): 1750  CHO Requirement: 200g    Nutrition Prescription Ordered  Current Diet Order: 2000 calorie ADA  Oral Nutrition Supplement: beneprotein    Evaluation of Received Nutrient/Fluid Intake  I/O: 299/800  Energy Calories Required: not meeting needs  Protein Required: not meeting needs  Fluid Required: not meeting needs  Comments: LBM 12/25  % Intake of Estimated Energy Needs: Other: intake not recorded  % Meal Intake: Other: intake not recorded    Nutrition Risk  Level of Risk/Frequency of Follow-up:  (2xweekly)     Monitor and Evaluation  Food and Nutrient Intake: food and beverage intake  Food and Nutrient Adminstration: diet order  Physical Activity and Function: nutrition-related ADLs and IADLs  Anthropometric " Measurements: weight  Biochemical Data, Medical Tests and Procedures: electrolyte and renal panel  Nutrition-Focused Physical Findings: overall appearance     Nutrition Follow-Up  RD Follow-up?: Yes

## 2023-12-25 NOTE — PLAN OF CARE
Pt is AAOx4. Pt rested comfortably in bed for half of shift. Pt received PRN phenergan for nausea. Pt had pelvic ultrasound performed. Pt has been up to bedside commode. Wound care performed to abdomen. Care administered as ordered.

## 2023-12-25 NOTE — ASSESSMENT & PLAN NOTE
Likely due to appendicitis and panniculitis  Treated with IV abx and gave fluconazole  CT A/P showed   Slight prominence of the distal appendix/ possible mild appendicitis, improved from the 12/18/2023 exam.  No free air or farheen-appendiceal fluid collections.     Uterus with central low attenuation, may represent thickened endometrium and/or fluid in the endometrial canal, possibly infected.  Further evaluation could be performed with pelvic ultrasound.     Prominent subcutaneous fat stranding/ inflammatory change in the left flank, increased.     Multiple fat containing lower quadrant ventral wall hernias.     Hepatosplenomegaly.     Prior cholecystectom

## 2023-12-25 NOTE — PROGRESS NOTES
Discharge orders noted. Additional clinical references attached. Patient's discharge instructions given by bedside RN. Virtual nurse cued into room and reviewed discharge instructions. Education provided on new medication, diagnosis, and follow-up appointments. Teach back method used. Patient verbalized understanding. All questions answered. Patient awaiting [facility] transportation to home. Bedside nurse updated on patient status.   12/25/23 3433   AVS Confirmation   Discharge instructions and AVS given to and reviewed with patient and/or significant other. Yes

## 2023-12-25 NOTE — PLAN OF CARE
Pt for d/c to home today   RAUL spoke with pt - she doesn't have transportation to home - her daughters will be at home by 9pm - pt doesn't have a key to her home   RAUL called [facility] to notify them that pt is discharging to home today  [phone]   RAUL left message asking that On Call nurse call CM    no dme ordered   Discharging nurse to review d/c meds/instructions   RAUL spoke with Belinda with [facility] - advised her that pt is discharging today -  [facility]      Sammi Edge MD (Cindy) PCP - General Family Medicine [phone] [address] [place] [address]      Next Steps: Follow up  Instructions: Patient will be notified of a PCP hospital follow up appointment    Rich Watson MD  Surgery General Surgery [phone] [address] [place] [address]     Next Steps: Follow up on 1/5/2024  Instructions: sy 9:40 AM; general surgery hospital follow up appointment    Sherri Damian MD  Nephrology   [address] [place] [address] [place] [address]     Next Steps: Follow up on 2/21/2024  Instructions: at 11:45 AM; nephrology hospital follow up appointment    [facility]  Home Health Services [phone] [address] [place] [address]     Next Steps: Follow up  Instructions: will provide home health services    [facility]  Wound Care [phone] [address] [place] [address]     Next Steps: Follow up  Instructions: will provide home visits for wound care        Close          12/25/23 1639   Final Note   Assessment Type Final Discharge Note   Anticip[facility] Dis[facility] Dis[facility]   What phone number can be called within the next 1-3 days to see how you are doing after discharge? [phone]   Hospital Resources/Appts/Education Provided Post-Acute resouces added to AVS;Appointments scheduled and added to AVS    Post-Acute Status   Post-Acute Authorization Home Health   Home Health Status Set-up Complete/Auth obtained   Discharge Delays None known at this time

## 2023-12-25 NOTE — SUBJECTIVE & OBJECTIVE
Interval History: Today is considering going home, would like her daughter  Jessica to be updated, tried to call multiple times this afternoon unable to reach, will try again tomorrow    Review of Systems   Constitutional:  Negative for appetite change, chills and fatigue.   HENT:  Negative for ear discharge and ear pain.    Respiratory:  Negative for cough and choking.    Cardiovascular:  Negative for chest pain and leg swelling.   Gastrointestinal:  Positive for abdominal pain. Negative for anal bleeding, blood in stool, diarrhea and nausea.   Endocrine: Negative for polydipsia and polyphagia.   Genitourinary:  Negative for flank pain and hematuria.   Musculoskeletal:  Negative for back pain and gait problem.   Skin:  Negative for pallor and rash.   Allergic/Immunologic: Negative for food allergies and immunocompromised state.   Neurological:  Negative for seizures and speech difficulty.   Hematological:  Negative for adenopathy. Does not bruise/bleed easily.   Psychiatric/Behavioral:  Negative for agitation, behavioral problems, decreased concentration and dysphoric mood.      Objective:     Vital Signs (Most Recent):  Temp: 97.7 °F (36.5 °C) (12/24/23 1526)  Pulse: 79 (12/24/23 1526)  Resp: 18 (12/24/23 1526)  BP: (!) 116/57 (12/24/23 1526)  SpO2: 97 % (12/24/23 1526) Vital Signs (24h Range):  Temp:  [97.6 °F (36.4 °C)-98 °F (36.7 °C)] 97.7 °F (36.5 °C)  Pulse:  [] 79  Resp:  [18-22] 18  SpO2:  [90 %-97 %] 97 %  BP: (111-130)/(53-62) 116/57     Weight: (!) 166 kg (365 lb 15.4 oz)  Body mass index is 66.94 kg/m².    Intake/Output Summary (Last 24 hours) at 12/24/2023 1900  Last data filed at 12/24/2023 1822  Gross per 24 hour   Intake 299.73 ml   Output 1700 ml   Net -1400.27 ml         Physical Exam  Vitals and nursing note reviewed.   Constitutional:       General: She is not in acute distress.  HENT:      Head: Normocephalic and atraumatic.      Mouth/Throat:      Mouth: Mucous membranes are moist.    Cardiovascular:      Rate and Rhythm: Normal rate.   Pulmonary:      Effort: Pulmonary effort is normal. No respiratory distress.   Abdominal:      General: There is no distension.      Palpations: Abdomen is soft.      Tenderness: There is abdominal tenderness (RLQ).      Comments: Has area of panniculitis, wounds. No abscess noted.   Skin:     General: Skin is warm and dry.   Neurological:      General: No focal deficit present.      Mental Status: She is alert and oriented to person, place, and time.             Significant Labs: All pertinent labs within the past 24 hours have been reviewed.  CBC:   Recent Labs   Lab 12/23/23  0629 12/24/23  0636   WBC 15.76* 15.73*   HGB 10.4* 10.6*   HCT 32.7* 34.5*    108*     CMP:   Recent Labs   Lab 12/23/23  0629 12/24/23  0636    142   K 4.5 4.6    112*   CO2 19* 17*   * 213*   BUN 27* 15   CREATININE 2.2* 1.3   CALCIUM 8.7 8.6*   ALBUMIN 1.7* 1.8*   ANIONGAP 10 13       Significant Imaging: I have reviewed all pertinent imaging results/findings within the past 24 hours.

## 2023-12-25 NOTE — PLAN OF CARE
Recommendation:  1. Encourage intake at meals as tolerated. 2. Monitor weight/labs.   3. RD to continue to follow to monitor po intake    Goals:  Pt will tolerate diet with at least 50-75% intake at meals by RD follow up  Nutrition Goal Status: progressing towards goal

## 2023-12-25 NOTE — ASSESSMENT & PLAN NOTE
Patient with acute kidney injury/acute renal failure likely due to ATN due to sepsis, BRIDGET is currently stable. Baseline creatinine  1.0  - Labs reviewed- Renal function/electrolytes with Estimated Creatinine Clearance: 83.3 mL/min (based on SCr of 1.3 mg/dL). according to latest data. Monitor urine output and serial BMP and adjust therapy as needed. Avoid nephrotoxins and renally dose meds for GFR listed above.    Continued improving urine output, may be causing hypotension  Down to 33/3.4-->27/2.2-->1.3  Nephrology signed off

## 2023-12-25 NOTE — PROGRESS NOTES
[facility] Medicine  Progress Note    Patient Name: Lele Dias  MRN: [id number]  Patient Class: IP- Inpatient   Admission Date: 12/18/2023  Length of Stay: 6 days  Attending Physician: Courtney Prieto MD  Primary Care Provider: Sammi Edge MD (Cindy)        Subjective:     Principal Problem:BRIDGET (acute kidney injury)        HPI:  Lele Dias is a 43 yr old female with PMH of CKD, depression, diabetes, hypertension who presents with lower abdominal pain and fatigue.    Patient reports right-sided lower abdominal pain, weakness, fatigue over the past day.  She states that she had multiple nonbloody nonbilious loose bowel movements, with associated nausea.  Of note, she was recently discharged for UTI/BRIDGET was sent home with antibiotics.    Triage vitals were remarkable for hypotension and hypothermia.  Physical exam significant for abdominal tenderness.    CBC was significant for leukocytosis, normocytic anemia.  CMP was significant for hyponatremia, and acute renal failure.  Troponin was slightly elevated at 0.146.  UA significant for WBCs and bacteria.  Blood cultures pending.    CT imaging was concerning for acute tip appendicitis, possible cellulitis    Surgery was consulted.  No acute surgery due to Plavix use    Patient admitted for acute renal failure and appendicitis management.    Overview/Hospital Course:  12/19/23 Refusing labs, VS checks  12/20/23 Abd pain is improved. Borderline BPs but patient is asymptomatic  12/21/23 is reporting worsening abdominal pain  12/22 Hypotension worsening with increased UOP and Cre down to 3.4  12/23 Patient requested repeat CT A/P due to abdominal pain, appendicitis is improved shows possible endometrial thickening  12/24 Renal function improved, still with abdominal tenderness, CT A/P shows resolving appendicitis, endometrial thickening, pelvic US performed    Interval History: Today is considering going home, would like her daughter  Jessica  to be updated, tried to call multiple times this afternoon unable to reach, will try again tomorrow    Review of Systems   Constitutional:  Negative for appetite change, chills and fatigue.   HENT:  Negative for ear discharge and ear pain.    Respiratory:  Negative for cough and choking.    Cardiovascular:  Negative for chest pain and leg swelling.   Gastrointestinal:  Positive for abdominal pain. Negative for anal bleeding, blood in stool, diarrhea and nausea.   Endocrine: Negative for polydipsia and polyphagia.   Genitourinary:  Negative for flank pain and hematuria.   Musculoskeletal:  Negative for back pain and gait problem.   Skin:  Negative for pallor and rash.   Allergic/Immunologic: Negative for food allergies and immunocompromised state.   Neurological:  Negative for seizures and speech difficulty.   Hematological:  Negative for adenopathy. Does not bruise/bleed easily.   Psychiatric/Behavioral:  Negative for agitation, behavioral problems, decreased concentration and dysphoric mood.      Objective:     Vital Signs (Most Recent):  Temp: 97.7 °F (36.5 °C) (12/24/23 1526)  Pulse: 79 (12/24/23 1526)  Resp: 18 (12/24/23 1526)  BP: (!) 116/57 (12/24/23 1526)  SpO2: 97 % (12/24/23 1526) Vital Signs (24h Range):  Temp:  [97.6 °F (36.4 °C)-98 °F (36.7 °C)] 97.7 °F (36.5 °C)  Pulse:  [] 79  Resp:  [18-22] 18  SpO2:  [90 %-97 %] 97 %  BP: (111-130)/(53-62) 116/57     Weight: (!) 166 kg (365 lb 15.4 oz)  Body mass index is 66.94 kg/m².    Intake/Output Summary (Last 24 hours) at 12/24/2023 1900  Last data filed at 12/24/2023 1822  Gross per 24 hour   Intake 299.73 ml   Output 1700 ml   Net -1400.27 ml         Physical Exam  Vitals and nursing note reviewed.   Constitutional:       General: She is not in acute distress.  HENT:      Head: Normocephalic and atraumatic.      Mouth/Throat:      Mouth: Mucous membranes are moist.   Cardiovascular:      Rate and Rhythm: Normal rate.   Pulmonary:      Effort: Pulmonary  effort is normal. No respiratory distress.   Abdominal:      General: There is no distension.      Palpations: Abdomen is soft.      Tenderness: There is abdominal tenderness (RLQ).      Comments: Has area of panniculitis, wounds. No abscess noted.   Skin:     General: Skin is warm and dry.   Neurological:      General: No focal deficit present.      Mental Status: She is alert and oriented to person, place, and time.             Significant Labs: All pertinent labs within the past 24 hours have been reviewed.  CBC:   Recent Labs   Lab 12/23/23  0629 12/24/23  0636   WBC 15.76* 15.73*   HGB 10.4* 10.6*   HCT 32.7* 34.5*    108*     CMP:   Recent Labs   Lab 12/23/23  0629 12/24/23  0636    142   K 4.5 4.6    112*   CO2 19* 17*   * 213*   BUN 27* 15   CREATININE 2.2* 1.3   CALCIUM 8.7 8.6*   ALBUMIN 1.7* 1.8*   ANIONGAP 10 13       Significant Imaging: I have reviewed all pertinent imaging results/findings within the past 24 hours.    Assessment/Plan:      * BRIDGET (acute kidney injury)  Patient with acute kidney injury/acute renal failure likely due to ATN due to sepsis, BRIDGET is currently stable. Baseline creatinine  1.0  - Labs reviewed- Renal function/electrolytes with Estimated Creatinine Clearance: 83.3 mL/min (based on SCr of 1.3 mg/dL). according to latest data. Monitor urine output and serial BMP and adjust therapy as needed. Avoid nephrotoxins and renally dose meds for GFR listed above.    Continued improving urine output, may be causing hypotension  Down to 33/3.4-->27/2.2-->1.3  Nephrology signed off      Abdominal pain  Likely due to appendicitis and panniculitis  Treated with IV abx and gave fluconazole  CT A/P showed   Slight prominence of the distal appendix/ possible mild appendicitis, improved from the 12/18/2023 exam.  No free air or farheen-appendiceal fluid collections.     Uterus with central low attenuation, may represent thickened endometrium and/or fluid in the endometrial  canal, possibly infected.  Further evaluation could be performed with pelvic ultrasound.     Prominent subcutaneous fat stranding/ inflammatory change in the left flank, increased.     Multiple fat containing lower quadrant ventral wall hernias.     Hepatosplenomegaly.     Prior cholecystectom      Hypotension  Improving  Stopped midodrine      Agitation  Will consult Psychiatry due to concern about medical decision making capacity  Will not PEC at this time as patient seems A&Ox4 but making rude and derogatory comments  Seen by Psychiatry, appreciate recommendations      Acute appendicitis with localized peritonitis, without perforation, abscess, or gangrene  Abdominal imaging concerning for appendicitis  Due to Plavix use, no urgent surgery is necessary  General surgery saw the patient in the ED  Plan to medically treat appendicitis with IV abx  Tentative plan for appendectomy in 6-8 weeks  Repeat KUB today did not show a perforation        UTI (urinary tract infection)  Antibiotics use for appendicitis can also be used for UTI        VTE Risk Mitigation (From admission, onward)           Ordered     heparin (porcine) injection 5,000 Units  Every 8 hours         12/18/23 1807     IP VTE HIGH RISK PATIENT  Once         12/18/23 1807     Place sequential compression device  Until discontinued         12/18/23 1807                    Discharge Planning   DEMARIO: 12/23/2023     Code Status: Full Code   Is the patient medically ready for discharge?:     Reason for patient still in hospital (select all that apply): Pending disposition  Discharge Plan A: Home Health ([facility])                  Courtney Prieto MD  Department of Hospital Medicine   [facility]

## 2023-12-25 NOTE — PROGRESS NOTES
Patient AAOx4, VSS, Patient in NAD. AVS given to patient. Transport provided will arrive at 9pm. Preparing for d/c

## 2023-12-25 NOTE — PLAN OF CARE
AAOx4. Medications administered per MAR. Glucose monitored. Wound care performed on sacrum and LLE. Safety precautions maintained. Call bell within reach.

## 2023-12-26 NOTE — PLAN OF CARE
12/26/2023 0825    [phone].    12/29/2023 0717  Discharge summary faxed to Dr Sammi Edge (PCP).

## 2023-12-28 NOTE — DISCHARGE SUMMARY
[facility] Discharge Summary    Attending Physician: Conner    Date of Admit: 12/18/2023  Date of Discharge: 12/25/2023    Discharge to: Home  Condition: Stable    Discharge Diagnoses     Sepsis 2/2 Acute Appendicitis, medical Tx  Acute Renal Failure 2/2 ATN-resolved  Ovarian cyst   Hypotension-Resolved  T2DM  CAD s/p BARBARA in 2021 on Plavix  CHF, systolic and diastolic    Consultants and Procedures     Consultants:  Gen Surgery, Nephrology, Psychiatry    Procedures:   None    Brief History of Present Illness      Lele Dias is a 43 yr old female with PMH of CKD, depression, diabetes, hypertension who presents with lower abdominal pain and fatigue.     Patient reports right-sided lower abdominal pain, weakness, fatigue over the past day.  She states that she had multiple nonbloody nonbilious loose bowel movements, with associated nausea.  Of note, she was recently discharged for UTI/BRIDGET was sent home with antibiotics.     Triage vitals were remarkable for hypotension and hypothermia.  Physical exam significant for abdominal tenderness.     CBC was significant for leukocytosis, normocytic anemia.  CMP was significant for hyponatremia, and acute renal failure.  Troponin was slightly elevated at 0.146.  UA significant for WBCs and bacteria.  Blood cultures pending.     CT imaging was concerning for acute tip appendicitis, possible cellulitis     Surgery was consulted.  No acute surgery due to Plavix use     Patient admitted for acute renal failure and appendicitis management.    For the full HPI please refer to the History & Physical from this admission.    Hospital Course By Problem with Pertinent Findings     Acute Renal Failure 2/2 ATN-Resolved  Patient with acute kidney injury/acute renal failure likely due to ATN due to sepsis and hypotension  Restart home lasix and BP meds gradually after seeing PCP  Nephrology signed off    Complex Ovarian Cyst  Mildly complex right ovarian cyst measuring 3.0  "cm.  If pain persists, recommend follow-up in 10-12 week     Acute appendicitis with localized peritonitis, without perforation, abscess, or gangrene  Abdominal imaging concerning for appendicitis  Due to Plavix use, no urgent surgery is necessary  General surgery saw the patient in the ED  Plan to medically treat appendicitis with IV abx  Tentative plan for appendectomy in 6-8 weeks  Repeat CT A/P showed   Slight prominence of the distal appendix/ possible mild appendicitis, improved from the 12/18/2023 exam.  No free air or farheen-appendiceal fluid collections.  Uterus with central low attenuation, may represent thickened endometrium and/or fluid in the endometrial canal, possibly infected.  Further evaluation could be performed with pelvic ultrasound.  Prominent subcutaneous fat stranding/ inflammatory change in the left flank, increased.  Multiple fat containing lower quadrant ventral wall hernias  Hepatosplenomegaly.  Prior cholecystectomy   Continue Augmentin to complete 2 week  course  Close Gen surgery follow up, okay to restart antiplatelet until surgery is scheduled    Hypotension-Resolved  Improving  Stopped midodrine  Restart BP meds at PCP office     Agitation-stable  A&Ox4 but making rude and derogatory comments  Will consult Psychiatry due to concern about medical decision making capacity, do not recommend PEC at this time    Discharge Time: Greater than 30 minutes? Yes    BP (!) 123/57 (BP Location: Right arm, Patient Position: Lying)   Pulse 69   Temp 98.1 °F (36.7 °C) (Oral)   Resp 18   Ht 5' 2" (1.575 m)   Wt (!) 161.6 kg (356 lb 4.2 oz)   SpO2 97%   Breastfeeding No   BMI 65.16 kg/m²       Discharge Medications        Medication List        CHANGE how you take these medications      ALPRAZolam 0.25 MG tablet  Commonly known as: XANAX  Take 1 tablet (0.25 mg total) by mouth 3 (three) times daily as needed for Insomnia or Anxiety.  What changed:   medication strength  how much to " "take  reasons to take this     furosemide 40 MG tablet  Commonly known as: LASIX  Take 1 tablet (40 mg total) by mouth daily as needed.  What changed: when to take this     HumaLOG Mix 75-25 KwikPen 100 unit/mL (75-25) Inpn  Generic drug: insulin lispro protamin-lispro  Inject 10 Units into the skin 3 (three) times daily.  What changed: how much to take     insulin glargine 100 unit/mL injection  Commonly known as: Lantus  Inject 30 Units into the skin every morning.  What changed: how much to take            CONTINUE taking these medications      acetaminophen 500 MG tablet  Commonly known as: TYLENOL     albuterol 90 mcg/actuation inhaler  Commonly known as: PROVENTIL/VENTOLIN HFA     allopurinoL 300 MG tablet  Commonly known as: ZYLOPRIM     amoxicillin-clavulanate 875-125mg 875-125 mg per tablet  Commonly known as: AUGMENTIN  Take 1 tablet by mouth every 12 (twelve) hours. for 7 days     aspirin 81 MG EC tablet  Commonly known as: ECOTRIN  Take 1 tablet (81 mg total) by mouth once daily.     atorvastatin 80 MG tablet  Commonly known as: LIPITOR  Take 1 tablet (80 mg total) by mouth once daily.     azelastine 137 mcg (0.1 %) nasal spray  Commonly known as: ASTELIN     BD ULTRA-FINE MINI PEN NEEDLE 31 gauge x 3/16" Ndle  Generic drug: pen needle, diabetic     BREZTRI AEROSPHERE 160-9-4.8 mcg/actuation Hfaa  Generic drug: budesonide-glycopyr-formoterol     cetirizine 10 MG tablet  Commonly known as: ZYRTEC     cholecalciferol (vitamin D3) 1,250 mcg (50,000 unit) capsule     clopidogreL 75 mg tablet  Commonly known as: PLAVIX  Take 1 tablet (75 mg total) by mouth once daily.     EScitalopram oxalate 10 MG tablet  Commonly known as: LEXAPRO     fluticasone propionate 50 mcg/actuation nasal spray  Commonly known as: FLONASE     LINZESS 72 mcg Cap capsule  Generic drug: linaCLOtide     medroxyPROGESTERone 150 mg/mL injection  Commonly known as: DEPO-PROVERA     nicotine 21 mg/24 hr  Commonly known as: NICODERM " CQ  Place 1 patch onto the skin once daily.     omeprazole 20 MG capsule  Commonly known as: PRILOSEC     ONE-A-DAY WOMEN'S COMPLETE 18 mg iron- 400 mcg Tab  Generic drug: multivit-min-iron fum-folic ac     pregabalin 300 MG Cap  Commonly known as: LYRICA     promethazine 6.25 mg/5 mL syrup  Commonly known as: PHENERGAN     SUBOXONE 8-2 mg  Generic drug: buprenorphine-naloxone 8-2 mg     tiZANidine 4 MG tablet  Commonly known as: ZANAFLEX  Take 1 tablet (4 mg total) by mouth every 8 (eight) hours as needed. Do not take while on cirpofloxacin antibiotics. Okay to take after completing course     TRUE METRIX GLUCOSE METER Misc  Generic drug: blood-glucose meter  use as directed     * TRUE METRIX GLUCOSE TEST STRIP Strp  Generic drug: blood sugar diagnostic     * TRUE METRIX GLUCOSE TEST STRIP Strp  Generic drug: blood sugar diagnostic  use as directed to check blood glucose up to three times daily     TRUEPLUS LANCETS 30 gauge Misc  Generic drug: lancets  use as directed to test blood glucose up to three times daily           * This list has 2 medication(s) that are the same as other medications prescribed for you. Read the directions carefully, and ask your doctor or other care provider to review them with you.                STOP taking these medications      carvediloL 3.125 MG tablet  Commonly known as: COREG     ciprofloxacin HCl 500 MG tablet  Commonly known as: CIPRO     doxycycline 100 MG Cap  Commonly known as: VIBRAMYCIN     lisinopriL 10 MG tablet     promethazine-dextromethorphan 6.25-15 mg/5 mL Syrp  Commonly known as: PROMETHAZINE-DM               Where to Get Your Medications        These medications were sent to [id number]  [address] [place], [place] [address] [address]      Phone: [phone]   amoxicillin-clavulanate 875-125mg 875-125 mg per tablet  insulin glargine 100 unit/mL injection       You can get these medications from any pharmacy    Bring a  paper prescription for each of these medications  ALPRAZolam 0.25 MG tablet       Information about where to get these medications is not yet available    Ask your nurse or doctor about these medications  furosemide 40 MG tablet  HumaLOG Mix 75-25 KwikPen 100 unit/mL (75-25) Inpn         Discharge Information:   Diet:  Diabetic    Physical Activity:  As tolerated    Instructions:  1. Take all medications as prescribed  2. Keep all follow-up appointments  3. Return to the hospital or call your primary care physicians if any worsening symptoms such as fevers, chills, lethargy, worsening abdominal pain, N/V/D or other concerns occur.      Follow-Up Appointments:  Gen Surgery in 1-2 weeks  Ob/Gyn in 1 month      Follow up items for PCP and tests that have not resulted at time of discharge:   None      Courtney Prieto MD  [facility] Medicine

## 2024-01-05 ENCOUNTER — TELEPHONE (OUTPATIENT)
Dept: SURGERY | Facility: CLINIC | Age: 44
End: 2024-01-05

## 2024-01-05 NOTE — TELEPHONE ENCOUNTER
24 [address]  Spoke to family member regarding patient's appointment today. Family member stated patient  yesterday. I extended my condolences. Will be tagged as .

## 2024-01-22 NOTE — PLAN OF CARE
The sw faxed the pt's info to [facility] b/c she's current with their agency to notify them of a hospital admit.        12/13/23 1084   Post-Acute Status   Post-Acute Authorization Home Health   Home Health Status Referrals Sent   Discharge Plan   Discharge Plan A Home Health   Discharge Plan B Other  (TBD)        140

## 2024-06-18 NOTE — PROGRESS NOTES
Wound assessed today per new wound care consult order. SARAH BALTAZAR, notified of assessment and told that wound showing a core of pus, still intact not yet draining, Janett states that this is a new finding and requested that Dr. Montiel see pt before discharge, wound care orders given and requests that pt follow up with him in wound clinic. Janett states that she has already made pt an appointment.  Wound cleaned with wound cleanser and xeroform applied to wound, covered with dry clean dressing.  See below for assessment.     09/14/18 1247       Wound 04/22/18 1930 Abscess lower Abdomen   Date First Assessed/Time First Assessed: 04/22/18 1930   Pre-existing: Yes  Wound Type: Abscess  Side: Left  Orientation: lower  Location: Abdomen   Wound Image    Wound WDL ex   Dressing Appearance Dry;Intact;Clean   Drainage Amount Scant   Drainage Characteristics/Odor Serosanguineous   Appearance Red;Tan   Tissue loss description Partial thickness   Red (%), Wound Tissue Color 90 %   Yellow (%), Wound Tissue Color 10 %   Periwound Area Dry;Intact   Wound Edges Defined;Open   Wound Length (cm) 3 cm   Wound Width (cm) 3 cm   Wound Depth (cm) 2 cm   Wound Volume (cm^3) 18 cm^3   Care Cleansed with:;Wound cleanser   Dressing Applied  (xeroform and adhesive dressing)   Periwound Care Dry periwound area maintained      RN called Trish ARNOLD and notified of BP 88/50 and blood sugar 58.   Orders being placed.

## 2025-04-03 ENCOUNTER — DOCUMENT SCAN (OUTPATIENT)
Dept: HOME HEALTH SERVICES | Facility: HOSPITAL | Age: 45
End: 2025-04-03
Payer: COMMERCIAL

## (undated) DEVICE — HEMOSTAT VASC BAND REG 24CM

## (undated) DEVICE — TUBING HPCIL ROT M/F ADPT 48IN

## (undated) DEVICE — GUIDEWIRE EMERALD .035IN 260CM

## (undated) DEVICE — CATH IMPULSE 5FR PIGTAIL 125CM

## (undated) DEVICE — INFLATOR ENCORE 26 BLLN INFL

## (undated) DEVICE — CATH TREK NC COR DIL 3.5X15

## (undated) DEVICE — DRESSING XEROFORM FOIL PK 1X8

## (undated) DEVICE — VISIPAQUE 320 200ML +PK

## (undated) DEVICE — KIT INTRODUCER W/GUIDEWIRE

## (undated) DEVICE — GLOVE SURGICAL LATEX SZ 7

## (undated) DEVICE — DEVICE MYNX GRIP 6/7F

## (undated) DEVICE — COVER PROBE US 5.5X58L NON LTX

## (undated) DEVICE — PAD ABDOMINAL 5X9 STERILE

## (undated) DEVICE — GUIDEWIRE EMERALD 150CM PTFE

## (undated) DEVICE — VALVE CONTROL COPILOT

## (undated) DEVICE — CATH NC QUANTUM APEX MR 3.5X20

## (undated) DEVICE — SHEATH INTRODUCER 6FR 11CM

## (undated) DEVICE — Device

## (undated) DEVICE — ELECTRODE REM PLYHSV RETURN 9

## (undated) DEVICE — SEE MEDLINE ITEM 152622

## (undated) DEVICE — PAD PREP 50/CA

## (undated) DEVICE — GUIDEWIRE FIELDER XT.014X190CM

## (undated) DEVICE — GUIDE LAUNCH 6FR AL 7.5

## (undated) DEVICE — DEFIBRILLATOR STAT PADZ II

## (undated) DEVICE — CATH ANGIO EXPO FL3.5 6F

## (undated) DEVICE — SEE MEDLINE ITEM 157187

## (undated) DEVICE — SEE MEDLINE ITEM 156955

## (undated) DEVICE — GUIDEWIRE RUNTHROUGH EF 180CM

## (undated) DEVICE — KIT GLIDESHEATH SLEND 6FR 10CM

## (undated) DEVICE — GUIDEWIRE PROWATER .014X180CM

## (undated) DEVICE — NDL 22GA X1 1/2 REG BEVEL

## (undated) DEVICE — SPONGE DERMACEA GAUZE 4X4

## (undated) DEVICE — KIT LEFT HEART MANIFOLD CUSTOM

## (undated) DEVICE — GUIDE WIRE BMW 014 X190

## (undated) DEVICE — SEE MEDLINE ITEM 154981

## (undated) DEVICE — SYR 10CC LUER LOCK

## (undated) DEVICE — CATH EAGLE EYE ST .014X20X150

## (undated) DEVICE — SHEATH 6FR 35CM

## (undated) DEVICE — CATH OPTITORQUE TIGER 5F 100CM

## (undated) DEVICE — SWAB CULTURETTE SINGLE

## (undated) DEVICE — KIT COLLECTION E SWAB REGULAR

## (undated) DEVICE — SET MICRO PUNCT 4FR/MPIS-401

## (undated) DEVICE — GUIDEWIRE OMNI J TIP 185CM

## (undated) DEVICE — GUIDEWIRE VERRATA + JTIP 185CM

## (undated) DEVICE — VISE RADIFOCUS MULTI TORQUE

## (undated) DEVICE — GUIDE LAUNCHER 6FR EBU 3.5

## (undated) DEVICE — CATH RADIAL TIG 6FR 4.0 110CM

## (undated) DEVICE — PACK BASIC

## (undated) DEVICE — SEE MEDLINE ITEM 157116

## (undated) DEVICE — CONTRAST VISIPAQUE 150ML

## (undated) DEVICE — COVER OVERHEAD SURG LT BLUE

## (undated) DEVICE — WIRE PTCE CHOICE PT .014X182

## (undated) DEVICE — CATH TREK RX 2.50MM X 12MM

## (undated) DEVICE — GUIDEWIRE PILOT 50X190

## (undated) DEVICE — SYR MARK 7 ARTERION 150ML

## (undated) DEVICE — SEE MEDLINE ITEM 157117